# Patient Record
Sex: FEMALE | Race: WHITE | NOT HISPANIC OR LATINO | Employment: OTHER | URBAN - METROPOLITAN AREA
[De-identification: names, ages, dates, MRNs, and addresses within clinical notes are randomized per-mention and may not be internally consistent; named-entity substitution may affect disease eponyms.]

---

## 2018-01-08 ENCOUNTER — TELEPHONE (OUTPATIENT)
Dept: SURGERY | Facility: CLINIC | Age: 68
End: 2018-01-08

## 2018-01-08 NOTE — TELEPHONE ENCOUNTER
----- Message from Chantell Phillips sent at 1/8/2018  1:36 PM CST -----  Contact: [phone]  Pt wants to know if the provider does colonoscopies Please call pt at your earliest convenience.  Thanks !

## 2018-01-16 ENCOUNTER — OFFICE VISIT (OUTPATIENT)
Dept: SURGERY | Facility: CLINIC | Age: 68
End: 2018-01-16
Payer: MEDICARE

## 2018-01-16 VITALS — HEIGHT: 61 IN | BODY MASS INDEX: 24.52 KG/M2 | WEIGHT: 129.88 LBS | TEMPERATURE: 98 F

## 2018-01-16 DIAGNOSIS — Z87.19 HISTORY OF DIVERTICULITIS: Primary | ICD-10-CM

## 2018-01-16 PROCEDURE — 99204 OFFICE O/P NEW MOD 45 MIN: CPT | Mod: S$GLB,,, | Performed by: SURGERY

## 2018-01-16 PROCEDURE — 99999 PR PBB SHADOW E&M-EST. PATIENT-LVL III: CPT | Mod: PBBFAC,,, | Performed by: SURGERY

## 2018-01-16 RX ORDER — AZELASTINE HYDROCHLORIDE 0.5 MG/ML
SOLUTION/ DROPS OPHTHALMIC
COMMUNITY
End: 2018-04-17

## 2018-01-16 RX ORDER — HYDROCODONE BITARTRATE AND ACETAMINOPHEN 5; 325 MG/1; MG/1
1 TABLET ORAL EVERY 6 HOURS PRN
COMMUNITY
Start: 2017-12-08 | End: 2019-01-08

## 2018-01-16 RX ORDER — BENAZEPRIL HYDROCHLORIDE 20 MG/1
1 TABLET ORAL DAILY
Refills: 1 | COMMUNITY
Start: 2017-12-26 | End: 2018-06-21 | Stop reason: SDUPTHER

## 2018-01-16 RX ORDER — CLONIDINE HYDROCHLORIDE 0.1 MG/1
1 TABLET ORAL 2 TIMES DAILY
Refills: 1 | COMMUNITY
Start: 2017-12-19 | End: 2018-09-04 | Stop reason: SDUPTHER

## 2018-01-16 RX ORDER — EZETIMIBE 10 MG/1
1 TABLET ORAL DAILY
COMMUNITY
Start: 2018-01-15 | End: 2018-08-23 | Stop reason: ALTCHOICE

## 2018-01-16 NOTE — PROGRESS NOTES
Subjective:       Patient ID: Kristin Guerra is a 67 y.o. female.    Chief Complaint: Consult (Diverticulitis./Colonoscopy)    HPI  Pleasant 68 yo F referred to me for evaluation of colonoscopy.  Pt reportedly had a bout of diverticulitis around [place]. Diagnosis was based on clinical picture.  Pt notes that she has improved with abx.  PT states that she has never had diverticulitis in the past.  Pt notes that she still has some slight pain but that it is improving.  Also notes some slight nausea. Pt has history of colon cancer and has had a colectomy in 2007.  Pt also had a repeat colectomy in 2008 for apparent complications from the first.  Pt is a daily smoker.  She suffers with HTN, hypercholesterolemia, Lupus and tremors. Pt last colonoscopy was reportedly 7 years ago    Review of Systems   Constitutional: Negative for activity change, appetite change, fever and unexpected weight change.   HENT: Negative for congestion and mouth sores.    Respiratory: Negative for chest tightness, shortness of breath and wheezing.    Cardiovascular: Negative for chest pain.   Gastrointestinal: Positive for abdominal pain. Negative for abdominal distention, constipation, diarrhea, nausea and vomiting.   Genitourinary: Negative for difficulty urinating, dysuria and frequency.   Skin: Negative for color change and wound.   Neurological: Negative for dizziness.   Hematological: Negative for adenopathy. Does not bruise/bleed easily.   Psychiatric/Behavioral: Negative for agitation and decreased concentration.       Objective:      Physical Exam   Constitutional: She is oriented to person, place, and time. She appears well-developed and well-nourished.   HENT:   Head: Normocephalic and atraumatic.   Eyes: Pupils are equal, round, and reactive to light.   Neck: Normal range of motion. Neck supple. No tracheal deviation present. No thyromegaly present.   Cardiovascular: Normal rate, regular rhythm and normal heart sounds.    No  murmur heard.  Pulmonary/Chest: Effort normal. She has wheezes. She exhibits no tenderness.   Abdominal: Soft. Bowel sounds are normal. She exhibits no distension, no abdominal bruit, no pulsatile midline mass and no mass. There is no hepatosplenomegaly. There is no tenderness. There is no rigidity, no rebound, no guarding, no tenderness at McBurney's point and negative Rivera's sign. No hernia. Hernia confirmed negative in the ventral area.   Genitourinary: Rectum normal.   Musculoskeletal: Normal range of motion. She exhibits no edema, tenderness or deformity.   Lymphadenopathy:     She has no cervical adenopathy.        Right: No supraclavicular adenopathy present.        Left: No supraclavicular adenopathy present.   Neurological: She is alert and oriented to person, place, and time.   Skin: Skin is warm. No rash noted. No erythema.   Psychiatric: She has a normal mood and affect. Her behavior is normal.   Vitals reviewed.      Assessment:     Diverticulitis  No diagnosis found.    Plan:       PT with presumed diverticulitis based on outside exam. Given this and last cscope 7 years ago do feel that cscope is appropriate.  Recommend obtaining ct scan to ensure that inflammation has subsided. Will call pt after ct scan to schedule cscope

## 2018-01-18 ENCOUNTER — TELEPHONE (OUTPATIENT)
Dept: SURGERY | Facility: CLINIC | Age: 68
End: 2018-01-18

## 2018-01-18 NOTE — TELEPHONE ENCOUNTER
I called patient to inquire about the date of her surgery in 2008 with Dr. Link.  She states that she was in the hospital in 7/2008 and thinks that it was in 8/2008.  I'll call [facility] and request the reports. Atrhur

## 2018-01-23 ENCOUNTER — HOSPITAL ENCOUNTER (OUTPATIENT)
Dept: RADIOLOGY | Facility: HOSPITAL | Age: 68
Discharge: HOME | End: 2018-01-23
Attending: SURGERY
Payer: MEDICARE

## 2018-01-23 ENCOUNTER — TELEPHONE (OUTPATIENT)
Dept: SURGERY | Facility: CLINIC | Age: 68
End: 2018-01-23

## 2018-01-23 DIAGNOSIS — Z87.19 HISTORY OF DIVERTICULITIS: ICD-10-CM

## 2018-01-23 PROCEDURE — 74177 CT ABD & PELVIS W/CONTRAST: CPT | Mod: TC

## 2018-01-23 PROCEDURE — 74177 CT ABD & PELVIS W/CONTRAST: CPT | Mod: 26,,, | Performed by: RADIOLOGY

## 2018-01-23 PROCEDURE — 25500020 PHARM REV CODE 255

## 2018-01-23 RX ORDER — SODIUM CHLORIDE 9 MG/ML
INJECTION, SOLUTION INTRAVENOUS
Status: DISPENSED
Start: 2018-01-23 | End: 2018-01-23

## 2018-01-23 RX ADMIN — IOHEXOL 75 ML: 350 INJECTION, SOLUTION INTRAVENOUS at 08:01

## 2018-01-23 RX ADMIN — IOHEXOL 30 ML: 350 INJECTION, SOLUTION INTRAVENOUS at 08:01

## 2018-01-24 ENCOUNTER — TELEPHONE (OUTPATIENT)
Dept: SURGERY | Facility: CLINIC | Age: 68
End: 2018-01-24

## 2018-01-24 NOTE — TELEPHONE ENCOUNTER
----- Message from Lulu Snider sent at 1/24/2018  9:39 AM CST -----  Contact: patient  Patient calling to schedule a Colonoscopy. Please advise.  Call back   Thanks!

## 2018-01-26 ENCOUNTER — TELEPHONE (OUTPATIENT)
Dept: SURGERY | Facility: CLINIC | Age: 68
End: 2018-01-26

## 2018-01-26 DIAGNOSIS — Z12.11 SCREEN FOR COLON CANCER: Primary | ICD-10-CM

## 2018-01-26 RX ORDER — SODIUM CHLORIDE, SODIUM LACTATE, POTASSIUM CHLORIDE, CALCIUM CHLORIDE 600; 310; 30; 20 MG/100ML; MG/100ML; MG/100ML; MG/100ML
INJECTION, SOLUTION INTRAVENOUS CONTINUOUS
Status: CANCELLED | OUTPATIENT
Start: 2018-01-26

## 2018-01-26 NOTE — TELEPHONE ENCOUNTER
----- Message from Hanh Santos sent at 1/26/2018  1:55 PM CST -----  Patient requesting to speak with nurse concerning date and time of colonoscopy/please call back at [phone] to advise.

## 2018-01-26 NOTE — TELEPHONE ENCOUNTER
Colonoscopy is scheduled for 01/30/18 arrival time per the preop nurse.  Preop will call from [phone]  Fasting after midnight  Someone to drive you home    PLEASE NOTE THAT SURGERY TIMES CAN CHANGE THE PREOP NURSE WILL GIVE THE ARRIVAL TIME WHEN SHE/HE CALLS.  THE PREOP NURSE WILL CALL, SOMETIMES AS LATE AS 4 PM IN THE AFTERNOON THE DAY BEFORE SURGERY.        Special Instruction:

## 2018-01-29 ENCOUNTER — TELEPHONE (OUTPATIENT)
Dept: SURGERY | Facility: CLINIC | Age: 68
End: 2018-01-29

## 2018-01-29 NOTE — TELEPHONE ENCOUNTER
----- Message from Rosanna Hayden sent at 1/29/2018  7:24 AM CST -----  Contact: self  Needs to cancel colonoscopy, has been sick all weekend. Please call back at [phone] ([phone])

## 2018-01-29 NOTE — TELEPHONE ENCOUNTER
----- Message from Tatiana Kim sent at 1/29/2018 10:30 AM CST -----  Contact: self [phone]  Call placed to pod. Patient returned your call, please call back.  Thank you!

## 2018-02-20 ENCOUNTER — ANESTHESIA (OUTPATIENT)
Dept: ENDOSCOPY | Facility: HOSPITAL | Age: 68
End: 2018-02-20
Payer: MEDICARE

## 2018-02-20 ENCOUNTER — ANESTHESIA EVENT (OUTPATIENT)
Dept: ENDOSCOPY | Facility: HOSPITAL | Age: 68
End: 2018-02-20
Payer: MEDICARE

## 2018-02-20 ENCOUNTER — SURGERY (OUTPATIENT)
Age: 68
End: 2018-02-20

## 2018-02-20 ENCOUNTER — HOSPITAL ENCOUNTER (OUTPATIENT)
Facility: HOSPITAL | Age: 68
Discharge: HOME | End: 2018-02-20
Attending: SURGERY | Admitting: SURGERY
Payer: MEDICARE

## 2018-02-20 DIAGNOSIS — Z87.19 HX OF DIVERTICULITIS OF COLON: ICD-10-CM

## 2018-02-20 DIAGNOSIS — Z85.038 HX OF COLON CANCER, STAGE II: ICD-10-CM

## 2018-02-20 PROCEDURE — 45380 COLONOSCOPY AND BIOPSY: CPT | Performed by: SURGERY

## 2018-02-20 PROCEDURE — 27201012 HC FORCEPS, HOT/COLD, DISP: Performed by: SURGERY

## 2018-02-20 PROCEDURE — 37000009 HC ANESTHESIA EA ADD 15 MINS: Performed by: SURGERY

## 2018-02-20 PROCEDURE — 25000003 PHARM REV CODE 250: Performed by: SURGERY

## 2018-02-20 PROCEDURE — D9220A PRA ANESTHESIA: Mod: CRNA,,, | Performed by: NURSE ANESTHETIST, CERTIFIED REGISTERED

## 2018-02-20 PROCEDURE — 25000003 PHARM REV CODE 250: Performed by: NURSE ANESTHETIST, CERTIFIED REGISTERED

## 2018-02-20 PROCEDURE — 45380 COLONOSCOPY AND BIOPSY: CPT | Mod: ,,, | Performed by: SURGERY

## 2018-02-20 PROCEDURE — 63600175 PHARM REV CODE 636 W HCPCS: Performed by: NURSE ANESTHETIST, CERTIFIED REGISTERED

## 2018-02-20 PROCEDURE — D9220A PRA ANESTHESIA: Mod: ANES,,, | Performed by: ANESTHESIOLOGY

## 2018-02-20 PROCEDURE — 88305 TISSUE EXAM BY PATHOLOGIST: CPT | Performed by: PATHOLOGY

## 2018-02-20 PROCEDURE — 37000008 HC ANESTHESIA 1ST 15 MINUTES: Performed by: SURGERY

## 2018-02-20 RX ORDER — LIDOCAINE HYDROCHLORIDE 10 MG/ML
INJECTION, SOLUTION INTRAVENOUS
Status: DISCONTINUED | OUTPATIENT
Start: 2018-02-20 | End: 2018-02-20

## 2018-02-20 RX ORDER — SODIUM CHLORIDE 9 MG/ML
INJECTION, SOLUTION INTRAVENOUS CONTINUOUS
Status: DISCONTINUED | OUTPATIENT
Start: 2018-02-20 | End: 2018-02-20 | Stop reason: HOSPADM

## 2018-02-20 RX ORDER — PROPOFOL 10 MG/ML
VIAL (ML) INTRAVENOUS
Status: DISCONTINUED | OUTPATIENT
Start: 2018-02-20 | End: 2018-02-20

## 2018-02-20 RX ADMIN — PROPOFOL 20 MG: 10 INJECTION, EMULSION INTRAVENOUS at 07:02

## 2018-02-20 RX ADMIN — PROPOFOL 100 MG: 10 INJECTION, EMULSION INTRAVENOUS at 07:02

## 2018-02-20 RX ADMIN — SODIUM CHLORIDE 1000 ML: 0.9 INJECTION, SOLUTION INTRAVENOUS at 07:02

## 2018-02-20 RX ADMIN — LIDOCAINE HYDROCHLORIDE 50 MG: 10 INJECTION, SOLUTION INTRAVENOUS at 07:02

## 2018-02-20 RX ADMIN — PROPOFOL 10 MG: 10 INJECTION, EMULSION INTRAVENOUS at 08:02

## 2018-02-20 NOTE — OR NURSING
Sedation, oxygen delivery, and monitoring per anesthesia for endoscopic procedure.    Have you had a colonoscopy LESS THAN 3 years ago?   * If YES, answer these questions*:  No    1. Did patient have a prior colonic polyp in a previous surveillance/diagnostic colonoscopy and is 18 years or older on date of encounter?    2. Documentation of < 3 year interval since the patients last colonoscopy due to medical reasons (eg., last colonoscopy incomplete, last colonoscopy had inadequate prep, piecemeal removal of adenomas, or last colonoscopy found > 10 adenomas) ?

## 2018-02-20 NOTE — DISCHARGE INSTRUCTIONS

## 2018-02-20 NOTE — ANESTHESIA PREPROCEDURE EVALUATION
02/20/2018  Kristin Guerra is a 68 y.o., female.    Anesthesia Evaluation    I have reviewed the Patient Summary Reports.    I have reviewed the Nursing Notes.   I have reviewed the Medications.     Review of Systems  Anesthesia Hx:  No problems with previous Anesthesia    Social:  Smoker    Cardiovascular:   Hypertension, well controlled    Pulmonary:  Pulmonary Normal    Renal/:  Renal/ Normal     Hepatic/GI:   GERD, well controlled diverticulitis   Neurological:  Neurology Normal    Endocrine:  Endocrine Normal        Physical Exam  General:  Well nourished    Airway/Jaw/Neck:  Airway Findings: Mouth Opening: Normal Tongue: Normal  General Airway Assessment: Adult  Oropharynx Findings:  Mallampati: II  Jaw/Neck Findings:  Neck ROM: Normal ROM     Eyes/Ears/Nose:  Eyes/Ears/Nose Findings:    Dental:  Dental Findings:   Chest/Lungs:  Chest/Lungs Findings: Normal Respiratory Rate     Heart/Vascular:  Heart Findings: Rate: Normal  Rhythm: Regular Rhythm        Mental Status:  Mental Status Findings:  Cooperative, Alert and Oriented         Anesthesia Plan  Type of Anesthesia, risks & benefits discussed:  Anesthesia Type:  general  Patient's Preference:   Intra-op Monitoring Plan:   Intra-op Monitoring Plan Comments:   Post Op Pain Control Plan: multimodal analgesia  Post Op Pain Control Plan Comments:   Induction:   IV  Beta Blocker:  Patient is not currently on a Beta-Blocker (No further documentation required).       Informed Consent: Patient understands risks and agrees with Anesthesia plan.  Questions answered. Anesthesia consent signed with patient.  ASA Score: 3     Day of Surgery Review of History & Physical:  There are no significant changes.   H&P completed by Anesthesiologist.       Ready For Surgery From Anesthesia Perspective.

## 2018-02-20 NOTE — H&P
HPI  Pleasant 68 yo F referred to me for evaluation of colonoscopy.  Pt reportedly had a bout of diverticulitis around [place]. Diagnosis was based on clinical picture.  Pt notes that she has improved with abx.  PT states that she has never had diverticulitis in the past.  Pt notes that she still has some slight pain but that it is improving.  Also notes some slight nausea. Pt has history of colon cancer and has had a colectomy in 2007.  Pt also had a repeat colectomy in 2008 for apparent complications from the first.  Pt is a daily smoker.  She suffers with HTN, hypercholesterolemia, Lupus and tremors. Pt last colonoscopy was reportedly 7 years ago.   F/u ct scan demonstrated no evidence of diverticulitis.        Review of Systems   Constitutional: Negative for activity change, appetite change, fever and unexpected weight change.   HENT: Negative for congestion and mouth sores.    Respiratory: Negative for chest tightness, shortness of breath and wheezing.    Cardiovascular: Negative for chest pain.   Gastrointestinal: Positive for abdominal pain. Negative for abdominal distention, constipation, diarrhea, nausea and vomiting.   Genitourinary: Negative for difficulty urinating, dysuria and frequency.   Skin: Negative for color change and wound.   Neurological: Negative for dizziness.   Hematological: Negative for adenopathy. Does not bruise/bleed easily.   Psychiatric/Behavioral: Negative for agitation and decreased concentration.       Objective:   Physical Exam   Constitutional: She is oriented to person, place, and time. She appears well-developed and well-nourished.   HENT:   Head: Normocephalic and atraumatic.   Eyes: Pupils are equal, round, and reactive to light.   Neck: Normal range of motion. Neck supple. No tracheal deviation present. No thyromegaly present.   Cardiovascular: Normal rate, regular rhythm and normal heart sounds.    No murmur heard.  Pulmonary/Chest: Effort normal. She has wheezes. She  exhibits no tenderness.   Abdominal: Soft. Bowel sounds are normal. She exhibits no distension, no abdominal bruit, no pulsatile midline mass and no mass. There is no hepatosplenomegaly. There is no tenderness. There is no rigidity, no rebound, no guarding, no tenderness at McBurney's point and negative Rivera's sign. No hernia. Hernia confirmed negative in the ventral area.   Genitourinary: Rectum normal.   Musculoskeletal: Normal range of motion. She exhibits no edema, tenderness or deformity.   Lymphadenopathy:     She has no cervical adenopathy.        Right: No supraclavicular adenopathy present.        Left: No supraclavicular adenopathy present.   Neurological: She is alert and oriented to person, place, and time.   Skin: Skin is warm. No rash noted. No erythema.   Psychiatric: She has a normal mood and affect. Her behavior is normal.   Vitals reviewed.      Assessment:     Diverticulitis  No diagnosis found.    Plan:       Resolved diverticulitis.  Proceed with cscope.

## 2018-02-20 NOTE — TRANSFER OF CARE
"Anesthesia Transfer of Care Note    Patient: Kristin Guerra    Procedure(s) Performed: Procedure(s) (LRB):  COLONOSCOPY (N/A)    Patient location: PACU    Anesthesia Type: general    Transport from OR: Transported from OR on 2-3 L/min O2 by NC with adequate spontaneous ventilation    Post pain: adequate analgesia    Post assessment: no apparent anesthetic complications and tolerated procedure well    Post vital signs: stable    Level of consciousness: awake, alert and oriented    Nausea/Vomiting: no nausea/vomiting    Complications: none    Transfer of care protocol was followed      Last vitals:   Visit Vitals  BP (!) 93/57   Pulse (!) 52   Temp 36.8 °C (98.2 °F) (Oral)   Resp 18   Ht 5' 1" (1.549 m)   Wt 59 kg (130 lb)   SpO2 99%   Breastfeeding? No   BMI 24.56 kg/m²     "

## 2018-02-21 VITALS
HEIGHT: 61 IN | WEIGHT: 130 LBS | SYSTOLIC BLOOD PRESSURE: 132 MMHG | BODY MASS INDEX: 24.55 KG/M2 | DIASTOLIC BLOOD PRESSURE: 72 MMHG | RESPIRATION RATE: 18 BRPM | HEART RATE: 55 BPM | TEMPERATURE: 98 F | OXYGEN SATURATION: 99 %

## 2018-02-25 NOTE — PHYSICIAN QUERY
PT Name: Kristin Guerra  MR #: [id number]     Physician Query Form - Documentation Clarification      CDS/: Mirna Allen               Contact information:    This form is a permanent document in the medical record.     Query Date: February 25, 2018    By submitting this query, we are merely seeking further clarification of documentation. Please utilize your independent clinical judgment when addressing the question(s) below.    The Medical record reflects the following:    Supporting Clinical Findings Location in Medical Record     screning       Surgery tab     Abdominal pain in the left lower quadrant       Proc note                                                                            Doctor Esquivel,  Was the intent of the colonoscopy:    Provider Use Only      [x ] screening    [ ] diagnostic                                                                                                                         [  ] Clinically undetermined

## 2018-04-17 ENCOUNTER — OFFICE VISIT (OUTPATIENT)
Dept: GASTROENTEROLOGY | Facility: CLINIC | Age: 68
End: 2018-04-17
Payer: MEDICARE

## 2018-04-17 VITALS
BODY MASS INDEX: 23.81 KG/M2 | HEIGHT: 61 IN | SYSTOLIC BLOOD PRESSURE: 138 MMHG | DIASTOLIC BLOOD PRESSURE: 65 MMHG | WEIGHT: 126.13 LBS | HEART RATE: 63 BPM

## 2018-04-17 DIAGNOSIS — R12 HEARTBURN: Primary | ICD-10-CM

## 2018-04-17 DIAGNOSIS — R14.3 FLATULENCE: ICD-10-CM

## 2018-04-17 DIAGNOSIS — R12 HEARTBURN: ICD-10-CM

## 2018-04-17 DIAGNOSIS — R10.12 LUQ PAIN: ICD-10-CM

## 2018-04-17 DIAGNOSIS — R10.13 EPIGASTRIC PAIN: Primary | ICD-10-CM

## 2018-04-17 DIAGNOSIS — R10.9 FLANK PAIN: ICD-10-CM

## 2018-04-17 DIAGNOSIS — R14.2 ERUCTATION: ICD-10-CM

## 2018-04-17 DIAGNOSIS — R10.9 LEFT FLANK PAIN: ICD-10-CM

## 2018-04-17 DIAGNOSIS — Z87.898 HISTORY OF URINARY INCONTINENCE: ICD-10-CM

## 2018-04-17 DIAGNOSIS — R10.13 EPIGASTRIC PAIN: ICD-10-CM

## 2018-04-17 DIAGNOSIS — Z87.19 HISTORY OF DIVERTICULOSIS: ICD-10-CM

## 2018-04-17 DIAGNOSIS — Z85.038 HISTORY OF COLON CANCER: ICD-10-CM

## 2018-04-17 DIAGNOSIS — R07.9 NONSPECIFIC CHEST PAIN: ICD-10-CM

## 2018-04-17 PROBLEM — H26.9 CATARACT: Status: ACTIVE | Noted: 2018-04-17

## 2018-04-17 PROBLEM — G25.2 ESSENTIAL AND OTHER SPECIFIED FORMS OF TREMOR: Status: ACTIVE | Noted: 2018-04-17

## 2018-04-17 PROBLEM — M54.12 CERVICAL RADICULOPATHY: Status: ACTIVE | Noted: 2018-04-17

## 2018-04-17 PROBLEM — M50.20 HERNIATION OF INTERVERTEBRAL DISC OF CERVICAL REGION: Status: ACTIVE | Noted: 2018-04-17

## 2018-04-17 PROBLEM — M50.30 DEGENERATION OF INTERVERTEBRAL DISC OF CERVICAL REGION: Status: ACTIVE | Noted: 2018-04-17

## 2018-04-17 PROBLEM — G25.0 ESSENTIAL AND OTHER SPECIFIED FORMS OF TREMOR: Status: ACTIVE | Noted: 2018-04-17

## 2018-04-17 PROBLEM — L93.0 LUPUS ERYTHEMATOSUS: Status: ACTIVE | Noted: 2018-04-17

## 2018-04-17 PROBLEM — I10 HYPERTENSION: Status: ACTIVE | Noted: 2018-04-17

## 2018-04-17 PROBLEM — K20.90 ESOPHAGITIS: Status: ACTIVE | Noted: 2018-04-17

## 2018-04-17 PROBLEM — K21.9 GASTROESOPHAGEAL REFLUX DISEASE: Status: ACTIVE | Noted: 2018-04-17

## 2018-04-17 PROBLEM — E78.00 HYPERCHOLESTEROLEMIA: Status: ACTIVE | Noted: 2018-04-17

## 2018-04-17 PROBLEM — E03.9 HYPOACTIVE THYROID: Status: ACTIVE | Noted: 2018-04-17

## 2018-04-17 PROCEDURE — 99999 PR PBB SHADOW E&M-EST. PATIENT-LVL IV: CPT | Mod: PBBFAC,,, | Performed by: NURSE PRACTITIONER

## 2018-04-17 PROCEDURE — 99203 OFFICE O/P NEW LOW 30 MIN: CPT | Mod: S$GLB,,, | Performed by: NURSE PRACTITIONER

## 2018-04-17 RX ORDER — AMLODIPINE BESYLATE 5 MG/1
5 TABLET ORAL DAILY
COMMUNITY
Start: 2018-03-24 | End: 2018-06-21 | Stop reason: SDUPTHER

## 2018-04-17 RX ORDER — NAPROXEN SODIUM 220 MG
220 TABLET ORAL DAILY PRN
COMMUNITY
End: 2018-10-31

## 2018-04-17 RX ORDER — POLYETHYLENE GLYCOL 3350 17 G/17G
17 POWDER, FOR SOLUTION ORAL DAILY PRN
Status: ON HOLD | COMMUNITY
End: 2020-06-04 | Stop reason: SDUPTHER

## 2018-04-17 RX ORDER — CALCIUM CARBONATE 200(500)MG
1 TABLET,CHEWABLE ORAL 2 TIMES DAILY PRN
COMMUNITY
End: 2020-09-29

## 2018-04-17 NOTE — PATIENT INSTRUCTIONS
Excess Gas  Certain foods produce gas when digested. In some people, these foods make an excessive amount of gas. This may cause bloating, burping, or increased gas passing through the rectum (flatulence).     Foods that cause gas  The following foods are more likely to cause this problem. Limit them, or remove them from your diet:  · Broccoli  · Cauliflower  · [place] sprouts  · Cabbage  · Cooked dried beans  · Fizzy (carbonated) drinks, such as sparkling water, soda, beer, and champagne  Other causes  Other causes of excess gas include:  · Eating too fast or talking while you chew. This may cause you to swallow air. This increases the amount of gas in your stomach. And it may make your symptoms worse. Chew each mouthful completely before you swallow. Take your time.  · Chewing on gum or sucking on hard candy. These cause you to swallow more often. And some of what you are swallowing is air. This leads to more gas in your stomach. Avoid chewing gum and hard candy.  · Overeating. This may increase the feeling of being bloated and cause more gas. When you are full, stop eating.   · Being constipated. This can increase the amount of normal intestinal gas. Avoid constipation by getting more fiber in your diet. Good sources of fiber include whole-grain cereal, fresh vegetables (except those in the above list), and fresh fruits. High-fiber foods absorb water and carry it out of the body. When adding more fiber to your diet, you also need to drink more water. You should drink at least 8, 8-ounce glasses of water (2 quarts) per day.  Date Last Reviewed: 8/1/2016  © 3499-6731 [employer]. [address] [place] [address]. All rights reserved. This information is not intended as a substitute for professional medical care. Always follow your healthcare professional's instructions.        Abdominal Pain    Abdominal pain is pain in the stomach or belly area. Everyone has this pain from time to time.  In many cases it goes away on its own. But abdominal pain can sometimes be due to a serious problem, such as appendicitis. So its important to know when to seek help.  Causes of abdominal pain  There are many possible causes of abdominal pain. Common causes in adults include:  · Constipation, diarrhea, or gas  · Stomach acid flowing back up into the esophagus (acid reflux or heartburn)  · Severe acid reflux, called GERD (gastroesophageal reflux disease)  · A sore in the lining of the stomach or small intestine (peptic ulcer)  · Inflammation of the gallbladder, liver, or pancreas  · Gallstones or kidney stones  · Appendicitis   · Intestinal blockage   · An internal organ pushing through a muscle or other tissue (hernia)  · Urinary tract infections  · In women, menstrual cramps, fibroids, or endometriosis  · Inflammation or infection of the intestines  Diagnosing the cause of abdominal pain  Your healthcare provider will do a physical exam help find the cause of your pain. If needed, tests will be ordered. Belly pain has many possible causes. So it can be hard to find the reason for your pain. Giving details about your pain can help. Tell your provider where and when you feel the pain, and what makes it better or worse. Also let your provider know if you have other symptoms such as:  · Fever  · Tiredness  · Upset stomach (nausea)  · Vomiting  · Changes in bathroom habits  Treating abdominal pain  Some causes of pain need emergency medical treatment right away. These include appendicitis or a bowel blockage. Other problems can be treated with rest, fluids, or medicines. Your healthcare provider can give you specific instructions for treatment or self-care based on what is causing your pain.  If you have vomiting or diarrhea, sip water or other clear fluids. When you are ready to eat solid foods again, start with small amounts of easy-to-digest, low-fat foods. These include apple sauce, toast, or crackers.   When to  seek medical care  Call 911 or go to the hospital right away if you:  · Cant pass stool and are vomiting  · Are vomiting blood or have bloody diarrhea or black, tarry diarrhea  · Have chest, neck, or shoulder pain  · Feel like you might pass out  · Have pain in your shoulder blades with nausea  · Have sudden, severe belly pain  · Have new, severe pain unlike any you have felt before  · Have a belly that is rigid, hard, and tender to touch  Call your healthcare provider if you have:  · Pain for more than 5 days  · Bloating for more than 2 days  · Diarrhea for more than 5 days  · A fever of 100.4°F (38.0°C) or higher, or as directed by your provider  · Pain that gets worse  · Weight loss for no reason  · Continued lack of appetite  · Blood in your stool  How to prevent abdominal pain  Here are some tips to help prevent abdominal pain:  · Eat smaller amounts of food at one time.  · Avoid greasy, fried, or other high-fat foods.  · Avoid foods that give you gas.  · Exercise regularly.  · Drink plenty of fluids.  To help prevent GERD symptoms:  · Quit smoking.  · Reduce alcohol and certain foods that increase stomach acid.  · Avoid aspirin and over-the-counter pain and fever medicines (NSAIDS or nonsteroidal anti-inflammatory drugs), if possible  · Lose extra weight.  · Finish eating at least 2 hours before you go to bed or lie down.  · Raise the head of your bed.  Date Last Reviewed: 7/1/2016  © 2566-5695 [employer]. [address] [place] [address], [place] [address]. All rights reserved. This information is not intended as a substitute for professional medical care. Always follow your healthcare professional's instructions.

## 2018-04-17 NOTE — PROGRESS NOTES
Subjective:       Patient ID: Kristin Guerra is a 68 y.o. female Body mass index is 23.83 kg/m².    Chief Complaint: Abdominal Pain ( extreme flatus)    This patient is new to me.    Abdominal Pain   This is a new problem. Episode onset: started a couple of months ago. The problem occurs every several days. The pain is located in the epigastric region. The pain is at a severity of 0/10 (currently). The quality of the pain is a sensation of fullness (pressure). The abdominal pain radiates to the LUQ and left flank (left flank and LUQ pain is daily described as nagging pain). Associated symptoms include belching (frequent), flatus (frequent) and nausea. Pertinent negatives include no anorexia, constipation (miralax prn; bowel movements once daily), diarrhea, dysuria, fever, frequency, hematochezia, melena, vomiting or weight loss. Associated symptoms comments: Denies change in bowel habits. Exacerbated by: lying on left side, coffee. The pain is relieved by belching and passing flatus. She has tried proton pump inhibitors (tums prn, nexium 40 mg daily (restarted a couple of weeks ago), aleve prn about twice a week) for the symptoms. Prior diagnostic workup includes lower endoscopy. Her past medical history is significant for abdominal surgery, colon cancer and GERD. There is no history of gallstones, pancreatitis or PUD.     Review of Systems   Constitutional: Negative for appetite change, chills, fatigue, fever, unexpected weight change and weight loss.   HENT: Positive for postnasal drip (frequent). Negative for sore throat and trouble swallowing.    Respiratory: Positive for cough (productive of clear mucus, relates to sinus and allergies). Negative for choking and shortness of breath.    Cardiovascular: Positive for chest pain (see cardiologist and told ok; denies currently).   Gastrointestinal: Positive for abdominal pain, flatus (frequent) and nausea. Negative for anal bleeding, anorexia, blood in stool,  constipation (miralax prn; bowel movements once daily), diarrhea, hematochezia, melena, rectal pain and vomiting.   Genitourinary: Negative for difficulty urinating, dysuria, flank pain and frequency.        Bladder incontinence- scheduled to see urology on 18   Neurological: Negative for weakness.       Past Medical History:   Diagnosis Date    Allergy     Colon cancer     Colon polyp     Diverticulitis     Diverticulosis     GERD (gastroesophageal reflux disease)     Hypertension     Lupus     Osteoarthritis     Tremor      Past Surgical History:   Procedure Laterality Date    APPENDECTOMY      CERVICAL FUSION       SECTION      COLON SURGERY   &     hemicolectomy for colon cancer and second was for complications from the first surgery    COLONOSCOPY N/A 2018    Procedure: COLONOSCOPY;  Surgeon: Maverick Esquivel MD;  Location: [facility];  Service: Endoscopy;  Laterality: N/A; repeat in 5 years for surveillance    HYSTERECTOMY      ovaries remain    SPLENECTOMY, TOTAL      UPPER GASTROINTESTINAL ENDOSCOPY      GERD & hiatal hernia per patient report     Family History   Problem Relation Age of Onset    Colon cancer Father      diagnosed in his 60's    Eczema Neg Hx     Lupus Neg Hx     Psoriasis Neg Hx     Melanoma Neg Hx     Crohn's disease Neg Hx     Ulcerative colitis Neg Hx     Stomach cancer Neg Hx     Esophageal cancer Neg Hx      Wt Readings from Last 10 Encounters:   18 57.2 kg (126 lb 1.7 oz)   18 59 kg (130 lb)   18 58.9 kg (129 lb 13.6 oz)   16 56.7 kg (125 lb)   16 57.1 kg (125 lb 14.1 oz)   13 60.3 kg (133 lb)   11/17/10 65.5 kg (144 lb 4.7 oz)     Lab Results   Component Value Date    WBC 13.5 (H) 2017    HGB 12.4 2017    HCT 35.8 (L) 2017    MCV 93.0 2017     2017     CMP  Sodium   Date Value Ref Range Status   2017 140 135 - 146 mmol/L Final     Potassium   Date  "Value Ref Range Status   12/11/2017 4.2 3.5 - 5.3 mmol/L Final     Chloride   Date Value Ref Range Status   12/11/2017 106 98 - 110 mmol/L Final     CO2   Date Value Ref Range Status   12/11/2017 27 20 - 31 mmol/L Final     Glucose   Date Value Ref Range Status   12/11/2017 81 65 - 99 mg/dL Final     Comment:                   Fasting reference interval          BUN, Bld   Date Value Ref Range Status   12/11/2017 11 7 - 25 mg/dL Final     Creatinine   Date Value Ref Range Status   01/23/2018 0.7 0.5 - 1.4 mg/dL Final     Calcium   Date Value Ref Range Status   12/11/2017 9.4 8.6 - 10.4 mg/dL Final     Total Protein   Date Value Ref Range Status   12/11/2017 7.2 6.1 - 8.1 g/dL Final     Albumin   Date Value Ref Range Status   12/11/2017 3.8 3.6 - 5.1 g/dL Final     Total Bilirubin   Date Value Ref Range Status   12/11/2017 0.5 0.2 - 1.2 mg/dL Final     Alkaline Phosphatase   Date Value Ref Range Status   12/11/2017 78 33 - 130 U/L Final     AST   Date Value Ref Range Status   12/11/2017 17 10 - 35 U/L Final     ALT   Date Value Ref Range Status   12/11/2017 14 6 - 29 U/L Final     Anion Gap   Date Value Ref Range Status   11/01/2010 16 10 - 20 mmol/L Final     eGFR if    Date Value Ref Range Status   01/23/2018 >60 >60 mL/min/1.73 m^2 Final     eGFR if non    Date Value Ref Range Status   01/23/2018 >60 >60 mL/min/1.73 m^2 Final     Comment:     Calculation used to obtain the estimated glomerular filtration  rate (eGFR) is the CKD-EPI equation.        Lab Results   Component Value Date    AMYLASE 79 12/19/2017     Lab Results   Component Value Date    LIPASE 17 12/19/2017     Lab Results   Component Value Date    TSH 0.80 02/09/2018     Reviewed prior medical records including radiology report of 1/23/18 ct abdomen pelvis & endoscopy history (see surgical history).    2/20/18 Colonoscopy was reviewed and procedure report states:   " Findings:       Non-bleeding hemorrhoids were found " "during endoscopy. The        hemorrhoids were small. Estimated blood loss was minimal.       Scattered small and large-mouthed diverticula were found in the        sigmoid colon. Estimated blood loss: none.       A 2 mm polyp was found in the recto-sigmoid colon. The polyp was        sessile. The polyp was removed with a cold biopsy forceps. Resection        and retrieval were complete. Estimated blood loss was minimal.       The exam was otherwise without abnormality.  Impression:          - Non-bleeding hemorrhoids.                       - Diverticulosis in the sigmoid colon.                       - One 2 mm polyp at the recto-sigmoid colon, removed                        with a cold biopsy forceps. Resected and retrieved.                       - The examination was otherwise normal.  Recommendation:      - Discharge patient to home (ambulatory).                       - Resume regular diet.                       - Await pathology results.                       - Repeat colonoscopy in 5 years for surveillance.                       - Return to my office PRN. ".  Biopsy results:   "Polyp, rectosigmoid colon, polypectomy:  Hyperplastic polyp, 1 fragment, negative for dysplasia."  Objective:      Physical Exam   Constitutional: She is oriented to person, place, and time. She appears well-developed and well-nourished. No distress.   HENT:   Mouth/Throat: Oropharynx is clear and moist and mucous membranes are normal. No oral lesions. No oropharyngeal exudate.   Eyes: Conjunctivae are normal. Pupils are equal, round, and reactive to light. No scleral icterus.   Cardiovascular: Normal rate.    Pulmonary/Chest: Effort normal and breath sounds normal. No respiratory distress. She has no wheezes.   Abdominal: Soft. Normal appearance and bowel sounds are normal. She exhibits no distension, no abdominal bruit and no mass. There is no hepatosplenomegaly. There is tenderness (mild) in the epigastric area. There is no rigidity, " no rebound, no guarding, no tenderness at McBurney's point and negative Rivera's sign. No hernia.   Well-healed surgical scars noted.   Neurological: She is alert and oriented to person, place, and time.   Skin: Skin is warm and dry. No rash noted. She is not diaphoretic. No erythema. No pallor.   Non-jaundiced   Psychiatric: She has a normal mood and affect. Her behavior is normal. Judgment and thought content normal.   Nursing note and vitals reviewed.      Assessment:       1. Epigastric pain    2. Flatulence    3. Eructation    4. History of colon cancer    5. History of diverticulosis    6. Left flank pain    7. LUQ pain    8. History of urinary incontinence    9. Heartburn    10. Nonspecific chest pain        Plan:       Epigastric pain  -     US Abdomen Complete; Future; Expected date: 04/17/2018  -     CBC auto differential; Future; Expected date: 04/17/2018  -     Comprehensive metabolic panel; Future; Expected date: 04/17/2018  -     Amylase; Future; Expected date: 04/17/2018  -     Lipase; Future; Expected date: 04/17/2018  - schedule EGD, discussed procedure with patient, patient verbalized understanding    Flatulence & Eructation  - schedule EGD, discussed procedure with patient, patient verbalized understanding  - recommended OTC simethicone as directed, such as Phazyme or Gas-x  -discussed with patient about low gas diet: Reduce or eliminate these foods from your diet: Broccoli, Cauliflower, [place] sprouts, Cabbage, Cooked dried beans, Carbonated beverages (sparkling water, soda, beer, champagne)  Other Causes Of Excess Gas Include:   1) EATING TOO FAST or TALKING WHILE YOU CHEW may cause you to swallow air. This increases the amount of gas in the stomach and may worsen your symptoms.  --> Chew each mouthful completely before swallowing. Take your time.  2) OVEREATING may increase the feeling of being bloated and cause more gas.  --> When you are full, stop eating.  3) CONSTIPATION can increase the  amount of normal intestinal gas.  --> Avoid constipation by increasing the amount of fiber in your diet by including whole cereal grains, fresh vegetables (except those in the above list) and fresh fruits. High-fiber foods absorb water and carry it out of the body. When increasing the amount of fiber in your diet, you also need to increase the amount of water that you drink. You should drink at least eight 8-ounce glasses of water (two quarts) per day.    History of colon cancer & History of diverticulosis  - discussed the diagnosis of diverticulosis and diverticulitis, & prevent diverticulitis, high fiber diet is recommended.  -Recommended high fiber diet. Recommended daily exercise, adequate water intake (six 8-oz glasses of water daily), and high fiber diet. OTC fiber supplements are recommended if diet does not reach daily fiber goal (25 grams daily), such as Metamucil, Citrucel, or FiberCon (take as directed, separate from other oral medications by >2 hours).  - Advised to avoid/minimize popcorn, corn, seeds, and nuts.    Left flank pain  -     US Abdomen Complete; Future; Expected date: 04/17/2018  -     CBC auto differential; Future; Expected date: 04/17/2018  -     Comprehensive metabolic panel; Future; Expected date: 04/17/2018  -     Amylase; Future; Expected date: 04/17/2018  -     Lipase; Future; Expected date: 04/17/2018  Recommend follow-up with UROLOGY AS SCHEDULED for continued evaluation and management.    LUQ pain  -     US Abdomen Complete; Future; Expected date: 04/17/2018  -     CBC auto differential; Future; Expected date: 04/17/2018  -     Comprehensive metabolic panel; Future; Expected date: 04/17/2018  -     Amylase; Future; Expected date: 04/17/2018  -     Lipase; Future; Expected date: 04/17/2018  - schedule EGD, discussed procedure with patient, patient verbalized understanding    History of urinary incontinence  Recommend follow-up with UROLOGY AS SCHEDULED for continued evaluation and  management.    Heartburn  - CONTINUE NEXIUM 40 MG ONCE DAILY AS DIRECTED, take in the morning 30-60 minutes before breakfast, discussed about possible long term use of medication (prefer to use lowest effective dose or discontinuing if possible) and discussed the risks & benefits with taking a reflux medication long term, and to take OTC calcium and vitamin d supplements as directed (such as Citracal +D), pt verbalized understanding  -discussed about the different types of medications used to treat reflux and how to use them, antacids can be used PRN for breakthrough heartburn symptoms by reducing stomach acid that is already produced, H2 blockers (zantac) work by limiting the amount acid production, & PPI's work to block acid production and are taken daily, patient verbalized understanding.  -Educated patient on lifestyle modifications to help control/reduce reflux/abdominal pain including: avoid large meals, avoid eating within 2-3 hours of bedtime (avoid late night eating & lying down soon after eating), elevate head of bed if nocturnal symptoms are present, smoking cessation (if current smoker), & weight loss (if overweight).   -Educated to avoid known foods which trigger reflux symptoms & to minimize/avoid high-fat foods, chocolate, caffeine, citrus, alcohol, & tomato products.  -Advised to avoid/limit use of NSAID's, since they can cause GI upset, bleeding, and/or ulcers. If needed, take with food.   - schedule EGD, discussed procedure with patient, patient verbalized understanding    Nonspecific chest pain  - follow-up with PCP &/or cardiologist for continued evaluation and management    Follow-up in about 1 month (around 5/17/2018), or if symptoms worsen or fail to improve.      If no improvement in symptoms or symptoms worsen, call/follow-up at clinic or go to ER.

## 2018-04-19 ENCOUNTER — OFFICE VISIT (OUTPATIENT)
Dept: UROLOGY | Facility: CLINIC | Age: 68
End: 2018-04-19
Payer: MEDICARE

## 2018-04-19 ENCOUNTER — APPOINTMENT (OUTPATIENT)
Dept: LAB | Facility: HOSPITAL | Age: 68
End: 2018-04-19
Attending: UROLOGY
Payer: MEDICARE

## 2018-04-19 VITALS
BODY MASS INDEX: 24.76 KG/M2 | WEIGHT: 126.13 LBS | TEMPERATURE: 98 F | SYSTOLIC BLOOD PRESSURE: 116 MMHG | HEART RATE: 52 BPM | HEIGHT: 60 IN | DIASTOLIC BLOOD PRESSURE: 66 MMHG

## 2018-04-19 DIAGNOSIS — R31.29 MICROHEMATURIA: ICD-10-CM

## 2018-04-19 DIAGNOSIS — N39.46 MIXED INCONTINENCE: ICD-10-CM

## 2018-04-19 DIAGNOSIS — N32.81 OAB (OVERACTIVE BLADDER): Primary | ICD-10-CM

## 2018-04-19 LAB
BACTERIA #/AREA URNS HPF: ABNORMAL /HPF
BILIRUB SERPL-MCNC: ABNORMAL MG/DL
BILIRUB UR QL STRIP: NEGATIVE
BLOOD URINE, POC: ABNORMAL
CLARITY UR: CLEAR
COLOR UR: YELLOW
COLOR, POC UA: ABNORMAL
GLUCOSE UR QL STRIP: ABNORMAL
GLUCOSE UR QL STRIP: NEGATIVE
HGB UR QL STRIP: ABNORMAL
KETONES UR QL STRIP: ABNORMAL
KETONES UR QL STRIP: NEGATIVE
LEUKOCYTE ESTERASE UR QL STRIP: NEGATIVE
LEUKOCYTE ESTERASE URINE, POC: ABNORMAL
MICROSCOPIC COMMENT: ABNORMAL
NITRITE UR QL STRIP: NEGATIVE
NITRITE, POC UA: ABNORMAL
PH UR STRIP: 7 [PH] (ref 5–8)
PH, POC UA: 7
PROT UR QL STRIP: NEGATIVE
PROTEIN, POC: ABNORMAL
RBC #/AREA URNS HPF: 0 /HPF (ref 0–4)
SP GR UR STRIP: <=1.005 (ref 1–1.03)
SPECIFIC GRAVITY, POC UA: 1010
SQUAMOUS #/AREA URNS HPF: 5 /HPF
URN SPEC COLLECT METH UR: ABNORMAL
UROBILINOGEN UR STRIP-ACNC: NEGATIVE EU/DL
UROBILINOGEN, POC UA: ABNORMAL
WBC #/AREA URNS HPF: 1 /HPF (ref 0–5)

## 2018-04-19 PROCEDURE — 99204 OFFICE O/P NEW MOD 45 MIN: CPT | Mod: 25,S$GLB,, | Performed by: UROLOGY

## 2018-04-19 PROCEDURE — 51725 SIMPLE CYSTOMETROGRAM: CPT | Mod: S$GLB,,, | Performed by: UROLOGY

## 2018-04-19 PROCEDURE — 81002 URINALYSIS NONAUTO W/O SCOPE: CPT | Mod: S$GLB,,, | Performed by: UROLOGY

## 2018-04-19 PROCEDURE — 87086 URINE CULTURE/COLONY COUNT: CPT

## 2018-04-19 PROCEDURE — 99999 PR PBB SHADOW E&M-EST. PATIENT-LVL IV: CPT | Mod: PBBFAC,,, | Performed by: UROLOGY

## 2018-04-19 PROCEDURE — 88112 CYTOPATH CELL ENHANCE TECH: CPT | Performed by: PATHOLOGY

## 2018-04-19 PROCEDURE — 81000 URINALYSIS NONAUTO W/SCOPE: CPT

## 2018-04-19 PROCEDURE — 88112 CYTOPATH CELL ENHANCE TECH: CPT | Mod: 26,,, | Performed by: PATHOLOGY

## 2018-04-19 NOTE — Clinical Note
Cath urine for urinalysis and cytology Voided urine for ua microscopic and culture F/u 8-10 weeks with sara

## 2018-04-19 NOTE — PATIENT INSTRUCTIONS
OAB with urge incontinence  -stress test today negative so more likely oab and urge incontinence.   -recommend she stop drinking coffee (3 cups a day)  -will also start myrbetriq 50mg daily. Explained all meds take 4-6 weeks to work and gave her a list of the medications she can call if myrbetriq not covered. All other meds cause dry mouth and constipation to some extent.  -we went over bladder pathway.  -f/u in 8-10 weeks with sara to see how she is doing on myrbetriq, bring diary. If not doing well then can change meds and f/u with me in 8-10 weeks later. If doing well then can continue myrbetriq and f/u with me in a year unless blood in urine needs further workup depending on cytology.  -voiding diary now for 3 days and prior to f/u     Microhematuria  -longstanding, could be from urethral caruncle  -sending urine cytology and urinalysis from cath urine   -send voided urine for urine culture and ua microscopic  -recent ct 1/2018 showed no stones or tumors  -may need a cytoscocpy     F/u 8-10 weeks with sara with voiding diary.

## 2018-04-19 NOTE — PROGRESS NOTES
[facility] Note - [place]  Staff: MD Evelyn    Referring provider and please cc: Dr.Clinton Castaneda  PCP: kamar Haneyner: inactive    Chief Complaint: oab, incontinence, recurrent uti    Subjective:        HPI: Kristin Guerra is a 68 y.o. female presents with     OAB and incontinence  -previously had seen  in . Urine cytology normal. Urine culture negative. Consider cyst  -she voids 10-15x a day. 2-3 pantyliners a day. Larger pad at night. She has mixed incontinence, IRAJ > UUI. . 2nd baby was almost 10 pounds. Denies any recurrent utis  -has never tried any medications for oab. Drinks 3 cups of coffee a day. 1 caffeine free diet coke a day. No tea. BM daily.   -sexually active     Microhematuria  -blood in urin in . No hx of stones.on no AC. Current smoker 1 ppd x 38. No gross hematuria. No uti's.     Ua today: 1.010//tr blood  ua cx:  13 ng    L flank pain  -likely musculoskeletal. Sees a neurologist and pain management doctor.  -she says the pain is worse when she lays on it  -ct scan 2018 showed no kidney stones or abnormalities  -urine sample today shows no UTI.       ECOG Status: 0    REVIEW OF SYSTEMS:  General ROS: no fevers, no chills  Psychological ROS: no depression  Endocrine ROS: no heat or cold  Respiratory ROS: no SOB  Cardiovascular ROS: no CP  Gastrointestinal ROS: no abdominal pain, no constipation, no diarrhea, noBRBPR  Musculoskeletal ROS: no muscle pain  Neurological ROS: no headaches  Dermatological ROS: no rashes  HEENT: noglasses, no sinus   ROS: per HPI     PMHx:  Past Medical History:   Diagnosis Date    Allergy     Colon cancer     Colon polyp     Diverticulitis     Diverticulosis     GERD (gastroesophageal reflux disease)     Hypertension     Lupus     Osteoarthritis     Tremor      Kidney stones: No    PSHx:  Past Surgical History:   Procedure Laterality Date    APPENDECTOMY      CERVICAL FUSION        SECTION      COLON SURGERY  2007 & 2008    hemicolectomy for colon cancer and second was for complications from the first surgery    COLONOSCOPY N/A 2/20/2018    Procedure: COLONOSCOPY;  Surgeon: Maverick Esquivel MD;  Location: [facility];  Service: Endoscopy;  Laterality: N/A; repeat in 5 years for surveillance    HYSTERECTOMY      ovaries remain    SPLENECTOMY, TOTAL      UPPER GASTROINTESTINAL ENDOSCOPY  2007    GERD & hiatal hernia per patient report     Urologic or Gynecologic Surgery: hysterectomy    Stents/Valves/Foreign Bodies: No  Cardiac Evaluation: No    Screening Studies  Colonoscopy:     Fam Hx:   malignancies: No , gyn malignancies: No .   kidney stones: No     Soc Hx:  +tobacco.  1pk per x 40 year  No alcohol  Lives in   :yes  Children: 2  Occupation:    Allergies:  Compazine [prochlorperazine edisylate]; Primidone; Simvastatin; Topiramate; and Morphine    Medications: reviewed   Anticoagulation: No    Objective:     Vitals:    04/19/18 1446   BP: 116/66   Pulse: (!) 52   Temp: 98.1 °F (36.7 °C)       General:WDWN in NAD  Eyes: PERRLA, normal conjunctiva  Respiratory: no increased work on breathing. No wheezing.   Cardiovascular: No obvious extremity edema. Warm and well perfused.   GI: no palpation of masses. No tenderness. No hepatosplenomegaly to palpation.  Musculoskeletal: normal range of motion of bilateral upper extremities. Normal muscle strength and tone.  Skin: no obvious rashes or lesions. No tightening of skin noted.  Neurologic: CN grossly normal. Normal sensation.   Psychiatric: awake, alert and oriented x 3. Mood and affect normal. Cooperative.    Pelvic exam  External Genitalia: normal hair distribution, no lesions  Urethral meatus: normal without prolapse +caruncle  Urethra: without tenderness or mass  Bladder: without fulness or tenderness  Vagina: normal appearing. No discharge or lesions. no prolapse. +atrophic vaginitis with some fusing of inferior labia  Anus and  perineum: appear normal    Pelvic exam today:  negative stress test with coughing supine, negative stress test with valsalva supine  In and out cath performed with 5 residual - urine was sent ua and cytology  Bladder filled to 150 cc  Sensation to void felt at 50 cc  Catheter removed  negativestress test with coughing supine, negative stress test with valsalva supine  Negative stress test with coughing or valsalva  Standing    No prolapse noted        LABS REVIEW:    Cr:   Lab Results   Component Value Date    CREATININE 0.7 01/23/2018       PATHOLOGY REVIEW:  Urine cytology 7/11/13: negative for cancer cells    RADIOGRAPHIC REVIEW:  ctap w 1/16/18  2 left adrenal nodules (indeterminate)  Kidneys normal  Small hiatal hernia, s/p right hemicolectomy, diverticulosis    Assessment:       1. OAB (overactive bladder)    2. Mixed incontinence    3. Microhematuria          Plan:     OAB with urge incontinence  -stress test today negative so more likely oab and urge incontinence.   -recommend she stop drinking coffee (3 cups a day)  -will also start myrbetriq 50mg daily. Explained all meds take 4-6 weeks to work and gave her a list of the medications she can call if myrbetriq not covered. All other meds cause dry mouth and constipation to some extent.  -we went over bladder pathway.  -f/u in 8-10 weeks with sara to see how she is doing on myrbetriq, bring diary. If not doing well then can change meds and f/u with me in 8-10 weeks later. If doing well then can continue myrbetriq and f/u with me in a year unless blood in urine needs further workup depending on cytology.  -voiding diary now for 3 days and prior to f/u     Microhematuria  -longstanding, could be from urethral caruncle  -sending urine cytology and urinalysis from cath urine   -send voided urine for urine culture and ua microscopic  -recent ct 1/2018 showed no stones or tumors  -may need a cytoscocpy     F/u 8-10 weeks with sara with voiding diary.      Rosanna Rivas MD

## 2018-04-20 NOTE — TELEPHONE ENCOUNTER
----- Message from Sherly Downs sent at 4/19/2018  4:34 PM CDT -----  Contact: patient   Calling to request that the prescription for mirabegron (MYRBETRIQ) 50 mg Tb24 be sent to correct pharmacy. Please advise. Call to pod. No answer.   Call back    Thanks!    [id number] - [place] - [address] AT [facility] [facility] [address] [facility] [address] [facility] [address] [facility]  [address]  [place] [address]  Phone: [phone] Fax: [phone]

## 2018-04-21 LAB — BACTERIA UR CULT: NO GROWTH

## 2018-05-01 ENCOUNTER — HOSPITAL ENCOUNTER (OUTPATIENT)
Facility: HOSPITAL | Age: 68
Discharge: HOME | End: 2018-05-01
Attending: INTERNAL MEDICINE | Admitting: INTERNAL MEDICINE
Payer: MEDICARE

## 2018-05-01 ENCOUNTER — HOSPITAL ENCOUNTER (OUTPATIENT)
Dept: RADIOLOGY | Facility: HOSPITAL | Age: 68
Discharge: HOME | End: 2018-05-01
Attending: NURSE PRACTITIONER | Admitting: INTERNAL MEDICINE
Payer: MEDICARE

## 2018-05-01 ENCOUNTER — ANESTHESIA EVENT (OUTPATIENT)
Dept: ENDOSCOPY | Facility: HOSPITAL | Age: 68
End: 2018-05-01
Payer: MEDICARE

## 2018-05-01 ENCOUNTER — SURGERY (OUTPATIENT)
Age: 68
End: 2018-05-01

## 2018-05-01 ENCOUNTER — ANESTHESIA (OUTPATIENT)
Dept: ENDOSCOPY | Facility: HOSPITAL | Age: 68
End: 2018-05-01
Payer: MEDICARE

## 2018-05-01 DIAGNOSIS — R10.9 LEFT FLANK PAIN: ICD-10-CM

## 2018-05-01 DIAGNOSIS — K21.9 ACID REFLUX: ICD-10-CM

## 2018-05-01 DIAGNOSIS — R10.12 LUQ PAIN: ICD-10-CM

## 2018-05-01 DIAGNOSIS — R10.13 EPIGASTRIC PAIN: ICD-10-CM

## 2018-05-01 PROBLEM — K44.9 HIATAL HERNIA: Status: ACTIVE | Noted: 2018-05-01

## 2018-05-01 PROCEDURE — D9220A PRA ANESTHESIA: Mod: ANES,,, | Performed by: ANESTHESIOLOGY

## 2018-05-01 PROCEDURE — 63600175 PHARM REV CODE 636 W HCPCS: Performed by: NURSE ANESTHETIST, CERTIFIED REGISTERED

## 2018-05-01 PROCEDURE — 88305 TISSUE EXAM BY PATHOLOGIST: CPT | Mod: 26,,, | Performed by: PATHOLOGY

## 2018-05-01 PROCEDURE — 88305 TISSUE EXAM BY PATHOLOGIST: CPT | Performed by: PATHOLOGY

## 2018-05-01 PROCEDURE — 76700 US EXAM ABDOM COMPLETE: CPT | Mod: TC

## 2018-05-01 PROCEDURE — 25000003 PHARM REV CODE 250: Performed by: INTERNAL MEDICINE

## 2018-05-01 PROCEDURE — 27201012 HC FORCEPS, HOT/COLD, DISP: Performed by: INTERNAL MEDICINE

## 2018-05-01 PROCEDURE — 37000008 HC ANESTHESIA 1ST 15 MINUTES: Performed by: INTERNAL MEDICINE

## 2018-05-01 PROCEDURE — 76700 US EXAM ABDOM COMPLETE: CPT | Mod: 26,,, | Performed by: RADIOLOGY

## 2018-05-01 PROCEDURE — D9220A PRA ANESTHESIA: Mod: CRNA,,, | Performed by: NURSE ANESTHETIST, CERTIFIED REGISTERED

## 2018-05-01 PROCEDURE — 43239 EGD BIOPSY SINGLE/MULTIPLE: CPT | Mod: ,,, | Performed by: INTERNAL MEDICINE

## 2018-05-01 PROCEDURE — 43239 EGD BIOPSY SINGLE/MULTIPLE: CPT | Performed by: INTERNAL MEDICINE

## 2018-05-01 RX ORDER — PROPOFOL 10 MG/ML
VIAL (ML) INTRAVENOUS
Status: DISCONTINUED | OUTPATIENT
Start: 2018-05-01 | End: 2018-05-01

## 2018-05-01 RX ORDER — OMEPRAZOLE 40 MG/1
40 CAPSULE, DELAYED RELEASE ORAL
Qty: 60 CAPSULE | Refills: 1 | Status: SHIPPED | OUTPATIENT
Start: 2018-05-01 | End: 2018-10-31

## 2018-05-01 RX ORDER — LIDOCAINE HCL/PF 100 MG/5ML
SYRINGE (ML) INTRAVENOUS
Status: DISCONTINUED | OUTPATIENT
Start: 2018-05-01 | End: 2018-05-01

## 2018-05-01 RX ORDER — SODIUM CHLORIDE 9 MG/ML
INJECTION, SOLUTION INTRAVENOUS CONTINUOUS
Status: DISCONTINUED | OUTPATIENT
Start: 2018-05-01 | End: 2018-05-01 | Stop reason: HOSPADM

## 2018-05-01 RX ADMIN — LIDOCAINE HYDROCHLORIDE 100 MG: 20 INJECTION, SOLUTION INTRAVENOUS at 10:05

## 2018-05-01 RX ADMIN — SODIUM CHLORIDE 1000 ML: 0.9 INJECTION, SOLUTION INTRAVENOUS at 09:05

## 2018-05-01 RX ADMIN — PROPOFOL 100 MG: 10 INJECTION, EMULSION INTRAVENOUS at 10:05

## 2018-05-01 NOTE — H&P (VIEW-ONLY)
Subjective:       Patient ID: Kristin Guerra is a 68 y.o. female Body mass index is 23.83 kg/m².    Chief Complaint: Abdominal Pain ( extreme flatus)    This patient is new to me.    Abdominal Pain   This is a new problem. Episode onset: started a couple of months ago. The problem occurs every several days. The pain is located in the epigastric region. The pain is at a severity of 0/10 (currently). The quality of the pain is a sensation of fullness (pressure). The abdominal pain radiates to the LUQ and left flank (left flank and LUQ pain is daily described as nagging pain). Associated symptoms include belching (frequent), flatus (frequent) and nausea. Pertinent negatives include no anorexia, constipation (miralax prn; bowel movements once daily), diarrhea, dysuria, fever, frequency, hematochezia, melena, vomiting or weight loss. Associated symptoms comments: Denies change in bowel habits. Exacerbated by: lying on left side, coffee. The pain is relieved by belching and passing flatus. She has tried proton pump inhibitors (tums prn, nexium 40 mg daily (restarted a couple of weeks ago), aleve prn about twice a week) for the symptoms. Prior diagnostic workup includes lower endoscopy. Her past medical history is significant for abdominal surgery, colon cancer and GERD. There is no history of gallstones, pancreatitis or PUD.     Review of Systems   Constitutional: Negative for appetite change, chills, fatigue, fever, unexpected weight change and weight loss.   HENT: Positive for postnasal drip (frequent). Negative for sore throat and trouble swallowing.    Respiratory: Positive for cough (productive of clear mucus, relates to sinus and allergies). Negative for choking and shortness of breath.    Cardiovascular: Positive for chest pain (see cardiologist and told ok; denies currently).   Gastrointestinal: Positive for abdominal pain, flatus (frequent) and nausea. Negative for anal bleeding, anorexia, blood in stool,  constipation (miralax prn; bowel movements once daily), diarrhea, hematochezia, melena, rectal pain and vomiting.   Genitourinary: Negative for difficulty urinating, dysuria, flank pain and frequency.        Bladder incontinence- scheduled to see urology on 18   Neurological: Negative for weakness.       Past Medical History:   Diagnosis Date    Allergy     Colon cancer     Colon polyp     Diverticulitis     Diverticulosis     GERD (gastroesophageal reflux disease)     Hypertension     Lupus     Osteoarthritis     Tremor      Past Surgical History:   Procedure Laterality Date    APPENDECTOMY      CERVICAL FUSION       SECTION      COLON SURGERY   &     hemicolectomy for colon cancer and second was for complications from the first surgery    COLONOSCOPY N/A 2018    Procedure: COLONOSCOPY;  Surgeon: Maverick Esquivel MD;  Location: [facility];  Service: Endoscopy;  Laterality: N/A; repeat in 5 years for surveillance    HYSTERECTOMY      ovaries remain    SPLENECTOMY, TOTAL      UPPER GASTROINTESTINAL ENDOSCOPY      GERD & hiatal hernia per patient report     Family History   Problem Relation Age of Onset    Colon cancer Father      diagnosed in his 60's    Eczema Neg Hx     Lupus Neg Hx     Psoriasis Neg Hx     Melanoma Neg Hx     Crohn's disease Neg Hx     Ulcerative colitis Neg Hx     Stomach cancer Neg Hx     Esophageal cancer Neg Hx      Wt Readings from Last 10 Encounters:   18 57.2 kg (126 lb 1.7 oz)   18 59 kg (130 lb)   18 58.9 kg (129 lb 13.6 oz)   16 56.7 kg (125 lb)   16 57.1 kg (125 lb 14.1 oz)   13 60.3 kg (133 lb)   11/17/10 65.5 kg (144 lb 4.7 oz)     Lab Results   Component Value Date    WBC 13.5 (H) 2017    HGB 12.4 2017    HCT 35.8 (L) 2017    MCV 93.0 2017     2017     CMP  Sodium   Date Value Ref Range Status   2017 140 135 - 146 mmol/L Final     Potassium   Date  "Value Ref Range Status   12/11/2017 4.2 3.5 - 5.3 mmol/L Final     Chloride   Date Value Ref Range Status   12/11/2017 106 98 - 110 mmol/L Final     CO2   Date Value Ref Range Status   12/11/2017 27 20 - 31 mmol/L Final     Glucose   Date Value Ref Range Status   12/11/2017 81 65 - 99 mg/dL Final     Comment:                   Fasting reference interval          BUN, Bld   Date Value Ref Range Status   12/11/2017 11 7 - 25 mg/dL Final     Creatinine   Date Value Ref Range Status   01/23/2018 0.7 0.5 - 1.4 mg/dL Final     Calcium   Date Value Ref Range Status   12/11/2017 9.4 8.6 - 10.4 mg/dL Final     Total Protein   Date Value Ref Range Status   12/11/2017 7.2 6.1 - 8.1 g/dL Final     Albumin   Date Value Ref Range Status   12/11/2017 3.8 3.6 - 5.1 g/dL Final     Total Bilirubin   Date Value Ref Range Status   12/11/2017 0.5 0.2 - 1.2 mg/dL Final     Alkaline Phosphatase   Date Value Ref Range Status   12/11/2017 78 33 - 130 U/L Final     AST   Date Value Ref Range Status   12/11/2017 17 10 - 35 U/L Final     ALT   Date Value Ref Range Status   12/11/2017 14 6 - 29 U/L Final     Anion Gap   Date Value Ref Range Status   11/01/2010 16 10 - 20 mmol/L Final     eGFR if    Date Value Ref Range Status   01/23/2018 >60 >60 mL/min/1.73 m^2 Final     eGFR if non    Date Value Ref Range Status   01/23/2018 >60 >60 mL/min/1.73 m^2 Final     Comment:     Calculation used to obtain the estimated glomerular filtration  rate (eGFR) is the CKD-EPI equation.        Lab Results   Component Value Date    AMYLASE 79 12/19/2017     Lab Results   Component Value Date    LIPASE 17 12/19/2017     Lab Results   Component Value Date    TSH 0.80 02/09/2018     Reviewed prior medical records including radiology report of 1/23/18 ct abdomen pelvis & endoscopy history (see surgical history).    2/20/18 Colonoscopy was reviewed and procedure report states:   " Findings:       Non-bleeding hemorrhoids were found " "during endoscopy. The        hemorrhoids were small. Estimated blood loss was minimal.       Scattered small and large-mouthed diverticula were found in the        sigmoid colon. Estimated blood loss: none.       A 2 mm polyp was found in the recto-sigmoid colon. The polyp was        sessile. The polyp was removed with a cold biopsy forceps. Resection        and retrieval were complete. Estimated blood loss was minimal.       The exam was otherwise without abnormality.  Impression:          - Non-bleeding hemorrhoids.                       - Diverticulosis in the sigmoid colon.                       - One 2 mm polyp at the recto-sigmoid colon, removed                        with a cold biopsy forceps. Resected and retrieved.                       - The examination was otherwise normal.  Recommendation:      - Discharge patient to home (ambulatory).                       - Resume regular diet.                       - Await pathology results.                       - Repeat colonoscopy in 5 years for surveillance.                       - Return to my office PRN. ".  Biopsy results:   "Polyp, rectosigmoid colon, polypectomy:  Hyperplastic polyp, 1 fragment, negative for dysplasia."  Objective:      Physical Exam   Constitutional: She is oriented to person, place, and time. She appears well-developed and well-nourished. No distress.   HENT:   Mouth/Throat: Oropharynx is clear and moist and mucous membranes are normal. No oral lesions. No oropharyngeal exudate.   Eyes: Conjunctivae are normal. Pupils are equal, round, and reactive to light. No scleral icterus.   Cardiovascular: Normal rate.    Pulmonary/Chest: Effort normal and breath sounds normal. No respiratory distress. She has no wheezes.   Abdominal: Soft. Normal appearance and bowel sounds are normal. She exhibits no distension, no abdominal bruit and no mass. There is no hepatosplenomegaly. There is tenderness (mild) in the epigastric area. There is no rigidity, " no rebound, no guarding, no tenderness at McBurney's point and negative Rivera's sign. No hernia.   Well-healed surgical scars noted.   Neurological: She is alert and oriented to person, place, and time.   Skin: Skin is warm and dry. No rash noted. She is not diaphoretic. No erythema. No pallor.   Non-jaundiced   Psychiatric: She has a normal mood and affect. Her behavior is normal. Judgment and thought content normal.   Nursing note and vitals reviewed.      Assessment:       1. Epigastric pain    2. Flatulence    3. Eructation    4. History of colon cancer    5. History of diverticulosis    6. Left flank pain    7. LUQ pain    8. History of urinary incontinence    9. Heartburn    10. Nonspecific chest pain        Plan:       Epigastric pain  -     US Abdomen Complete; Future; Expected date: 04/17/2018  -     CBC auto differential; Future; Expected date: 04/17/2018  -     Comprehensive metabolic panel; Future; Expected date: 04/17/2018  -     Amylase; Future; Expected date: 04/17/2018  -     Lipase; Future; Expected date: 04/17/2018  - schedule EGD, discussed procedure with patient, patient verbalized understanding    Flatulence & Eructation  - schedule EGD, discussed procedure with patient, patient verbalized understanding  - recommended OTC simethicone as directed, such as Phazyme or Gas-x  -discussed with patient about low gas diet: Reduce or eliminate these foods from your diet: Broccoli, Cauliflower, [place] sprouts, Cabbage, Cooked dried beans, Carbonated beverages (sparkling water, soda, beer, champagne)  Other Causes Of Excess Gas Include:   1) EATING TOO FAST or TALKING WHILE YOU CHEW may cause you to swallow air. This increases the amount of gas in the stomach and may worsen your symptoms.  --> Chew each mouthful completely before swallowing. Take your time.  2) OVEREATING may increase the feeling of being bloated and cause more gas.  --> When you are full, stop eating.  3) CONSTIPATION can increase the  amount of normal intestinal gas.  --> Avoid constipation by increasing the amount of fiber in your diet by including whole cereal grains, fresh vegetables (except those in the above list) and fresh fruits. High-fiber foods absorb water and carry it out of the body. When increasing the amount of fiber in your diet, you also need to increase the amount of water that you drink. You should drink at least eight 8-ounce glasses of water (two quarts) per day.    History of colon cancer & History of diverticulosis  - discussed the diagnosis of diverticulosis and diverticulitis, & prevent diverticulitis, high fiber diet is recommended.  -Recommended high fiber diet. Recommended daily exercise, adequate water intake (six 8-oz glasses of water daily), and high fiber diet. OTC fiber supplements are recommended if diet does not reach daily fiber goal (25 grams daily), such as Metamucil, Citrucel, or FiberCon (take as directed, separate from other oral medications by >2 hours).  - Advised to avoid/minimize popcorn, corn, seeds, and nuts.    Left flank pain  -     US Abdomen Complete; Future; Expected date: 04/17/2018  -     CBC auto differential; Future; Expected date: 04/17/2018  -     Comprehensive metabolic panel; Future; Expected date: 04/17/2018  -     Amylase; Future; Expected date: 04/17/2018  -     Lipase; Future; Expected date: 04/17/2018  Recommend follow-up with UROLOGY AS SCHEDULED for continued evaluation and management.    LUQ pain  -     US Abdomen Complete; Future; Expected date: 04/17/2018  -     CBC auto differential; Future; Expected date: 04/17/2018  -     Comprehensive metabolic panel; Future; Expected date: 04/17/2018  -     Amylase; Future; Expected date: 04/17/2018  -     Lipase; Future; Expected date: 04/17/2018  - schedule EGD, discussed procedure with patient, patient verbalized understanding    History of urinary incontinence  Recommend follow-up with UROLOGY AS SCHEDULED for continued evaluation and  management.    Heartburn  - CONTINUE NEXIUM 40 MG ONCE DAILY AS DIRECTED, take in the morning 30-60 minutes before breakfast, discussed about possible long term use of medication (prefer to use lowest effective dose or discontinuing if possible) and discussed the risks & benefits with taking a reflux medication long term, and to take OTC calcium and vitamin d supplements as directed (such as Citracal +D), pt verbalized understanding  -discussed about the different types of medications used to treat reflux and how to use them, antacids can be used PRN for breakthrough heartburn symptoms by reducing stomach acid that is already produced, H2 blockers (zantac) work by limiting the amount acid production, & PPI's work to block acid production and are taken daily, patient verbalized understanding.  -Educated patient on lifestyle modifications to help control/reduce reflux/abdominal pain including: avoid large meals, avoid eating within 2-3 hours of bedtime (avoid late night eating & lying down soon after eating), elevate head of bed if nocturnal symptoms are present, smoking cessation (if current smoker), & weight loss (if overweight).   -Educated to avoid known foods which trigger reflux symptoms & to minimize/avoid high-fat foods, chocolate, caffeine, citrus, alcohol, & tomato products.  -Advised to avoid/limit use of NSAID's, since they can cause GI upset, bleeding, and/or ulcers. If needed, take with food.   - schedule EGD, discussed procedure with patient, patient verbalized understanding    Nonspecific chest pain  - follow-up with PCP &/or cardiologist for continued evaluation and management    Follow-up in about 1 month (around 5/17/2018), or if symptoms worsen or fail to improve.      If no improvement in symptoms or symptoms worsen, call/follow-up at clinic or go to ER.

## 2018-05-01 NOTE — ANESTHESIA POSTPROCEDURE EVALUATION
"Anesthesia Post Evaluation    Patient: Kristin Guerra    Procedure(s) Performed: Procedure(s) (LRB):  ESOPHAGOGASTRODUODENOSCOPY (EGD) (N/A)    Final Anesthesia Type: general  Patient location during evaluation: PACU  Patient participation: Yes- Able to Participate  Level of consciousness: awake and alert and oriented  Post-procedure vital signs: reviewed and stable  Pain management: adequate  Airway patency: patent  PONV status at discharge: No PONV  Anesthetic complications: no      Cardiovascular status: blood pressure returned to baseline  Respiratory status: unassisted, spontaneous ventilation and room air  Hydration status: euvolemic  Follow-up not needed.        Visit Vitals  /60   Pulse (!) 52   Temp 36.4 °C (97.5 °F)   Resp (!) 60   Ht 5' 1" (1.549 m)   Wt 55.8 kg (123 lb)   SpO2 99%   Breastfeeding? No   BMI 23.24 kg/m²       Pain/Kobe Score: Pain Assessment Performed: Yes (5/1/2018  9:23 AM)  Presence of Pain: denies (5/1/2018  9:23 AM)      "

## 2018-05-01 NOTE — PROVATION PATIENT INSTRUCTIONS
Discharge Summary/Instructions after an Endoscopic Procedure  Patient Name: Kristin Guerra  Patient MRN: [id number]  Patient YOB: 1950  Tuesday, May 01, 2018  Monika Steele MD  RESTRICTIONS:  During your procedure today, you received medications for sedation.  These   medications may affect your judgment, balance and coordination.  Therefore,   for 24 hours, you have the following restrictions:   - DO NOT drive a car, operate machinery, make legal/financial decisions,   sign important papers or drink alcohol.    ACTIVITY:  The following day: return to full activity including work, except no heavy   lifting, straining or running for 3 days if polyps were removed.  DIET:  Eat and drink normally unless instructed otherwise.     TREATMENT FOR COMMON SIDE EFFECTS:  - Mild abdominal pain, nausea, belching, bloating or excessive gas:  rest,   eat lightly and use a heating pad.  - Sore Throat: treat with throat lozenges and/or gargle with warm salt   water.  - Because air was used during the procedure, expelling large amounts of air   from your rectum or belching is normal.  - If a bowel prep was taken, you may not have a bowel movement for 1-3 days.    This is normal.  SYMPTOMS TO WATCH FOR AND REPORT TO YOUR PHYSICIAN:  1. Abdominal pain or bloating, other than gas cramps.  2. Chest pain.  3. Back pain.  4. Signs of infection such as: chills or fever occurring within 24 hours   after the procedure.  5. Rectal bleeding, which would show as bright red, maroon, or black stools.   (A tablespoon of blood from the rectum is not serious, especially if   hemorrhoids are present.)  6. Vomiting.  7. Weakness or dizziness.  GO DIRECTLY TO THE NEAREST EMERGENCY ROOM IF YOU HAVE ANY OF THE FOLLOWING:      Difficulty breathing              Chills and/or fever over 101 F   Persistent vomiting and/or vomiting blood   Severe abdominal pain   Severe chest pain   Black, tarry stools   Bleeding- more than one  tablespoon   Any other symptom or condition that you feel may need urgent attention  Your doctor recommends these additional instructions:  If any biopsies were taken, your doctors clinic will contact you in 1 to 2   weeks with any results.  - Await pathology results.   - Discharge patient to home (with escort).   - Patient has a contact number available for emergencies.  The signs and   symptoms of potential delayed complications were discussed with the   patient.  Return to normal activities tomorrow.  Written discharge   instructions were provided to the patient.   - Resume previous diet.   -PPI BID x 8 weeks  -Repeat EGD to ensure esophageal healing in 8 weeks  -Continue treatment for osteoporosis while on PPI  -Avoid NSAID  For questions, problems or results please call your physician - Monika Steele MD at Work:  [phone].  [facility], EMERGENCY ROOM PHONE NUMBER: [phone]  IF A COMPLICATION OR EMERGENCY SITUATION ARISES AND YOU ARE UNABLE TO REACH   YOUR PHYSICIAN - GO DIRECTLY TO THE EMERGENCY ROOM.  Monika Steele MD  5/1/2018 10:30:08 AM  This report has been verified and signed electronically.

## 2018-05-01 NOTE — INTERVAL H&P NOTE
The patient has been examined and the H&P has been reviewed:    I concur with the findings and no changes have occurred since H&P was written.    Anesthesia/Surgery risks, benefits and alternative options discussed and understood by patient/family.          Active Hospital Problems    Diagnosis  POA    Acid reflux [K21.9]  Yes      Resolved Hospital Problems    Diagnosis Date Resolved POA   No resolved problems to display.

## 2018-05-01 NOTE — TRANSFER OF CARE
"Anesthesia Transfer of Care Note    Patient: Kristin Guerra    Procedure(s) Performed: Procedure(s) (LRB):  ESOPHAGOGASTRODUODENOSCOPY (EGD) (N/A)    Patient location: GI    Anesthesia Type: general    Transport from OR: Transported from OR on room air with adequate spontaneous ventilation    Post pain: adequate analgesia    Post assessment: no apparent anesthetic complications    Post vital signs: stable    Level of consciousness: sedated    Nausea/Vomiting: no nausea/vomiting    Complications: none    Transfer of care protocol was followed      Last vitals:   Visit Vitals  /60   Pulse (!) 52   Temp 36.4 °C (97.5 °F)   Resp (!) 60   Ht 5' 1" (1.549 m)   Wt 55.8 kg (123 lb)   SpO2 99%   Breastfeeding? No   BMI 23.24 kg/m²     "

## 2018-05-01 NOTE — ANESTHESIA PREPROCEDURE EVALUATION
05/01/2018  Kristin Guerra is a 68 y.o., female.    Anesthesia Evaluation    I have reviewed the Patient Summary Reports.    I have reviewed the Nursing Notes.   I have reviewed the Medications.     Review of Systems  Anesthesia Hx:  No problems with previous Anesthesia    Social:  Smoker    Cardiovascular:   Hypertension, well controlled    Pulmonary:  Pulmonary Normal    Renal/:  Renal/ Normal     Hepatic/GI:   GERD, poorly controlled diverticulitis   Musculoskeletal:   Arthritis     Neurological:   Neuromuscular Disease,    Endocrine:   Hypothyroidism        Physical Exam  General:  Well nourished    Airway/Jaw/Neck:  Airway Findings: Mouth Opening: Normal Tongue: Normal  General Airway Assessment: Adult, Good  Mallampati: II  Improves to II with phonation.  TM Distance: 4-6 cm      Dental:  Dental Findings: In tact   Chest/Lungs:  Chest/Lungs Findings: Clear to auscultation, Normal Respiratory Rate     Heart/Vascular:  Heart Findings: Rate: Normal  Rhythm: Regular Rhythm  Sounds: Normal  Heart murmur: negative       Mental Status:  Mental Status Findings:  Cooperative, Alert and Oriented         Anesthesia Plan  Type of Anesthesia, risks & benefits discussed:  Anesthesia Type:  general  Patient's Preference:   Intra-op Monitoring Plan: standard ASA monitors  Intra-op Monitoring Plan Comments:   Post Op Pain Control Plan:   Post Op Pain Control Plan Comments:   Induction:   IV  Beta Blocker:  Patient is not currently on a Beta-Blocker (No further documentation required).       Informed Consent: Patient understands risks and agrees with Anesthesia plan.  Questions answered. Anesthesia consent signed with patient.  ASA Score: 3     Day of Surgery Review of History & Physical: I have interviewed and examined the patient. I have reviewed the patient's H&P dated:  There are no significant changes.           Ready For Surgery From Anesthesia Perspective.

## 2018-05-01 NOTE — DISCHARGE INSTRUCTIONS
"  GERD (Adult)    The esophagus is a tube that carries food from the mouth to the stomach. A valve at the lower end of the esophagus prevents stomach acid from flowing upward. When this valve doesn't work properly, stomach contents may repeatedly flow back up (reflux) into the esophagus. This is called gastroesophageal reflux disease (GERD). GERD can irritate the esophagus. It can cause problems with swallowing or breathing. In severe cases, GERD can cause recurrent pneumonia or other serious problems.  Symptoms of reflux include burning, pressure or sharp pain in the upper abdomen or mid to lower chest. The pain can spread to the neck, back, or shoulder. There may be belching, an acid taste in the back of the throat, chronic cough, or sore throat or hoarseness. GERD symptoms often occur during the day after a big meal. They can also occur at night when lying down.   Home care  Lifestyle changes can help reduce symptoms. If needed, medicines may be prescribed. Symptoms often improve with treatment, but if treatment is stopped, the symptoms often return after a few months. So most persons with GERD will need to continue treatment.  Lifestyle changes  · Limit or avoid fatty, fried, and spicy foods, as well as coffee, chocolate, mint, and foods with high acid content such as tomatoes and citrus fruit and juices (orange, grapefruit, lemon).  · Dont eat large meals, especially at night. Frequent, smaller meals are best. Do not lie down right after eating. And dont eat anything 3 hours before going to bed.  · Avoid drinking alcohol and smoking. As much as possible, stay away from second hand smoke.  · If you are overweight, losing weight will reduce symptoms.   · Avoid wearing tight clothing around your stomach area.  · If your symptoms occur during sleep, use a foam wedge to elevate your upper body (not just your head.) Or, place 4" blocks under the head of your bed.  Medicines  If needed, medicines can help relieve " the symptoms of GERD and prevent damage to the esophagus. Discuss a medicine plan with your healthcare provider. This may include one or more of the following medicines:  · Antacids to help neutralize the normal acids in your stomach.  · Acid blockers (H2 blockers) to decrease acid production.  · Acid inhibitors (PPIs) to decrease acid production in a different way than the blockers. They may work better, but can take a little longer to take effect.  Take an antacid 30-60 minutes after eating and at bedtime, but not at the same time as an acid blocker.  Try not to take medicines such as ibuprofen and aspirin. If you are taking aspirin for your heart or other medical reasons, talk to your healthcare provider about stopping it.  Follow-up care  Follow up with your healthcare provider or as advised by our staff.  When to seek medical advice  Call your healthcare provider if any of the following occur:  · Stomach pain gets worse or moves to the lower right abdomen (appendix area)  · Chest pain appears or gets worse, or spreads to the back, neck, shoulder, or arm  · Frequent vomiting (cant keep down liquids)  · Blood in the stool or vomit (red or black in color)  · Feeling weak or dizzy  · Fever of 100.4ºF (38ºC) or higher, or as directed by your healthcare provider  Date Last Reviewed: 6/23/2015 © 2000-2017 [employer]. [address] [place] [address], [place] [address]. All rights reserved. This information is not intended as a substitute for professional medical care. Always follow your healthcare professional's instructions.        Gastritis (Adult)    Gastritis is inflammation and irritation of the stomach lining. It can be present for a short time (acute) or be long lasting (chronic). Gastritis is often caused by infection with bacteria called H pylori. More than a third of people in the [place] have this bacteria in their bodies. In many cases, H pylori causes no problems or symptoms. In some people, though, the  infection irritates the stomach lining and causes gastritis. Other causes of stomach irritation include drinking alcohol or taking pain-relieving medicines called NSAIDs (such as aspirin or ibuprofen).   Symptoms of gastritis can include:  · Abdominal pain or bloating  · Loss of appetite  · Nausea or vomiting  · Vomiting blood or having black stools  · Feeling more tired than usual  An inflamed and irritated stomach lining is more likely to develop a sore called an ulcer. To help prevent this, gastritis should be treated.  Home care  If needed, medicines may be prescribed. If you have H pylori infection, treating it will likely relieve your symptoms. Other changes can help reduce stomach irritation and help it heal.  · If you have been prescribed medicines for H pylori infection, take them as directed. Take all of the medicine until it is finished or your healthcare provider tells you to stop, even if you feel better.  · Your healthcare provider may recommend avoiding NSAIDs. If you take daily aspirin for your heart or other medical reasons, do not stop without talking to your healthcare provider first.  · Avoid drinking alcohol.  · Stop smoking. Smoking can irritate the stomach and delay healing. As much as possible, stay away from second hand smoke.  Follow-up care  Follow up with your healthcare provider, or as advised by our staff. Testing may be needed to check for inflammation or an ulcer.  When to seek medical advice  Call your healthcare provider for any of the following:  · Stomach pain that gets worse or moves to the lower right abdomen (appendix area)  · Chest pain that appears or gets worse, or spreads to the back, neck, shoulder, or arm  · Frequent vomiting (cant keep down liquids)  · Blood in the stool or vomit (red or black in color)  · Feeling weak or dizzy  · Fever of 100.4ºF (38ºC) or higher, or as directed by your healthcare provider  Date Last Reviewed: 6/22/2015  © 3130-3166 [employer]. [address] [place] [address], [place] [address]. All rights reserved. This information is not intended as a substitute for professional medical care. Always follow your healthcare professional's instructions.        What Is a Hiatal Hernia?    Hiatal hernia is when the area where the stomach and esophagus meet bulges up through the diaphragm into the chest cavity. In some cases, part of the stomach may bulge above the diaphragm. Stomach acid may move up into the esophagus and cause symptoms. The symptoms are often blamed on gastroesophageal reflux disease (GERD). You may only know about the hernia when it shows up on an X-ray taken for other reasons.   What you may feel  The hiatus is a normal hole in the diaphragm. The esophagus passes through this hole and leads to the stomach. In some cases, part of the stomach may bulge above the diaphragm. This bulge is called a hernia. Stomach acid may move up into the esophagus and cause symptoms.  When you eat, the muscle at the hiatus relaxes to allow food to pass into the stomach. It tightens again to keep food and digestive acids in the stomach.  Many people with hiatal hernias have mild symptoms. You may notice the following GERD symptoms:  · Heartburn or other chest discomfort  · A feeling of chest fullness after a meal  · Frequent burping  · Acid taste in the mouth  · Trouble swallowing  Treating symptoms  If you have been diagnosed with hiatal hernia, these suggestions may help improve symptoms:  · Lose excess weight. Extra weight puts pressure on the stomach and esophagus.  · Dont lie down after eating. Sit up for at least an hour after eating. Lying down after eating can increase symptoms.  · Avoid certain foods and drinks. These include fatty foods, chocolate, coffee, mint, and other foods that cause symptoms for you.  · Dont smoke or drink alcohol. These can worsen symptoms.  · Look at your medicines. Discuss your medicines with your healthcare provider.  Many medicines can cause symptoms.  · Consider an antacid medicine. Ask your healthcare provider about over-the-counter and prescription medicines that may help.  · Ask about surgery, if needed. Surgery is a treatment choice for some people. Your healthcare provider can determine if surgery is an option for you.    Date Last Reviewed: 10/1/2016  © 6360-4065 [employer]. [address] [place] [address]. All rights reserved. This information is not intended as a substitute for professional medical care. Always follow your healthcare professional's instructions.        Upper GI Endoscopy     During endoscopy, a long, flexible tube is used to view the inside of your upper GI tract.      Upper GI endoscopy allows your healthcare provider to look directly into the beginning of your gastrointestinal (GI) tract. The esophagus, stomach, and duodenum (the first part of the small intestine) make up the upper GI tract.   Before the exam  Follow these and any other instructions you are given before your endoscopy. If you dont follow the healthcare providers instructions carefully, the test may need to be canceled or done over:  · Don't eat or drink anything after midnight the night before your exam. If your exam is in the afternoon, drink only clear liquids in the morning. Don't eat or drink anything for 8 hours before the exam. In some cases, you may be able to take medicines with sips of water until 2 hours before the procedure. Speak with your healthcare provider about this.   · Bring your X-rays and any other test results you have.  · Because you will be sedated, arrange for an adult to drive you home after the exam.  · Tell your healthcare provider before the exam if you are taking any medicines or have any medical problems.  The procedure  Here is what to expect:  · You will lie on the endoscopy table. Usually patients lie on the left side.  · You will be monitored and given oxygen.  · Your throat  may be numbed with a spray or gargle. You are given medicine through an intravenous (IV) line that will help you relax and remain comfortable. You may be awake or asleep during the procedure.  · The healthcare provider will put the endoscope in your mouth and down your esophagus. It is thinner than most pieces of food that you swallow. It will not affect your breathing. The medicine helps keep you from gagging.  · Air is put into your GI tract to expand it. It can make you burp.  · During the procedure, the healthcare provider can take biopsies (tissue samples), remove abnormalities, such as polyps, or treat abnormalities through a variety of devices placed through the endoscope. You will not feel this.   · The endoscope carries images of your upper GI tract to a video screen. If you are awake, you may be able to look at the images.  · After the procedure is done, you will rest for a time. An adult must drive you home.  When to call your healthcare provider  Contact your healthcare provider if you have:  · Black or tarry stools, or blood in your stool  · Fever  · Pain in your belly that does not go away  · Nausea and vomiting, or vomiting blood   Date Last Reviewed: 7/1/2016  © 1795-9374 [employer]. [address] [place] [address], [place] [address]. All rights reserved. This information is not intended as a substitute for professional medical care. Always follow your healthcare professional's instructions.      Discharge Instructions: After Your Surgery/Procedure  Youve just had surgery. During surgery you were given medicine called anesthesia to keep you relaxed and free of pain. After surgery you may have some pain or nausea. This is common. Here are some tips for feeling better and getting well after surgery.     Stay on schedule with your medication.   Going home  Your doctor or nurse will show you how to take care of yourself when you go home. He or she will also answer your questions. Have an adult  "family member or friend drive you home.      For your safety we recommend these precaution for the first 24 hours after your procedure:  · Do not drive or use heavy equipment.  · Do not make important decisions or sign legal papers.  · Do not drink alcohol.  · Have someone stay with you, if needed. He or she can watch for problems and help keep you safe.  · Your concentration, balance, coordination, and judgement may be impaired for many hours after anesthesia.  Use caution when ambulating or standing up.     · You may feel weak and "washed out" after anesthesia and surgery.      Subtle residual effects of general anesthesia or sedation with regional / local anesthesia can last more than 24 hours.  Rest for the remainder of the day or longer if your Doctor/Surgeon has advised you to do so.  Although you may feel normal within the first 24 hours, your reflexes and mental ability may be impaired without you realizing it.  You may feel dizzy, lightheaded or sleepy for 24 hours or longer.      Be sure to go to all follow-up visits with your doctor. And rest after your surgery for as long as your doctor tells you to.  Coping with pain  If you have pain after surgery, pain medicine will help you feel better. Take it as told, before pain becomes severe. Also, ask your doctor or pharmacist about other ways to control pain. This might be with heat, ice, or relaxation. And follow any other instructions your surgeon or nurse gives you.  Tips for taking pain medicine  To get the best relief possible, remember these points:  · Pain medicines can upset your stomach. Taking them with a little food may help.  · Most pain relievers taken by mouth need at least 20 to 30 minutes to start to work.  · Taking medicine on a schedule can help you remember to take it. Try to time your medicine so that you can take it before starting an activity. This might be before you get dressed, go for a walk, or sit down for dinner.  · Constipation is " a common side effect of pain medicines. Call your doctor before taking any medicines such as laxatives or stool softeners to help ease constipation. Also ask if you should skip any foods. Drinking lots of fluids and eating foods such as fruits and vegetables that are high in fiber can also help. Remember, do not take laxatives unless your surgeon has prescribed them.  · Drinking alcohol and taking pain medicine can cause dizziness and slow your breathing. It can even be deadly. Do not drink alcohol while taking pain medicine.  · Pain medicine can make you react more slowly to things. Do not drive or run machinery while taking pain medicine.  Your health care provider may tell you to take acetaminophen to help ease your pain. Ask him or her how much you are supposed to take each day. Acetaminophen or other pain relievers may interact with your prescription medicines or other over-the-counter (OTC) drugs. Some prescription medicines have acetaminophen and other ingredients. Using both prescription and OTC acetaminophen for pain can cause you to overdose. Read the labels on your OTC medicines with care. This will help you to clearly know the list of ingredients, how much to take, and any warnings. It may also help you not take too much acetaminophen. If you have questions or do not understand the information, ask your pharmacist or health care provider to explain it to you before you take the OTC medicine.  Managing nausea  Some people have an upset stomach after surgery. This is often because of anesthesia, pain, or pain medicine, or the stress of surgery. These tips will help you handle nausea and eat healthy foods as you get better. If you were on a special food plan before surgery, ask your doctor if you should follow it while you get better. These tips may help:  · Do not push yourself to eat. Your body will tell you when to eat and how much.  · Start off with clear liquids and soup. They are easier to  digest.  · Next try semi-solid foods, such as mashed potatoes, applesauce, and gelatin, as you feel ready.  · Slowly move to solid foods. Dont eat fatty, rich, or spicy foods at first.  · Do not force yourself to have 3 large meals a day. Instead eat smaller amounts more often.  · Take pain medicines with a small amount of solid food, such as crackers or toast, to avoid nausea.     Call your surgeon if  · You still have pain an hour after taking medicine. The medicine may not be strong enough.  · You feel too sleepy, dizzy, or groggy. The medicine may be too strong.  · You have side effects like nausea, vomiting, or skin changes, such as rash, itching, or hives.       If you have obstructive sleep apnea  You were given anesthesia medicine during surgery to keep you comfortable and free of pain. After surgery, you may have more apnea spells because of this medicine and other medicines you were given. The spells may last longer than usual.   At home:  · Keep using the continuous positive airway pressure (CPAP) device when you sleep. Unless your health care provider tells you not to, use it when you sleep, day or night. CPAP is a common device used to treat obstructive sleep apnea.  · Talk with your provider before taking any pain medicine, muscle relaxants, or sedatives. Your provider will tell you about the possible dangers of taking these medicines.  © 2388-1388 [employer]. [address] [place] [address], [place] [address]. All rights reserved. This information is not intended as a substitute for professional medical care. Always follow your healthcare professional's instructions.

## 2018-05-02 VITALS
HEART RATE: 53 BPM | WEIGHT: 123 LBS | DIASTOLIC BLOOD PRESSURE: 67 MMHG | BODY MASS INDEX: 23.22 KG/M2 | HEIGHT: 61 IN | RESPIRATION RATE: 18 BRPM | TEMPERATURE: 97 F | OXYGEN SATURATION: 100 % | SYSTOLIC BLOOD PRESSURE: 141 MMHG

## 2018-05-03 ENCOUNTER — TELEPHONE (OUTPATIENT)
Dept: GASTROENTEROLOGY | Facility: CLINIC | Age: 68
End: 2018-05-03

## 2018-05-03 NOTE — TELEPHONE ENCOUNTER
Please call to inform & review the results with the patient- radiology report of the abdominal ultrasound showed mild fatty liver. For fatty liver recommend: low fat, low cholesterol diet, maintain good control of blood sugars and cholesterol levels, exercise, weight loss, minimize/avoid alcohol and tylenol products, & follow-up with PCP for continued evaluation and management; if specialist is needed, recommend seeing hepatology.  Otherwise, unremarkable findings.  Blood work showed normal pancreatic enzymes; unremarkable electrolytes, kidney and liver function levels; & blood counts are stable overall: WBC WNL, mild decrease in RBC and hematocrit.  Continue with previous recommendations. If no improvement in symptoms or symptoms worsen, call/follow-up at clinic or go to ER.  Please release results to patient's mychart once you have discussed results and recommendations with patient.  Thanks,  Balbina HASKINS

## 2018-08-22 ENCOUNTER — DOCUMENTATION ONLY (OUTPATIENT)
Dept: FAMILY MEDICINE | Facility: CLINIC | Age: 68
End: 2018-08-22

## 2018-08-22 NOTE — PROGRESS NOTES
Pre-Visit Chart Review  For Appointment Scheduled on 8/23/2018    Health Maintenance Due   Topic Date Due    TETANUS VACCINE  01/27/1968    Zoster Vaccine  01/27/2011    Pneumococcal (65+) (1 of 2 - PCV13) 01/27/2016    Influenza Vaccine  08/01/2018

## 2018-08-23 ENCOUNTER — OFFICE VISIT (OUTPATIENT)
Dept: FAMILY MEDICINE | Facility: CLINIC | Age: 68
End: 2018-08-23
Payer: MEDICARE

## 2018-08-23 VITALS
WEIGHT: 127 LBS | BODY MASS INDEX: 23.98 KG/M2 | TEMPERATURE: 98 F | HEIGHT: 61 IN | DIASTOLIC BLOOD PRESSURE: 77 MMHG | SYSTOLIC BLOOD PRESSURE: 146 MMHG | HEART RATE: 71 BPM

## 2018-08-23 DIAGNOSIS — I10 HYPERTENSION, UNSPECIFIED TYPE: Primary | ICD-10-CM

## 2018-08-23 DIAGNOSIS — F41.9 ANXIETY AND DEPRESSION: ICD-10-CM

## 2018-08-23 DIAGNOSIS — E78.00 HYPERCHOLESTEROLEMIA: ICD-10-CM

## 2018-08-23 DIAGNOSIS — F32.A ANXIETY AND DEPRESSION: ICD-10-CM

## 2018-08-23 DIAGNOSIS — F41.0 PANIC ATTACK: ICD-10-CM

## 2018-08-23 PROBLEM — G25.81 RLS (RESTLESS LEGS SYNDROME): Status: ACTIVE | Noted: 2018-08-23

## 2018-08-23 PROBLEM — E03.9 HYPOTHYROIDISM: Status: ACTIVE | Noted: 2018-08-23

## 2018-08-23 PROBLEM — R01.1 HEART MURMUR: Status: ACTIVE | Noted: 2018-08-23

## 2018-08-23 PROCEDURE — 99999 PR PBB SHADOW E&M-EST. PATIENT-LVL IV: CPT | Mod: PBBFAC,,, | Performed by: FAMILY MEDICINE

## 2018-08-23 PROCEDURE — 3077F SYST BP >= 140 MM HG: CPT | Mod: ,,, | Performed by: FAMILY MEDICINE

## 2018-08-23 PROCEDURE — 99204 OFFICE O/P NEW MOD 45 MIN: CPT | Mod: S$PBB,,, | Performed by: FAMILY MEDICINE

## 2018-08-23 PROCEDURE — 99214 OFFICE O/P EST MOD 30 MIN: CPT | Mod: PBBFAC,PO | Performed by: FAMILY MEDICINE

## 2018-08-23 PROCEDURE — 3078F DIAST BP <80 MM HG: CPT | Mod: ,,, | Performed by: FAMILY MEDICINE

## 2018-08-23 PROCEDURE — 1101F PT FALLS ASSESS-DOCD LE1/YR: CPT | Mod: ,,, | Performed by: FAMILY MEDICINE

## 2018-08-23 RX ORDER — ESCITALOPRAM OXALATE 5 MG/1
5 TABLET ORAL DAILY
Qty: 30 TABLET | Refills: 3 | Status: SHIPPED | OUTPATIENT
Start: 2018-08-23 | End: 2018-09-25

## 2018-08-23 RX ORDER — AZELASTINE 1 MG/ML
1 SPRAY, METERED NASAL 2 TIMES DAILY
Qty: 30 ML | Refills: 11 | Status: SHIPPED | OUTPATIENT
Start: 2018-08-23 | End: 2020-05-28 | Stop reason: SDUPTHER

## 2018-08-23 NOTE — PROGRESS NOTES
CHIEF COMPLAINT:  Establish care      HISTORY OF PRESENT ILLNESS:  Kristin Guerra is a 68 y.o. female who presents to clinic as a new patient to establish care. She was previously receiving her care from Dr. Castaneda. I do not have her medical records available to me today.  She has HTN and is on norvasc, lotensin. She denies any CP, cough, SOB, edema. She does not regularly monitor her BP. She has hyperlipidemia on her problem list but is not on a statin. She describes having panic attacks, anxiety and depression and requests pharmacotherapy. She has never been on an SSRI/SNRI.       REVIEW OF SYSTEMS:  The patient denies any fever, chills, night sweats, headaches, vision changes, difficulty speaking or swallowing, decreased hearing, weight loss, weight gain, chest pain, palpitations, shortness of breath, cough, nausea, vomiting, abdominal pain, dysuria, diarrhea, constipation, hematuria, hematochezia, melena, changes in her hair, skin, nails, numbness or weakness in her extremities, erythema, pain or swelling over any of her joints, myalgia, swollen glands, easy bruising, fatigue, edema.She denies any vaginal discharge, breast masses, nipple discharge, change in the skin overlying her breasts.      MEDICATIONS:   Reviewed and/or reconciled in EPIC    ALLERGIES:  Reviewed and/or reconciled in [facility]    PAST MEDICAL/SURGICAL HISTORY:   Past Medical History:   Diagnosis Date    Allergy     Colon cancer     Colon polyp     Diverticulitis     Diverticulosis     Fatty liver     GERD (gastroesophageal reflux disease)     Hyperlipidemia     Hypertension     Lupus (systemic lupus erythematosus)     discoid    Osteoarthritis     Osteoporosis     Tremor 2005    essential      Past Surgical History:   Procedure Laterality Date    APPENDECTOMY      removed during colon surgery    CERVICAL FUSION       SECTION      COLON SURGERY   &     hemicolectomy for colon cancer and second was for  complications from the first surgery    HYSTERECTOMY      ovaries remain    SPLENECTOMY, TOTAL  2007    Injured during surgery and removed    UPPER GASTROINTESTINAL ENDOSCOPY  2007    GERD & hiatal hernia per patient report       FAMILY HISTORY:    Family History   Problem Relation Age of Onset    Colon cancer Father         diagnosed in his 60's    Lung cancer Father     Lymphoma Mother         Brain    Lung cancer Brother     Lung cancer Brother         metastatic from prostate    Prostate cancer Brother     Heart disease Brother         valvular disease    Hyperlipidemia Brother     Eczema Neg Hx     Lupus Neg Hx     Psoriasis Neg Hx     Melanoma Neg Hx     Crohn's disease Neg Hx     Ulcerative colitis Neg Hx     Stomach cancer Neg Hx     Esophageal cancer Neg Hx        SOCIAL HISTORY:    Social History     Socioeconomic History    Marital status:      Spouse name: Not on file    Number of children: 2    Years of education: Not on file    Highest education level: Not on file   Social Needs    Financial resource strain: Not on file    Food insecurity - worry: Not on file    Food insecurity - inability: Not on file    Transportation needs - medical: Not on file    Transportation needs - non-medical: Not on file   Occupational History    Not on file   Tobacco Use    Smoking status: Current Every Day Smoker     Packs/day: 1.00     Years: 35.00     Pack years: 35.00     Types: Cigarettes    Smokeless tobacco: Never Used   Substance and Sexual Activity    Alcohol use: No    Drug use: No    Sexual activity: Yes     Partners: Male     Birth control/protection: Post-menopausal   Other Topics Concern    Are you pregnant or think you may be? Not Asked    Breast-feeding Not Asked   Social History Narrative    Not on file       PHYSICAL EXAM:  VITAL SIGNS:   Vitals:    08/23/18 1359   BP: (!) 146/77   Pulse: 71   Temp: 98.2 °F (36.8 °C)   Weight: 57.6 kg (126 lb 15.8 oz)   Height:  "5' 1" (1.549 m)     GENERAL:  Patient appears well nourished, sitting on exam table, in no acute distress.  HEENT:  Atraumatic, normocephalic, PERRLA, EOMI, no conjunctival injection, sclerae are anicteric, normal external auditory canals,TMs clear b/l, gross hearing intact to whisper, MMM, no oropharygneal erythema or exudate.  NECK:  Supple, normal ROM, trachea is midline , no supraclavicular or cervical LAD or masses palpated.  Thyroid gland not palpable.  CARDIOVASCULAR:  RRR, normal S1 and S2, no m/r/g.  RESPIRATORY:  CTA b/l, no wheezes, rhonchi, rales.  No increased work of breathing, no  use of accessory muscles.  ABDOMEN:  Soft, nontender, nondistended, normoactive bowel sounds in all four quadrants, no rebound or guarding, no HSM or masses palpated.  Normal percussion.  EXTREMITIES:  2+ DP pulses b/l, no edema.  SKIN:  Warm, no lesions on exposed skin.  NEUROMUSCULAR:  Cranial nerves II-XII grossly intact.  Strength is 4+/5 over upper and lower extremity flexors/extensors b/l, 2+ biceps and patellar reflexes b/l. No clubbing or cyanosis of digits/nails.  Steady gait.  PSYCH:  Patient is alert and oriented to person, time, place. They are appropriately dressed and groomed. There is normal eye contact. Rate and tone of speech is normal. Normal insight, judgement. Normal thought content and process. Patient describes her mood as "ok", affect is euthymic    LABORATORY/IMAGING STUDIES: pending     ASSESSMENT/PLAN: This is a 68 y.o. female who presents to clinic for evaluation of the following concerns.  .1. Hypertension, unspecified type  See below    2. Hypercholesterolemia  Patient elects not to start statin at this time, continue with low cholesterol diet and monitoring    3. Panic attack,Anxiety and depression  Start lexapro with prn clonopin. The risks/side effects of these medications were discussed with her. She will notify us in 2 weeks with an update.    4. We will request her medical records from " [facility]'s office        Patient readiness: acceptance and barriers:none    During the course of the visit the patient was educated and counseled about the following:     Hypertension:   Medication: no change.    Goals: Hypertension: Reduce Blood Pressure    Did patient meet goals/outcomes: Yes    The following self management tools provided: declined    Patient Instructions (the written plan) was given to the patient/family.     Time spent with patient: 30 minutes      FOLLOW UP:  1 month      Morena Terrazas MD

## 2018-08-23 NOTE — PATIENT INSTRUCTIONS
Start lexapro/escitalopram 5 mg tablet daily, call clinic in 2 weeks to give update on the medication.  Call sooner if having any side effects.

## 2018-09-04 RX ORDER — CLONIDINE HYDROCHLORIDE 0.1 MG/1
0.1 TABLET ORAL 2 TIMES DAILY
Qty: 90 TABLET | Refills: 3 | Status: SHIPPED | OUTPATIENT
Start: 2018-09-04 | End: 2018-12-13 | Stop reason: ALTCHOICE

## 2018-09-04 NOTE — TELEPHONE ENCOUNTER
----- Message from Maira Mcdonald sent at 9/4/2018  8:28 AM CDT -----  Contact: self  Type:  RX Refill Request    Who Called: patient   Refill or New Rx:  refill  RX Name and Strength:cloNIDine (CATAPRES) 0.1 MG tablet   How is the patient currently taking it? (ex. 1XDay):  Take 1 tablet by mouth 2 (two) times daily. - Oral  Is this a 30 day or 90 day RX:    Preferred Pharmacy with phone number:       [id number] - [place], [place] - [address] AT [address]  [address] [address]  Phone: [phone] Fax: [phone]      Local or Mail Order: local  Ordering Provider:  Nini Farr Call Back Number:[phone] (home)     Additional Information:  Patient states she is currently out of medication.

## 2018-09-14 ENCOUNTER — TELEPHONE (OUTPATIENT)
Dept: FAMILY MEDICINE | Facility: CLINIC | Age: 68
End: 2018-09-14

## 2018-09-14 NOTE — TELEPHONE ENCOUNTER
Called pt regarding below message. Pt states she believes she is having a reaction to lexapro. Pt reports   Shakiness, persistent dry mouth and excessive sleepiness. Pt denies swelling, hives, chest discomfort.    Advised pt to discontinue medication until further recommendations from Dr. Terrazas. Pt verbalized understanding with no further questions.     ----- Message from Jossie Starr sent at 9/14/2018  3:12 PM CDT -----  Reaction from Rx Lexpro.  Please call patient as soon as possible to [phone].

## 2018-09-17 ENCOUNTER — TELEPHONE (OUTPATIENT)
Dept: SMOKING CESSATION | Facility: CLINIC | Age: 68
End: 2018-09-17

## 2018-09-17 NOTE — TELEPHONE ENCOUNTER
I spoke to patient regarding Smoking Cessation program and she is now registered and scheduled for intake appointment on 9/24/2018.

## 2018-09-18 ENCOUNTER — DOCUMENTATION ONLY (OUTPATIENT)
Dept: FAMILY MEDICINE | Facility: CLINIC | Age: 68
End: 2018-09-18

## 2018-09-18 NOTE — PROGRESS NOTES
Pre-Visit Chart Review  For Appointment Scheduled on 9/25/2018    Health Maintenance Due   Topic Date Due    TETANUS VACCINE  [birth year]27/1968    Zoster Vaccine  01/27/2011    Pneumococcal (65+) (1 of 2 - PCV13) 01/27/2016    Influenza Vaccine  08/01/2018

## 2018-09-20 NOTE — TELEPHONE ENCOUNTER
If she is still having symptoms she needs to stop the lexapro and we will discuss starting a different medication at her follow up next week. If the symptoms improved, she can continue with it.

## 2018-09-21 NOTE — TELEPHONE ENCOUNTER
Called pt regarding below message per Dr. Terrazas. Pt states she has already discontinued the medication. Pt will discuss alternatives at her upcoming appt. Pt verbalized understanding with no further questions.

## 2018-09-24 ENCOUNTER — CLINICAL SUPPORT (OUTPATIENT)
Dept: SMOKING CESSATION | Facility: CLINIC | Age: 68
End: 2018-09-24
Payer: COMMERCIAL

## 2018-09-24 DIAGNOSIS — F17.200 NICOTINE DEPENDENCE: Primary | ICD-10-CM

## 2018-09-24 PROCEDURE — 99999 PR PBB SHADOW E&M-EST. PATIENT-LVL II: CPT | Mod: PBBFAC,,,

## 2018-09-24 PROCEDURE — 99404 PREV MED CNSL INDIV APPRX 60: CPT | Mod: S$GLB,,,

## 2018-09-24 RX ORDER — IBUPROFEN 200 MG
1 TABLET ORAL DAILY
Qty: 14 PATCH | Refills: 0 | Status: SHIPPED | OUTPATIENT
Start: 2018-09-24 | End: 2018-10-25

## 2018-09-24 NOTE — Clinical Note
Patient is smoking about 20 cigarettes per day. She is ready to quit for health reasons. She mentions pain & physical limitations as a primary trigger. She and her  will be supporting each other in this quit. She will begin her tobacco cessation regimen of 21 mg nicotine patch QD and will follow up in clinic in 2 weeks.

## 2018-09-25 ENCOUNTER — OFFICE VISIT (OUTPATIENT)
Dept: FAMILY MEDICINE | Facility: CLINIC | Age: 68
End: 2018-09-25
Payer: MEDICARE

## 2018-09-25 VITALS
SYSTOLIC BLOOD PRESSURE: 119 MMHG | WEIGHT: 125 LBS | DIASTOLIC BLOOD PRESSURE: 75 MMHG | HEART RATE: 62 BPM | TEMPERATURE: 98 F | HEIGHT: 61 IN | BODY MASS INDEX: 23.6 KG/M2

## 2018-09-25 DIAGNOSIS — F41.9 ANXIETY AND DEPRESSION: Primary | ICD-10-CM

## 2018-09-25 DIAGNOSIS — I10 HYPERTENSION, UNSPECIFIED TYPE: ICD-10-CM

## 2018-09-25 DIAGNOSIS — F32.A ANXIETY AND DEPRESSION: Primary | ICD-10-CM

## 2018-09-25 DIAGNOSIS — Z23 IMMUNIZATION DUE: ICD-10-CM

## 2018-09-25 DIAGNOSIS — F41.0 PANIC ATTACK: ICD-10-CM

## 2018-09-25 PROCEDURE — 99999 PR PBB SHADOW E&M-EST. PATIENT-LVL IV: CPT | Mod: PBBFAC,,, | Performed by: FAMILY MEDICINE

## 2018-09-25 PROCEDURE — 99214 OFFICE O/P EST MOD 30 MIN: CPT | Mod: 25,S$PBB,, | Performed by: FAMILY MEDICINE

## 2018-09-25 PROCEDURE — 1101F PT FALLS ASSESS-DOCD LE1/YR: CPT | Mod: ,,, | Performed by: FAMILY MEDICINE

## 2018-09-25 PROCEDURE — 99214 OFFICE O/P EST MOD 30 MIN: CPT | Mod: PBBFAC,PO,25 | Performed by: FAMILY MEDICINE

## 2018-09-25 PROCEDURE — 90662 IIV NO PRSV INCREASED AG IM: CPT | Mod: PBBFAC,PO

## 2018-09-25 RX ORDER — LORAZEPAM 0.5 MG/1
0.5 TABLET ORAL DAILY PRN
Qty: 30 TABLET | Refills: 3 | Status: SHIPPED | OUTPATIENT
Start: 2018-09-25 | End: 2020-05-26

## 2018-10-08 ENCOUNTER — CLINICAL SUPPORT (OUTPATIENT)
Dept: SMOKING CESSATION | Facility: CLINIC | Age: 68
End: 2018-10-08
Payer: COMMERCIAL

## 2018-10-08 DIAGNOSIS — F17.200 NICOTINE DEPENDENCE: Primary | ICD-10-CM

## 2018-10-08 PROCEDURE — 99999 PR PBB SHADOW E&M-EST. PATIENT-LVL I: CPT | Mod: PBBFAC,,,

## 2018-10-08 PROCEDURE — 99407 BEHAV CHNG SMOKING > 10 MIN: CPT | Mod: S$GLB,,,

## 2018-10-25 ENCOUNTER — CLINICAL SUPPORT (OUTPATIENT)
Dept: SMOKING CESSATION | Facility: CLINIC | Age: 68
End: 2018-10-25
Payer: COMMERCIAL

## 2018-10-25 DIAGNOSIS — F17.200 NICOTINE DEPENDENCE: Primary | ICD-10-CM

## 2018-10-25 PROCEDURE — 99406 BEHAV CHNG SMOKING 3-10 MIN: CPT | Mod: S$GLB,,,

## 2018-10-25 PROCEDURE — 99999 PR PBB SHADOW E&M-EST. PATIENT-LVL I: CPT | Mod: PBBFAC,,,

## 2018-10-30 ENCOUNTER — DOCUMENTATION ONLY (OUTPATIENT)
Dept: FAMILY MEDICINE | Facility: CLINIC | Age: 68
End: 2018-10-30

## 2018-10-30 ENCOUNTER — TELEPHONE (OUTPATIENT)
Dept: FAMILY MEDICINE | Facility: CLINIC | Age: 68
End: 2018-10-30

## 2018-10-30 NOTE — PROGRESS NOTES
Pre-Visit Chart Review  For Appointment Scheduled on 10/31/2018    Health Maintenance Due   Topic Date Due    TETANUS VACCINE  01[birth year]27/1969    Zoster Vaccine  01/27/2011    Pneumococcal (65+) (1 of 2 - PCV13) 01/27/2016

## 2018-10-30 NOTE — TELEPHONE ENCOUNTER
She can be scheduled in 20 minutes. But if there are no openings prior to her surgery it will need to be pushed back.

## 2018-10-30 NOTE — TELEPHONE ENCOUNTER
Received fax from Dr. Tapia's office asking for surgical clearance for pt for back surgery (posterior laminectomy with fusion and instrumentation c3 to c7) on 11/7/18. No available 40 minute appointments. Please review chart and schedule and advise what to do. Thanks, Elma

## 2018-10-30 NOTE — TELEPHONE ENCOUNTER
Called pt regarding below message. Scheduled surgical clearance appt. Appt date, time, and location given. Pt verbalized understanding with no further questions.

## 2018-10-31 ENCOUNTER — OFFICE VISIT (OUTPATIENT)
Dept: FAMILY MEDICINE | Facility: CLINIC | Age: 68
End: 2018-10-31
Payer: MEDICARE

## 2018-10-31 ENCOUNTER — TELEPHONE (OUTPATIENT)
Dept: FAMILY MEDICINE | Facility: CLINIC | Age: 68
End: 2018-10-31

## 2018-10-31 VITALS
WEIGHT: 125.69 LBS | TEMPERATURE: 98 F | BODY MASS INDEX: 23.73 KG/M2 | DIASTOLIC BLOOD PRESSURE: 73 MMHG | OXYGEN SATURATION: 99 % | SYSTOLIC BLOOD PRESSURE: 121 MMHG | HEART RATE: 50 BPM | RESPIRATION RATE: 16 BRPM | HEIGHT: 61 IN

## 2018-10-31 DIAGNOSIS — Z01.818 PRE-OP EXAMINATION: Primary | ICD-10-CM

## 2018-10-31 DIAGNOSIS — M50.20 HERNIATION OF INTERVERTEBRAL DISC OF CERVICAL REGION: ICD-10-CM

## 2018-10-31 DIAGNOSIS — I10 HYPERTENSION, UNSPECIFIED TYPE: ICD-10-CM

## 2018-10-31 DIAGNOSIS — R94.31 ABNORMAL EKG: ICD-10-CM

## 2018-10-31 LAB
BILIRUB UR QL STRIP: NEGATIVE
CLARITY UR REFRACT.AUTO: CLEAR
COLOR UR AUTO: YELLOW
GLUCOSE UR QL STRIP: NEGATIVE
HGB UR QL STRIP: NEGATIVE
KETONES UR QL STRIP: NEGATIVE
LEUKOCYTE ESTERASE UR QL STRIP: NEGATIVE
NITRITE UR QL STRIP: NEGATIVE
PH UR STRIP: 5 [PH] (ref 5–8)
PROT UR QL STRIP: NEGATIVE
SP GR UR STRIP: 1.02 (ref 1–1.03)
URN SPEC COLLECT METH UR: NORMAL

## 2018-10-31 PROCEDURE — 99215 OFFICE O/P EST HI 40 MIN: CPT | Mod: PBBFAC,PO,25 | Performed by: FAMILY MEDICINE

## 2018-10-31 PROCEDURE — 93005 ELECTROCARDIOGRAM TRACING: CPT | Mod: PBBFAC,PO | Performed by: INTERNAL MEDICINE

## 2018-10-31 PROCEDURE — 93010 ELECTROCARDIOGRAM REPORT: CPT | Mod: S$PBB,,, | Performed by: INTERNAL MEDICINE

## 2018-10-31 PROCEDURE — 81003 URINALYSIS AUTO W/O SCOPE: CPT

## 2018-10-31 PROCEDURE — 99999 PR PBB SHADOW E&M-EST. PATIENT-LVL V: CPT | Mod: PBBFAC,,, | Performed by: FAMILY MEDICINE

## 2018-10-31 PROCEDURE — 1101F PT FALLS ASSESS-DOCD LE1/YR: CPT | Mod: ,,, | Performed by: FAMILY MEDICINE

## 2018-10-31 PROCEDURE — 87086 URINE CULTURE/COLONY COUNT: CPT

## 2018-10-31 PROCEDURE — 99215 OFFICE O/P EST HI 40 MIN: CPT | Mod: 25,S$PBB,, | Performed by: FAMILY MEDICINE

## 2018-10-31 RX ORDER — CYCLOBENZAPRINE HCL 5 MG
.5-1 TABLET ORAL EVERY 8 HOURS PRN
Refills: 0 | COMMUNITY
Start: 2018-09-17 | End: 2018-10-31

## 2018-10-31 RX ORDER — HYDROGEN PEROXIDE 3 %
20 SOLUTION, NON-ORAL MISCELLANEOUS
COMMUNITY
End: 2019-01-25

## 2018-10-31 NOTE — PROGRESS NOTES
CHIEF COMPLAINT:  Pre-op examination       HISTORY OF PRESENT ILLNESS:  Kristin Guerra is a 68 y.o. female who presents to clinic for a pre-op examination. She is scheduled to undergo a posterior laminectomy with fusion and instrumentation at the C3-C7 level with Dr. Tapia on 11/7/18. This will be under general anesthesia. She denies any personal or family history of complication from anesthesia. She denies any personal or family history of coagulopathy, hypercoagulable state.  She hsa HTN and hyperlipidemia, she denies any other history of cardiopulmonary disease. She has good functional capacity. She is not on any antiplatelet agents, anticoagulants. She had pre-operative testing done which is reviewed today. Her CBC, CMP, INR, PTT were normal. A CXR was normal. UA was negative, UCX was not done. She had an EKG which demonstrated a rate of 51 bpm, sinus bradycardia, nonspecific t wave changes.       REVIEW OF SYSTEMS:  The patient denies any fever, chills, night sweats, headaches, vision changes, difficulty speaking or swallowing, decreased hearing, weight loss, weight gain, chest pain, palpitations, shortness of breath, cough, nausea, vomiting, abdominal pain, dysuria, diarrhea, constipation, hematuria, hematochezia, melena, changes in her hair, skin, nails, numbness or weakness in her extremities, erythema, swelling over any of her joints, myalgia, swollen glands, easy bruising, fatigue, edema, symptoms of anxiety or depression. She denies any vaginal discharge, breast masses, nipple discharge, change in the skin overlying her breasts.      MEDICATIONS:   Reviewed and/or reconciled in EPIC    ALLERGIES:  Reviewed and/or reconciled in EPIC    PAST MEDICAL/SURGICAL HISTORY:   Past Medical History:   Diagnosis Date    Allergy     Colon cancer     Colon polyp     Diverticulitis     Diverticulosis     Fatty liver     GERD (gastroesophageal reflux disease)     Hyperlipidemia     Hypertension     Lupus  (systemic lupus erythematosus)     discoid    Osteoarthritis     Osteoporosis     Tremor 2005    essential      Past Surgical History:   Procedure Laterality Date    APPENDECTOMY  2007    removed during colon surgery    CERVICAL FUSION       SECTION      COLON SURGERY   & 2008    hemicolectomy for colon cancer and second was for complications from the first surgery    COLONOSCOPY N/A 2018    Procedure: COLONOSCOPY;  Surgeon: Maverick Esquivel MD;  Location: [facility];  Service: Endoscopy;  Laterality: N/A; repeat in 5 years for surveillance    COLONOSCOPY N/A 2018    Performed by Maverick Esquivel MD at [facility]    ESOPHAGOGASTRODUODENOSCOPY (EGD) N/A 2018    Performed by Monika Steele MD at [facility]    HYSTERECTOMY      ovaries remain    SPLENECTOMY, TOTAL      Injured during surgery and removed    UPPER GASTROINTESTINAL ENDOSCOPY      GERD & hiatal hernia per patient report       FAMILY HISTORY:    Family History   Problem Relation Age of Onset    Colon cancer Father         diagnosed in his 60's    Lung cancer Father     Lymphoma Mother         Brain    Lung cancer Brother     Lung cancer Brother         metastatic from prostate    Prostate cancer Brother     Heart disease Brother         valvular disease    Hyperlipidemia Brother     Eczema Neg Hx     Lupus Neg Hx     Psoriasis Neg Hx     Melanoma Neg Hx     Crohn's disease Neg Hx     Ulcerative colitis Neg Hx     Stomach cancer Neg Hx     Esophageal cancer Neg Hx        SOCIAL HISTORY:    Social History     Socioeconomic History    Marital status:      Spouse name: Not on file    Number of children: 2    Years of education: Not on file    Highest education level: Not on file   Social Needs    Financial resource strain: Not on file    Food insecurity - worry: Not on file    Food insecurity - inability: Not on file    Transportation needs - medical: Not on file     "Transportation needs - non-medical: Not on file   Occupational History    Not on file   Tobacco Use    Smoking status: Current Every Day Smoker     Packs/day: 1.00     Years: 35.00     Pack years: 35.00     Types: Cigarettes    Smokeless tobacco: Never Used   Substance and Sexual Activity    Alcohol use: No    Drug use: No    Sexual activity: Yes     Partners: Male     Birth control/protection: Post-menopausal   Other Topics Concern    Are you pregnant or think you may be? Not Asked    Breast-feeding Not Asked   Social History Narrative    Not on file       PHYSICAL EXAM:  VITAL SIGNS:   Vitals:    10/31/18 1157   BP: 121/73   BP Location: Left arm   Patient Position: Sitting   BP Method: Medium (Automatic)   Pulse: (!) 50   Resp: 16   Temp: 97.6 °F (36.4 °C)   TempSrc: Oral   SpO2: 99%   Weight: 57 kg (125 lb 10.6 oz)   Height: 5' 1" (1.549 m)     GENERAL:  Patient appears well nourished, sitting on exam table, in no acute distress.  HEENT:  Atraumatic, normocephalic, PERRLA, EOMI, no conjunctival injection, sclerae are anicteric, normal external auditory canals,TMs clear b/l, gross hearing intact to whisper, MMM, no oropharygneal erythema or exudate.  NECK:  Supple, normal ROM, trachea is midline , no supraclavicular or cervical LAD or masses palpated.  Thyroid gland not palpable.  CARDIOVASCULAR:  RRR, normal S1 and S2, no m/r/g.  RESPIRATORY:  CTA b/l, no wheezes, rhonchi, rales.  No increased work of breathing, no  use of accessory muscles.  ABDOMEN:  Soft, nontender, nondistended, normoactive bowel sounds in all four quadrants, no rebound or guarding, no HSM or masses palpated.  Normal percussion.  EXTREMITIES:  2+ DP pulses b/l, no edema.  SKIN:  Warm, no lesions on exposed skin.   2+ biceps and patellar reflexes b/l. No clubbing or cyanosis of digits/nails.  Steady gait.  PSYCH:  Patient is alert and oriented to person, time, place. They are appropriately dressed and groomed. There is normal eye " contact. Rate and tone of speech is normal. Normal insight, judgement. Normal thought content and process.     LABORATORY/IMAGING STUDIES: I have reviewed a repeat EKG which demonstrated a rate of 44 bpm, sinus bradycardia, no ST or T wave abnormalities or axis deviation    ASSESSMENT/PLAN: This is a 68 y.o. female who presents to clinic for evaluation of the following concerns.  1. 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 If no significant abnormalities she will be cleared for surgery and Dr. Tapia will be notified.    2. Hypertension, unspecified type  See below        Addendum: UA and UCX negative. She is cleared for surgery. Dr. Tapia will be notified.     Patient readiness: acceptance and barriers:none    During the course of the visit the patient was educated and counseled about the following:     Hypertension:   Medication: no change.  Will tae to closely monitor HR, usual range for patient is 50-70 bpm.    Goals: Hypertension: Reduce Blood Pressure    Did patient meet goals/outcomes: Yes    The following self management tools provided: declined    Patient Instructions (the written plan) was given to the patient/family.     Time spent with patient: 30 minutes            Morena Terrazas MD

## 2018-10-31 NOTE — TELEPHONE ENCOUNTER
Called pt regarding below message. Unable to leave voicemail.     ----- Message from Ildefonso Sosa sent at 10/30/2018  4:24 PM CDT -----  Contact: patient  Kristin, [phone]. Requesting to speak with the nurse again. Please advise. Thanks.

## 2018-11-02 ENCOUNTER — TELEPHONE (OUTPATIENT)
Dept: FAMILY MEDICINE | Facility: CLINIC | Age: 68
End: 2018-11-02

## 2018-11-02 LAB — BACTERIA UR CULT: NORMAL

## 2018-11-02 NOTE — TELEPHONE ENCOUNTER
So who put her on antibiotics? Because the surgical center did not do a culture on her urine. That is why I did it.

## 2018-11-02 NOTE — TELEPHONE ENCOUNTER
Called Lisandroregarding below message. Informed Lisandro we will have the clearance prepared and faxed on Monday 11/5/18. Lisandro states pt is currently being treated with levaquin 500 mg daily for urinary symptoms. Informed Lisandro I will send this infraction to Dr. Terrazas and return her call.         ----- Message from Catrina Conte sent at 11/2/2018 12:53 PM CDT -----   Dr Lester Tapia / Lisandro cell ph [phone]  Please send a surgery clearance to fax [phone] / pt was seen 10/31

## 2018-11-05 ENCOUNTER — TELEPHONE (OUTPATIENT)
Dept: FAMILY MEDICINE | Facility: CLINIC | Age: 68
End: 2018-11-05

## 2018-11-05 DIAGNOSIS — R31.9 HEMATURIA, UNSPECIFIED TYPE: Primary | ICD-10-CM

## 2018-11-05 NOTE — TELEPHONE ENCOUNTER
Called pt regarding below message. Unable to leave voicemail.     Faxed clearance to Dr. Tapia office.

## 2018-11-05 NOTE — TELEPHONE ENCOUNTER
Pt returned call to clinic regarding below message. Pt states she will call after surgery to schedule repeat urinalysis. Pt verbalized understanding with no further questions.

## 2018-11-05 NOTE — TELEPHONE ENCOUNTER
Her ucx was negative, ua demonstrates trace blood. She is cleared for surgery. Please let Dr. Tapia know. Will need repeat ua and ucx in one month to make sure there is still not evidence of blood.

## 2018-11-12 ENCOUNTER — PES CALL (OUTPATIENT)
Dept: ADMINISTRATIVE | Facility: CLINIC | Age: 68
End: 2018-11-12

## 2018-12-05 ENCOUNTER — TELEPHONE (OUTPATIENT)
Dept: FAMILY MEDICINE | Facility: CLINIC | Age: 68
End: 2018-12-05

## 2018-12-05 NOTE — TELEPHONE ENCOUNTER
Called pt regarding below message.  George states that pt has been extremely fatigue and lethargic. Pt became extremely weak yesterday with collapse but Josesera denies LOC. Josesera states all pt will do is lay in bed. Encouraged George to take pt to ER  and informed him this needs immediate attention and not an appt in clinic. George states he will take her to the ER. George verbalized understanding with no further questions.

## 2018-12-12 ENCOUNTER — TELEPHONE (OUTPATIENT)
Dept: FAMILY MEDICINE | Facility: CLINIC | Age: 68
End: 2018-12-12

## 2018-12-12 ENCOUNTER — DOCUMENTATION ONLY (OUTPATIENT)
Dept: FAMILY MEDICINE | Facility: CLINIC | Age: 68
End: 2018-12-12

## 2018-12-12 NOTE — TELEPHONE ENCOUNTER
Called pt--her Hospital Follow Up appointment that was scheduled for tomorrow at 10 am on Dr. Terrazas's schedule was made incorrectly. It was scheduled for 20 mins and not 60. Rescheduled to [facility] [place] at 1030 am. Thanks, Elma

## 2018-12-12 NOTE — PROGRESS NOTES
Pre-Visit Chart Review  For Appointment Scheduled on 12/13/2018    Health Maintenance Due   Topic Date Due    TETANUS VACCINE  01[birth year]27/1969    Zoster Vaccine  01/27/2011    Pneumococcal Vaccine (65+ Low/Medium Risk) (1 of 2 - PCV13) 01/27/2016    Mammogram  01/11/2019

## 2018-12-13 ENCOUNTER — LAB VISIT (OUTPATIENT)
Dept: LAB | Facility: HOSPITAL | Age: 68
End: 2018-12-13
Attending: FAMILY MEDICINE
Payer: MEDICARE

## 2018-12-13 ENCOUNTER — OFFICE VISIT (OUTPATIENT)
Dept: FAMILY MEDICINE | Facility: CLINIC | Age: 68
End: 2018-12-13
Payer: MEDICARE

## 2018-12-13 VITALS
SYSTOLIC BLOOD PRESSURE: 125 MMHG | HEIGHT: 61 IN | WEIGHT: 125.69 LBS | DIASTOLIC BLOOD PRESSURE: 73 MMHG | BODY MASS INDEX: 23.73 KG/M2 | HEART RATE: 72 BPM | TEMPERATURE: 98 F | OXYGEN SATURATION: 95 %

## 2018-12-13 DIAGNOSIS — M50.20 HERNIATION OF INTERVERTEBRAL DISC OF CERVICAL REGION: ICD-10-CM

## 2018-12-13 DIAGNOSIS — Z12.39 BREAST CANCER SCREENING: ICD-10-CM

## 2018-12-13 DIAGNOSIS — I95.1 SYNCOPE DUE TO ORTHOSTATIC HYPOTENSION: Primary | ICD-10-CM

## 2018-12-13 DIAGNOSIS — I10 HYPERTENSION, UNSPECIFIED TYPE: ICD-10-CM

## 2018-12-13 DIAGNOSIS — F17.200 TOBACCO USE DISORDER, MODERATE, DEPENDENCE: ICD-10-CM

## 2018-12-13 DIAGNOSIS — R31.9 HEMATURIA, UNSPECIFIED TYPE: ICD-10-CM

## 2018-12-13 DIAGNOSIS — Z09 FOLLOW UP: ICD-10-CM

## 2018-12-13 LAB
BACTERIA #/AREA URNS AUTO: NORMAL /HPF
BILIRUB UR QL STRIP: NEGATIVE
CLARITY UR REFRACT.AUTO: ABNORMAL
COLOR UR AUTO: YELLOW
GLUCOSE UR QL STRIP: NEGATIVE
HGB UR QL STRIP: ABNORMAL
KETONES UR QL STRIP: NEGATIVE
LEUKOCYTE ESTERASE UR QL STRIP: NEGATIVE
MICROSCOPIC COMMENT: NORMAL
NITRITE UR QL STRIP: NEGATIVE
PH UR STRIP: 6 [PH] (ref 5–8)
PROT UR QL STRIP: NEGATIVE
RBC #/AREA URNS AUTO: 2 /HPF (ref 0–4)
SP GR UR STRIP: 1.02 (ref 1–1.03)
SQUAMOUS #/AREA URNS AUTO: 6 /HPF
URN SPEC COLLECT METH UR: ABNORMAL
WBC #/AREA URNS AUTO: 1 /HPF (ref 0–5)

## 2018-12-13 PROCEDURE — 87088 URINE BACTERIA CULTURE: CPT

## 2018-12-13 PROCEDURE — 81001 URINALYSIS AUTO W/SCOPE: CPT

## 2018-12-13 PROCEDURE — 99214 OFFICE O/P EST MOD 30 MIN: CPT | Mod: S$GLB,,, | Performed by: NURSE PRACTITIONER

## 2018-12-13 PROCEDURE — 87086 URINE CULTURE/COLONY COUNT: CPT

## 2018-12-13 PROCEDURE — 87077 CULTURE AEROBIC IDENTIFY: CPT

## 2018-12-13 PROCEDURE — 1101F PT FALLS ASSESS-DOCD LE1/YR: CPT | Mod: S$GLB,,, | Performed by: NURSE PRACTITIONER

## 2018-12-13 PROCEDURE — 99999 PR PBB SHADOW E&M-EST. PATIENT-LVL IV: CPT | Mod: PBBFAC,,, | Performed by: NURSE PRACTITIONER

## 2018-12-13 PROCEDURE — 87186 SC STD MICRODIL/AGAR DIL: CPT

## 2018-12-13 RX ORDER — NAPROXEN SODIUM 220 MG/1
81 TABLET, FILM COATED ORAL DAILY
COMMUNITY
End: 2021-11-02

## 2018-12-13 RX ORDER — LOSARTAN POTASSIUM 50 MG/1
TABLET ORAL
Refills: 2 | COMMUNITY
Start: 2018-12-07 | End: 2019-01-08 | Stop reason: ALTCHOICE

## 2018-12-13 RX ORDER — NIFEDIPINE 30 MG/1
30 TABLET, FILM COATED, EXTENDED RELEASE ORAL DAILY
COMMUNITY
End: 2021-01-29

## 2018-12-13 NOTE — PROGRESS NOTES
Subjective:       Patient ID: Kristin Guerra is a 68 y.o. female.    Chief Complaint: Hospital Follow Up    Patient is a 68 year old female who presents today with  for a hospital follow-up. Patient was inpatient at [facility] on 12/05/2018 for Syncope related to bradycardia and hypotension. Head CT was normal. Patients Clonidine, Norvasc, and Benazepril was discontinued; Nifedipine ER was started. Patient remains on Losartan; current /73. Patient denies and dizziness or lightheadedness . Informed patient to record daily blood pressure readings and call with one week readings. She had Cervical spine surgery on November 07,2018, Incision starts at the base of the skull to cervical neck. The incision is well approximated, open to air, no drainage or edema.  Spent 30 minutes reviewing history, labs and documents form inpatient visit at [facility], education document given on back and neck pain.      Review of Systems   Constitutional: Negative for activity change, appetite change, fatigue and fever.   HENT: Negative for congestion, postnasal drip, rhinorrhea, sinus pressure, sinus pain, sore throat and trouble swallowing.    Eyes: Negative for discharge, redness and itching.   Respiratory: Negative for cough, choking, chest tightness, shortness of breath and wheezing.    Cardiovascular: Negative for chest pain, palpitations and leg swelling.   Gastrointestinal: Negative for abdominal distention, blood in stool, constipation, diarrhea, nausea and vomiting.   Endocrine: Negative.    Genitourinary: Negative for difficulty urinating, flank pain, hematuria and urgency.   Musculoskeletal: Positive for neck pain (neck surgery). Negative for neck stiffness.   Skin: Negative.    Allergic/Immunologic: Negative.    Neurological: Positive for weakness. Negative for dizziness, speech difficulty, light-headedness, numbness and headaches.   Hematological: Negative.   "  Psychiatric/Behavioral: Negative for agitation. The patient is nervous/anxious.        Objective:      /73 (BP Location: Right arm, Patient Position: Sitting, BP Method: Medium (Automatic))   Pulse 72   Temp 98 °F (36.7 °C) (Oral)   Ht 5' 1" (1.549 m)   Wt 57 kg (125 lb 10.6 oz)   SpO2 95%   BMI 23.74 kg/m²   Physical Exam   Constitutional: She is oriented to person, place, and time. She appears well-developed and well-nourished.   HENT:   Head: Normocephalic.   Eyes: Conjunctivae and EOM are normal. Pupils are equal, round, and reactive to light.   Neck: Normal range of motion. Neck supple.   Cardiovascular: Normal rate, regular rhythm, normal heart sounds and intact distal pulses.   Pulmonary/Chest: Effort normal and breath sounds normal.   Abdominal: Soft. Bowel sounds are normal.   Musculoskeletal: Normal range of motion.   Neurological: She is alert and oriented to person, place, and time.   Skin: Skin is warm and dry. There is erythema.        Psychiatric: Judgment and thought content normal. Her mood appears anxious.       Assessment:       1. Syncope due to orthostatic hypotension    2. Herniation of intervertebral disc of cervical region    3. Tobacco use disorder, moderate, dependence    4. Hypertension, unspecified type    5. Follow up    6. Breast cancer screening        Plan:       Syncope due to orthostatic hypotension   continue current medication  Herniation of intervertebral disc of cervical region   S/P surgery repair on November 07,2018   Neck incision well approximated, open to air, no drainage or edema  Tobacco use disorder, moderate, dependence  -     Ambulatory referral to Smoking Cessation Program   Education document given on Tobacco Cessation  Hypertension, unspecified type   Controlled continue current medication  Follow up  -     Comprehensive metabolic panel; Future; Expected date: 12/13/2018  -     CBC auto differential; Future; Expected date: 12/13/2018  -     Lipid " panel; Future; Expected date: 12/13/2018  -     TSH; Future; Expected date: 12/13/2018    Breast cancer screening  -     Mammo Digital Screening Bilateral With CAD; Future; Expected date: 06/12/2019      Patient readiness: acceptance and barriers:none    During the course of the visit the patient was educated and counseled about the following:     Hypertension:   Dietary sodium restriction.  Regular aerobic exercise.    Goals: Hypertension: Reduce Blood Pressure    Did patient meet goals/outcomes: Yes    The following self management tools provided: blood pressure log    Patient Instructions (the written plan) was given to the patient/family.     Time spent with patient: 55 minutes    Barriers to medications present (no )    Adverse reactions to current medications (no)    Over the counter medications reviewed (Yes)

## 2018-12-13 NOTE — PATIENT INSTRUCTIONS
General Neck and Back Pain    Both neck and back pain are usually caused by injury to the muscles or ligaments of the spine. Sometimes the disks that separate each bone of the spine may cause pain by pressing on a nearby nerve. Back and neck pain may appear after a sudden twisting or bending force (such as in a car accident), or sometimes after a simple awkward movement. In either case, muscle spasm is often present and adds to the pain.  Acute neck and back pain usually gets better in 1 to 2 weeks. Pain related to disk disease, arthritis in the spinal joints or spinal stenosis (narrowing of the spinal canal) can become chronic and last for months or years.  Back and neck pain are common problems. Most people feel better in 1 or 2 weeks, and most of the rest in 1 to 2 months. Most people can remain active.  People experience and describe pain differently.  · Pain can be sharp, stabbing, shooting, aching, cramping, or burning  · Movement, standing, bending, lifting, sitting, or walking may worsen the pain  · Pain can be localized to one spot or area, or it can be more generalized  · Pain can spread or radiate upwards, downwards, to the front, or go down your arms  · Muscle spasm may occur.  Most of the time mechanical problems with the muscles or spine cause the pain. it is usually caused by an injury, whether known or not, to the muscles or ligaments. While illnesses can cause back pain, it is usually not caused by a serious illness. Pain is usually related to physical activity, whether sports, exercise, work, or normal activity. Sometimes it can occur without an identifiable cause. This can happen simply by stretching or moving wrong, without noting pain at the time. Other causes include:  · Overexertion, lifting, pushing, pulling incorrectly or too aggressively.  · Sudden twisting, bending or stretching from an accident (car or fall), or accidental movement.  · Poor posture  · Poor conditioning, lack of regular  exercise  · Spinal disc disease or arthritis  · Stress  · Pregnancy, or illness like appendicitis, bladder or kidney infection, pelvic infections   Home care  · For neck pain: Use a comfortable pillow that supports the head and keeps the spine in a neutral position. The position of the head should not be tilted forward or backward.  · When in bed, try to find a position of comfort. A firm mattress is best. Try lying flat on your back with pillows under your knees. You can also try lying on your side with your knees bent up towards your chest and a pillow between your knees.  · At first, do not try to stretch out the sore spots. If there is a strain, it is not like the good soreness you get after exercising without an injury. In this case, stretching may make it worse.  · Avoid prolonged sitting, long car rides or travel. This puts more stress on the lower back than standing or walking.  · During the first 24 to 72 hours after an injury, apply an ice pack to the painful area for 20 minutes and then remove it for 20 minutes over a period of 60 to 90 minutes or several times a day.   · You can alternate ice and heat therapies. Talk with your healthcare provider about the best treatment for your back or neck pain. As a safety precaution, do not use a heating pad at bedtime. Sleeping with a heating pad can lead to skin burns or tissue damage.  · Therapeutic massage can help relax the back and neck muscles without stretching them.  · Be aware of safe lifting methods and do not lift anything over 15 pounds until all the pain is gone.  Medications  Talk to your healthcare provider before using medicine, especially if you have other medical problems or are taking other medicines.  · You may use over-the-counter medicine to control pain, unless another pain medicine was prescribed. If you have chronic conditions like diabetes, liver or kidney disease, stomach ulcers,  gastrointestinal bleeding, or are taking blood thinner  medicines.  · Be careful if you are given pain medicines, narcotics, or medicine for muscle spasm. They can cause drowsiness, and can affect your coordination, reflexes, and judgment. Do not drive or operate heavy machinery.  Follow-up care  Follow up with your healthcare provider, or as advised. Physical therapy or further tests may be needed.  If X-rays were taken, you will be notified of any new findings that may affect your care.  Call 911  Seek emergency medical care if any of the following occur:  · Trouble breathing  · Confusion  · Very drowsy or trouble awakening  · Fainting or loss of consciousness  · Rapid or very slow heart rate  · Loss of bowel or bladder control  When to seek medical advice  Call your healthcare provider right away if any of these occur:  · Pain becomes worse or spreads into your arms or legs  · Weakness, numbness or pain in one or both arms or legs  · Numbness in the groin area  · Difficulty walking  · Fever of 100.4ºF (38ºC) or higher, or as directed by your healthcare provider  Date Last Reviewed: 7/1/2016 © 2000-2017 [employer]. [address] [place] [address], [place] [address]. All rights reserved. This information is not intended as a substitute for professional medical care. Always follow your healthcare professional's instructions.

## 2018-12-14 ENCOUNTER — TELEPHONE (OUTPATIENT)
Dept: FAMILY MEDICINE | Facility: CLINIC | Age: 68
End: 2018-12-14

## 2018-12-14 PROBLEM — K21.9 ACID REFLUX: Status: RESOLVED | Noted: 2018-05-01 | Resolved: 2018-12-14

## 2018-12-16 LAB — BACTERIA UR CULT: NORMAL

## 2018-12-17 ENCOUNTER — TELEPHONE (OUTPATIENT)
Dept: FAMILY MEDICINE | Facility: CLINIC | Age: 68
End: 2018-12-17

## 2018-12-17 DIAGNOSIS — N39.0 URINARY TRACT INFECTION WITH HEMATURIA, SITE UNSPECIFIED: Primary | ICD-10-CM

## 2018-12-17 DIAGNOSIS — R31.9 URINARY TRACT INFECTION WITH HEMATURIA, SITE UNSPECIFIED: Primary | ICD-10-CM

## 2018-12-17 RX ORDER — NITROFURANTOIN 25; 75 MG/1; MG/1
100 CAPSULE ORAL EVERY 12 HOURS
Qty: 14 CAPSULE | Refills: 0 | Status: SHIPPED | OUTPATIENT
Start: 2018-12-17 | End: 2018-12-24

## 2018-12-17 NOTE — TELEPHONE ENCOUNTER
Patient notified of U/A result and to  antibiotics from pharmacy.  Voiced understanding.  Pt is scheduled to see Dr. Terrazas 01/14/19 and will discuss repeating UA and culture at that time.

## 2018-12-17 NOTE — TELEPHONE ENCOUNTER
ua continued to demonstrate blood and culture was positive for infection with enterococcus. Needs to complete a course of antibiotics and have repeat ua and ucx in one month to make sure that infection and hematuria are resolved.

## 2018-12-26 ENCOUNTER — TELEPHONE (OUTPATIENT)
Dept: GASTROENTEROLOGY | Facility: CLINIC | Age: 68
End: 2018-12-26

## 2018-12-26 NOTE — TELEPHONE ENCOUNTER
Spoke with pt. Informed that her scheduled appointment is first available. Appointment added to wait list. Pt verbalized understanding.

## 2018-12-26 NOTE — TELEPHONE ENCOUNTER
----- Message from Huan Cui sent at 12/26/2018  8:21 AM CST -----  Contact: pt  Type:  Sooner Apoointment Request    Caller is requesting a sooner appointment.  Caller declined first available appointment listed below.  Caller will not accept being placed on the waitlist and is requesting a message be sent to doctor.    Name of Caller:  pt  When is the first available appointment?  Pt has one on 1.24.19  Symptoms:  suffering with reflux related issues  Best Call Back Number:  [phone]  Additional Information:  Pt was released from hospital on 12.25.18 for colitus

## 2019-01-02 ENCOUNTER — DOCUMENTATION ONLY (OUTPATIENT)
Dept: FAMILY MEDICINE | Facility: CLINIC | Age: 69
End: 2019-01-02

## 2019-01-02 NOTE — PROGRESS NOTES
Pre-Visit Chart Review  For Appointment Scheduled on 1/7/2019    Health Maintenance Due   Topic Date Due    Pneumococcal Vaccine (65+ Low/Medium Risk) (1 of 2 - PCV13) 01/27/2016    Mammogram  01/11/2019

## 2019-01-07 ENCOUNTER — DOCUMENTATION ONLY (OUTPATIENT)
Dept: FAMILY MEDICINE | Facility: CLINIC | Age: 69
End: 2019-01-07

## 2019-01-07 NOTE — PROGRESS NOTES
Pre-Visit Chart Review  For Appointment Scheduled on 1/8/2019    Health Maintenance Due   Topic Date Due    Pneumococcal Vaccine (65+ Low/Medium Risk) (1 of 2 - PCV13) 01/27/2016    Mammogram  01/11/2019

## 2019-01-08 ENCOUNTER — OFFICE VISIT (OUTPATIENT)
Dept: FAMILY MEDICINE | Facility: CLINIC | Age: 69
End: 2019-01-08
Payer: MEDICARE

## 2019-01-08 ENCOUNTER — HOSPITAL ENCOUNTER (OUTPATIENT)
Dept: RADIOLOGY | Facility: HOSPITAL | Age: 69
Discharge: HOME | End: 2019-01-08
Attending: FAMILY MEDICINE
Payer: MEDICARE

## 2019-01-08 VITALS
HEIGHT: 61 IN | SYSTOLIC BLOOD PRESSURE: 130 MMHG | TEMPERATURE: 98 F | DIASTOLIC BLOOD PRESSURE: 72 MMHG | HEART RATE: 71 BPM | WEIGHT: 124.56 LBS | BODY MASS INDEX: 23.52 KG/M2

## 2019-01-08 DIAGNOSIS — R10.32 LLQ PAIN: ICD-10-CM

## 2019-01-08 DIAGNOSIS — N39.0 URINARY TRACT INFECTION WITH HEMATURIA, SITE UNSPECIFIED: ICD-10-CM

## 2019-01-08 DIAGNOSIS — I10 HYPERTENSION, UNSPECIFIED TYPE: ICD-10-CM

## 2019-01-08 DIAGNOSIS — R31.9 URINARY TRACT INFECTION WITH HEMATURIA, SITE UNSPECIFIED: ICD-10-CM

## 2019-01-08 DIAGNOSIS — R10.32 LLQ PAIN: Primary | ICD-10-CM

## 2019-01-08 LAB
BILIRUB UR QL STRIP: NEGATIVE
CLARITY UR REFRACT.AUTO: ABNORMAL
COLOR UR AUTO: ABNORMAL
GLUCOSE UR QL STRIP: NEGATIVE
HGB UR QL STRIP: NEGATIVE
KETONES UR QL STRIP: NEGATIVE
LEUKOCYTE ESTERASE UR QL STRIP: NEGATIVE
MICROSCOPIC COMMENT: NORMAL
NITRITE UR QL STRIP: NEGATIVE
PH UR STRIP: 6 [PH] (ref 5–8)
PROT UR QL STRIP: NEGATIVE
SP GR UR STRIP: 1.01 (ref 1–1.03)
URN SPEC COLLECT METH UR: ABNORMAL

## 2019-01-08 PROCEDURE — 1101F PR PT FALLS ASSESS DOC 0-1 FALLS W/OUT INJ PAST YR: ICD-10-PCS | Mod: S$GLB,,, | Performed by: FAMILY MEDICINE

## 2019-01-08 PROCEDURE — 74177 CT ABD & PELVIS W/CONTRAST: CPT | Mod: TC

## 2019-01-08 PROCEDURE — 74177 CT ABDOMEN PELVIS WITH CONTRAST: ICD-10-PCS | Mod: 26,,, | Performed by: RADIOLOGY

## 2019-01-08 PROCEDURE — 25500020 PHARM REV CODE 255

## 2019-01-08 PROCEDURE — 1101F PT FALLS ASSESS-DOCD LE1/YR: CPT | Mod: S$GLB,,, | Performed by: FAMILY MEDICINE

## 2019-01-08 PROCEDURE — 74177 CT ABD & PELVIS W/CONTRAST: CPT | Mod: 26,,, | Performed by: RADIOLOGY

## 2019-01-08 PROCEDURE — 3078F DIAST BP <80 MM HG: CPT | Mod: S$GLB,,, | Performed by: FAMILY MEDICINE

## 2019-01-08 PROCEDURE — 3078F PR MOST RECENT DIASTOLIC BLOOD PRESSURE < 80 MM HG: ICD-10-PCS | Mod: S$GLB,,, | Performed by: FAMILY MEDICINE

## 2019-01-08 PROCEDURE — 87088 URINE BACTERIA CULTURE: CPT

## 2019-01-08 PROCEDURE — 99214 OFFICE O/P EST MOD 30 MIN: CPT | Mod: S$GLB,,, | Performed by: FAMILY MEDICINE

## 2019-01-08 PROCEDURE — 3075F SYST BP GE 130 - 139MM HG: CPT | Mod: S$GLB,,, | Performed by: FAMILY MEDICINE

## 2019-01-08 PROCEDURE — 81001 URINALYSIS AUTO W/SCOPE: CPT

## 2019-01-08 PROCEDURE — 87186 SC STD MICRODIL/AGAR DIL: CPT

## 2019-01-08 PROCEDURE — 99214 PR OFFICE/OUTPT VISIT, EST, LEVL IV, 30-39 MIN: ICD-10-PCS | Mod: S$GLB,,, | Performed by: FAMILY MEDICINE

## 2019-01-08 PROCEDURE — 87077 CULTURE AEROBIC IDENTIFY: CPT

## 2019-01-08 PROCEDURE — 87086 URINE CULTURE/COLONY COUNT: CPT

## 2019-01-08 PROCEDURE — 3075F PR MOST RECENT SYSTOLIC BLOOD PRESS GE 130-139MM HG: ICD-10-PCS | Mod: S$GLB,,, | Performed by: FAMILY MEDICINE

## 2019-01-08 PROCEDURE — 99999 PR PBB SHADOW E&M-EST. PATIENT-LVL IV: ICD-10-PCS | Mod: PBBFAC,,, | Performed by: FAMILY MEDICINE

## 2019-01-08 PROCEDURE — 99999 PR PBB SHADOW E&M-EST. PATIENT-LVL IV: CPT | Mod: PBBFAC,,, | Performed by: FAMILY MEDICINE

## 2019-01-08 RX ORDER — AMOXICILLIN AND CLAVULANATE POTASSIUM 875; 125 MG/1; MG/1
1 TABLET, FILM COATED ORAL EVERY 12 HOURS
Qty: 20 TABLET | Refills: 0 | Status: SHIPPED | OUTPATIENT
Start: 2019-01-08 | End: 2019-01-18

## 2019-01-08 RX ORDER — HYDROCODONE BITARTRATE AND ACETAMINOPHEN 7.5; 325 MG/1; MG/1
TABLET ORAL
COMMUNITY
Start: 2019-01-07 | End: 2019-07-09

## 2019-01-08 RX ADMIN — IOHEXOL 75 ML: 350 INJECTION, SOLUTION INTRAVENOUS at 05:01

## 2019-01-08 RX ADMIN — IOHEXOL 30 ML: 350 INJECTION, SOLUTION INTRAVENOUS at 05:01

## 2019-01-08 NOTE — PATIENT INSTRUCTIONS
Add daily fiber supplement such as metamucil or fibercon.     Need to take daily stool softener or laxative such as docusate senna or miralax. If having diarrhea can decrease to every other day.

## 2019-01-08 NOTE — PROGRESS NOTES
CHIEF COMPLAINT: follow up HTN      HISTORY OF PRESENT ILLNESS:  Kristin Guerra is a 68 y.o. female who presents to clinic for follow up on HTN. Of note she was recently seen for a hospital follow up. And today she states that she was again hospitalized at [facility] and that this is a hospital follow up. I have no records available to me at today's visit. She is accompanied by her  and they state that hs was hospitalized at [facility] from 12/23/18-12/25/18 for evaluation of left sided abdominal pain and nausea that started a few weeks prior to her presenting to the ED and being admitted. She states that she had a CT scan that demonstrated evidence of colitis however she does not know what kind and she is not on any antibiotics. A GI follow up was not made. She continues to have pain. She denies any fever, chills, vomiting, hematuria, diarrhea, constipation, hematuria, blood in her stool.     She was recently treated for an enterococcal UTI.    REVIEW OF SYSTEMS:  The patient denies any fever, chills, night sweats, headaches, vision changes, difficulty speaking or swallowing, decreased hearing, weight loss, weight gain, chest pain, palpitations, shortness of breath, cough, nausea, vomiting,dysuria, diarrhea, constipation, hematuria, hematochezia, melena, changes in her hair, skin, nails, numbness or weakness in her extremities, erythema, pain or swelling over any of her joints, myalgia, swollen glands, easy bruising, fatigue, edema.She denies any vaginal discharge, breast masses, nipple discharge, change in the skin overlying her breasts.      MEDICATIONS:   Reviewed and/or reconciled in EPIC    ALLERGIES:  Reviewed and/or reconciled in [facility]    PAST MEDICAL/SURGICAL HISTORY:   Past Medical History:   Diagnosis Date    Allergy     Colon cancer     Colon polyp     Diverticulitis     Diverticulosis     Fatty liver     GERD (gastroesophageal reflux disease)     Hx of cervical spine surgery 11/07/2018     Hyperlipidemia     Hypertension     Lupus (systemic lupus erythematosus)     discoid    Osteoarthritis     Osteoporosis     Tremor 2005    essential      Past Surgical History:   Procedure Laterality Date    APPENDECTOMY  2007    removed during colon surgery    CERVICAL FUSION       SECTION      COLON SURGERY   &     hemicolectomy for colon cancer and second was for complications from the first surgery    COLONOSCOPY N/A 2018    Performed by [facility]ick [facility]quivel [facility] at [facility]    ESOPHAGOGASTRODUODENOSCOPY (EGD) N/A 2018    Performed by Monika Steele MD at [facility]    HYSTERECTOMY      ovaries remain    SPLENECTOMY, TOTAL      Injured during surgery and removed    UPPER GASTROINTESTINAL ENDOSCOPY      GERD & hiatal hernia per patient report       FAMILY HISTORY:    Family History   Problem Relation Age of Onset    Colon cancer Father         diagnosed in his 60's    Lung cancer Father     Lymphoma Mother         Brain    Lung cancer Brother     Lung cancer Brother         metastatic from prostate    Prostate cancer Brother     Heart disease Brother         valvular disease    Hyperlipidemia Brother     Eczema Neg Hx     Lupus Neg Hx     Psoriasis Neg Hx     Melanoma Neg Hx     Crohn's disease Neg Hx     Ulcerative colitis Neg Hx     Stomach cancer Neg Hx     Esophageal cancer Neg Hx        SOCIAL HISTORY:    Social History     Socioeconomic History    Marital status:      Spouse name: Not on file    Number of children: 2    Years of education: Not on file    Highest education level: Not on file   Social Needs    Financial resource strain: Not on file    Food insecurity - worry: Not on file    Food insecurity - inability: Not on file    Transportation needs - medical: Not on file    Transportation needs - non-medical: Not on file   Occupational History    Not on file   Tobacco Use    Smoking status: Current Every Day Smoker      "Packs/day: 0.50     Years: 35.00     Pack years: 17.50     Types: Cigarettes    Smokeless tobacco: Never Used   Substance and Sexual Activity    Alcohol use: No    Drug use: No    Sexual activity: Yes     Partners: Male     Birth control/protection: Post-menopausal   Other Topics Concern    Are you pregnant or think you may be? Not Asked    Breast-feeding Not Asked   Social History Narrative    Not on file       PHYSICAL EXAM:  VITAL SIGNS:   There were no vitals filed for this visit.  GENERAL:  Patient appears well nourished, sitting on exam table, in no acute distress.  HEENT:  Atraumatic, normocephalic, PERRLA, EOMI, no conjunctival injection, sclerae are anicteric, normal external auditory canals,TMs clear b/l, gross hearing intact to whisper, MMM, no oropharygneal erythema or exudate.  NECK:  Supple, normal ROM, trachea is midline , no supraclavicular or cervical LAD or masses palpated.  Thyroid gland not palpable.  CARDIOVASCULAR:  RRR, normal S1 and S2, no m/r/g.  RESPIRATORY:  CTA b/l, no wheezes, rhonchi, rales.  No increased work of breathing, no  use of accessory muscles.  ABDOMEN:  Soft, nontender, nondistended, normoactive bowel sounds in all four quadrants, no rebound or guarding, no HSM or masses palpated.  Normal percussion.  EXTREMITIES:  2+ DP pulses b/l, no edema.  SKIN:  Warm, no lesions on exposed skin.  NEUROMUSCULAR:  Cranial nerves II-XII grossly intact.  Strength is 4+/5 over upper and lower extremity flexors/extensors b/l, 2+ biceps and patellar reflexes b/l. No clubbing or cyanosis of digits/nails.  Steady gait.  PSYCH:  Patient is alert and oriented to person, time, place. They are appropriately dressed and groomed. There is normal eye contact. Rate and tone of speech is normal. Normal insight, judgement. Normal thought content and process. Patient describes her mood as "ok", affect is euthymic    LABORATORY/IMAGING STUDIES: pending     ASSESSMENT/PLAN: This is a 68 y.o. female who " presents to clinic for evaluation of the following concerns.  1. LLQ pain  -will request medical records from [facility]. Due to continued pain will obtain CBC, CMP, and repeat CT scan.   - CT Abdomen Pelvis With Contrast; Future  - CBC auto differential; Future  - Comprehensive metabolic panel; Future    2. Urinary tract infection with hematuria, site unspecified  - Urine culture  - Urinalysis    3. Hypertension, unspecified type  -See below            Patient readiness: acceptance and barriers:none    During the course of the visit the patient was educated and counseled about the following:     Hypertension:   Medication: no change.    Goals: Hypertension: Reduce Blood Pressure    Did patient meet goals/outcomes: Yes    The following self management tools provided: declined    Patient Instructions (the written plan) was given to the patient/family.     Time spent with patient: 30 minutes            Morena Terrazas MD

## 2019-01-09 ENCOUNTER — TELEPHONE (OUTPATIENT)
Dept: FAMILY MEDICINE | Facility: CLINIC | Age: 69
End: 2019-01-09

## 2019-01-09 DIAGNOSIS — D64.9 ANEMIA, UNSPECIFIED TYPE: Primary | ICD-10-CM

## 2019-01-09 DIAGNOSIS — E87.6 HYPOKALEMIA: ICD-10-CM

## 2019-01-09 NOTE — TELEPHONE ENCOUNTER
----- Message from Emily Gr sent at 1/9/2019 10:56 AM CST -----  Contact: Kristin  Type: Needs Medical Advice    Who Called:  patient  Best Call Back Number: [phone]  Additional Information:  Asking to pickup order for MAMMO while at clinic tomorrow for labs as going to [facility] to have done. Please advise if can pickup or if faxing over. Asking also if has gotten results from CT Scan done 1/8/19 at [facility]. Thanks!

## 2019-01-09 NOTE — TELEPHONE ENCOUNTER
Blood work demonstrated mild anemia and slightly low potassium. Needs to eat potassium rich foods, have repeat CBC and BMP in 4 weeks.     UA demonstrated urine that was red, but no blood. Has she has anything to eat or drink that would discolor her urine? The culture is pending.    Her CT scan showed a stable adrenal adenoma, removal of her spleen, right side of there colon and uterus. There is no evidence of kidney stone, diverticulitis, or colitis. However due to her pain I want her to finish the antibiotics. I want her to keep her appointment with GI.     Also please schedule her a 1 month follow up with me-must be 60 minutes to discuss anxiety and depression again.

## 2019-01-10 ENCOUNTER — LAB VISIT (OUTPATIENT)
Dept: LAB | Facility: HOSPITAL | Age: 69
End: 2019-01-10
Attending: NURSE PRACTITIONER
Payer: MEDICARE

## 2019-01-10 DIAGNOSIS — Z09 FOLLOW UP: ICD-10-CM

## 2019-01-10 LAB
CHOLEST SERPL-MCNC: 192 MG/DL
CHOLEST/HDLC SERPL: 4.1 {RATIO}
HDLC SERPL-MCNC: 47 MG/DL
HDLC SERPL: 24.5 %
LDLC SERPL CALC-MCNC: 126 MG/DL
NONHDLC SERPL-MCNC: 145 MG/DL
TRIGL SERPL-MCNC: 95 MG/DL
TSH SERPL DL<=0.005 MIU/L-ACNC: 0.86 UIU/ML

## 2019-01-10 PROCEDURE — 84443 ASSAY THYROID STIM HORMONE: CPT

## 2019-01-10 PROCEDURE — 36415 COLL VENOUS BLD VENIPUNCTURE: CPT | Mod: PO

## 2019-01-10 PROCEDURE — 80061 LIPID PANEL: CPT

## 2019-01-11 LAB — BACTERIA UR CULT: NORMAL

## 2019-01-14 ENCOUNTER — TELEPHONE (OUTPATIENT)
Dept: FAMILY MEDICINE | Facility: CLINIC | Age: 69
End: 2019-01-14

## 2019-01-14 ENCOUNTER — OFFICE VISIT (OUTPATIENT)
Dept: FAMILY MEDICINE | Facility: CLINIC | Age: 69
End: 2019-01-14
Payer: MEDICARE

## 2019-01-14 VITALS
WEIGHT: 124.31 LBS | HEIGHT: 61 IN | TEMPERATURE: 98 F | BODY MASS INDEX: 23.47 KG/M2 | HEART RATE: 58 BPM | SYSTOLIC BLOOD PRESSURE: 150 MMHG | DIASTOLIC BLOOD PRESSURE: 73 MMHG | OXYGEN SATURATION: 96 %

## 2019-01-14 DIAGNOSIS — I10 ESSENTIAL HYPERTENSION: ICD-10-CM

## 2019-01-14 DIAGNOSIS — B37.9 ANTIBIOTIC-INDUCED YEAST INFECTION: ICD-10-CM

## 2019-01-14 DIAGNOSIS — R30.0 BURNING WITH URINATION: Primary | ICD-10-CM

## 2019-01-14 DIAGNOSIS — N39.0 URINARY TRACT INFECTION WITH HEMATURIA, SITE UNSPECIFIED: ICD-10-CM

## 2019-01-14 DIAGNOSIS — Z12.39 BREAST CANCER SCREENING: ICD-10-CM

## 2019-01-14 DIAGNOSIS — R31.9 URINARY TRACT INFECTION WITH HEMATURIA, SITE UNSPECIFIED: ICD-10-CM

## 2019-01-14 DIAGNOSIS — R31.9 HEMATURIA, UNSPECIFIED TYPE: ICD-10-CM

## 2019-01-14 DIAGNOSIS — R31.9 HEMATURIA, UNSPECIFIED TYPE: Primary | ICD-10-CM

## 2019-01-14 DIAGNOSIS — T36.95XA ANTIBIOTIC-INDUCED YEAST INFECTION: ICD-10-CM

## 2019-01-14 LAB
AMORPH CRY UR QL COMP ASSIST: ABNORMAL
BILIRUB SERPL-MCNC: NEGATIVE MG/DL
BILIRUB UR QL STRIP: NEGATIVE
BLOOD URINE, POC: 250
CLARITY UR REFRACT.AUTO: ABNORMAL
COLOR UR AUTO: ABNORMAL
COLOR, POC UA: YELLOW
GLUCOSE UR QL STRIP: NEGATIVE
GLUCOSE UR QL STRIP: NORMAL
HGB UR QL STRIP: ABNORMAL
KETONES UR QL STRIP: NEGATIVE
KETONES UR QL STRIP: NEGATIVE
LEUKOCYTE ESTERASE UR QL STRIP: NEGATIVE
LEUKOCYTE ESTERASE URINE, POC: NEGATIVE
MICROSCOPIC COMMENT: ABNORMAL
NITRITE UR QL STRIP: NEGATIVE
NITRITE, POC UA: NEGATIVE
PH UR STRIP: 5 [PH] (ref 5–8)
PH, POC UA: 5
PROT UR QL STRIP: NEGATIVE
PROTEIN, POC: ABNORMAL
RBC #/AREA URNS AUTO: 8 /HPF (ref 0–4)
SP GR UR STRIP: 1.02 (ref 1–1.03)
SPECIFIC GRAVITY, POC UA: 1.02
SQUAMOUS #/AREA URNS AUTO: 1 /HPF
URN SPEC COLLECT METH UR: ABNORMAL
UROBILINOGEN, POC UA: NORMAL

## 2019-01-14 PROCEDURE — 1101F PR PT FALLS ASSESS DOC 0-1 FALLS W/OUT INJ PAST YR: ICD-10-PCS | Mod: S$GLB,,, | Performed by: NURSE PRACTITIONER

## 2019-01-14 PROCEDURE — 3077F PR MOST RECENT SYSTOLIC BLOOD PRESSURE >= 140 MM HG: ICD-10-PCS | Mod: S$GLB,,, | Performed by: NURSE PRACTITIONER

## 2019-01-14 PROCEDURE — 99999 PR PBB SHADOW E&M-EST. PATIENT-LVL IV: CPT | Mod: PBBFAC,,, | Performed by: NURSE PRACTITIONER

## 2019-01-14 PROCEDURE — 3077F SYST BP >= 140 MM HG: CPT | Mod: S$GLB,,, | Performed by: NURSE PRACTITIONER

## 2019-01-14 PROCEDURE — 99214 OFFICE O/P EST MOD 30 MIN: CPT | Mod: 25,S$GLB,, | Performed by: NURSE PRACTITIONER

## 2019-01-14 PROCEDURE — 81002 URINALYSIS NONAUTO W/O SCOPE: CPT | Mod: S$GLB,,, | Performed by: NURSE PRACTITIONER

## 2019-01-14 PROCEDURE — 99999 PR PBB SHADOW E&M-EST. PATIENT-LVL IV: ICD-10-PCS | Mod: PBBFAC,,, | Performed by: NURSE PRACTITIONER

## 2019-01-14 PROCEDURE — 87086 URINE CULTURE/COLONY COUNT: CPT

## 2019-01-14 PROCEDURE — 1101F PT FALLS ASSESS-DOCD LE1/YR: CPT | Mod: S$GLB,,, | Performed by: NURSE PRACTITIONER

## 2019-01-14 PROCEDURE — 3078F PR MOST RECENT DIASTOLIC BLOOD PRESSURE < 80 MM HG: ICD-10-PCS | Mod: S$GLB,,, | Performed by: NURSE PRACTITIONER

## 2019-01-14 PROCEDURE — 99214 PR OFFICE/OUTPT VISIT, EST, LEVL IV, 30-39 MIN: ICD-10-PCS | Mod: 25,S$GLB,, | Performed by: NURSE PRACTITIONER

## 2019-01-14 PROCEDURE — 3078F DIAST BP <80 MM HG: CPT | Mod: S$GLB,,, | Performed by: NURSE PRACTITIONER

## 2019-01-14 PROCEDURE — 81002 POCT URINE DIPSTICK WITHOUT MICROSCOPE: ICD-10-PCS | Mod: S$GLB,,, | Performed by: NURSE PRACTITIONER

## 2019-01-14 PROCEDURE — 81001 URINALYSIS AUTO W/SCOPE: CPT

## 2019-01-14 RX ORDER — NITROFURANTOIN 25; 75 MG/1; MG/1
100 CAPSULE ORAL 2 TIMES DAILY
Qty: 20 CAPSULE | Refills: 0 | Status: SHIPPED | OUTPATIENT
Start: 2019-01-14 | End: 2019-01-24

## 2019-01-14 RX ORDER — FLUCONAZOLE 50 MG/1
50 TABLET ORAL DAILY
Qty: 30 TABLET | Refills: 0 | Status: SHIPPED | OUTPATIENT
Start: 2019-01-14 | End: 2019-02-13

## 2019-01-14 NOTE — PATIENT INSTRUCTIONS
When to Use Antibiotics   Antibiotics are medicines used to treat infections caused by bacteria. They dont work for illnesses caused by viruses or an allergic reaction. In fact, taking antibiotics for reasons other than a bacterial infection can cause problems. For example, you may have side effects from the medicine. And if you really need an antibiotic, it may not work well.                                                                                                                                              When antibiotics wont help  Your healthcare provider wont usually prescribe antibiotics for the following conditions. You can help by not asking for them if you have:   · A cold. This type of illness is caused by a virus. It can cause a runny nose, stuffed-up nose, sneezing, coughing, headache, mild body aches, and low fever. A cold gets better on its own in a few days to a week.  · The flu (influenza). This is a respiratory illness caused by a virus. The flu usually goes away on its own in a week or so. It can cause fever, body aches, sore throat, and fatigue.  · Bronchitis. This is an infection in the lungs most often caused by a virus. You may have coughing, phlegm, body aches, and a low fever. A common type of bronchitis is known as a chest cold (acute bronchitis). This often happens after you have a respiratory infection like a common cold. Bronchitis can take weeks to go away, but antibiotics usually dont help.  · Most sore throats. Sore throats are most often caused by viruses. Your throat may feel scratchy or achy, and it may hurt to swallow. You may also have a low fever and body aches. A sore throat usually gets better in a few days.  · Most ear infections. An ear infection may be caused by a virus or bacteria. It causes pain in the ear. Antibiotics usually dont help, and the infection goes away on its own.  · Most sinus infections (sinusitis). This kind of infection causes sinus pain and  swelling, and a runny nose. In most cases, sinusitis goes away on its own, and antibiotics dont make recovery quicker.  · Allergic rhinitis. This is a set of symptoms caused by an allergic reaction. You may have sneezing, a runny nose, itchy or watery eyes, or a sore throat. Allergies are not treated with antibiotics.  · Low fever. A mild fever thats less than 100.4°F (38°C) most likely doesnt need treatment with antibiotics.   When antibiotics can help   Antibiotics can be used to treat:                                                     · Strep throat. This is a throat infectioncaused by a certain type of bacteria. Symptoms of strep throat include a sore throat, white patches on the tonsils, red spots on the roof of the mouth, fever, body aches, and nausea and vomiting.  · Urinary tract infection (UTI). This is a bacterial infection of the bladder and the tube that takes urine out of the body. It can cause burning pain and urine thats cloudy or tinted with blood. UTIs are very common. Antibiotics usually help treat these infections.  · Some ear infections. In some cases, a healthcare provider may prescribe antibiotics for an ear infection. You may need a test to show whats causing the ear infection.  · Some sinus infections. In some cases, yourhealthcare provider may give you antibiotics. He or she may first need to make sure your symptoms arent caused by a virus, fungus, allergies, or air pollutants such as smoke.   Your doctor may also recommend antibiotics if you have a condition that can affect your immune system, such as diabetes or cancer.   Self-care at home   If your infection cant be treated with antibiotics, you can take other steps to feel better. Try the remedies below. In general:   · Rest and sleep as much as needed.  · Drink water and other clear fluids.  · Dont smoke, and avoid smoke from other people.  · Use over-the-counter medicine such as acetaminophen to ease pain or fever, as  directed by your healthcare provider.   To treat sinus pain or nasal congestion:   · Put a warm, moist washcloth on your face where you feel sinus pain or pressure.  · Use a nasal spray with medicine or saline, as directed by your healthcare provider.  · Breathe in steam from a hot shower.  · Use a humidifier or cool mist vaporizer.   To quiet a cough:   · Use a humidifier or cool mist vaporizer.  · Breathe in steam from a hot shower.  · Use cough lozenges.   To sooth a sore throat:   · Suck on ice chips, popsicles, or lozenges.  · Use a sore throat spray.  · Use a humidifier or cool mist vaporizer.  · Gargle with saltwater.  · Drink warm liquids.   To ease ear pain:   · Hold a warm, moist washcloth on the ear for 10 minutes at a time.  Date Last Reviewed: 9/1/2016  © 5516-5844 [employer] [address] [place] [address]. All rights reserved. This information is not intended as a substitute for professional medical care. Always follow your healthcare professional's instructions.        Understanding Urinary Tract Infections (UTIs)  Most UTIs are caused by bacteria, although they may also be caused by viruses or fungi. Bacteria from the bowel are the most common source of infection. The infection may start because of any of the following:  · Sexual activity. During sex, bacteria can travel from the penis, vagina, or rectum into the urethra.   · Bacteria on the skin outside the rectum may travel into the urethra. This is more common in women since the rectum and urethra are closer to each other than in men. Wiping from front to back after using the toilet and keeping the area clean can help prevent germs from getting to the urethra.  · Blockage of urine flow through the urinary tract. If urine sits too long, germs may start to grow out of control.      Parts of the urinary tract  The infection can occur in any part of the urinary tract.  · The kidneys collect and store urine.  · The ureters  carry urine from the kidneys to the bladder.  · The bladder holds urine until you are ready to let it out.  · The urethra carries urine from the bladder out of the body. It is shorter in women, so bacteria can move through it more easily. The urethra is longer in men, so a UTI is less likely to reach the bladder or kidneys in men.  Date Last Reviewed: 1/1/2017  © 8498-5986 [employer]. [address] [place] [address], [place] [address]. All rights reserved. This information is not intended as a substitute for professional medical care. Always follow your healthcare professional's instructions.

## 2019-01-14 NOTE — PROGRESS NOTES
Subjective:       Patient ID: Kristin Guerra is a 68 y.o. female.    Chief Complaint: itching, back pain, diarrhea    Patient is a 68 year old female who presents today for vaginal itching, back pain, urinary urgency and frequency. Patient has an history of urinary tract infection with antibiotic use. Patient expresses concerns of hematuria. Urine was sent to Cytology which showed no cancer cells and was seen by Urology. She was diagnosis with overactive bladder.  A abdominal ulrasound was done- no kidney stones, but showed a stable Adrenal Adenoma. Today  POCT urine dipstick showed hematuria, I put in a Urology referral.  Time spent with patient 35 minutes with 50% of time spent reviewing history, labs and coordinating care.      Urinary Tract Infection    This is a recurrent problem. The current episode started acute onset. The problem occurs every urination. The problem has been unchanged. The quality of the pain is described as burning (itching). The pain is at a severity of 7/10. The pain is moderate. There has been no fever. She is sexually active. There is no history of pyelonephritis. Associated symptoms include flank pain, frequency, nausea, urgency and rash. Pertinent negatives include no vomiting or bubble bath use. She has tried antibiotics for the symptoms. The treatment provided no relief. Her past medical history is significant for hypertension and recurrent UTIs. There is no history of diabetes mellitus or kidney stones.     Review of Systems   Constitutional: Negative for activity change, fatigue and fever.   HENT: Negative for congestion, sinus pressure and sinus pain.    Eyes: Negative for photophobia and pain.   Respiratory: Negative for cough and shortness of breath.    Cardiovascular: Negative for chest pain and palpitations.   Gastrointestinal: Positive for nausea. Negative for vomiting.   Genitourinary: Positive for flank pain, frequency and urgency.   Skin: Positive for rash.  "  Neurological: Negative for dizziness and light-headedness.   Hematological: Negative.    Psychiatric/Behavioral: Negative for agitation. The patient is nervous/anxious.        Objective:      BP (!) 150/73 (BP Location: Right arm, Patient Position: Sitting, BP Method: Medium (Automatic))   Pulse (!) 58   Temp 98.1 °F (36.7 °C) (Oral)   Ht 5' 1" (1.549 m)   Wt 56.4 kg (124 lb 5.4 oz)   SpO2 96%   BMI 23.49 kg/m²   Physical Exam   Constitutional: She is oriented to person, place, and time. She appears well-developed and well-nourished.   Eyes: EOM are normal. Pupils are equal, round, and reactive to light.   Neck: Normal range of motion.   Cardiovascular: Normal rate, regular rhythm and normal heart sounds.   Pulmonary/Chest: Effort normal and breath sounds normal.   Abdominal: Soft. Bowel sounds are normal. There is tenderness in the suprapubic area. There is CVA tenderness.   Musculoskeletal: Normal range of motion.   Neurological: She is alert and oriented to person, place, and time.   Skin: Skin is warm and dry. There is erythema.        Psychiatric: She has a normal mood and affect. Her behavior is normal. Judgment and thought content normal.       Assessment:       1. Burning with urination    2. Urinary tract infection with hematuria, site unspecified    3. Breast cancer screening    4. Antibiotic-induced yeast infection    5. Hematuria, unspecified type    6. Essential hypertension        Plan:       Burning with urination  -     POCT URINE DIPSTICK WITHOUT MICROSCOPE  -     URINALYSIS; Future; Expected date: 01/14/2019  -     Urine culture; Future; Expected date: 01/14/2019  -     Urinalysis Microscopic    Urinary tract infection with hematuria, site unspecified  -     nitrofurantoin, macrocrystal-monohydrate, (MACROBID) 100 MG capsule; Take 1 capsule (100 mg total) by mouth 2 (two) times daily. for 10 days  Dispense: 20 capsule; Refill: 0    Breast cancer screening  -     Mammo Digital Screening " Kyler; Future; Expected date: 01/14/2019    Antibiotic-induced yeast infection  -     fluconazole (DIFLUCAN) 50 MG Tab; Take 1 tablet (50 mg total) by mouth once daily.  Dispense: 30 tablet; Refill: 0    Hematuria, unspecified type  -     Ambulatory referral to Urology    Essential hypertension   Patient very anxious   Low sodium diet   Continue mediation   Blood pressure diary for one week      Patient readiness: acceptance and barriers:none    During the course of the visit the patient was educated and counseled about the following:     Hypertension:   Dietary sodium restriction.  Regular aerobic exercise.  Obesity:   General weight loss/lifestyle modification strategies discussed (elicit support from others; identify saboteurs; non-food rewards, etc).  Regular aerobic exercise program discussed.    Goals: Hypertension: Reduce Blood Pressure and Obesity: Reduce calorie intake and BMI    Did patient meet goals/outcomes: No    The following self management tools provided: declined    Patient Instructions (the written plan) was given to the patient/family.     Time spent with patient: 30 minutes    Barriers to medications present (no )    Adverse reactions to current medications (no)    Over the counter medications reviewed (Yes)

## 2019-01-14 NOTE — TELEPHONE ENCOUNTER
final ucx demonstrated infection with enterococcus. Patient was on augmentin due to potential enteritis.    Please see my telephone message from 1/9 with all of her results.    Will need to stop the augmentin, start macrobid.     Thank you for seeing her this morning.

## 2019-01-14 NOTE — TELEPHONE ENCOUNTER
Pt called clinic concerning possible allergic reaction to antibiotics. Pt states diarrhea, lips are on fire, back and legs hurting, Itching to back and arms. Denies rash. Pt reports symptoms x 3 days. Antibiotic started Jan 8. Pt is requesting same day appt for eval. Appt date, time, and location given. Pt verbalized understanding with no further questions.     ----- Message from Caroline Sheriff sent at 1/14/2019  8:04 AM CST -----  Contact: self   Patient want to speak with a nurse regarding having a allergic reaction to antibiotic please call back at [phone] (home)

## 2019-01-14 NOTE — TELEPHONE ENCOUNTER
Please schedule patient an appointment to see Urology for blood in urine, and call her with date and time.

## 2019-01-16 LAB — BACTERIA UR CULT: NO GROWTH

## 2019-01-25 ENCOUNTER — OFFICE VISIT (OUTPATIENT)
Dept: GASTROENTEROLOGY | Facility: CLINIC | Age: 69
End: 2019-01-25
Payer: MEDICARE

## 2019-01-25 VITALS
HEART RATE: 59 BPM | SYSTOLIC BLOOD PRESSURE: 111 MMHG | WEIGHT: 122.56 LBS | BODY MASS INDEX: 23.16 KG/M2 | DIASTOLIC BLOOD PRESSURE: 62 MMHG

## 2019-01-25 DIAGNOSIS — K44.9 LARGE HIATAL HERNIA: ICD-10-CM

## 2019-01-25 DIAGNOSIS — K21.00 GASTROESOPHAGEAL REFLUX DISEASE WITH ESOPHAGITIS: Primary | ICD-10-CM

## 2019-01-25 PROCEDURE — 1101F PT FALLS ASSESS-DOCD LE1/YR: CPT | Mod: S$GLB,,, | Performed by: INTERNAL MEDICINE

## 2019-01-25 PROCEDURE — 3078F PR MOST RECENT DIASTOLIC BLOOD PRESSURE < 80 MM HG: ICD-10-PCS | Mod: S$GLB,,, | Performed by: INTERNAL MEDICINE

## 2019-01-25 PROCEDURE — 3078F DIAST BP <80 MM HG: CPT | Mod: S$GLB,,, | Performed by: INTERNAL MEDICINE

## 2019-01-25 PROCEDURE — 3074F SYST BP LT 130 MM HG: CPT | Mod: S$GLB,,, | Performed by: INTERNAL MEDICINE

## 2019-01-25 PROCEDURE — 1101F PR PT FALLS ASSESS DOC 0-1 FALLS W/OUT INJ PAST YR: ICD-10-PCS | Mod: S$GLB,,, | Performed by: INTERNAL MEDICINE

## 2019-01-25 PROCEDURE — 3074F PR MOST RECENT SYSTOLIC BLOOD PRESSURE < 130 MM HG: ICD-10-PCS | Mod: S$GLB,,, | Performed by: INTERNAL MEDICINE

## 2019-01-25 PROCEDURE — 99213 OFFICE O/P EST LOW 20 MIN: CPT | Mod: S$GLB,,, | Performed by: INTERNAL MEDICINE

## 2019-01-25 PROCEDURE — 99999 PR PBB SHADOW E&M-EST. PATIENT-LVL III: ICD-10-PCS | Mod: PBBFAC,,, | Performed by: INTERNAL MEDICINE

## 2019-01-25 PROCEDURE — 99999 PR PBB SHADOW E&M-EST. PATIENT-LVL III: CPT | Mod: PBBFAC,,, | Performed by: INTERNAL MEDICINE

## 2019-01-25 PROCEDURE — 99213 PR OFFICE/OUTPT VISIT, EST, LEVL III, 20-29 MIN: ICD-10-PCS | Mod: S$GLB,,, | Performed by: INTERNAL MEDICINE

## 2019-01-25 RX ORDER — ESOMEPRAZOLE MAGNESIUM 40 MG/1
40 CAPSULE, DELAYED RELEASE ORAL
Qty: 60 CAPSULE | Refills: 2 | Status: ON HOLD | OUTPATIENT
Start: 2019-01-25 | End: 2019-06-04 | Stop reason: SDUPTHER

## 2019-01-25 NOTE — PROGRESS NOTES
[facility] Note    CC: Reflux    HPI 68 y.o. female with history of Stage II colon cancer s/p resection, presents with several months of severe, intermittent, gastroesophageal reflux not associated with vomiting. She underwent EGD in May 2018 which showed LA Grade D esophagitis and large hiatal hernia. 2 month follow up EGD was recommended but did not follow up. She is taking 20 mg OTC Nexium with frequent breakthrough symptoms.      Past Medical History:   Diagnosis Date    Allergy     Colon cancer     Colon polyp     Diverticulitis     Diverticulosis     Fatty liver     GERD (gastroesophageal reflux disease)     Hx of cervical spine surgery 11/07/2018    Hyperlipidemia     Hypertension     Lupus (systemic lupus erythematosus) 1999    discoid    Osteoarthritis     Osteoporosis     Tremor 2005    essential         Review of Systems  General ROS: negative for - chills, fever; + weight loss  Cardiovascular ROS: no chest pain or dyspnea on exertion  Gastrointestinal ROS: + heartburn, no diarrhea, hematemesis    Physical Examination  /62   Pulse (!) 59   Wt 55.6 kg (122 lb 9.2 oz)   BMI 23.16 kg/m²   General appearance: alert, cooperative, no distress, + resting tremor  HENT: Normocephalic, atraumatic, neck symmetrical, no nasal discharge, sclera anicteric , in neck collar with healing Cspine scar  Lungs: clear to auscultation bilaterally, symmetric chest wall expansion bilaterally  Heart: regular rate and rhythm without rub; no displacement of the PMI   Abdomen: soft, nontender,nondistended, BS normoactive  Extremities: extremities symmetric; no clubbing, cyanosis, or edema      Labs:  Lab Results   Component Value Date    WBC 10.60 01/08/2019    HGB 11.3 (L) 01/08/2019    HCT 34.2 (L) 01/08/2019    MCV 94 01/08/2019     (H) 01/08/2019         Imaging:  CT abdomen/pelvis January 2019 was independently visualized and reviewed by me and showed prior splenectomy, hysterectomy,  right hemicolectomy, stable adrenal adenoma, aortic and pelvic vessel atherosclerosis, diverticulosis of sigmoid    Assessment:   68 y.o. female with history of Grade D esophagitis and large hiatal hernia, presents for symptomatic reflux.     Plan:  -Schedule EGD  -Increase Nexium to 40 mg BID (qAC)  -Repeat colonoscopy 2023 (Dr. Esquivel)    Monika Steele MD  [facility]  [address], [address]  [place] [address]  Office: [phone]  Fax: [phone]

## 2019-01-30 ENCOUNTER — DOCUMENTATION ONLY (OUTPATIENT)
Dept: FAMILY MEDICINE | Facility: CLINIC | Age: 69
End: 2019-01-30

## 2019-01-30 NOTE — PROGRESS NOTES
Pre-Visit Chart Review  For Appointment Scheduled on 2/27/19.    There are no preventive care reminders to display for this patient.

## 2019-02-06 ENCOUNTER — LAB VISIT (OUTPATIENT)
Dept: LAB | Facility: HOSPITAL | Age: 69
End: 2019-02-06
Attending: FAMILY MEDICINE
Payer: MEDICARE

## 2019-02-06 DIAGNOSIS — E87.6 HYPOKALEMIA: ICD-10-CM

## 2019-02-06 DIAGNOSIS — D64.9 ANEMIA, UNSPECIFIED TYPE: ICD-10-CM

## 2019-02-06 LAB
ANION GAP SERPL CALC-SCNC: 7 MMOL/L
BASOPHILS # BLD AUTO: 0.11 K/UL
BASOPHILS NFR BLD: 1.3 %
BUN SERPL-MCNC: 16 MG/DL
CALCIUM SERPL-MCNC: 9.6 MG/DL
CHLORIDE SERPL-SCNC: 107 MMOL/L
CO2 SERPL-SCNC: 26 MMOL/L
CREAT SERPL-MCNC: 0.7 MG/DL
DIFFERENTIAL METHOD: ABNORMAL
EOSINOPHIL # BLD AUTO: 0.3 K/UL
EOSINOPHIL NFR BLD: 4.1 %
ERYTHROCYTE [DISTWIDTH] IN BLOOD BY AUTOMATED COUNT: 14.8 %
EST. GFR  (AFRICAN AMERICAN): >60 ML/MIN/1.73 M^2
EST. GFR  (NON AFRICAN AMERICAN): >60 ML/MIN/1.73 M^2
GLUCOSE SERPL-MCNC: 87 MG/DL
HCT VFR BLD AUTO: 36.3 %
HGB BLD-MCNC: 11.5 G/DL
IMM GRANULOCYTES # BLD AUTO: 0.03 K/UL
IMM GRANULOCYTES NFR BLD AUTO: 0.4 %
LYMPHOCYTES # BLD AUTO: 2.9 K/UL
LYMPHOCYTES NFR BLD: 34.6 %
MCH RBC QN AUTO: 30.3 PG
MCHC RBC AUTO-ENTMCNC: 31.7 G/DL
MCV RBC AUTO: 96 FL
MONOCYTES # BLD AUTO: 0.8 K/UL
MONOCYTES NFR BLD: 9.2 %
NEUTROPHILS # BLD AUTO: 4.2 K/UL
NEUTROPHILS NFR BLD: 50.4 %
NRBC BLD-RTO: 0 /100 WBC
PLATELET # BLD AUTO: 384 K/UL
PMV BLD AUTO: 10 FL
POTASSIUM SERPL-SCNC: 3.9 MMOL/L
RBC # BLD AUTO: 3.8 M/UL
SODIUM SERPL-SCNC: 140 MMOL/L
WBC # BLD AUTO: 8.36 K/UL

## 2019-02-06 PROCEDURE — 80048 BASIC METABOLIC PNL TOTAL CA: CPT

## 2019-02-06 PROCEDURE — 36415 COLL VENOUS BLD VENIPUNCTURE: CPT | Mod: PO

## 2019-02-06 PROCEDURE — 85025 COMPLETE CBC W/AUTO DIFF WBC: CPT

## 2019-02-19 ENCOUNTER — HOSPITAL ENCOUNTER (OUTPATIENT)
Dept: RADIOLOGY | Facility: HOSPITAL | Age: 69
Discharge: HOME | End: 2019-02-19
Attending: NURSE PRACTITIONER
Payer: MEDICARE

## 2019-02-19 DIAGNOSIS — Z12.39 BREAST CANCER SCREENING: ICD-10-CM

## 2019-02-19 PROCEDURE — 77067 MAMMO DIGITAL SCREENING BILAT WITH TOMOSYNTHESIS_CAD: ICD-10-PCS | Mod: 26,,, | Performed by: RADIOLOGY

## 2019-02-19 PROCEDURE — 77063 BREAST TOMOSYNTHESIS BI: CPT | Mod: 26,,, | Performed by: RADIOLOGY

## 2019-02-19 PROCEDURE — 77063 MAMMO DIGITAL SCREENING BILAT WITH TOMOSYNTHESIS_CAD: ICD-10-PCS | Mod: 26,,, | Performed by: RADIOLOGY

## 2019-02-19 PROCEDURE — 77067 SCR MAMMO BI INCL CAD: CPT | Mod: 26,,, | Performed by: RADIOLOGY

## 2019-02-19 PROCEDURE — 77067 SCR MAMMO BI INCL CAD: CPT | Mod: TC

## 2019-02-27 RX ORDER — GABAPENTIN 300 MG/1
300 CAPSULE ORAL DAILY
Qty: 30 CAPSULE | Refills: 6 | Status: SHIPPED | OUTPATIENT
Start: 2019-02-27 | End: 2019-07-22 | Stop reason: SDUPTHER

## 2019-02-27 RX ORDER — TRAZODONE HYDROCHLORIDE 100 MG/1
100 TABLET ORAL NIGHTLY
Qty: 90 TABLET | Refills: 1 | Status: SHIPPED | OUTPATIENT
Start: 2019-02-27 | End: 2019-07-22 | Stop reason: SDUPTHER

## 2019-02-27 NOTE — TELEPHONE ENCOUNTER
Cristina didn't see the trazodone              ----- Message from Stacy Funez sent at 2/27/2019 10:19 AM CST -----  Type:  RX Refill Request    Who Called:  self  Refill or New Rx:  refill  RX Name and Strength:  gabapentin (NEURONTIN) 300 MG capsule; traZODone (DESYREL) 100 MG tablet  How is the patient currently taking it? (ex. 1XDay):    Is this a 30 day or 90 day RX:  30  Preferred Pharmacy with phone number:  [facility]  Local or Mail Order:  local  Ordering Provider:  Dr Terrazas  Best Call Back Number:  [phone]  Additional Information:  Please call patient when sent. Thanks!    [facility] [id number]  [place] - [address] [facility]  [address] [facility] [place] [address]  Phone: [phone] Fax: [phone]

## 2019-03-20 ENCOUNTER — OFFICE VISIT (OUTPATIENT)
Dept: FAMILY MEDICINE | Facility: CLINIC | Age: 69
End: 2019-03-20
Payer: MEDICARE

## 2019-03-20 ENCOUNTER — LAB VISIT (OUTPATIENT)
Dept: LAB | Facility: HOSPITAL | Age: 69
End: 2019-03-20
Attending: FAMILY MEDICINE
Payer: MEDICARE

## 2019-03-20 VITALS
SYSTOLIC BLOOD PRESSURE: 132 MMHG | HEART RATE: 54 BPM | WEIGHT: 127.63 LBS | BODY MASS INDEX: 24.12 KG/M2 | DIASTOLIC BLOOD PRESSURE: 75 MMHG

## 2019-03-20 DIAGNOSIS — R55 SYNCOPE, UNSPECIFIED SYNCOPE TYPE: Primary | ICD-10-CM

## 2019-03-20 DIAGNOSIS — R55 SYNCOPE, UNSPECIFIED SYNCOPE TYPE: ICD-10-CM

## 2019-03-20 LAB
ALBUMIN SERPL BCP-MCNC: 3.5 G/DL
ALP SERPL-CCNC: 77 U/L
ALT SERPL W/O P-5'-P-CCNC: 17 U/L
ANION GAP SERPL CALC-SCNC: 9 MMOL/L
AST SERPL-CCNC: 21 U/L
BASOPHILS # BLD AUTO: 0.1 K/UL
BASOPHILS NFR BLD: 1 %
BILIRUB SERPL-MCNC: 0.2 MG/DL
BUN SERPL-MCNC: 14 MG/DL
CALCIUM SERPL-MCNC: 9.2 MG/DL
CHLORIDE SERPL-SCNC: 105 MMOL/L
CO2 SERPL-SCNC: 24 MMOL/L
CREAT SERPL-MCNC: 0.6 MG/DL
DIFFERENTIAL METHOD: ABNORMAL
EOSINOPHIL # BLD AUTO: 0 K/UL
EOSINOPHIL NFR BLD: 0.1 %
ERYTHROCYTE [DISTWIDTH] IN BLOOD BY AUTOMATED COUNT: 15.8 %
EST. GFR  (AFRICAN AMERICAN): >60 ML/MIN/1.73 M^2
EST. GFR  (NON AFRICAN AMERICAN): >60 ML/MIN/1.73 M^2
FERRITIN SERPL-MCNC: 35 NG/ML
GLUCOSE SERPL-MCNC: 70 MG/DL
HCT VFR BLD AUTO: 37.2 %
HGB BLD-MCNC: 11.8 G/DL
IMM GRANULOCYTES # BLD AUTO: 0.02 K/UL
IMM GRANULOCYTES NFR BLD AUTO: 0.2 %
IRON SERPL-MCNC: 63 UG/DL
LYMPHOCYTES # BLD AUTO: 2.4 K/UL
LYMPHOCYTES NFR BLD: 22.7 %
MCH RBC QN AUTO: 30.6 PG
MCHC RBC AUTO-ENTMCNC: 31.7 G/DL
MCV RBC AUTO: 97 FL
MONOCYTES # BLD AUTO: 0.9 K/UL
MONOCYTES NFR BLD: 8.5 %
NEUTROPHILS # BLD AUTO: 7 K/UL
NEUTROPHILS NFR BLD: 67.5 %
NRBC BLD-RTO: 0 /100 WBC
PLATELET # BLD AUTO: 381 K/UL
PMV BLD AUTO: 10.6 FL
POTASSIUM SERPL-SCNC: 3.9 MMOL/L
PROT SERPL-MCNC: 7.6 G/DL
RBC # BLD AUTO: 3.85 M/UL
SATURATED IRON: 18 %
SODIUM SERPL-SCNC: 138 MMOL/L
TOTAL IRON BINDING CAPACITY: 354 UG/DL
TRANSFERRIN SERPL-MCNC: 239 MG/DL
WBC # BLD AUTO: 10.34 K/UL

## 2019-03-20 PROCEDURE — 93000 EKG 12-LEAD: ICD-10-PCS | Mod: S$GLB,,, | Performed by: INTERNAL MEDICINE

## 2019-03-20 PROCEDURE — 99214 OFFICE O/P EST MOD 30 MIN: CPT | Mod: S$GLB,,, | Performed by: FAMILY MEDICINE

## 2019-03-20 PROCEDURE — 99214 PR OFFICE/OUTPT VISIT, EST, LEVL IV, 30-39 MIN: ICD-10-PCS | Mod: S$GLB,,, | Performed by: FAMILY MEDICINE

## 2019-03-20 PROCEDURE — 1100F PTFALLS ASSESS-DOCD GE2>/YR: CPT | Mod: S$GLB,,, | Performed by: FAMILY MEDICINE

## 2019-03-20 PROCEDURE — 3288F PR FALLS RISK ASSESSMENT DOCUMENTED: ICD-10-PCS | Mod: S$GLB,,, | Performed by: FAMILY MEDICINE

## 2019-03-20 PROCEDURE — 1100F PR PT FALLS ASSESS DOC 2+ FALLS/FALL W/INJURY/YR: ICD-10-PCS | Mod: S$GLB,,, | Performed by: FAMILY MEDICINE

## 2019-03-20 PROCEDURE — 99999 PR PBB SHADOW E&M-EST. PATIENT-LVL IV: ICD-10-PCS | Mod: PBBFAC,,, | Performed by: FAMILY MEDICINE

## 2019-03-20 PROCEDURE — 3078F PR MOST RECENT DIASTOLIC BLOOD PRESSURE < 80 MM HG: ICD-10-PCS | Mod: S$GLB,,, | Performed by: FAMILY MEDICINE

## 2019-03-20 PROCEDURE — 80053 COMPREHEN METABOLIC PANEL: CPT

## 2019-03-20 PROCEDURE — 93000 ELECTROCARDIOGRAM COMPLETE: CPT | Mod: S$GLB,,, | Performed by: INTERNAL MEDICINE

## 2019-03-20 PROCEDURE — 36415 COLL VENOUS BLD VENIPUNCTURE: CPT | Mod: PO

## 2019-03-20 PROCEDURE — 3075F PR MOST RECENT SYSTOLIC BLOOD PRESS GE 130-139MM HG: ICD-10-PCS | Mod: S$GLB,,, | Performed by: FAMILY MEDICINE

## 2019-03-20 PROCEDURE — 99999 PR PBB SHADOW E&M-EST. PATIENT-LVL IV: CPT | Mod: PBBFAC,,, | Performed by: FAMILY MEDICINE

## 2019-03-20 PROCEDURE — 3075F SYST BP GE 130 - 139MM HG: CPT | Mod: S$GLB,,, | Performed by: FAMILY MEDICINE

## 2019-03-20 PROCEDURE — 3078F DIAST BP <80 MM HG: CPT | Mod: S$GLB,,, | Performed by: FAMILY MEDICINE

## 2019-03-20 PROCEDURE — 3288F FALL RISK ASSESSMENT DOCD: CPT | Mod: S$GLB,,, | Performed by: FAMILY MEDICINE

## 2019-03-20 PROCEDURE — 83540 ASSAY OF IRON: CPT

## 2019-03-20 PROCEDURE — 85025 COMPLETE CBC W/AUTO DIFF WBC: CPT

## 2019-03-20 PROCEDURE — 82728 ASSAY OF FERRITIN: CPT

## 2019-03-20 NOTE — PROGRESS NOTES
"Subjective:       Patient ID: Kristin Guerra is a 69 y.o. female.    Chief Complaint: Establish Care (sinus issues , passed out 3/15/19)    HPI  Mrs. Guerra presents to clinic for syncope.  States that she passed out this past Friday.  Was walking from the den to the bathroom then became hot and weak then blacked out.   states that she was "coming in and out," for about 30 minutes.  Last episode like this was in December.  Patient states that she has always had a low pulse.  Will be seeing by her cardiologist in May (Dr. Vasques).  States that her cardiologist gave here for a medication that helped her pulse but she does not remember the name.  Denies history of seizures.    Past Medical History:   Diagnosis Date    Allergy     Colon cancer     Colon polyp     Diverticulitis     Diverticulosis     Fatty liver     GERD (gastroesophageal reflux disease)     Hx of cervical spine surgery 2018    Hyperlipidemia     Hypertension     Lupus (systemic lupus erythematosus)     discoid    Osteoarthritis     Osteoporosis     Tremor 2005    essential       Past Surgical History:   Procedure Laterality Date    APPENDECTOMY      removed during colon surgery    CERVICAL FUSION       SECTION      COLON SURGERY   &     hemicolectomy for colon cancer and second was for complications from the first surgery    COLONOSCOPY N/A 2018    [facility]    ESOPHAGOGASTRODUODENOSCOPY (EGD) N/A 2018    [facility]formed by [facility] at [facility]    HYSTERECTOMY      ovaries remain    SPLENECTOMY, TOTAL      Injured during surgery and removed    UPPER GASTROINTESTINAL ENDOSCOPY      GERD & hiatal hernia per patient report       Family History   Problem Relation Age of Onset    Colon cancer Father         diagnosed in his 60's    Lung cancer Father     Lymphoma Mother         Brain    Lung cancer Brother     Lung cancer Brother "         metastatic from prostate    Prostate cancer Brother     Heart disease Brother         valvular disease    Hyperlipidemia Brother     Eczema Neg Hx     Lupus Neg Hx     Psoriasis Neg Hx     Melanoma Neg Hx     Crohn's disease Neg Hx     Ulcerative colitis Neg Hx     Stomach cancer Neg Hx     Esophageal cancer Neg Hx        Review of patient's allergies indicates:   Allergen Reactions    Compazine [prochlorperazine edisylate] Nausea Only    Lexapro [escitalopram oxalate]      Worsening tremor, and sedation    Primidone Other (See Comments)     Could not function    Prochlorperazine     Simvastatin     Topiramate     Morphine Swelling and Rash           Social History     Substance and Sexual Activity   Drug Use No       Social History     Tobacco Use    Smoking status: Current Every Day Smoker     Packs/day: 0.50     Years: 35.00     Pack years: 17.50     Types: Cigarettes    Smokeless tobacco: Never Used   Substance Use Topics    Alcohol use: No       Social History     Substance and Sexual Activity   Sexual Activity Yes    Partners: Male    Birth control/protection: Post-menopausal         Current Outpatient Medications:     aspirin 81 MG Chew, aspirin 81 mg chewable tablet  Chew 1 tablet every day by oral route., Disp: , Rfl:     azelastine (ASTELIN) 137 mcg (0.1 %) nasal spray, 1 spray (137 mcg total) by Nasal route 2 (two) times daily., Disp: 30 mL, Rfl: 11    calcium carbonate (TUMS) 200 mg calcium (500 mg) chewable tablet, Take 1 tablet by mouth 2 (two) times daily as needed for Heartburn., Disp: , Rfl:     esomeprazole (NEXIUM) 40 MG capsule, Take 1 capsule (40 mg total) by mouth 2 (two) times daily before meals., Disp: 60 capsule, Rfl: 2    fluticasone (FLONASE) 50 mcg/actuation nasal spray, Inhale 1 spray into the lungs once daily once daily., Disp: , Rfl:     gabapentin (NEURONTIN) 300 MG capsule, Take 1 capsule (300 mg total) by mouth once daily., Disp: 30  capsule, Rfl: 6    HYDROcodone-acetaminophen (NORCO) 7.5-325 mg per tablet, , Disp: , Rfl:     LORazepam (ATIVAN) 0.5 MG tablet, Take 1 tablet (0.5 mg total) by mouth daily as needed., Disp: 30 tablet, Rfl: 3    NIFEdipine (ADALAT CC) 30 MG TbSR, nifedipine ER 30 mg tablet,extended release  Take 1 tablet every day by oral route., Disp: , Rfl:     polyethylene glycol (GLYCOLAX) 17 gram/dose powder, Take 17 g by mouth daily as needed., Disp: , Rfl:     traZODone (DESYREL) 100 MG tablet, Take 1 tablet (100 mg total) by mouth nightly., Disp: 90 tablet, Rfl: 1    Review of Systems   Constitutional: Negative for chills and fever.   HENT: Negative for congestion and sore throat.    Eyes: Negative for visual disturbance.   Respiratory: Negative for cough and shortness of breath.    Cardiovascular: Negative for chest pain.   Gastrointestinal: Negative for abdominal pain, constipation, diarrhea, nausea and vomiting.   Genitourinary: Negative for dysuria.   Skin: Negative for rash and wound.   Neurological: Positive for syncope. Negative for dizziness, seizures and headaches.   Hematological: Does not bruise/bleed easily.           Objective:          Vitals:    03/20/19 1014 03/20/19 1124 03/20/19 1125 03/20/19 1126   BP: (!) 140/73 134/83 (!) 154/86 132/75   Pulse: 62 (!) 54 (!) 58 (!) 54   Weight: 57.9 kg (127 lb 10.3 oz)          Physical Exam   Constitutional: She appears well-developed and well-nourished. She is cooperative. No distress.   HENT:   Head: Normocephalic and atraumatic.   Right Ear: Hearing and external ear normal.   Left Ear: Hearing and external ear normal.   Nose: Nose normal.   Eyes: Conjunctivae, EOM and lids are normal.   Neck: Carotid bruit is not present.   Cardiovascular: Normal rate, regular rhythm, normal heart sounds and normal pulses.   Pulmonary/Chest: Effort normal and breath sounds normal.   Abdominal: Soft. Normal appearance and bowel sounds are normal. There is no tenderness.    Musculoskeletal: Normal range of motion.   Neurological: She is alert.   Skin: Skin is warm. Capillary refill takes less than 2 seconds. No rash noted. No cyanosis.   Psychiatric: She has a normal mood and affect. Her speech is normal and behavior is normal. Cognition and memory are normal.   Vitals reviewed.            Assessment/Plan     Kristin was seen today for establish care.    Diagnoses and all orders for this visit:    Syncope, unspecified syncope type  -     IN OFFICE EKG 12-LEAD (to Muse). Revealed bradycardia  -     Orthostatic blood pressure. BP adjusted properly.  -     CBC auto differential; Future  -     Comprehensive metabolic panel; Future  -     Ferritin; Future  -     Iron and TIBC; Future  -     Transthoracic echo (TTE) complete ([facility] Only); Future  -     Holter Monitor - 3-14 Day Adult ([facility] Only); Future  - Will call card office to see if anything was previously done for patient. Also can see if cards can see patient sooner.     Follow-up to discuss results    Future Appointments   Date Time Provider Department Center   3/22/2019  9:00 AM [facility]   3/22/2019 10:00 AM [facility]   4/26/2019  9:00 AM Samantha Diaz MD [facility] Diaz MD  [facility]

## 2019-03-22 ENCOUNTER — CLINICAL SUPPORT (OUTPATIENT)
Dept: CARDIOLOGY | Facility: CLINIC | Age: 69
End: 2019-03-22
Attending: FAMILY MEDICINE
Payer: MEDICARE

## 2019-03-22 ENCOUNTER — HOSPITAL ENCOUNTER (OUTPATIENT)
Dept: CARDIOLOGY | Facility: HOSPITAL | Age: 69
Discharge: HOME | End: 2019-03-22
Attending: FAMILY MEDICINE
Payer: MEDICARE

## 2019-03-22 VITALS
SYSTOLIC BLOOD PRESSURE: 110 MMHG | DIASTOLIC BLOOD PRESSURE: 60 MMHG | WEIGHT: 127.63 LBS | BODY MASS INDEX: 24.1 KG/M2 | HEART RATE: 70 BPM | HEIGHT: 61 IN

## 2019-03-22 DIAGNOSIS — R55 SYNCOPE, UNSPECIFIED SYNCOPE TYPE: ICD-10-CM

## 2019-03-22 PROCEDURE — 0298T HOLTER MONITOR - 3-14 DAY ADULT (CUPID ONLY): CPT | Mod: ,,, | Performed by: INTERNAL MEDICINE

## 2019-03-22 PROCEDURE — 99999 PR PBB SHADOW E&M-EST. PATIENT-LVL II: ICD-10-PCS | Mod: PBBFAC,,,

## 2019-03-22 PROCEDURE — 99999 PR PBB SHADOW E&M-EST. PATIENT-LVL II: CPT | Mod: PBBFAC,,,

## 2019-03-22 PROCEDURE — 93306 TRANSTHORACIC ECHO (TTE) COMPLETE (CUPID ONLY): ICD-10-PCS | Mod: S$GLB,,, | Performed by: INTERNAL MEDICINE

## 2019-03-22 PROCEDURE — 0298T HOLTER MONITOR - 3-14 DAY ADULT (CUPID ONLY): ICD-10-PCS | Mod: ,,, | Performed by: INTERNAL MEDICINE

## 2019-03-22 PROCEDURE — 93306 TTE W/DOPPLER COMPLETE: CPT | Mod: S$GLB,,, | Performed by: INTERNAL MEDICINE

## 2019-03-26 LAB
ASCENDING AORTA: 3.39 CM
AV INDEX (PROSTH): 0.26
AV MEAN GRADIENT: 6.97 MMHG
AV PEAK GRADIENT: 13.54 MMHG
AV VALVE AREA: 0.8 CM2
AV VELOCITY RATIO: 0.28
BSA FOR ECHO PROCEDURE: 1.58 M2
CV ECHO LV RWT: 0.28 CM
DOP CALC AO PEAK VEL: 1.84 M/S
DOP CALC AO VTI: 48.24 CM
DOP CALC LVOT AREA: 3.11 CM2
DOP CALC LVOT DIAMETER: 1.99 CM
DOP CALC LVOT PEAK VEL: 0.51 M/S
DOP CALC LVOT STROKE VOLUME: 38.52 CM3
DOP CALCLVOT PEAK VEL VTI: 12.39 CM
E WAVE DECELERATION TIME: 161.52 MSEC
E/A RATIO: 1.33
E/E' RATIO: 12.47
ECHO LV POSTERIOR WALL: 0.7 CM (ref 0.6–1.1)
FRACTIONAL SHORTENING: 41 % (ref 28–44)
INTERVENTRICULAR SEPTUM: 0.59 CM (ref 0.6–1.1)
IVRT: 0.15 MSEC
LA MAJOR: 4.25 CM
LA MINOR: 3.72 CM
LA WIDTH: 2.94 CM
LEFT ATRIUM SIZE: 2.8 CM
LEFT ATRIUM VOLUME INDEX: 17.8 ML/M2
LEFT ATRIUM VOLUME: 27.76 CM3
LEFT INTERNAL DIMENSION IN SYSTOLE: 2.95 CM (ref 2.1–4)
LEFT VENTRICLE DIASTOLIC VOLUME INDEX: 76.29 ML/M2
LEFT VENTRICLE DIASTOLIC VOLUME: 119.08 ML
LEFT VENTRICLE MASS INDEX: 66.9 G/M2
LEFT VENTRICLE SYSTOLIC VOLUME INDEX: 21.6 ML/M2
LEFT VENTRICLE SYSTOLIC VOLUME: 33.65 ML
LEFT VENTRICULAR INTERNAL DIMENSION IN DIASTOLE: 5.02 CM (ref 3.5–6)
LEFT VENTRICULAR MASS: 104.38 G
LV LATERAL E/E' RATIO: 11.78
LV SEPTAL E/E' RATIO: 13.25
MV PEAK A VEL: 0.8 M/S
MV PEAK E VEL: 1.06 M/S
PISA TR MAX VEL: 2.14 M/S
PULM VEIN S/D RATIO: 1.71
PV PEAK D VEL: 0.34 M/S
PV PEAK S VEL: 0.58 M/S
PV PEAK VELOCITY: 0.8 CM/S
RA MAJOR: 3.82 CM
RA PRESSURE: 3 MMHG
RA WIDTH: 3.7 CM
RIGHT VENTRICULAR END-DIASTOLIC DIMENSION: 1.83 CM
SINUS: 2.35 CM
STJ: 2.73 CM
TDI LATERAL: 0.09
TDI SEPTAL: 0.08
TDI: 0.09
TR MAX PG: 18.32 MMHG
TRICUSPID ANNULAR PLANE SYSTOLIC EXCURSION: 2.26 CM
TV REST PULMONARY ARTERY PRESSURE: 21 MMHG

## 2019-04-02 ENCOUNTER — ANESTHESIA (OUTPATIENT)
Dept: ENDOSCOPY | Facility: HOSPITAL | Age: 69
End: 2019-04-02
Payer: MEDICARE

## 2019-04-02 ENCOUNTER — HOSPITAL ENCOUNTER (OUTPATIENT)
Facility: HOSPITAL | Age: 69
Discharge: HOME | End: 2019-04-02
Attending: INTERNAL MEDICINE | Admitting: INTERNAL MEDICINE
Payer: MEDICARE

## 2019-04-02 ENCOUNTER — ANESTHESIA EVENT (OUTPATIENT)
Dept: ENDOSCOPY | Facility: HOSPITAL | Age: 69
End: 2019-04-02
Payer: MEDICARE

## 2019-04-02 VITALS
SYSTOLIC BLOOD PRESSURE: 159 MMHG | WEIGHT: 127 LBS | DIASTOLIC BLOOD PRESSURE: 68 MMHG | RESPIRATION RATE: 18 BRPM | HEART RATE: 57 BPM | BODY MASS INDEX: 23.98 KG/M2 | OXYGEN SATURATION: 100 % | TEMPERATURE: 98 F | HEIGHT: 61 IN

## 2019-04-02 DIAGNOSIS — K21.9 GERD (GASTROESOPHAGEAL REFLUX DISEASE): ICD-10-CM

## 2019-04-02 PROCEDURE — 88342 IMHCHEM/IMCYTCHM 1ST ANTB: CPT | Mod: 26,,, | Performed by: PATHOLOGY

## 2019-04-02 PROCEDURE — D9220A PRA ANESTHESIA: Mod: CRNA,,, | Performed by: NURSE ANESTHETIST, CERTIFIED REGISTERED

## 2019-04-02 PROCEDURE — 88305 TISSUE SPECIMEN TO PATHOLOGY - SURGERY: ICD-10-PCS | Mod: 26,,, | Performed by: PATHOLOGY

## 2019-04-02 PROCEDURE — D9220A PRA ANESTHESIA: Mod: ANES,,, | Performed by: ANESTHESIOLOGY

## 2019-04-02 PROCEDURE — 63600175 PHARM REV CODE 636 W HCPCS: Performed by: NURSE ANESTHETIST, CERTIFIED REGISTERED

## 2019-04-02 PROCEDURE — 43239 PR EGD, FLEX, W/BIOPSY, SGL/MULTI: ICD-10-PCS | Mod: ,,, | Performed by: INTERNAL MEDICINE

## 2019-04-02 PROCEDURE — 43239 EGD BIOPSY SINGLE/MULTIPLE: CPT | Mod: ,,, | Performed by: INTERNAL MEDICINE

## 2019-04-02 PROCEDURE — 27201012 HC FORCEPS, HOT/COLD, DISP: Performed by: INTERNAL MEDICINE

## 2019-04-02 PROCEDURE — D9220A PRA ANESTHESIA: ICD-10-PCS | Mod: ANES,,, | Performed by: ANESTHESIOLOGY

## 2019-04-02 PROCEDURE — 88305 TISSUE EXAM BY PATHOLOGIST: CPT | Performed by: PATHOLOGY

## 2019-04-02 PROCEDURE — 37000009 HC ANESTHESIA EA ADD 15 MINS: Performed by: INTERNAL MEDICINE

## 2019-04-02 PROCEDURE — 88305 TISSUE EXAM BY PATHOLOGIST: CPT | Mod: 26,,, | Performed by: PATHOLOGY

## 2019-04-02 PROCEDURE — 88342 TISSUE SPECIMEN TO PATHOLOGY - SURGERY: ICD-10-PCS | Mod: 26,,, | Performed by: PATHOLOGY

## 2019-04-02 PROCEDURE — 43239 EGD BIOPSY SINGLE/MULTIPLE: CPT | Performed by: INTERNAL MEDICINE

## 2019-04-02 PROCEDURE — D9220A PRA ANESTHESIA: ICD-10-PCS | Mod: CRNA,,, | Performed by: NURSE ANESTHETIST, CERTIFIED REGISTERED

## 2019-04-02 PROCEDURE — 37000008 HC ANESTHESIA 1ST 15 MINUTES: Performed by: INTERNAL MEDICINE

## 2019-04-02 PROCEDURE — 25000003 PHARM REV CODE 250: Performed by: INTERNAL MEDICINE

## 2019-04-02 RX ORDER — PROPOFOL 10 MG/ML
VIAL (ML) INTRAVENOUS
Status: DISCONTINUED | OUTPATIENT
Start: 2019-04-02 | End: 2019-04-02

## 2019-04-02 RX ORDER — LIDOCAINE HCL/PF 100 MG/5ML
SYRINGE (ML) INTRAVENOUS
Status: DISCONTINUED | OUTPATIENT
Start: 2019-04-02 | End: 2019-04-02

## 2019-04-02 RX ORDER — SODIUM CHLORIDE 9 MG/ML
INJECTION, SOLUTION INTRAVENOUS CONTINUOUS
Status: DISCONTINUED | OUTPATIENT
Start: 2019-04-02 | End: 2019-04-02 | Stop reason: HOSPADM

## 2019-04-02 RX ADMIN — PROPOFOL 30 MG: 10 INJECTION, EMULSION INTRAVENOUS at 09:04

## 2019-04-02 RX ADMIN — PROPOFOL 120 MG: 10 INJECTION, EMULSION INTRAVENOUS at 09:04

## 2019-04-02 RX ADMIN — SODIUM CHLORIDE: 0.9 INJECTION, SOLUTION INTRAVENOUS at 08:04

## 2019-04-02 RX ADMIN — LIDOCAINE HYDROCHLORIDE 50 MG: 20 INJECTION, SOLUTION INTRAVENOUS at 09:04

## 2019-04-02 NOTE — TRANSFER OF CARE
"Anesthesia Transfer of Care Note    Patient: Kristin Guerra    Procedure(s) Performed: Procedure(s) (LRB):  EGD (ESOPHAGOGASTRODUODENOSCOPY) (N/A)    Patient location: GI    Anesthesia Type: general    Transport from OR: Transported from OR on 2-3 L/min O2 by NC with adequate spontaneous ventilation    Post pain: adequate analgesia    Post assessment: no apparent anesthetic complications    Post vital signs: stable    Level of consciousness: awake    Nausea/Vomiting: no nausea/vomiting    Complications: none    Transfer of care protocol was followed      Last vitals:   Visit Vitals  BP (!) 147/67   Pulse (!) 57   Temp 36.5 °C (97.7 °F)   Ht 5' 1" (1.549 m)   Wt 57.6 kg (127 lb)   SpO2 99%   BMI 24.00 kg/m²     "

## 2019-04-02 NOTE — H&P
"[facility] Note    CC: Esophagitis, reflux    HPI 69 y.o. female with history of esophagitis presents for evaluation of worsening reflux    Past Medical History:   Diagnosis Date    Allergy     Colon cancer     Colon polyp     Diverticulitis     Diverticulosis     Fatty liver     GERD (gastroesophageal reflux disease)     Hx of cervical spine surgery 11/07/2018    Hyperlipidemia     Hypertension     Lupus (systemic lupus erythematosus) 1999    discoid    Osteoarthritis     Osteoporosis     Tremor 2005    essential         Review of Systems  General ROS: negative for - chills, fever or weight loss  Cardiovascular ROS: no chest pain or dyspnea on exertion  Gastrointestinal ROS: + reflux    Physical Examination  BP (!) 159/68   Pulse (!) 57   Temp 97.7 °F (36.5 °C)   Resp 18   Ht 5' 1" (1.549 m)   Wt 57.6 kg (127 lb)   SpO2 100%   BMI 24.00 kg/m²   General appearance: alert, cooperative, no distress  HENT: Normocephalic, atraumatic, neck symmetrical, no nasal discharge, sclera anicteric   Lungs: clear to auscultation bilaterally, symmetric chest wall expansion bilaterally  Heart: regular rate and rhythm without rub; no displacement of the PMI   Abdomen: soft, nontender,nondistended  Extremities: extremities symmetric; no clubbing, cyanosis, or edema          Assessment:   69 y.o. female with history of esophagitis and reflux presents for endoscopy    Plan:  -Proceed to EGD    Monika Steele MD  [facility]  [address], [address]  [place] [address]  Office: [phone]  Fax: [phone]  " 1 person assist/verbal cues

## 2019-04-02 NOTE — PLAN OF CARE
Patient is being driven home by her . Dr Steele spoke with them prior to discharge. Nurse accompanied her to bathroom.

## 2019-04-02 NOTE — ANESTHESIA POSTPROCEDURE EVALUATION
Anesthesia Post Evaluation    Patient: Kristin Guerra    Procedure(s) Performed: Procedure(s) (LRB):  EGD (ESOPHAGOGASTRODUODENOSCOPY) (N/A)    Final Anesthesia Type: general  Patient location during evaluation: PACU  Patient participation: Yes- Able to Participate  Level of consciousness: awake and alert and oriented  Post-procedure vital signs: reviewed and stable  Pain management: adequate  Airway patency: patent  PONV status at discharge: No PONV  Anesthetic complications: no      Cardiovascular status: blood pressure returned to baseline  Respiratory status: unassisted, spontaneous ventilation and room air  Hydration status: euvolemic  Follow-up not needed.          Vitals Value Taken Time   /68 4/2/2019 10:20 AM   Temp 36.5 °C (97.7 °F) 4/2/2019  8:28 AM   Pulse 57 4/2/2019 10:20 AM   Resp 18 4/2/2019 10:20 AM   SpO2 100 % 4/2/2019 10:20 AM         No case tracking events are documented in the log.      Pain/Kobe Score: Kobe Score: 10 (4/2/2019 10:17 AM)

## 2019-04-02 NOTE — [employer] PATIENT INSTRUCTIONS
Discharge Summary/Instructions after an Endoscopic Procedure  Patient Name: Kristin Guerra  Patient MRN: [id number]  Patient YOB: 1950 Tuesday, April 02, 2019  Monika Steele MD  RESTRICTIONS:  During your procedure today, you received medications for sedation.  These   medications may affect your judgment, balance and coordination.  Therefore,   for 24 hours, you have the following restrictions:   - DO NOT drive a car, operate machinery, make legal/financial decisions,   sign important papers or drink alcohol.    ACTIVITY:  Today: no heavy lifting, straining or running due to procedural   sedation/anesthesia.  The following day: return to full activity including work.  DIET:  Eat and drink normally unless instructed otherwise.     TREATMENT FOR COMMON SIDE EFFECTS:  - Mild abdominal pain, nausea, belching, bloating or excessive gas:  rest,   eat lightly and use a heating pad.  - Sore Throat: treat with throat lozenges and/or gargle with warm salt   water.  - Because air was used during the procedure, expelling large amounts of air   from your rectum or belching is normal.  - If a bowel prep was taken, you may not have a bowel movement for 1-3 days.    This is normal.  SYMPTOMS TO WATCH FOR AND REPORT TO YOUR PHYSICIAN:  1. Abdominal pain or bloating, other than gas cramps.  2. Chest pain.  3. Back pain.  4. Signs of infection such as: chills or fever occurring within 24 hours   after the procedure.  5. Rectal bleeding, which would show as bright red, maroon, or black stools.   (A tablespoon of blood from the rectum is not serious, especially if   hemorrhoids are present.)  6. Vomiting.  7. Weakness or dizziness.  GO DIRECTLY TO THE NEAREST EMERGENCY ROOM IF YOU HAVE ANY OF THE FOLLOWING:      Difficulty breathing              Chills and/or fever over 101 F   Persistent vomiting and/or vomiting blood   Severe abdominal pain   Severe chest pain   Black, tarry stools   Bleeding- more than one  tablespoon   Any other symptom or condition that you feel may need urgent attention  Your doctor recommends these additional instructions:  If any biopsies were taken, your doctors clinic will contact you in 1 to 2   weeks with any results.  - Await pathology results.   - Discharge patient to home (with spouse).   - Patient has a contact number available for emergencies.  The signs and   symptoms of potential delayed complications were discussed with the   patient.  Return to normal activities tomorrow.  Written discharge   instructions were provided to the patient.   - Resume previous diet.   - Continue present medications, including PPI daily for now  - Await pathology results.  For questions, problems or results please call your physician - Monika Steele MD at Work:  [phone].  [facility], EMERGENCY ROOM PHONE NUMBER: [phone]  IF A COMPLICATION OR EMERGENCY SITUATION ARISES AND YOU ARE UNABLE TO REACH   YOUR PHYSICIAN - GO DIRECTLY TO THE EMERGENCY ROOM.  Monika Steele MD  4/2/2019 9:50:41 AM  This report has been verified and signed electronically.  [employer]

## 2019-04-02 NOTE — ANESTHESIA PREPROCEDURE EVALUATION
04/02/2019  Kristin Guerra is a 69 y.o., female.    Pre-op Assessment    I have reviewed the Patient Summary Reports.     I have reviewed the Nursing Notes.   I have reviewed the Medications.     Review of Systems  Anesthesia Hx:  No problems with previous Anesthesia    Social:  Smoker    Cardiovascular:   Hypertension, well controlled    Pulmonary:  Pulmonary Normal    Renal/:  Renal/ Normal     Hepatic/GI:   GERD, poorly controlled diverticulitis   Musculoskeletal:   Arthritis     Neurological:   Neuromuscular Disease,    Endocrine:   Hypothyroidism        Physical Exam  General:  Well nourished    Airway/Jaw/Neck:  Airway Findings: Mouth Opening: Normal Tongue: Normal  General Airway Assessment: Adult, Good  Mallampati: II  Improves to II with phonation.  TM Distance: 4-6 cm      Dental:  Dental Findings: In tact   Chest/Lungs:  Chest/Lungs Findings: Clear to auscultation, Normal Respiratory Rate     Heart/Vascular:  Heart Findings: Rate: Normal  Rhythm: Regular Rhythm  Sounds: Normal  Heart murmur: negative       Mental Status:  Mental Status Findings:  Cooperative, Alert and Oriented         Anesthesia Plan  Type of Anesthesia, risks & benefits discussed:  Anesthesia Type:  general  Patient's Preference:   Intra-op Monitoring Plan: standard ASA monitors  Intra-op Monitoring Plan Comments:   Post Op Pain Control Plan:   Post Op Pain Control Plan Comments:   Induction:   IV  Beta Blocker:  Patient is not currently on a Beta-Blocker (No further documentation required).       Informed Consent: Patient understands risks and agrees with Anesthesia plan.  Questions answered. Anesthesia consent signed with patient.  ASA Score: 3     Day of Surgery Review of History & Physical: I have interviewed and examined the patient. I have reviewed the patient's H&P dated:  There are no significant changes.           Ready For Surgery From Anesthesia Perspective.

## 2019-04-25 ENCOUNTER — HOSPITAL ENCOUNTER (OUTPATIENT)
Dept: RADIOLOGY | Facility: CLINIC | Age: 69
Discharge: HOME | End: 2019-04-25
Attending: NURSE PRACTITIONER
Payer: MEDICARE

## 2019-04-25 ENCOUNTER — OFFICE VISIT (OUTPATIENT)
Dept: FAMILY MEDICINE | Facility: CLINIC | Age: 69
End: 2019-04-25
Payer: MEDICARE

## 2019-04-25 VITALS
WEIGHT: 126.56 LBS | OXYGEN SATURATION: 92 % | TEMPERATURE: 98 F | HEART RATE: 65 BPM | BODY MASS INDEX: 23.9 KG/M2 | HEIGHT: 61 IN | SYSTOLIC BLOOD PRESSURE: 128 MMHG | DIASTOLIC BLOOD PRESSURE: 70 MMHG

## 2019-04-25 DIAGNOSIS — M54.6 ACUTE LEFT-SIDED THORACIC BACK PAIN: ICD-10-CM

## 2019-04-25 DIAGNOSIS — H61.21 RIGHT EAR IMPACTED CERUMEN: ICD-10-CM

## 2019-04-25 DIAGNOSIS — R30.0 BURNING WITH URINATION: Primary | ICD-10-CM

## 2019-04-25 DIAGNOSIS — R10.84 GENERALIZED ABDOMINAL PAIN: ICD-10-CM

## 2019-04-25 DIAGNOSIS — N30.00 ACUTE CYSTITIS WITHOUT HEMATURIA: ICD-10-CM

## 2019-04-25 DIAGNOSIS — I10 ESSENTIAL HYPERTENSION: ICD-10-CM

## 2019-04-25 PROCEDURE — 3078F PR MOST RECENT DIASTOLIC BLOOD PRESSURE < 80 MM HG: ICD-10-PCS | Mod: S$GLB,,, | Performed by: NURSE PRACTITIONER

## 2019-04-25 PROCEDURE — 99214 OFFICE O/P EST MOD 30 MIN: CPT | Mod: S$GLB,,, | Performed by: NURSE PRACTITIONER

## 2019-04-25 PROCEDURE — 1101F PT FALLS ASSESS-DOCD LE1/YR: CPT | Mod: S$GLB,,, | Performed by: NURSE PRACTITIONER

## 2019-04-25 PROCEDURE — 3074F SYST BP LT 130 MM HG: CPT | Mod: S$GLB,,, | Performed by: NURSE PRACTITIONER

## 2019-04-25 PROCEDURE — 72070 X-RAY EXAM THORAC SPINE 2VWS: CPT | Mod: TC,FY,PO

## 2019-04-25 PROCEDURE — 3074F PR MOST RECENT SYSTOLIC BLOOD PRESSURE < 130 MM HG: ICD-10-PCS | Mod: S$GLB,,, | Performed by: NURSE PRACTITIONER

## 2019-04-25 PROCEDURE — 1101F PR PT FALLS ASSESS DOC 0-1 FALLS W/OUT INJ PAST YR: ICD-10-PCS | Mod: S$GLB,,, | Performed by: NURSE PRACTITIONER

## 2019-04-25 PROCEDURE — 3078F DIAST BP <80 MM HG: CPT | Mod: S$GLB,,, | Performed by: NURSE PRACTITIONER

## 2019-04-25 PROCEDURE — 99999 PR PBB SHADOW E&M-EST. PATIENT-LVL V: CPT | Mod: PBBFAC,,, | Performed by: NURSE PRACTITIONER

## 2019-04-25 PROCEDURE — 99999 PR PBB SHADOW E&M-EST. PATIENT-LVL V: ICD-10-PCS | Mod: PBBFAC,,, | Performed by: NURSE PRACTITIONER

## 2019-04-25 PROCEDURE — 87086 URINE CULTURE/COLONY COUNT: CPT

## 2019-04-25 PROCEDURE — 72070 XR THORACIC SPINE AP LATERAL: ICD-10-PCS | Mod: 26,,, | Performed by: RADIOLOGY

## 2019-04-25 PROCEDURE — 72070 X-RAY EXAM THORAC SPINE 2VWS: CPT | Mod: 26,,, | Performed by: RADIOLOGY

## 2019-04-25 PROCEDURE — 99214 PR OFFICE/OUTPT VISIT, EST, LEVL IV, 30-39 MIN: ICD-10-PCS | Mod: S$GLB,,, | Performed by: NURSE PRACTITIONER

## 2019-04-25 RX ORDER — LOSARTAN POTASSIUM 25 MG/1
TABLET ORAL
Refills: 2 | COMMUNITY
Start: 2019-04-15 | End: 2019-07-09

## 2019-04-25 RX ORDER — NITROFURANTOIN (MACROCRYSTALS) 100 MG/1
100 CAPSULE ORAL EVERY 12 HOURS
Qty: 20 CAPSULE | Refills: 0 | Status: SHIPPED | OUTPATIENT
Start: 2019-04-25 | End: 2019-05-05

## 2019-04-25 RX ORDER — FLUCONAZOLE 100 MG/1
100 TABLET ORAL DAILY
Qty: 7 TABLET | Refills: 0 | Status: SHIPPED | OUTPATIENT
Start: 2019-04-25 | End: 2019-05-02

## 2019-04-25 RX ORDER — MIRTAZAPINE 15 MG/1
TABLET, FILM COATED ORAL
Refills: 2 | COMMUNITY
Start: 2019-03-04 | End: 2019-04-25

## 2019-04-25 RX ORDER — CYCLOBENZAPRINE HCL 5 MG
TABLET ORAL
Refills: 3 | COMMUNITY
Start: 2019-02-14 | End: 2019-04-25

## 2019-04-25 NOTE — PROGRESS NOTES
Subjective:       Patient ID: Kristin Guerra is a 69 y.o. female.    Chief Complaint: back, stomach, neck and bladder pain    Urinary Tract Infection    The current episode started in the past 7 days. The problem occurs intermittently. The problem has been unchanged. The quality of the pain is described as burning. The pain is at a severity of 5/10. The pain is mild. There has been no fever. She is sexually active. There is no history of pyelonephritis. Associated symptoms include flank pain, frequency, nausea and urgency. Pertinent negatives include no bubble bath use. She has tried nothing for the symptoms.   Back Pain   This is a recurrent problem. The current episode started 1 to 4 weeks ago. The pain is present in the thoracic spine. The quality of the pain is described as aching. The pain is the same all the time. The symptoms are aggravated by sitting. Associated symptoms include headaches and paresthesias. Pertinent negatives include no tingling or weakness. She has tried nothing for the symptoms.       Past Medical History:   Diagnosis Date    Allergy     Colon cancer     Colon polyp     Diverticulitis     Diverticulosis     Fatty liver     GERD (gastroesophageal reflux disease)     Hx of cervical spine surgery 11/07/2018    Hyperlipidemia     Hypertension     Lupus (systemic lupus erythematosus) 1999    discoid    Osteoarthritis     Osteoporosis     Tremor 2005    essential       Review of patient's allergies indicates:   Allergen Reactions    Compazine [prochlorperazine edisylate] Nausea Only    Lexapro [escitalopram oxalate]      Worsening tremor, and sedation    Primidone Other (See Comments)     Could not function    Prochlorperazine     Simvastatin     Topiramate     Morphine Swelling and Rash         Current Outpatient Medications:     aspirin 81 MG Chew, aspirin 81 mg chewable tablet  Chew 1 tablet every day by oral route., Disp: , Rfl:     azelastine (ASTELIN) 137 mcg (0.1  %) nasal spray, 1 spray (137 mcg total) by Nasal route 2 (two) times daily., Disp: 30 mL, Rfl: 11    calcium carbonate (TUMS) 200 mg calcium (500 mg) chewable tablet, Take 1 tablet by mouth 2 (two) times daily as needed for Heartburn., Disp: , Rfl:     esomeprazole (NEXIUM) 40 MG capsule, Take 1 capsule (40 mg total) by mouth 2 (two) times daily before meals., Disp: 60 capsule, Rfl: 2    fluticasone (FLONASE) 50 mcg/actuation nasal spray, Inhale 1 spray into the lungs once daily once daily., Disp: , Rfl:     gabapentin (NEURONTIN) 300 MG capsule, Take 1 capsule (300 mg total) by mouth once daily., Disp: 30 capsule, Rfl: 6    HYDROcodone-acetaminophen (NORCO) 7.5-325 mg per tablet, , Disp: , Rfl:     LORazepam (ATIVAN) 0.5 MG tablet, Take 1 tablet (0.5 mg total) by mouth daily as needed., Disp: 30 tablet, Rfl: 3    losartan (COZAAR) 25 MG tablet, TK 1 T PO Q EVENING, Disp: , Rfl: 2    NIFEdipine (ADALAT CC) 30 MG TbSR, nifedipine ER 30 mg tablet,extended release  Take 1 tablet every day by oral route., Disp: , Rfl:     polyethylene glycol (GLYCOLAX) 17 gram/dose powder, Take 17 g by mouth daily as needed., Disp: , Rfl:     traZODone (DESYREL) 100 MG tablet, Take 1 tablet (100 mg total) by mouth nightly., Disp: 90 tablet, Rfl: 1    fluconazole (DIFLUCAN) 100 MG tablet, Take 1 tablet (100 mg total) by mouth once daily. for 7 days, Disp: 7 tablet, Rfl: 0    nitrofurantoin (MACRODANTIN) 100 MG capsule, Take 1 capsule (100 mg total) by mouth every 12 (twelve) hours. for 10 days, Disp: 20 capsule, Rfl: 0    Review of Systems   Gastrointestinal: Positive for nausea.   Genitourinary: Positive for flank pain, frequency and urgency.   Musculoskeletal: Positive for back pain.   Neurological: Positive for headaches and paresthesias. Negative for tingling and weakness.       Objective:      /70 (BP Location: Right arm, Patient Position: Sitting, BP Method: Medium (Automatic))   Pulse 65   Temp 98.2 °F (36.8  "°C) (Oral)   Ht 5' 1" (1.549 m)   Wt 57.4 kg (126 lb 8.7 oz)   SpO2 (!) 92%   BMI 23.91 kg/m²   Physical Exam   Constitutional: She is oriented to person, place, and time. She appears well-developed and well-nourished.   Eyes: Pupils are equal, round, and reactive to light. EOM are normal.   Neck: Normal range of motion.   Cardiovascular: Normal rate, regular rhythm and normal heart sounds.   Pulmonary/Chest: Effort normal and breath sounds normal.   Abdominal: Soft. Bowel sounds are normal. There is generalized tenderness and tenderness in the suprapubic area.   Musculoskeletal: Normal range of motion.   Neurological: She is alert and oriented to person, place, and time.   Skin: Skin is warm and dry.        Psychiatric: She has a normal mood and affect. Her behavior is normal. Judgment and thought content normal.       Assessment:       1. Burning with urination    2. Acute left-sided thoracic back pain    3. Right ear impacted cerumen    4. Acute cystitis without hematuria    5. Generalized abdominal pain    6. Essential hypertension    7. BMI 23.0-23.9, adult        Plan:       Burning with urination  -     POCT URINE DIPSTICK WITHOUT MICROSCOPE  -     Urine culture  -     fluconazole (DIFLUCAN) 100 MG tablet; Take 1 tablet (100 mg total) by mouth once daily. for 7 days  Dispense: 7 tablet; Refill: 0  -     Ambulatory referral to Urology    Acute left-sided thoracic back pain  -     X-Ray Thoracic Spine AP Lateral; Future; Expected date: 04/25/2019    Right ear impacted cerumen  -     Ear wax removal    Acute cystitis without hematuria  -     nitrofurantoin (MACRODANTIN) 100 MG capsule; Take 1 capsule (100 mg total) by mouth every 12 (twelve) hours. for 10 days  Dispense: 20 capsule; Refill: 0  -     fluconazole (DIFLUCAN) 100 MG tablet; Take 1 tablet (100 mg total) by mouth once daily. for 7 days  Dispense: 7 tablet; Refill: 0  -     Ambulatory referral to Urology    Generalized abdominal pain  -     CT " "Abdomen Pelvis With Contrast; Future; Expected date: 04/25/2019  -     Creatinine, serum; Future; Expected date: 04/25/2019    Essential hypertension  BP Readings from Last 3 Encounters:   04/25/19 128/70   04/02/19 (!) 159/68   03/22/19 110/60     BMI 23.0-23.9, adult  Counseled patient on his ideal body weight, health consequences of being obese and current recommendations including weekly exercise and a heart healthy diet.  Current BMI is:Estimated body mass index is 23.91 kg/m² as calculated from the following:    Height as of this encounter: 5' 1" (1.549 m).    Weight as of this encounter: 57.4 kg (126 lb 8.7 oz)..  Patient is aware that ideal BMI < 25 or Weight in (lb) to have BMI = 25: 132.            Patient readiness: acceptance and barriers:none    During the course of the visit the patient was educated and counseled about the following:     Hypertension:   Dietary sodium restriction.  Regular aerobic exercise.    Goals: Hypertension: Reduce Blood Pressure    Did patient meet goals/outcomes: Yes    The following self management tools provided: declined    Patient Instructions (the written plan) was given to the patient/family.     Time spent with patient: 30 minutes    Barriers to medications present (no )    Adverse reactions to current medications (no)    Over the counter medications reviewed (Yes)        "

## 2019-04-26 ENCOUNTER — HOSPITAL ENCOUNTER (OUTPATIENT)
Dept: RADIOLOGY | Facility: HOSPITAL | Age: 69
Discharge: HOME | End: 2019-04-26
Attending: NURSE PRACTITIONER
Payer: MEDICARE

## 2019-04-26 DIAGNOSIS — R10.84 GENERALIZED ABDOMINAL PAIN: ICD-10-CM

## 2019-04-26 DIAGNOSIS — R10.84 GENERALIZED ABDOMINAL PAIN: Primary | ICD-10-CM

## 2019-04-26 PROCEDURE — 74177 CT ABD & PELVIS W/CONTRAST: CPT | Mod: TC

## 2019-04-26 PROCEDURE — 74177 CT ABDOMEN PELVIS WITH CONTRAST: ICD-10-PCS | Mod: 26,,, | Performed by: RADIOLOGY

## 2019-04-26 PROCEDURE — 25500020 PHARM REV CODE 255: Performed by: NURSE PRACTITIONER

## 2019-04-26 PROCEDURE — 74177 CT ABD & PELVIS W/CONTRAST: CPT | Mod: 26,,, | Performed by: RADIOLOGY

## 2019-04-26 RX ADMIN — IOHEXOL 75 ML: 350 INJECTION, SOLUTION INTRAVENOUS at 08:04

## 2019-04-26 RX ADMIN — IOHEXOL 30 ML: 350 INJECTION, SOLUTION INTRAVENOUS at 08:04

## 2019-04-27 LAB — BACTERIA UR CULT: NO GROWTH

## 2019-05-08 ENCOUNTER — TELEPHONE (OUTPATIENT)
Dept: ENDOSCOPY | Facility: HOSPITAL | Age: 69
End: 2019-05-08

## 2019-05-08 DIAGNOSIS — K22.710 BARRETT'S ESOPHAGUS WITH LOW GRADE DYSPLASIA: Primary | ICD-10-CM

## 2019-05-08 NOTE — TELEPHONE ENCOUNTER
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 Could someone please see her for RFA.    Thanks!  Monika

## 2019-05-09 NOTE — TELEPHONE ENCOUNTER
Message   Received: Today   Message Contents   MD Abbie Galan MA   Caller: Unspecified (Yesterday,  4:59 PM)             Need EGD with RFA for LGD.   Uzair Cantor MD      Please sign order

## 2019-05-10 ENCOUNTER — TELEPHONE (OUTPATIENT)
Dept: GASTROENTEROLOGY | Facility: CLINIC | Age: 69
End: 2019-05-10

## 2019-05-10 NOTE — TELEPHONE ENCOUNTER
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  Patient would like to come by today to pick this up.    Patient call back number is [phone] cell

## 2019-05-13 ENCOUNTER — TELEPHONE (OUTPATIENT)
Dept: ENDOSCOPY | Facility: HOSPITAL | Age: 69
End: 2019-05-13

## 2019-05-13 NOTE — TELEPHONE ENCOUNTER
----- Message from Betsey Vu sent at 5/13/2019  2:01 PM CDT -----  Contact: Self- [phone]  [place]- pt called to schedule an ep appt- states she was told by Dr. Steele to schedule since pre-cancerous cells in esophagus have been found- please contact pt at [phone]

## 2019-05-13 NOTE — TELEPHONE ENCOUNTER
Spoke with patient. EGD scheduled 5/31 at 10a. Reviewed prep instructions. Ms Guerra verbalized understanding.

## 2019-05-16 ENCOUNTER — TELEPHONE (OUTPATIENT)
Dept: ENDOSCOPY | Facility: HOSPITAL | Age: 69
End: 2019-05-16

## 2019-05-16 NOTE — TELEPHONE ENCOUNTER
Spoke to pt, she is scheduled for an EGD on 5/31/19 at 10:00 am, medical history/medications reviewed and prep instructions mailed to pt.

## 2019-05-20 ENCOUNTER — TELEPHONE (OUTPATIENT)
Dept: ENDOSCOPY | Facility: HOSPITAL | Age: 69
End: 2019-05-20

## 2019-05-20 NOTE — TELEPHONE ENCOUNTER
EGD Prep Instructions    [facility] [address] Clinic Office [phone]  Endoscopy Lab [phone]    You are scheduled for an EGD with Dr. Cantor on 06/04/2019 at [facility].  You will check in at Admit on the first floor of the hospital.    You may not have anything to eat after 7pm and nothing to drink after midnight.  You can drink clear liquids between 7pm and midnight.    You MAY take your blood pressure, heart, and seizure medication on the morning of the procedure, with a SIP of water.  Hold ALL other medications until after the procedure.    If you are on blood thinners THAT YOU HAVE BEEN INSTRUCTED TO HOLD BY YOUR DOCTOR FOR THIS PROCEDURE, then do NOT take this the morning of your EGD.  Do NOT stop these medications on your own, they must be approved to be held by your doctor.  Your EGD can NOT be done if you are on these medications.  Examples of blood thinners include: Coumadin, Aggrenox, Plavix, Pradaxa, Reapro, Pletal, Xarelto, Ticagrelor, Brilinta, Eliquis, and high dose aspirin (325 mg).  You do not have to stop baby aspirin 81 mg.

## 2019-05-20 NOTE — TELEPHONE ENCOUNTER
----- Message from Georgia Artis MA sent at 5/20/2019  1:18 PM CDT -----  Contact: [phone]  Needs Advice    Reason for call: pt is wanting to know if she can have her procedure done at [facility]  Because its easier for her and her  to get around . Neither pt of spouse  can walk very far .        Communication Preference: [phone]    Additional Information: please call

## 2019-05-31 ENCOUNTER — TELEPHONE (OUTPATIENT)
Dept: ENDOSCOPY | Facility: HOSPITAL | Age: 69
End: 2019-05-31

## 2019-05-31 NOTE — TELEPHONE ENCOUNTER
Spoke with patient about arrival time @ 0845    NPO status reviewed: Patient must have nothing to eat after midnight.  Pt may have CLEAR liquids ONLY until completely NPO 3 hrs @ 0545    Medications: Do not take Insulin or oral diabetic medications the day of the procedure.  Take as prescribed: heart, seizure and blood pressure medication in the morning with a sip of water (less than an ounce).  Take any breathing medications and bring inhalers to hospital with you Leave all valuables and jewelry at home.     Wear comfortable clothes to procedure to change into hospital gown You cannot drive for 24 hours after your procedure because you will receive sedation for your procedure to make you comfortable.  A ride must be provided at discharge.

## 2019-06-04 ENCOUNTER — TELEPHONE (OUTPATIENT)
Dept: ENDOSCOPY | Facility: HOSPITAL | Age: 69
End: 2019-06-04

## 2019-06-04 ENCOUNTER — ANESTHESIA (OUTPATIENT)
Dept: ENDOSCOPY | Facility: HOSPITAL | Age: 69
End: 2019-06-04
Payer: MEDICARE

## 2019-06-04 ENCOUNTER — ANESTHESIA EVENT (OUTPATIENT)
Dept: ENDOSCOPY | Facility: HOSPITAL | Age: 69
End: 2019-06-04
Payer: MEDICARE

## 2019-06-04 ENCOUNTER — HOSPITAL ENCOUNTER (OUTPATIENT)
Facility: HOSPITAL | Age: 69
Discharge: HOME | End: 2019-06-04
Attending: INTERNAL MEDICINE | Admitting: INTERNAL MEDICINE
Payer: MEDICARE

## 2019-06-04 DIAGNOSIS — K22.719 BARRETT'S ESOPHAGUS WITH DYSPLASIA: Primary | ICD-10-CM

## 2019-06-04 PROCEDURE — 88305 TISSUE SPECIMEN TO PATHOLOGY - SURGERY: ICD-10-PCS | Mod: 26,,, | Performed by: PATHOLOGY

## 2019-06-04 PROCEDURE — 63600175 PHARM REV CODE 636 W HCPCS

## 2019-06-04 PROCEDURE — 43257 EGD W/THRML TXMNT GERD: CPT | Performed by: INTERNAL MEDICINE

## 2019-06-04 PROCEDURE — 25000003 PHARM REV CODE 250: Performed by: NURSE ANESTHETIST, CERTIFIED REGISTERED

## 2019-06-04 PROCEDURE — 37000008 HC ANESTHESIA 1ST 15 MINUTES: Performed by: INTERNAL MEDICINE

## 2019-06-04 PROCEDURE — 63600175 PHARM REV CODE 636 W HCPCS: Performed by: NURSE ANESTHETIST, CERTIFIED REGISTERED

## 2019-06-04 PROCEDURE — 37000009 HC ANESTHESIA EA ADD 15 MINS: Performed by: INTERNAL MEDICINE

## 2019-06-04 PROCEDURE — 43270 PR EGD, FLEX, W/ABLATION, TUMOR/POLYP/LESION(S), W/ DILATION: ICD-10-PCS | Mod: ,,, | Performed by: INTERNAL MEDICINE

## 2019-06-04 PROCEDURE — 88305 TISSUE EXAM BY PATHOLOGIST: CPT | Mod: 26,,, | Performed by: PATHOLOGY

## 2019-06-04 PROCEDURE — 63600175 PHARM REV CODE 636 W HCPCS: Performed by: ANESTHESIOLOGY

## 2019-06-04 PROCEDURE — 43270 EGD LESION ABLATION: CPT | Performed by: INTERNAL MEDICINE

## 2019-06-04 PROCEDURE — 88305 TISSUE EXAM BY PATHOLOGIST: CPT | Performed by: PATHOLOGY

## 2019-06-04 PROCEDURE — 25000003 PHARM REV CODE 250: Performed by: INTERNAL MEDICINE

## 2019-06-04 PROCEDURE — 43270 EGD LESION ABLATION: CPT | Mod: ,,, | Performed by: INTERNAL MEDICINE

## 2019-06-04 PROCEDURE — C1886 CATHETER, ABLATION: HCPCS | Performed by: INTERNAL MEDICINE

## 2019-06-04 RX ORDER — METOCLOPRAMIDE HYDROCHLORIDE 5 MG/ML
INJECTION INTRAMUSCULAR; INTRAVENOUS
Status: COMPLETED
Start: 2019-06-04 | End: 2019-06-04

## 2019-06-04 RX ORDER — ONDANSETRON 2 MG/ML
INJECTION INTRAMUSCULAR; INTRAVENOUS
Status: DISCONTINUED | OUTPATIENT
Start: 2019-06-04 | End: 2019-06-04

## 2019-06-04 RX ORDER — ESOMEPRAZOLE MAGNESIUM 40 MG/1
40 CAPSULE, DELAYED RELEASE ORAL
Qty: 60 CAPSULE | Refills: 2 | Status: ON HOLD | OUTPATIENT
Start: 2019-06-04 | End: 2019-10-07 | Stop reason: SDUPTHER

## 2019-06-04 RX ORDER — SUCRALFATE 1 G/1
TABLET ORAL
Qty: 60 TABLET | Refills: 2 | Status: SHIPPED | OUTPATIENT
Start: 2019-06-04 | End: 2019-09-04

## 2019-06-04 RX ORDER — SODIUM CHLORIDE 0.9 % (FLUSH) 0.9 %
10 SYRINGE (ML) INJECTION
Status: DISCONTINUED | OUTPATIENT
Start: 2019-06-04 | End: 2019-06-04 | Stop reason: HOSPADM

## 2019-06-04 RX ORDER — PROPOFOL 10 MG/ML
VIAL (ML) INTRAVENOUS
Status: DISCONTINUED | OUTPATIENT
Start: 2019-06-04 | End: 2019-06-04

## 2019-06-04 RX ORDER — LIDOCAINE HCL/PF 100 MG/5ML
SYRINGE (ML) INTRAVENOUS
Status: DISCONTINUED | OUTPATIENT
Start: 2019-06-04 | End: 2019-06-04

## 2019-06-04 RX ORDER — PROPOFOL 10 MG/ML
VIAL (ML) INTRAVENOUS CONTINUOUS PRN
Status: DISCONTINUED | OUTPATIENT
Start: 2019-06-04 | End: 2019-06-04

## 2019-06-04 RX ORDER — SODIUM CHLORIDE 9 MG/ML
INJECTION, SOLUTION INTRAVENOUS CONTINUOUS
Status: DISCONTINUED | OUTPATIENT
Start: 2019-06-04 | End: 2019-06-04 | Stop reason: HOSPADM

## 2019-06-04 RX ADMIN — METOCLOPRAMIDE 10 MG: 5 INJECTION, SOLUTION INTRAMUSCULAR; INTRAVENOUS at 09:06

## 2019-06-04 RX ADMIN — SODIUM CHLORIDE: 0.9 INJECTION, SOLUTION INTRAVENOUS at 09:06

## 2019-06-04 RX ADMIN — TOPICAL ANESTHETIC 1 EACH: 200 SPRAY DENTAL; PERIODONTAL at 09:06

## 2019-06-04 RX ADMIN — PROPOFOL 130 MCG/KG/MIN: 10 INJECTION, EMULSION INTRAVENOUS at 09:06

## 2019-06-04 RX ADMIN — ONDANSETRON 4 MG: 2 INJECTION, SOLUTION INTRAMUSCULAR; INTRAVENOUS at 09:06

## 2019-06-04 RX ADMIN — PROPOFOL 60 MG: 10 INJECTION, EMULSION INTRAVENOUS at 09:06

## 2019-06-04 RX ADMIN — LIDOCAINE HYDROCHLORIDE 100 MG: 20 INJECTION, SOLUTION INTRAVENOUS at 09:06

## 2019-06-04 RX ADMIN — PROPOFOL 20 MG: 10 INJECTION, EMULSION INTRAVENOUS at 09:06

## 2019-06-04 NOTE — ANESTHESIA PREPROCEDURE EVALUATION
06/04/2019  Kristin Guerra is a 69 y.o., female h/o essential tremors,  SLE, HTN, GERD, and dysphagia, has had significant PONV  Even after propofol anesthestics.  Will try treating with zofran and reglan today.    Anesthesia Evaluation         Review of Systems  Cardiovascular:   Hypertension   Hepatic/GI:   Hiatal Hernia, GERD    Musculoskeletal:   Arthritis     Neurological:   esssential tremors       Physical Exam  General:  Well nourished    Airway/Jaw/Neck:  Airway Findings: Mouth Opening: Small, but > 3cm Tongue: Normal  Pre-Existing Airway Tube(s): Oral Endotracheal tube  General Airway Assessment: Adult  TM Distance: Normal, at least 6 cm       Chest/Lungs:  Chest/Lungs Clear    Heart/Vascular:  Heart Findings: Normal            Anesthesia Plan  Type of Anesthesia, risks & benefits discussed:  Anesthesia Type:  general, MAC  Patient's Preference:   Intra-op Monitoring Plan:   Intra-op Monitoring Plan Comments:   Post Op Pain Control Plan:   Post Op Pain Control Plan Comments:   Induction:   IV  Beta Blocker:  Patient is not currently on a Beta-Blocker (No further documentation required).       Informed Consent: Patient understands risks and agrees with Anesthesia plan.  Questions answered. Anesthesia consent signed with patient.  ASA Score: 2     Day of Surgery Review of History & Physical: I have interviewed and examined the patient. I have reviewed the patient's H&P dated:            Ready For Surgery From Anesthesia Perspective.

## 2019-06-04 NOTE — DISCHARGE SUMMARY
Discharge Summary/Instructions after an Endoscopic Procedure    Patient Name: Kristin Guerra  Patient MRN: [id number]  Patient YOB: 1950 Tuesday, June 04, 2019  Uzair Cantor MD    RESTRICTIONS:  During your procedure today, you received medications for sedation.  These medications may affect your judgment, balance and coordination.  Therefore, for 24 hours, you have the following restrictions:     - DO NOT drive a car, operate machinery, make legal/financial decisions, sign important papers or drink alcohol.      ACTIVITY:  Today: no heavy lifting, straining or running due to procedural sedation/anesthesia.  The following day: return to full activity including work.    DIET:  Eat and drink normally unless instructed otherwise.     TREATMENT FOR COMMON SIDE EFFECTS:  - Mild abdominal pain, nausea, belching, bloating or excessive gas:  rest, eat lightly and use a heating pad.  - Sore Throat: treat with throat lozenges and/or gargle with warm salt water.  - Because air was used during the procedure, expelling large amounts of air from your rectum or belching is normal.  - If a bowel prep was taken, you may not have a bowel movement for 1-3 days.  This is normal.      SYMPTOMS TO WATCH FOR AND REPORT TO YOUR PHYSICIAN:  1. Abdominal pain or bloating, other than gas cramps.  2. Chest pain.  3. Back pain.  4. Signs of infection such as: chills or fever occurring within 24 hours after the procedure.  5. Rectal bleeding, which would show as bright red, maroon, or black stools. (A tablespoon of blood from the rectum is not serious, especially if hemorrhoids are present.)  6. Vomiting.  7. Weakness or dizziness.      GO DIRECTLY TO THE NEAREST EMERGENCY ROOM IF YOU HAVE ANY OF THE FOLLOWING:     Difficulty breathing              Chills and/or fever over 101 F   Persistent vomiting and/or vomiting blood   Severe abdominal pain   Severe chest pain   Black, tarry stools   Bleeding- more than one tablespoon   Any  other symptom or condition that you feel may need urgent attention    Your doctor recommends these additional instructions:  If any biopsies were taken, your doctors clinic will contact you in 1 to 2 weeks with any results.    - Discharge patient to home.   - Resume previous diet.   - Use sucralfate suspension 1 gram PO BID.   - Use Nexium (esomeprazole) 40 mg PO BID.   - Repeat upper endoscopy in 12 weeks for follow-up of Sterling's ablation.   - Await pathology results.    For questions, problems or results please call your physician - Uzair Cantor MD at Work:  [phone].    EMERGENCY PHONE NUMBER: [phone],  LAB RESULTS: [phone]    IF A COMPLICATION OR EMERGENCY SITUATION ARISES AND YOU ARE UNABLE TO REACH YOUR PHYSICIAN - GO DIRECTLY TO THE EMERGENCY ROOM.

## 2019-06-04 NOTE — TRANSFER OF CARE
"Anesthesia Transfer of Care Note    Patient: Kristin Guerra    Procedure(s) Performed: Procedure(s) (LRB):  EGD (ESOPHAGOGASTRODUODENOSCOPY) (N/A)    Patient location: GI    Anesthesia Type: MAC    Transport from OR: Transported from OR on room air with adequate spontaneous ventilation    Post pain: adequate analgesia    Post assessment: no apparent anesthetic complications and tolerated procedure well    Post vital signs: stable    Level of consciousness: sedated    Nausea/Vomiting: no nausea/vomiting    Complications: none    Transfer of care protocol was followed      Last vitals:   Visit Vitals  BP (!) 190/89 (BP Location: Right arm)   Pulse (!) 50   Temp 36.4 °C (97.6 °F)   Resp 20   Ht 5' 1" (1.549 m)   Wt 56.7 kg (125 lb)   SpO2 100%   BMI 23.62 kg/m²     "

## 2019-06-04 NOTE — TELEPHONE ENCOUNTER
----- Message from Betsey Horace sent at 6/4/2019  4:03 PM CDT -----  Contact: Self- [phone]  Devaughn- pt called to determine if she can drink water after taking the rx Carafate- please contact pt at [phone]

## 2019-06-04 NOTE — PLAN OF CARE
Past Medical History:   Diagnosis Date    Allergy     Sterling's esophagus     Colon cancer     Colon polyp     Diverticulitis     Diverticulosis     Fatty liver     GERD (gastroesophageal reflux disease)     Hx of cervical spine surgery 11/07/2018    Hyperlipidemia     Hypertension     Lupus (systemic lupus erythematosus) 1999    discoid    Osteoarthritis     Osteoporosis     Tremor 2005    essential   Dr. Cantor visited at bedside, discussed findings and recommendations with patient and family member; all questions asked and answered. Verbalized understanding of information given. Handout provided at time of discharge.

## 2019-06-04 NOTE — H&P
History & Physical - Short Stay  Gastroenterology      SUBJECTIVE:     Procedure: EGD    Chief Complaint/Indication for Procedure: Sterling's Esophagus    History of Present Illness:  Patient is a 69 y.o. female presents with Sterling's with dysplasia here for possible RFA.     PTA Medications   Medication Sig    aspirin 81 MG Chew aspirin 81 mg chewable tablet   Chew 1 tablet every day by oral route.    azelastine (ASTELIN) 137 mcg (0.1 %) nasal spray 1 spray (137 mcg total) by Nasal route 2 (two) times daily.    calcium carbonate (TUMS) 200 mg calcium (500 mg) chewable tablet Take 1 tablet by mouth 2 (two) times daily as needed for Heartburn.    esomeprazole (NEXIUM) 40 MG capsule Take 1 capsule (40 mg total) by mouth 2 (two) times daily before meals.    fluticasone (FLONASE) 50 mcg/actuation nasal spray Inhale 1 spray into the lungs once daily once daily.    gabapentin (NEURONTIN) 300 MG capsule Take 1 capsule (300 mg total) by mouth once daily.    HYDROcodone-acetaminophen (NORCO) 7.5-325 mg per tablet     LORazepam (ATIVAN) 0.5 MG tablet Take 1 tablet (0.5 mg total) by mouth daily as needed.    losartan (COZAAR) 25 MG tablet TK 1 T PO Q EVENING    NIFEdipine (ADALAT CC) 30 MG TbSR nifedipine ER 30 mg tablet,extended release   Take 1 tablet every day by oral route.    polyethylene glycol (GLYCOLAX) 17 gram/dose powder Take 17 g by mouth daily as needed.    traZODone (DESYREL) 100 MG tablet Take 1 tablet (100 mg total) by mouth nightly.       Review of patient's allergies indicates:   Allergen Reactions    Compazine [prochlorperazine edisylate] Nausea Only    Lexapro [escitalopram oxalate]      Worsening tremor, and sedation    Primidone Other (See Comments)     Could not function    Prochlorperazine     Simvastatin     Topiramate     Morphine Swelling and Rash        Past Medical History:   Diagnosis Date    Allergy     Colon cancer     Colon polyp     Diverticulitis     Diverticulosis      Fatty liver     GERD (gastroesophageal reflux disease)     Hx of cervical spine surgery 2018    Hyperlipidemia     Hypertension     Lupus (systemic lupus erythematosus)     discoid    Osteoarthritis     Osteoporosis     Tremor 2005    essential     Past Surgical History:   Procedure Laterality Date    APPENDECTOMY  2007    removed during colon surgery    CERVICAL FUSION       SECTION      COLON SURGERY   &     hemicolectomy for colon cancer and second was for complications from the first surgery    COLONOSCOPY N/A 2018    Per[facility] at [facility]    EGD (ESOPHAGOGASTRODUODENOSCOPY) N/A 2019    Performed by Monika Steele [facility] at [facility]    ESOPHAGOGASTRODUODENOSCOPY (EGD) N/A 2018    Performed by Monika [facility] at [facility]    HYSTERECTOMY      ovaries remain    SPLENECTOMY, TOTAL      Injured during surgery and removed    UPPER GASTROINTESTINAL ENDOSCOPY      GERD & hiatal hernia per patient report     Family History   Problem Relation Age of Onset    Colon cancer Father         diagnosed in his 60's    Lung cancer Father     Lymphoma Mother         Brain    Lung cancer Brother     Lung cancer Brother         metastatic from prostate    Prostate cancer Brother     Heart disease Brother         valvular disease    Hyperlipidemia Brother     Eczema Neg Hx     Lupus Neg Hx     Psoriasis Neg Hx     Melanoma Neg Hx     Crohn's disease Neg Hx     Ulcerative colitis Neg Hx     Stomach cancer Neg Hx     Esophageal cancer Neg Hx      Social History     Tobacco Use    Smoking status: Current Every Day Smoker     Packs/day: 0.50     Years: 35.00     Pack years: 17.50     Types: Cigarettes    Smokeless tobacco: Never Used   Substance Use Topics    Alcohol use: No    Drug use: No       Review of Systems:  Respiratory: no cough or shortness of breath  Cardiovascular: no chest pain or  palpitations  Gastrointestinal: no nausea or vomiting, no abdominal pain or change in bowel habits    OBJECTIVE:     Vital Signs (Most Recent)       Physical Exam:  General: well developed, well nourished  Lungs:  normal respiratory effort  Heart: regular rate, S1, S2 normal  Abdomen: soft, non-tender non-distented; bowel sounds normal; no masses,  no organomegaly    Laboratory  CBC: No results for input(s): WBC, RBC, HGB, HCT, PLT, MCV, MCH, MCHC in the last 168 hours.  CMP: No results for input(s): GLU, CALCIUM, ALBUMIN, PROT, NA, K, CO2, CL, BUN, CREATININE, ALKPHOS, ALT, AST, BILITOT in the last 168 hours.  Coagulation: No results for input(s): LABPROT, INR, APTT in the last 168 hours.      Diagnostic Results:      ASSESSMENT/PLAN:     Sterling's with dysplasia    Plan: EGD    Anesthesia Plan: MAC    ASA Grade: ASA 3 - Patient with moderate systemic disease with functional limitations     The impression and plan was discussed in detail with the patient and family. All questions have been answered and the patient voices understanding of our plan at this point. The risk of the procedure was discussed in detail which includes but not limited to bleeding, infection, perforation in some cases requiring surgery with its spectrum of complications.

## 2019-06-04 NOTE — DISCHARGE INSTRUCTIONS
Esomeprazole capsules  What is this medicine?  ESOMEPRAZOLE (es oh ME pray zol) prevents the production of acid in the stomach. It is used to treat gastroesophageal reflux disease (GERD), ulcers, certain bacteria in the stomach, and inflammation of the esophagus. It can also be used to prevent ulcers in patients taking medicines called NSAIDs.  You can also buy this medicine without a prescription to treat the symptoms of heartburn. The non-prescription product is not for long-term use, unless otherwise directed by your doctor or health care professional.  How should I use this medicine?  Take this medicine by mouth. Swallow the capsules whole with a drink of water. Follow the directions on the prescription or product label. Do not crush, break or chew. The capsules can be opened and the contents sprinkled on applesauce. Do not crush the contents into the food. This medicine works best if taken on an empty stomach at least one hour before a meal. Take your medicine at regular intervals. Do not take your medicine more often than directed.  Talk to your pediatrician regarding the use of this medicine in children. Special care may be needed.  What side effects may I notice from receiving this medicine?  Side effects that you should report to your doctor or health care professional as soon as possible:  · allergic reactions like skin rash, itching or hives, swelling of the face, lips, or tongue  · bone, muscle or joint pain  · breathing problems  · chest pain or chest tightness  · dark yellow or brown urine  · fast, irregular heartbeat  · feeling faint or lightheaded  · fever or sore throat  · muscle spasms  · rash on cheeks or arms that gets worse in the sun  · tremors  · unusual bleeding or bruising  · unusually weak or tired  · upset stomach  · yellowing of the eyes or skin  Side effects that usually do not require medical attention (Report these to your doctor or health care professional if they continue or are  bothersome.):  · constipation  · diarrhea  · dry mouth  · headache  · nausea  · stomach pain or gas  · vomiting  What may interact with this medicine?  Do not take this medicine with any of the following medications:  · atazanavir  This medicine may also interact with the following medications:  · ampicillin  · antiviral medicines for HIV or AIDS  · certain medicines for fungal infections like ketoconazole and itraconazole  · cilostazol  · clopidogrel  · diazepam  · digoxin  · erlotinib  · diuretics  · iron salts  · methotrexate  · Ryne's Wort  · tacrolimus  · rifampin  · warfarin  What if I miss a dose?  If you miss a dose, take it as soon as you can. If it is almost time for your next dose, take only that dose. Do not take double or extra doses.  Where should I keep my medicine?  Keep out of the reach of children.  Store at room temperature between 15 and 30 degrees C (59 and 86 degrees F). Protect from light and moisture. Throw away any unused medicine after the expiration date.  What should I tell my health care provider before I take this medicine?  They need to know if you have any of these conditions:  · bloody or black, tarry stools  · chest pain  · have had heartburn for over 3 months  · have heartburn with dizziness, lightheadedness, or sweating  · liver disease  · low levels of magnesium in the blood  · lupus  · nausea, vomiting  · stomach pain  · trouble swallowing  · unexplained weight loss  · vomiting with blood  · wheezing  · an unusual or allergic reaction to esomeprazole, other medicines, foods, dyes, or preservatives  · pregnant or trying to get pregnant  · breast-feeding  What should I watch for while using this medicine?  If you are taking this medicine without a prescription, it may take 1 to 4 days for it to fully relieve your heartburn.   If you are using this medicine with a prescription from your healthcare professional for a more serious condition, it can take several days before your  condition gets better. Check with your doctor or health care professional if your condition does not start to get better, or if it gets worse.  If you take this medicine for long periods of time, you may need blood work done.  NOTE:This sheet is a summary. It may not cover all possible information. If you have questions about this medicine, talk to your doctor, pharmacist, or health care provider. Copyright© 2017 [employer]        Sucralfate oral suspension  What is this medicine?  SUCRALFATE (QUENTIN rupali fate) helps to treat ulcers of the intestine.  How should I use this medicine?  Take this medicine by mouth with a glass of water. Follow the directions on the prescription label. Shake well before using. Use a specially marked spoon or container to measure your medicine. Ask your pharmacist if you do not have one. Household spoons are not accurate. This medicine works best if you take it on an empty stomach, 1 hour before meals. Take your doses at regular intervals. Do not take your medicine more often than directed. Do not stop taking except on your doctor's advice.  Talk to your pediatrician regarding the use of this medicine in children. Special care may be needed.  What side effects may I notice from receiving this medicine?  Side effects that you should report to your doctor or health care professional as soon as possible:  · allergic reactions like skin rash, itching or hives, swelling of the face, lips, or tongue  · difficulty breathing  Side effects that usually do not require medical attention (report to your doctor or health care professional if they continue or are bothersome):  · back pain  · constipation  · drowsy, dizzy  · dry mouth  · headache  · stomach upset, gas  · trouble sleeping  What may interact with this medicine?  · antacid  · cimetidine  · digoxin  · ketoconazole  · phenytoin  · quinidine  · ranitidine  · some antibiotics like ciprofloxacin, norfloxacin, and  ofloxacin  · theophylline  · thyroid hormones  · warfarin  What if I miss a dose?  If you miss a dose, take it as soon as you can. If it is almost time for your next dose, take only that dose. Do not take double or extra doses.  Where should I keep my medicine?  Keep out of the reach of children.  Store at room temperature between 20 and 25 degrees C (68 and 77 degrees F). Keep container tightly closed. Throw away any unused medicine after the expiration date.  What should I tell my health care provider before I take this medicine?  They need to know if you have any of these conditions:  · kidney disease  · an unusual or allergic reaction to sucralfate, other medicines, foods, dyes, or preservatives  · pregnant or trying to get pregnant  · breast-feeding  What should I watch for while using this medicine?  Visit your doctor or health care professional for regular check ups. Let your doctor know if your symptoms do not improve or if you feel worse.  Antacids should not be taken within one half hour before or after this medicine.  NOTE:This sheet is a summary. It may not cover all possible information. If you have questions about this medicine, talk to your doctor, pharmacist, or health care provider. Copyright© 2017 [employer]        Tips to Control Acid Reflux    To control acid reflux, youll need to make some basic diet and lifestyle changes. The simple steps outlined below may be all youll need to ease discomfort.  Watch what you eat  · Avoid fatty foods and spicy foods.  · Eat fewer acidic foods, such as citrus and tomato-based foods. These can increase symptoms.  · Limit drinking alcohol, caffeine, and fizzy beverages. All increase acid reflux.  · Try limiting chocolate, peppermint, and spearmint. These can worsen acid reflux in some people.  Watch when you eat  · Avoid lying down for 3 hours after eating.  · Do not snack before going to bed.  Raise your head  Raising your head and upper body by 4 to 6  inches helps limit reflux when youre lying down. Put blocks under the head of your bed frame to raise it.  Other changes  · Lose weight, if you need to  · Dont exercise near bedtime  · Avoid tight-fitting clothes  · Limit aspirin and ibuprofen  · Stop smoking   Date Last Reviewed: 7/1/2016 © 2000-2017 [employer]. [address] [place] [address], [place] [address]. All rights reserved. This information is not intended as a substitute for professional medical care. Always follow your healthcare professional's instructions.        What Is a Hiatal Hernia?    Hiatal hernia is when the area where the stomach and esophagus meet bulges up through the diaphragm into the chest cavity. In some cases, part of the stomach may bulge above the diaphragm. Stomach acid may move up into the esophagus and cause symptoms. The symptoms are often blamed on gastroesophageal reflux disease (GERD). You may only know about the hernia when it shows up on an X-ray taken for other reasons.   What you may feel  The hiatus is a normal hole in the diaphragm. The esophagus passes through this hole and leads to the stomach. In some cases, part of the stomach may bulge above the diaphragm. This bulge is called a hernia. Stomach acid may move up into the esophagus and cause symptoms.  When you eat, the muscle at the hiatus relaxes to allow food to pass into the stomach. It tightens again to keep food and digestive acids in the stomach.  Many people with hiatal hernias have mild symptoms. You may notice the following GERD symptoms:  · Heartburn or other chest discomfort  · A feeling of chest fullness after a meal  · Frequent burping  · Acid taste in the mouth  · Trouble swallowing  Treating symptoms  If you have been diagnosed with hiatal hernia, these suggestions may help improve symptoms:  · Lose excess weight. Extra weight puts pressure on the stomach and esophagus.  · Dont lie down after eating. Sit up for at least an hour after eating.  Lying down after eating can increase symptoms.  · Avoid certain foods and drinks. These include fatty foods, chocolate, coffee, mint, and other foods that cause symptoms for you.  · Dont smoke or drink alcohol. These can worsen symptoms.  · Look at your medicines. Discuss your medicines with your healthcare provider. Many medicines can cause symptoms.  · Consider an antacid medicine. Ask your healthcare provider about over-the-counter and prescription medicines that may help.  · Ask about surgery, if needed. Surgery is a treatment choice for some people. Your healthcare provider can determine if surgery is an option for you.    Date Last Reviewed: 10/1/2016  © 0296-0711 [employer]. [address] [place] [address], [place] [address]. All rights reserved. This information is not intended as a substitute for professional medical care. Always follow your healthcare professional's instructions.        What Is Sterling Esophagus?          You have Sterling esophagus. This means that there have been changes to the lining of the esophagus near the stomach. The changes may have been caused by the acid reflux that happens with GERD (gastroesophageal reflux disease). The changed lining is not cancerous, but may increase your chances of developing cancer later on.      When you have GERD  The esophagus is the tube that carries food and liquid from the mouth to the stomach. Your lower esophageal sphincter (LES) is a one-way valve at the top of the stomach. It keeps food and stomach acid from flowing backward. If the LES is weakened, food and stomach acid flow back (reflux) into your esophagus. If this happens often, the condition is called GERD.  Changes in the lining  The stomach is kept safe from its own acid by a special lining. The esophagus isnt meant to contact stomach acid. With GERD, acid flows back into the esophagus often. This damages the esophagus. In response to the damage, new tissue forms that is not  normal. This is Sterling esophagus. The new tissue may keep changing. This is why it is more likely to become cancer in the future.  Preventing further damage  Your healthcare provider may suggest regular tests to keep track of changes in the esophagus. This usually includes an endoscopy, when a flexible lighted scope is placed through the mouth into the esophagus. Biopsies (tissue samples) can be taken of the abnormal areas. You are usually sedated with an IV medicine for comfort. He or she may also suggest ways for you to control GERD. This includes lifestyle changes, medicine, or even surgery. This should help keep your Sterling esophagus from getting worse.  Symptoms of GERD  Symptoms include the following:  · Heartburn  · Sour-tasting fluid backing up into your mouth  · Frequent burping or belching  · Symptoms that get worse after you eat, bend over, or lie down  · Coughing repeatedly to clear your throat  · Hoarseness   Date Last Reviewed: 6/1/2016  © 2412-6758 [employer]. [address] [place] [address]. All rights reserved. This information is not intended as a substitute for professional medical care. Always follow your healthcare professional's instructions.        Understanding Radiofrequency Ablation (RFA) for Sterlings Esophagus    The esophagus is the tube that joins the mouth to the stomach. In people with GERD (gastroesophageal reflux disease), stomach acid flows back into the esophagus. Over time, stomach acid can hurt the esophagus. Sterlings esophagus is a condition where cells in the esophagus lining change to a different type of cell. The changed cells may protect the esophagus from stomach acid. But these cells are more likely than normal lining cells to become cancerous.  Radiofrequency ablation (RFA) is a procedure that destroys the changed cells. This can help prevent or treat cancer of the esophagus.  Why RFA for Sterlings esophagus is done  This procedure removes the  changed lining from the esophagus. It is most often advised for people who show precancerous changes in the lining and very early cancer. RFA destroys the abnormal lining. Normal lining then grows back to replace it.  How RFA for Sterlings esophagus is done  RFA is most often done on an outpatient basis. That means you go home the same day. During the procedure:  · You are given medicine (anesthesia) to prevent pain and make you sleep during the procedure.  · The doctor puts a thin, lighted tube (endoscope) into your mouth. He or she passes the tube down your throat into the esophagus. The tube sends live video from inside the esophagus to a computer screen.  · A wire with a tiny electrode attached is put through the tube. The doctor moves it down to the area of changed cells. Energy is sent into the electrode. This heats up the esophagus lining, destroying it.  · When the procedure is done, the doctor removes the wire and tube.  Risks of RFA for Sterlings esophagus  These include:  · Chest pain  · Sore throat or trouble swallowing  · Narrowing of the esophagus (stricture)  · Irritation of or hole (perforation) in the esophagus  · Problems related to the anesthesia  Date Last Reviewed: 5/1/2016  © 2038-5010 [employer]. [address] [place] [address], [place] [address]. All rights reserved. This information is not intended as a substitute for professional medical care. Always follow your healthcare professional's instructions.

## 2019-06-04 NOTE — ANESTHESIA POSTPROCEDURE EVALUATION
Anesthesia Post Evaluation    Patient: Kristin Guerra    Procedure(s) Performed: Procedure(s) (LRB):  EGD (ESOPHAGOGASTRODUODENOSCOPY) (N/A)    Final Anesthesia Type: MAC  Patient location during evaluation: GI PACU  Patient participation: Yes- Able to Participate  Level of consciousness: awake and alert and oriented  Post-procedure vital signs: reviewed and stable  Pain management: adequate  Airway patency: patent  PONV status at discharge: No PONV  Anesthetic complications: no      Cardiovascular status: blood pressure returned to baseline, hemodynamically stable and stable  Respiratory status: unassisted, spontaneous ventilation and room air  Hydration status: euvolemic  Follow-up not needed.          Vitals Value Taken Time   /89 6/4/2019  9:27 AM   Temp 36.4 °C (97.6 °F) 6/4/2019  9:27 AM   Pulse 50 6/4/2019  9:27 AM   Resp 20 6/4/2019  9:27 AM   SpO2 100 % 6/4/2019  9:27 AM         No case tracking events are documented in the log.      Pain/Kobe Score: No data recorded

## 2019-06-04 NOTE — [employer]OVATION PATIENT INSTRUCTIONS
Discharge Summary/Instructions after an Endoscopic Procedure  Patient Name: Kristin Guerra  Patient MRN: [id number]  Patient YOB: 1950 Tuesday, June 04, 2019  Uzair Cantor MD  RESTRICTIONS:  During your procedure today, you received medications for sedation.  These   medications may affect your judgment, balance and coordination.  Therefore,   for 24 hours, you have the following restrictions:   - DO NOT drive a car, operate machinery, make legal/financial decisions,   sign important papers or drink alcohol.    ACTIVITY:  Today: no heavy lifting, straining or running due to procedural   sedation/anesthesia.  The following day: return to full activity including work.  DIET:  Eat and drink normally unless instructed otherwise.     TREATMENT FOR COMMON SIDE EFFECTS:  - Mild abdominal pain, nausea, belching, bloating or excessive gas:  rest,   eat lightly and use a heating pad.  - Sore Throat: treat with throat lozenges and/or gargle with warm salt   water.  - Because air was used during the procedure, expelling large amounts of air   from your rectum or belching is normal.  - If a bowel prep was taken, you may not have a bowel movement for 1-3 days.    This is normal.  SYMPTOMS TO WATCH FOR AND REPORT TO YOUR PHYSICIAN:  1. Abdominal pain or bloating, other than gas cramps.  2. Chest pain.  3. Back pain.  4. Signs of infection such as: chills or fever occurring within 24 hours   after the procedure.  5. Rectal bleeding, which would show as bright red, maroon, or black stools.   (A tablespoon of blood from the rectum is not serious, especially if   hemorrhoids are present.)  6. Vomiting.  7. Weakness or dizziness.  GO DIRECTLY TO THE NEAREST EMERGENCY ROOM IF YOU HAVE ANY OF THE FOLLOWING:      Difficulty breathing              Chills and/or fever over 101 F   Persistent vomiting and/or vomiting blood   Severe abdominal pain   Severe chest pain   Black, tarry stools   Bleeding- more than one  tablespoon   Any other symptom or condition that you feel may need urgent attention  Your doctor recommends these additional instructions:  If any biopsies were taken, your doctors clinic will contact you in 1 to 2   weeks with any results.  - Discharge patient to home.   - Resume previous diet.   - Use sucralfate suspension 1 gram PO BID.   - Use Nexium (esomeprazole) 40 mg PO BID.   - Repeat upper endoscopy in 12 weeks for follow-up of Sterling's ablation.   - Await pathology results.  For questions, problems or results please call your physician - Uzair Cantor MD at Work:  [phone].  EMERGENCY PHONE NUMBER: [phone],  LAB RESULTS: [phone]  IF A COMPLICATION OR EMERGENCY SITUATION ARISES AND YOU ARE UNABLE TO REACH   YOUR PHYSICIAN - GO DIRECTLY TO THE EMERGENCY ROOM.  Uzair Cantor MD  6/4/2019 10:05:03 AM  This report has been verified and signed electronically.  [employer]

## 2019-06-05 VITALS
OXYGEN SATURATION: 100 % | DIASTOLIC BLOOD PRESSURE: 74 MMHG | HEART RATE: 54 BPM | BODY MASS INDEX: 23.6 KG/M2 | HEIGHT: 61 IN | TEMPERATURE: 98 F | SYSTOLIC BLOOD PRESSURE: 142 MMHG | WEIGHT: 125 LBS | RESPIRATION RATE: 24 BRPM

## 2019-06-06 ENCOUNTER — TELEPHONE (OUTPATIENT)
Dept: ENDOSCOPY | Facility: HOSPITAL | Age: 69
End: 2019-06-06

## 2019-06-06 DIAGNOSIS — K22.719 BARRETT'S ESOPHAGUS WITH DYSPLASIA: Primary | ICD-10-CM

## 2019-06-06 DIAGNOSIS — K22.70 BARRETT'S ESOPHAGUS WITHOUT DYSPLASIA: ICD-10-CM

## 2019-06-06 NOTE — TELEPHONE ENCOUNTER
----- Message from Uzair Cantor MD sent at 6/6/2019  4:23 PM CDT -----  Please let the patient know the biopsies of the esophagus showed Sterling's no dysplasia. EGD in 12 weeks.

## 2019-06-10 ENCOUNTER — TELEPHONE (OUTPATIENT)
Dept: ENDOSCOPY | Facility: HOSPITAL | Age: 69
End: 2019-06-10

## 2019-06-10 ENCOUNTER — CLINICAL SUPPORT (OUTPATIENT)
Dept: SMOKING CESSATION | Facility: CLINIC | Age: 69
End: 2019-06-10
Payer: COMMERCIAL

## 2019-06-10 DIAGNOSIS — F17.200 NICOTINE DEPENDENCE: Primary | ICD-10-CM

## 2019-06-10 DIAGNOSIS — K22.719 BARRETT'S ESOPHAGUS WITH DYSPLASIA: Primary | ICD-10-CM

## 2019-06-10 PROCEDURE — 99407 BEHAV CHNG SMOKING > 10 MIN: CPT | Mod: S$GLB,,,

## 2019-06-10 PROCEDURE — 99407 PR TOBACCO USE CESSATION INTENSIVE >10 MINUTES: ICD-10-PCS | Mod: S$GLB,,,

## 2019-06-10 NOTE — PROGRESS NOTES
Called pt to f/u on her 3 and 6 month smoking cessation quit status. Pt stated she is still smoking. Informed her she has benefits available and is able to rejoin. Pt not ready to make appointment. She will call back when ready. Informed her of benefit period, phone follow ups, and contact information. Will complete smart form and will continue to follow up on episode.

## 2019-06-12 ENCOUNTER — TELEPHONE (OUTPATIENT)
Dept: GASTROENTEROLOGY | Facility: CLINIC | Age: 69
End: 2019-06-12

## 2019-06-12 NOTE — TELEPHONE ENCOUNTER
Post procedure Instructions: Repeat upper endoscopy in 12 weeks for follow-up of Sterling's ablation. Spoke with patient and was able to schedule 12 week EGD on 8/21/19 with Dr. Cantor. Instructions were reviewed with pt and mailed out to home address. Verbal Understanding.               Patient stated that the Sucralfate makes her very gassy and extremely constipated. How long will she have to continue taking this medication?

## 2019-06-12 NOTE — TELEPHONE ENCOUNTER
EGD Prep Instructions    [facility] [address] Clinic Office [phone]  Endoscopy Lab [phone]    You are scheduled for an EGD with Dr. Cantor on 8/21/19 at [facility].  You will check in at Admit on the first floor of the hospital.    You may not have anything to eat after 7pm and nothing to drink after midnight.  You can drink clear liquids between 7pm and midnight.    You MAY take your blood pressure, heart, and seizure medication on the morning of the procedure, with a SIP of water.  Hold ALL other medications until after the procedure.    If you are on blood thinners THAT YOU HAVE BEEN INSTRUCTED TO HOLD BY YOUR DOCTOR FOR THIS PROCEDURE, then do NOT take this the morning of your EGD.  Do NOT stop these medications on your own, they must be approved to be held by your doctor.  Your EGD can NOT be done if you are on these medications.  Examples of blood thinners include: Coumadin, Aggrenox, Plavix, Pradaxa, Reapro, Pletal, Xarelto, Ticagrelor, Brilinta, Eliquis, and high dose aspirin (325 mg).  You do not have to stop baby aspirin 81 mg.

## 2019-06-24 ENCOUNTER — TELEPHONE (OUTPATIENT)
Dept: UROLOGY | Facility: CLINIC | Age: 69
End: 2019-06-24

## 2019-06-24 NOTE — TELEPHONE ENCOUNTER
----- Message from RT Francheska sent at 6/24/2019  2:48 PM CDT -----  Contact: pt    pt , requesting an appt to be worked in as soon as possible for urinary issues, thanks.

## 2019-06-24 NOTE — TELEPHONE ENCOUNTER
Spoke with patient she states she has been having some urinary frequency. Appointment scheduled with  on 7/9. Patient declined appointment with NP.

## 2019-07-09 ENCOUNTER — APPOINTMENT (OUTPATIENT)
Dept: LAB | Facility: HOSPITAL | Age: 69
End: 2019-07-09
Attending: NURSE PRACTITIONER
Payer: MEDICARE

## 2019-07-09 ENCOUNTER — OFFICE VISIT (OUTPATIENT)
Dept: UROLOGY | Facility: CLINIC | Age: 69
End: 2019-07-09
Payer: MEDICARE

## 2019-07-09 VITALS
SYSTOLIC BLOOD PRESSURE: 146 MMHG | HEART RATE: 60 BPM | WEIGHT: 126.31 LBS | TEMPERATURE: 98 F | BODY MASS INDEX: 23.85 KG/M2 | DIASTOLIC BLOOD PRESSURE: 66 MMHG | RESPIRATION RATE: 18 BRPM | HEIGHT: 61 IN

## 2019-07-09 DIAGNOSIS — N32.81 OAB (OVERACTIVE BLADDER): ICD-10-CM

## 2019-07-09 DIAGNOSIS — R30.0 DYSURIA: ICD-10-CM

## 2019-07-09 DIAGNOSIS — N39.41 URGE INCONTINENCE: ICD-10-CM

## 2019-07-09 DIAGNOSIS — R35.0 URINARY FREQUENCY: Primary | ICD-10-CM

## 2019-07-09 DIAGNOSIS — R39.89 SENSATION OF PRESSURE IN BLADDER AREA: ICD-10-CM

## 2019-07-09 LAB
BACTERIA #/AREA URNS HPF: NORMAL /HPF
BILIRUB SERPL-MCNC: ABNORMAL MG/DL
BILIRUB UR QL STRIP: NEGATIVE
BLOOD URINE, POC: ABNORMAL
CLARITY UR: CLEAR
COLOR UR: YELLOW
COLOR, POC UA: YELLOW
GLUCOSE UR QL STRIP: ABNORMAL
GLUCOSE UR QL STRIP: NEGATIVE
HGB UR QL STRIP: ABNORMAL
KETONES UR QL STRIP: ABNORMAL
KETONES UR QL STRIP: NEGATIVE
LEUKOCYTE ESTERASE UR QL STRIP: NEGATIVE
LEUKOCYTE ESTERASE URINE, POC: ABNORMAL
MICROSCOPIC COMMENT: NORMAL
NITRITE UR QL STRIP: NEGATIVE
NITRITE, POC UA: ABNORMAL
PH UR STRIP: 5 [PH] (ref 5–8)
PH, POC UA: 6
PROT UR QL STRIP: NEGATIVE
PROTEIN, POC: ABNORMAL
RBC #/AREA URNS HPF: 1 /HPF (ref 0–4)
SP GR UR STRIP: <=1.005 (ref 1–1.03)
SPECIFIC GRAVITY, POC UA: 1.01
SQUAMOUS #/AREA URNS HPF: 0 /HPF
URN SPEC COLLECT METH UR: ABNORMAL
UROBILINOGEN UR STRIP-ACNC: NEGATIVE EU/DL
UROBILINOGEN, POC UA: 0.2
WBC #/AREA URNS HPF: 0 /HPF (ref 0–5)

## 2019-07-09 PROCEDURE — 1101F PR PT FALLS ASSESS DOC 0-1 FALLS W/OUT INJ PAST YR: ICD-10-PCS | Mod: S$GLB,,, | Performed by: NURSE PRACTITIONER

## 2019-07-09 PROCEDURE — 81000 URINALYSIS NONAUTO W/SCOPE: CPT

## 2019-07-09 PROCEDURE — 3078F PR MOST RECENT DIASTOLIC BLOOD PRESSURE < 80 MM HG: ICD-10-PCS | Mod: S$GLB,,, | Performed by: NURSE PRACTITIONER

## 2019-07-09 PROCEDURE — 99999 PR PBB SHADOW E&M-EST. PATIENT-LVL V: CPT | Mod: PBBFAC,,, | Performed by: NURSE PRACTITIONER

## 2019-07-09 PROCEDURE — 99214 PR OFFICE/OUTPT VISIT, EST, LEVL IV, 30-39 MIN: ICD-10-PCS | Mod: 25,S$GLB,, | Performed by: NURSE PRACTITIONER

## 2019-07-09 PROCEDURE — 99214 OFFICE O/P EST MOD 30 MIN: CPT | Mod: 25,S$GLB,, | Performed by: NURSE PRACTITIONER

## 2019-07-09 PROCEDURE — 1101F PT FALLS ASSESS-DOCD LE1/YR: CPT | Mod: S$GLB,,, | Performed by: NURSE PRACTITIONER

## 2019-07-09 PROCEDURE — 81002 POCT URINE DIPSTICK WITHOUT MICROSCOPE: ICD-10-PCS | Mod: S$GLB,,, | Performed by: NURSE PRACTITIONER

## 2019-07-09 PROCEDURE — 99999 PR PBB SHADOW E&M-EST. PATIENT-LVL V: ICD-10-PCS | Mod: PBBFAC,,, | Performed by: NURSE PRACTITIONER

## 2019-07-09 PROCEDURE — 3077F SYST BP >= 140 MM HG: CPT | Mod: S$GLB,,, | Performed by: NURSE PRACTITIONER

## 2019-07-09 PROCEDURE — 3078F DIAST BP <80 MM HG: CPT | Mod: S$GLB,,, | Performed by: NURSE PRACTITIONER

## 2019-07-09 PROCEDURE — 87086 URINE CULTURE/COLONY COUNT: CPT

## 2019-07-09 PROCEDURE — 81002 URINALYSIS NONAUTO W/O SCOPE: CPT | Mod: S$GLB,,, | Performed by: NURSE PRACTITIONER

## 2019-07-09 PROCEDURE — 51701 INSERT BLADDER CATHETER: CPT | Mod: S$GLB,,, | Performed by: NURSE PRACTITIONER

## 2019-07-09 PROCEDURE — 3077F PR MOST RECENT SYSTOLIC BLOOD PRESSURE >= 140 MM HG: ICD-10-PCS | Mod: S$GLB,,, | Performed by: NURSE PRACTITIONER

## 2019-07-09 PROCEDURE — 51701 PR INSERTION OF NON-INDWELLING BLADDER CATHETERIZATION FOR RESIDUAL UR: ICD-10-PCS | Mod: S$GLB,,, | Performed by: NURSE PRACTITIONER

## 2019-07-09 RX ORDER — CIPROFLOXACIN 500 MG/1
500 TABLET ORAL 2 TIMES DAILY
Qty: 6 TABLET | Refills: 0 | Status: SHIPPED | OUTPATIENT
Start: 2019-07-09 | End: 2019-07-12

## 2019-07-09 RX ORDER — FLUCONAZOLE 150 MG/1
150 TABLET ORAL DAILY
Qty: 1 TABLET | Refills: 0 | Status: SHIPPED | OUTPATIENT
Start: 2019-07-09 | End: 2019-07-10

## 2019-07-09 NOTE — PROGRESS NOTES
[facility] Note  Staff: AMRITA BroderickC    PCP: Samantha Diaz    Chief Complaint: Urinary frequency, Hx of OAB and Urge incontinence    Subjective:        HPI: Kristin Guerra is a 69 y.o. female presents with bladder pressure, dysuria, discomfort and urinary frequency.    Pt was last seen by Dr. Rosanna Rivas on 18 for oab, incontinence, and recurrent UTIs.  Last ov ov with MD, pt was prescribed Myrbetriq 50 mg daily for her LUTS but states today that she is no longer taking medication.  After being on the med for 1-2 weeks it started to make her sick, therefore she stopped the medication.    PT'S  HX:  OAB and incontinence  -previously had seen  in . Urine cytology normal. Urine culture negative. Consider cyst  -she voids 10-15x a day. 2-3 pantyliners a day. Larger pad at night. She has mixed incontinence, IRAJ > UUI. . 2nd baby was almost 10 pounds. Denies any recurrent utis  -has never tried any medications for oab. Drinks 3 cups of coffee a day. 1 caffeine free diet coke a day. No tea. BM daily.   -sexually active      Microhematuria  -blood in urin in . No hx of stones.on no AC. Current smoker 1 ppd x 38. No gross hematuria. No uti's.       L flank pain  -likely musculoskeletal. Sees a neurologist and pain management doctor.  -she says the pain is worse when she lays on it  -ct scan 2018 showed no kidney stones or abnormalities  -urine sample today shows no UTI.       REVIEW OF SYSTEMS:  A comprehensive 10 system review was performed and is negative except as noted above in HPI    PMHx:  Past Medical History:   Diagnosis Date    Allergy     Sterling's esophagus     Colon cancer     Colon polyp     Diverticulitis     Diverticulosis     Fatty liver     GERD (gastroesophageal reflux disease)     Hx of cervical spine surgery 2018    Hyperlipidemia     Hypertension     Lupus (systemic lupus erythematosus)     discoid     Osteoarthritis     Osteoporosis     Tremor 2005    essential     PSHx:  Past Surgical History:   Procedure Laterality Date    APPENDECTOMY  2007    removed during colon surgery    CERVICAL FUSION       SECTION      COLON SURGERY   &     hemicolectomy for colon cancer and second was for complications from the first surgery    COLONOSCOPY N/A 2018    Performed [facility]ver[facility]quivel [facility] at [facility]    EGD (ESOPHAGOGASTRODUODENOSCOPY) N/A 2019    [facility]    EGD (ESOPHAGOGASTRODUODENOSCOPY) N/A 2019    [facility]formed by Monika Steele [facility] at [facility]    ESOPHAGOGASTRODUODENOSCOPY  2019    with RFA and biopsies    ESOPHAGOGASTRODUODENOSCOPY (EGD) N/A 2018    Performed by [facility] Steele [facility] at [facility]    HYSTERECTOMY      ovaries remain    SPLENECTOMY, TOTAL      Injured during surgery and removed    UPPER GASTROINTESTINAL ENDOSCOPY      GERD & hiatal hernia per patient report     Stents/Valves/Foreign Bodies: No  Cardiac Evaluation: No    Fam Hx:   malignancies: No , gyn malignancies: No   kidney stones: No     Allergies:  Compazine [prochlorperazine edisylate]; Lexapro [escitalopram oxalate]; Primidone; Prochlorperazine; Simvastatin; Topiramate; and Morphine    Medications: reviewed   Anticoagulation: No    Objective:     Vitals:    19 1310   BP: (!) 146/66   Pulse: 60   Resp: 18   Temp: 98.1 °F (36.7 °C)     Physical Exam   Vitals reviewed.  Constitutional: She is oriented to person, place, and time. She appears well-developed and well-nourished.   HENT:   Head: Normocephalic and atraumatic.   Eyes: Conjunctivae and EOM are normal. Pupils are equal, round, and reactive to light.   Neck: Normal range of motion. Neck supple.   Cardiovascular: Normal rate, regular rhythm, normal heart sounds and intact distal pulses.    Pulmonary/Chest: Effort normal and breath sounds normal.   Abdominal: Soft. Bowel sounds are  normal.   Musculoskeletal: Normal range of motion.   Neurological: She is alert and oriented to person, place, and time. She has normal reflexes.   Skin: Skin is warm and dry.     Psychiatric: She has a normal mood and affect. Her behavior is normal. Judgment and thought content normal.      Exam TODAY by me:  An 14 Fr red rubber in and out cath was used for cath with >150 mL of residual urine collected.  White discharge observed at opening but unsure whether it is remains of premarin cream or a yeast infection at this time.  +Caruncle observed.    Pelvic exam done by Dr. Rivas on 04/19/18:  External Genitalia: normal hair distribution, no lesions  Urethral meatus: normal without prolapse +caruncle  Urethra: without tenderness or mass  Bladder: without fulness or tenderness  Vagina: normal appearing. No discharge or lesions. no prolapse. +atrophic vaginitis with some fusing of inferior labia  Anus and perineum: appear normal     Pelvic exam done by MD on 04/19/18:  negative stress test with coughing supine, negative stress test with valsalva supine  In and out cath performed with 5 residual - urine was sent ua and cytology  Bladder filled to 150 cc  Sensation to void felt at 50 cc  Catheter removed  negativestress test with coughing supine, negative stress test with valsalva supine  Negative stress test with coughing or valsalva  Standing  No prolapse noted     LABS REVIEW:  UA today:   Color:Clear, Yellow  Spec. Grav.  1.010  PH  6.0  Negative for leukocytes, nitrates, protein, glucose, ketones, urobili, bili, and blood.    Assessment:       1. Urinary frequency    2. OAB (overactive bladder)    3. Urge incontinence    4. Sensation of pressure in bladder area    5. Dysuria          Plan:     UA, Urine culture, UA microscopic to be performed.  RBUS to be performed.    F/u--due to pt missing her prior f/up appts, it was mentioned by MD at one time that pt may need further evaluation with cystoscopy which was  never done.  Therefore pt needs to f/up with MD in near future for further evaluation of her pain.    [place]: Pt Declined    Melissa Mejia, AMRITAC

## 2019-07-09 NOTE — PATIENT INSTRUCTIONS
Dysuria with Uncertain Cause (Adult)    The urethra is the tube that allows urine to pass out of the body. In a woman, the urethra is the opening above the vagina. In men, the urethra is the opening on the tip of the penis. Dysuria is the feeling of pain or burning in the urethra when passing urine.  Dysuria can be caused by anything that irritates or inflames the urethra. An infection or chemical irritation can cause this reaction. A bladder infection is the most common cause of dysuria in adults. A urine test can diagnose this. A bladder infection needs antibiotic treatment.  Soaps, lotions, colognes and feminine hygiene products can cause dysuria. So can birth control jellies, creams, and foams. It will go away 1 to 3 days after using these irritants.  Sexually transmitted diseases (STDs) such as chlamydia or gonorrhea can cause dysuria. Your healthcare provider may take a culture sample. Your provider may start you on antibiotic medicine before the culture test returns.  In women who have gone through menopause, dysuria can be from dryness in the lining of the urethra. This can be treated with hormones. Dysuria becomes long-term (chronic) when it lasts for weeks or months. You may need to see a specialist (urologist) to diagnose and treat chronic dysuria.  Home care  These home care tips may help:  · Don't use any chemicals or products that you think may be causing your symptoms.  · If you were given a prescription medicine, take as directed. Be sure to take it until it is all used up.  · If a culture was taken, don't have sex until you have been told that it is negative. This means you don't have an infection. Then follow your healthcare provider's advice to treat your condition.  If a culture was done and it is positive:  · Both you and your sexual partner may need to be treated. This is true even if your partner has no symptoms.  · Contact your healthcare provider or go to an urgent care clinic or the  public health department to be looked at and treated.  · Don't have sex until both you and your partner(s) have finished all antibiotics and your healthcare provider says you are no longer contagious.  · Learn about and use safe sex practices. The safest sex is with a partner who has tested negative and only has sex with you. Condoms can prevent STDs from spreading, but they aren't a guarantee.  Follow-up care  Follow up with your healthcare provider, or as advised. If a culture was taken, you may call as directed for the results. If you have an STD, follow up with your provider or the public health department for a complete STD screening, including HIV testing. For more information, contact CDC-INFO at 673-336-6833.  When to seek medical advice  Call your healthcare provider right away if any of these occur:  · You aren't better after 3 days of treatment  · Fever of 100.4ºF (38ºC) or higher, or as directed by your healthcare provider  · Back or belly pain that gets worse  · You can't urinate because of pain  · New discharge from the urethra, vagina, or penis  · Painful sores on the penis  · Rash or joint pain  · Painful lumps (lymph nodes) in the groin  · Testicle pain or swelling of the scrotum  Date Last Reviewed: 11/1/2016 © 2000-2017 [employer]. [address] [place] [address]. All rights reserved. This information is not intended as a substitute for professional medical care. Always follow your healthcare professional's instructions.        Overactive Bladder Syndrome (OAB)     Normally, urine stays in the bladder until a person decides to release it. With OAB, the bladder muscles contract involuntarily, causing a sudden urge to urinate and even urine leakage.   When the bladder muscle contract or squeeze involuntarily, it is called overactive bladder syndrome. This causes an intense urge to urinate, known as urgency. Urgency can occur many times during the day and night. If urine  leaks with the urgency, it is called urge incontinence.  A disease that affects the bladder nerves, such as multiple sclerosis, can cause overactive bladder syndrome. Other conditions, such as urinary tract infection (UTI) or prostate problems in men, can also lead to OAB. But the exact cause is often not known.  How is overactive bladder syndrome diagnosed?  Your healthcare provider will examine you and ask about your symptoms and health history. You may also have one or more of the following:  · Urine test to take samples of urine and have them checked for problems.  · Urinary diary to record how much fluid you take in and urinate out in a 3 day period.  · Bladder ultrasound to study the bladder as it empties. Ultrasound uses sound waves to create detailed images of the inside of the body.  · Cystoscopy to allow the healthcare provider to look for problems in the urinary tract. The test uses a thin, flexible scope called a cystoscope with a light and camera on the end. The scope is inserted into the urethra (the tube that carries urine out of the body).  · Urodynamic studies, a battery of tests designed to measure and record many aspects of urinary bladder function, including pressures, volume, and urine flow.  How is overactive bladder syndrome treated?  Treatment depends on the cause and severity of your OAB. Treatments may include the following:  · Changing urination habits may be suggested. For instance, your healthcare provider may suggest that you urinate as soon as you feel the urge. You may also need to limit how much fluid you have during the day.  · Exercising your pelvic muscles can help strengthen muscles used during urination. These exercises are called Kegels. They involve maite as if you were stopping your urine stream and tightening your rectum as if trying not to pass gas. Your healthcare provider can help you learn how to do Kegels.  · Biofeedback to help you learn to control the movement  of your bladder muscles. Sensors are placed on your abdomen. They turn signals given off by your muscles into lines on a computer screen.  · Medicine may be given to relax the bladder muscle. Medicine can also help ease bladder contractions, which reduces the urge to urinate.  · Neuromodulation may be done if medicine and behavioral changes dont work. Electrical pulses are sent to the sacral nerves (nerves that affect the pelvic area). These pulses help relieve OAB and urge incontinence.  · Surgery to make the bladder larger may be done in severe cases.  With treatment, OAB can be managed. A condition, such as UTI, that has caused you to have OAB will be treated. Treatment may involve taking medicine for months or years. You may also need to make changes in your daily routine. This may include going to the bathroom more often than you think you need to. Or, you may need to cut back on caffeine and alcohol because these can make symptoms worse. Your healthcare provider can tell you more.     When to call your healthcare provider  Call the healthcare provider right away if you have any of the following:  · Fever of 100.4°F (38.0 °C) or higher   · No improvement with treatment  · Trouble urinating because of pain  · Back or abdominal pain   Date Last Reviewed: 1/1/2017  © 4802-2456 [employer]. [address] [place] [address], [place] [address]. All rights reserved. This information is not intended as a substitute for professional medical care. Always follow your healthcare professional's instructions.

## 2019-07-10 ENCOUNTER — HOSPITAL ENCOUNTER (OUTPATIENT)
Dept: RADIOLOGY | Facility: CLINIC | Age: 69
Discharge: HOME | End: 2019-07-10
Attending: NURSE PRACTITIONER
Payer: MEDICARE

## 2019-07-10 DIAGNOSIS — N32.81 OAB (OVERACTIVE BLADDER): ICD-10-CM

## 2019-07-10 DIAGNOSIS — N39.41 URGE INCONTINENCE: ICD-10-CM

## 2019-07-10 DIAGNOSIS — R35.0 URINARY FREQUENCY: ICD-10-CM

## 2019-07-10 DIAGNOSIS — R39.89 SENSATION OF PRESSURE IN BLADDER AREA: ICD-10-CM

## 2019-07-10 DIAGNOSIS — R30.0 DYSURIA: ICD-10-CM

## 2019-07-10 LAB — BACTERIA UR CULT: NO GROWTH

## 2019-07-10 PROCEDURE — 76770 US RETROPERITONEAL COMPLETE: ICD-10-PCS | Mod: 26,,, | Performed by: RADIOLOGY

## 2019-07-10 PROCEDURE — 76770 US EXAM ABDO BACK WALL COMP: CPT | Mod: 26,,, | Performed by: RADIOLOGY

## 2019-07-10 PROCEDURE — 76770 US EXAM ABDO BACK WALL COMP: CPT | Mod: TC,PO

## 2019-07-18 ENCOUNTER — DOCUMENTATION ONLY (OUTPATIENT)
Dept: FAMILY MEDICINE | Facility: CLINIC | Age: 69
End: 2019-07-18

## 2019-07-18 NOTE — PROGRESS NOTES
Pre-Visit Chart Review  For Appointment Scheduled on 7/22/19    Health Maintenance Due   Topic Date Due    Pneumococcal Vaccine (65+ Low/Medium Risk) (1 of 2 - PCV13) 01/27/2016

## 2019-07-22 ENCOUNTER — OFFICE VISIT (OUTPATIENT)
Dept: FAMILY MEDICINE | Facility: CLINIC | Age: 69
End: 2019-07-22
Payer: MEDICARE

## 2019-07-22 VITALS
BODY MASS INDEX: 23.52 KG/M2 | HEART RATE: 66 BPM | HEIGHT: 61 IN | DIASTOLIC BLOOD PRESSURE: 68 MMHG | TEMPERATURE: 98 F | OXYGEN SATURATION: 96 % | SYSTOLIC BLOOD PRESSURE: 136 MMHG | WEIGHT: 124.56 LBS

## 2019-07-22 DIAGNOSIS — F32.A ANXIETY AND DEPRESSION: ICD-10-CM

## 2019-07-22 DIAGNOSIS — Z76.0 MEDICATION REFILL: ICD-10-CM

## 2019-07-22 DIAGNOSIS — M54.12 CERVICAL RADICULOPATHY: Primary | ICD-10-CM

## 2019-07-22 DIAGNOSIS — F41.9 ANXIETY AND DEPRESSION: ICD-10-CM

## 2019-07-22 PROCEDURE — 1101F PR PT FALLS ASSESS DOC 0-1 FALLS W/OUT INJ PAST YR: ICD-10-PCS | Mod: S$GLB,,, | Performed by: FAMILY MEDICINE

## 2019-07-22 PROCEDURE — 99214 PR OFFICE/OUTPT VISIT, EST, LEVL IV, 30-39 MIN: ICD-10-PCS | Mod: S$GLB,,, | Performed by: FAMILY MEDICINE

## 2019-07-22 PROCEDURE — 99214 OFFICE O/P EST MOD 30 MIN: CPT | Mod: S$GLB,,, | Performed by: FAMILY MEDICINE

## 2019-07-22 PROCEDURE — 99999 PR PBB SHADOW E&M-EST. PATIENT-LVL III: CPT | Mod: PBBFAC,,, | Performed by: FAMILY MEDICINE

## 2019-07-22 PROCEDURE — 3078F PR MOST RECENT DIASTOLIC BLOOD PRESSURE < 80 MM HG: ICD-10-PCS | Mod: S$GLB,,, | Performed by: FAMILY MEDICINE

## 2019-07-22 PROCEDURE — 3078F DIAST BP <80 MM HG: CPT | Mod: S$GLB,,, | Performed by: FAMILY MEDICINE

## 2019-07-22 PROCEDURE — 99999 PR PBB SHADOW E&M-EST. PATIENT-LVL III: ICD-10-PCS | Mod: PBBFAC,,, | Performed by: FAMILY MEDICINE

## 2019-07-22 PROCEDURE — 3075F PR MOST RECENT SYSTOLIC BLOOD PRESS GE 130-139MM HG: ICD-10-PCS | Mod: S$GLB,,, | Performed by: FAMILY MEDICINE

## 2019-07-22 PROCEDURE — 3075F SYST BP GE 130 - 139MM HG: CPT | Mod: S$GLB,,, | Performed by: FAMILY MEDICINE

## 2019-07-22 PROCEDURE — 1101F PT FALLS ASSESS-DOCD LE1/YR: CPT | Mod: S$GLB,,, | Performed by: FAMILY MEDICINE

## 2019-07-22 RX ORDER — DULOXETIN HYDROCHLORIDE 30 MG/1
30 CAPSULE, DELAYED RELEASE ORAL DAILY
Qty: 30 CAPSULE | Refills: 1 | Status: SHIPPED | OUTPATIENT
Start: 2019-07-22 | End: 2019-09-04

## 2019-07-22 RX ORDER — GABAPENTIN 300 MG/1
300 CAPSULE ORAL 2 TIMES DAILY
Qty: 60 CAPSULE | Refills: 3 | Status: SHIPPED | OUTPATIENT
Start: 2019-07-22 | End: 2020-05-28 | Stop reason: SDUPTHER

## 2019-07-22 RX ORDER — TRAZODONE HYDROCHLORIDE 100 MG/1
100 TABLET ORAL NIGHTLY
Qty: 90 TABLET | Refills: 1 | Status: SHIPPED | OUTPATIENT
Start: 2019-07-22 | End: 2020-05-28

## 2019-07-22 NOTE — PROGRESS NOTES
Subjective:       Patient ID: Kristin Guerra is a 69 y.o. female.    Chief Complaint: Follow-up    HPI    Mrs. Guerra presents to clinic for f/u. Discussed holter test. Had some episodes of tachycardia. Is being seen by her cardiologist. Denies feeling faint or dizzy at the moment.     Main issue is that she feels depressed.   Feels like days she just wants to cry. All she does is go tot he doctor. Denies suicidal or homicidal ideation. Has chronic pain for cervical radiculopathy    Past Medical History:   Diagnosis Date    Allergy     Sterling's esophagus     Colon cancer     Colon polyp     Diverticulitis     Diverticulosis     Fatty liver     GERD (gastroesophageal reflux disease)     Hx of cervical spine surgery 2018    Hyperlipidemia     Hypertension     Lupus (systemic lupus erythematosus)     discoid    Osteoarthritis     Osteoporosis     Tremor     essential       Past Surgical History:   Procedure Laterality Date    APPENDECTOMY      removed during colon surgery    CERVICAL FUSION       SECTION      COLON SURGERY   &     hemicolectomy for colon cancer and second was for complications from the first surgery    COLONOSCOPY N/A 2018    [facility] ENDO    EGD (ESOPHAGOGASTRODUODENOSCOPY) N/A 2019    [facility]    EGD (ESOPHAGOGASTRODUODENOSCOPY) N/A 2019    [facility] ENDO    ESOPHAGOGASTRODUODENOSCOPY  2019    with RFA and biopsies    ESOPHAGOGASTRODUODENOSCOPY (EGD) N/A 2018    [facility] at [facility] ENDO    HYSTERECTOMY      ovaries remain    SPLENECTOMY, TOTAL      Injured during surgery and removed    UPPER GASTROINTESTINAL ENDOSCOPY      GERD & hiatal hernia per patient report       Family History   Problem Relation Age of Onset    Colon cancer Father         diagnosed in his 60's    Lung cancer  Father     Lymphoma Mother         Brain    Lung cancer Brother     Lung cancer Brother         metastatic from prostate    Prostate cancer Brother     Heart disease Brother         valvular disease    Hyperlipidemia Brother     Eczema Neg Hx     Lupus Neg Hx     Psoriasis Neg Hx     Melanoma Neg Hx     Crohn's disease Neg Hx     Ulcerative colitis Neg Hx     Stomach cancer Neg Hx     Esophageal cancer Neg Hx        Social History     Tobacco Use    Smoking status: Current Every Day Smoker     Packs/day: 0.50     Years: 35.00     Pack years: 17.50     Types: Cigarettes    Smokeless tobacco: Never Used   Substance Use Topics    Alcohol use: No    Drug use: No       Social History     Substance and Sexual Activity   Sexual Activity Yes    Partners: Male    Birth control/protection: Post-menopausal          Current Outpatient Medications:     aspirin 81 MG Chew, aspirin 81 mg chewable tablet  Chew 1 tablet every day by oral route., Disp: , Rfl:     azelastine (ASTELIN) 137 mcg (0.1 %) nasal spray, 1 spray (137 mcg total) by Nasal route 2 (two) times daily., Disp: 30 mL, Rfl: 11    calcium carbonate (TUMS) 200 mg calcium (500 mg) chewable tablet, Take 1 tablet by mouth 2 (two) times daily as needed for Heartburn., Disp: , Rfl:     conjugated estrogens (PREMARIN) vaginal cream, Premarin 0.625 mg/gram vaginal cream  Insert 1 gram by vaginal route once daily for 30days then 2-3times per week, Disp: , Rfl:     esomeprazole (NEXIUM) 40 MG capsule, Take 1 capsule (40 mg total) by mouth 2 (two) times daily before meals., Disp: 60 capsule, Rfl: 2    fluticasone (FLONASE) 50 mcg/actuation nasal spray, Inhale 1 spray into the lungs once daily once daily., Disp: , Rfl:     gabapentin (NEURONTIN) 300 MG capsule, Take 1 capsule (300 mg total) by mouth once daily., Disp: 30 capsule, Rfl: 6    LORazepam (ATIVAN) 0.5 MG tablet, Take 1 tablet (0.5 mg total) by mouth daily as needed., Disp: 30 tablet, Rfl:  "3    NIFEdipine (ADALAT CC) 30 MG TbSR, nifedipine ER 30 mg tablet,extended release  Take 1 tablet every day by oral route., Disp: , Rfl:     polyethylene glycol (GLYCOLAX) 17 gram/dose powder, Take 17 g by mouth daily as needed., Disp: , Rfl:     sucralfate (CARAFATE) 1 gram tablet, 1 tablet dissolved in 10 ml of water orally twice a day., Disp: 60 tablet, Rfl: 2    traZODone (DESYREL) 100 MG tablet, Take 1 tablet (100 mg total) by mouth nightly., Disp: 90 tablet, Rfl: 1     Review of patient's allergies indicates:   Allergen Reactions    Compazine [prochlorperazine edisylate] Nausea Only    Lexapro [escitalopram oxalate]      Worsening tremor, and sedation    Primidone Other (See Comments)     Could not function    Prochlorperazine     Simvastatin     Topiramate     Morphine Swelling and Rash            Review of Systems   Constitutional: Negative for chills and fever.   HENT: Negative for congestion and sore throat.    Eyes: Negative for visual disturbance.   Respiratory: Negative for cough and shortness of breath.    Cardiovascular: Negative for chest pain.   Gastrointestinal: Negative for abdominal pain, constipation, diarrhea, nausea and vomiting.   Genitourinary: Negative for dysuria.   Musculoskeletal: Negative for joint swelling.   Skin: Negative for rash and wound.   Neurological: Negative for dizziness and headaches.   Hematological: Does not bruise/bleed easily.   Psychiatric/Behavioral: Positive for dysphoric mood.           Objective:          Vitals:    07/22/19 0910   BP: 136/68   Pulse: 66   Temp: 97.9 °F (36.6 °C)   SpO2: 96%   Weight: 56.5 kg (124 lb 9 oz)   Height: 5' 1" (1.549 m)       Physical Exam   Constitutional: She appears well-developed and well-nourished.   HENT:   Head: Normocephalic and atraumatic.   Eyes: Conjunctivae are normal.   Cardiovascular: Normal rate and regular rhythm.   Pulmonary/Chest: Effort normal and breath sounds normal.   Neurological: She is alert. "   Skin: Skin is warm.   Psychiatric: She has a normal mood and affect. Her behavior is normal.   Vitals reviewed.              Assessment/Plan     Kristin was seen today for follow-up.    Diagnoses and all orders for this visit:    Cervical radiculopathy  -     gabapentin (NEURONTIN) 300 MG capsule; Take 1 capsule (300 mg total) by mouth 2 (two) times daily.  -     DULoxetine (CYMBALTA) 30 MG capsule; Take 1 capsule (30 mg total) by mouth once daily.    Anxiety and depression  -     DULoxetine (CYMBALTA) 30 MG capsule; Take 1 capsule (30 mg total) by mouth once daily.    Medication refill  -     traZODone (DESYREL) 100 MG tablet; Take 1 tablet (100 mg total) by mouth nightly.          Follow up in about 6 weeks (around 9/2/2019) for anxiety and depresison. body pain. .    Future Appointments   Date Time Provider Department Center   9/5/2019  2:00 PM Rosanna Rivas MD [facility] UROLOGY [place]   9/16/2019  8:40 AM Samantha Diaz MD [facility] [place]       Patient readiness: acceptance and barriers:none    Patient Instructions (the written plan) was given to the patient/family.     Time spent with patient: 30 minutes    Barriers to medications present (no )    Adverse reactions to current medications (no)    Over the counter medications reviewed (No)    Samantha Diaz MD  [facility]

## 2019-07-22 NOTE — PATIENT INSTRUCTIONS
Duloxetine delayed-release capsules  What is this medicine?  DULOXETINE (doo LOX e teen) is used to treat depression, anxiety, and different types of chronic pain.  How should I use this medicine?  Take this medicine by mouth with a glass of water. Follow the directions on the prescription label. Do not cut, crush or chew this medicine. You can take this medicine with or without food. Take your medicine at regular intervals. Do not take your medicine more often than directed. Do not stop taking this medicine suddenly except upon the advice of your doctor. Stopping this medicine too quickly may cause serious side effects or your condition may worsen.  A special MedGuide will be given to you by the pharmacist with each prescription and refill. Be sure to read this information carefully each time.  Talk to your pediatrician regarding the use of this medicine in children. While this drug may be prescribed for children as young as 7 years of age for selected conditions, precautions do apply.  What side effects may I notice from receiving this medicine?  Side effects that you should report to your doctor or health care professional as soon as possible:  · allergic reactions like skin rash, itching or hives, swelling of the face, lips, or tongue  · changes in blood pressure  · confusion  · dark urine  · dizziness  · fast talking and excited feelings or actions that are out of control  · fast, irregular heartbeat  · fever  · general ill feeling or flu-like symptoms  · hallucination, loss of contact with reality  · light-colored stools  · loss of balance or coordination  · redness, blistering, peeling or loosening of the skin, including inside the mouth  · right upper belly pain  · seizures  · suicidal thoughts or other mood changes  · trouble concentrating  · trouble passing urine or change in the amount of urine  · unusual bleeding or bruising  · unusually weak or tired  · yellowing of the eyes or skin  Side effects that  usually do not require medical attention (report to your doctor or health care professional if they continue or are bothersome):  · blurred vision  · change in appetite  · change in sex drive or performance  · headache  · increased sweating  · nausea  What may interact with this medicine?  Do not take this medicine with any of the following medications:  · certain diet drugs like dexfenfluramine, fenfluramine  · desvenlafaxine  · linezolid  · MAOIs like Azilect, Carbex, Eldepryl, Marplan, Nardil, and Parnate  · methylene blue (intravenous)  · milnacipran  · thioridazine  · venlafaxine  This medicine may also interact with the following medications:  · alcohol  · aspirin and aspirin-like medicines  · certain antibiotics like ciprofloxacin and enoxacin  · certain medicines for blood pressure, heart disease, irregular heart beat  · certain medicines for depression, anxiety, or psychotic disturbances  · certain medicines for migraine headache like almotriptan, eletriptan, frovatriptan, naratriptan, rizatriptan, sumatriptan, zolmitriptan  · certain medicines that treat or prevent blood clots like warfarin, enoxaparin, and dalteparin  · cimetidine  · fentanyl  · lithium  · NSAIDS, medicines for pain and inflammation, like ibuprofen or naproxen  · phentermine  · procarbazine  · sibutramine  · Ryne's wort  · theophylline  · tramadol  · tryptophan  What if I miss a dose?  If you miss a dose, take it as soon as you can. If it is almost time for your next dose, take only that dose. Do not take double or extra doses.  Where should I keep my medicine?  Keep out of the reach of children.  Store at room temperature between 20 and 25 degrees C (68 to 77 degrees F). Throw away any unused medicine after the expiration date.  What should I tell my health care provider before I take this medicine?  They need to know if you have any of these conditions:  · bipolar disorder or a family history of bipolar  disorder  · glaucoma  · kidney disease  · liver disease  · suicidal thoughts or a previous suicide attempt  · taken medicines called MAOIs like Carbex, Eldepryl, Marplan, Nardil, and Parnate within 14 days  · an unusual reaction to duloxetine, other medicines, foods, dyes, or preservatives  · pregnant or trying to get pregnant  · breast-feeding  What should I watch for while using this medicine?  Tell your doctor if your symptoms do not get better or if they get worse. Visit your doctor or health care professional for regular checks on your progress. Because it may take several weeks to see the full effects of this medicine, it is important to continue your treatment as prescribed by your doctor.  Patients and their families should watch out for new or worsening thoughts of suicide or depression. Also watch out for sudden changes in feelings such as feeling anxious, agitated, panicky, irritable, hostile, aggressive, impulsive, severely restless, overly excited and hyperactive, or not being able to sleep. If this happens, especially at the beginning of treatment or after a change in dose, call your health care professional.  You may get drowsy or dizzy. Do not drive, use machinery, or do anything that needs mental alertness until you know how this medicine affects you. Do not stand or sit up quickly, especially if you are an older patient. This reduces the risk of dizzy or fainting spells. Alcohol may interfere with the effect of this medicine. Avoid alcoholic drinks.  This medicine can cause an increase in blood pressure. This medicine can also cause a sudden drop in your blood pressure, which may make you feel faint and increase the chance of a fall. These effects are most common when you first start the medicine or when the dose is increased, or during use of other medicines that can cause a sudden drop in blood pressure. Check with your doctor for instructions on monitoring your blood pressure while taking this  medicine.  Your mouth may get dry. Chewing sugarless gum or sucking hard candy, and drinking plenty of water may help. Contact your doctor if the problem does not go away or is severe.  NOTE:This sheet is a summary. It may not cover all possible information. If you have questions about this medicine, talk to your doctor, pharmacist, or health care provider. Copyright© 2017 [employer]

## 2019-07-29 ENCOUNTER — TELEPHONE (OUTPATIENT)
Dept: FAMILY MEDICINE | Facility: CLINIC | Age: 69
End: 2019-07-29

## 2019-07-29 NOTE — TELEPHONE ENCOUNTER
----- Message from Mery Cannon sent at 7/29/2019 12:17 PM CDT -----   Type: Needs Medical Advice    Who Called: pt  Symptoms (please be specific):    Sleeping a lot // and  Stated she is  Taking  gabapentine that  Making her sleep ?  Spitting up  Brown , and  Pt is a  Smoker   How long has patient had these symptoms: a  Couple   A weeks   Best Call Back Number:[phone]  Additional Information:  Pt   Stated she wants to  See a pulmonologist   For  Sleeping a lot ?   Needs adv ice on  Where to  Go  From here

## 2019-08-01 ENCOUNTER — LAB VISIT (OUTPATIENT)
Dept: LAB | Facility: HOSPITAL | Age: 69
End: 2019-08-01
Attending: INTERNAL MEDICINE
Payer: MEDICARE

## 2019-08-01 ENCOUNTER — PATIENT OUTREACH (OUTPATIENT)
Dept: ADMINISTRATIVE | Facility: HOSPITAL | Age: 69
End: 2019-08-01

## 2019-08-01 DIAGNOSIS — Z00.00 ROUTINE GENERAL MEDICAL EXAMINATION AT A HEALTH CARE FACILITY: ICD-10-CM

## 2019-08-01 DIAGNOSIS — R49.0 DYSPHONIA OF ORGANIC TREMOR: ICD-10-CM

## 2019-08-01 DIAGNOSIS — I10 ESSENTIAL HYPERTENSION, MALIGNANT: Primary | ICD-10-CM

## 2019-08-01 DIAGNOSIS — R25.1 DYSPHONIA OF ORGANIC TREMOR: ICD-10-CM

## 2019-08-01 DIAGNOSIS — R55 SYNCOPE AND COLLAPSE: ICD-10-CM

## 2019-08-01 LAB
ALBUMIN SERPL BCP-MCNC: 3.8 G/DL (ref 3.5–5.2)
ALP SERPL-CCNC: 58 U/L (ref 55–135)
ALT SERPL W/O P-5'-P-CCNC: 14 U/L (ref 10–44)
ANION GAP SERPL CALC-SCNC: 6 MMOL/L (ref 8–16)
AST SERPL-CCNC: 19 U/L (ref 10–40)
BILIRUB SERPL-MCNC: 0.6 MG/DL (ref 0.1–1)
BUN SERPL-MCNC: 14 MG/DL (ref 8–23)
CALCIUM SERPL-MCNC: 9.1 MG/DL (ref 8.7–10.5)
CHLORIDE SERPL-SCNC: 109 MMOL/L (ref 95–110)
CHOLEST SERPL-MCNC: 226 MG/DL (ref 120–199)
CHOLEST/HDLC SERPL: 4.3 {RATIO} (ref 2–5)
CO2 SERPL-SCNC: 28 MMOL/L (ref 23–29)
CREAT SERPL-MCNC: 0.7 MG/DL (ref 0.5–1.4)
EST. GFR  (AFRICAN AMERICAN): >60 ML/MIN/1.73 M^2
EST. GFR  (NON AFRICAN AMERICAN): >60 ML/MIN/1.73 M^2
GLUCOSE SERPL-MCNC: 91 MG/DL (ref 70–110)
HDLC SERPL-MCNC: 52 MG/DL (ref 40–75)
HDLC SERPL: 23 % (ref 20–50)
LDLC SERPL CALC-MCNC: 156.4 MG/DL (ref 63–159)
NONHDLC SERPL-MCNC: 174 MG/DL
POTASSIUM SERPL-SCNC: 3.9 MMOL/L (ref 3.5–5.1)
PROT SERPL-MCNC: 7.8 G/DL (ref 6–8.4)
SODIUM SERPL-SCNC: 143 MMOL/L (ref 136–145)
TRIGL SERPL-MCNC: 88 MG/DL (ref 30–150)

## 2019-08-01 PROCEDURE — 80061 LIPID PANEL: CPT

## 2019-08-01 PROCEDURE — 80053 COMPREHEN METABOLIC PANEL: CPT

## 2019-08-19 ENCOUNTER — TELEPHONE (OUTPATIENT)
Dept: ENDOSCOPY | Facility: HOSPITAL | Age: 69
End: 2019-08-19

## 2019-08-19 NOTE — TELEPHONE ENCOUNTER
Spoke with patient about arrival time @ 0815    NPO status reviewed: Patient may eat until 7:00pm.  After 7pm, pt may have CLEAR liquids ONLY until completely NPO at Midnight.    Medications: Do not take Insulin or oral diabetic medications the day of the procedure.    Take as prescribed: heart, seizure and blood pressure medication in the morning with a sip of water (less than an ounce).  Take any breathing medications and bring inhalers to hospital with you.     Leave all valuables and jewelry at home. Wear comfortable clothes to procedure to change into hospital gown.   You cannot drive for 24 hours after your procedure because you will receive sedation for your procedure to make you comfortable.    A ride must be provided at discharge.

## 2019-08-21 ENCOUNTER — ANESTHESIA (OUTPATIENT)
Dept: ENDOSCOPY | Facility: HOSPITAL | Age: 69
End: 2019-08-21
Payer: MEDICARE

## 2019-08-21 ENCOUNTER — HOSPITAL ENCOUNTER (OUTPATIENT)
Facility: HOSPITAL | Age: 69
Discharge: HOME | End: 2019-08-21
Attending: INTERNAL MEDICINE | Admitting: INTERNAL MEDICINE
Payer: MEDICARE

## 2019-08-21 ENCOUNTER — ANESTHESIA EVENT (OUTPATIENT)
Dept: ENDOSCOPY | Facility: HOSPITAL | Age: 69
End: 2019-08-21
Payer: MEDICARE

## 2019-08-21 VITALS
SYSTOLIC BLOOD PRESSURE: 149 MMHG | TEMPERATURE: 98 F | RESPIRATION RATE: 18 BRPM | DIASTOLIC BLOOD PRESSURE: 69 MMHG | HEIGHT: 61 IN | OXYGEN SATURATION: 99 % | HEART RATE: 60 BPM | WEIGHT: 127 LBS | BODY MASS INDEX: 23.98 KG/M2

## 2019-08-21 DIAGNOSIS — K22.719 BARRETT'S ESOPHAGUS WITH DYSPLASIA: Primary | ICD-10-CM

## 2019-08-21 PROCEDURE — 63600175 PHARM REV CODE 636 W HCPCS: Performed by: STUDENT IN AN ORGANIZED HEALTH CARE EDUCATION/TRAINING PROGRAM

## 2019-08-21 PROCEDURE — 43235 EGD DIAGNOSTIC BRUSH WASH: CPT | Mod: 52,,, | Performed by: INTERNAL MEDICINE

## 2019-08-21 PROCEDURE — 37000009 HC ANESTHESIA EA ADD 15 MINS: Performed by: INTERNAL MEDICINE

## 2019-08-21 PROCEDURE — 43235 EGD DIAGNOSTIC BRUSH WASH: CPT | Mod: 53 | Performed by: INTERNAL MEDICINE

## 2019-08-21 PROCEDURE — 63600175 PHARM REV CODE 636 W HCPCS: Performed by: INTERNAL MEDICINE

## 2019-08-21 PROCEDURE — 43235 PR EGD, FLEX, DIAGNOSTIC: ICD-10-PCS | Mod: 52,,, | Performed by: INTERNAL MEDICINE

## 2019-08-21 PROCEDURE — 37000008 HC ANESTHESIA 1ST 15 MINUTES: Performed by: INTERNAL MEDICINE

## 2019-08-21 RX ORDER — SODIUM CHLORIDE 0.9 % (FLUSH) 0.9 %
10 SYRINGE (ML) INJECTION
Status: DISCONTINUED | OUTPATIENT
Start: 2019-08-21 | End: 2019-08-21 | Stop reason: HOSPADM

## 2019-08-21 RX ORDER — PROPOFOL 10 MG/ML
VIAL (ML) INTRAVENOUS
Status: DISCONTINUED | OUTPATIENT
Start: 2019-08-21 | End: 2019-08-21

## 2019-08-21 RX ORDER — SODIUM CHLORIDE 9 MG/ML
INJECTION, SOLUTION INTRAVENOUS CONTINUOUS
Status: DISCONTINUED | OUTPATIENT
Start: 2019-08-21 | End: 2019-08-21 | Stop reason: HOSPADM

## 2019-08-21 RX ORDER — PROPOFOL 10 MG/ML
VIAL (ML) INTRAVENOUS CONTINUOUS PRN
Status: DISCONTINUED | OUTPATIENT
Start: 2019-08-21 | End: 2019-08-21

## 2019-08-21 RX ORDER — ALUMINUM HYDROXIDE, MAGNESIUM HYDROXIDE, AND SIMETHICONE 2400; 240; 2400 MG/30ML; MG/30ML; MG/30ML
5 SUSPENSION ORAL EVERY 6 HOURS PRN
COMMUNITY

## 2019-08-21 RX ADMIN — PROPOFOL 125 MCG/KG/MIN: 10 INJECTION, EMULSION INTRAVENOUS at 09:08

## 2019-08-21 RX ADMIN — PROPOFOL 50 MG: 10 INJECTION, EMULSION INTRAVENOUS at 09:08

## 2019-08-21 RX ADMIN — SODIUM CHLORIDE: 0.9 INJECTION, SOLUTION INTRAVENOUS at 08:08

## 2019-08-21 NOTE — H&P
History & Physical - Short Stay  Gastroenterology      SUBJECTIVE:     Procedure: EGD    Chief Complaint/Indication for Procedure: Sterling's Esophagus    History of Present Illness:  Patient is a 69 y.o. female presents with Sterling's with LGD S/P RFA here for follow up.     PTA Medications   Medication Sig    aluminum & magnesium hydroxide-simethicone (MYLANTA MAX STRENGTH) 400-400-40 mg/5 mL suspension Take by mouth every 6 (six) hours as needed for Indigestion.    esomeprazole (NEXIUM) 40 MG capsule Take 1 capsule (40 mg total) by mouth 2 (two) times daily before meals.    gabapentin (NEURONTIN) 300 MG capsule Take 1 capsule (300 mg total) by mouth 2 (two) times daily.    NIFEdipine (ADALAT CC) 30 MG TbSR nifedipine ER 30 mg tablet,extended release   Take 1 tablet every day by oral route.    polyethylene glycol (GLYCOLAX) 17 gram/dose powder Take 17 g by mouth daily as needed.    traZODone (DESYREL) 100 MG tablet Take 1 tablet (100 mg total) by mouth nightly.    aspirin 81 MG Chew aspirin 81 mg chewable tablet   Chew 1 tablet every day by oral route.    azelastine (ASTELIN) 137 mcg (0.1 %) nasal spray 1 spray (137 mcg total) by Nasal route 2 (two) times daily.    calcium carbonate (TUMS) 200 mg calcium (500 mg) chewable tablet Take 1 tablet by mouth 2 (two) times daily as needed for Heartburn.    conjugated estrogens (PREMARIN) vaginal cream Premarin 0.625 mg/gram vaginal cream   Insert 1 gram by vaginal route once daily for 30days then 2-3times per week    DULoxetine (CYMBALTA) 30 MG capsule Take 1 capsule (30 mg total) by mouth once daily.    fluticasone (FLONASE) 50 mcg/actuation nasal spray Inhale 1 spray into the lungs once daily once daily.    LORazepam (ATIVAN) 0.5 MG tablet Take 1 tablet (0.5 mg total) by mouth daily as needed.    sucralfate (CARAFATE) 1 gram tablet 1 tablet dissolved in 10 ml of water orally twice a day.       Review of patient's allergies indicates:   Allergen Reactions     Compazine [prochlorperazine edisylate] Nausea Only    Lexapro [escitalopram oxalate]      Worsening tremor, and sedation    Primidone Other (See Comments)     Could not function    Prochlorperazine     Simvastatin     Topiramate     Morphine Swelling and Rash        Past Medical History:   Diagnosis Date    Allergy     Sterling's esophagus     Colon cancer     Colon polyp     Diverticulitis     Diverticulosis     Fatty liver     GERD (gastroesophageal reflux disease)     Hx of cervical spine surgery 2018    Hyperlipidemia     Hypertension     Lupus (systemic lupus erythematosus)     discoid    Osteoarthritis     Osteoporosis     Tremor     essential     Past Surgical History:   Procedure Laterality Date    APPENDECTOMY  2007    removed during colon surgery    CERVICAL FUSION       SECTION      COLON SURGERY   &     hemicolectomy for colon cancer and second was for complications from the first surgery    COLONOSCOPY N/A 2018    [facility] ENDO    EGD (ESOPHAGOGASTRODUODENOSCOPY) N/A 2019    [facility]O    EGD (ESOPHAGOGASTRODUODENOSCOPY) N/A 2019    [facility] by [facility] ENDO    ESOPHAGOGASTRODUODENOSCOPY  2019    with RFA and biopsies    ESOPHAGOGASTRODUODENOSCOPY (EGD) N/A 2018    [facility] by [facility] ENDO    HYSTERECTOMY      ovaries remain    SPLENECTOMY, TOTAL      Injured during surgery and removed    UPPER GASTROINTESTINAL ENDOSCOPY      GERD & hiatal hernia per patient report     Family History   Problem Relation Age of Onset    Colon cancer Father         diagnosed in his 60's    Lung cancer Father     Lymphoma Mother         Brain    Lung cancer Brother     Lung cancer Brother         metastatic from prostate    Prostate cancer Brother     Heart disease Brother         valvular disease     Hyperlipidemia Brother     Eczema Neg Hx     Lupus Neg Hx     Psoriasis Neg Hx     Melanoma Neg Hx     Crohn's disease Neg Hx     Ulcerative colitis Neg Hx     Stomach cancer Neg Hx     Esophageal cancer Neg Hx      Social History     Tobacco Use    Smoking status: Current Every Day Smoker     Packs/day: 0.50     Years: 35.00     Pack years: 17.50     Types: Cigarettes    Smokeless tobacco: Never Used   Substance Use Topics    Alcohol use: No    Drug use: No       Review of Systems:  Respiratory: no cough or shortness of breath  Cardiovascular: no chest pain or palpitations  Gastrointestinal: positive for constipation    OBJECTIVE:     Vital Signs (Most Recent)  Temp: 97.7 °F (36.5 °C) (08/21/19 0816)  Pulse: 62 (08/21/19 0816)  Resp: 17 (08/21/19 0816)  BP: (!) 148/74 (08/21/19 0816)  SpO2: 100 % (08/21/19 0816)    Physical Exam:  General: well developed, well nourished  Lungs:  normal respiratory effort  Heart: regular rate, S1, S2 normal  Abdomen: soft, non-tender non-distented; bowel sounds normal; no masses,  no organomegaly    Laboratory  CBC: No results for input(s): WBC, RBC, HGB, HCT, PLT, MCV, MCH, MCHC in the last 168 hours.  CMP: No results for input(s): GLU, CALCIUM, ALBUMIN, PROT, NA, K, CO2, CL, BUN, CREATININE, ALKPHOS, ALT, AST, BILITOT in the last 168 hours.  Coagulation: No results for input(s): LABPROT, INR, APTT in the last 168 hours.      Diagnostic Results:      ASSESSMENT/PLAN:     Sterling's with LGD    Plan: EGD    Anesthesia Plan: MAC    ASA Grade: ASA 3 - Patient with moderate systemic disease with functional limitations     The impression and plan was discussed in detail with the patient. All questions have been answered and the patient voices understanding of our plan at this point. The risk of the procedure was discussed in detail which includes but not limited to bleeding, infection, perforation in some cases requiring surgery with its spectrum of complications.

## 2019-08-21 NOTE — TRANSFER OF CARE
"Anesthesia Transfer of Care Note    Patient: Kristin Guerra    Procedure(s) Performed: Procedure(s) (LRB):  EGD (ESOPHAGOGASTRODUODENOSCOPY) (N/A)    Patient location: GI    Anesthesia Type: MAC    Transport from OR: Transported from OR on room air with adequate spontaneous ventilation    Post pain: adequate analgesia    Post assessment: no apparent anesthetic complications    Post vital signs: stable    Level of consciousness: awake    Nausea/Vomiting: no nausea/vomiting    Complications: none    Transfer of care protocol was followed      Last vitals:   Visit Vitals  /65 (BP Location: Right arm, Patient Position: Lying)   Pulse (!) 59   Temp 36 °C (96.8 °F) (Skin)   Resp 16   Ht 5' 1" (1.549 m)   Wt 57.6 kg (127 lb)   SpO2 96%   Breastfeeding? No   BMI 24.00 kg/m²     "

## 2019-08-21 NOTE — DISCHARGE SUMMARY
Discharge Summary/Instructions after an Endoscopic Procedure    Patient Name: Kristin Guerra  Patient MRN: [id number]  Patient YOB: 1950 Wednesday, August 21, 2019  Uzair Cantor MD    RESTRICTIONS:  During your procedure today, you received medications for sedation.  These medications may affect your judgment, balance and coordination.  Therefore, for 24 hours, you have the following restrictions:     - DO NOT drive a car, operate machinery, make legal/financial decisions, sign important papers or drink alcohol.      ACTIVITY:  Today: no heavy lifting, straining or running due to procedural sedation/anesthesia.  The following day: return to full activity including work.    DIET:  Eat and drink normally unless instructed otherwise.     TREATMENT FOR COMMON SIDE EFFECTS:  - Mild abdominal pain, nausea, belching, bloating or excessive gas:  rest, eat lightly and use a heating pad.  - Sore Throat: treat with throat lozenges and/or gargle with warm salt water.  - Because air was used during the procedure, expelling large amounts of air from your rectum or belching is normal.  - If a bowel prep was taken, you may not have a bowel movement for 1-3 days.  This is normal.      SYMPTOMS TO WATCH FOR AND REPORT TO YOUR PHYSICIAN:  1. Abdominal pain or bloating, other than gas cramps.  2. Chest pain.  3. Back pain.  4. Signs of infection such as: chills or fever occurring within 24 hours after the procedure.  5. Rectal bleeding, which would show as bright red, maroon, or black stools. (A tablespoon of blood from the rectum is not serious, especially if hemorrhoids are present.)  6. Vomiting.  7. Weakness or dizziness.      GO DIRECTLY TO THE NEAREST EMERGENCY ROOM IF YOU HAVE ANY OF THE FOLLOWING:     Difficulty breathing              Chills and/or fever over 101 F   Persistent vomiting and/or vomiting blood   Severe abdominal pain   Severe chest pain   Black, tarry stools   Bleeding- more than one tablespoon   Any  other symptom or condition that you feel may need urgent attention    Your doctor recommends these additional instructions:  If any biopsies were taken, your doctors clinic will contact you in 1 to 2 weeks with any results.    - Discharge patient to home (ambulatory).   - Repeat upper endoscopy at the next available appointment because the preparation was poor.   - The patient will need to do a 24 hours full liquid diet prior to procedure    For questions, problems or results please call your physician - Uzair Cantor MD at Work:  [phone].    EMERGENCY PHONE NUMBER: [phone],  LAB RESULTS: [phone]    IF A COMPLICATION OR EMERGENCY SITUATION ARISES AND YOU ARE UNABLE TO REACH YOUR PHYSICIAN - GO DIRECTLY TO THE EMERGENCY ROOM.

## 2019-08-21 NOTE — ANESTHESIA PREPROCEDURE EVALUATION
2019  Kristin Guerra is a 69 y.o., female having upper endo for eval Sterling's esophagus.    Anesthesia Evaluation    I have reviewed the Patient Summary Reports.    I have reviewed the Nursing Notes.   I have reviewed the Medications.     Review of Systems  Anesthesia Hx:  Personal Hx of Anesthesia complications, Post-Operative Nausea/Vomiting   Social:  Smoker      Past Medical History:   Diagnosis Date    Allergy      Sterling's esophagus      Colon cancer      Colon polyp      Diverticulitis      Diverticulosis      Fatty liver      GERD (gastroesophageal reflux disease)      Hx of cervical spine surgery 2018    Hyperlipidemia      Hypertension      Lupus (systemic lupus erythematosus)      discoid    Osteoarthritis      Osteoporosis      Tremor 2005     essential               Past Surgical History:   Procedure Laterality Date    APPENDECTOMY   2007     removed during colon surgery    CERVICAL FUSION         SECTION        COLON SURGERY    &      hemicolectomy for colon cancer and second was for complications from the first surgery    COLONOSCOPY N/A 2018     Performed by [facility]verick [facility]quivel [facility] at [facility]    EGD (ESOPHAGOGASTRODUODENOSCOPY) N/A 2019     [facility] at [facility]    EGD (ESOPHAGOGASTRODUODENOSCOPY) N/A 2019     Performed by Monika Steele MD at [facility] END[facility]    ESOPHAGOGASTRODUODENOSCOPY   2019     with RFA and biopsies    ESOPHAGOGASTRODUODENOSCOPY (EGD) N/A 2018     Performed by Monika Steele MD at [facility] END[facility]    HYSTERECTOMY         ovaries remain    SPLENECTOMY, TOTAL        Injured during surgery and removed    UPPER GASTROINTESTINAL ENDOSCOPY        GERD & hiatal hernia per patient report         Physical Exam  General:  Well nourished    Airway/Jaw/Neck:  Airway  Findings: Mouth Opening: Normal Tongue: Normal  General Airway Assessment: Adult  Mallampati: III  Improves to I with phonation.  TM Distance: Normal, at least 6 cm  Jaw/Neck Findings:  Neck ROM: Extension Decreased, Mild       Chest/Lungs:  Chest/Lungs Findings: Clear to auscultation, Normal Respiratory Rate     Heart/Vascular:  Heart Findings: Rate: Normal  Rhythm: Regular Rhythm  Sounds: Normal        Mental Status:  Mental Status Findings:  Alert and Oriented         Anesthesia Plan  Type of Anesthesia, risks & benefits discussed:  Anesthesia Type:  MAC  Patient's Preference:   Intra-op Monitoring Plan:   Intra-op Monitoring Plan Comments:   Post Op Pain Control Plan:   Post Op Pain Control Plan Comments:   Induction:   IV  Beta Blocker:         Informed Consent: Patient understands risks and agrees with Anesthesia plan.  Questions answered. Anesthesia consent signed with patient.  ASA Score: 3     Day of Surgery Review of History & Physical: I have interviewed and examined the patient. I have reviewed the patient's H&P dated:            Ready For Surgery From Anesthesia Perspective.

## 2019-08-21 NOTE — PLAN OF CARE
Patient ready for procedure. Reviewed plan of care with patient and family. No needs at this time.

## 2019-08-21 NOTE — [employer] PATIENT INSTRUCTIONS
Discharge Summary/Instructions after an Endoscopic Procedure  Patient Name: Kristin Guerra  Patient MRN: [id number]  Patient YOB: 1950 Wednesday, August 21, 2019  Uzair Cantor MD  RESTRICTIONS:  During your procedure today, you received medications for sedation.  These   medications may affect your judgment, balance and coordination.  Therefore,   for 24 hours, you have the following restrictions:   - DO NOT drive a car, operate machinery, make legal/financial decisions,   sign important papers or drink alcohol.    ACTIVITY:  Today: no heavy lifting, straining or running due to procedural   sedation/anesthesia.  The following day: return to full activity including work.  DIET:  Eat and drink normally unless instructed otherwise.     TREATMENT FOR COMMON SIDE EFFECTS:  - Mild abdominal pain, nausea, belching, bloating or excessive gas:  rest,   eat lightly and use a heating pad.  - Sore Throat: treat with throat lozenges and/or gargle with warm salt   water.  - Because air was used during the procedure, expelling large amounts of air   from your rectum or belching is normal.  - If a bowel prep was taken, you may not have a bowel movement for 1-3 days.    This is normal.  SYMPTOMS TO WATCH FOR AND REPORT TO YOUR PHYSICIAN:  1. Abdominal pain or bloating, other than gas cramps.  2. Chest pain.  3. Back pain.  4. Signs of infection such as: chills or fever occurring within 24 hours   after the procedure.  5. Rectal bleeding, which would show as bright red, maroon, or black stools.   (A tablespoon of blood from the rectum is not serious, especially if   hemorrhoids are present.)  6. Vomiting.  7. Weakness or dizziness.  GO DIRECTLY TO THE NEAREST EMERGENCY ROOM IF YOU HAVE ANY OF THE FOLLOWING:      Difficulty breathing              Chills and/or fever over 101 F   Persistent vomiting and/or vomiting blood   Severe abdominal pain   Severe chest pain   Black, tarry stools   Bleeding- more than one  tablespoon   Any other symptom or condition that you feel may need urgent attention  Your doctor recommends these additional instructions:  If any biopsies were taken, your doctors clinic will contact you in 1 to 2   weeks with any results.  - Discharge patient to home (ambulatory).   - Repeat upper endoscopy at the next available appointment because the   preparation was poor.   - The patient will need to do a 24 hours full liquid diet prior to   procedure  For questions, problems or results please call your physician - Uzair Cantor MD at Work:  [phone].  EMERGENCY PHONE NUMBER: [phone],  LAB RESULTS: [phone]  IF A COMPLICATION OR EMERGENCY SITUATION ARISES AND YOU ARE UNABLE TO REACH   YOUR PHYSICIAN - GO DIRECTLY TO THE EMERGENCY ROOM.  Uzair Cantor MD  8/21/2019 9:30:42 AM  This report has been verified and signed electronically.  [employer]

## 2019-08-26 ENCOUNTER — TELEPHONE (OUTPATIENT)
Dept: GASTROENTEROLOGY | Facility: CLINIC | Age: 69
End: 2019-08-26

## 2019-08-26 NOTE — TELEPHONE ENCOUNTER
Post procedure Instructions: Repeat upper endoscopy at the next available appointment because the preparation was poor.The patient will need to do a 24 hours full   liquid diet prior to procedure. Please contact patient to schedule.

## 2019-08-27 ENCOUNTER — TELEPHONE (OUTPATIENT)
Dept: ENDOSCOPY | Facility: HOSPITAL | Age: 69
End: 2019-08-27

## 2019-08-27 DIAGNOSIS — K22.710 BARRETT'S ESOPHAGUS WITH LOW GRADE DYSPLASIA: Primary | ICD-10-CM

## 2019-09-04 ENCOUNTER — OFFICE VISIT (OUTPATIENT)
Dept: FAMILY MEDICINE | Facility: CLINIC | Age: 69
End: 2019-09-04
Payer: MEDICARE

## 2019-09-04 VITALS
HEIGHT: 61 IN | HEART RATE: 62 BPM | WEIGHT: 125 LBS | RESPIRATION RATE: 20 BRPM | DIASTOLIC BLOOD PRESSURE: 66 MMHG | SYSTOLIC BLOOD PRESSURE: 122 MMHG | BODY MASS INDEX: 23.6 KG/M2

## 2019-09-04 DIAGNOSIS — G47.01 INSOMNIA DUE TO MEDICAL CONDITION: ICD-10-CM

## 2019-09-04 DIAGNOSIS — F41.9 ANXIETY: ICD-10-CM

## 2019-09-04 DIAGNOSIS — M54.12 CERVICAL RADICULOPATHY: ICD-10-CM

## 2019-09-04 DIAGNOSIS — G89.28 OTHER CHRONIC POSTPROCEDURAL PAIN: ICD-10-CM

## 2019-09-04 DIAGNOSIS — J34.9 SINUS PROBLEM: ICD-10-CM

## 2019-09-04 DIAGNOSIS — Z90.81 S/P SPLENECTOMY: ICD-10-CM

## 2019-09-04 DIAGNOSIS — K22.719 BARRETT'S ESOPHAGUS WITH DYSPLASIA: Chronic | ICD-10-CM

## 2019-09-04 DIAGNOSIS — L08.9 SKIN INFECTION: ICD-10-CM

## 2019-09-04 DIAGNOSIS — G25.0 TREMOR, ESSENTIAL: Chronic | ICD-10-CM

## 2019-09-04 DIAGNOSIS — I10 ESSENTIAL HYPERTENSION: Primary | ICD-10-CM

## 2019-09-04 PROCEDURE — 3074F SYST BP LT 130 MM HG: CPT | Mod: S$GLB,,, | Performed by: INTERNAL MEDICINE

## 2019-09-04 PROCEDURE — 1101F PT FALLS ASSESS-DOCD LE1/YR: CPT | Mod: S$GLB,,, | Performed by: INTERNAL MEDICINE

## 2019-09-04 PROCEDURE — 99204 PR OFFICE/OUTPT VISIT, NEW, LEVL IV, 45-59 MIN: ICD-10-PCS | Mod: S$GLB,,, | Performed by: INTERNAL MEDICINE

## 2019-09-04 PROCEDURE — 99204 OFFICE O/P NEW MOD 45 MIN: CPT | Mod: S$GLB,,, | Performed by: INTERNAL MEDICINE

## 2019-09-04 PROCEDURE — 3078F DIAST BP <80 MM HG: CPT | Mod: S$GLB,,, | Performed by: INTERNAL MEDICINE

## 2019-09-04 PROCEDURE — 99999 PR PBB SHADOW E&M-EST. PATIENT-LVL IV: CPT | Mod: PBBFAC,,, | Performed by: INTERNAL MEDICINE

## 2019-09-04 PROCEDURE — 1101F PR PT FALLS ASSESS DOC 0-1 FALLS W/OUT INJ PAST YR: ICD-10-PCS | Mod: S$GLB,,, | Performed by: INTERNAL MEDICINE

## 2019-09-04 PROCEDURE — 99999 PR PBB SHADOW E&M-EST. PATIENT-LVL IV: ICD-10-PCS | Mod: PBBFAC,,, | Performed by: INTERNAL MEDICINE

## 2019-09-04 PROCEDURE — 3078F PR MOST RECENT DIASTOLIC BLOOD PRESSURE < 80 MM HG: ICD-10-PCS | Mod: S$GLB,,, | Performed by: INTERNAL MEDICINE

## 2019-09-04 PROCEDURE — 3074F PR MOST RECENT SYSTOLIC BLOOD PRESSURE < 130 MM HG: ICD-10-PCS | Mod: S$GLB,,, | Performed by: INTERNAL MEDICINE

## 2019-09-04 RX ORDER — MIRTAZAPINE 15 MG/1
1 TABLET, FILM COATED ORAL 2 TIMES DAILY
COMMUNITY
End: 2019-09-19

## 2019-09-04 RX ORDER — HYDROCODONE BITARTRATE AND ACETAMINOPHEN 7.5; 325 MG/1; MG/1
1 TABLET ORAL EVERY 8 HOURS PRN
Refills: 0 | COMMUNITY
Start: 2019-08-20 | End: 2021-10-05

## 2019-09-04 RX ORDER — DOXYCYCLINE 100 MG/1
100 CAPSULE ORAL 2 TIMES DAILY
Qty: 14 CAPSULE | Refills: 0 | Status: SHIPPED | OUTPATIENT
Start: 2019-09-04 | End: 2019-11-25

## 2019-09-04 NOTE — PATIENT INSTRUCTIONS
Taking Your Blood Pressure  Blood pressure is the force of blood against the artery wall as it moves from the heart through the blood vessels. You can take your own blood pressure reading using a digital monitor. Take your readings the same each time, using the same arm. Take readings as often as your healthcare provider instructs.  About blood pressure monitors  Blood pressure monitors are designed for certain ages and cases. You can find monitors for older adults, for pregnant women, and for children. Make sure the one you choose is the right one for your age and situation.  The American Heart Association recommends an automatic cuff monitor that fits on your upper arm (bicep). The cuff should fit your arm size. A cuff thats too large or too small will not give an accurate reading. Measure around your upper arm to find your size.  Monitors that attach to your finger or wrist are not as accurate as monitors for your upper arm.  Ask your healthcare provider for help in choosing a monitor. Bring your monitor to your next provider visit if you need help in using it the correct way.  The steps below are general instructions for using an automatic digital monitor.  Step 1. Relax    · Take your blood pressure at the same time every day, such as in the morning or evening, or at the time your healthcare provider recommends.  · Wait at least a half-hour after smoking, eating, or exercising. Don't drink coffee, tea, soda, or other caffeinated beverages before checking your blood pressure.  · Sit comfortably at a table with both feet on the floor. Do not cross your legs or feet. Place the monitor near you.  · Rest for a few minutes before you begin.  Step 2. Wrap the cuff    · Place your arm on the table, palm up. Your arm should be at the level of your heart. Wrap the cuff around your upper arm, just above your elbow. Its best done on bare skin, not over clothing. Most cuffs will indicate where the brachial artery (the  blood vessel in the middle of the arm at the inner side of the elbow) should line up with the cuff. Look in your monitor's instruction booklet for an illustration. You can also bring your cuff to your healthcare provider and have them show you how to correctly place the cuff.  Step 3. Inflate the cuff    · Push the button that starts the pump.  · The cuff will tighten, then loosen.  · The numbers will change. When they stop changing, your blood pressure reading will appear.  · Take 2 or 3 readings one minute apart.  Step 4. Write down the results of each reading    · Write down your blood pressure numbers for each reading. Note the date and time. Keep your results in one place, such as a notebook. Even if your monitor has a built-in memory, keep a hard copy of the readings.  · Remove the cuff from your arm. Turn off the machine.  · Bring your blood pressure records with your healthcare providers at each visit.  · If you start a new blood pressure medicine, note the day you started the new medicine. Also note the day if you change the dose of your medicine. This information goes on your blood pressure recording sheet. This will help your healthcare provider monitor how well the medicine changes are working.  · Ask your healthcare provider what numbers should prompt you to call him or her. Also ask what numbers should prompt you to get help right away.  Date Last Reviewed: 11/1/2016 © 2000-2017 [employer]. [address] [place] [address]. All rights reserved. This information is not intended as a substitute for professional medical care. Always follow your healthcare professional's instructions.        Essential Tremor (ET)  What is essential tremor?  Essential tremor (ET) is a neurological disorder that causes your hands, head, trunk, voice, or legs to shake rhythmically. It is often confused with Parkinson disease.  ET is the most common trembling disorder that people have. Everyone has  some ET, but the movements usually cannot be seen or felt. When tremors are noticeable, the condition is classified as ET.  ET is most common among people older than age 65, but it can affect people at any age.  What causes ET?  ET can occur in different people for different reasons:  · Familial essential tremor. In most people, the condition seems to be passed down from a parent to a child. If your parent has ET, there is a 50% chance that you or your children will inherit the gene responsible for the condition.  · Essential tremor related to another disorder. Sometimes, a tremor is a symptom of another neurological disorder, such as Parkinson disease or dystonia. Sometimes, ET is mistaken for these other diseases when they are not present. A healthcare providers careful diagnosis is extremely important.  The cause of ET isnt known. However, one theory suggests that your cerebellum and other parts of your brain are not communicating correctly. The cerebellum is a part of the brain that controls muscle coordination.  What are the symptoms of ET?  If you have ET, you will have shaking and trembling at different times and in different situations, but some characteristics are common to all. Here is what you might typically experience:  · Tremors occur when you move and are less noticeable when you rest.  · Certain medicines, caffeine, or stress can make your tremors worse.  · Tremor may improve with ingestion of a small amount of alcohol (such as wine)  · Tremors get worse as you age.  · Tremors dont affect both sides of your body in the same way.  Here are different signs of essential tremor:  · Tremors that are most obvious in your hands  · Difficulty doing tasks with your hands, such as writing or using tools  · Shaking or quivering sound in your voice  · Uncontrollable head-nodding  · In rare instances, tremors in your legs or feet  How is ET diagnosed?  Your rapid, uncontrollable trembling, as well as questions  about your medical and family history, can help your healthcare provider determine if you have familial ET. He or she will probably need to rule out other conditions that could cause shaking or trembling. For example, tremors could be symptoms of diseases, such as hyperthyroidism. Your healthcare provider might test you for those, as well.  In some cases, the tremors might be related to other factors. To find out for certain, your healthcare provider may have you try to:  · Abstain from heavy alcohol use; if youre an alcoholic, trembling is a common symptom.  · Cut out cigarette smoking.  · Avoid caffeine.  · Avoid certain medicines  How is ET treated?   Propanolol and primidone are 2 medicines often prescribed to treat ET. Propanolol blocks the stimulating action of neurotransmitters to calm your trembling. Primidone is a common antiseizure medicine that also controls the actions of neurotransmitters.  Gabapentin and topiramate are 2 other antiseizure medicines that are sometimes prescribed. In some cases, tranquilizers like alprazolam or clonazepam might be suggested.  For ET in your hands, botulinum toxin (Botox) injections have shown some promise in easing the trembling. They work by weakening the surrounding muscles around your hands. For severe tremors, a stimulating device (deep brain stimulator) surgically implanted in your brain may help.  Can ET be prevented?   The specific cause of ET is not known, so scientists are not sure how the condition can be prevented.  Living with ET  ET is usually not dangerous, but it can certainly be frustrating if you have to deal with it. Certain factors can make tremors worse, so the following steps may help to decrease tremors:  · Avoid alcohol, cigarettes, and caffeine  · Avoid stressful situations as much as possible  · Use relaxation techniques, such as yoga, deep-breathing exercises, or biofeedback  · Check with your healthcare provider to see if any medicines youre  taking could be making your tremors worse.  Talk with your healthcare provider about other options, such as surgery, if ET starts to affect your quality of life.  When should I call my healthcare provider?  If you have been diagnosed with ET, talk with your healthcare provider about when you might need to call. He or she will likely advise you to call if your tremors become worse, or if you develop new neurologic symptoms, such as numbness or weakness.  Key points about essential tremors  · ET is a neurological disorder that causes your hands, head, trunk, voice, or legs to shake rhythmically. The cause is not known, but it is often passed down from a parent to a child.  · ET is sometimes confused with other types of tremor, so getting the right diagnosis is important.  · Tremors tend to be worse during movement than when at rest. The tremors are usually not dangerous, but they can get worse over time.  · Avoiding things that might make tremors worse, such as stress, caffeine, and certain medicines, may be helpful.  · Medicines can also help control or limit tremors in some people. Severe tremors can sometimes be treated with surgery.  Next steps  Tips to help you get the most from a visit to your healthcare provider:  · Know the reason for your visit and what you want to happen.  · Before your visit, write down questions you want answered.  · Bring someone with you to help you ask questions and remember what your provider tells you.  · At the visit, write down the name of a new diagnosis, and any new medicines, treatments, or tests. Also write down any new instructions your provider gives you.  · Know why a new medicine or treatment is prescribed, and how it will help you. Also know what the side effects are.  · Ask if your condition can be treated in other ways.  · Know why a test or procedure is recommended and what the results could mean.  · Know what to expect if you do not take the medicine or have the test or  procedure.  · If you have a follow-up appointment, write down the date, time, and purpose for that visit.  · Know how you can contact your provider if you have questions.  © 1298-6830 [employer]. [address] [place] [address], [place] [address]. All rights reserved. This information is not intended as a substitute for professional medical care. Always follow your healthcare professional's instructions.

## 2019-09-04 NOTE — PROGRESS NOTES
Subjective:       Patient ID: Kristin Guerra is a 69 y.o. female.    Chief Complaint: Fatigue; Abscess (under right arm ); Hypertension; Gastroesophageal Reflux; Hidradentis; Tremors; Tobacco dependency; Insomnia; Anxiety; and Chronic Pain    Hypertension   This is a chronic problem. The current episode started more than 1 year ago. The problem is unchanged. The problem is controlled. Associated symptoms include anxiety, malaise/fatigue and neck pain. Pertinent negatives include no chest pain, headaches, palpitations or shortness of breath. Risk factors for coronary artery disease include sedentary lifestyle and smoking/tobacco exposure. Past treatments include calcium channel blockers. The current treatment provides moderate improvement. Compliance problems include psychosocial issues.  There is no history of chronic renal disease, coarctation of the aorta, hyperaldosteronism or renovascular disease.   Gastroesophageal Reflux   She complains of heartburn. She reports no chest pain or no coughing. This is a recurrent problem. The current episode started more than 1 year ago. The problem occurs constantly. The problem has been unchanged. Associated symptoms include fatigue. Risk factors include smoking/tobacco exposure and Sterling's esophagus (Sterling's esophagus). She has tried a PPI for the symptoms. The treatment provided moderate relief. Past procedures include an EGD.       Past Medical History:   Diagnosis Date    Allergy     Sterling's esophagus     Colon cancer     Colon polyp     Diverticulitis     Diverticulosis     Fatty liver     GERD (gastroesophageal reflux disease)     Hx of cervical spine surgery 11/07/2018    Hyperlipidemia     Hypertension     Lupus (systemic lupus erythematosus) 1999    discoid    Osteoarthritis     Osteoporosis     Tremor 2005    essential     Social History     Socioeconomic History    Marital status:      Spouse name: Not on file    Number of children: 2     Years of education: Not on file    Highest education level: Not on file   Occupational History    Not on file   Social Needs    Financial resource strain: Not on file    Food insecurity:     Worry: Not on file     Inability: Not on file    Transportation needs:     Medical: Not on file     Non-medical: Not on file   Tobacco Use    Smoking status: Current Every Day Smoker     Packs/day: 0.50     Years: 35.00     Pack years: 17.50     Types: Cigarettes    Smokeless tobacco: Never Used   Substance and Sexual Activity    Alcohol use: No    Drug use: No    Sexual activity: Yes     Partners: Male     Birth control/protection: Post-menopausal   Lifestyle    Physical activity:     Days per week: Not on file     Minutes per session: Not on file    Stress: Not on file   Relationships    Social connections:     Talks on phone: Not on file     Gets together: Not on file     Attends Buddhism service: Not on file     Active member of club or organization: Not on file     Attends meetings of clubs or organizations: Not on file     Relationship status: Not on file   Other Topics Concern    Are you pregnant or think you may be? Not Asked    Breast-feeding Not Asked   Social History Narrative    Not on file     Past Surgical History:   Procedure Laterality Date    APPENDECTOMY      removed during colon surgery    CERVICAL FUSION       SECTION      COLON SURGERY   &     hemicolectomy for colon cancer and second was for complications from the first surgery    COLONOSCOPY N/A 2018    [facility]    EGD (ESOPHAGOGASTRODUODENOSCOPY) N/A 2019    [facility] at [facility]    EGD (ESOPHAGOGASTRODUODENOSCOPY) N/A 2019    [facility] by [facility]    EGD (ESOPHAGOGASTRODUODENOSCOPY) N/A 2019    Performed by Monika Steele [facility] at [facility] END[facility]    ESOPHAGOGASTRODUODENOSCOPY  2019    with RFA and  biopsies    ESOPHAGOGASTRODUODENOSCOPY (EGD) N/A 5/1/2018    Performed by [facility] ENDO    HYSTERECTOMY      ovaries remain    SPLENECTOMY, TOTAL  2007    Injured during surgery and removed    UPPER GASTROINTESTINAL ENDOSCOPY  2007    GERD & hiatal hernia per patient report     Family History   Problem Relation Age of Onset    Colon cancer Father         diagnosed in his 60's    Lung cancer Father     Lymphoma Mother         Brain    Lung cancer Brother     Lung cancer Brother         metastatic from prostate    Prostate cancer Brother     Heart disease Brother         valvular disease    Hyperlipidemia Brother     Eczema Neg Hx     Lupus Neg Hx     Psoriasis Neg Hx     Melanoma Neg Hx     Crohn's disease Neg Hx     Ulcerative colitis Neg Hx     Stomach cancer Neg Hx     Esophageal cancer Neg Hx        Review of Systems   Constitutional: Positive for fatigue and malaise/fatigue. Negative for activity change, chills, fever and unexpected weight change.   HENT: Positive for congestion. Negative for postnasal drip and sinus pressure.         Chronic sinus problems currently on Astelin   Eyes: Negative for pain, discharge and visual disturbance.   Respiratory: Negative for cough, chest tightness and shortness of breath.    Cardiovascular: Negative for chest pain, palpitations and leg swelling.        HTN,    Gastrointestinal: Positive for heartburn. Negative for abdominal distention, anal bleeding, constipation and diarrhea.        Sterling's esophagitis and reflux.  Currently on PPI.   Genitourinary: Negative for difficulty urinating, dysuria, flank pain, frequency and pelvic pain.   Musculoskeletal: Positive for arthralgias, back pain, neck pain and neck stiffness. Negative for joint swelling.        History of cervical spine surgery with persistent postsurgical pain.  Currently under pain management and hydrocodone.  Also on gabapentin.   Skin: Negative for color change, pallor  "and rash.   Allergic/Immunologic: Negative for environmental allergies, food allergies and immunocompromised state.   Neurological: Positive for tremors. Negative for dizziness, seizures, syncope, light-headedness and headaches.   Hematological: Negative for adenopathy. Does not bruise/bleed easily.   Psychiatric/Behavioral: Positive for sleep disturbance. Negative for agitation, confusion and dysphoric mood. The patient is nervous/anxious.          Objective:      Blood pressure 122/66, pulse 62, resp. rate 20, height 5' 1" (1.549 m), weight 56.7 kg (125 lb). Body mass index is 23.62 kg/m².  Physical Exam   Constitutional: She appears well-developed and well-nourished. She is cooperative. She appears distressed.   Chronically ill-appearing lady with tremulousness and slight difficulty walking.   HENT:   Head: Normocephalic and atraumatic.   Right Ear: Tympanic membrane normal.   Left Ear: Tympanic membrane normal.   Eyes: Pupils are equal, round, and reactive to light. Conjunctivae, EOM and lids are normal. Lids are everted and swept, no foreign bodies found. Right pupil is round and reactive. Left pupil is round and reactive.   Neck: Trachea normal and normal range of motion. Neck supple.   Cardiovascular: Normal rate, regular rhythm, S1 normal, S2 normal and normal heart sounds.   Pulmonary/Chest: She has decreased breath sounds. She has no wheezes. She exhibits no tenderness.   Abdominal: Soft. Bowel sounds are normal. There is no rigidity and no guarding.   Musculoskeletal: She exhibits tenderness and deformity.        Back:    Deformities in the spine noted.   Lymphadenopathy:     She has no cervical adenopathy.     She has no axillary adenopathy.   Neurological: She is alert.   Skin: Skin is warm and dry.   Psychiatric: Her behavior is normal. Thought content normal.   Nursing note and vitals reviewed.        Assessment:       1. Essential hypertension    2. Tremor, essential    3. Sterling's esophagus with " dysplasia    4. Sinus problem    5. S/P splenectomy    6. Skin infection           Lab Visit on 08/01/2019   Component Date Value Ref Range Status    Sodium 08/01/2019 143  136 - 145 mmol/L Final    Potassium 08/01/2019 3.9  3.5 - 5.1 mmol/L Final    Chloride 08/01/2019 109  95 - 110 mmol/L Final    CO2 08/01/2019 28  23 - 29 mmol/L Final    Glucose 08/01/2019 91  70 - 110 mg/dL Final    BUN, Bld 08/01/2019 14  8 - 23 mg/dL Final    Creatinine 08/01/2019 0.7  0.5 - 1.4 mg/dL Final    Calcium 08/01/2019 9.1  8.7 - 10.5 mg/dL Final    Total Protein 08/01/2019 7.8  6.0 - 8.4 g/dL Final    Albumin 08/01/2019 3.8  3.5 - 5.2 g/dL Final    Total Bilirubin 08/01/2019 0.6  0.1 - 1.0 mg/dL Final    Alkaline Phosphatase 08/01/2019 58  55 - 135 U/L Final    AST 08/01/2019 19  10 - 40 U/L Final    ALT 08/01/2019 14  10 - 44 U/L Final    Anion Gap 08/01/2019 6* 8 - 16 mmol/L Final    eGFR if African American 08/01/2019 >60.0  >60 mL/min/1.73 m^2 Final    eGFR if non African American 08/01/2019 >60.0  >60 mL/min/1.73 m^2 Final    Cholesterol 08/01/2019 226* 120 - 199 mg/dL Final    Triglycerides 08/01/2019 88  30 - 150 mg/dL Final    HDL 08/01/2019 52  40 - 75 mg/dL Final    LDL Cholesterol 08/01/2019 156.4  63.0 - 159.0 mg/dL Final    Hdl/Cholesterol Ratio 08/01/2019 23.0  20.0 - 50.0 % Final    Total Cholesterol/HDL Ratio 08/01/2019 4.3  2.0 - 5.0 Final    Non-HDL Cholesterol 08/01/2019 174  mg/dL Final   Office Visit on 07/09/2019   Component Date Value Ref Range Status    Color, UA 07/09/2019 Yellow   Final    Spec Grav UA 07/09/2019 1.010   Final    pH, UA 07/09/2019 6   Final    WBC, UA 07/09/2019 neg   Final    Nitrite, UA 07/09/2019 neg   Final    Protein 07/09/2019 neg   Final    Glucose, UA 07/09/2019 neg   Final    Ketones, UA 07/09/2019 neg   Final    Urobilinogen, UA 07/09/2019 0.2   Final    Bilirubin 07/09/2019 neg   Final    Blood, UA 07/09/2019 small   Final    Urine Culture,  Routine 07/09/2019 No growth   Final    Specimen UA 07/09/2019 Urine, Catheterized   Final    Color, UA 07/09/2019 Yellow  Yellow, Straw, [place] Final    Appearance, UA 07/09/2019 Clear  Clear Final    pH, UA 07/09/2019 5.0  5.0 - 8.0 Final    Specific Gravity, UA 07/09/2019 <=1.005* 1.005 - 1.030 Final    Protein, UA 07/09/2019 Negative  Negative Final    Glucose, UA 07/09/2019 Negative  Negative Final    Ketones, UA 07/09/2019 Negative  Negative Final    Bilirubin (UA) 07/09/2019 Negative  Negative Final    Occult Blood UA 07/09/2019 1+* Negative Final    Nitrite, UA 07/09/2019 Negative  Negative Final    Urobilinogen, UA 07/09/2019 Negative  <2.0 EU/dL Final    Leukocytes, UA 07/09/2019 Negative  Negative Final    RBC, UA 07/09/2019 1  0 - 4 /hpf Final    WBC, UA 07/09/2019 0  0 - 5 /hpf Final    Bacteria 07/09/2019 Rare  None-Occ /hpf Final    Squam Epithel, UA 07/09/2019 0  /hpf Final    Microscopic Comment 07/09/2019 SEE COMMENT   Final     This is a new patient appointment for miss Kristin hernandez who has thus far had fragmented medical care spanning across multiple sub specialities and subspecialties.  Her care includes cardiologist, gastroenterologist, chronic pain management and neurologist also.    She is status post splenectomy.    Plan:           Essential hypertension    Tremor, essential  Comments:  Mirtazapine for tremors. Dr Trimble    Sterling's esophagus with dysplasia  Comments:  OnNexium Dr David Steele    Sinus problem  Comments:  Flonase and Astelin    S/P splenectomy    Skin infection  -     doxycycline (MONODOX) 100 MG capsule; Take 1 capsule (100 mg total) by mouth 2 (two) times daily. Avoid sun exposure  Dispense: 14 capsule; Refill: 0     First order of business would be to start her on antibiotics poor what appears to be an suppurative hidradenitis lesion in the right axilla.    If it does not get better in a couple of days or so she will need incision and drainage.    I  have advised her to quit smoking but at this point she is not prepared for same.    History of splenectomy is noted but I am unclear and unsure about if she has been completely immunized and vaccinated accordingly.  Patient herself is not a very astute historian and old primary care records are not easily available at this point.    She will need the following-    1--pneumonia vaccine-while she may have obtained pneumococcal 23, it is unclear if she has obtained Prevnar 13.  2-meningitis vaccine.  Patient is not sure about it  3-HIB vaccination- patient is not sure about it    Patient has been advised to monitor her blood pressure.    Respiratory status will need to be assessed in near future.    Use of opiate medications, chronic pain and attendant mood dysphoric disorder has also been noted.    Future challenges may include ambulation, polypharmacy and issues of aging and attendant complications.    Follow-up in 2 or 3 weeks for review of status.    Follow-up in 1-2 days with Dr.Ryan Rodolfo MD for potential I and D of lesion in right axilla.    Spent about 40-45 minutes with the patient with 50% of time face-to-face discussion about her multiple medical issues, issues or tobacco dependency and need for immunizations.      Current Outpatient Medications:     aluminum & magnesium hydroxide-simethicone (MYLANTA MAX STRENGTH) 400-400-40 mg/5 mL suspension, Take by mouth every 6 (six) hours as needed for Indigestion., Disp: , Rfl:     aspirin 81 MG Chew, aspirin 81 mg chewable tablet  Chew 1 tablet every day by oral route., Disp: , Rfl:     azelastine (ASTELIN) 137 mcg (0.1 %) nasal spray, 1 spray (137 mcg total) by Nasal route 2 (two) times daily., Disp: 30 mL, Rfl: 11    calcium carbonate (TUMS) 200 mg calcium (500 mg) chewable tablet, Take 1 tablet by mouth 2 (two) times daily as needed for Heartburn., Disp: , Rfl:     esomeprazole (NEXIUM) 40 MG capsule, Take 1 capsule (40 mg total) by mouth 2 (two) times  daily before meals., Disp: 60 capsule, Rfl: 2    fluticasone (FLONASE) 50 mcg/actuation nasal spray, Inhale 1 spray into the lungs once daily once daily., Disp: , Rfl:     gabapentin (NEURONTIN) 300 MG capsule, Take 1 capsule (300 mg total) by mouth 2 (two) times daily., Disp: 60 capsule, Rfl: 3    HYDROcodone-acetaminophen (NORCO) 7.5-325 mg per tablet, 1 tablet daily as needed., Disp: , Rfl: 0    LORazepam (ATIVAN) 0.5 MG tablet, Take 1 tablet (0.5 mg total) by mouth daily as needed., Disp: 30 tablet, Rfl: 3    mirtazapine (REMERON) 15 MG tablet, 1 tablet 2 (two) times daily., Disp: , Rfl:     NIFEdipine (ADALAT CC) 30 MG TbSR, nifedipine ER 30 mg tablet,extended release  Take 1 tablet every day by oral route., Disp: , Rfl:     polyethylene glycol (GLYCOLAX) 17 gram/dose powder, Take 17 g by mouth daily as needed., Disp: , Rfl:     doxycycline (MONODOX) 100 MG capsule, Take 1 capsule (100 mg total) by mouth 2 (two) times daily. Avoid sun exposure, Disp: 14 capsule, Rfl: 0    traZODone (DESYREL) 100 MG tablet, Take 1 tablet (100 mg total) by mouth nightly., Disp: 90 tablet, Rfl: 1

## 2019-09-05 ENCOUNTER — OFFICE VISIT (OUTPATIENT)
Dept: FAMILY MEDICINE | Facility: CLINIC | Age: 69
End: 2019-09-05
Payer: MEDICARE

## 2019-09-05 VITALS
BODY MASS INDEX: 23.6 KG/M2 | TEMPERATURE: 98 F | SYSTOLIC BLOOD PRESSURE: 118 MMHG | HEIGHT: 61 IN | DIASTOLIC BLOOD PRESSURE: 66 MMHG | OXYGEN SATURATION: 99 % | HEART RATE: 69 BPM | WEIGHT: 125 LBS | RESPIRATION RATE: 18 BRPM

## 2019-09-05 DIAGNOSIS — L73.2 HIDRADENITIS: Primary | ICD-10-CM

## 2019-09-05 PROCEDURE — 99213 OFFICE O/P EST LOW 20 MIN: CPT | Mod: 25,S$GLB,, | Performed by: FAMILY MEDICINE

## 2019-09-05 PROCEDURE — 3074F PR MOST RECENT SYSTOLIC BLOOD PRESSURE < 130 MM HG: ICD-10-PCS | Mod: S$GLB,,, | Performed by: FAMILY MEDICINE

## 2019-09-05 PROCEDURE — 99999 PR PBB SHADOW E&M-EST. PATIENT-LVL V: ICD-10-PCS | Mod: PBBFAC,,, | Performed by: FAMILY MEDICINE

## 2019-09-05 PROCEDURE — 1101F PT FALLS ASSESS-DOCD LE1/YR: CPT | Mod: S$GLB,,, | Performed by: FAMILY MEDICINE

## 2019-09-05 PROCEDURE — 3078F DIAST BP <80 MM HG: CPT | Mod: S$GLB,,, | Performed by: FAMILY MEDICINE

## 2019-09-05 PROCEDURE — 3074F SYST BP LT 130 MM HG: CPT | Mod: S$GLB,,, | Performed by: FAMILY MEDICINE

## 2019-09-05 PROCEDURE — 1101F PR PT FALLS ASSESS DOC 0-1 FALLS W/OUT INJ PAST YR: ICD-10-PCS | Mod: S$GLB,,, | Performed by: FAMILY MEDICINE

## 2019-09-05 PROCEDURE — 99213 PR OFFICE/OUTPT VISIT, EST, LEVL III, 20-29 MIN: ICD-10-PCS | Mod: 25,S$GLB,, | Performed by: FAMILY MEDICINE

## 2019-09-05 PROCEDURE — 3078F PR MOST RECENT DIASTOLIC BLOOD PRESSURE < 80 MM HG: ICD-10-PCS | Mod: S$GLB,,, | Performed by: FAMILY MEDICINE

## 2019-09-05 PROCEDURE — 99999 PR PBB SHADOW E&M-EST. PATIENT-LVL V: CPT | Mod: PBBFAC,,, | Performed by: FAMILY MEDICINE

## 2019-09-06 ENCOUNTER — CLINICAL SUPPORT (OUTPATIENT)
Dept: FAMILY MEDICINE | Facility: CLINIC | Age: 69
End: 2019-09-06
Payer: MEDICARE

## 2019-09-06 VITALS
WEIGHT: 125 LBS | DIASTOLIC BLOOD PRESSURE: 70 MMHG | HEART RATE: 76 BPM | SYSTOLIC BLOOD PRESSURE: 120 MMHG | BODY MASS INDEX: 23.6 KG/M2 | OXYGEN SATURATION: 96 % | HEIGHT: 61 IN | TEMPERATURE: 98 F | RESPIRATION RATE: 16 BRPM

## 2019-09-06 PROCEDURE — 10060 INCISION & DRAINAGE: ICD-10-PCS | Mod: S$GLB,,, | Performed by: FAMILY MEDICINE

## 2019-09-06 PROCEDURE — 10060 I&D ABSCESS SIMPLE/SINGLE: CPT | Mod: S$GLB,,, | Performed by: FAMILY MEDICINE

## 2019-09-06 PROCEDURE — 99999 PR PBB SHADOW E&M-EST. PATIENT-LVL V: ICD-10-PCS | Mod: PBBFAC,,,

## 2019-09-06 PROCEDURE — 99999 PR PBB SHADOW E&M-EST. PATIENT-LVL V: CPT | Mod: PBBFAC,,,

## 2019-09-06 NOTE — PROGRESS NOTES
Patient returned to the office for a wound recheck. Patient was identified using her name and date of birth. MD came in to check to make sure that the abscess is draining correctly and not developing an infection. Once it was unpacked and checked to see that it was healing properly, we then proceeded to re-pack the wound. Sterile technique was used during this. Iodine packing was used to be put inside the abscess to help promote draining and healing from the inside out. A non adhesive telfa pad and paper tape was used to protect the abscess. Patient tolerated the procedure well. She will report to urgent care over the weekend to get it unpacked and then repacked. Patient has an appointment scheduled on Monday to re check.

## 2019-09-06 NOTE — PROGRESS NOTES
SUBJECTIVE:    Patient ID: Kristin Guerra is a 69 y.o. female.    Chief Complaint: Abscess (I&D)  70 yo female referred for abscess I&D, she has ha hx of hidradenitis suppurativa with a golf ball sized abscess in her right axilla. Pt has been on abx for 24 hours the lesion has decreased in size over night and may have partially drained.    Abscess   Chronicity:  RecurrentProgression Since Onset: partially resolved  Location:  Shoulder/arm  Associated Symptoms: no fever, no chills, no sweats  Characteristics: redness and swelling    Pain Scale:  7/10  Treatments Tried:  Oral antibiotics and warm compresses  Relieved by:  Warm compresses and oral antibiotics        Past Medical History:   Diagnosis Date    Allergy     Sterling's esophagus     Colon cancer     Colon polyp     Diverticulitis     Diverticulosis     Fatty liver     GERD (gastroesophageal reflux disease)     Hx of cervical spine surgery 11/07/2018    Hyperlipidemia     Hypertension     Lupus (systemic lupus erythematosus) 1999    discoid    Osteoarthritis     Osteoporosis     Tremor 2005    essential     Social History     Socioeconomic History    Marital status:      Spouse name: Not on file    Number of children: 2    Years of education: Not on file    Highest education level: Not on file   Occupational History    Not on file   Social Needs    Financial resource strain: Not on file    Food insecurity:     Worry: Not on file     Inability: Not on file    Transportation needs:     Medical: Not on file     Non-medical: Not on file   Tobacco Use    Smoking status: Current Every Day Smoker     Packs/day: 0.50     Years: 35.00     Pack years: 17.50     Types: Cigarettes    Smokeless tobacco: Never Used   Substance and Sexual Activity    Alcohol use: No    Drug use: No    Sexual activity: Yes     Partners: Male     Birth control/protection: Post-menopausal   Lifestyle    Physical activity:     Days per week: Not on file      Minutes per session: Not on file    Stress: Not on file   Relationships    Social connections:     Talks on phone: Not on file     Gets together: Not on file     Attends Sikhism service: Not on file     Active member of club or organization: Not on file     Attends meetings of clubs or organizations: Not on file     Relationship status: Not on file   Other Topics Concern    Are you pregnant or think you may be? Not Asked    Breast-feeding Not Asked   Social History Narrative    Not on file     Past Surgical History:   Procedure Laterality Date    APPENDECTOMY      removed during colon surgery    CERVICAL FUSION       SECTION      COLON SURGERY   &     hemicolectomy for colon cancer and second was for complications from the first surgery    COLONOSCOPY N/A 2018    [facility] by [facility]ivel [facility] ENDO    EGD (ESOPHAGOGASTRODUODENOSCOPY) N/A 2019    [facility]O    EGD (ESOPHAGOGASTRODUODENOSCOPY) N/A 2019    [facility] ENDO    EGD (ESOPHAGOGASTRODUODENOSCOPY) N/A 2019    [facility]formed by [facility]ika Steele [facility] at [facility] END[facility]    ESOPHAGOGASTRODUODENOSCOPY  2019    with RFA and biopsies    ESOPHAGOGASTRODUODENOSCOPY (EGD) N/A 2018    [facility]formed by [facility] at [facility] ENDO    HYSTERECTOMY      ovaries remain    SPLENECTOMY, TOTAL      Injured during surgery and removed    UPPER GASTROINTESTINAL ENDOSCOPY      GERD & hiatal hernia per patient report     Family History   Problem Relation Age of Onset    Colon cancer Father         diagnosed in his 60's    Lung cancer Father     Lymphoma Mother         Brain    Lung cancer Brother     Lung cancer Brother         metastatic from prostate    Prostate cancer Brother     Heart disease Brother         valvular disease    Hyperlipidemia Brother     Eczema Neg Hx     Lupus Neg Hx     Psoriasis Neg Hx     Melanoma Neg  Hx     Crohn's disease Neg Hx     Ulcerative colitis Neg Hx     Stomach cancer Neg Hx     Esophageal cancer Neg Hx      Current Outpatient Medications   Medication Sig Dispense Refill    aluminum & magnesium hydroxide-simethicone (MYLANTA MAX STRENGTH) 400-400-40 mg/5 mL suspension Take by mouth every 6 (six) hours as needed for Indigestion.      aspirin 81 MG Chew aspirin 81 mg chewable tablet   Chew 1 tablet every day by oral route.      calcium carbonate (TUMS) 200 mg calcium (500 mg) chewable tablet Take 1 tablet by mouth 2 (two) times daily as needed for Heartburn.      doxycycline (MONODOX) 100 MG capsule Take 1 capsule (100 mg total) by mouth 2 (two) times daily. Avoid sun exposure 14 capsule 0    esomeprazole (NEXIUM) 40 MG capsule Take 1 capsule (40 mg total) by mouth 2 (two) times daily before meals. 60 capsule 2    fluticasone (FLONASE) 50 mcg/actuation nasal spray Inhale 1 spray into the lungs once daily once daily.      gabapentin (NEURONTIN) 300 MG capsule Take 1 capsule (300 mg total) by mouth 2 (two) times daily. 60 capsule 3    HYDROcodone-acetaminophen (NORCO) 7.5-325 mg per tablet 1 tablet daily as needed.  0    LORazepam (ATIVAN) 0.5 MG tablet Take 1 tablet (0.5 mg total) by mouth daily as needed. 30 tablet 3    mirtazapine (REMERON) 15 MG tablet 1 tablet 2 (two) times daily.      NIFEdipine (ADALAT CC) 30 MG TbSR nifedipine ER 30 mg tablet,extended release   Take 1 tablet every day by oral route.      polyethylene glycol (GLYCOLAX) 17 gram/dose powder Take 17 g by mouth daily as needed.      traZODone (DESYREL) 100 MG tablet Take 1 tablet (100 mg total) by mouth nightly. 90 tablet 1    azelastine (ASTELIN) 137 mcg (0.1 %) nasal spray 1 spray (137 mcg total) by Nasal route 2 (two) times daily. 30 mL 11     No current facility-administered medications for this visit.      Review of patient's allergies indicates:   Allergen Reactions    Compazine [prochlorperazine edisylate]  "Nausea Only    Lexapro [escitalopram oxalate]      Worsening tremor, and sedation    Primidone Other (See Comments)     Could not function    Prochlorperazine     Simvastatin     Topiramate     Morphine Swelling and Rash       Review of Systems   Constitutional: Negative for chills and fever.          Blood pressure 118/66, pulse 69, temperature 97.9 °F (36.6 °C), temperature source Oral, resp. rate 18, height 5' 1" (1.549 m), weight 56.7 kg (125 lb), SpO2 99 %. Body mass index is 23.62 kg/m².   Objective:      Physical Exam        Assessment:       1. Hidradenitis         Plan:           Hidradenitis  -     Incision & Drainage    Pt instructed to return in the AM for repacking                    "

## 2019-09-06 NOTE — PROCEDURES
"Incision & Drainage  Date/Time: 9/6/2019 8:03 AM  Performed by: Manuel Reynolds MD  Authorized by: Manuel Reynolds MD     Time out: Immediately prior to procedure a "time out" was called to verify the correct patient, procedure, equipment, support staff and site/side marked as required.    Consent Done?:  Yes (Verbal)    Type:  Abscess  Body area:  Upper extremity  Location details:  Right arm  Anesthesia:  Local infiltration  Local anesthetic: lidocaine 2% without epinephrine  Anesthetic total (ml):  2  Risk factor:  Underlying major vessel and underlying major nerve  Scalpel size:  15  Incision type:  Single straight  Drainage:  Pus and purulent  Drainage amount:  Copious  Wound treatment:  Incision and expression of material  Packing material:  1/4 in iodoform gauze  Patient tolerance:  Patient tolerated the procedure well with no immediate complications    Risks and benifets were explained, (bleeding infection, risk of artery or nerve damage, risk of needing a repeat procedure)The area was cleaned with 3 betadine swabs, the area was anesthesized with 2cc xylocane without epi, a .5 inch incision made into the abscess, very thick sebaceous material was expressed. And 2 inches of iodoform gauze packing material was left inplace for the wound to drain. Pt is currently on Doxycycline and will remain on this medication. Pt should have the wound repacked over the next several days.  I attempted to express out all of the material but due to her underlying condition of hidradinitis I suspect that she will require repeat procedures in the future.      "

## 2019-09-07 ENCOUNTER — CLINICAL SUPPORT (OUTPATIENT)
Dept: URGENT CARE | Facility: CLINIC | Age: 69
End: 2019-09-07
Payer: MEDICARE

## 2019-09-07 VITALS
RESPIRATION RATE: 14 BRPM | WEIGHT: 126 LBS | HEART RATE: 80 BPM | BODY MASS INDEX: 23.81 KG/M2 | DIASTOLIC BLOOD PRESSURE: 82 MMHG | OXYGEN SATURATION: 97 % | SYSTOLIC BLOOD PRESSURE: 142 MMHG

## 2019-09-07 DIAGNOSIS — Z51.89 WOUND CHECK, ABSCESS: ICD-10-CM

## 2019-09-07 DIAGNOSIS — L73.2 HIDRADENITIS: Primary | ICD-10-CM

## 2019-09-07 PROCEDURE — 99213 OFFICE O/P EST LOW 20 MIN: CPT | Mod: S$GLB,,, | Performed by: NURSE PRACTITIONER

## 2019-09-07 PROCEDURE — 99213 PR OFFICE/OUTPT VISIT, EST, LEVL III, 20-29 MIN: ICD-10-PCS | Mod: S$GLB,,, | Performed by: NURSE PRACTITIONER

## 2019-09-07 NOTE — PATIENT INSTRUCTIONS
Finish antibiotics FU with your physician for worsening symptoms     Understanding Hidradenitis Suppurativa  Hidradenitis suppurativa is a long-term (chronic) skin disease. It causes painful bumps and sores (abscesses) to form around a hair follicle. Follicles are the tiny holes from which hair grows out of your skin. The disease occurs on parts of the body where skin rubs together. It most often appears in the armpits, the groin area, and under the breasts. It is more common in women.  How to say it  [id number]-[id number] [id number] ANTONIO-vuh   What causes hidradenitis suppurativa?  This skin disease happens when hair follicles become clogged with keratin. Keratin is the protein that makes up your hair and nails. The follicles then burst and become infected. Pressure or rubbing on the skin can clog the follicles. Or it can further irritate them.  The disease tends to run in families. Its also more likely to occur in people who are obese, have diabetes, or smoke. Hormones may also play a part.  Symptoms of hidradenitis suppurativa  This skin disease causes one or more painful red bumps on the skin. These bumps become infected and drain pus. They may also itch or burn. In severe cases, sinus tracts may form. These are narrow channels that run under the skin. Blood or a bad-smelling pus may ooze from these bumps or sinus tracts. Bands of scarring often occur.  Treatment for hidradenitis suppurativa  Treatment for this skin disease is most successful when started early. But it may be hard to diagnose. It may be mistaken for other skin conditions. The painful bumps also often return. So stopping new bumps and limiting scarring is important. Treatment options include:  · Warm compress. Putting a warm, wet washcloth on the affected skin may help.  · Lifestyle changes. Your symptoms may get better if you lose weight or stop smoking, if needed. Also avoid shaving or other irritants, such as deodorant or perfume.  · Antibiotics.  For mild cases, an antibiotic for the skin (topical) may help. You may need oral antibiotics if you have a severe case. They can help prevent further infection.  · Other oral medicines. Over-the-counter pain medicines can ease pain and inflammation. You may need stronger medicines for a severe case. These medicines include corticosteroids or a retinoid. These may cause side effects.  · Injected medicines. A steroid may be injected into the bump to ease pain. A biologic may be injected to ease severe symptoms.  · Surgery. Surgery can drain and remove the painful bumps. For severe cases, the doctor may cut out the entire area of affected skin or destroy it with a laser.  Complications of hidradenitis suppurativa  These include:  · Arthritis  · Depression  · Lymphedema  · Scarring of skin  · Skin cancer  When to call your healthcare provider  Call your healthcare provider right away if you have any of these:  · Fever of 100.4°F (38°C) or higher, or as directed  · Redness, swelling, or fluid leaking from your rash that gets worse  · Pain that gets worse  · Symptoms that dont get better, or get worse  · New symptoms   Date Last Reviewed: 5/1/2016  © 9909-7733 [employer] [address] [place] [address], [place] [address]. All rights reserved. This information is not intended as a substitute for professional medical care. Always follow your healthcare professional's instructions.

## 2019-09-07 NOTE — PROGRESS NOTES
Subjective:       Patient ID: Kristin Guerra is a 69 y.o. female.    Vitals:  weight is 57.2 kg (126 lb). Her blood pressure is 142/82 (abnormal) and her pulse is 80. Her respiration is 14 and oxygen saturation is 97%.     Chief Complaint: Wound Check    Pt had an abscess removed from right axillary here to have it repacked        Wound Check   She was originally treated 2 to 3 days ago (Thursday ). Previous treatment included oral antibiotics (doxy ). Her temperature was unmeasured prior to arrival. There has been bloody discharge from the wound. The redness has improved. The swelling has improved. The pain has improved. She has no difficulty moving the affected extremity or digit.       Constitution: Negative for fatigue.   Gastrointestinal: Negative for nausea, vomiting and diarrhea.   Skin: Positive for wound and abscess.       Objective:      Physical Exam   Constitutional: She is oriented to person, place, and time. No distress.   Eyes: Conjunctivae are normal.   Neck: Normal range of motion.   Cardiovascular: Normal rate.   Pulmonary/Chest: Effort normal.   Neurological: She is alert and oriented to person, place, and time.   + tremors    Skin:   R axillary with healing abscess. Once packing removed no further drainage or surrounding erythema or warmth. There is mild induration.    Psychiatric: She has a normal mood and affect. Her behavior is normal.   Nursing note and vitals reviewed.      Assessment:       1. Hidradenitis    2. Wound check, abscess        Plan:         Hidradenitis    Wound check, abscess    Pt has healing abscess. Discussed continuing antibiotics and FU with her PCP.

## 2019-09-09 ENCOUNTER — CLINICAL SUPPORT (OUTPATIENT)
Dept: FAMILY MEDICINE | Facility: CLINIC | Age: 69
End: 2019-09-09
Payer: MEDICARE

## 2019-09-09 PROCEDURE — 99999 PR PBB SHADOW E&M-EST. PATIENT-LVL II: CPT | Mod: PBBFAC,,,

## 2019-09-09 PROCEDURE — 99999 PR PBB SHADOW E&M-EST. PATIENT-LVL II: ICD-10-PCS | Mod: PBBFAC,,,

## 2019-09-09 NOTE — PROGRESS NOTES
Patient returned to the office to check the wound from over the weekend. The abscess under the right arm has drained and has gone down in size. The MD came to take a look at it and advised that just a bandage was needed to keep it covered. Patient advised to follow up if it comes back to have it re-drained. Patient verbalized understanding.

## 2019-09-11 ENCOUNTER — TELEPHONE (OUTPATIENT)
Dept: GASTROENTEROLOGY | Facility: CLINIC | Age: 69
End: 2019-09-11

## 2019-09-11 NOTE — LETTER
[facility]  [address]  [facility] [address]  Phone: [phone]                 Kristin Guerra  [address] [address] [facility] [address]      EGD Prep Instructions    [facility]  [address] [facility] [phone]  Endoscopy Lab [phone]    You are scheduled for an EGD with Dr. Cantor on 10/07/2019 at [facility].  You will check in at [facility] on the first floor of the hospital. Please contact the office two days before procedure date to reschedule if needed.    You will need to be on a clear liquid diet for two days (10/5/19 and 10/6/2019) before procedure.  You can have clear liquids until 1200am the day before procedure.  CLEAR LIQUID DIET:   - Avoid Red, Orange, Purple, and/or Blue food coloring   - NO DAIRY   - You can have:  Coffee with sugar (no creamer), tea, water, soda, apple or white grape juice, chicken or beef broth/bouillon (no meat, noodles, or veggies), green/yellow popsicles, green/yellow Jell-O, lemonade.      You MAY take your blood pressure, heart, and seizure medication on the morning of the procedure, with a SIP of water.  Hold ALL other medications until after the procedure.    If you are on blood thinners THAT YOU HAVE BEEN INSTRUCTED TO HOLD BY YOUR DOCTOR FOR THIS PROCEDURE, then do NOT take this the morning of your EGD.  Do NOT stop these medications on your own, they must be approved to be held by your doctor.  Your EGD can NOT be done if you are on these medications.  Examples of blood thinners include: Coumadin, Aggrenox, Plavix, Pradaxa, Reapro, Pletal, Xarelto, Ticagrelor, Brilinta, Eliquis, and high dose aspirin (325 mg).  You do not have to stop baby aspirin 81 mg.

## 2019-09-11 NOTE — TELEPHONE ENCOUNTER
You are scheduled for an EGD with Dr. Cantor on 10/07/2019 at [facility].  You will check in at [facility] on the first floor of the hospital. Please contact the office two days before procedure date to reschedule if needed.    You will need to be on a clear liquid diet for two days (10/5/19 and 10/6/2019) before procedure.  You can have clear liquids until 1200am the day before procedure.  CLEAR LIQUID DIET:   - Avoid Red, Orange, Purple, and/or Blue food coloring   - NO DAIRY   - You can have:  Coffee with sugar (no creamer), tea, water, soda, apple or white grape juice, chicken or beef broth/bouillon (no meat, noodles, or veggies), green/yellow popsicles, green/yellow Jell-O, lemonade.      You MAY take your blood pressure, heart, and seizure medication on the morning of the procedure, with a SIP of water.  Hold ALL other medications until after the procedure.    If you are on blood thinners THAT YOU HAVE BEEN INSTRUCTED TO HOLD BY YOUR DOCTOR FOR THIS PROCEDURE, then do NOT take this the morning of your EGD.  Do NOT stop these medications on your own, they must be approved to be held by your doctor.  Your EGD can NOT be done if you are on these medications.  Examples of blood thinners include: Coumadin, Aggrenox, Plavix, Pradaxa, Reapro, Pletal, Xarelto, Ticagrelor, Brilinta, Eliquis, and high dose aspirin (325 mg).  You do not have to stop baby aspirin 81 mg.

## 2019-09-13 ENCOUNTER — TELEPHONE (OUTPATIENT)
Dept: SMOKING CESSATION | Facility: CLINIC | Age: 69
End: 2019-09-13

## 2019-09-19 ENCOUNTER — OFFICE VISIT (OUTPATIENT)
Dept: FAMILY MEDICINE | Facility: CLINIC | Age: 69
End: 2019-09-19
Payer: MEDICARE

## 2019-09-19 VITALS
HEIGHT: 61 IN | SYSTOLIC BLOOD PRESSURE: 149 MMHG | HEART RATE: 56 BPM | BODY MASS INDEX: 23.98 KG/M2 | RESPIRATION RATE: 16 BRPM | WEIGHT: 127 LBS | DIASTOLIC BLOOD PRESSURE: 77 MMHG

## 2019-09-19 DIAGNOSIS — Z90.81 S/P SPLENECTOMY: Chronic | ICD-10-CM

## 2019-09-19 DIAGNOSIS — F17.200 TOBACCO DEPENDENCE: Chronic | ICD-10-CM

## 2019-09-19 DIAGNOSIS — J34.9 SINUS PROBLEM: ICD-10-CM

## 2019-09-19 DIAGNOSIS — Z23 NEEDS FLU SHOT: ICD-10-CM

## 2019-09-19 DIAGNOSIS — G25.0 TREMOR, ESSENTIAL: Chronic | ICD-10-CM

## 2019-09-19 DIAGNOSIS — I10 ESSENTIAL HYPERTENSION: Primary | Chronic | ICD-10-CM

## 2019-09-19 DIAGNOSIS — M54.12 CERVICAL RADICULOPATHY: ICD-10-CM

## 2019-09-19 PROCEDURE — 3078F PR MOST RECENT DIASTOLIC BLOOD PRESSURE < 80 MM HG: ICD-10-PCS | Mod: S$GLB,,, | Performed by: INTERNAL MEDICINE

## 2019-09-19 PROCEDURE — 1101F PT FALLS ASSESS-DOCD LE1/YR: CPT | Mod: S$GLB,,, | Performed by: INTERNAL MEDICINE

## 2019-09-19 PROCEDURE — G0008 FLU VACCINE - HIGH DOSE (65+) PRESERVATIVE FREE IM: ICD-10-PCS | Mod: S$GLB,,, | Performed by: INTERNAL MEDICINE

## 2019-09-19 PROCEDURE — 99214 OFFICE O/P EST MOD 30 MIN: CPT | Mod: 25,S$GLB,, | Performed by: INTERNAL MEDICINE

## 2019-09-19 PROCEDURE — 1101F PR PT FALLS ASSESS DOC 0-1 FALLS W/OUT INJ PAST YR: ICD-10-PCS | Mod: S$GLB,,, | Performed by: INTERNAL MEDICINE

## 2019-09-19 PROCEDURE — 99214 PR OFFICE/OUTPT VISIT, EST, LEVL IV, 30-39 MIN: ICD-10-PCS | Mod: 25,S$GLB,, | Performed by: INTERNAL MEDICINE

## 2019-09-19 PROCEDURE — 3078F DIAST BP <80 MM HG: CPT | Mod: S$GLB,,, | Performed by: INTERNAL MEDICINE

## 2019-09-19 PROCEDURE — 99999 PR PBB SHADOW E&M-EST. PATIENT-LVL IV: CPT | Mod: PBBFAC,,, | Performed by: INTERNAL MEDICINE

## 2019-09-19 PROCEDURE — 3077F PR MOST RECENT SYSTOLIC BLOOD PRESSURE >= 140 MM HG: ICD-10-PCS | Mod: S$GLB,,, | Performed by: INTERNAL MEDICINE

## 2019-09-19 PROCEDURE — 90662 IIV NO PRSV INCREASED AG IM: CPT | Mod: S$GLB,,, | Performed by: INTERNAL MEDICINE

## 2019-09-19 PROCEDURE — G0008 ADMIN INFLUENZA VIRUS VAC: HCPCS | Mod: S$GLB,,, | Performed by: INTERNAL MEDICINE

## 2019-09-19 PROCEDURE — 99999 PR PBB SHADOW E&M-EST. PATIENT-LVL IV: ICD-10-PCS | Mod: PBBFAC,,, | Performed by: INTERNAL MEDICINE

## 2019-09-19 PROCEDURE — 3077F SYST BP >= 140 MM HG: CPT | Mod: S$GLB,,, | Performed by: INTERNAL MEDICINE

## 2019-09-19 PROCEDURE — 90662 FLU VACCINE - HIGH DOSE (65+) PRESERVATIVE FREE IM: ICD-10-PCS | Mod: S$GLB,,, | Performed by: INTERNAL MEDICINE

## 2019-09-19 NOTE — PROGRESS NOTES
Subjective:       Patient ID: Kristin Guerra is a 69 y.o. female.    Chief Complaint: Hypertension and Tremors    Hypertension   This is a chronic problem. The current episode started more than 1 year ago. The problem is unchanged. The problem is controlled. Associated symptoms include anxiety, malaise/fatigue and neck pain. Pertinent negatives include no chest pain, headaches, palpitations or shortness of breath. Risk factors for coronary artery disease include sedentary lifestyle and smoking/tobacco exposure. Past treatments include calcium channel blockers. The current treatment provides moderate improvement. Compliance problems include psychosocial issues.  There is no history of chronic renal disease, coarctation of the aorta, hyperaldosteronism or renovascular disease.   Gastroesophageal Reflux   She complains of heartburn. She reports no chest pain or no coughing. This is a recurrent problem. The current episode started more than 1 year ago. The problem occurs constantly. The problem has been unchanged. Associated symptoms include fatigue. Risk factors include smoking/tobacco exposure and Sterling's esophagus (Sterling's esophagus). She has tried a PPI for the symptoms. The treatment provided moderate relief. Past procedures include an EGD.       Past Medical History:   Diagnosis Date    Allergy     Sterling's esophagus     Colon cancer     Colon polyp     Diverticulitis     Diverticulosis     Fatty liver     GERD (gastroesophageal reflux disease)     Hx of cervical spine surgery 11/07/2018    Hyperlipidemia     Hypertension     Lupus (systemic lupus erythematosus) 1999    discoid    Osteoarthritis     Osteoporosis     Tremor 2005    essential     Social History     Socioeconomic History    Marital status:      Spouse name: Not on file    Number of children: 2    Years of education: Not on file    Highest education level: Not on file   Occupational History    Not on file   Social  Needs    Financial resource strain: Not on file    Food insecurity:     Worry: Not on file     Inability: Not on file    Transportation needs:     Medical: Not on file     Non-medical: Not on file   Tobacco Use    Smoking status: Current Every Day Smoker     Packs/day: 0.50     Years: 35.00     Pack years: 17.50     Types: Cigarettes    Smokeless tobacco: Never Used   Substance and Sexual Activity    Alcohol use: No    Drug use: No    Sexual activity: Yes     Partners: Male     Birth control/protection: Post-menopausal   Lifestyle    Physical activity:     Days per week: Not on file     Minutes per session: Not on file    Stress: Not at all   Relationships    Social connections:     Talks on phone: Not on file     Gets together: Not on file     Attends [employer] service: Not on file     Active member of club or organization: Not on file     Attends meetings of clubs or organizations: Not on file     Relationship status: Not on file   Other Topics Concern    Are you pregnant or think you may be? Not Asked    Breast-feeding Not Asked   Social History Narrative    Not on file     Past Surgical History:   Procedure Laterality Date    APPENDECTOMY  2007    removed during colon surgery    CERVICAL FUSION       SECTION      COLON SURGERY   &     hemicolectomy for colon cancer and second was for complications from the first surgery    COLONOSCOPY N/A 2018    [facility]    EGD (ESOPHAGOGASTRODUODENOSCOPY) N/A 2019    [facility]air [facility]or [facility] at [facility]    EGD (ESOPHAGOGASTRODUODENOSCOPY) N/A 2019    Performed by [facility] ENDO    EGD (ESOPHAGOGASTRODUODENOSCOPY) N/A 2019    [facility] at [facility]    ESOPHAGOGASTRODUODENOSCOPY  2019    with RFA and biopsies    ESOPHAGOGASTRODUODENOSCOPY (EGD) N/A 2018    [facility] at [facility]    HYSTERECTOMY       ovaries remain    SPLENECTOMY, TOTAL  2007    Injured during surgery and removed    UPPER GASTROINTESTINAL ENDOSCOPY  2007    GERD & hiatal hernia per patient report     Family History   Problem Relation Age of Onset    Colon cancer Father         diagnosed in his 60's    Lung cancer Father     Lymphoma Mother         Brain    Lung cancer Brother     Lung cancer Brother         metastatic from prostate    Prostate cancer Brother     Heart disease Brother         valvular disease    Hyperlipidemia Brother     Eczema Neg Hx     Lupus Neg Hx     Psoriasis Neg Hx     Melanoma Neg Hx     Crohn's disease Neg Hx     Ulcerative colitis Neg Hx     Stomach cancer Neg Hx     Esophageal cancer Neg Hx        Review of Systems   Constitutional: Positive for fatigue and malaise/fatigue. Negative for activity change, chills, fever and unexpected weight change.   HENT: Positive for congestion. Negative for postnasal drip and sinus pressure.         Chronic sinus problems currently on Astelin   Eyes: Negative for pain, discharge and visual disturbance.   Respiratory: Negative for cough, chest tightness and shortness of breath.    Cardiovascular: Negative for chest pain, palpitations and leg swelling.        HTN,    Gastrointestinal: Positive for heartburn. Negative for abdominal distention, anal bleeding, constipation and diarrhea.        Sterling's esophagitis and reflux.  Currently on PPI.   Genitourinary: Negative for difficulty urinating, dysuria, flank pain, frequency and pelvic pain.   Musculoskeletal: Positive for arthralgias, back pain, neck pain and neck stiffness. Negative for joint swelling.        History of cervical spine surgery with persistent postsurgical pain.  Currently under pain management and hydrocodone.  Also on gabapentin.   Skin: Negative for color change, pallor and rash.   Allergic/Immunologic: Negative for environmental allergies, food allergies and immunocompromised state.  "  Neurological: Positive for tremors. Negative for dizziness, seizures, syncope, light-headedness and headaches.   Hematological: Negative for adenopathy. Does not bruise/bleed easily.   Psychiatric/Behavioral: Positive for sleep disturbance. Negative for agitation, confusion and dysphoric mood. The patient is nervous/anxious.          Objective:      Blood pressure (!) 149/77, pulse (!) 56, resp. rate 16, height 5' 1" (1.549 m), weight 57.6 kg (127 lb). Body mass index is 24 kg/m².  Physical Exam   Constitutional: She appears well-developed and well-nourished. She is cooperative. She appears distressed.   Chronically ill-appearing lady with tremulousness and slight difficulty walking.   HENT:   Head: Normocephalic and atraumatic.   Right Ear: Tympanic membrane normal.   Left Ear: Tympanic membrane normal.   Eyes: Pupils are equal, round, and reactive to light. Conjunctivae, EOM and lids are normal. Lids are everted and swept, no foreign bodies found. Right pupil is round and reactive. Left pupil is round and reactive.   Neck: Trachea normal and normal range of motion. Neck supple.   Cardiovascular: Normal rate, regular rhythm, S1 normal, S2 normal and normal heart sounds.   Pulmonary/Chest: She has decreased breath sounds. She has no wheezes. She exhibits no tenderness.   Abdominal: Soft. Bowel sounds are normal. There is no rigidity and no guarding.   Musculoskeletal: She exhibits tenderness and deformity.        Back:    Deformities in the spine noted.   Lymphadenopathy:     She has no cervical adenopathy.     She has no axillary adenopathy.   Neurological: She is alert.   Skin: Skin is warm and dry.   Psychiatric: Her behavior is normal. Thought content normal.   Nursing note and vitals reviewed.        Assessment:       1. Essential hypertension    2. Tremor, essential    3. Needs flu shot    4. S/P splenectomy    5. Cervical radiculopathy           Lab Visit on 08/01/2019   Component Date Value Ref Range " Status    Sodium 08/01/2019 143  136 - 145 mmol/L Final    Potassium 08/01/2019 3.9  3.5 - 5.1 mmol/L Final    Chloride 08/01/2019 109  95 - 110 mmol/L Final    CO2 08/01/2019 28  23 - 29 mmol/L Final    Glucose 08/01/2019 91  70 - 110 mg/dL Final    BUN, Bld 08/01/2019 14  8 - 23 mg/dL Final    Creatinine 08/01/2019 0.7  0.5 - 1.4 mg/dL Final    Calcium 08/01/2019 9.1  8.7 - 10.5 mg/dL Final    Total Protein 08/01/2019 7.8  6.0 - 8.4 g/dL Final    Albumin 08/01/2019 3.8  3.5 - 5.2 g/dL Final    Total Bilirubin 08/01/2019 0.6  0.1 - 1.0 mg/dL Final    Alkaline Phosphatase 08/01/2019 58  55 - 135 U/L Final    AST 08/01/2019 19  10 - 40 U/L Final    ALT 08/01/2019 14  10 - 44 U/L Final    Anion Gap 08/01/2019 6* 8 - 16 mmol/L Final    eGFR if African American 08/01/2019 >60.0  >60 mL/min/1.73 m^2 Final    eGFR if non African American 08/01/2019 >60.0  >60 mL/min/1.73 m^2 Final    Cholesterol 08/01/2019 226* 120 - 199 mg/dL Final    Triglycerides 08/01/2019 88  30 - 150 mg/dL Final    HDL 08/01/2019 52  40 - 75 mg/dL Final    LDL Cholesterol 08/01/2019 156.4  63.0 - 159.0 mg/dL Final    Hdl/Cholesterol Ratio 08/01/2019 23.0  20.0 - 50.0 % Final    Total Cholesterol/HDL Ratio 08/01/2019 4.3  2.0 - 5.0 Final    Non-HDL Cholesterol 08/01/2019 174  mg/dL Final   Office Visit on 07/09/2019   Component Date Value Ref Range Status    Color, UA 07/09/2019 Yellow   Final    Spec Grav UA 07/09/2019 1.010   Final    pH, UA 07/09/2019 6   Final    WBC, UA 07/09/2019 neg   Final    Nitrite, UA 07/09/2019 neg   Final    Protein 07/09/2019 neg   Final    Glucose, UA 07/09/2019 neg   Final    Ketones, UA 07/09/2019 neg   Final    Urobilinogen, UA 07/09/2019 0.2   Final    Bilirubin 07/09/2019 neg   Final    Blood, UA 07/09/2019 small   Final    Urine Culture, Routine 07/09/2019 No growth   Final    Specimen UA 07/09/2019 Urine, Catheterized   Final    Color, UA 07/09/2019 Yellow  Yellow, Straw,  [place] Final    Appearance, UA 07/09/2019 Clear  Clear Final    pH, UA 07/09/2019 5.0  5.0 - 8.0 Final    Specific Gravity, UA 07/09/2019 <=1.005* 1.005 - 1.030 Final    Protein, UA 07/09/2019 Negative  Negative Final    Glucose, UA 07/09/2019 Negative  Negative Final    Ketones, UA 07/09/2019 Negative  Negative Final    Bilirubin (UA) 07/09/2019 Negative  Negative Final    Occult Blood UA 07/09/2019 1+* Negative Final    Nitrite, UA 07/09/2019 Negative  Negative Final    Urobilinogen, UA 07/09/2019 Negative  <2.0 EU/dL Final    Leukocytes, UA 07/09/2019 Negative  Negative Final    RBC, UA 07/09/2019 1  0 - 4 /hpf Final    WBC, UA 07/09/2019 0  0 - 5 /hpf Final    Bacteria 07/09/2019 Rare  None-Occ /hpf Final    Squam Epithel, UA 07/09/2019 0  /hpf Final    Microscopic Comment 07/09/2019 SEE COMMENT   Final     This is a new patient appointment for miss Kristin hernandez who has thus far had fragmented medical care spanning across multiple sub specialities and subspecialties.  Her care includes cardiologist, gastroenterologist, chronic pain management and neurologist also.    She is status post splenectomy.    Plan:           Essential hypertension    Tremor, essential    Needs flu shot  -     Influenza - High Dose (65+) (PF) (IM)    S/P splenectomy    Cervical radiculopathy     First order of business would be to start her on antibiotics poor what appears to be an suppurative hidradenitis lesion in the right axilla.    If it does not get better in a couple of days or so she will need incision and drainage.    I have advised her to quit smoking but at this point she is not prepared for same.    History of splenectomy is noted but I am unclear and unsure about if she has been completely immunized and vaccinated accordingly.  Patient herself is not a very astute historian and old primary care records are not easily available at this point.    She will need the following-    1--pneumonia vaccine-while she  may have obtained pneumococcal 23, it is unclear if she has obtained Prevnar 13.  2-meningitis vaccine.  Patient is not sure about it  3-HIB vaccination- patient is not sure about it    Patient has been advised to monitor her blood pressure.    Respiratory status will need to be assessed in near future.    Use of opiate medications, chronic pain and attendant mood dysphoric disorder has also been noted.    Future challenges may include ambulation, polypharmacy and issues of aging and attendant complications.    Follow-up in 2 or 3 weeks for review of status.    Follow-up in 1-2 days with Dr.Ryan Rodolfo MD for potential I and D of lesion in right axilla.    Spent about 40-45 minutes with the patient with 50% of time face-to-face discussion about her multiple medical issues, issues or tobacco dependency and need for immunizations.      Current Outpatient Medications:     aluminum & magnesium hydroxide-simethicone (MYLANTA MAX STRENGTH) 400-400-40 mg/5 mL suspension, Take by mouth every 6 (six) hours as needed for Indigestion., Disp: , Rfl:     aspirin 81 MG Chew, aspirin 81 mg chewable tablet  Chew 1 tablet every day by oral route., Disp: , Rfl:     calcium carbonate (TUMS) 200 mg calcium (500 mg) chewable tablet, Take 1 tablet by mouth 2 (two) times daily as needed for Heartburn., Disp: , Rfl:     doxycycline (MONODOX) 100 MG capsule, Take 1 capsule (100 mg total) by mouth 2 (two) times daily. Avoid sun exposure, Disp: 14 capsule, Rfl: 0    esomeprazole (NEXIUM) 40 MG capsule, Take 1 capsule (40 mg total) by mouth 2 (two) times daily before meals., Disp: 60 capsule, Rfl: 2    fluticasone (FLONASE) 50 mcg/actuation nasal spray, Inhale 1 spray into the lungs once daily once daily., Disp: , Rfl:     gabapentin (NEURONTIN) 300 MG capsule, Take 1 capsule (300 mg total) by mouth 2 (two) times daily., Disp: 60 capsule, Rfl: 3    HYDROcodone-acetaminophen (NORCO) 7.5-325 mg per tablet, 1 tablet daily as needed.,  Disp: , Rfl: 0    LORazepam (ATIVAN) 0.5 MG tablet, Take 1 tablet (0.5 mg total) by mouth daily as needed., Disp: 30 tablet, Rfl: 3    NIFEdipine (ADALAT CC) 30 MG TbSR, nifedipine ER 30 mg tablet,extended release  Take 1 tablet every day by oral route., Disp: , Rfl:     polyethylene glycol (GLYCOLAX) 17 gram/dose powder, Take 17 g by mouth daily as needed., Disp: , Rfl:     traZODone (DESYREL) 100 MG tablet, Take 1 tablet (100 mg total) by mouth nightly., Disp: 90 tablet, Rfl: 1    azelastine (ASTELIN) 137 mcg (0.1 %) nasal spray, 1 spray (137 mcg total) by Nasal route 2 (two) times daily., Disp: 30 mL, Rfl: 11  Subjective:       Patient ID: Kristin Guerra is a 69 y.o. female.    Chief Complaint: Hypertension and Tremors    Hypertension   This is a chronic problem. The current episode started more than 1 year ago. The problem is unchanged. The problem is controlled. Associated symptoms include anxiety, malaise/fatigue and neck pain. Pertinent negatives include no chest pain, headaches, palpitations or shortness of breath. Risk factors for coronary artery disease include sedentary lifestyle and smoking/tobacco exposure. Past treatments include calcium channel blockers. The current treatment provides moderate improvement. Compliance problems include psychosocial issues.  There is no history of chronic renal disease, coarctation of the aorta, hyperaldosteronism or renovascular disease.   Gastroesophageal Reflux   She complains of heartburn. She reports no chest pain or no coughing. This is a recurrent problem. The current episode started more than 1 year ago. The problem occurs constantly. The problem has been unchanged. Associated symptoms include fatigue. Risk factors include smoking/tobacco exposure and Sterling's esophagus (Sterling's esophagus). She has tried a PPI for the symptoms. The treatment provided moderate relief. Past procedures include an EGD.       Past Medical History:   Diagnosis Date    Allergy      Sterling's esophagus     Colon cancer     Colon polyp     Diverticulitis     Diverticulosis     Fatty liver     GERD (gastroesophageal reflux disease)     Hx of cervical spine surgery 2018    Hyperlipidemia     Hypertension     Lupus (systemic lupus erythematosus) 1999    discoid    Osteoarthritis     Osteoporosis     Tremor 2005    essential     Social History     Socioeconomic History    Marital status:      Spouse name: Not on file    Number of children: 2    Years of education: Not on file    Highest education level: Not on file   Occupational History    Not on file   Social Needs    Financial resource strain: Not on file    Food insecurity:     Worry: Not on file     Inability: Not on file    Transportation needs:     Medical: Not on file     Non-medical: Not on file   Tobacco Use    Smoking status: Current Every Day Smoker     Packs/day: 0.50     Years: 35.00     Pack years: 17.50     Types: Cigarettes    Smokeless tobacco: Never Used   Substance and Sexual Activity    Alcohol use: No    Drug use: No    Sexual activity: Yes     Partners: Male     Birth control/protection: Post-menopausal   Lifestyle    Physical activity:     Days per week: Not on file     Minutes per session: Not on file    Stress: Not at all   Relationships    Social connections:     Talks on phone: Not on file     Gets together: Not on file     Attends Gnosticist service: Not on file     Active member of club or organization: Not on file     Attends meetings of clubs or organizations: Not on file     Relationship status: Not on file   Other Topics Concern    Are you pregnant or think you may be? Not Asked    Breast-feeding Not Asked   Social History Narrative    Not on file     Past Surgical History:   Procedure Laterality Date    APPENDECTOMY  2007    removed during colon surgery    CERVICAL FUSION       SECTION      COLON SURGERY   & 2008    hemicolectomy for colon cancer and  second was for complications from the first surgery    COLONOSCOPY N/A 2/20/2018    [facility] ENDO    EGD (ESOPHAGOGASTRODUODENOSCOPY) N/A 8/21/2019    [facility]    EGD (ESOPHAGOGASTRODUODENOSCOPY) N/A 6/4/2019    [facility] ENDO    EGD (ESOPHAGOGASTRODUODENOSCOPY) N/A 4/2/2019    Performed by [facility] Steele [facility] at [facility] ENDO    ESOPHAGOGASTRODUODENOSCOPY  06/04/2019    with RFA and biopsies    ESOPHAGOGASTRODUODENOSCOPY (EGD) N/A 5/1/2018    [facility] by [facility] ENDO    HYSTERECTOMY      ovaries remain    SPLENECTOMY, TOTAL  2007    Injured during surgery and removed    UPPER GASTROINTESTINAL ENDOSCOPY  2007    GERD & hiatal hernia per patient report     Family History   Problem Relation Age of Onset    Colon cancer Father         diagnosed in his 60's    Lung cancer Father     Lymphoma Mother         Brain    Lung cancer Brother     Lung cancer Brother         metastatic from prostate    Prostate cancer Brother     Heart disease Brother         valvular disease    Hyperlipidemia Brother     Eczema Neg Hx     Lupus Neg Hx     Psoriasis Neg Hx     Melanoma Neg Hx     Crohn's disease Neg Hx     Ulcerative colitis Neg Hx     Stomach cancer Neg Hx     Esophageal cancer Neg Hx        Review of Systems   Constitutional: Positive for fatigue and malaise/fatigue. Negative for activity change, chills, fever and unexpected weight change.   HENT: Positive for congestion. Negative for postnasal drip and sinus pressure.         Chronic sinus problems currently on Astelin   Eyes: Negative for pain, discharge and visual disturbance.   Respiratory: Negative for cough, chest tightness and shortness of breath.    Cardiovascular: Negative for chest pain, palpitations and leg swelling.        HTN,    Gastrointestinal: Positive for heartburn. Negative for abdominal distention, anal  "bleeding, constipation and diarrhea.        Sterling's esophagitis and reflux.  Currently on PPI.   Genitourinary: Negative for difficulty urinating, dysuria, flank pain, frequency and pelvic pain.   Musculoskeletal: Positive for arthralgias, back pain, neck pain and neck stiffness. Negative for joint swelling.        History of cervical spine surgery with persistent postsurgical pain.  Currently under pain management and hydrocodone.  Also on gabapentin.   Skin: Negative for color change, pallor and rash.   Allergic/Immunologic: Negative for environmental allergies, food allergies and immunocompromised state.   Neurological: Positive for tremors. Negative for dizziness, seizures, syncope, light-headedness and headaches.   Hematological: Negative for adenopathy. Does not bruise/bleed easily.   Psychiatric/Behavioral: Positive for sleep disturbance. Negative for agitation, confusion and dysphoric mood. The patient is nervous/anxious.          Objective:      Blood pressure (!) 149/77, pulse (!) 56, resp. rate 16, height 5' 1" (1.549 m), weight 57.6 kg (127 lb). Body mass index is 24 kg/m².  Physical Exam   Constitutional: She appears well-developed and well-nourished. She is cooperative. She appears distressed.   Chronically ill-appearing lady with tremulousness and slight difficulty walking.   HENT:   Head: Normocephalic and atraumatic.   Right Ear: Tympanic membrane normal.   Left Ear: Tympanic membrane normal.   Eyes: Pupils are equal, round, and reactive to light. Conjunctivae, EOM and lids are normal. Lids are everted and swept, no foreign bodies found. Right pupil is round and reactive. Left pupil is round and reactive.   Neck: Trachea normal and normal range of motion. Neck supple.   Cardiovascular: Normal rate, regular rhythm, S1 normal, S2 normal and normal heart sounds.   Pulmonary/Chest: She has decreased breath sounds. She has no wheezes. She exhibits no tenderness.   Abdominal: Soft. Bowel sounds are " normal. There is no rigidity and no guarding.   Musculoskeletal: She exhibits tenderness and deformity.        Back:    Deformities in the spine noted.   Lymphadenopathy:     She has no cervical adenopathy.     She has no axillary adenopathy.   Neurological: She is alert.   Skin: Skin is warm and dry.   Psychiatric: Her behavior is normal. Thought content normal.   Nursing note and vitals reviewed.        Assessment:       1. Essential hypertension    2. Tremor, essential    3. Needs flu shot    4. S/P splenectomy    5. Cervical radiculopathy           Lab Visit on 08/01/2019   Component Date Value Ref Range Status    Sodium 08/01/2019 143  136 - 145 mmol/L Final    Potassium 08/01/2019 3.9  3.5 - 5.1 mmol/L Final    Chloride 08/01/2019 109  95 - 110 mmol/L Final    CO2 08/01/2019 28  23 - 29 mmol/L Final    Glucose 08/01/2019 91  70 - 110 mg/dL Final    BUN, Bld 08/01/2019 14  8 - 23 mg/dL Final    Creatinine 08/01/2019 0.7  0.5 - 1.4 mg/dL Final    Calcium 08/01/2019 9.1  8.7 - 10.5 mg/dL Final    Total Protein 08/01/2019 7.8  6.0 - 8.4 g/dL Final    Albumin 08/01/2019 3.8  3.5 - 5.2 g/dL Final    Total Bilirubin 08/01/2019 0.6  0.1 - 1.0 mg/dL Final    Alkaline Phosphatase 08/01/2019 58  55 - 135 U/L Final    AST 08/01/2019 19  10 - 40 U/L Final    ALT 08/01/2019 14  10 - 44 U/L Final    Anion Gap 08/01/2019 6* 8 - 16 mmol/L Final    eGFR if African American 08/01/2019 >60.0  >60 mL/min/1.73 m^2 Final    eGFR if non African American 08/01/2019 >60.0  >60 mL/min/1.73 m^2 Final    Cholesterol 08/01/2019 226* 120 - 199 mg/dL Final    Triglycerides 08/01/2019 88  30 - 150 mg/dL Final    HDL 08/01/2019 52  40 - 75 mg/dL Final    LDL Cholesterol 08/01/2019 156.4  63.0 - 159.0 mg/dL Final    Hdl/Cholesterol Ratio 08/01/2019 23.0  20.0 - 50.0 % Final    Total Cholesterol/HDL Ratio 08/01/2019 4.3  2.0 - 5.0 Final    Non-HDL Cholesterol 08/01/2019 174  mg/dL Final   Office Visit on 07/09/2019    Component Date Value Ref Range Status    Color, UA 07/09/2019 Yellow   Final    Spec Grav UA 07/09/2019 1.010   Final    pH, UA 07/09/2019 6   Final    WBC, UA 07/09/2019 neg   Final    Nitrite, UA 07/09/2019 neg   Final    Protein 07/09/2019 neg   Final    Glucose, UA 07/09/2019 neg   Final    Ketones, UA 07/09/2019 neg   Final    Urobilinogen, UA 07/09/2019 0.2   Final    Bilirubin 07/09/2019 neg   Final    Blood, UA 07/09/2019 small   Final    Urine Culture, Routine 07/09/2019 No growth   Final    Specimen UA 07/09/2019 Urine, Catheterized   Final    Color, UA 07/09/2019 Yellow  Yellow, Straw, [place] Final    Appearance, UA 07/09/2019 Clear  Clear Final    pH, UA 07/09/2019 5.0  5.0 - 8.0 Final    Specific Gravity, UA 07/09/2019 <=1.005* 1.005 - 1.030 Final    Protein, UA 07/09/2019 Negative  Negative Final    Glucose, UA 07/09/2019 Negative  Negative Final    Ketones, UA 07/09/2019 Negative  Negative Final    Bilirubin (UA) 07/09/2019 Negative  Negative Final    Occult Blood UA 07/09/2019 1+* Negative Final    Nitrite, UA 07/09/2019 Negative  Negative Final    Urobilinogen, UA 07/09/2019 Negative  <2.0 EU/dL Final    Leukocytes, UA 07/09/2019 Negative  Negative Final    RBC, UA 07/09/2019 1  0 - 4 /hpf Final    WBC, UA 07/09/2019 0  0 - 5 /hpf Final    Bacteria 07/09/2019 Rare  None-Occ /hpf Final    Squam Epithel, UA 07/09/2019 0  /hpf Final    Microscopic Comment 07/09/2019 SEE COMMENT   Final     This is a new patient appointment for miss Kristin hernandez who has thus far had fragmented medical care spanning across multiple sub specialities and subspecialties.  Her care includes cardiologist, gastroenterologist, chronic pain management and neurologist also.    She is status post splenectomy.    Plan:           Essential hypertension    Tremor, essential    Needs flu shot  -     Influenza - High Dose (65+) (PF) (IM)    S/P splenectomy    Cervical radiculopathy     First order of  business would be to start her on antibiotics poor what appears to be an suppurative hidradenitis lesion in the right axilla.    If it does not get better in a couple of days or so she will need incision and drainage.    I have advised her to quit smoking but at this point she is not prepared for same.    History of splenectomy is noted but I am unclear and unsure about if she has been completely immunized and vaccinated accordingly.  Patient herself is not a very astute historian and old primary care records are not easily available at this point.    She will need the following-    1--pneumonia vaccine-while she may have obtained pneumococcal 23, it is unclear if she has obtained Prevnar 13.  2-meningitis vaccine.  Patient is not sure about it  3-HIB vaccination- patient is not sure about it    Patient has been advised to monitor her blood pressure.    Respiratory status will need to be assessed in near future.    Use of opiate medications, chronic pain and attendant mood dysphoric disorder has also been noted.    Future challenges may include ambulation, polypharmacy and issues of aging and attendant complications.    Follow-up in 2 or 3 weeks for review of status.    Follow-up in 1-2 days with Dr.Ryan Rodolfo MD for potential I and D of lesion in right axilla.    Spent about 40-45 minutes with the patient with 50% of time face-to-face discussion about her multiple medical issues, issues or tobacco dependency and need for immunizations.      Current Outpatient Medications:     aluminum & magnesium hydroxide-simethicone (MYLANTA MAX STRENGTH) 400-400-40 mg/5 mL suspension, Take by mouth every 6 (six) hours as needed for Indigestion., Disp: , Rfl:     aspirin 81 MG Chew, aspirin 81 mg chewable tablet  Chew 1 tablet every day by oral route., Disp: , Rfl:     calcium carbonate (TUMS) 200 mg calcium (500 mg) chewable tablet, Take 1 tablet by mouth 2 (two) times daily as needed for Heartburn., Disp: , Rfl:      doxycycline (MONODOX) 100 MG capsule, Take 1 capsule (100 mg total) by mouth 2 (two) times daily. Avoid sun exposure, Disp: 14 capsule, Rfl: 0    esomeprazole (NEXIUM) 40 MG capsule, Take 1 capsule (40 mg total) by mouth 2 (two) times daily before meals., Disp: 60 capsule, Rfl: 2    fluticasone (FLONASE) 50 mcg/actuation nasal spray, Inhale 1 spray into the lungs once daily once daily., Disp: , Rfl:     gabapentin (NEURONTIN) 300 MG capsule, Take 1 capsule (300 mg total) by mouth 2 (two) times daily., Disp: 60 capsule, Rfl: 3    HYDROcodone-acetaminophen (NORCO) 7.5-325 mg per tablet, 1 tablet daily as needed., Disp: , Rfl: 0    LORazepam (ATIVAN) 0.5 MG tablet, Take 1 tablet (0.5 mg total) by mouth daily as needed., Disp: 30 tablet, Rfl: 3    NIFEdipine (ADALAT CC) 30 MG TbSR, nifedipine ER 30 mg tablet,extended release  Take 1 tablet every day by oral route., Disp: , Rfl:     polyethylene glycol (GLYCOLAX) 17 gram/dose powder, Take 17 g by mouth daily as needed., Disp: , Rfl:     traZODone (DESYREL) 100 MG tablet, Take 1 tablet (100 mg total) by mouth nightly., Disp: 90 tablet, Rfl: 1    azelastine (ASTELIN) 137 mcg (0.1 %) nasal spray, 1 spray (137 mcg total) by Nasal route 2 (two) times daily., Disp: 30 mL, Rfl: 11Subjective:       Patient ID: Kristin Guerra is a 69 y.o. female.    Chief Complaint: Hypertension and Tremors    HPI    Past Medical History:   Diagnosis Date    Allergy     Sterling's esophagus     Colon cancer     Colon polyp     Diverticulitis     Diverticulosis     Fatty liver     GERD (gastroesophageal reflux disease)     Hx of cervical spine surgery 11/07/2018    Hyperlipidemia     Hypertension     Lupus (systemic lupus erythematosus) 1999    discoid    Osteoarthritis     Osteoporosis     Tremor 2005    essential     Social History     Socioeconomic History    Marital status:      Spouse name: Not on file    Number of children: 2    Years of education: Not on  file    Highest education level: Not on file   Occupational History    Not on file   Social Needs    Financial resource strain: Not on file    Food insecurity:     Worry: Not on file     Inability: Not on file    Transportation needs:     Medical: Not on file     Non-medical: Not on file   Tobacco Use    Smoking status: Current Every Day Smoker     Packs/day: 0.50     Years: 35.00     Pack years: 17.50     Types: Cigarettes    Smokeless tobacco: Never Used   Substance and Sexual Activity    Alcohol use: No    Drug use: No    Sexual activity: Yes     Partners: Male     Birth control/protection: Post-menopausal   Lifestyle    Physical activity:     Days per week: Not on file     Minutes per session: Not on file    Stress: Not at all   Relationships    Social connections:     Talks on phone: Not on file     Gets together: Not on file     Attends Nondenominational service: Not on file     Active member of club or organization: Not on file     Attends meetings of clubs or organizations: Not on file     Relationship status: Not on file   Other Topics Concern    Are you pregnant or think you may be? Not Asked    Breast-feeding Not Asked   Social History Narrative    Not on file     Past Surgical History:   Procedure Laterality Date    APPENDECTOMY      removed during colon surgery    CERVICAL FUSION       SECTION      COLON SURGERY   &     hemicolectomy for colon cancer and second was for complications from the first surgery    COLONOSCOPY N/A 2018    Performed [facility]quivel [facility] at [facility]    EGD (ESOPHAGOGASTRODUODENOSCOPY) N/A 2019    [facility]    EGD (ESOPHAGOGASTRODUODENOSCOPY) N/A 2019    [facility] at [facility]    EGD (ESOPHAGOGASTRODUODENOSCOPY) N/A 2019    Performed by Monika Steele MD at [facility] END[facility]    ESOPHAGOGASTRODUODENOSCOPY  2019    with RFA and biopsies     ESOPHAGOGASTRODUODENOSCOPY (EGD) N/A 5/1/2018    [facility]    HYSTERECTOMY      ovaries remain    SPLENECTOMY, TOTAL  2007    Injured during surgery and removed    UPPER GASTROINTESTINAL ENDOSCOPY  2007    GERD & hiatal hernia per patient report     Family History   Problem Relation Age of Onset    Colon cancer Father         diagnosed in his 60's    Lung cancer Father     Lymphoma Mother         Brain    Lung cancer Brother     Lung cancer Brother         metastatic from prostate    Prostate cancer Brother     Heart disease Brother         valvular disease    Hyperlipidemia Brother     Eczema Neg Hx     Lupus Neg Hx     Psoriasis Neg Hx     Melanoma Neg Hx     Crohn's disease Neg Hx     Ulcerative colitis Neg Hx     Stomach cancer Neg Hx     Esophageal cancer Neg Hx        Review of Systems   Constitutional: Positive for fatigue. Negative for activity change, chills, fever and unexpected weight change.   HENT: Positive for congestion. Negative for postnasal drip and sinus pressure.         Chronic sinus problems currently on Astelin   Eyes: Negative for pain, discharge and visual disturbance.   Respiratory: Negative for cough, chest tightness and shortness of breath.    Cardiovascular: Negative for chest pain, palpitations and leg swelling.        HTN,    Gastrointestinal: Negative for abdominal distention, anal bleeding, constipation and diarrhea.        Sterling's esophagitis and reflux.  Currently on PPI.   Genitourinary: Negative for difficulty urinating, dysuria, flank pain, frequency and pelvic pain.   Musculoskeletal: Positive for arthralgias, back pain, neck pain and neck stiffness. Negative for joint swelling.        History of cervical spine surgery with persistent postsurgical pain.  Currently under pain management and hydrocodone.  Also on gabapentin.   Skin: Negative for color change, pallor and rash.   Allergic/Immunologic: Negative for  "environmental allergies, food allergies and immunocompromised state.   Neurological: Positive for tremors. Negative for dizziness, seizures, syncope, light-headedness and headaches.   Hematological: Negative for adenopathy. Does not bruise/bleed easily.   Psychiatric/Behavioral: Positive for sleep disturbance. Negative for agitation, confusion and dysphoric mood. The patient is nervous/anxious.          Objective:      Blood pressure (!) 149/77, pulse (!) 56, resp. rate 16, height 5' 1" (1.549 m), weight 57.6 kg (127 lb). Body mass index is 24 kg/m².  Physical Exam   Constitutional: She appears well-developed and well-nourished. She is cooperative. She appears distressed.   Chronically ill-appearing lady with tremulousness and slight difficulty walking.   HENT:   Head: Normocephalic and atraumatic.   Right Ear: Tympanic membrane normal.   Left Ear: Tympanic membrane normal.   Eyes: Pupils are equal, round, and reactive to light. Conjunctivae, EOM and lids are normal. Lids are everted and swept, no foreign bodies found. Right pupil is round and reactive. Left pupil is round and reactive.   Neck: Trachea normal and normal range of motion. Neck supple.   Cardiovascular: Normal rate, regular rhythm, S1 normal, S2 normal and normal heart sounds.   Pulmonary/Chest: She has decreased breath sounds. She has no wheezes. She exhibits no tenderness.   Abdominal: Soft. Bowel sounds are normal. There is no rigidity and no guarding.   Musculoskeletal: She exhibits tenderness and deformity.        Back:    Deformities in the spine noted.   Lymphadenopathy:     She has no cervical adenopathy.     She has no axillary adenopathy.   Neurological: She is alert.   Skin: Skin is warm and dry.   Psychiatric: Her behavior is normal. Thought content normal.   Nursing note and vitals reviewed.        Assessment:       1. Essential hypertension    2. Tremor, essential    3. Needs flu shot    4. S/P splenectomy    5. Cervical radiculopathy     "       Lab Visit on 08/01/2019   Component Date Value Ref Range Status    Sodium 08/01/2019 143  136 - 145 mmol/L Final    Potassium 08/01/2019 3.9  3.5 - 5.1 mmol/L Final    Chloride 08/01/2019 109  95 - 110 mmol/L Final    CO2 08/01/2019 28  23 - 29 mmol/L Final    Glucose 08/01/2019 91  70 - 110 mg/dL Final    BUN, Bld 08/01/2019 14  8 - 23 mg/dL Final    Creatinine 08/01/2019 0.7  0.5 - 1.4 mg/dL Final    Calcium 08/01/2019 9.1  8.7 - 10.5 mg/dL Final    Total Protein 08/01/2019 7.8  6.0 - 8.4 g/dL Final    Albumin 08/01/2019 3.8  3.5 - 5.2 g/dL Final    Total Bilirubin 08/01/2019 0.6  0.1 - 1.0 mg/dL Final    Alkaline Phosphatase 08/01/2019 58  55 - 135 U/L Final    AST 08/01/2019 19  10 - 40 U/L Final    ALT 08/01/2019 14  10 - 44 U/L Final    Anion Gap 08/01/2019 6* 8 - 16 mmol/L Final    eGFR if African American 08/01/2019 >60.0  >60 mL/min/1.73 m^2 Final    eGFR if non African American 08/01/2019 >60.0  >60 mL/min/1.73 m^2 Final    Cholesterol 08/01/2019 226* 120 - 199 mg/dL Final    Triglycerides 08/01/2019 88  30 - 150 mg/dL Final    HDL 08/01/2019 52  40 - 75 mg/dL Final    LDL Cholesterol 08/01/2019 156.4  63.0 - 159.0 mg/dL Final    Hdl/Cholesterol Ratio 08/01/2019 23.0  20.0 - 50.0 % Final    Total Cholesterol/HDL Ratio 08/01/2019 4.3  2.0 - 5.0 Final    Non-HDL Cholesterol 08/01/2019 174  mg/dL Final   Office Visit on 07/09/2019   Component Date Value Ref Range Status    Color, UA 07/09/2019 Yellow   Final    Spec Grav UA 07/09/2019 1.010   Final    pH, UA 07/09/2019 6   Final    WBC, UA 07/09/2019 neg   Final    Nitrite, UA 07/09/2019 neg   Final    Protein 07/09/2019 neg   Final    Glucose, UA 07/09/2019 neg   Final    Ketones, UA 07/09/2019 neg   Final    Urobilinogen, UA 07/09/2019 0.2   Final    Bilirubin 07/09/2019 neg   Final    Blood, UA 07/09/2019 small   Final    Urine Culture, Routine 07/09/2019 No growth   Final    Specimen UA 07/09/2019 Urine,  Catheterized   Final    Color, UA 07/09/2019 Yellow  Yellow, Straw, [place] Final    Appearance, UA 07/09/2019 Clear  Clear Final    pH, UA 07/09/2019 5.0  5.0 - 8.0 Final    Specific Gravity, UA 07/09/2019 <=1.005* 1.005 - 1.030 Final    Protein, UA 07/09/2019 Negative  Negative Final    Glucose, UA 07/09/2019 Negative  Negative Final    Ketones, UA 07/09/2019 Negative  Negative Final    Bilirubin (UA) 07/09/2019 Negative  Negative Final    Occult Blood UA 07/09/2019 1+* Negative Final    Nitrite, UA 07/09/2019 Negative  Negative Final    Urobilinogen, UA 07/09/2019 Negative  <2.0 EU/dL Final    Leukocytes, UA 07/09/2019 Negative  Negative Final    RBC, UA 07/09/2019 1  0 - 4 /hpf Final    WBC, UA 07/09/2019 0  0 - 5 /hpf Final    Bacteria 07/09/2019 Rare  None-Occ /hpf Final    Squam Epithel, UA 07/09/2019 0  /hpf Final    Microscopic Comment 07/09/2019 SEE COMMENT   Final         Plan:           Essential hypertension    Tremor, essential    Needs flu shot  -     Influenza - High Dose (65+) (PF) (IM)    S/P splenectomy    Cervical radiculopathy      Spent sonido 25 minutes with patient which involved review of pts medical conditions, labs, medications and with 50% of time face-to-face discussion about medical problems, management and any applicable changes.      Current Outpatient Medications:     aluminum & magnesium hydroxide-simethicone (MYLANTA MAX STRENGTH) 400-400-40 mg/5 mL suspension, Take by mouth every 6 (six) hours as needed for Indigestion., Disp: , Rfl:     aspirin 81 MG Chew, aspirin 81 mg chewable tablet  Chew 1 tablet every day by oral route., Disp: , Rfl:     calcium carbonate (TUMS) 200 mg calcium (500 mg) chewable tablet, Take 1 tablet by mouth 2 (two) times daily as needed for Heartburn., Disp: , Rfl:     doxycycline (MONODOX) 100 MG capsule, Take 1 capsule (100 mg total) by mouth 2 (two) times daily. Avoid sun exposure, Disp: 14 capsule, Rfl: 0    esomeprazole (NEXIUM) 40 MG  capsule, Take 1 capsule (40 mg total) by mouth 2 (two) times daily before meals., Disp: 60 capsule, Rfl: 2    fluticasone (FLONASE) 50 mcg/actuation nasal spray, Inhale 1 spray into the lungs once daily once daily., Disp: , Rfl:     gabapentin (NEURONTIN) 300 MG capsule, Take 1 capsule (300 mg total) by mouth 2 (two) times daily., Disp: 60 capsule, Rfl: 3    HYDROcodone-acetaminophen (NORCO) 7.5-325 mg per tablet, 1 tablet daily as needed., Disp: , Rfl: 0    LORazepam (ATIVAN) 0.5 MG tablet, Take 1 tablet (0.5 mg total) by mouth daily as needed., Disp: 30 tablet, Rfl: 3    NIFEdipine (ADALAT CC) 30 MG TbSR, nifedipine ER 30 mg tablet,extended release  Take 1 tablet every day by oral route., Disp: , Rfl:     polyethylene glycol (GLYCOLAX) 17 gram/dose powder, Take 17 g by mouth daily as needed., Disp: , Rfl:     traZODone (DESYREL) 100 MG tablet, Take 1 tablet (100 mg total) by mouth nightly., Disp: 90 tablet, Rfl: 1    azelastine (ASTELIN) 137 mcg (0.1 %) nasal spray, 1 spray (137 mcg total) by Nasal route 2 (two) times daily., Disp: 30 mL, Rfl: 11

## 2019-09-19 NOTE — PATIENT INSTRUCTIONS
Essential Tremor (ET)  What is essential tremor?  Essential tremor (ET) is a neurological disorder that causes your hands, head, trunk, voice, or legs to shake rhythmically. It is often confused with Parkinson disease.  ET is the most common trembling disorder that people have. Everyone has some ET, but the movements usually cannot be seen or felt. When tremors are noticeable, the condition is classified as ET.  ET is most common among people older than age 65, but it can affect people at any age.  What causes ET?  ET can occur in different people for different reasons:  · Familial essential tremor. In most people, the condition seems to be passed down from a parent to a child. If your parent has ET, there is a 50% chance that you or your children will inherit the gene responsible for the condition.  · Essential tremor related to another disorder. Sometimes, a tremor is a symptom of another neurological disorder, such as Parkinson disease or dystonia. Sometimes, ET is mistaken for these other diseases when they are not present. A healthcare providers careful diagnosis is extremely important.  The cause of ET isnt known. However, one theory suggests that your cerebellum and other parts of your brain are not communicating correctly. The cerebellum is a part of the brain that controls muscle coordination.  What are the symptoms of ET?  If you have ET, you will have shaking and trembling at different times and in different situations, but some characteristics are common to all. Here is what you might typically experience:  · Tremors occur when you move and are less noticeable when you rest.  · Certain medicines, caffeine, or stress can make your tremors worse.  · Tremor may improve with ingestion of a small amount of alcohol (such as wine)  · Tremors get worse as you age.  · Tremors dont affect both sides of your body in the same way.  Here are different signs of essential tremor:  · Tremors that are most obvious  in your hands  · Difficulty doing tasks with your hands, such as writing or using tools  · Shaking or quivering sound in your voice  · Uncontrollable head-nodding  · In rare instances, tremors in your legs or feet  How is ET diagnosed?  Your rapid, uncontrollable trembling, as well as questions about your medical and family history, can help your healthcare provider determine if you have familial ET. He or she will probably need to rule out other conditions that could cause shaking or trembling. For example, tremors could be symptoms of diseases, such as hyperthyroidism. Your healthcare provider might test you for those, as well.  In some cases, the tremors might be related to other factors. To find out for certain, your healthcare provider may have you try to:  · Abstain from heavy alcohol use; if youre an alcoholic, trembling is a common symptom.  · Cut out cigarette smoking.  · Avoid caffeine.  · Avoid certain medicines  How is ET treated?   Propanolol and primidone are 2 medicines often prescribed to treat ET. Propanolol blocks the stimulating action of neurotransmitters to calm your trembling. Primidone is a common antiseizure medicine that also controls the actions of neurotransmitters.  Gabapentin and topiramate are 2 other antiseizure medicines that are sometimes prescribed. In some cases, tranquilizers like alprazolam or clonazepam might be suggested.  For ET in your hands, botulinum toxin (Botox) injections have shown some promise in easing the trembling. They work by weakening the surrounding muscles around your hands. For severe tremors, a stimulating device (deep brain stimulator) surgically implanted in your brain may help.  Can ET be prevented?   The specific cause of ET is not known, so scientists are not sure how the condition can be prevented.  Living with ET  ET is usually not dangerous, but it can certainly be frustrating if you have to deal with it. Certain factors can make tremors worse, so  the following steps may help to decrease tremors:  · Avoid alcohol, cigarettes, and caffeine  · Avoid stressful situations as much as possible  · Use relaxation techniques, such as yoga, deep-breathing exercises, or biofeedback  · Check with your healthcare provider to see if any medicines youre taking could be making your tremors worse.  Talk with your healthcare provider about other options, such as surgery, if ET starts to affect your quality of life.  When should I call my healthcare provider?  If you have been diagnosed with ET, talk with your healthcare provider about when you might need to call. He or she will likely advise you to call if your tremors become worse, or if you develop new neurologic symptoms, such as numbness or weakness.  Key points about essential tremors  · ET is a neurological disorder that causes your hands, head, trunk, voice, or legs to shake rhythmically. The cause is not known, but it is often passed down from a parent to a child.  · ET is sometimes confused with other types of tremor, so getting the right diagnosis is important.  · Tremors tend to be worse during movement than when at rest. The tremors are usually not dangerous, but they can get worse over time.  · Avoiding things that might make tremors worse, such as stress, caffeine, and certain medicines, may be helpful.  · Medicines can also help control or limit tremors in some people. Severe tremors can sometimes be treated with surgery.  Next steps  Tips to help you get the most from a visit to your healthcare provider:  · Know the reason for your visit and what you want to happen.  · Before your visit, write down questions you want answered.  · Bring someone with you to help you ask questions and remember what your provider tells you.  · At the visit, write down the name of a new diagnosis, and any new medicines, treatments, or tests. Also write down any new instructions your provider gives you.  · Know why a new medicine or  treatment is prescribed, and how it will help you. Also know what the side effects are.  · Ask if your condition can be treated in other ways.  · Know why a test or procedure is recommended and what the results could mean.  · Know what to expect if you do not take the medicine or have the test or procedure.  · If you have a follow-up appointment, write down the date, time, and purpose for that visit.  · Know how you can contact your provider if you have questions.  © 2535-1486 [employer]. [address] [place] [address], [place] [address]. All rights reserved. This information is not intended as a substitute for professional medical care. Always follow your healthcare professional's instructions.        Taking Your Blood Pressure  Blood pressure is the force of blood against the artery wall as it moves from the heart through the blood vessels. You can take your own blood pressure reading using a digital monitor. Take your readings the same each time, using the same arm. Take readings as often as your healthcare provider instructs.  About blood pressure monitors  Blood pressure monitors are designed for certain ages and cases. You can find monitors for older adults, for pregnant women, and for children. Make sure the one you choose is the right one for your age and situation.  The American Heart Association recommends an automatic cuff monitor that fits on your upper arm (bicep). The cuff should fit your arm size. A cuff thats too large or too small will not give an accurate reading. Measure around your upper arm to find your size.  Monitors that attach to your finger or wrist are not as accurate as monitors for your upper arm.  Ask your healthcare provider for help in choosing a monitor. Bring your monitor to your next provider visit if you need help in using it the correct way.  The steps below are general instructions for using an automatic digital monitor.  Step 1. Relax    · Take your blood pressure at  the same time every day, such as in the morning or evening, or at the time your healthcare provider recommends.  · Wait at least a half-hour after smoking, eating, or exercising. Don't drink coffee, tea, soda, or other caffeinated beverages before checking your blood pressure.  · Sit comfortably at a table with both feet on the floor. Do not cross your legs or feet. Place the monitor near you.  · Rest for a few minutes before you begin.  Step 2. Wrap the cuff    · Place your arm on the table, palm up. Your arm should be at the level of your heart. Wrap the cuff around your upper arm, just above your elbow. Its best done on bare skin, not over clothing. Most cuffs will indicate where the brachial artery (the blood vessel in the middle of the arm at the inner side of the elbow) should line up with the cuff. Look in your monitor's instruction booklet for an illustration. You can also bring your cuff to your healthcare provider and have them show you how to correctly place the cuff.  Step 3. Inflate the cuff    · Push the button that starts the pump.  · The cuff will tighten, then loosen.  · The numbers will change. When they stop changing, your blood pressure reading will appear.  · Take 2 or 3 readings one minute apart.  Step 4. Write down the results of each reading    · Write down your blood pressure numbers for each reading. Note the date and time. Keep your results in one place, such as a notebook. Even if your monitor has a built-in memory, keep a hard copy of the readings.  · Remove the cuff from your arm. Turn off the machine.  · Bring your blood pressure records with your healthcare providers at each visit.  · If you start a new blood pressure medicine, note the day you started the new medicine. Also note the day if you change the dose of your medicine. This information goes on your blood pressure recording sheet. This will help your healthcare provider monitor how well the medicine changes are  working.  · Ask your healthcare provider what numbers should prompt you to call him or her. Also ask what numbers should prompt you to get help right away.  Date Last Reviewed: 11/1/2016  © 5961-8149 [employer]. [address] [place] [address], [place] [address]. All rights reserved. This information is not intended as a substitute for professional medical care. Always follow your healthcare professional's instructions.

## 2019-09-20 ENCOUNTER — TELEPHONE (OUTPATIENT)
Dept: GASTROENTEROLOGY | Facility: CLINIC | Age: 69
End: 2019-09-20

## 2019-09-20 PROBLEM — I95.1 SYNCOPE DUE TO ORTHOSTATIC HYPOTENSION: Status: RESOLVED | Noted: 2018-12-13 | Resolved: 2019-09-20

## 2019-09-20 PROBLEM — K20.90 ESOPHAGITIS: Status: RESOLVED | Noted: 2018-04-17 | Resolved: 2019-09-20

## 2019-09-20 PROBLEM — L08.9 SKIN INFECTION: Status: RESOLVED | Noted: 2019-09-04 | Resolved: 2019-09-20

## 2019-09-20 NOTE — PROGRESS NOTES
Subjective:       Patient ID: Kristin Guerra is a 69 y.o. female.    Chief Complaint: Hypertension; Tremors; Neck Pain; and Sinus Problem    May Kristin is a 69-year-old  female who comes for follow-up.  Underlying medical issues of hypertension, chronic neck pain, tremors of the head and neck have been noted. She had history of neck surgery in past.    Last visit she had axillary abscess and she was seen by my colleague Dr. Weaver or ricky who performed incision and drainage with resolution of this problem.  She does have chronic changes of hidradenitis in the axilla.  Certainly continued smoking increases her risk of repeat infections.  She is currently taking doxycycline.    Neuropathy pain is controlled by gabapentin and pain management.    There has been a recommendation to use Botox for her persistent tremors in the head.  These are uncontrollable tremors of the head and neck in lateral direction bilaterally. The seem to aggravate her neck pain because of persistent and constant movement on her cervical spine.    Sleep is helped by trazodone.  Insomnia has been there for several years.    Hypertension   This is a chronic problem. The current episode started more than 1 month ago. The problem has been waxing and waning since onset. The problem is uncontrolled. Associated symptoms include neck pain. Pertinent negatives include no chest pain, headaches, palpitations or shortness of breath. Risk factors for coronary artery disease include sedentary lifestyle, smoking/tobacco exposure and stress. Past treatments include calcium channel blockers. The current treatment provides mild improvement. Compliance problems include psychosocial issues.    Neck Pain    This is a chronic problem. The current episode started more than 1 year ago. The problem occurs constantly. The problem has been unchanged. Associated with: Past surgery in C-spine/persistent tremors of head. Pertinent negatives include no chest pain,  fever or headaches.   Sinus Problem   This is a chronic problem. The current episode started more than 1 year ago. The problem has been gradually improving since onset. There has been no fever. Associated symptoms include congestion and neck pain. Pertinent negatives include no chills, coughing, headaches, shortness of breath or sinus pressure. Treatments tried: Combination of Flonase and Astelin. The treatment provided moderate relief.       Past Medical History:   Diagnosis Date    Allergy     Sterling's esophagus     Colon cancer     Colon polyp     Diverticulitis     Diverticulosis     Fatty liver     GERD (gastroesophageal reflux disease)     Hx of cervical spine surgery 11/07/2018    Hyperlipidemia     Hypertension     Lupus (systemic lupus erythematosus) 1999    discoid    Osteoarthritis     Osteoporosis     Tremor 2005    essential     Social History     Socioeconomic History    Marital status:      Spouse name: Not on file    Number of children: 2    Years of education: Not on file    Highest education level: Not on file   Occupational History    Not on file   Social Needs    Financial resource strain: Not on file    Food insecurity:     Worry: Not on file     Inability: Not on file    Transportation needs:     Medical: Not on file     Non-medical: Not on file   Tobacco Use    Smoking status: Current Every Day Smoker     Packs/day: 0.50     Years: 35.00     Pack years: 17.50     Types: Cigarettes    Smokeless tobacco: Never Used   Substance and Sexual Activity    Alcohol use: No    Drug use: No    Sexual activity: Not Currently     Partners: Male     Birth control/protection: Post-menopausal   Lifestyle    Physical activity:     Days per week: Not on file     Minutes per session: Not on file    Stress: Not at all   Relationships    Social connections:     Talks on phone: Not on file     Gets together: Not on file     Attends Restorationist service: Not on file     Active  member of club or organization: Not on file     Attends meetings of clubs or organizations: Not on file     Relationship status: Not on file   Other Topics Concern    Are you pregnant or think you may be? Not Asked    Breast-feeding Not Asked   Social History Narrative    Not on file     Past Surgical History:   Procedure Laterality Date    APPENDECTOMY  2007    removed during colon surgery    CERVICAL FUSION       SECTION      COLON SURGERY   &     hemicolectomy for colon cancer and second was for complications from the first surgery    COLONOSCOPY N/A 2018    Performed by [facility] at [facility]    EGD (ESOPHAGOGASTRODUODENOSCOPY) N/A 2019    [facility] at [facility]    EGD (ESOPHAGOGASTRODUODENOSCOPY) N/A 2019    [facility] at [facility] END[facility]    EGD (ESOPHAGOGASTRODUODENOSCOPY) N/A 2019    Performed by Monika Steele [facility] at [facility] END[facility]    ESOPHAGOGASTRODUODENOSCOPY  2019    with RFA and biopsies    ESOPHAGOGASTRODUODENOSCOPY (EGD) N/A 2018    [facility] by [facility] at [facility] END[facility]    HYSTERECTOMY      ovaries remain    SPLENECTOMY, TOTAL      Injured during surgery and removed    UPPER GASTROINTESTINAL ENDOSCOPY      GERD & hiatal hernia per patient report     Family History   Problem Relation Age of Onset    Colon cancer Father         diagnosed in his 60's    Lung cancer Father     Lymphoma Mother         Brain    Lung cancer Brother     Lung cancer Brother         metastatic from prostate    Prostate cancer Brother     Heart disease Brother         valvular disease    Hyperlipidemia Brother     Eczema Neg Hx     Lupus Neg Hx     Psoriasis Neg Hx     Melanoma Neg Hx     Crohn's disease Neg Hx     Ulcerative colitis Neg Hx     Stomach cancer Neg Hx     Esophageal cancer Neg Hx        Review of Systems   Constitutional: Positive for fatigue. Negative for activity change,  "chills, fever and unexpected weight change.   HENT: Positive for congestion and postnasal drip. Negative for sinus pressure.         Chronic sinus problems currently on Astelin   Eyes: Negative for pain, discharge and visual disturbance.   Respiratory: Negative for cough, chest tightness and shortness of breath.    Cardiovascular: Negative for chest pain, palpitations and leg swelling.        HTN,    Gastrointestinal: Negative for abdominal distention, anal bleeding, constipation and diarrhea.        Sterling's esophagitis and reflux.  Currently on PPI.   Genitourinary: Negative for difficulty urinating, dysuria, flank pain, frequency and pelvic pain.   Musculoskeletal: Positive for arthralgias, back pain, neck pain and neck stiffness. Negative for joint swelling.        History of cervical spine surgery with persistent postsurgical pain.  Currently under pain management and hydrocodone.  Also on gabapentin.   Skin: Negative for color change, pallor and rash.   Allergic/Immunologic: Negative for environmental allergies, food allergies and immunocompromised state.   Neurological: Positive for tremors. Negative for dizziness, seizures, syncope, light-headedness and headaches.   Hematological: Negative for adenopathy. Does not bruise/bleed easily.   Psychiatric/Behavioral: Positive for sleep disturbance. Negative for agitation, confusion and dysphoric mood. The patient is nervous/anxious.          Objective:      Blood pressure (!) 149/77, pulse (!) 56, resp. rate 16, height 5' 1" (1.549 m), weight 57.6 kg (127 lb). Body mass index is 24 kg/m².  Physical Exam   Constitutional: She appears well-developed and well-nourished. She is cooperative. No distress.   Chronically ill-appearing lady with tremulousness and slight difficulty walking.   HENT:   Head: Normocephalic and atraumatic.   Right Ear: Tympanic membrane normal.   Left Ear: Tympanic membrane normal.   Eyes: Pupils are equal, round, and reactive to light. " Conjunctivae, EOM and lids are normal. Lids are everted and swept, no foreign bodies found. Right pupil is round and reactive. Left pupil is round and reactive.   Neck: Trachea normal and normal range of motion. Neck supple.   Cardiovascular: Normal rate, regular rhythm, S1 normal, S2 normal and normal heart sounds.   Pulmonary/Chest: She has decreased breath sounds. She has no wheezes. She exhibits no tenderness.   Abdominal: Soft. Bowel sounds are normal. There is no rigidity and no guarding.   Musculoskeletal: She exhibits tenderness and deformity.        Back:    Deformities in the spine noted.   Lymphadenopathy:     She has no cervical adenopathy.     She has no axillary adenopathy.   Neurological: She is alert.   Skin: Skin is warm and dry.   Psychiatric: Her behavior is normal. Thought content normal.   Nursing note and vitals reviewed.        Assessment:       1. Essential hypertension    2. Tremor, essential    3. Needs flu shot    4. Cervical radiculopathy    5. Sinus problem    6. S/P splenectomy    7. Tobacco dependence           Lab Visit on 08/01/2019   Component Date Value Ref Range Status    Sodium 08/01/2019 143  136 - 145 mmol/L Final    Potassium 08/01/2019 3.9  3.5 - 5.1 mmol/L Final    Chloride 08/01/2019 109  95 - 110 mmol/L Final    CO2 08/01/2019 28  23 - 29 mmol/L Final    Glucose 08/01/2019 91  70 - 110 mg/dL Final    BUN, Bld 08/01/2019 14  8 - 23 mg/dL Final    Creatinine 08/01/2019 0.7  0.5 - 1.4 mg/dL Final    Calcium 08/01/2019 9.1  8.7 - 10.5 mg/dL Final    Total Protein 08/01/2019 7.8  6.0 - 8.4 g/dL Final    Albumin 08/01/2019 3.8  3.5 - 5.2 g/dL Final    Total Bilirubin 08/01/2019 0.6  0.1 - 1.0 mg/dL Final    Alkaline Phosphatase 08/01/2019 58  55 - 135 U/L Final    AST 08/01/2019 19  10 - 40 U/L Final    ALT 08/01/2019 14  10 - 44 U/L Final    Anion Gap 08/01/2019 6* 8 - 16 mmol/L Final    eGFR if African American 08/01/2019 >60.0  >60 mL/min/1.73 m^2 Final     eGFR if non African American 08/01/2019 >60.0  >60 mL/min/1.73 m^2 Final    Cholesterol 08/01/2019 226* 120 - 199 mg/dL Final    Triglycerides 08/01/2019 88  30 - 150 mg/dL Final    HDL 08/01/2019 52  40 - 75 mg/dL Final    LDL Cholesterol 08/01/2019 156.4  63.0 - 159.0 mg/dL Final    Hdl/Cholesterol Ratio 08/01/2019 23.0  20.0 - 50.0 % Final    Total Cholesterol/HDL Ratio 08/01/2019 4.3  2.0 - 5.0 Final    Non-HDL Cholesterol 08/01/2019 174  mg/dL Final   Office Visit on 07/09/2019   Component Date Value Ref Range Status    Color, UA 07/09/2019 Yellow   Final    Spec Grav UA 07/09/2019 1.010   Final    pH, UA 07/09/2019 6   Final    WBC, UA 07/09/2019 neg   Final    Nitrite, UA 07/09/2019 neg   Final    Protein 07/09/2019 neg   Final    Glucose, UA 07/09/2019 neg   Final    Ketones, UA 07/09/2019 neg   Final    Urobilinogen, UA 07/09/2019 0.2   Final    Bilirubin 07/09/2019 neg   Final    Blood, UA 07/09/2019 small   Final    Urine Culture, Routine 07/09/2019 No growth   Final    Specimen UA 07/09/2019 Urine, Catheterized   Final    Color, UA 07/09/2019 Yellow  Yellow, Straw, [place] Final    Appearance, UA 07/09/2019 Clear  Clear Final    pH, UA 07/09/2019 5.0  5.0 - 8.0 Final    Specific Gravity, UA 07/09/2019 <=1.005* 1.005 - 1.030 Final    Protein, UA 07/09/2019 Negative  Negative Final    Glucose, UA 07/09/2019 Negative  Negative Final    Ketones, UA 07/09/2019 Negative  Negative Final    Bilirubin (UA) 07/09/2019 Negative  Negative Final    Occult Blood UA 07/09/2019 1+* Negative Final    Nitrite, UA 07/09/2019 Negative  Negative Final    Urobilinogen, UA 07/09/2019 Negative  <2.0 EU/dL Final    Leukocytes, UA 07/09/2019 Negative  Negative Final    RBC, UA 07/09/2019 1  0 - 4 /hpf Final    WBC, UA 07/09/2019 0  0 - 5 /hpf Final    Bacteria 07/09/2019 Rare  None-Occ /hpf Final    Squam Epithel, UA 07/09/2019 0  /hpf Final    Microscopic Comment 07/09/2019 SEE COMMENT   Final          Plan:           Essential hypertension  Comments:  Blood pressures are elevated.  I will have to rely on home blood pressure readings.  Medications reviewed.  Patient advised to quit smoking.    Tremor, essential  Comments:  Chronic tremors of head and neck and I do not have any objection to her getting Botox.    Needs flu shot  -     Influenza - High Dose (65+) (PF) (IM)    Cervical radiculopathy    Sinus problem    S/P splenectomy  Comments:  Patient will need to be immunized for encapsulated organisms.  She has had pneumonia vaccination.  She will need meningitis vaccine as well as HIB vaccination.    Tobacco dependence  Comments:  Patient has been advised to quit smoking.    Patient has been advised to watch diet and exercise. Avoid fried and fatty food. Compliance to medications and follow up urged.    I again had a detailed review of for prior medical history and contribution of head tremors towards her persistent neck pain. I do not have any objection her getting Botox to help mitigate this problem.    Though I suspect, her dependence on opiates will remain.  Perhaps there will be an attempt to cut down on the use of opiates.    I had a lengthy discussion about plans to quit smoking.  Too many psycho social stressors at this point.    She will get a flu shot.  Will need to start immunization against other encapsulated organisms including meningitis vaccine and HIB vaccination.    Not sure if she got these immunizations before.    Spent sonido 25 minutes with patient which involved review of pts medical conditions, labs, medications and with 50% of time face-to-face discussion about medical problems, management and any applicable changes.          Current Outpatient Medications:     aluminum & magnesium hydroxide-simethicone (MYLANTA MAX STRENGTH) 400-400-40 mg/5 mL suspension, Take by mouth every 6 (six) hours as needed for Indigestion., Disp: , Rfl:     aspirin 81 MG Chew, aspirin 81 mg chewable  tablet  Chew 1 tablet every day by oral route., Disp: , Rfl:     calcium carbonate (TUMS) 200 mg calcium (500 mg) chewable tablet, Take 1 tablet by mouth 2 (two) times daily as needed for Heartburn., Disp: , Rfl:     doxycycline (MONODOX) 100 MG capsule, Take 1 capsule (100 mg total) by mouth 2 (two) times daily. Avoid sun exposure, Disp: 14 capsule, Rfl: 0    esomeprazole (NEXIUM) 40 MG capsule, Take 1 capsule (40 mg total) by mouth 2 (two) times daily before meals., Disp: 60 capsule, Rfl: 2    fluticasone (FLONASE) 50 mcg/actuation nasal spray, Inhale 1 spray into the lungs once daily once daily., Disp: , Rfl:     gabapentin (NEURONTIN) 300 MG capsule, Take 1 capsule (300 mg total) by mouth 2 (two) times daily., Disp: 60 capsule, Rfl: 3    HYDROcodone-acetaminophen (NORCO) 7.5-325 mg per tablet, 1 tablet daily as needed., Disp: , Rfl: 0    LORazepam (ATIVAN) 0.5 MG tablet, Take 1 tablet (0.5 mg total) by mouth daily as needed., Disp: 30 tablet, Rfl: 3    NIFEdipine (ADALAT CC) 30 MG TbSR, nifedipine ER 30 mg tablet,extended release  Take 1 tablet every day by oral route., Disp: , Rfl:     polyethylene glycol (GLYCOLAX) 17 gram/dose powder, Take 17 g by mouth daily as needed., Disp: , Rfl:     traZODone (DESYREL) 100 MG tablet, Take 1 tablet (100 mg total) by mouth nightly., Disp: 90 tablet, Rfl: 1    azelastine (ASTELIN) 137 mcg (0.1 %) nasal spray, 1 spray (137 mcg total) by Nasal route 2 (two) times daily., Disp: 30 mL, Rfl: 11

## 2019-09-20 NOTE — TELEPHONE ENCOUNTER
----- Message from Nancy Menon sent at 9/20/2019  2:16 PM CDT -----  Contact: self  Type:  Patient states received letter need to fast two days before procedure.   Patient states need to speak with nurse have questions       Name of Caller:Patient   When is the first available appointment?  Symptoms:  Best Call Back Number:[phone]  Additional Information:

## 2019-10-03 ENCOUNTER — TELEPHONE (OUTPATIENT)
Dept: ENDOSCOPY | Facility: HOSPITAL | Age: 69
End: 2019-10-03

## 2019-10-03 NOTE — TELEPHONE ENCOUNTER
Spoke with patient about arrival time @ 0700 monday    NPO status reviewed: Patient may eat until 7:00pm.  After 7pm, pt may have CLEAR liquids ONLY until completely NPO at Midnight.    Medications: Do not take Insulin or oral diabetic medications the day of the procedure.    Take as prescribed: heart, seizure and blood pressure medication in the morning with a sip of water (less than an ounce).  Take any breathing medications and bring inhalers to hospital with you.     Leave all valuables and jewelry at home. Wear comfortable clothes to procedure to change into hospital gown.   You cannot drive for 24 hours after your procedure because you will receive sedation for your procedure to make you comfortable.    A ride must be provided at discharge.

## 2019-10-06 ENCOUNTER — ANESTHESIA EVENT (OUTPATIENT)
Dept: ENDOSCOPY | Facility: HOSPITAL | Age: 69
End: 2019-10-06
Payer: MEDICARE

## 2019-10-07 ENCOUNTER — HOSPITAL ENCOUNTER (OUTPATIENT)
Facility: HOSPITAL | Age: 69
Discharge: HOME | End: 2019-10-07
Attending: INTERNAL MEDICINE | Admitting: INTERNAL MEDICINE
Payer: MEDICARE

## 2019-10-07 ENCOUNTER — ANESTHESIA (OUTPATIENT)
Dept: ENDOSCOPY | Facility: HOSPITAL | Age: 69
End: 2019-10-07
Payer: MEDICARE

## 2019-10-07 VITALS
OXYGEN SATURATION: 100 % | WEIGHT: 127 LBS | TEMPERATURE: 98 F | HEART RATE: 58 BPM | HEIGHT: 61 IN | DIASTOLIC BLOOD PRESSURE: 61 MMHG | RESPIRATION RATE: 17 BRPM | SYSTOLIC BLOOD PRESSURE: 118 MMHG | BODY MASS INDEX: 23.98 KG/M2

## 2019-10-07 DIAGNOSIS — K22.719 BARRETT'S ESOPHAGUS WITH DYSPLASIA: Primary | ICD-10-CM

## 2019-10-07 PROCEDURE — 25000003 PHARM REV CODE 250: Performed by: NURSE ANESTHETIST, CERTIFIED REGISTERED

## 2019-10-07 PROCEDURE — 43270 EGD LESION ABLATION: CPT | Performed by: INTERNAL MEDICINE

## 2019-10-07 PROCEDURE — 63600175 PHARM REV CODE 636 W HCPCS: Performed by: NURSE ANESTHETIST, CERTIFIED REGISTERED

## 2019-10-07 PROCEDURE — 37000008 HC ANESTHESIA 1ST 15 MINUTES: Performed by: INTERNAL MEDICINE

## 2019-10-07 PROCEDURE — 37000009 HC ANESTHESIA EA ADD 15 MINS: Performed by: INTERNAL MEDICINE

## 2019-10-07 PROCEDURE — 43270 EGD LESION ABLATION: CPT | Mod: ,,, | Performed by: INTERNAL MEDICINE

## 2019-10-07 PROCEDURE — 63600175 PHARM REV CODE 636 W HCPCS: Performed by: INTERNAL MEDICINE

## 2019-10-07 PROCEDURE — C1886 CATHETER, ABLATION: HCPCS | Performed by: INTERNAL MEDICINE

## 2019-10-07 PROCEDURE — 43270 PR EGD, FLEX, W/ABLATION, TUMOR/POLYP/LESION(S), W/ DILATION: ICD-10-PCS | Mod: ,,, | Performed by: INTERNAL MEDICINE

## 2019-10-07 RX ORDER — SUCRALFATE 1 G/1
TABLET ORAL
Qty: 60 TABLET | Refills: 0 | Status: SHIPPED | OUTPATIENT
Start: 2019-10-07 | End: 2019-11-25

## 2019-10-07 RX ORDER — PROPOFOL 10 MG/ML
VIAL (ML) INTRAVENOUS
Status: DISCONTINUED | OUTPATIENT
Start: 2019-10-07 | End: 2019-10-07

## 2019-10-07 RX ORDER — PROPOFOL 10 MG/ML
VIAL (ML) INTRAVENOUS CONTINUOUS PRN
Status: DISCONTINUED | OUTPATIENT
Start: 2019-10-07 | End: 2019-10-07

## 2019-10-07 RX ORDER — SODIUM CHLORIDE 9 MG/ML
INJECTION, SOLUTION INTRAVENOUS CONTINUOUS
Status: DISCONTINUED | OUTPATIENT
Start: 2019-10-07 | End: 2019-10-07 | Stop reason: HOSPADM

## 2019-10-07 RX ORDER — ESOMEPRAZOLE MAGNESIUM 40 MG/1
40 CAPSULE, DELAYED RELEASE ORAL
Qty: 60 CAPSULE | Refills: 2 | Status: ON HOLD | OUTPATIENT
Start: 2019-10-07 | End: 2020-02-10 | Stop reason: SDUPTHER

## 2019-10-07 RX ORDER — SODIUM CHLORIDE 0.9 % (FLUSH) 0.9 %
10 SYRINGE (ML) INJECTION
Status: DISCONTINUED | OUTPATIENT
Start: 2019-10-07 | End: 2019-10-07 | Stop reason: HOSPADM

## 2019-10-07 RX ORDER — LIDOCAINE HCL/PF 100 MG/5ML
SYRINGE (ML) INTRAVENOUS
Status: DISCONTINUED | OUTPATIENT
Start: 2019-10-07 | End: 2019-10-07

## 2019-10-07 RX ADMIN — SODIUM CHLORIDE: 0.9 INJECTION, SOLUTION INTRAVENOUS at 07:10

## 2019-10-07 RX ADMIN — PROPOFOL 20 MG: 10 INJECTION, EMULSION INTRAVENOUS at 08:10

## 2019-10-07 RX ADMIN — LIDOCAINE HYDROCHLORIDE 80 MG: 20 INJECTION, SOLUTION INTRAVENOUS at 08:10

## 2019-10-07 RX ADMIN — PROPOFOL 60 MG: 10 INJECTION, EMULSION INTRAVENOUS at 08:10

## 2019-10-07 RX ADMIN — PROPOFOL 130 MCG/KG/MIN: 10 INJECTION, EMULSION INTRAVENOUS at 08:10

## 2019-10-07 RX ADMIN — TOPICAL ANESTHETIC 1 EACH: 200 SPRAY DENTAL; PERIODONTAL at 08:10

## 2019-10-07 NOTE — ANESTHESIA PREPROCEDURE EVALUATION
10/07/2019  Kristin Guerra is a 69 y.o., female for EGD.  Underwent EGD 19 with [facility]  Past Medical History:   Diagnosis Date    Allergy     Sterling's esophagus     Colon cancer     Colon polyp     Diverticulitis     Diverticulosis     Fatty liver     GERD (gastroesophageal reflux disease)     Hx of cervical spine surgery 2018    Hyperlipidemia     Hypertension     Lupus (systemic lupus erythematosus)     discoid    Osteoarthritis     Osteoporosis     Tremor 2005    essential     Past Surgical History:   Procedure Laterality Date    APPENDECTOMY      removed during colon surgery    CERVICAL FUSION       SECTION      COLON SURGERY   &     hemicolectomy for colon cancer and second was for complications from the first surgery    COLONOSCOPY N/A 2018    Procedure: COLONOSCOPY;  Surgeon: Maverick Esquivle MD;  Location: [facility];  Service: Endoscopy;  Laterality: N/A; repeat in 5 years for surveillance    ESOPHAGOGASTRODUODENOSCOPY N/A 2019    Procedure: EGD (ESOPHAGOGASTRODUODENOSCOPY);  Surgeon: Monika Steele MD;  Location: [facility];  Service: Endoscopy;  Laterality: N/A;    ESOPHAGOGASTRODUODENOSCOPY  2019    with RFA and biopsies    ESOPHAGOGASTRODUODENOSCOPY N/A 2019    Procedure: EGD (ESOPHAGOGASTRODUODENOSCOPY);  Surgeon: Uzair Cantor MD;  Location: [facility];  Service: Endoscopy;  Laterality: N/A;  n+v with anesthesia    ESOPHAGOGASTRODUODENOSCOPY N/A 2019    Procedure: EGD (ESOPHAGOGASTRODUODENOSCOPY);  Surgeon: Uzair Cantor MD;  Location: [facility];  Service: Endoscopy;  Laterality: N/A;    HYSTERECTOMY      ovaries remain    SPLENECTOMY, TOTAL      Injured during surgery and removed    UPPER GASTROINTESTINAL ENDOSCOPY      GERD & hiatal hernia per patient report     Lab Results   Component  Value Date    WBC 10.34 03/20/2019    HGB 11.8 (L) 03/20/2019    HCT 37.2 03/20/2019     (H) 03/20/2019    CHOL 226 (H) 08/01/2019    TRIG 88 08/01/2019    HDL 52 08/01/2019    ALT 14 08/01/2019    AST 19 08/01/2019     08/01/2019    K 3.9 08/01/2019     08/01/2019    CREATININE 0.7 08/01/2019    BUN 14 08/01/2019    CO2 28 08/01/2019    TSH 0.859 01/10/2019           Anesthesia Evaluation    I have reviewed the Patient Summary Reports.    I have reviewed the Nursing Notes.   I have reviewed the Medications.     Review of Systems  Anesthesia Hx:  Personal Hx of Anesthesia complications, Post-Operative Nausea/Vomiting   Social:  Smoker        Physical Exam  General:  Well nourished    Airway/Jaw/Neck:  Airway Findings: Mouth Opening: Normal Tongue: Normal  General Airway Assessment: Adult  Mallampati: III  Improves to I with phonation.  TM Distance: Normal, at least 6 cm  Jaw/Neck Findings:  Neck ROM: Extension Decreased, Mild       Chest/Lungs:  Chest/Lungs Findings: Clear to auscultation, Normal Respiratory Rate     Heart/Vascular:  Heart Findings: Rate: Normal  Rhythm: Regular Rhythm  Sounds: Normal        Mental Status:  Mental Status Findings:  Alert and Oriented         Anesthesia Plan  Type of Anesthesia, risks & benefits discussed:  Anesthesia Type:  MAC  Patient's Preference:   Intra-op Monitoring Plan:   Intra-op Monitoring Plan Comments:   Post Op Pain Control Plan:   Post Op Pain Control Plan Comments:   Induction:   IV  Beta Blocker:         Informed Consent: Patient understands risks and agrees with Anesthesia plan.  Questions answered. Anesthesia consent signed with patient.  ASA Score: 3     Day of Surgery Review of History & Physical: I have interviewed and examined the patient. I have reviewed the patient's H&P dated:            Ready For Surgery From Anesthesia Perspective.

## 2019-10-07 NOTE — H&P
History & Physical - Short Stay  Gastroenterology      SUBJECTIVE:     Procedure: EGD    Chief Complaint/Indication for Procedure: Sterling's Esophagus    History of Present Illness:  Patient is a 69 y.o. female presents with Sterling's with LGD S/P RFA here for follow up.     PTA Medications   Medication Sig    aspirin 81 MG Chew aspirin 81 mg chewable tablet   Chew 1 tablet every day by oral route.    esomeprazole (NEXIUM) 40 MG capsule Take 1 capsule (40 mg total) by mouth 2 (two) times daily before meals.    gabapentin (NEURONTIN) 300 MG capsule Take 1 capsule (300 mg total) by mouth 2 (two) times daily.    HYDROcodone-acetaminophen (NORCO) 7.5-325 mg per tablet 1 tablet daily as needed.    NIFEdipine (ADALAT CC) 30 MG TbSR nifedipine ER 30 mg tablet,extended release   Take 1 tablet every day by oral route.    traZODone (DESYREL) 100 MG tablet Take 1 tablet (100 mg total) by mouth nightly.    aluminum & magnesium hydroxide-simethicone (MYLANTA MAX STRENGTH) 400-400-40 mg/5 mL suspension Take by mouth every 6 (six) hours as needed for Indigestion.    azelastine (ASTELIN) 137 mcg (0.1 %) nasal spray 1 spray (137 mcg total) by Nasal route 2 (two) times daily.    calcium carbonate (TUMS) 200 mg calcium (500 mg) chewable tablet Take 1 tablet by mouth 2 (two) times daily as needed for Heartburn.    doxycycline (MONODOX) 100 MG capsule Take 1 capsule (100 mg total) by mouth 2 (two) times daily. Avoid sun exposure    fluticasone (FLONASE) 50 mcg/actuation nasal spray Inhale 1 spray into the lungs once daily once daily.    LORazepam (ATIVAN) 0.5 MG tablet Take 1 tablet (0.5 mg total) by mouth daily as needed.    polyethylene glycol (GLYCOLAX) 17 gram/dose powder Take 17 g by mouth daily as needed.       Review of patient's allergies indicates:   Allergen Reactions    Mirtazapine Other (See Comments)     Climb the walls    Compazine [prochlorperazine edisylate] Nausea Only    Lexapro [escitalopram oxalate]       Worsening tremor, and sedation    Primidone Other (See Comments)     Could not function    Prochlorperazine     Simvastatin     Topiramate     Morphine Swelling and Rash        Past Medical History:   Diagnosis Date    Allergy     Sterling's esophagus     Colon cancer     Colon polyp     Diverticulitis     Diverticulosis     Fatty liver     GERD (gastroesophageal reflux disease)     Hx of cervical spine surgery 2018    Hyperlipidemia     Hypertension     Lupus (systemic lupus erythematosus)     discoid    Osteoarthritis     Osteoporosis     Tremor 2005    essential     Past Surgical History:   Procedure Laterality Date    APPENDECTOMY  2007    removed during colon surgery    CERVICAL FUSION       SECTION      COLON SURGERY   &     hemicolectomy for colon cancer and second was for complications from the first surgery    COLONOSCOPY N/A 2018    Procedure: COLONOSCOPY;  Surgeon: Maverick Esquivel MD;  Location: [facility];  Service: Endoscopy;  Laterality: N/A; repeat in 5 years for surveillance    ESOPHAGOGASTRODUODENOSCOPY N/A 2019    Procedure: EGD (ESOPHAGOGASTRODUODENOSCOPY);  Surgeon: Monika Steele MD;  Location: [facility];  Service: Endoscopy;  Laterality: N/A;    ESOPHAGOGASTRODUODENOSCOPY  2019    with RFA and biopsies    ESOPHAGOGASTRODUODENOSCOPY N/A 2019    Procedure: EGD (ESOPHAGOGASTRODUODENOSCOPY);  Surgeon: Uzair Cantor MD;  Location: [facility];  Service: Endoscopy;  Laterality: N/A;  n+v with anesthesia    ESOPHAGOGASTRODUODENOSCOPY N/A 2019    Procedure: EGD (ESOPHAGOGASTRODUODENOSCOPY);  Surgeon: Uzair Cantor MD;  Location: [facility];  Service: Endoscopy;  Laterality: N/A;    HYSTERECTOMY      ovaries remain    SPLENECTOMY, TOTAL      Injured during surgery and removed    UPPER GASTROINTESTINAL ENDOSCOPY      GERD & hiatal hernia per patient report     Family History   Problem Relation Age of  Onset    Colon cancer Father         diagnosed in his 60's    Lung cancer Father     Lymphoma Mother         Brain    Lung cancer Brother     Lung cancer Brother         metastatic from prostate    Prostate cancer Brother     Heart disease Brother         valvular disease    Hyperlipidemia Brother     Eczema Neg Hx     Lupus Neg Hx     Psoriasis Neg Hx     Melanoma Neg Hx     Crohn's disease Neg Hx     Ulcerative colitis Neg Hx     Stomach cancer Neg Hx     Esophageal cancer Neg Hx      Social History     Tobacco Use    Smoking status: Current Every Day Smoker     Packs/day: 0.50     Years: 35.00     Pack years: 17.50     Types: Cigarettes    Smokeless tobacco: Never Used   Substance Use Topics    Alcohol use: No    Drug use: No       Review of Systems:  Respiratory: no cough or shortness of breath  Cardiovascular: no chest pain or palpitations    OBJECTIVE:     Vital Signs (Most Recent)  Temp: 97 °F (36.1 °C) (10/07/19 0714)  Pulse: 63 (10/07/19 0714)  Resp: 20 (10/07/19 0714)  BP: 131/75 (10/07/19 0714)  SpO2: 99 % (10/07/19 0714)    Physical Exam:  General: well developed, well nourished  Lungs:  normal respiratory effort  Heart: regular rate, S1, S2 normal    Laboratory  CBC: No results for input(s): WBC, RBC, HGB, HCT, PLT, MCV, MCH, MCHC in the last 168 hours.  CMP: No results for input(s): GLU, CALCIUM, ALBUMIN, PROT, NA, K, CO2, CL, BUN, CREATININE, ALKPHOS, ALT, AST, BILITOT in the last 168 hours.  Coagulation: No results for input(s): LABPROT, INR, APTT in the last 168 hours.      Diagnostic Results:      ASSESSMENT/PLAN:     Sterling's with LGD    Plan: EGD    Anesthesia Plan: MAC    ASA Grade: ASA 3 - Patient with moderate systemic disease with functional limitations     The impression and plan was discussed in detail with the patient and family. All questions have been answered and the patient voices understanding of our plan at this point. The risk of the procedure was discussed in  detail which includes but not limited to bleeding, infection, perforation in some cases requiring surgery with its spectrum of complications.

## 2019-10-07 NOTE — PLAN OF CARE
visited at bedside, discussed findings and recommendations with patient and family member; all questions asked and answered. Verbalized understanding of information given. Handout provided at time of discharge.Recovery complete. Patient recovered to baseline. Discharge instructions reviewed with patient. VSS.

## 2019-10-07 NOTE — DISCHARGE SUMMARY
Discharge Summary/Instructions after an Endoscopic Procedure    Patient Name: Kristin Guerra  Patient MRN: [id number]  Patient YOB: 1950 Monday, October 07, 2019  Uzair Cantor MD    RESTRICTIONS:  During your procedure today, you received medications for sedation.  These medications may affect your judgment, balance and coordination.  Therefore, for 24 hours, you have the following restrictions:     - DO NOT drive a car, operate machinery, make legal/financial decisions, sign important papers or drink alcohol.      ACTIVITY:  Today: no heavy lifting, straining or running due to procedural sedation/anesthesia.  The following day: return to full activity including work.    DIET:  Eat and drink normally unless instructed otherwise.     TREATMENT FOR COMMON SIDE EFFECTS:  - Mild abdominal pain, nausea, belching, bloating or excessive gas:  rest, eat lightly and use a heating pad.  - Sore Throat: treat with throat lozenges and/or gargle with warm salt water.  - Because air was used during the procedure, expelling large amounts of air from your rectum or belching is normal.  - If a bowel prep was taken, you may not have a bowel movement for 1-3 days.  This is normal.      SYMPTOMS TO WATCH FOR AND REPORT TO YOUR PHYSICIAN:  1. Abdominal pain or bloating, other than gas cramps.  2. Chest pain.  3. Back pain.  4. Signs of infection such as: chills or fever occurring within 24 hours after the procedure.  5. Rectal bleeding, which would show as bright red, maroon, or black stools. (A tablespoon of blood from the rectum is not serious, especially if hemorrhoids are present.)  6. Vomiting.  7. Weakness or dizziness.      GO DIRECTLY TO THE NEAREST EMERGENCY ROOM IF YOU HAVE ANY OF THE FOLLOWING:     Difficulty breathing              Chills and/or fever over 101 F   Persistent vomiting and/or vomiting blood   Severe abdominal pain   Severe chest pain   Black, tarry stools   Bleeding- more than one tablespoon   Any  other symptom or condition that you feel may need urgent attention    Your doctor recommends these additional instructions:  If any biopsies were taken, your doctors clinic will contact you in 1 to 2 weeks with any results.    - Discharge patient to home (ambulatory).   - Repeat upper endoscopy in 12 weeks for follow-up of Sterling's ablation.   - Use Nexium (esomeprazole) 40 mg PO BID.   - Use sucralfate suspension 1 gram PO BID.   - Resume previous diet.    For questions, problems or results please call your physician - Uzair Cantor MD at Work:  [phone].    EMERGENCY PHONE NUMBER: [phone],  LAB RESULTS: [phone]    IF A COMPLICATION OR EMERGENCY SITUATION ARISES AND YOU ARE UNABLE TO REACH YOUR PHYSICIAN - GO DIRECTLY TO THE EMERGENCY ROOM.

## 2019-10-07 NOTE — TRANSFER OF CARE
"Anesthesia Transfer of Care Note    Patient: Kristin Guerra    Procedure(s) Performed: Procedure(s) (LRB):  EGD (ESOPHAGOGASTRODUODENOSCOPY) (N/A)    Patient location: GI    Anesthesia Type: MAC    Transport from OR: Transported from OR on room air with adequate spontaneous ventilation    Post pain: adequate analgesia    Post assessment: no apparent anesthetic complications and tolerated procedure well    Post vital signs: stable    Level of consciousness: sedated    Nausea/Vomiting: no nausea/vomiting    Complications: none    Transfer of care protocol was followed      Last vitals:   Visit Vitals  /75 (BP Location: Left arm, Patient Position: Lying)   Pulse 63   Temp 36.1 °C (97 °F) (Temporal)   Resp 20   Ht 5' 1" (1.549 m)   Wt 57.6 kg (127 lb)   SpO2 99%   Breastfeeding? No   BMI 24.00 kg/m²     "

## 2019-10-07 NOTE — [employer] PATIENT INSTRUCTIONS
Discharge Summary/Instructions after an Endoscopic Procedure  Patient Name: Kristin Guerra  Patient MRN: [id number]  Patient YOB: 1950 Monday, October 07, 2019  Uzair Cantor MD  RESTRICTIONS:  During your procedure today, you received medications for sedation.  These   medications may affect your judgment, balance and coordination.  Therefore,   for 24 hours, you have the following restrictions:   - DO NOT drive a car, operate machinery, make legal/financial decisions,   sign important papers or drink alcohol.    ACTIVITY:  Today: no heavy lifting, straining or running due to procedural   sedation/anesthesia.  The following day: return to full activity including work.  DIET:  Eat and drink normally unless instructed otherwise.     TREATMENT FOR COMMON SIDE EFFECTS:  - Mild abdominal pain, nausea, belching, bloating or excessive gas:  rest,   eat lightly and use a heating pad.  - Sore Throat: treat with throat lozenges and/or gargle with warm salt   water.  - Because air was used during the procedure, expelling large amounts of air   from your rectum or belching is normal.  - If a bowel prep was taken, you may not have a bowel movement for 1-3 days.    This is normal.  SYMPTOMS TO WATCH FOR AND REPORT TO YOUR PHYSICIAN:  1. Abdominal pain or bloating, other than gas cramps.  2. Chest pain.  3. Back pain.  4. Signs of infection such as: chills or fever occurring within 24 hours   after the procedure.  5. Rectal bleeding, which would show as bright red, maroon, or black stools.   (A tablespoon of blood from the rectum is not serious, especially if   hemorrhoids are present.)  6. Vomiting.  7. Weakness or dizziness.  GO DIRECTLY TO THE NEAREST EMERGENCY ROOM IF YOU HAVE ANY OF THE FOLLOWING:      Difficulty breathing              Chills and/or fever over 101 F   Persistent vomiting and/or vomiting blood   Severe abdominal pain   Severe chest pain   Black, tarry stools   Bleeding- more than one  tablespoon   Any other symptom or condition that you feel may need urgent attention  Your doctor recommends these additional instructions:  If any biopsies were taken, your doctors clinic will contact you in 1 to 2   weeks with any results.  - Discharge patient to home (ambulatory).   - Repeat upper endoscopy in 12 weeks for follow-up of Sterling's ablation.   - Use Nexium (esomeprazole) 40 mg PO BID.   - Use sucralfate suspension 1 gram PO BID.   - Resume previous diet.  For questions, problems or results please call your physician - Uzair Cantor MD at Work:  [phone].  EMERGENCY PHONE NUMBER: [phone],  LAB RESULTS: [phone]  IF A COMPLICATION OR EMERGENCY SITUATION ARISES AND YOU ARE UNABLE TO REACH   YOUR PHYSICIAN - GO DIRECTLY TO THE EMERGENCY ROOM.  Uzair Cantor MD  10/7/2019 8:38:11 AM  This report has been verified and signed electronically.  [employer]

## 2019-10-07 NOTE — ANESTHESIA POSTPROCEDURE EVALUATION
Anesthesia Post Evaluation    Patient: Kristin Guerra    Procedure(s) Performed: Procedure(s) (LRB):  EGD (ESOPHAGOGASTRODUODENOSCOPY) (N/A)    Final Anesthesia Type: MAC  Patient location during evaluation: GI PACU  Patient participation: Yes- Able to Participate  Level of consciousness: awake and alert and oriented  Post-procedure vital signs: reviewed and stable  Pain management: adequate  Airway patency: patent  PONV status at discharge: No PONV  Anesthetic complications: no      Cardiovascular status: stable, hemodynamically stable and blood pressure returned to baseline  Respiratory status: unassisted, spontaneous ventilation and room air  Hydration status: euvolemic  Follow-up not needed.          Vitals Value Taken Time   /75 10/7/2019  7:14 AM   Temp 36.1 °C (97 °F) 10/7/2019  7:14 AM   Pulse 63 10/7/2019  7:14 AM   Resp 20 10/7/2019  7:14 AM   SpO2 99 % 10/7/2019  7:14 AM         No case tracking events are documented in the log.      Pain/Kobe Score: No data recorded

## 2019-10-08 RX ORDER — LORAZEPAM 0.5 MG/1
0.5 TABLET ORAL DAILY PRN
Qty: 30 TABLET | Refills: 3 | OUTPATIENT
Start: 2019-10-08

## 2019-10-14 ENCOUNTER — TELEPHONE (OUTPATIENT)
Dept: GASTROENTEROLOGY | Facility: CLINIC | Age: 69
End: 2019-10-14

## 2019-10-14 NOTE — TELEPHONE ENCOUNTER
Post procedure Instructions: Repeat upper endoscopy in 12 weeks for follow-up of Sterling's ablation. Please contact patient to schedule.

## 2019-10-15 ENCOUNTER — TELEPHONE (OUTPATIENT)
Dept: ENDOSCOPY | Facility: HOSPITAL | Age: 69
End: 2019-10-15

## 2019-10-15 DIAGNOSIS — K22.710 BARRETT'S ESOPHAGUS WITH LOW GRADE DYSPLASIA: Primary | ICD-10-CM

## 2019-10-17 ENCOUNTER — CLINICAL SUPPORT (OUTPATIENT)
Dept: SMOKING CESSATION | Facility: CLINIC | Age: 69
End: 2019-10-17
Payer: COMMERCIAL

## 2019-10-17 DIAGNOSIS — F17.200 NICOTINE DEPENDENCE: Primary | ICD-10-CM

## 2019-10-17 PROCEDURE — 99406 BEHAV CHNG SMOKING 3-10 MIN: CPT | Mod: S$GLB,,,

## 2019-10-17 PROCEDURE — 99406 PR TOBACCO USE CESSATION INTERMEDIATE 3-10 MINUTES: ICD-10-PCS | Mod: S$GLB,,,

## 2019-10-17 NOTE — PROGRESS NOTES
Called pt to f/u on her 12 month smoking cessation quit status. Pt's  stated him and wife are still smoking. Informed him they have benefits available and are able to rejoin. Pt stated they will call back next week to schedule appointment. Informed him of benefit period, phone follow ups, and contact information. Will complete smart form and resolve quit 1 episode.

## 2019-11-25 ENCOUNTER — OFFICE VISIT (OUTPATIENT)
Dept: FAMILY MEDICINE | Facility: CLINIC | Age: 69
End: 2019-11-25
Payer: MEDICARE

## 2019-11-25 VITALS
HEART RATE: 60 BPM | DIASTOLIC BLOOD PRESSURE: 63 MMHG | BODY MASS INDEX: 23.6 KG/M2 | SYSTOLIC BLOOD PRESSURE: 110 MMHG | HEIGHT: 61 IN | WEIGHT: 125 LBS

## 2019-11-25 DIAGNOSIS — E78.2 MIXED DYSLIPIDEMIA: ICD-10-CM

## 2019-11-25 DIAGNOSIS — T46.6X5A STATIN MYOPATHY: ICD-10-CM

## 2019-11-25 DIAGNOSIS — F17.200 TOBACCO DEPENDENCE: ICD-10-CM

## 2019-11-25 DIAGNOSIS — Z78.9 STATIN INTOLERANCE: ICD-10-CM

## 2019-11-25 DIAGNOSIS — E55.9 VITAMIN D DEFICIENCY: ICD-10-CM

## 2019-11-25 DIAGNOSIS — G25.0 TREMOR, ESSENTIAL: ICD-10-CM

## 2019-11-25 DIAGNOSIS — G72.0 STATIN MYOPATHY: ICD-10-CM

## 2019-11-25 DIAGNOSIS — I10 ESSENTIAL HYPERTENSION: Primary | ICD-10-CM

## 2019-11-25 DIAGNOSIS — G25.2 TREMOR, COARSE: ICD-10-CM

## 2019-11-25 PROCEDURE — 3078F PR MOST RECENT DIASTOLIC BLOOD PRESSURE < 80 MM HG: ICD-10-PCS | Mod: S$GLB,,, | Performed by: INTERNAL MEDICINE

## 2019-11-25 PROCEDURE — 1101F PR PT FALLS ASSESS DOC 0-1 FALLS W/OUT INJ PAST YR: ICD-10-PCS | Mod: S$GLB,,, | Performed by: INTERNAL MEDICINE

## 2019-11-25 PROCEDURE — 1101F PT FALLS ASSESS-DOCD LE1/YR: CPT | Mod: S$GLB,,, | Performed by: INTERNAL MEDICINE

## 2019-11-25 PROCEDURE — 1126F PR PAIN SEVERITY QUANTIFIED, NO PAIN PRESENT: ICD-10-PCS | Mod: S$GLB,,, | Performed by: INTERNAL MEDICINE

## 2019-11-25 PROCEDURE — 3078F DIAST BP <80 MM HG: CPT | Mod: S$GLB,,, | Performed by: INTERNAL MEDICINE

## 2019-11-25 PROCEDURE — 1126F AMNT PAIN NOTED NONE PRSNT: CPT | Mod: S$GLB,,, | Performed by: INTERNAL MEDICINE

## 2019-11-25 PROCEDURE — 99213 OFFICE O/P EST LOW 20 MIN: CPT | Mod: S$GLB,,, | Performed by: INTERNAL MEDICINE

## 2019-11-25 PROCEDURE — 3074F PR MOST RECENT SYSTOLIC BLOOD PRESSURE < 130 MM HG: ICD-10-PCS | Mod: S$GLB,,, | Performed by: INTERNAL MEDICINE

## 2019-11-25 PROCEDURE — 3074F SYST BP LT 130 MM HG: CPT | Mod: S$GLB,,, | Performed by: INTERNAL MEDICINE

## 2019-11-25 PROCEDURE — 1159F PR MEDICATION LIST DOCUMENTED IN MEDICAL RECORD: ICD-10-PCS | Mod: S$GLB,,, | Performed by: INTERNAL MEDICINE

## 2019-11-25 PROCEDURE — 1159F MED LIST DOCD IN RCRD: CPT | Mod: S$GLB,,, | Performed by: INTERNAL MEDICINE

## 2019-11-25 PROCEDURE — 99213 PR OFFICE/OUTPT VISIT, EST, LEVL III, 20-29 MIN: ICD-10-PCS | Mod: S$GLB,,, | Performed by: INTERNAL MEDICINE

## 2019-11-25 NOTE — PROGRESS NOTES
Subjective:       Patient ID: Kristin Guerra is a 69 y.o. female.    Chief Complaint: Hypertension and Hyperlipidemia    May Kristin is a 69-year-old  female who comes for follow-up.  Underlying medical issues of hypertension, chronic neck pain, tremors of the head and neck have been noted. She had history of neck surgery in past.    Few months ago  she had axillary abscess and she was seen by my colleague Dr. Manuel Reynolds who performed incision and drainage with resolution of this problem.  She does have chronic changes of hidradenitis in the axilla.  Certainly continued smoking increases her risk of repeat infections.    Neuropathy pain is controlled by gabapentin and pain management.    She recently had seen a foot doctor.  She was found to have plantar fasciitis.  She was given a couple of cortisone injections which did not make her feel good and in fact made her feel bad.  She does not want to go back.    She also had seen ENT specialist recently for ear discomfort.  No infection was found.  She was advised to check for vitamin-D levels.    She also is interested in cutting down on the pain medications but is looking for some alternative.  She asked me if I can take over her pain medication prescription and weaning off if needed.  I have advised to her that I do not have much qualifications in treating chronic pain at this point.  He should discuss with her pain management specialist about a weaning protocol or replacement protocol.  She is also interested in hearing about Marijuana.  At this point she is concerned and she is not interested in switching from pain medications to marijuana.  She also smokes cigarettes.  She has to quit smoking eventually.  Quitting smoking will eventually improve her generalized pains.  Nicotine does not help with pain.    Please note that she had tried Zocor and Crestor in past and she had severe muscle aches.        Hypertension   This is a chronic problem. The current  episode started more than 1 month ago. The problem has been waxing and waning since onset. The problem is uncontrolled. Pertinent negatives include no palpitations. Risk factors for coronary artery disease include sedentary lifestyle, smoking/tobacco exposure and stress. Past treatments include calcium channel blockers. The current treatment provides mild improvement. Compliance problems include psychosocial issues.    Hyperlipidemia   This is a chronic problem. The current episode started more than 1 year ago. She has no history of obesity. She is currently on no antihyperlipidemic treatment. The current treatment provides mild improvement of lipids. Risk factors for coronary artery disease include post-menopausal, a sedentary lifestyle and dyslipidemia.       Past Medical History:   Diagnosis Date    Allergy     Sterling's esophagus     Colon cancer     Colon polyp     Diverticulitis     Diverticulosis     Fatty liver     GERD (gastroesophageal reflux disease)     Hx of cervical spine surgery 11/07/2018    Hyperlipidemia     Hypertension     Lupus (systemic lupus erythematosus) 1999    discoid    Osteoarthritis     Osteoporosis     Tremor 2005    essential     Social History     Socioeconomic History    Marital status:      Spouse name: Not on file    Number of children: 2    Years of education: Not on file    Highest education level: Not on file   Occupational History    Not on file   Social Needs    Financial resource strain: Not on file    Food insecurity:     Worry: Not on file     Inability: Not on file    Transportation needs:     Medical: Not on file     Non-medical: Not on file   Tobacco Use    Smoking status: Current Every Day Smoker     Packs/day: 0.50     Years: 35.00     Pack years: 17.50     Types: Cigarettes    Smokeless tobacco: Never Used   Substance and Sexual Activity    Alcohol use: No    Drug use: No    Sexual activity: Not Currently     Partners: Male      Birth control/protection: Post-menopausal   Lifestyle    Physical activity:     Days per week: Not on file     Minutes per session: Not on file    Stress: Not at all   Relationships    Social connections:     Talks on phone: Not on file     Gets together: Not on file     Attends Denominational service: Not on file     Active member of club or organization: Not on file     Attends meetings of clubs or organizations: Not on file     Relationship status: Not on file   Other Topics Concern    Are you pregnant or think you may be? Not Asked    Breast-feeding Not Asked   Social History Narrative    Not on file     Past Surgical History:   Procedure Laterality Date    APPENDECTOMY      removed during colon surgery    CERVICAL FUSION       SECTION      COLON SURGERY   &     hemicolectomy for colon cancer and second was for complications from the first surgery    COLONOSCOPY N/A 2018    Procedure: COLONOSCOPY;  Surgeon: Maverick Esquivel MD;  Location: [facility];  Service: Endoscopy;  Laterality: N/A; repeat in 5 years for surveillance    ESOPHAGOGASTRODUODENOSCOPY N/A 2019    Procedure: EGD (ESOPHAGOGASTRODUODENOSCOPY);  Surgeon: Monika Steele MD;  Location: [facility];  Service: Endoscopy;  Laterality: N/A;    ESOPHAGOGASTRODUODENOSCOPY  2019    with RFA and biopsies    ESOPHAGOGASTRODUODENOSCOPY N/A 2019    Procedure: EGD (ESOPHAGOGASTRODUODENOSCOPY);  Surgeon: Uzair Cantor MD;  Location: [facility];  Service: Endoscopy;  Laterality: N/A;  n+v with anesthesia    ESOPHAGOGASTRODUODENOSCOPY N/A 2019    Procedure: EGD (ESOPHAGOGASTRODUODENOSCOPY);  Surgeon: Uzair Cantor MD;  Location: [facility];  Service: Endoscopy;  Laterality: N/A;    ESOPHAGOGASTRODUODENOSCOPY N/A 10/7/2019    Procedure: EGD (ESOPHAGOGASTRODUODENOSCOPY);  Surgeon: Uzair Cantor MD;  Location: [facility];  Service: Endoscopy;  Laterality: N/A;    HYSTERECTOMY      ovaries remain     SPLENECTOMY, TOTAL  2007    Injured during surgery and removed    UPPER GASTROINTESTINAL ENDOSCOPY  2007    GERD & hiatal hernia per patient report     Family History   Problem Relation Age of Onset    Colon cancer Father         diagnosed in his 60's    Lung cancer Father     Lymphoma Mother         Brain    Lung cancer Brother     Lung cancer Brother         metastatic from prostate    Prostate cancer Brother     Heart disease Brother         valvular disease    Hyperlipidemia Brother     Eczema Neg Hx     Lupus Neg Hx     Psoriasis Neg Hx     Melanoma Neg Hx     Crohn's disease Neg Hx     Ulcerative colitis Neg Hx     Stomach cancer Neg Hx     Esophageal cancer Neg Hx        Review of Systems   Constitutional: Positive for fatigue. Negative for activity change and unexpected weight change.   HENT: Positive for postnasal drip.         Chronic sinus problems currently on Astelin   Eyes: Negative for pain, discharge and visual disturbance.   Respiratory: Negative for chest tightness.    Cardiovascular: Negative for palpitations and leg swelling.        HTN,    Gastrointestinal: Negative for abdominal distention, anal bleeding, constipation and diarrhea.        Sterling's esophagitis and reflux.  Currently on PPI.   Genitourinary: Negative for difficulty urinating, dysuria, flank pain, frequency and pelvic pain.   Musculoskeletal: Positive for arthralgias, back pain and neck stiffness. Negative for joint swelling.        History of cervical spine surgery with persistent postsurgical pain.  Currently under pain management and hydrocodone.  Also on gabapentin.   Skin: Negative for color change, pallor and rash.   Allergic/Immunologic: Negative for environmental allergies, food allergies and immunocompromised state.   Neurological: Positive for tremors. Negative for dizziness, seizures, syncope and light-headedness.   Hematological: Negative for adenopathy. Does not bruise/bleed easily.  "  Psychiatric/Behavioral: Positive for sleep disturbance. Negative for agitation, confusion and dysphoric mood. The patient is nervous/anxious.          Objective:      Blood pressure 110/63, pulse 60, height 5' 1" (1.549 m), weight 56.7 kg (125 lb). Body mass index is 23.62 kg/m².  Physical Exam   Constitutional: She appears well-developed and well-nourished. She is cooperative. No distress.   Chronically ill-appearing lady with tremulousness and slight difficulty walking.   HENT:   Head: Normocephalic and atraumatic.   Right Ear: Tympanic membrane normal.   Left Ear: Tympanic membrane normal.   Eyes: Pupils are equal, round, and reactive to light. Conjunctivae, EOM and lids are normal. Lids are everted and swept, no foreign bodies found. Right pupil is round and reactive. Left pupil is round and reactive.   Neck: Trachea normal and normal range of motion. Neck supple.   Cardiovascular: Normal rate, regular rhythm, S1 normal, S2 normal and normal heart sounds.   Pulmonary/Chest: She has decreased breath sounds. She has no wheezes. She exhibits no tenderness.   Abdominal: Soft. Bowel sounds are normal. There is no rigidity and no guarding.   Musculoskeletal: She exhibits tenderness and deformity.        Back:    Deformities in the spine noted.   Lymphadenopathy:     She has no cervical adenopathy.     She has no axillary adenopathy.   Neurological: She is alert. She displays tremor (Neck and hands).   Tremors   Skin: Skin is warm and dry.   Psychiatric: Her behavior is normal. Thought content normal.   Nursing note and vitals reviewed.        Assessment:       1. Essential hypertension    2. Mixed dyslipidemia    3. Tremor, essential    4. Tobacco dependence    5. Statin intolerance    6. Statin myopathy    7. Tremor, coarse    8. Vitamin D deficiency           No visits with results within 3 Month(s) from this visit.   Latest known visit with results is:   Lab Visit on 08/01/2019   Component Date Value Ref Range " Status    Sodium 08/01/2019 143  136 - 145 mmol/L Final    Potassium 08/01/2019 3.9  3.5 - 5.1 mmol/L Final    Chloride 08/01/2019 109  95 - 110 mmol/L Final    CO2 08/01/2019 28  23 - 29 mmol/L Final    Glucose 08/01/2019 91  70 - 110 mg/dL Final    BUN, Bld 08/01/2019 14  8 - 23 mg/dL Final    Creatinine 08/01/2019 0.7  0.5 - 1.4 mg/dL Final    Calcium 08/01/2019 9.1  8.7 - 10.5 mg/dL Final    Total Protein 08/01/2019 7.8  6.0 - 8.4 g/dL Final    Albumin 08/01/2019 3.8  3.5 - 5.2 g/dL Final    Total Bilirubin 08/01/2019 0.6  0.1 - 1.0 mg/dL Final    Alkaline Phosphatase 08/01/2019 58  55 - 135 U/L Final    AST 08/01/2019 19  10 - 40 U/L Final    ALT 08/01/2019 14  10 - 44 U/L Final    Anion Gap 08/01/2019 6* 8 - 16 mmol/L Final    eGFR if African American 08/01/2019 >60.0  >60 mL/min/1.73 m^2 Final    eGFR if non African American 08/01/2019 >60.0  >60 mL/min/1.73 m^2 Final    Cholesterol 08/01/2019 226* 120 - 199 mg/dL Final    Triglycerides 08/01/2019 88  30 - 150 mg/dL Final    HDL 08/01/2019 52  40 - 75 mg/dL Final    LDL Cholesterol 08/01/2019 156.4  63.0 - 159.0 mg/dL Final    Hdl/Cholesterol Ratio 08/01/2019 23.0  20.0 - 50.0 % Final    Total Cholesterol/HDL Ratio 08/01/2019 4.3  2.0 - 5.0 Final    Non-HDL Cholesterol 08/01/2019 174  mg/dL Final         Plan:           Essential hypertension    Mixed dyslipidemia    Tremor, essential    Tobacco dependence    Statin intolerance    Statin myopathy    Tremor, coarse    Vitamin D deficiency  -     Vitamin D; Future; Expected date: 11/25/2019     Patient's recent visit to the ENT specialist and also Podiatry have been noted.    Chronic pain continues to be an issue.  Unfortunately I am not qualified treat chronic pain.  She should keep her appointment with her pain management doctor and discuss about potential of getting of the pain medication and any alternative treatment like marijuana.  I will also try to find alternative  prescribers.    I will check a vitamin-D level as per patient request.  Certainly she might be deficient in vitamin-D.    I have encouraged her to consider quitting smoking.    She has some annual wellness examination guidelines by [employer] cross [employer] which I will address next visit.          Patient has been advised to watch diet and exercise. Avoid fried and fatty food. Compliance to medications and follow up urged.    I again had a detailed review of for prior medical history and contribution of head tremors towards her persistent neck pain. I do not have any objection her getting Botox to help mitigate this problem.    Though I suspect, her dependence on opiates will remain.  Perhaps there will be an attempt to cut down on the use of opiates.    I had a lengthy discussion about plans to quit smoking.  Too many psycho social stressors at this point.    She will get a flu shot.  Will need to start immunization against other encapsulated organisms including meningitis vaccine and HIB vaccination.    Not sure if she got these immunizations before.    Spent sonido 25 minutes with patient which involved review of pts medical conditions, labs, medications and with 50% of time face-to-face discussion about medical problems, management and any applicable changes.          Current Outpatient Medications:     aluminum & magnesium hydroxide-simethicone (MYLANTA MAX STRENGTH) 400-400-40 mg/5 mL suspension, Take by mouth every 6 (six) hours as needed for Indigestion., Disp: , Rfl:     aspirin 81 MG Chew, aspirin 81 mg chewable tablet  Chew 1 tablet every day by oral route., Disp: , Rfl:     azelastine (ASTELIN) 137 mcg (0.1 %) nasal spray, 1 spray (137 mcg total) by Nasal route 2 (two) times daily., Disp: 30 mL, Rfl: 11    calcium carbonate (TUMS) 200 mg calcium (500 mg) chewable tablet, Take 1 tablet by mouth 2 (two) times daily as needed for Heartburn., Disp: , Rfl:     fluticasone (FLONASE) 50 mcg/actuation nasal  spray, Inhale 1 spray into the lungs once daily once daily., Disp: , Rfl:     gabapentin (NEURONTIN) 300 MG capsule, Take 1 capsule (300 mg total) by mouth 2 (two) times daily., Disp: 60 capsule, Rfl: 3    HYDROcodone-acetaminophen (NORCO) 7.5-325 mg per tablet, 1 tablet daily as needed., Disp: , Rfl: 0    LORazepam (ATIVAN) 0.5 MG tablet, Take 1 tablet (0.5 mg total) by mouth daily as needed., Disp: 30 tablet, Rfl: 3    NIFEdipine (ADALAT CC) 30 MG TbSR, nifedipine ER 30 mg tablet,extended release  Take 1 tablet every day by oral route., Disp: , Rfl:     polyethylene glycol (GLYCOLAX) 17 gram/dose powder, Take 17 g by mouth daily as needed., Disp: , Rfl:     traZODone (DESYREL) 100 MG tablet, Take 1 tablet (100 mg total) by mouth nightly., Disp: 90 tablet, Rfl: 1    esomeprazole (NEXIUM) 40 MG capsule, Take 1 capsule (40 mg total) by mouth 2 (two) times daily before meals., Disp: 60 capsule, Rfl: 2

## 2019-11-25 NOTE — PATIENT INSTRUCTIONS

## 2019-11-26 ENCOUNTER — LAB VISIT (OUTPATIENT)
Dept: LAB | Facility: HOSPITAL | Age: 69
End: 2019-11-26
Attending: INTERNAL MEDICINE
Payer: MEDICARE

## 2019-11-26 DIAGNOSIS — E55.9 VITAMIN D DEFICIENCY: ICD-10-CM

## 2019-11-26 LAB — 25(OH)D3+25(OH)D2 SERPL-MCNC: 23 NG/ML (ref 30–96)

## 2019-11-26 PROCEDURE — 82306 VITAMIN D 25 HYDROXY: CPT

## 2019-11-26 PROCEDURE — 36415 COLL VENOUS BLD VENIPUNCTURE: CPT

## 2019-11-26 NOTE — PROGRESS NOTES
Please notify the patient that her vitamin-D level was slightly on the low side at 23.  It should be above 30.  Tender to take over-the-counter vitamin D3 2000 units every day for the rest of her life.

## 2019-11-27 ENCOUNTER — TELEPHONE (OUTPATIENT)
Dept: FAMILY MEDICINE | Facility: CLINIC | Age: 69
End: 2019-11-27

## 2019-11-27 NOTE — TELEPHONE ENCOUNTER
----- Message from Haile Weaver MD sent at 11/26/2019  5:33 PM CST -----  Please notify the patient that her vitamin-D level was slightly on the low side at 23.  It should be above 30.  Tender to take over-the-counter vitamin D3 2000 units every day for the rest of her life.

## 2020-01-15 ENCOUNTER — TELEPHONE (OUTPATIENT)
Dept: GASTROENTEROLOGY | Facility: CLINIC | Age: 70
End: 2020-01-15

## 2020-01-15 NOTE — LETTER
[facility]  [address]  [facility] [address]  Phone: [phone]               Kristin Guerra  [address] [address] [facility] [address]        EGD Prep Instructions    [facility]  [address]  Clinic Office [phone]  Endoscopy Lab [phone]    You are scheduled for an EGD with Dr. Cantor on 02/10/2020 at 815am at [facility].  You will check in at Admit on the first floor of the hospital. Please contact the office two days before procedure date to reschedule if needed.    You may not have anything to eat after 7pm and nothing to drink after midnight.  You can drink clear liquids between 7pm and midnight.    You MAY take your blood pressure, heart, and seizure medication on the morning of the procedure, with a SIP of water.  Hold ALL other medications until after the procedure.    If you are on blood thinners THAT YOU HAVE BEEN INSTRUCTED TO HOLD BY YOUR DOCTOR FOR THIS PROCEDURE, then do NOT take this the morning of your EGD.  Do NOT stop these medications on your own, they must be approved to be held by your doctor.  Your EGD can NOT be done if you are on these medications.  Examples of blood thinners include: Coumadin, Aggrenox, Plavix, Pradaxa, Reapro, Pletal, Xarelto, Ticagrelor, Brilinta, Eliquis, and high dose aspirin (325 mg).  You do not have to stop baby aspirin 81 mg.

## 2020-02-06 ENCOUNTER — TELEPHONE (OUTPATIENT)
Dept: ENDOSCOPY | Facility: HOSPITAL | Age: 70
End: 2020-02-06

## 2020-02-06 NOTE — TELEPHONE ENCOUNTER
Spoke with patient about arrival time @ 0830    NPO status reviewed: Patient must have nothing to eat after midnight.  Pt may have CLEAR liquids ONLY until completely NPO 3 hrs @ 0430    Medications: Do not take Insulin or oral diabetic medications the day of the procedure.  Take as prescribed: heart, seizure and blood pressure medication in the morning with a sip of water (less than an ounce).  Take any breathing medications and bring inhalers to hospital with you Leave all valuables and jewelry at home.     Wear comfortable clothes to procedure to change into hospital gown You cannot drive for 24 hours after your procedure because you will receive sedation for your procedure to make you comfortable.  A ride must be provided at discharge.

## 2020-02-10 ENCOUNTER — ANESTHESIA EVENT (OUTPATIENT)
Dept: ENDOSCOPY | Facility: HOSPITAL | Age: 70
End: 2020-02-10
Payer: MEDICARE

## 2020-02-10 ENCOUNTER — ANESTHESIA (OUTPATIENT)
Dept: ENDOSCOPY | Facility: HOSPITAL | Age: 70
End: 2020-02-10
Payer: MEDICARE

## 2020-02-10 ENCOUNTER — HOSPITAL ENCOUNTER (OUTPATIENT)
Facility: HOSPITAL | Age: 70
Discharge: HOME | End: 2020-02-10
Attending: INTERNAL MEDICINE | Admitting: INTERNAL MEDICINE
Payer: MEDICARE

## 2020-02-10 DIAGNOSIS — K22.719 BARRETT'S ESOPHAGUS WITH DYSPLASIA: Primary | ICD-10-CM

## 2020-02-10 PROCEDURE — C1886 CATHETER, ABLATION: HCPCS | Performed by: INTERNAL MEDICINE

## 2020-02-10 PROCEDURE — 25000003 PHARM REV CODE 250: Performed by: NURSE ANESTHETIST, CERTIFIED REGISTERED

## 2020-02-10 PROCEDURE — 63600175 PHARM REV CODE 636 W HCPCS: Performed by: NURSE ANESTHETIST, CERTIFIED REGISTERED

## 2020-02-10 PROCEDURE — 63600175 PHARM REV CODE 636 W HCPCS: Performed by: INTERNAL MEDICINE

## 2020-02-10 PROCEDURE — 37000009 HC ANESTHESIA EA ADD 15 MINS: Performed by: INTERNAL MEDICINE

## 2020-02-10 PROCEDURE — 43270 EGD LESION ABLATION: CPT | Mod: ,,, | Performed by: INTERNAL MEDICINE

## 2020-02-10 PROCEDURE — 37000008 HC ANESTHESIA 1ST 15 MINUTES: Performed by: INTERNAL MEDICINE

## 2020-02-10 PROCEDURE — 43270 PR EGD, FLEX, W/ABLATION, TUMOR/POLYP/LESION(S), W/ DILATION: ICD-10-PCS | Mod: ,,, | Performed by: INTERNAL MEDICINE

## 2020-02-10 PROCEDURE — 43270 EGD LESION ABLATION: CPT | Performed by: INTERNAL MEDICINE

## 2020-02-10 RX ORDER — LIDOCAINE HCL/PF 100 MG/5ML
SYRINGE (ML) INTRAVENOUS
Status: DISCONTINUED | OUTPATIENT
Start: 2020-02-10 | End: 2020-02-10

## 2020-02-10 RX ORDER — PROPOFOL 10 MG/ML
INJECTION, EMULSION INTRAVENOUS CONTINUOUS PRN
Status: DISCONTINUED | OUTPATIENT
Start: 2020-02-10 | End: 2020-02-10

## 2020-02-10 RX ORDER — SODIUM CHLORIDE 9 MG/ML
INJECTION, SOLUTION INTRAVENOUS CONTINUOUS
Status: DISCONTINUED | OUTPATIENT
Start: 2020-02-10 | End: 2020-02-10 | Stop reason: HOSPADM

## 2020-02-10 RX ORDER — SUCRALFATE 1 G/1
TABLET ORAL
Qty: 60 TABLET | Refills: 0 | Status: SHIPPED | OUTPATIENT
Start: 2020-02-10 | End: 2020-06-02 | Stop reason: CLARIF

## 2020-02-10 RX ORDER — GLYCOPYRROLATE 0.2 MG/ML
INJECTION INTRAMUSCULAR; INTRAVENOUS
Status: DISCONTINUED | OUTPATIENT
Start: 2020-02-10 | End: 2020-02-10

## 2020-02-10 RX ORDER — ESOMEPRAZOLE MAGNESIUM 40 MG/1
40 CAPSULE, DELAYED RELEASE ORAL
Qty: 60 CAPSULE | Refills: 2 | Status: SHIPPED | OUTPATIENT
Start: 2020-02-10 | End: 2020-05-12

## 2020-02-10 RX ORDER — SODIUM CHLORIDE 0.9 % (FLUSH) 0.9 %
10 SYRINGE (ML) INJECTION
Status: DISCONTINUED | OUTPATIENT
Start: 2020-02-10 | End: 2020-02-10 | Stop reason: HOSPADM

## 2020-02-10 RX ORDER — PROPOFOL 10 MG/ML
INJECTION, EMULSION INTRAVENOUS
Status: DISCONTINUED | OUTPATIENT
Start: 2020-02-10 | End: 2020-02-10

## 2020-02-10 RX ADMIN — PROPOFOL 60 MG: 10 INJECTION, EMULSION INTRAVENOUS at 09:02

## 2020-02-10 RX ADMIN — SODIUM CHLORIDE: 0.9 INJECTION, SOLUTION INTRAVENOUS at 08:02

## 2020-02-10 RX ADMIN — TOPICAL ANESTHETIC 1 EACH: 200 SPRAY DENTAL; PERIODONTAL at 09:02

## 2020-02-10 RX ADMIN — GLYCOPYRROLATE 0.2 MG: 0.2 INJECTION, SOLUTION INTRAMUSCULAR; INTRAVENOUS at 09:02

## 2020-02-10 RX ADMIN — PROPOFOL 150 MCG/KG/MIN: 10 INJECTION, EMULSION INTRAVENOUS at 09:02

## 2020-02-10 RX ADMIN — Medication 50 MG: at 09:02

## 2020-02-10 NOTE — [employer] PATIENT INSTRUCTIONS
Discharge Summary/Instructions after an Endoscopic Procedure  Patient Name: Kristin Guerra  Patient MRN: [id number]  Patient YOB: 1950  Monday, February 10, 2020  Uzair Cantor MD  RESTRICTIONS:  During your procedure today, you received medications for sedation.  These   medications may affect your judgment, balance and coordination.  Therefore,   for 24 hours, you have the following restrictions:   - DO NOT drive a car, operate machinery, make legal/financial decisions,   sign important papers or drink alcohol.    ACTIVITY:  Today: no heavy lifting, straining or running due to procedural   sedation/anesthesia.  The following day: return to full activity including work.  DIET:  Eat and drink normally unless instructed otherwise.     TREATMENT FOR COMMON SIDE EFFECTS:  - Mild abdominal pain, nausea, belching, bloating or excessive gas:  rest,   eat lightly and use a heating pad.  - Sore Throat: treat with throat lozenges and/or gargle with warm salt   water.  - Because air was used during the procedure, expelling large amounts of air   from your rectum or belching is normal.  - If a bowel prep was taken, you may not have a bowel movement for 1-3 days.    This is normal.  SYMPTOMS TO WATCH FOR AND REPORT TO YOUR PHYSICIAN:  1. Abdominal pain or bloating, other than gas cramps.  2. Chest pain.  3. Back pain.  4. Signs of infection such as: chills or fever occurring within 24 hours   after the procedure.  5. Rectal bleeding, which would show as bright red, maroon, or black stools.   (A tablespoon of blood from the rectum is not serious, especially if   hemorrhoids are present.)  6. Vomiting.  7. Weakness or dizziness.  GO DIRECTLY TO THE NEAREST EMERGENCY ROOM IF YOU HAVE ANY OF THE FOLLOWING:      Difficulty breathing              Chills and/or fever over 101 F   Persistent vomiting and/or vomiting blood   Severe abdominal pain   Severe chest pain   Black, tarry stools   Bleeding- more than one  tablespoon   Any other symptom or condition that you feel may need urgent attention  Your doctor recommends these additional instructions:  If any biopsies were taken, your doctors clinic will contact you in 1 to 2   weeks with any results.  - Discharge patient to home (ambulatory).   - Resume previous diet.   - Use Nexium (esomeprazole) 40 mg PO BID.   - Use sucralfate suspension 1 gram PO BID.   - Repeat upper endoscopy in 4 months for follow-up of Sterling's ablation.  For questions, problems or results please call your physician - Uzair Cantor MD at Work:  [phone].  EMERGENCY PHONE NUMBER: [phone],  LAB RESULTS: [phone]  IF A COMPLICATION OR EMERGENCY SITUATION ARISES AND YOU ARE UNABLE TO REACH   YOUR PHYSICIAN - GO DIRECTLY TO THE EMERGENCY ROOM.  Uzair Cantor MD  2/10/2020 9:47:38 AM  This report has been verified and signed electronically.  [employer]

## 2020-02-10 NOTE — ANESTHESIA PREPROCEDURE EVALUATION
02/10/2020  Kristin Guerra is a 70 y.o., female for repeat EGD under MAC      Past Medical History:   Diagnosis Date    Allergy     Sterling's esophagus     Colon cancer     Colon polyp     Diverticulitis     Diverticulosis     Fatty liver     GERD (gastroesophageal reflux disease)     Hx of cervical spine surgery 2018    Hyperlipidemia     Hypertension     Lupus (systemic lupus erythematosus)     discoid    Osteoarthritis     Osteoporosis     Tremor 2005    essential     Past Surgical History:   Procedure Laterality Date    APPENDECTOMY      removed during colon surgery    CERVICAL FUSION       SECTION      COLON SURGERY   &     hemicolectomy for colon cancer and second was for complications from the first surgery    COLONOSCOPY N/A 2018    Procedure: COLONOSCOPY;  Surgeon: Maverick Esquivel MD;  Location: [facility];  Service: Endoscopy;  Laterality: N/A; repeat in 5 years for surveillance    ESOPHAGOGASTRODUODENOSCOPY N/A 2019    Procedure: EGD (ESOPHAGOGASTRODUODENOSCOPY);  Surgeon: Monika Steele MD;  Location: [facility];  Service: Endoscopy;  Laterality: N/A;    ESOPHAGOGASTRODUODENOSCOPY  2019    with RFA and biopsies    ESOPHAGOGASTRODUODENOSCOPY N/A 2019    Procedure: EGD (ESOPHAGOGASTRODUODENOSCOPY);  Surgeon: Uzair Cantor MD;  Location: [facility];  Service: Endoscopy;  Laterality: N/A;  n+v with anesthesia    ESOPHAGOGASTRODUODENOSCOPY N/A 2019    Procedure: EGD (ESOPHAGOGASTRODUODENOSCOPY);  Surgeon: Uzair Cantor MD;  Location: [facility];  Service: Endoscopy;  Laterality: N/A;    ESOPHAGOGASTRODUODENOSCOPY N/A 10/7/2019    Procedure: EGD (ESOPHAGOGASTRODUODENOSCOPY);  Surgeon: Uzair Cantor MD;  Location: [facility];  Service: Endoscopy;  Laterality: N/A;    HYSTERECTOMY      ovaries remain     SPLENECTOMY, TOTAL  2007    Injured during surgery and removed    UPPER GASTROINTESTINAL ENDOSCOPY  2007    GERD & hiatal hernia per patient report           Pre-op Assessment    I have reviewed the Patient Summary Reports.     I have reviewed the Nursing Notes.   I have reviewed the Medications.     Review of Systems  Anesthesia Hx:  Personal Hx of Anesthesia complications, Post-Operative Nausea/Vomiting   Social:  Smoker        Physical Exam  General:  Well nourished    Airway/Jaw/Neck:  Airway Findings: Mouth Opening: Normal Tongue: Normal  General Airway Assessment: Adult  Mallampati: III  Improves to I with phonation.  TM Distance: Normal, at least 6 cm  Jaw/Neck Findings:  Neck ROM: Extension Decreased, Mild       Chest/Lungs:  Chest/Lungs Findings: Clear to auscultation, Normal Respiratory Rate     Heart/Vascular:  Heart Findings: Rate: Normal  Rhythm: Regular Rhythm  Sounds: Normal        Mental Status:  Mental Status Findings:  Alert and Oriented         Anesthesia Plan  Type of Anesthesia, risks & benefits discussed:  Anesthesia Type:  MAC  Patient's Preference:   Intra-op Monitoring Plan:   Intra-op Monitoring Plan Comments:   Post Op Pain Control Plan:   Post Op Pain Control Plan Comments:   Induction:   IV  Beta Blocker:         Informed Consent: Patient understands risks and agrees with Anesthesia plan.  Questions answered. Anesthesia consent signed with patient.  ASA Score: 3     Day of Surgery Review of History & Physical: I have interviewed and examined the patient. I have reviewed the patient's H&P dated:            Ready For Surgery From Anesthesia Perspective.

## 2020-02-10 NOTE — H&P
History & Physical - Short Stay  Gastroenterology      SUBJECTIVE:     Procedure: EGD    Chief Complaint/Indication for Procedure: Sterling's Esophagus    History of Present Illness:  Patient is a 70 y.o. female presents with Sterling's with LGD S/P RFA here for follow up.     PTA Medications   Medication Sig    aluminum & magnesium hydroxide-simethicone (MYLANTA MAX STRENGTH) 400-400-40 mg/5 mL suspension Take by mouth every 6 (six) hours as needed for Indigestion.    aspirin 81 MG Chew aspirin 81 mg chewable tablet   Chew 1 tablet every day by oral route.    azelastine (ASTELIN) 137 mcg (0.1 %) nasal spray 1 spray (137 mcg total) by Nasal route 2 (two) times daily.    calcium carbonate (TUMS) 200 mg calcium (500 mg) chewable tablet Take 1 tablet by mouth 2 (two) times daily as needed for Heartburn.    esomeprazole (NEXIUM) 40 MG capsule Take 1 capsule (40 mg total) by mouth 2 (two) times daily before meals.    fluticasone (FLONASE) 50 mcg/actuation nasal spray Inhale 1 spray into the lungs once daily once daily.    gabapentin (NEURONTIN) 300 MG capsule Take 1 capsule (300 mg total) by mouth 2 (two) times daily.    HYDROcodone-acetaminophen (NORCO) 7.5-325 mg per tablet 1 tablet daily as needed.    LORazepam (ATIVAN) 0.5 MG tablet Take 1 tablet (0.5 mg total) by mouth daily as needed.    NIFEdipine (ADALAT CC) 30 MG TbSR nifedipine ER 30 mg tablet,extended release   Take 1 tablet every day by oral route.    polyethylene glycol (GLYCOLAX) 17 gram/dose powder Take 17 g by mouth daily as needed.    traZODone (DESYREL) 100 MG tablet Take 1 tablet (100 mg total) by mouth nightly.       Review of patient's allergies indicates:   Allergen Reactions    Mirtazapine Other (See Comments)     Climb the walls    Compazine [prochlorperazine edisylate] Nausea Only    Lexapro [escitalopram oxalate]      Worsening tremor, and sedation    Primidone Other (See Comments)     Could not function    Prochlorperazine      Simvastatin     Topiramate     Morphine Swelling and Rash        Past Medical History:   Diagnosis Date    Allergy     Sterling's esophagus     Colon cancer     Colon polyp     Diverticulitis     Diverticulosis     Fatty liver     GERD (gastroesophageal reflux disease)     Hx of cervical spine surgery 2018    Hyperlipidemia     Hypertension     Lupus (systemic lupus erythematosus)     discoid    Osteoarthritis     Osteoporosis     Tremor 2005    essential     Past Surgical History:   Procedure Laterality Date    APPENDECTOMY  2007    removed during colon surgery    CERVICAL FUSION       SECTION      COLON SURGERY   & 2008    hemicolectomy for colon cancer and second was for complications from the first surgery    COLONOSCOPY N/A 2018    Procedure: COLONOSCOPY;  Surgeon: Maverick Esquivel MD;  Location: [facility];  Service: Endoscopy;  Laterality: N/A; repeat in 5 years for surveillance    ESOPHAGOGASTRODUODENOSCOPY N/A 2019    Procedure: EGD (ESOPHAGOGASTRODUODENOSCOPY);  Surgeon: Monika Steele MD;  Location: [facility];  Service: Endoscopy;  Laterality: N/A;    ESOPHAGOGASTRODUODENOSCOPY  2019    with RFA and biopsies    ESOPHAGOGASTRODUODENOSCOPY N/A 2019    Procedure: EGD (ESOPHAGOGASTRODUODENOSCOPY);  Surgeon: Uzair Cantor MD;  Location: [facility];  Service: Endoscopy;  Laterality: N/A;  n+v with anesthesia    ESOPHAGOGASTRODUODENOSCOPY N/A 2019    Procedure: EGD (ESOPHAGOGASTRODUODENOSCOPY);  Surgeon: Uzair Cantor MD;  Location: [facility];  Service: Endoscopy;  Laterality: N/A;    ESOPHAGOGASTRODUODENOSCOPY N/A 10/7/2019    Procedure: EGD (ESOPHAGOGASTRODUODENOSCOPY);  Surgeon: Uzair Cantor MD;  Location: [facility];  Service: Endoscopy;  Laterality: N/A;    HYSTERECTOMY      ovaries remain    SPLENECTOMY, TOTAL      Injured during surgery and removed    UPPER GASTROINTESTINAL ENDOSCOPY      GERD & hiatal  hernia per patient report     Family History   Problem Relation Age of Onset    Colon cancer Father         diagnosed in his 60's    Lung cancer Father     Lymphoma Mother         Brain    Lung cancer Brother     Lung cancer Brother         metastatic from prostate    Prostate cancer Brother     Heart disease Brother         valvular disease    Hyperlipidemia Brother     Eczema Neg Hx     Lupus Neg Hx     Psoriasis Neg Hx     Melanoma Neg Hx     Crohn's disease Neg Hx     Ulcerative colitis Neg Hx     Stomach cancer Neg Hx     Esophageal cancer Neg Hx      Social History     Tobacco Use    Smoking status: Current Every Day Smoker     Packs/day: 0.50     Years: 35.00     Pack years: 17.50     Types: Cigarettes    Smokeless tobacco: Never Used   Substance Use Topics    Alcohol use: No    Drug use: No       Review of Systems:  Gastrointestinal: positive for dysphagia    OBJECTIVE:     Vital Signs (Most Recent)       Physical Exam:  General: well developed, well nourished  Lungs:  normal respiratory effort  Heart: regular rate, S1, S2 normal    Laboratory  CBC: No results for input(s): WBC, RBC, HGB, HCT, PLT, MCV, MCH, MCHC in the last 168 hours.  CMP: No results for input(s): GLU, CALCIUM, ALBUMIN, PROT, NA, K, CO2, CL, BUN, CREATININE, ALKPHOS, ALT, AST, BILITOT in the last 168 hours.  Coagulation: No results for input(s): LABPROT, INR, APTT in the last 168 hours.      Diagnostic Results:      ASSESSMENT/PLAN:     Sterling's with LGD    Plan: EGD    Anesthesia Plan: MAC    ASA Grade: ASA 3 - Patient with moderate systemic disease with functional limitations     The impression and plan was discussed in detail with the patient and family. All questions have been answered and the patient voices understanding of our plan at this point. The risk of the procedure was discussed in detail which includes but not limited to bleeding, infection, perforation in some cases requiring surgery with its spectrum of  complications.

## 2020-02-10 NOTE — TRANSFER OF CARE
"Anesthesia Transfer of Care Note    Patient: Kristin Guerra    Procedure(s) Performed: Procedure(s) (LRB):  EGD (ESOPHAGOGASTRODUODENOSCOPY) (N/A)    Patient location: GI    Anesthesia Type: MAC    Transport from OR: Transported from OR on room air with adequate spontaneous ventilation    Post pain: adequate analgesia    Post assessment: no apparent anesthetic complications and tolerated procedure well    Post vital signs: stable    Level of consciousness: awake, alert and oriented    Nausea/Vomiting: no nausea/vomiting    Complications: none    Transfer of care protocol was followed      Last vitals:   Visit Vitals  /86 (Patient Position: Lying)   Pulse (!) 54   Temp 36.9 °C (98.4 °F) (Temporal)   Resp 18   Ht 5' 2" (1.575 m)   Wt 57.6 kg (127 lb)   SpO2 100%   Breastfeeding? No   BMI 23.23 kg/m²     "

## 2020-02-10 NOTE — DISCHARGE SUMMARY
Discharge Summary/Instructions after an Endoscopic Procedure    Patient Name: Kristin Guerra  Patient MRN: [id number]  Patient YOB: 1950    Monday, February 10, 2020  Uzair Canotr MD    RESTRICTIONS:  During your procedure today, you received medications for sedation.  These medications may affect your judgment, balance and coordination.  Therefore, for 24 hours, you have the following restrictions:     - DO NOT drive a car, operate machinery, make legal/financial decisions, sign important papers or drink alcohol.      ACTIVITY:  Today: no heavy lifting, straining or running due to procedural sedation/anesthesia.  The following day: return to full activity including work.    DIET:  Eat and drink normally unless instructed otherwise.     TREATMENT FOR COMMON SIDE EFFECTS:  - Mild abdominal pain, nausea, belching, bloating or excessive gas:  rest, eat lightly and use a heating pad.  - Sore Throat: treat with throat lozenges and/or gargle with warm salt water.  - Because air was used during the procedure, expelling large amounts of air from your rectum or belching is normal.  - If a bowel prep was taken, you may not have a bowel movement for 1-3 days.  This is normal.      SYMPTOMS TO WATCH FOR AND REPORT TO YOUR PHYSICIAN:  1. Abdominal pain or bloating, other than gas cramps.  2. Chest pain.  3. Back pain.  4. Signs of infection such as: chills or fever occurring within 24 hours after the procedure.  5. Rectal bleeding, which would show as bright red, maroon, or black stools. (A tablespoon of blood from the rectum is not serious, especially if hemorrhoids are present.)  6. Vomiting.  7. Weakness or dizziness.      GO DIRECTLY TO THE NEAREST EMERGENCY ROOM IF YOU HAVE ANY OF THE FOLLOWING:     Difficulty breathing              Chills and/or fever over 101 F   Persistent vomiting and/or vomiting blood   Severe abdominal pain   Severe chest pain   Black, tarry stools   Bleeding- more than one tablespoon   Any  other symptom or condition that you feel may need urgent attention    Your doctor recommends these additional instructions:  If any biopsies were taken, your doctors clinic will contact you in 1 to 2 weeks with any results.    - Discharge patient to home (ambulatory).   - Resume previous diet.   - Use Nexium (esomeprazole) 40 mg PO BID.   - Use sucralfate suspension 1 gram PO BID.   - Repeat upper endoscopy in 4 months for follow-up of Sterling's ablation.    For questions, problems or results please call your physician - Uzair Cantor MD at Work:  [phone].    EMERGENCY PHONE NUMBER: [phone],  LAB RESULTS: [phone]    IF A COMPLICATION OR EMERGENCY SITUATION ARISES AND YOU ARE UNABLE TO REACH YOUR PHYSICIAN - GO DIRECTLY TO THE EMERGENCY ROOM.

## 2020-02-10 NOTE — ANESTHESIA POSTPROCEDURE EVALUATION
Anesthesia Post Evaluation    Patient: Kristin Guerra    Procedure(s) Performed: Procedure(s) (LRB):  EGD (ESOPHAGOGASTRODUODENOSCOPY) (N/A)    Final Anesthesia Type: MAC    Patient location during evaluation: GI PACU  Patient participation: Yes- Able to Participate  Level of consciousness: awake and alert and oriented  Post-procedure vital signs: reviewed and stable  Pain management: adequate  Airway patency: patent    PONV status at discharge: No PONV  Anesthetic complications: no      Cardiovascular status: blood pressure returned to baseline, hemodynamically stable and stable  Respiratory status: unassisted, spontaneous ventilation and room air  Hydration status: euvolemic  Follow-up not needed.          Vitals Value Taken Time   /86 2/10/2020  8:33 AM   Temp 36.9 °C (98.4 °F) 2/10/2020  8:33 AM   Pulse 54 2/10/2020  8:33 AM   Resp 18 2/10/2020  8:33 AM   SpO2 100 % 2/10/2020  8:33 AM         No case tracking events are documented in the log.      Pain/Kobe Score: No data recorded

## 2020-02-11 ENCOUNTER — TELEPHONE (OUTPATIENT)
Dept: ENDOSCOPY | Facility: HOSPITAL | Age: 70
End: 2020-02-11

## 2020-02-11 VITALS
OXYGEN SATURATION: 100 % | RESPIRATION RATE: 17 BRPM | HEIGHT: 62 IN | SYSTOLIC BLOOD PRESSURE: 126 MMHG | TEMPERATURE: 98 F | WEIGHT: 127 LBS | BODY MASS INDEX: 23.37 KG/M2 | DIASTOLIC BLOOD PRESSURE: 78 MMHG | HEART RATE: 71 BPM

## 2020-02-11 DIAGNOSIS — K22.719 BARRETT'S ESOPHAGUS WITH DYSPLASIA: Primary | ICD-10-CM

## 2020-02-18 ENCOUNTER — TELEPHONE (OUTPATIENT)
Dept: GASTROENTEROLOGY | Facility: CLINIC | Age: 70
End: 2020-02-18

## 2020-02-18 NOTE — TELEPHONE ENCOUNTER
Post procedure Instructions: Repeat upper endoscopy in 4 months for follow-up of Sterling's ablation. Please place on recall.

## 2020-03-13 DIAGNOSIS — M54.12 CERVICAL RADICULOPATHY: ICD-10-CM

## 2020-03-13 RX ORDER — GABAPENTIN 300 MG/1
CAPSULE ORAL
Qty: 60 CAPSULE | Refills: 3 | OUTPATIENT
Start: 2020-03-13

## 2020-05-12 RX ORDER — ESOMEPRAZOLE MAGNESIUM 40 MG/1
CAPSULE, DELAYED RELEASE ORAL
Qty: 60 CAPSULE | Refills: 2 | Status: SHIPPED | OUTPATIENT
Start: 2020-05-12 | End: 2020-08-24

## 2020-05-26 ENCOUNTER — OFFICE VISIT (OUTPATIENT)
Dept: FAMILY MEDICINE | Facility: CLINIC | Age: 70
End: 2020-05-26
Payer: MEDICARE

## 2020-05-26 VITALS
WEIGHT: 126 LBS | SYSTOLIC BLOOD PRESSURE: 139 MMHG | DIASTOLIC BLOOD PRESSURE: 70 MMHG | TEMPERATURE: 98 F | BODY MASS INDEX: 23.19 KG/M2 | HEART RATE: 67 BPM | HEIGHT: 62 IN

## 2020-05-26 DIAGNOSIS — R52 TOTAL BODY PAIN: ICD-10-CM

## 2020-05-26 DIAGNOSIS — G25.0 TREMOR, ESSENTIAL: ICD-10-CM

## 2020-05-26 DIAGNOSIS — T46.6X5A STATIN MYOPATHY: ICD-10-CM

## 2020-05-26 DIAGNOSIS — I10 ESSENTIAL HYPERTENSION: ICD-10-CM

## 2020-05-26 DIAGNOSIS — F17.200 TOBACCO DEPENDENCE: Chronic | ICD-10-CM

## 2020-05-26 DIAGNOSIS — Z00.01 ENCOUNTER FOR GENERAL ADULT MEDICAL EXAMINATION WITH ABNORMAL FINDINGS: Primary | Chronic | ICD-10-CM

## 2020-05-26 DIAGNOSIS — E78.2 MIXED DYSLIPIDEMIA: ICD-10-CM

## 2020-05-26 DIAGNOSIS — G72.0 STATIN MYOPATHY: ICD-10-CM

## 2020-05-26 DIAGNOSIS — R10.9 LEFT FLANK PAIN: ICD-10-CM

## 2020-05-26 PROCEDURE — 3078F DIAST BP <80 MM HG: CPT | Mod: S$GLB,,, | Performed by: INTERNAL MEDICINE

## 2020-05-26 PROCEDURE — 1125F AMNT PAIN NOTED PAIN PRSNT: CPT | Mod: S$GLB,,, | Performed by: INTERNAL MEDICINE

## 2020-05-26 PROCEDURE — 3075F PR MOST RECENT SYSTOLIC BLOOD PRESS GE 130-139MM HG: ICD-10-PCS | Mod: S$GLB,,, | Performed by: INTERNAL MEDICINE

## 2020-05-26 PROCEDURE — 1159F MED LIST DOCD IN RCRD: CPT | Mod: S$GLB,,, | Performed by: INTERNAL MEDICINE

## 2020-05-26 PROCEDURE — 1101F PR PT FALLS ASSESS DOC 0-1 FALLS W/OUT INJ PAST YR: ICD-10-PCS | Mod: S$GLB,,, | Performed by: INTERNAL MEDICINE

## 2020-05-26 PROCEDURE — 3075F SYST BP GE 130 - 139MM HG: CPT | Mod: S$GLB,,, | Performed by: INTERNAL MEDICINE

## 2020-05-26 PROCEDURE — 99397 PER PM REEVAL EST PAT 65+ YR: CPT | Mod: 25,S$GLB,, | Performed by: INTERNAL MEDICINE

## 2020-05-26 PROCEDURE — 1125F PR PAIN SEVERITY QUANTIFIED, PAIN PRESENT: ICD-10-PCS | Mod: S$GLB,,, | Performed by: INTERNAL MEDICINE

## 2020-05-26 PROCEDURE — 1159F PR MEDICATION LIST DOCUMENTED IN MEDICAL RECORD: ICD-10-PCS | Mod: S$GLB,,, | Performed by: INTERNAL MEDICINE

## 2020-05-26 PROCEDURE — 1101F PT FALLS ASSESS-DOCD LE1/YR: CPT | Mod: S$GLB,,, | Performed by: INTERNAL MEDICINE

## 2020-05-26 PROCEDURE — 3078F PR MOST RECENT DIASTOLIC BLOOD PRESSURE < 80 MM HG: ICD-10-PCS | Mod: S$GLB,,, | Performed by: INTERNAL MEDICINE

## 2020-05-26 PROCEDURE — 99214 PR OFFICE/OUTPT VISIT, EST, LEVL IV, 30-39 MIN: ICD-10-PCS | Mod: S$GLB,,, | Performed by: INTERNAL MEDICINE

## 2020-05-26 PROCEDURE — 99214 OFFICE O/P EST MOD 30 MIN: CPT | Mod: S$GLB,,, | Performed by: INTERNAL MEDICINE

## 2020-05-26 PROCEDURE — 99397 PR PREVENTIVE VISIT,EST,65 & OVER: ICD-10-PCS | Mod: 25,S$GLB,, | Performed by: INTERNAL MEDICINE

## 2020-05-26 RX ORDER — BUSPIRONE HYDROCHLORIDE 5 MG/1
5 TABLET ORAL DAILY
COMMUNITY
Start: 2020-05-04 | End: 2020-09-29

## 2020-05-26 NOTE — PATIENT INSTRUCTIONS
Relieving Back Pain  Back pain is a common problem. You can strain back muscles by lifting too much weight or just by moving the wrong way. Back strain can be uncomfortable, even painful. And it can take weeks or months to improve. To help yourself feel better and prevent future back strains, try these tips.  Important Note: Do not give aspirin to children or teens without first discussing it with your healthcare provider.      ? Ice    Ice reduces muscle pain and swelling. It helps most during the first 24 to 48 hours after an injury.  · Wrap an ice pack or a bag of frozen peas in a thin towel. (Never place ice directly on your skin.)  · Place the ice where your back hurts the most.  · Dont ice for more than 20 minutes at a time.  · You can use ice several times a day.  ? Medicines  Over-the-counter pain relievers can include acetaminophen and anti-inflammatory medicines, which includes aspirin or ibuprofen. They can help ease discomfort. Some also reduce swelling.  · Tell your healthcare provider about any medicines you are already taking.  · Take medicines only as directed.  ? Heat  After the first 48 hours, heat can relax sore muscles and improve blood flow.  · Try a warm bath or shower. Or use a heating pad set on low. To prevent a burn, keep a cloth between you and the heating pad.  · Dont use a heating pad for more than 15 minutes at a time. Never sleep on a heating pad.  Date Last Reviewed: 9/1/2015  © 2291-8544 [employer] [address] [place] [address], [place] [address]. All rights reserved. This information is not intended as a substitute for professional medical care. Always follow your healthcare professional's instructions.

## 2020-05-26 NOTE — PROGRESS NOTES
Subjective:       Patient ID: Kristin Guerra is a 70 y.o. female.    Chief Complaint: Hypertension; Hyperlipidemia; Body Hurts (Everything hurts); Cystitis; and Annual Exam    This is a annual general examination with abnormal findings.  This is directed by [employer].    Patient is a 70-year-old  female who comes for evaluation.  Underlying medical issues of chronic pain, opiate dependency, hypertension, anxiety disorder, tobacco dependency, insomnia, sinus and allergies have been noted.    She complains of generalized body pains and points out to all parts of her body including back, shoulders, knees, hips and spine hurting.  This has been going on for months to years and seems to be getting worse.    She has a chronic smoker with no immediate or intermediate plans to quit smoking.  Blood pressure control is borderline.    Preventive care issues have been reviewed.  She is due for pneumonia vaccine, shingles vaccine, tetanus vaccination and a low-dose CT scan for the lungs in view of longstanding tobacco dependency.  She is updated on colonoscopy, mammogram and bone density checkup.    Allergies have been noted.  They include Remeron, Lexapro, primidone, simvastatin, Topamax and morphine.        Past Medical History:   Diagnosis Date    Allergy     Sterling's esophagus     Colon cancer     Colon polyp     Diverticulitis     Diverticulosis     Fatty liver     GERD (gastroesophageal reflux disease)     Hx of cervical spine surgery 11/07/2018    Hyperlipidemia     Hypertension     Lupus (systemic lupus erythematosus) 1999    discoid    Osteoarthritis     Osteoporosis     Tremor 2005    essential     Social History     Socioeconomic History    Marital status:      Spouse name: Not on file    Number of children: 2    Years of education: Not on file    Highest education level: Not on file   Occupational History    Not on file   Social Needs    Financial resource  strain: Not on file    Food insecurity:     Worry: Not on file     Inability: Not on file    Transportation needs:     Medical: Not on file     Non-medical: Not on file   Tobacco Use    Smoking status: Current Every Day Smoker     Packs/day: 1.00     Years: 35.00     Pack years: 35.00     Types: Cigarettes    Smokeless tobacco: Never Used   Substance and Sexual Activity    Alcohol use: No    Drug use: No    Sexual activity: Not Currently     Partners: Male     Birth control/protection: Post-menopausal   Lifestyle    Physical activity:     Days per week: Not on file     Minutes per session: Not on file    Stress: Not at all   Relationships    Social connections:     Talks on phone: Not on file     Gets together: Not on file     Attends Confucianist service: Not on file     Active member of club or organization: Not on file     Attends meetings of clubs or organizations: Not on file     Relationship status: Not on file   Other Topics Concern    Are you pregnant or think you may be? Not Asked    Breast-feeding Not Asked   Social History Narrative    Not on file     Past Surgical History:   Procedure Laterality Date    APPENDECTOMY      removed during colon surgery    CERVICAL FUSION       SECTION      COLON SURGERY   &     hemicolectomy for colon cancer and second was for complications from the first surgery    COLONOSCOPY N/A 2018    Procedure: COLONOSCOPY;  Surgeon: Maverick Esquivel MD;  Location: [facility];  Service: Endoscopy;  Laterality: N/A; repeat in 5 years for surveillance    ESOPHAGOGASTRODUODENOSCOPY N/A 2019    Procedure: EGD (ESOPHAGOGASTRODUODENOSCOPY);  Surgeon: Monika Steele MD;  Location: [facility];  Service: Endoscopy;  Laterality: N/A;    ESOPHAGOGASTRODUODENOSCOPY  2019    with RFA and biopsies    ESOPHAGOGASTRODUODENOSCOPY N/A 2019    Procedure: EGD (ESOPHAGOGASTRODUODENOSCOPY);  Surgeon: Uzair Cantor MD;  Location: [facility];   Service: Endoscopy;  Laterality: N/A;  n+v with anesthesia    ESOPHAGOGASTRODUODENOSCOPY N/A 8/21/2019    Procedure: EGD (ESOPHAGOGASTRODUODENOSCOPY);  Surgeon: Uzair Cantor MD;  Location: [facility];  Service: Endoscopy;  Laterality: N/A;    ESOPHAGOGASTRODUODENOSCOPY N/A 10/7/2019    Procedure: EGD (ESOPHAGOGASTRODUODENOSCOPY);  Surgeon: Uzair Cantor MD;  Location: [facility] ENDO;  Service: Endoscopy;  Laterality: N/A;    ESOPHAGOGASTRODUODENOSCOPY N/A 2/10/2020    Procedure: EGD (ESOPHAGOGASTRODUODENOSCOPY);  Surgeon: Uzair Cantor MD;  Location: [facility] ENDO;  Service: Endoscopy;  Laterality: N/A;  Requests earliest procedure time    HYSTERECTOMY      ovaries remain    SPLENECTOMY, TOTAL  2007    Injured during surgery and removed    UPPER GASTROINTESTINAL ENDOSCOPY  2007    GERD & hiatal hernia per patient report     Family History   Problem Relation Age of Onset    Colon cancer Father         diagnosed in his 60's    Lung cancer Father     Lymphoma Mother         Brain    Lung cancer Brother     Lung cancer Brother         metastatic from prostate    Prostate cancer Brother     Heart disease Brother         valvular disease    Hyperlipidemia Brother     Eczema Neg Hx     Lupus Neg Hx     Psoriasis Neg Hx     Melanoma Neg Hx     Crohn's disease Neg Hx     Ulcerative colitis Neg Hx     Stomach cancer Neg Hx     Esophageal cancer Neg Hx        Review of Systems   Constitutional: Positive for fatigue. Negative for activity change and unexpected weight change.   HENT: Positive for postnasal drip. Negative for congestion.         Chronic sinus problems currently on Astelin   Eyes: Negative for pain, discharge and visual disturbance.   Respiratory: Positive for shortness of breath. Negative for chest tightness, wheezing and stridor.    Cardiovascular: Negative for chest pain, palpitations and leg swelling.        HTN,    Gastrointestinal: Negative for abdominal distention, anal bleeding,  "constipation and diarrhea.        Sterling's esophagitis and reflux.  Currently on PPI.   Genitourinary: Negative for difficulty urinating, dysuria, flank pain, frequency and pelvic pain.   Musculoskeletal: Positive for arthralgias, back pain and neck stiffness. Negative for joint swelling.        History of cervical spine surgery with persistent postsurgical pain.  Currently under pain management and hydrocodone.  Also on gabapentin.   Skin: Negative for color change, pallor and rash.   Allergic/Immunologic: Negative for environmental allergies, food allergies and immunocompromised state.   Neurological: Positive for tremors. Negative for dizziness, seizures, syncope and light-headedness.   Hematological: Negative for adenopathy. Does not bruise/bleed easily.   Psychiatric/Behavioral: Positive for sleep disturbance. Negative for agitation, confusion and dysphoric mood. The patient is nervous/anxious.          Objective:      Blood pressure 139/70, pulse 67, temperature 98.2 °F (36.8 °C), height 5' 2" (1.575 m), weight 57.2 kg (126 lb). Body mass index is 23.05 kg/m².  Physical Exam   Constitutional: She appears well-developed and well-nourished. She is cooperative. No distress.   Chronically ill-appearing lady with tremulousness and slight difficulty walking.   HENT:   Head: Normocephalic and atraumatic.   Right Ear: Tympanic membrane normal.   Left Ear: Tympanic membrane normal.   Eyes: Conjunctivae, EOM and lids are normal. Lids are everted and swept, no foreign bodies found. Right pupil is round and reactive. Left pupil is round and reactive.   Neck: Trachea normal and normal range of motion. Neck supple.   Cardiovascular: Normal rate, regular rhythm, S1 normal, S2 normal and normal heart sounds.   Pulmonary/Chest: She has decreased breath sounds. She has no wheezes. She exhibits no tenderness.   Abdominal: Soft. Bowel sounds are normal. There is no rigidity and no guarding.   Musculoskeletal: She exhibits " tenderness and deformity.        Back:    Deformities in the spine noted.   Lymphadenopathy:     She has no cervical adenopathy.     She has no axillary adenopathy.   Neurological: She is alert. She displays tremor (Neck and hands).   Tremors Bobbing of head   Skin: Skin is warm and dry.   Nursing note and vitals reviewed.        Assessment:       1. Encounter for general adult medical examination with abnormal findings    2. Left flank pain    3. Essential hypertension    4. Mixed dyslipidemia    5. Statin myopathy    6. Total body pain    7. Tobacco dependence    8. Tremor, essential           No visits with results within 3 Month(s) from this visit.   Latest known visit with results is:   Lab Visit on 11/26/2019   Component Date Value Ref Range Status    Vit D, 25-Hydroxy 11/26/2019 23* 30 - 96 ng/mL Final         Plan:           Encounter for general adult medical examination with abnormal findings  -     CBC auto differential; Future; Expected date: 05/26/2020  -     Comprehensive metabolic panel; Future; Expected date: 05/26/2020  -     Urinalysis; Future; Expected date: 05/26/2020  -     Lipid Panel; Future; Expected date: 05/26/2020    Left flank pain    Essential hypertension    Mixed dyslipidemia    Statin myopathy    Total body pain  -     C-Reactive Protein; Future; Expected date: 05/26/2020  -     Sedimentation rate; Future; Expected date: 05/26/2020    Tobacco dependence  Comments:  Patient has been advised to quit smoking.    Tremor, essential    Other orders  -     Cancel: CBC auto differential; Future; Expected date: 05/26/2020  -     Cancel: Comprehensive metabolic panel; Future; Expected date: 05/26/2020  -     Cancel: Lipid Panel; Future; Expected date: 05/26/2020  -     Cancel: Urinalysis     Patient's pain see what affectively with combination of arthritis, continued smoking and possibly some degree of mood disorder.  Patient's annual physical examination has been done and labs have been  ordered.    Preventive care issues like update on immunizations have been discussed.    Patient has been advised to watch diet and exercise. Avoid fried and fatty food. Compliance to medications and follow up urged.  Advised Ms. Guerra about age and season appropriate immunizations/ cancer screenings.  Also seasonal influenza vaccine, update on tetanus diphtheria vaccination every 10 years.      Please utilize precautions for current COVID-19 pandemic.  Try to avoid crowds or close contact with multiple people.  Minimize outside interaction.  Wash hands with soap for  frequently upon contact.Use face mask or cover.    Follow-up in 2 weeks to review blood pressures and labs.    Spent sonido 25 minutes with patient which involved review of pts medical conditions, labs, medications and with 50% of time face-to-face discussion about medical problems, management and any applicable changes.         Current Outpatient Medications:     aluminum & magnesium hydroxide-simethicone (MYLANTA MAX STRENGTH) 400-400-40 mg/5 mL suspension, Take by mouth every 6 (six) hours as needed for Indigestion., Disp: , Rfl:     aspirin 81 MG Chew, aspirin 81 mg chewable tablet  Chew 1 tablet every day by oral route., Disp: , Rfl:     azelastine (ASTELIN) 137 mcg (0.1 %) nasal spray, 1 spray (137 mcg total) by Nasal route 2 (two) times daily., Disp: 30 mL, Rfl: 11    busPIRone (BUSPAR) 5 MG Tab, 1 tablet once daily., Disp: , Rfl:     calcium carbonate (TUMS) 200 mg calcium (500 mg) chewable tablet, Take 1 tablet by mouth 2 (two) times daily as needed for Heartburn., Disp: , Rfl:     esomeprazole (NEXIUM) 40 MG capsule, TAKE 1 CAPSULE(40 MG) BY MOUTH TWICE DAILY BEFORE MEALS, Disp: 60 capsule, Rfl: 2    fluticasone (FLONASE) 50 mcg/actuation nasal spray, Inhale 1 spray into the lungs once daily once daily., Disp: , Rfl:     gabapentin (NEURONTIN) 300 MG capsule, Take 1 capsule (300 mg total) by mouth 2 (two) times daily., Disp: 60  capsule, Rfl: 3    HYDROcodone-acetaminophen (NORCO) 7.5-325 mg per tablet, 1 tablet daily as needed., Disp: , Rfl: 0    NIFEdipine (ADALAT CC) 30 MG TbSR, nifedipine ER 30 mg tablet,extended release  Take 1 tablet every day by oral route., Disp: , Rfl:     polyethylene glycol (GLYCOLAX) 17 gram/dose powder, Take 17 g by mouth daily as needed., Disp: , Rfl:     sucralfate (CARAFATE) 1 gram tablet, 1 tablet dissolved in 10 ml of water orally twice a day., Disp: 60 tablet, Rfl: 0    traZODone (DESYREL) 100 MG tablet, Take 1 tablet (100 mg total) by mouth nightly., Disp: 90 tablet, Rfl: 1

## 2020-05-28 ENCOUNTER — LAB VISIT (OUTPATIENT)
Dept: LAB | Facility: HOSPITAL | Age: 70
End: 2020-05-28
Attending: INTERNAL MEDICINE
Payer: MEDICARE

## 2020-05-28 DIAGNOSIS — Z00.01 ENCOUNTER FOR GENERAL ADULT MEDICAL EXAMINATION WITH ABNORMAL FINDINGS: Chronic | ICD-10-CM

## 2020-05-28 DIAGNOSIS — R52 TOTAL BODY PAIN: ICD-10-CM

## 2020-05-28 DIAGNOSIS — M54.12 CERVICAL RADICULOPATHY: ICD-10-CM

## 2020-05-28 LAB
ALBUMIN SERPL BCP-MCNC: 3.7 G/DL (ref 3.5–5.2)
ALP SERPL-CCNC: 55 U/L (ref 55–135)
ALT SERPL W/O P-5'-P-CCNC: 12 U/L (ref 10–44)
ANION GAP SERPL CALC-SCNC: 6 MMOL/L (ref 8–16)
AST SERPL-CCNC: 15 U/L (ref 10–40)
BASOPHILS # BLD AUTO: 0.1 K/UL (ref 0–0.2)
BASOPHILS NFR BLD: 1.2 % (ref 0–1.9)
BILIRUB SERPL-MCNC: 0.8 MG/DL (ref 0.1–1)
BUN SERPL-MCNC: 13 MG/DL (ref 8–23)
CALCIUM SERPL-MCNC: 8.6 MG/DL (ref 8.7–10.5)
CHLORIDE SERPL-SCNC: 108 MMOL/L (ref 95–110)
CHOLEST SERPL-MCNC: 213 MG/DL (ref 120–199)
CHOLEST/HDLC SERPL: 4.4 {RATIO} (ref 2–5)
CO2 SERPL-SCNC: 26 MMOL/L (ref 23–29)
CREAT SERPL-MCNC: 0.5 MG/DL (ref 0.5–1.4)
CRP SERPL-MCNC: 0.21 MG/DL
DIFFERENTIAL METHOD: ABNORMAL
EOSINOPHIL # BLD AUTO: 0.4 K/UL (ref 0–0.5)
EOSINOPHIL NFR BLD: 4.2 % (ref 0–8)
ERYTHROCYTE [DISTWIDTH] IN BLOOD BY AUTOMATED COUNT: 14.9 % (ref 11.5–14.5)
ERYTHROCYTE [SEDIMENTATION RATE] IN BLOOD BY WESTERGREN METHOD: 23 MM/HR (ref 0–20)
EST. GFR  (AFRICAN AMERICAN): >60 ML/MIN/1.73 M^2
EST. GFR  (NON AFRICAN AMERICAN): >60 ML/MIN/1.73 M^2
GLUCOSE SERPL-MCNC: 81 MG/DL (ref 70–110)
HCT VFR BLD AUTO: 37.2 % (ref 37–48.5)
HDLC SERPL-MCNC: 48 MG/DL (ref 40–75)
HDLC SERPL: 22.5 % (ref 20–50)
HGB BLD-MCNC: 12.4 G/DL (ref 12–16)
IMM GRANULOCYTES # BLD AUTO: 0.02 K/UL (ref 0–0.04)
IMM GRANULOCYTES NFR BLD AUTO: 0.2 % (ref 0–0.5)
LDLC SERPL CALC-MCNC: 152 MG/DL (ref 63–159)
LYMPHOCYTES # BLD AUTO: 3.1 K/UL (ref 1–4.8)
LYMPHOCYTES NFR BLD: 36 % (ref 18–48)
MCH RBC QN AUTO: 31.6 PG (ref 27–31)
MCHC RBC AUTO-ENTMCNC: 33.3 G/DL (ref 32–36)
MCV RBC AUTO: 95 FL (ref 82–98)
MONOCYTES # BLD AUTO: 0.9 K/UL (ref 0.3–1)
MONOCYTES NFR BLD: 10.2 % (ref 4–15)
NEUTROPHILS # BLD AUTO: 4.2 K/UL (ref 1.8–7.7)
NEUTROPHILS NFR BLD: 48.2 % (ref 38–73)
NONHDLC SERPL-MCNC: 165 MG/DL
NRBC BLD-RTO: 0 /100 WBC
PLATELET # BLD AUTO: 312 K/UL (ref 150–350)
PMV BLD AUTO: 10.3 FL (ref 9.2–12.9)
POTASSIUM SERPL-SCNC: 3.5 MMOL/L (ref 3.5–5.1)
PROT SERPL-MCNC: 7.3 G/DL (ref 6–8.4)
RBC # BLD AUTO: 3.93 M/UL (ref 4–5.4)
SODIUM SERPL-SCNC: 140 MMOL/L (ref 136–145)
TRIGL SERPL-MCNC: 65 MG/DL (ref 30–150)
WBC # BLD AUTO: 8.61 K/UL (ref 3.9–12.7)

## 2020-05-28 PROCEDURE — 86140 C-REACTIVE PROTEIN: CPT

## 2020-05-28 PROCEDURE — 80053 COMPREHEN METABOLIC PANEL: CPT

## 2020-05-28 PROCEDURE — 80061 LIPID PANEL: CPT

## 2020-05-28 PROCEDURE — 36415 COLL VENOUS BLD VENIPUNCTURE: CPT

## 2020-05-28 PROCEDURE — 85651 RBC SED RATE NONAUTOMATED: CPT

## 2020-05-28 PROCEDURE — 85025 COMPLETE CBC W/AUTO DIFF WBC: CPT

## 2020-05-28 RX ORDER — TRAZODONE HYDROCHLORIDE 50 MG/1
50 TABLET ORAL NIGHTLY
COMMUNITY
End: 2020-05-28 | Stop reason: SDUPTHER

## 2020-05-29 RX ORDER — TRAZODONE HYDROCHLORIDE 50 MG/1
50 TABLET ORAL NIGHTLY
Qty: 30 TABLET | Refills: 2 | Status: SHIPPED | OUTPATIENT
Start: 2020-05-29 | End: 2020-08-24 | Stop reason: SDUPTHER

## 2020-05-29 RX ORDER — AZELASTINE 1 MG/ML
1 SPRAY, METERED NASAL 2 TIMES DAILY
Qty: 30 ML | Refills: 11 | Status: SHIPPED | OUTPATIENT
Start: 2020-05-29 | End: 2022-02-10 | Stop reason: SDUPTHER

## 2020-05-29 RX ORDER — GABAPENTIN 300 MG/1
300 CAPSULE ORAL 2 TIMES DAILY
Qty: 60 CAPSULE | Refills: 3 | Status: SHIPPED | OUTPATIENT
Start: 2020-05-29 | End: 2020-09-21 | Stop reason: SDUPTHER

## 2020-05-29 NOTE — TELEPHONE ENCOUNTER
----- Message from Haile Weaver MD sent at 5/29/2020 11:31 AM CDT -----  Notify patient that the blood tests and urine test not show any significant major changes to explain her arthritis in pains.  We will discuss at follow-up.

## 2020-05-29 NOTE — PROGRESS NOTES
Notify patient that the blood tests and urine test not show any significant major changes to explain her arthritis in pains.  We will discuss at follow-up.

## 2020-06-02 ENCOUNTER — HOSPITAL ENCOUNTER (OUTPATIENT)
Facility: HOSPITAL | Age: 70
Discharge: HOME | End: 2020-06-04
Attending: EMERGENCY MEDICINE | Admitting: INTERNAL MEDICINE
Payer: MEDICARE

## 2020-06-02 DIAGNOSIS — R07.9 CHEST PAIN: ICD-10-CM

## 2020-06-02 DIAGNOSIS — R00.1 BRADYCARDIA: ICD-10-CM

## 2020-06-02 DIAGNOSIS — R55 SYNCOPE AND COLLAPSE: ICD-10-CM

## 2020-06-02 DIAGNOSIS — R55 NEAR SYNCOPE: ICD-10-CM

## 2020-06-02 DIAGNOSIS — R55 SYNCOPE: Primary | ICD-10-CM

## 2020-06-02 PROBLEM — R10.9 ABDOMINAL PAIN: Status: ACTIVE | Noted: 2020-06-02

## 2020-06-02 LAB
ALBUMIN SERPL BCP-MCNC: 3.9 G/DL (ref 3.5–5.2)
ALP SERPL-CCNC: 66 U/L (ref 55–135)
ALT SERPL W/O P-5'-P-CCNC: 15 U/L (ref 10–44)
ANION GAP SERPL CALC-SCNC: 8 MMOL/L (ref 8–16)
AST SERPL-CCNC: 22 U/L (ref 10–40)
BASOPHILS # BLD AUTO: 0.13 K/UL (ref 0–0.2)
BASOPHILS NFR BLD: 1.1 % (ref 0–1.9)
BILIRUB SERPL-MCNC: 0.7 MG/DL (ref 0.1–1)
BILIRUB UR QL STRIP: NEGATIVE
BUN SERPL-MCNC: 18 MG/DL (ref 8–23)
CALCIUM SERPL-MCNC: 8.8 MG/DL (ref 8.7–10.5)
CHLORIDE SERPL-SCNC: 104 MMOL/L (ref 95–110)
CLARITY UR: CLEAR
CO2 SERPL-SCNC: 23 MMOL/L (ref 23–29)
COLOR UR: YELLOW
CREAT SERPL-MCNC: 0.7 MG/DL (ref 0.5–1.4)
DIFFERENTIAL METHOD: ABNORMAL
EOSINOPHIL # BLD AUTO: 0 K/UL (ref 0–0.5)
EOSINOPHIL NFR BLD: 0.2 % (ref 0–8)
ERYTHROCYTE [DISTWIDTH] IN BLOOD BY AUTOMATED COUNT: 14.6 % (ref 11.5–14.5)
EST. GFR  (AFRICAN AMERICAN): >60 ML/MIN/1.73 M^2
EST. GFR  (NON AFRICAN AMERICAN): >60 ML/MIN/1.73 M^2
GLUCOSE SERPL-MCNC: 100 MG/DL (ref 70–110)
GLUCOSE SERPL-MCNC: 91 MG/DL (ref 70–110)
GLUCOSE UR QL STRIP: NEGATIVE
HCT VFR BLD AUTO: 37 % (ref 37–48.5)
HGB BLD-MCNC: 12.5 G/DL (ref 12–16)
HGB UR QL STRIP: NEGATIVE
IMM GRANULOCYTES # BLD AUTO: 0.04 K/UL (ref 0–0.04)
IMM GRANULOCYTES NFR BLD AUTO: 0.3 % (ref 0–0.5)
KETONES UR QL STRIP: ABNORMAL
LEUKOCYTE ESTERASE UR QL STRIP: NEGATIVE
LYMPHOCYTES # BLD AUTO: 2.3 K/UL (ref 1–4.8)
LYMPHOCYTES NFR BLD: 19.7 % (ref 18–48)
MCH RBC QN AUTO: 31.4 PG (ref 27–31)
MCHC RBC AUTO-ENTMCNC: 33.8 G/DL (ref 32–36)
MCV RBC AUTO: 93 FL (ref 82–98)
MONOCYTES # BLD AUTO: 0.9 K/UL (ref 0.3–1)
MONOCYTES NFR BLD: 8 % (ref 4–15)
NEUTROPHILS # BLD AUTO: 8.4 K/UL (ref 1.8–7.7)
NEUTROPHILS NFR BLD: 70.7 % (ref 38–73)
NITRITE UR QL STRIP: NEGATIVE
NRBC BLD-RTO: 0 /100 WBC
PH UR STRIP: 8 [PH] (ref 5–8)
PLATELET # BLD AUTO: 297 K/UL (ref 150–350)
PMV BLD AUTO: 9.7 FL (ref 9.2–12.9)
POTASSIUM SERPL-SCNC: 3.6 MMOL/L (ref 3.5–5.1)
PROT SERPL-MCNC: 8 G/DL (ref 6–8.4)
PROT UR QL STRIP: NEGATIVE
RBC # BLD AUTO: 3.98 M/UL (ref 4–5.4)
SARS-COV-2 RDRP RESP QL NAA+PROBE: NEGATIVE
SODIUM SERPL-SCNC: 135 MMOL/L (ref 136–145)
SP GR UR STRIP: 1.03 (ref 1–1.03)
TROPONIN I SERPL DL<=0.01 NG/ML-MCNC: <0.03 NG/ML
URN SPEC COLLECT METH UR: ABNORMAL
UROBILINOGEN UR STRIP-ACNC: NEGATIVE EU/DL
WBC # BLD AUTO: 11.82 K/UL (ref 3.9–12.7)

## 2020-06-02 PROCEDURE — 84484 ASSAY OF TROPONIN QUANT: CPT

## 2020-06-02 PROCEDURE — G0378 HOSPITAL OBSERVATION PER HR: HCPCS

## 2020-06-02 PROCEDURE — 81003 URINALYSIS AUTO W/O SCOPE: CPT

## 2020-06-02 PROCEDURE — 96374 THER/PROPH/DIAG INJ IV PUSH: CPT | Mod: 59

## 2020-06-02 PROCEDURE — 63600175 PHARM REV CODE 636 W HCPCS: Performed by: STUDENT IN AN ORGANIZED HEALTH CARE EDUCATION/TRAINING PROGRAM

## 2020-06-02 PROCEDURE — 80053 COMPREHEN METABOLIC PANEL: CPT

## 2020-06-02 PROCEDURE — 25500020 PHARM REV CODE 255: Performed by: EMERGENCY MEDICINE

## 2020-06-02 PROCEDURE — 63600175 PHARM REV CODE 636 W HCPCS: Performed by: NURSE PRACTITIONER

## 2020-06-02 PROCEDURE — 82962 GLUCOSE BLOOD TEST: CPT

## 2020-06-02 PROCEDURE — 36415 COLL VENOUS BLD VENIPUNCTURE: CPT

## 2020-06-02 PROCEDURE — 99285 EMERGENCY DEPT VISIT HI MDM: CPT | Mod: 25

## 2020-06-02 PROCEDURE — 25000003 PHARM REV CODE 250: Performed by: INTERNAL MEDICINE

## 2020-06-02 PROCEDURE — 85025 COMPLETE CBC W/AUTO DIFF WBC: CPT

## 2020-06-02 PROCEDURE — 25000003 PHARM REV CODE 250: Performed by: NURSE PRACTITIONER

## 2020-06-02 PROCEDURE — U0002 COVID-19 LAB TEST NON-CDC: HCPCS

## 2020-06-02 PROCEDURE — 96372 THER/PROPH/DIAG INJ SC/IM: CPT | Mod: 59

## 2020-06-02 RX ORDER — PANTOPRAZOLE SODIUM 40 MG/1
40 TABLET, DELAYED RELEASE ORAL
Status: DISCONTINUED | OUTPATIENT
Start: 2020-06-03 | End: 2020-06-04 | Stop reason: HOSPADM

## 2020-06-02 RX ORDER — SUCRALFATE 1 G/1
1 TABLET ORAL 2 TIMES DAILY
COMMUNITY
End: 2020-07-15 | Stop reason: ALTCHOICE

## 2020-06-02 RX ORDER — ONDANSETRON 2 MG/ML
4 INJECTION INTRAMUSCULAR; INTRAVENOUS EVERY 8 HOURS PRN
Status: DISCONTINUED | OUTPATIENT
Start: 2020-06-02 | End: 2020-06-04 | Stop reason: HOSPADM

## 2020-06-02 RX ORDER — MAG HYDROX/ALUMINUM HYD/SIMETH 200-200-20
5 SUSPENSION, ORAL (FINAL DOSE FORM) ORAL EVERY 6 HOURS PRN
Status: DISCONTINUED | OUTPATIENT
Start: 2020-06-02 | End: 2020-06-03

## 2020-06-02 RX ORDER — BUSPIRONE HYDROCHLORIDE 5 MG/1
5 TABLET ORAL DAILY
Status: DISCONTINUED | OUTPATIENT
Start: 2020-06-03 | End: 2020-06-04 | Stop reason: HOSPADM

## 2020-06-02 RX ORDER — ACETAMINOPHEN 325 MG/1
650 TABLET ORAL EVERY 4 HOURS PRN
Status: DISCONTINUED | OUTPATIENT
Start: 2020-06-02 | End: 2020-06-04 | Stop reason: HOSPADM

## 2020-06-02 RX ORDER — TALC
6 POWDER (GRAM) TOPICAL NIGHTLY PRN
Status: DISCONTINUED | OUTPATIENT
Start: 2020-06-02 | End: 2020-06-04 | Stop reason: HOSPADM

## 2020-06-02 RX ORDER — AZELASTINE 1 MG/ML
1 SPRAY, METERED NASAL 2 TIMES DAILY
Status: DISCONTINUED | OUTPATIENT
Start: 2020-06-02 | End: 2020-06-04 | Stop reason: HOSPADM

## 2020-06-02 RX ORDER — SODIUM CHLORIDE 0.9 % (FLUSH) 0.9 %
10 SYRINGE (ML) INJECTION
Status: DISCONTINUED | OUTPATIENT
Start: 2020-06-02 | End: 2020-06-04 | Stop reason: HOSPADM

## 2020-06-02 RX ORDER — CALCIUM CARBONATE 200(500)MG
1 TABLET,CHEWABLE ORAL 2 TIMES DAILY PRN
Status: DISCONTINUED | OUTPATIENT
Start: 2020-06-02 | End: 2020-06-04 | Stop reason: HOSPADM

## 2020-06-02 RX ORDER — SUCRALFATE 1 G/1
1 TABLET ORAL 2 TIMES DAILY
Status: DISCONTINUED | OUTPATIENT
Start: 2020-06-02 | End: 2020-06-04 | Stop reason: HOSPADM

## 2020-06-02 RX ORDER — NIFEDIPINE 30 MG/1
30 TABLET, EXTENDED RELEASE ORAL DAILY
Status: DISCONTINUED | OUTPATIENT
Start: 2020-06-03 | End: 2020-06-04 | Stop reason: HOSPADM

## 2020-06-02 RX ORDER — BISACODYL 10 MG
10 SUPPOSITORY, RECTAL RECTAL DAILY PRN
Status: DISCONTINUED | OUTPATIENT
Start: 2020-06-02 | End: 2020-06-04 | Stop reason: HOSPADM

## 2020-06-02 RX ORDER — ONDANSETRON 2 MG/ML
4 INJECTION INTRAMUSCULAR; INTRAVENOUS
Status: COMPLETED | OUTPATIENT
Start: 2020-06-02 | End: 2020-06-02

## 2020-06-02 RX ORDER — LACTULOSE 10 G/15ML
30 SOLUTION ORAL 3 TIMES DAILY
Status: DISPENSED | OUTPATIENT
Start: 2020-06-02 | End: 2020-06-03

## 2020-06-02 RX ORDER — ENOXAPARIN SODIUM 100 MG/ML
40 INJECTION SUBCUTANEOUS EVERY 24 HOURS
Status: DISCONTINUED | OUTPATIENT
Start: 2020-06-02 | End: 2020-06-04 | Stop reason: HOSPADM

## 2020-06-02 RX ORDER — NAPROXEN SODIUM 220 MG/1
81 TABLET, FILM COATED ORAL DAILY
Status: DISCONTINUED | OUTPATIENT
Start: 2020-06-03 | End: 2020-06-04 | Stop reason: HOSPADM

## 2020-06-02 RX ORDER — SYRING-NEEDL,DISP,INSUL,0.3 ML 29 G X1/2"
296 SYRINGE, EMPTY DISPOSABLE MISCELLANEOUS ONCE
Status: COMPLETED | OUTPATIENT
Start: 2020-06-02 | End: 2020-06-02

## 2020-06-02 RX ORDER — TRAZODONE HYDROCHLORIDE 50 MG/1
50 TABLET ORAL NIGHTLY
Status: DISCONTINUED | OUTPATIENT
Start: 2020-06-02 | End: 2020-06-04 | Stop reason: HOSPADM

## 2020-06-02 RX ORDER — FLUTICASONE PROPIONATE 50 MCG
1 SPRAY, SUSPENSION (ML) NASAL DAILY
Status: DISCONTINUED | OUTPATIENT
Start: 2020-06-03 | End: 2020-06-04 | Stop reason: HOSPADM

## 2020-06-02 RX ORDER — HYDROCODONE BITARTRATE AND ACETAMINOPHEN 7.5; 325 MG/1; MG/1
1 TABLET ORAL EVERY 8 HOURS PRN
Status: DISCONTINUED | OUTPATIENT
Start: 2020-06-02 | End: 2020-06-04 | Stop reason: HOSPADM

## 2020-06-02 RX ORDER — GABAPENTIN 300 MG/1
300 CAPSULE ORAL 2 TIMES DAILY
Status: DISCONTINUED | OUTPATIENT
Start: 2020-06-02 | End: 2020-06-03

## 2020-06-02 RX ADMIN — GABAPENTIN 300 MG: 300 CAPSULE ORAL at 10:06

## 2020-06-02 RX ADMIN — ENOXAPARIN SODIUM 40 MG: 100 INJECTION SUBCUTANEOUS at 10:06

## 2020-06-02 RX ADMIN — TRAZODONE HYDROCHLORIDE 50 MG: 50 TABLET ORAL at 10:06

## 2020-06-02 RX ADMIN — ONDANSETRON 4 MG: 2 INJECTION INTRAMUSCULAR; INTRAVENOUS at 05:06

## 2020-06-02 RX ADMIN — LACTULOSE 30 G: 20 SOLUTION ORAL at 10:06

## 2020-06-02 RX ADMIN — Medication 296 ML: at 10:06

## 2020-06-02 RX ADMIN — IOHEXOL 100 ML: 350 INJECTION, SOLUTION INTRAVENOUS at 06:06

## 2020-06-02 NOTE — ED TRIAGE NOTES
Pt states she has felt tired today and had a syncopal episode after getting dizzy in the shower without injury.

## 2020-06-03 ENCOUNTER — CLINICAL SUPPORT (OUTPATIENT)
Dept: CARDIOLOGY | Facility: HOSPITAL | Age: 70
End: 2020-06-03
Attending: INTERNAL MEDICINE
Payer: MEDICARE

## 2020-06-03 PROBLEM — K59.00 CONSTIPATION: Status: ACTIVE | Noted: 2020-06-03

## 2020-06-03 PROBLEM — F17.200 TOBACCO DEPENDENCE: Chronic | Status: ACTIVE | Noted: 2018-12-13

## 2020-06-03 PROBLEM — I10 ESSENTIAL HYPERTENSION: Chronic | Status: ACTIVE | Noted: 2018-04-17

## 2020-06-03 PROBLEM — E78.2 MIXED DYSLIPIDEMIA: Chronic | Status: ACTIVE | Noted: 2019-11-25

## 2020-06-03 PROBLEM — Z85.038 HX OF COLON CANCER, STAGE II: Chronic | Status: ACTIVE | Noted: 2018-02-20

## 2020-06-03 PROBLEM — L93.0 LUPUS ERYTHEMATOSUS: Chronic | Status: ACTIVE | Noted: 2018-04-17

## 2020-06-03 PROBLEM — K22.719 BARRETT'S ESOPHAGUS WITH DYSPLASIA: Chronic | Status: ACTIVE | Noted: 2019-06-04

## 2020-06-03 PROBLEM — K21.9 GASTROESOPHAGEAL REFLUX DISEASE: Chronic | Status: ACTIVE | Noted: 2018-04-17

## 2020-06-03 PROBLEM — Z78.9 STATIN INTOLERANCE: Chronic | Status: ACTIVE | Noted: 2019-11-25

## 2020-06-03 LAB
ANION GAP SERPL CALC-SCNC: 9 MMOL/L (ref 8–16)
AORTIC ROOT ANNULUS: 3.2 CM
AORTIC VALVE CUSP SEPERATION: 1.14 CM
AV INDEX (PROSTH): 0.86
AV MEAN GRADIENT: 6 MMHG
AV PEAK GRADIENT: 11 MMHG
AV VALVE AREA: 2.7 CM2
AV VELOCITY RATIO: 98.75
BASOPHILS # BLD AUTO: 0.1 K/UL (ref 0–0.2)
BASOPHILS NFR BLD: 1 % (ref 0–1.9)
BSA FOR ECHO PROCEDURE: 1.55 M2
BUN SERPL-MCNC: 17 MG/DL (ref 8–23)
CALCIUM SERPL-MCNC: 9 MG/DL (ref 8.7–10.5)
CHLORIDE SERPL-SCNC: 107 MMOL/L (ref 95–110)
CK MB SERPL-MCNC: 0.9 NG/ML (ref 0.1–6.5)
CK MB SERPL-MCNC: 1.2 NG/ML (ref 0.1–6.5)
CO2 SERPL-SCNC: 24 MMOL/L (ref 23–29)
CORTIS SERPL-MCNC: 21.2 UG/DL
CREAT SERPL-MCNC: 0.6 MG/DL (ref 0.5–1.4)
CV ECHO LV RWT: 0.45 CM
DIFFERENTIAL METHOD: ABNORMAL
DOP CALC AO PEAK VEL: 1.67 M/S
DOP CALC AO VTI: 40.03 CM
DOP CALC LVOT AREA: 3.1 CM2
DOP CALC LVOT DIAMETER: 2 CM
DOP CALC LVOT PEAK VEL: 164.92 M/S
DOP CALC LVOT STROKE VOLUME: 108.2 CM3
DOP CALCLVOT PEAK VEL VTI: 34.46 CM
E WAVE DECELERATION TIME: 178.19 MSEC
E/A RATIO: 1.31
E/E' RATIO: 7.74 M/S
ECHO LV POSTERIOR WALL: 1.1 CM (ref 0.6–1.1)
EOSINOPHIL # BLD AUTO: 0.1 K/UL (ref 0–0.5)
EOSINOPHIL NFR BLD: 0.7 % (ref 0–8)
ERYTHROCYTE [DISTWIDTH] IN BLOOD BY AUTOMATED COUNT: 14.6 % (ref 11.5–14.5)
EST. GFR  (AFRICAN AMERICAN): >60 ML/MIN/1.73 M^2
EST. GFR  (NON AFRICAN AMERICAN): >60 ML/MIN/1.73 M^2
FRACTIONAL SHORTENING: 32 % (ref 28–44)
GLUCOSE SERPL-MCNC: 89 MG/DL (ref 70–110)
HCT VFR BLD AUTO: 35.8 % (ref 37–48.5)
HGB BLD-MCNC: 12.1 G/DL (ref 12–16)
IMM GRANULOCYTES # BLD AUTO: 0.03 K/UL (ref 0–0.04)
IMM GRANULOCYTES NFR BLD AUTO: 0.3 % (ref 0–0.5)
INTERVENTRICULAR SEPTUM: 1.1 CM (ref 0.6–1.1)
LEFT ATRIUM SIZE: 2.42 CM
LEFT INTERNAL DIMENSION IN SYSTOLE: 3.27 CM (ref 2.1–4)
LEFT VENTRICLE MASS INDEX: 127 G/M2
LEFT VENTRICULAR INTERNAL DIMENSION IN DIASTOLE: 4.84 CM (ref 3.5–6)
LEFT VENTRICULAR MASS: 196.56 G
LV LATERAL E/E' RATIO: 7.42 M/S
LV SEPTAL E/E' RATIO: 8.09 M/S
LYMPHOCYTES # BLD AUTO: 3.3 K/UL (ref 1–4.8)
LYMPHOCYTES NFR BLD: 32 % (ref 18–48)
MCH RBC QN AUTO: 30.9 PG (ref 27–31)
MCHC RBC AUTO-ENTMCNC: 33.8 G/DL (ref 32–36)
MCV RBC AUTO: 92 FL (ref 82–98)
MONOCYTES # BLD AUTO: 1 K/UL (ref 0.3–1)
MONOCYTES NFR BLD: 9.4 % (ref 4–15)
MV PEAK A VEL: 0.68 M/S
MV PEAK E VEL: 0.89 M/S
NEUTROPHILS # BLD AUTO: 5.8 K/UL (ref 1.8–7.7)
NEUTROPHILS NFR BLD: 56.6 % (ref 38–73)
NRBC BLD-RTO: 0 /100 WBC
PISA TR MAX VEL: 2.54 M/S
PLATELET # BLD AUTO: 296 K/UL (ref 150–350)
PMV BLD AUTO: 9.4 FL (ref 9.2–12.9)
POTASSIUM SERPL-SCNC: 3.4 MMOL/L (ref 3.5–5.1)
PV PEAK VELOCITY: 89.96 CM/S
RA PRESSURE: 8 MMHG
RBC # BLD AUTO: 3.91 M/UL (ref 4–5.4)
SODIUM SERPL-SCNC: 140 MMOL/L (ref 136–145)
TDI LATERAL: 0.12 M/S
TDI SEPTAL: 0.11 M/S
TDI: 0.12 M/S
TR MAX PG: 26 MMHG
TROPONIN I SERPL DL<=0.01 NG/ML-MCNC: <0.03 NG/ML
TROPONIN I SERPL DL<=0.01 NG/ML-MCNC: <0.03 NG/ML
TV REST PULMONARY ARTERY PRESSURE: 34 MMHG
WBC # BLD AUTO: 10.22 K/UL (ref 3.9–12.7)

## 2020-06-03 PROCEDURE — 63600175 PHARM REV CODE 636 W HCPCS: Performed by: NURSE PRACTITIONER

## 2020-06-03 PROCEDURE — 94760 N-INVAS EAR/PLS OXIMETRY 1: CPT

## 2020-06-03 PROCEDURE — 82553 CREATINE MB FRACTION: CPT

## 2020-06-03 PROCEDURE — 36415 COLL VENOUS BLD VENIPUNCTURE: CPT

## 2020-06-03 PROCEDURE — 25000003 PHARM REV CODE 250: Performed by: FAMILY MEDICINE

## 2020-06-03 PROCEDURE — 80048 BASIC METABOLIC PNL TOTAL CA: CPT

## 2020-06-03 PROCEDURE — 84484 ASSAY OF TROPONIN QUANT: CPT | Mod: 91

## 2020-06-03 PROCEDURE — 82553 CREATINE MB FRACTION: CPT | Mod: 91

## 2020-06-03 PROCEDURE — 82533 TOTAL CORTISOL: CPT

## 2020-06-03 PROCEDURE — 25000003 PHARM REV CODE 250: Performed by: NURSE PRACTITIONER

## 2020-06-03 PROCEDURE — 84484 ASSAY OF TROPONIN QUANT: CPT

## 2020-06-03 PROCEDURE — 93306 TTE W/DOPPLER COMPLETE: CPT

## 2020-06-03 PROCEDURE — 99900035 HC TECH TIME PER 15 MIN (STAT)

## 2020-06-03 PROCEDURE — G0378 HOSPITAL OBSERVATION PER HR: HCPCS

## 2020-06-03 PROCEDURE — 85025 COMPLETE CBC W/AUTO DIFF WBC: CPT

## 2020-06-03 PROCEDURE — 96372 THER/PROPH/DIAG INJ SC/IM: CPT | Mod: 59

## 2020-06-03 RX ORDER — GABAPENTIN 300 MG/1
300 CAPSULE ORAL NIGHTLY
Status: DISCONTINUED | OUTPATIENT
Start: 2020-06-03 | End: 2020-06-04 | Stop reason: HOSPADM

## 2020-06-03 RX ORDER — POLYETHYLENE GLYCOL 3350 17 G/17G
17 POWDER, FOR SOLUTION ORAL DAILY
Status: DISCONTINUED | OUTPATIENT
Start: 2020-06-03 | End: 2020-06-03

## 2020-06-03 RX ORDER — DOCUSATE SODIUM 100 MG/1
100 CAPSULE, LIQUID FILLED ORAL DAILY
Status: DISCONTINUED | OUTPATIENT
Start: 2020-06-03 | End: 2020-06-03

## 2020-06-03 RX ORDER — MAG HYDROX/ALUMINUM HYD/SIMETH 200-200-20
10 SUSPENSION, ORAL (FINAL DOSE FORM) ORAL EVERY 6 HOURS PRN
Status: DISCONTINUED | OUTPATIENT
Start: 2020-06-03 | End: 2020-06-04 | Stop reason: HOSPADM

## 2020-06-03 RX ADMIN — ASPIRIN 81 MG 81 MG: 81 TABLET ORAL at 09:06

## 2020-06-03 RX ADMIN — TRAZODONE HYDROCHLORIDE 50 MG: 50 TABLET ORAL at 09:06

## 2020-06-03 RX ADMIN — SUCRALFATE 1 G: 1 TABLET ORAL at 10:06

## 2020-06-03 RX ADMIN — NIFEDIPINE 30 MG: 30 TABLET, FILM COATED, EXTENDED RELEASE ORAL at 10:06

## 2020-06-03 RX ADMIN — BUSPIRONE HYDROCHLORIDE 5 MG: 5 TABLET ORAL at 09:06

## 2020-06-03 RX ADMIN — GABAPENTIN 300 MG: 300 CAPSULE ORAL at 09:06

## 2020-06-03 RX ADMIN — ENOXAPARIN SODIUM 40 MG: 100 INJECTION SUBCUTANEOUS at 05:06

## 2020-06-03 RX ADMIN — PANTOPRAZOLE SODIUM 40 MG: 40 TABLET, DELAYED RELEASE ORAL at 05:06

## 2020-06-03 NOTE — PLAN OF CARE
Problem: Fall Injury Risk  Goal: Absence of Fall and Fall-Related Injury  Outcome: Ongoing, Progressing  Intervention: Promote Injury-Free Environment  Flowsheets (Taken 6/3/2020 1825)  Safety Promotion/Fall Prevention: assistive device/personal item within reach; bed alarm set; Fall Risk reviewed with patient/family; nonskid shoes/socks when out of bed; room near unit station; side rails raised x 2; instructed to call staff for mobility  Environmental Safety Modification: assistive device/personal items within reach; clutter free environment maintained; room organization consistent; room near unit station; lighting adjusted     Problem: Adult Inpatient Plan of Care  Goal: Plan of Care Review  Outcome: Ongoing, Progressing  Goal: Patient-Specific Goal (Individualization)  Outcome: Ongoing, Progressing  Goal: Absence of Hospital-Acquired Illness or Injury  Outcome: Ongoing, Progressing  Intervention: Identify and Manage Fall Risk  Flowsheets (Taken 6/3/2020 1825)  Safety Promotion/Fall Prevention: assistive device/personal item within reach; bed alarm set; Fall Risk reviewed with patient/family; nonskid shoes/socks when out of bed; room near unit station; side rails raised x 2; instructed to call staff for mobility  Intervention: Prevent VTE (venous thromboembolism)  Flowsheets (Taken 6/3/2020 1825)  VTE Prevention/Management: ambulation promoted  Goal: Optimal Comfort and Wellbeing  Outcome: Ongoing, Progressing  Goal: Readiness for Transition of Care  Outcome: Ongoing, Progressing  Goal: Rounds/Family Conference  Outcome: Ongoing, Progressing

## 2020-06-03 NOTE — H&P
[facility]  History & Physical  Date of service: 6/2/2020  At 2030    Patient Name: Kristin Guerra  MRN: [id number]  Admission Date: 6/2/2020  Attending Physician: German Portillo MD   Primary Care Provider: Haile Weaver MD         Patient information was obtained from patient, past medical records and ER records.     Subjective:     Principal Problem:Syncope    Chief Complaint:   Chief Complaint   Patient presents with    Loss of Consciousness        HPI: The patient is a 70 year old female with history of chronic pain, hypertension, anxiety disorder, tobacco dependency, insomnia, sinus/allergies, abdominal surgeries.  She states that she has not been feeling well with pain all over for several weeks. She reports intermittent moderate low back, left flank pain, and abdominal pain associated with nausea/indigestion for several weeks. She states that she sometimes has poor appetite. She reports having a normal BM this morning.    PTA, she took a shower and felt sudden onset of weakness/lightheadedness. She states that her  helped her to the floor and she did not loose consciousness. She denies chest pain, palpitation, shortness of breath.    She states that she had a fall and hit her head in April 2020. She reports having a negative tilt study with Dr. Vasques recently.     In the ED:   Afebrile  VSS, negative for orthostatic changes  Labs unremarkable  UA: no UTI  EKG: SB, no acute changes  Chest radiograph: Negative  CT abdomen and pelvis: Moderate to large volume of stool and gas seen in the rectosigmoid colon  CT head: Negative    Per my chart review:  Echocardiogram 3/22/2019:   · Normal left ventricular systolic function. The estimated ejection fraction is 60%  · Normal LV diastolic function.  · Normal right ventricular systolic function.  · The estimated PA systolic pressure is 21 mm Hg       Holter Monitor 3/22/2020  · The average HR, excluding ectopy was 64 bpm, with a  min of 43 bpm at 07:16 D4 and a max of 105 bpm  · SUPRAVENTRICULAR TACHYCARDIA occurred 14 times  · The fastest run occurred at 17:45 D1 with 3 beats and a HR of 137 bpm  · The lontgest run was 53 beats at 01:47 D4 at a rate of 103 bpm  · No patient diary was submitted, not patient triggered events noted      Past Medical History:   Diagnosis Date    Allergy     Sterling's esophagus     Colon cancer     Colon polyp     Diverticulitis     Diverticulosis     Fatty liver     GERD (gastroesophageal reflux disease)     Hx of cervical spine surgery 2018    Hyperlipidemia     Hypertension     Lupus (systemic lupus erythematosus)     discoid    Osteoarthritis     Osteoporosis     Tremor 2005    essential       Past Surgical History:   Procedure Laterality Date    APPENDECTOMY      removed during colon surgery    CERVICAL FUSION       SECTION      COLON SURGERY   & 2008    hemicolectomy for colon cancer and second was for complications from the first surgery    COLONOSCOPY N/A 2018    Procedure: COLONOSCOPY;  Surgeon: Maverick Esquivel MD;  Location: [facility];  Service: Endoscopy;  Laterality: N/A; repeat in 5 years for surveillance    ESOPHAGOGASTRODUODENOSCOPY N/A 2019    Procedure: EGD (ESOPHAGOGASTRODUODENOSCOPY);  Surgeon: Monika Steele MD;  Location: [facility];  Service: Endoscopy;  Laterality: N/A;    ESOPHAGOGASTRODUODENOSCOPY  2019    with RFA and biopsies    ESOPHAGOGASTRODUODENOSCOPY N/A 2019    Procedure: EGD (ESOPHAGOGASTRODUODENOSCOPY);  Surgeon: Uzair Cantor MD;  Location: [facility];  Service: Endoscopy;  Laterality: N/A;  n+v with anesthesia    ESOPHAGOGASTRODUODENOSCOPY N/A 2019    Procedure: EGD (ESOPHAGOGASTRODUODENOSCOPY);  Surgeon: Uzair Cantor MD;  Location: [facility];  Service: Endoscopy;  Laterality: N/A;    ESOPHAGOGASTRODUODENOSCOPY N/A 10/7/2019    Procedure: EGD (ESOPHAGOGASTRODUODENOSCOPY);  Surgeon:  Uzair Cantor MD;  Location: [facility] ENDO;  Service: Endoscopy;  Laterality: N/A;    ESOPHAGOGASTRODUODENOSCOPY N/A 2/10/2020    Procedure: EGD (ESOPHAGOGASTRODUODENOSCOPY);  Surgeon: Uzair Cantor MD;  Location: [facility] ENDO;  Service: Endoscopy;  Laterality: N/A;  Requests earliest procedure time    HYSTERECTOMY      ovaries remain    SPLENECTOMY, TOTAL  2007    Injured during surgery and removed    UPPER GASTROINTESTINAL ENDOSCOPY  2007    GERD & hiatal hernia per patient report       Review of patient's allergies indicates:   Allergen Reactions    Mirtazapine Other (See Comments)     Climb the walls    Compazine [prochlorperazine edisylate] Nausea Only    Lexapro [escitalopram oxalate]      Worsening tremor, and sedation    Primidone Other (See Comments)     Could not function    Prochlorperazine     Simvastatin     Topiramate     Morphine Swelling and Rash       No current facility-administered medications on file prior to encounter.      Current Outpatient Medications on File Prior to Encounter   Medication Sig    aspirin 81 MG Chew Take 81 mg by mouth once daily.     busPIRone (BUSPAR) 5 MG Tab Take 5 mg by mouth once daily.     esomeprazole (NEXIUM) 40 MG capsule TAKE 1 CAPSULE(40 MG) BY MOUTH TWICE DAILY BEFORE MEALS (Patient taking differently: Take 40 mg by mouth 2 (two) times daily. )    fluticasone (FLONASE) 50 mcg/actuation nasal spray Inhale 1 spray into the lungs once daily once daily.    gabapentin (NEURONTIN) 300 MG capsule Take 1 capsule (300 mg total) by mouth 2 (two) times daily.    NIFEdipine (ADALAT CC) 30 MG TbSR Take 30 mg by mouth once daily.     traZODone (DESYREL) 50 MG tablet Take 1 tablet (50 mg total) by mouth every evening.    aluminum & magnesium hydroxide-simethicone (MYLANTA MAX STRENGTH) 400-400-40 mg/5 mL suspension Take 5 mLs by mouth every 6 (six) hours as needed for Indigestion.     azelastine (ASTELIN) 137 mcg (0.1 %) nasal spray 1 spray (137 mcg  total) by Nasal route 2 (two) times daily.    calcium carbonate (TUMS) 200 mg calcium (500 mg) chewable tablet Take 1 tablet by mouth 2 (two) times daily as needed for Heartburn.    HYDROcodone-acetaminophen (NORCO) 7.5-325 mg per tablet Take 1 tablet by mouth every 8 (eight) hours as needed.     polyethylene glycol (GLYCOLAX) 17 gram/dose powder Take 17 g by mouth daily as needed.    sucralfate (CARAFATE) 1 gram tablet Take 1 g by mouth 2 (two) times daily. Dissolve tab in 10 mls of water    [DISCONTINUED] sucralfate (CARAFATE) 1 gram tablet 1 tablet dissolved in 10 ml of water orally twice a day. (Patient taking differently: 1 g. 1 tablet dissolved in 10 ml of water orally twice a day.)     Family History     Problem Relation (Age of Onset)    Colon cancer Father    Heart disease Brother    Hyperlipidemia Brother    Lung cancer Father, Brother, Brother    Lymphoma Mother    Prostate cancer Brother        Tobacco Use    Smoking status: Current Every Day Smoker     Packs/day: 1.00     Years: 35.00     Pack years: 35.00     Types: Cigarettes    Smokeless tobacco: Never Used   Substance and Sexual Activity    Alcohol use: No    Drug use: No    Sexual activity: Not Currently     Partners: Male     Birth control/protection: Post-menopausal     Review of Systems   Constitutional: Negative for unexpected weight change.   All other systems reviewed and are negative.    Objective:     Vital Signs (Most Recent):  Temp: 97.7 °F (36.5 °C) (06/02/20 1718)  Pulse: 61 (06/02/20 1905)  Resp: 13 (06/02/20 1905)  BP: 91/65 (06/02/20 1905)  SpO2: 100 % (06/02/20 1905) Vital Signs (24h Range):  Temp:  [97.7 °F (36.5 °C)] 97.7 °F (36.5 °C)  Pulse:  [56-61] 61  Resp:  [13-22] 13  SpO2:  [99 %-100 %] 100 %  BP: ()/(63-74) 91/65     Weight: 57.2 kg (126 lb)  Body mass index is 23.05 kg/m².    Physical Exam   Constitutional: She is oriented to person, place, and time. She appears well-developed and well-nourished.   HENT:    Head: Normocephalic and atraumatic.   Eyes: Right eye exhibits no discharge. Left eye exhibits no discharge.   Neck: Normal range of motion. Neck supple.   Cardiovascular: Normal rate and regular rhythm.   No murmur heard.  Pulmonary/Chest: Effort normal and breath sounds normal. She has no wheezes. She has no rales.   Abdominal: Soft. Tenderness: Mild diffuse.   Genitourinary:   Genitourinary Comments: No CVA tenderness   Musculoskeletal: Normal range of motion. She exhibits no edema or deformity.   Neurological: She is alert and oriented to person, place, and time.   Skin: Skin is warm. No rash noted. She is not diaphoretic.   Psychiatric:   Flat affect   Vitals reviewed.          Significant Labs:   CBC:   Recent Labs   Lab 06/02/20  1800   WBC 11.82   HGB 12.5   HCT 37.0        CMP:   Recent Labs   Lab 06/02/20  1800   *   K 3.6      CO2 23      BUN 18   CREATININE 0.7   CALCIUM 8.8   PROT 8.0   ALBUMIN 3.9   BILITOT 0.7   ALKPHOS 66   AST 22   ALT 15   ANIONGAP 8   EGFRNONAA >60.0     Urine Studies:   Recent Labs   Lab 06/02/20  1937   COLORU Yellow   APPEARANCEUA Clear   PHUR 8.0   SPECGRAV 1.030   PROTEINUA Negative   GLUCUA Negative   KETONESU 1+*   BILIRUBINUA Negative   OCCULTUA Negative   NITRITE Negative   UROBILINOGEN Negative   LEUKOCYTESUR Negative       Significant Imaging: I have reviewed all pertinent imaging results/findings within the past 24 hours.   Ct Head Without Contrast    Result Date: 6/2/2020  CMS MANDATED QUALITY DATA - CT RADIATION 436 All CT scans at this facility utilize dose modulation, iterative reconstruction, and/or weight based dosing when appropriate to reduce radiation dose to as low as reasonably achievable. CLINICAL HISTORY: 70 years (1950) Female fall with head trauma, near syncope TECHNIQUE: CT HEAD WITHOUT CONTRAST. 113 images obtained. Axial CT of the brain without contrast using soft tissue and bone algorithm. Please note in the acute  setting if there is a clinical concern for an acute stroke MRI would be more sensitive/specific for evaluation of ischemia. COMPARISON: Most recent study from December 5, 2018 FINDINGS: No acute intracranial hemorrhage, edema or mass effect, and no acute parenchymal abnormality. There is no hydrocephalus, evidence of herniation or midline shift. The basal and suprasellar cisterns are within normal limits. The osseous structures show no acute skull fracture. Mild periventricular deep cerebral white matter low attenuation, nonspecific findings which can be seen in any diffuse white matter process but most commonly associated with chronic microvascular ischemic disease. Rounded defect in the right basal ganglia (image 28) consistent with prior lacunar infarct. There are scattered atheromatous calcifications in the intracranial internal carotid arteries. The orbits appear within normal limits noting likely bilateral lens replacement. External auditory canals are unremarkable. The visualized paranasal sinuses and mastoid air cells are essentially clear. IMPRESSION: 1. No acute intracranial process. 2. Unchanged chronic and involutional findings as noted above. . Electronically Signed by LENO Pena on 6/2/2020 5:58 PM    Ct Abdomen Pelvis With Contrast    Result Date: 6/2/2020  CMS MANDATED QUALITY DATA - CT RADIATION  436 All CT scans at this facility utilize dose modulation, iterative reconstruction, and/or weight based dosing when appropriate to reduce radiation dose to as low as reasonably achievable. CLINICAL HISTORY: 70 years (1950) Female left flank pain/abdominal pain TECHNIQUE: CT ABDOMEN PELVIS WITH CONTRAST. 219 images obtained. Axial CT images of the abdomen and pelvis were obtained from the dome of the diaphragm to the proximal thigh. CONTRAST: IV contrast was administered. COMPARISON: CT most recently from April 26, 2019. FINDINGS:. Lower Thorax: Tiny linear interstitial opacities at both  lung bases suggesting atelectasis and scarring. There is a small hiatal hernia. The heart is normal in size. CT Abdomen: Liver: The liver is normal in size. There is an unchanged 10 mm diameter cyst in the dome of the left lobe (image 25). Gallbladder: No radiopaque stone is identified. There is focal fundal thickening in keeping with adenomyomatosis, unchanged. Biliary Tree: No intra or extrahepatic ductal dilation. Spleen: The spleen is not visualized (surgically absent?). There are small soft tissue attenuation nodules in the splenic fossa in keeping with splenosis status post splenectomy for example 15 x 8 mm (image 34), and 12 x 6 mm (image 47) Pancreas: Within normal limits with no ductal dilation. Adrenal Glands: There is left adrenal nodularity, with a nodule in the body measuring approximately 15 x 12 mm, and in the lateral limb measuring 11 x 10 mm, unchanged. Kidneys: The kidneys are normal in imaging appearance without hydronephrosis or hydroureter. Vasculature: Atheromatous calcifications in the abdominal aorta and iliacs with no aneurysm identified. There is an incidentally noted dual right renal arterial supply a small accessory right renal artery appearing to supply the inferior pole. Lymph nodes: No abdominal lymphadenopathy is seen. Intraperitoneal structures: There is no ascites. Bowel: Scattered sigmoid predominant diverticulosis without findings of acute diverticulitis. There is a moderate to large volume of stool and gas seen in the rectosigmoid colon, consider correlation for constipation/fecal impaction. The patient has had a right hemicolectomy with no acute abnormality along the colonic resection (noting that there is motion artifact in this area). Abdominal wall: The abdominal wall and musculature are normal. Musculoskeletal: Degenerative changes are seen in the spine with no aggressive appearing lytic or blastic lesion. There is diffusely decreased osseous mineralization (suggesting  osteoporosis/osteopenia). CT Pelvis: Bladder: The urinary bladder is within normal limits. Reproductive Organs: The uterus is not visualized, surgically absent. Pelvic Lymph nodes: No pelvic lymphadenopathy or pelvic mass is identified. IMPRESSION: 1. Moderate to large volume of stool and gas seen in the rectosigmoid colon, consider correlation for constipation/fecal impaction. 2. No hydronephrosis, hydroureter or obstructing renal stone is identified. 3. Scattered sigmoid diverticula without findings of acute diverticulitis. 4. Atheromatous calcifications in the abdominal aorta and its major branch vessels with no aneurysm identified. 5. Small hiatal hernia. 6. Additional, and incidental findings as noted above unchanged from the previous exam. . Electronically Signed by LENO Pena on 6/2/2020 7:17 PM    X-ray Chest Ap Portable    Result Date: 6/2/2020  CLINICAL HISTORY: 70 years (1950) Female syncope TECHNIQUE: Portable AP radiograph the chest. COMPARISON: Most recent from December 23, 2018. FINDINGS: The lungs are clear. Costophrenic angles are seen without effusion. No pneumothorax is identified. The heart is normal in size. Atheromatous calcifications are seen at the aortic arch. Degenerative changes are seen with an ACDF and paraspinal fixation hardware in the lower cervical spine. The visualized upper abdomen is unremarkable. IMPRESSION: No acute cardiac or pulmonary process. . Electronically Signed by LENO Pena on 6/2/2020 6:01 PM          Assessment/Plan:     * Syncope  Reports sudden weakness/dizziness. Helped to the floor by her   Denies hitting head/LOC  CT head negative  EKG showed sinus bradycardia  Negative for orthostatic changes in the ED  Reports having a recent negative tilt study, done with Dr. Vasques. I do not see the results in [facility]  Admit for observation  Cardiac monitor for arrhthymias  Echocardiogram  Carotid ultrasounds          Abdominal  pain  History of hiatal hernia, GERD, and Sterling's esophagus  Per abdominal CT: Moderate to large volume of stool and gas seen in the rectosigmoid colon  Add lactulose and Mag citrate      Tobacco dependence  Advise smoking cessation      Essential hypertension  Chronic medical condition  Stable   Monitor      Gastroesophageal reflux disease  Chronic medical condition  Continue home medication  Monitor      VTE Risk Mitigation (From admission, onward)         Ordered     enoxaparin injection 40 mg  Daily      06/02/20 2033     IP VTE HIGH RISK PATIENT  Once      06/02/20 2033                   CANDI Bai  Department of Hospital Medicine   [facility]

## 2020-06-03 NOTE — PROGRESS NOTES
[facility]  Progress Note    Patient name: Kristin Guerra  MRN: [id number]  Admit Date: 6/2/2020   LOS: 0 days     SUBJECTIVE:     Principal problem: Near syncope    Interval History:   Patient reports feeling well.  Denies any more episodes of near-syncope.  Denies any chest pain, shortness of breath.  Wants to go home.  Daughter at bedside.  Appears frustrated and wants to find answers to why mother recurrent episodes of near-syncope.  Counseled family on possible causes.  Patient has significant constipation on imaging.  Enema orders placed.  Patient reports that she had loose stools this morning.  Suspect stool impaction.  Patient's orthostatic negative.  Serial cardiac enzymes unremarkable.  Workup thus far has been negative.  Patient with episodes bradycardia, heart rate in the 50s.  Cardiology consulted.    Scheduled Meds:   aspirin  81 mg Oral Daily    azelastine  1 spray Nasal BID    busPIRone  5 mg Oral Daily    enoxaparin  40 mg Subcutaneous Daily    fluticasone propionate  1 spray Each Nostril Daily    gabapentin  300 mg Oral QHS    lactulose  30 g Oral TID    NIFEdipine  30 mg Oral Daily    pantoprazole  40 mg Oral Before breakfast    sucralfate  1 g Oral BID    traZODone  50 mg Oral QHS     Continuous Infusions:  PRN Meds:acetaminophen, aluminum-magnesium hydroxide-simethicone, bisacodyL, calcium carbonate, HYDROcodone-acetaminophen, melatonin, ondansetron, sodium chloride 0.9%    Review of patient's allergies indicates:   Allergen Reactions    Mirtazapine Other (See Comments)     Climb the walls    Compazine [prochlorperazine edisylate] Nausea Only    Lexapro [escitalopram oxalate]      Worsening tremor, and sedation    Primidone Other (See Comments)     Could not function    Prochlorperazine     Simvastatin     Topiramate     Morphine Swelling and Rash       Review of Systems  As per subjective    OBJECTIVE:     Vital Signs (Most Recent)  Temp: 98.1  °F (36.7 °C) (06/03/20 1513)  Pulse: (!) 50(Notified nurse) (06/03/20 1513)  Resp: 18 (06/03/20 1513)  BP: 122/70 (06/03/20 1513)  SpO2: 97 % (06/03/20 1513)    Vital Signs Range (Last 24H):  Temp:  [97.5 °F (36.4 °C)-99.8 °F (37.7 °C)]   Pulse:  [50-78]   Resp:  [13-25]   BP: ()/(63-78)   SpO2:  [94 %-100 %]     I & O (Last 24H):    Intake/Output Summary (Last 24 hours) at 6/3/2020 1711  Last data filed at 6/3/2020 0750  Gross per 24 hour   Intake 240 ml   Output 1 ml   Net 239 ml       Physical Exam:  General: Patient resting comfortably in no acute distress. Appears older than stated age. Calm.   Eyes: EOM intact. No conjunctivae injection. No scleral icterus.  ENT: Hearing grossly intact. No discharge from ears. No nasal discharge.   CVS: RRR. No LE edema BL.  Lungs: CTA BL, no wheezing or crackles. Good breath sounds. No accessory muscle use. No acute respiratory distress  Neuro: AOx2/3. GCS 15. Cranial nerves grossly intact. Moves all extremities equally. Follows commands. Responds appropriately     Laboratory:  CBC:   Recent Labs   Lab 06/03/20  0556   WBC 10.22   RBC 3.91*   HGB 12.1   HCT 35.8*      MCV 92   MCH 30.9   MCHC 33.8     CMP:   Recent Labs   Lab 06/02/20  1800 06/03/20  0556    89   CALCIUM 8.8 9.0   ALBUMIN 3.9  --    PROT 8.0  --    * 140   K 3.6 3.4*   CO2 23 24    107   BUN 18 17   CREATININE 0.7 0.6   ALKPHOS 66  --    ALT 15  --    AST 22  --    BILITOT 0.7  --      Cardiac markers:   Recent Labs   Lab 06/03/20  0556   CPKMB 0.9   TROPONINI <0.030       Diagnostic Results:      ASSESSMENT/PLAN:     Active Hospital Problems    Diagnosis    *Near syncope    Constipation    Abdominal pain    Mixed dyslipidemia    Tobacco dependence    Essential hypertension    Gastroesophageal reflux disease    Lupus erythematosus    Hx of colon cancer, stage II       Near syncope  Suspect vasovagal response however possible concerns for sick sinus syndrome with  bradycardia causing near-syncope  Imaging found moderate to large volume of stool and gas seen in the sigmoid colon  Orthostatic negative, negative tilt study, carotid ultrasound unremarkable, serial cardiac enzymes unremarkable  May require Holter monitor  Enema ordered  Cardiology consulted  Appreciate consults      VTE Risk Mitigation (From admission, onward)         Ordered     enoxaparin injection 40 mg  Daily      06/02/20 2033     IP VTE HIGH RISK PATIENT  Once      06/02/20 2033                  Department Hospital Medicine  [facility]  Daniel Sharma MD  Date of service: 06/03/2020

## 2020-06-03 NOTE — PLAN OF CARE
06/03/20 1505   RUIZ Message   Medicare Outpatient and Observation Notification regarding financial responsibility Given to patient/caregiver;Explained to patient/caregiver;Signed/date by patient/caregiver   Date RUIZ was signed 06/03/20   Time RUIZ was signed 1500   Notified patient and daughter Jenifer of observation status.  Daughter- Jenifer signed ruiz form for patient.

## 2020-06-03 NOTE — ASSESSMENT & PLAN NOTE
History of hiatal hernia, GERD, and Sterling's esophagus  Per abdominal CT: Moderate to large volume of stool and gas seen in the rectosigmoid colon  Add lactulose and Mag citrate

## 2020-06-03 NOTE — ED PROVIDER NOTES
Encounter Date: 2020       History     Chief Complaint   Patient presents with    Loss of Consciousness     HPI     This is a 71yo woman with hx HTN, multiple abdominal surgeries presenting after near syncopal episode. Patient states that she has been feeling unwell since yesterday with nausea and decreased appetite. Today she was taking a shower, stepped out when she finished and became lightheaded like she would pass out. Her  helped her to the floor and she never lost consciousness or hit her head. States she still feels nauseated. Also notes pain to her left flank/abdomen that has been present for >1 month. In April she also had an accidental fall while trying to reach for something on a stool. She fell backwards hitting her head and has had left sided head pain ever since. Didn't got to he doctor at that time.       Review of patient's allergies indicates:   Allergen Reactions    Mirtazapine Other (See Comments)     Climb the walls    Compazine [prochlorperazine edisylate] Nausea Only    Lexapro [escitalopram oxalate]      Worsening tremor, and sedation    Primidone Other (See Comments)     Could not function    Prochlorperazine     Simvastatin     Topiramate     Morphine Swelling and Rash     Past Medical History:   Diagnosis Date    Allergy     Sterling's esophagus     Colon cancer     Colon polyp     Diverticulitis     Diverticulosis     Fatty liver     GERD (gastroesophageal reflux disease)     Hx of cervical spine surgery 2018    Hyperlipidemia     Hypertension     Lupus (systemic lupus erythematosus)     discoid    Osteoarthritis     Osteoporosis     Tremor 2005    essential     Past Surgical History:   Procedure Laterality Date    APPENDECTOMY  2007    removed during colon surgery    CERVICAL FUSION       SECTION      COLON SURGERY   & 2008    hemicolectomy for colon cancer and second was for complications from the first surgery    COLONOSCOPY  N/A 2/20/2018    Procedure: COLONOSCOPY;  Surgeon: Maverick Esquivel MD;  Location: [facility];  Service: Endoscopy;  Laterality: N/A; repeat in 5 years for surveillance    ESOPHAGOGASTRODUODENOSCOPY N/A 4/2/2019    Procedure: EGD (ESOPHAGOGASTRODUODENOSCOPY);  Surgeon: Monika Steele MD;  Location: [facility];  Service: Endoscopy;  Laterality: N/A;    ESOPHAGOGASTRODUODENOSCOPY  06/04/2019    with RFA and biopsies    ESOPHAGOGASTRODUODENOSCOPY N/A 6/4/2019    Procedure: EGD (ESOPHAGOGASTRODUODENOSCOPY);  Surgeon: Uzair Cantor MD;  Location: [facility];  Service: Endoscopy;  Laterality: N/A;  n+v with anesthesia    ESOPHAGOGASTRODUODENOSCOPY N/A 8/21/2019    Procedure: EGD (ESOPHAGOGASTRODUODENOSCOPY);  Surgeon: Uzair Cantor MD;  Location: [facility];  Service: Endoscopy;  Laterality: N/A;    ESOPHAGOGASTRODUODENOSCOPY N/A 10/7/2019    Procedure: EGD (ESOPHAGOGASTRODUODENOSCOPY);  Surgeon: Uzair Cantor MD;  Location: [facility];  Service: Endoscopy;  Laterality: N/A;    ESOPHAGOGASTRODUODENOSCOPY N/A 2/10/2020    Procedure: EGD (ESOPHAGOGASTRODUODENOSCOPY);  Surgeon: Uzair Cantor MD;  Location: [facility];  Service: Endoscopy;  Laterality: N/A;  Requests earliest procedure time    HYSTERECTOMY      ovaries remain    SPLENECTOMY, TOTAL  2007    Injured during surgery and removed    UPPER GASTROINTESTINAL ENDOSCOPY  2007    GERD & hiatal hernia per patient report     Family History   Problem Relation Age of Onset    Colon cancer Father         diagnosed in his 60's    Lung cancer Father     Lymphoma Mother         Brain    Lung cancer Brother     Lung cancer Brother         metastatic from prostate    Prostate cancer Brother     Heart disease Brother         valvular disease    Hyperlipidemia Brother     Eczema Neg Hx     Lupus Neg Hx     Psoriasis Neg Hx     Melanoma Neg Hx     Crohn's disease Neg Hx     Ulcerative colitis Neg Hx     Stomach cancer Neg Hx     Esophageal  cancer Neg Hx      Social History     Tobacco Use    Smoking status: Current Every Day Smoker     Packs/day: 1.00     Years: 35.00     Pack years: 35.00     Types: Cigarettes    Smokeless tobacco: Never Used   Substance Use Topics    Alcohol use: No    Drug use: No     Review of Systems   Constitutional: Positive for appetite change. Negative for fever.   HENT: Negative for sore throat.    Respiratory: Negative for cough and shortness of breath.    Cardiovascular: Negative for chest pain and palpitations.   Gastrointestinal: Positive for abdominal pain and nausea. Negative for vomiting.   Genitourinary: Positive for flank pain. Negative for dysuria, hematuria, vaginal bleeding and vaginal discharge.   Neurological: Positive for light-headedness and headaches.   Hematological: Does not bruise/bleed easily.   All other systems reviewed and are negative.      Physical Exam     Initial Vitals [06/02/20 1718]   BP Pulse Resp Temp SpO2   131/67 (!) 56 (!) 22 97.7 °F (36.5 °C) 99 %      MAP       --         Physical Exam    Nursing note and vitals reviewed.  Constitutional: She appears well-developed and well-nourished. She is not diaphoretic.   HENT:   Head: Normocephalic and atraumatic.   Mouth/Throat: Oropharynx is clear and moist.   No scalp hematoma or crepitus   Eyes: EOM are normal. Pupils are equal, round, and reactive to light.   Neck: Normal range of motion. Neck supple.   Cardiovascular: Normal rate, regular rhythm, normal heart sounds and intact distal pulses.   Pulmonary/Chest: Breath sounds normal. No respiratory distress. She has no wheezes. She has no rales.   Abdominal: Soft. Bowel sounds are normal. She exhibits no distension. There is tenderness (over the left mid/upper abdomen and flank). There is no guarding.   No flank or abdominal ecchymosis   Neurological: She is alert and oriented to person, place, and time.   No nystagmus   Skin: Skin is warm and dry.   Psychiatric: She has a normal mood and  affect.         ED Course   Procedures     MDM: Rosanna Torres MD HO-IV 7:50 PM  This is a 69yo woman with hx HTN, abdominal surgeries presenting with near syncope espisode as well as left flank/abdominal pain for several weeks. Differential includes arrhythmia, symptomatic bradycardia, ACS, electrolyte abnormality, valvulopathy, dehydration, intracranial hemorrahge, head contusion, intraabdominal injury, constipation, pneumonia, pleural effusion. EKG shows sinus bradycardia 56bpm, normal axis and intervals no stemi. Labs reviewed, troponin normal, glucose 91. Obtained CT head and abd/pelvis given her recent fall with continued head and left flank pain - both images show no acute injuries, large stool burden noted in the rectosigmoid colon. Discussed results with the patient. Given patient's episode of near syncope and most recent echo is from >1 year ago, will admit to hospitalist service.         Labs Reviewed   CBC W/ AUTO DIFFERENTIAL - Abnormal; Notable for the following components:       Result Value    RBC 3.98 (*)     Mean Corpuscular Hemoglobin 31.4 (*)     RDW 14.6 (*)     Gran # (ANC) 8.4 (*)     All other components within normal limits   COMPREHENSIVE METABOLIC PANEL - Abnormal; Notable for the following components:    Sodium 135 (*)     All other components within normal limits   TROPONIN I   URINALYSIS, REFLEX TO URINE CULTURE   SARS-COV-2 RNA AMPLIFICATION, QUAL   POCT GLUCOSE   POCT GLUCOSE MONITORING CONTINUOUS          Imaging Results          CT Abdomen Pelvis With Contrast (Final result)  Result time 06/02/20 19:10:03    Final result by Gage Benson MD (06/02/20 19:10:03)                 Narrative:    CMS MANDATED QUALITY DATA - CT RADIATION  436    All CT scans at this facility utilize dose modulation, iterative  reconstruction, and/or weight based dosing when appropriate to reduce  radiation dose to as low as reasonably achievable.    CLINICAL HISTORY:  70 years (1950) Female  left flank pain/abdominal pain    TECHNIQUE:  CT ABDOMEN PELVIS WITH CONTRAST. 219 images obtained. Axial CT images  of the abdomen and pelvis were obtained from the dome of the diaphragm  to the proximal thigh.    CONTRAST:  IV contrast was administered.    COMPARISON:  CT most recently from April 26, 2019.    FINDINGS:.  Lower Thorax:  Tiny linear interstitial opacities at both lung bases suggesting  atelectasis and scarring. There is a small hiatal hernia. The heart is  normal in size.    CT Abdomen:  Liver: The liver is normal in size. There is an unchanged 10 mm  diameter cyst in the dome of the left lobe (image 25).  Gallbladder: No radiopaque stone is identified. There is focal fundal  thickening in keeping with adenomyomatosis, unchanged.  Biliary Tree: No intra or extrahepatic ductal dilation.  Spleen: The spleen is not visualized (surgically absent?). There are  small soft tissue attenuation nodules in the splenic fossa in keeping  with splenosis status post splenectomy for example 15 x 8 mm (image  34), and 12 x 6 mm (image 47)  Pancreas: Within normal limits with no ductal dilation.  Adrenal Glands: There is left adrenal nodularity, with a nodule in the  body measuring approximately 15 x 12 mm, and in the lateral limb  measuring 11 x 10 mm, unchanged.  Kidneys: The kidneys are normal in imaging appearance without  hydronephrosis or hydroureter.  Vasculature: Atheromatous calcifications in the abdominal aorta and  iliacs with no aneurysm identified. There is an incidentally noted  dual right renal arterial supply a small accessory right renal artery  appearing to supply the inferior pole.  Lymph nodes: No abdominal lymphadenopathy is seen.  Intraperitoneal structures: There is no ascites.  Bowel: Scattered sigmoid predominant diverticulosis without findings  of acute diverticulitis. There is a moderate to large volume of stool  and gas seen in the rectosigmoid colon, consider correlation  for  constipation/fecal impaction. The patient has had a right  hemicolectomy with no acute abnormality along the colonic resection  (noting that there is motion artifact in this area).  Abdominal wall: The abdominal wall and musculature are normal.  Musculoskeletal: Degenerative changes are seen in the spine with no  aggressive appearing lytic or blastic lesion. There is diffusely  decreased osseous mineralization (suggesting osteoporosis/osteopenia).    CT Pelvis:  Bladder: The urinary bladder is within normal limits.  Reproductive Organs: The uterus is not visualized, surgically absent.  Pelvic Lymph nodes: No pelvic lymphadenopathy or pelvic mass is  identified.    IMPRESSION:  1. Moderate to large volume of stool and gas seen in the rectosigmoid  colon, consider correlation for constipation/fecal impaction.  2. No hydronephrosis, hydroureter or obstructing renal stone is  identified.  3. Scattered sigmoid diverticula without findings of acute  diverticulitis.  4. Atheromatous calcifications in the abdominal aorta and its major  branch vessels with no aneurysm identified.  5. Small hiatal hernia.  6. Additional, and incidental findings as noted above unchanged from  the previous exam.                    .    Electronically Signed by LENO Pena on 6/2/2020 7:17 PM                             X-Ray Chest AP Portable (Final result)  Result time 06/02/20 18:01:53   Procedure changed from X-Ray Chest 1 View     Final result by Gage Benson MD (06/02/20 18:01:53)                 Narrative:    CLINICAL HISTORY:  70 years (1950) Female syncope    TECHNIQUE:  Portable AP radiograph the chest.    COMPARISON:  Most recent from December 23, 2018.    FINDINGS:  The lungs are clear. Costophrenic angles are seen without effusion. No  pneumothorax is identified. The heart is normal in size. Atheromatous  calcifications are seen at the aortic arch. Degenerative changes are  seen with an ACDF and  paraspinal fixation hardware in the lower  cervical spine. The visualized upper abdomen is unremarkable.    IMPRESSION:  No acute cardiac or pulmonary process.                  .            Electronically Signed by LENO Pena. on 6/2/2020 6:01 PM                             CT Head Without Contrast (Final result)  Result time 06/02/20 17:57:59    Final result by Gage Benson MD (06/02/20 17:57:59)                 Narrative:    CMS MANDATED QUALITY DATA - CT RADIATION 436    All CT scans at this facility utilize dose modulation, iterative  reconstruction, and/or weight based dosing when appropriate to reduce  radiation dose to as low as reasonably achievable.    CLINICAL HISTORY:  70 years (1950) Female fall with head trauma, near syncope    TECHNIQUE:  CT HEAD WITHOUT CONTRAST. 113 images obtained. Axial CT of the brain  without contrast using soft tissue and bone algorithm. Please note in  the acute setting if there is a clinical concern for an acute stroke  MRI would be more sensitive/specific for evaluation of ischemia.    COMPARISON:  Most recent study from December 5, 2018    FINDINGS:  No acute intracranial hemorrhage, edema or mass effect, and no acute  parenchymal abnormality. There is no hydrocephalus, evidence of  herniation or midline shift. The basal and suprasellar cisterns are  within normal limits. The osseous structures show no acute skull  fracture.    Mild periventricular deep cerebral white matter low attenuation,  nonspecific findings which can be seen in any diffuse white matter  process but most commonly associated with chronic microvascular  ischemic disease. Rounded defect in the right basal ganglia (image 28)  consistent with prior lacunar infarct. There are scattered  atheromatous calcifications in the intracranial internal carotid  arteries.    The orbits appear within normal limits noting likely bilateral lens  replacement. External auditory canals are unremarkable.  The visualized  paranasal sinuses and mastoid air cells are essentially clear.    IMPRESSION:  1. No acute intracranial process.  2. Unchanged chronic and involutional findings as noted above.                  .    Electronically Signed by LENO Pena on 6/2/2020 5:58 PM                                            Attending Attestation:   Physician Attestation Statement for Resident:  As the supervising MD  I agree with the above history. -:   As the supervising MD I agree with the above PE.    As the supervising MD I agree with the above treatment, course, plan, and disposition.                                  Clinical Impression:       ICD-10-CM ICD-9-CM   1. Syncope R55 780.2   2. Chest pain R07.9 786.50             ED Disposition Condition    Admit                           Rosanna Torres MD  Resident  06/02/20 2006       Isrrael Navarrete MD  06/03/20 0015       Isrrael Navarrete MD  06/03/20 0016

## 2020-06-03 NOTE — PLAN OF CARE
06/03/20 0844   Patient Assessment/Suction   Level of Consciousness (AVPU) alert   Respiratory Effort Normal;Unlabored   Expansion/Accessory Muscles/Retractions no use of accessory muscles;no retractions   PRE-TX-O2   O2 Device (Oxygen Therapy) room air   SpO2 98 %   Pulse Oximetry Type Intermittent   $ Pulse Oximetry - Single Charge Pulse Oximetry - Single   Pulse (!) 52   Resp 18   Respiratory Evaluation   $ Care Plan Tech Time 15 min   Evaluation For   (CARE PLAN)

## 2020-06-03 NOTE — CONSULTS
[facility]  Cardiology  Consult Note    Patient Name: Kristin Guerra  MRN: [id number]  Admission Date: 2020  Hospital Length of Stay: 0 days  Code Status: Full Code   Attending Provider: Daniel Sharma MD   Consulting Provider: Karyna Vasques MD  Primary Care Physician: Haile Weaver MD  Principal Problem:Near syncope    Patient information was obtained from patient, relative(s), past medical records and ER records.     Consults  Subjective:     Chief Complaint:  Syncope     HPI:  70-year-old lady admitted to hospital after she felt lightheaded and thought she was going to pass out while taking a shower.  She called out to her  who then helped her get on the flow.  Patient tells me she did not lose consciousness but she was somewhat fuzzy minded for a while.  I saw her during her previous admission to hospital anterior tilt table test which was basically negative.  Holter monitor showed some nonspecific short runs of SVT but no major pauses.  A change blood pressure medication to Procardia XL 30 mg once daily for its positive chronotropic effect.  Her daughter was present in the room and was very frustrated with repeated admissions and no clear solution  Past Medical History:   Diagnosis Date    Allergy     Sterling's esophagus     Colon cancer     Colon polyp     Diverticulitis     Diverticulosis     Fatty liver     GERD (gastroesophageal reflux disease)     Hx of cervical spine surgery 2018    Hyperlipidemia     Hypertension     Lupus (systemic lupus erythematosus)     discoid    Osteoarthritis     Osteoporosis     Tremor 2005    essential       Past Surgical History:   Procedure Laterality Date    APPENDECTOMY      removed during colon surgery    CERVICAL FUSION       SECTION      COLON SURGERY   &     hemicolectomy for colon cancer and second was for complications from the first surgery    COLONOSCOPY N/A 2018    Procedure:  COLONOSCOPY;  Surgeon: Maverick Esquivel MD;  Location: [facility];  Service: Endoscopy;  Laterality: N/A; repeat in 5 years for surveillance    ESOPHAGOGASTRODUODENOSCOPY N/A 4/2/2019    Procedure: EGD (ESOPHAGOGASTRODUODENOSCOPY);  Surgeon: Monika Steele MD;  Location: [facility];  Service: Endoscopy;  Laterality: N/A;    ESOPHAGOGASTRODUODENOSCOPY  06/04/2019    with RFA and biopsies    ESOPHAGOGASTRODUODENOSCOPY N/A 6/4/2019    Procedure: EGD (ESOPHAGOGASTRODUODENOSCOPY);  Surgeon: Uzair Cantor MD;  Location: [facility];  Service: Endoscopy;  Laterality: N/A;  n+v with anesthesia    ESOPHAGOGASTRODUODENOSCOPY N/A 8/21/2019    Procedure: EGD (ESOPHAGOGASTRODUODENOSCOPY);  Surgeon: Uzair Cantor MD;  Location: [facility];  Service: Endoscopy;  Laterality: N/A;    ESOPHAGOGASTRODUODENOSCOPY N/A 10/7/2019    Procedure: EGD (ESOPHAGOGASTRODUODENOSCOPY);  Surgeon: Uzair Cantor MD;  Location: [facility];  Service: Endoscopy;  Laterality: N/A;    ESOPHAGOGASTRODUODENOSCOPY N/A 2/10/2020    Procedure: EGD (ESOPHAGOGASTRODUODENOSCOPY);  Surgeon: Uzair Cantor MD;  Location: [facility];  Service: Endoscopy;  Laterality: N/A;  Requests earliest procedure time    HYSTERECTOMY      ovaries remain    SPLENECTOMY, TOTAL  2007    Injured during surgery and removed    UPPER GASTROINTESTINAL ENDOSCOPY  2007    GERD & hiatal hernia per patient report       Review of patient's allergies indicates:   Allergen Reactions    Mirtazapine Other (See Comments)     Climb the walls    Compazine [prochlorperazine edisylate] Nausea Only    Lexapro [escitalopram oxalate]      Worsening tremor, and sedation    Primidone Other (See Comments)     Could not function    Prochlorperazine     Simvastatin     Topiramate     Morphine Swelling and Rash       No current facility-administered medications on file prior to encounter.      Current Outpatient Medications on File Prior to Encounter   Medication Sig     aspirin 81 MG Chew Take 81 mg by mouth once daily.     busPIRone (BUSPAR) 5 MG Tab Take 5 mg by mouth once daily.     esomeprazole (NEXIUM) 40 MG capsule TAKE 1 CAPSULE(40 MG) BY MOUTH TWICE DAILY BEFORE MEALS (Patient taking differently: Take 40 mg by mouth 2 (two) times daily. )    fluticasone (FLONASE) 50 mcg/actuation nasal spray Inhale 1 spray into the lungs once daily once daily.    gabapentin (NEURONTIN) 300 MG capsule Take 1 capsule (300 mg total) by mouth 2 (two) times daily.    NIFEdipine (ADALAT CC) 30 MG TbSR Take 30 mg by mouth once daily.     polyethylene glycol (GLYCOLAX) 17 gram/dose powder Take 17 g by mouth daily as needed.    traZODone (DESYREL) 50 MG tablet Take 1 tablet (50 mg total) by mouth every evening.    aluminum & magnesium hydroxide-simethicone (MYLANTA MAX STRENGTH) 400-400-40 mg/5 mL suspension Take 5 mLs by mouth every 6 (six) hours as needed for Indigestion.     azelastine (ASTELIN) 137 mcg (0.1 %) nasal spray 1 spray (137 mcg total) by Nasal route 2 (two) times daily.    calcium carbonate (TUMS) 200 mg calcium (500 mg) chewable tablet Take 1 tablet by mouth 2 (two) times daily as needed for Heartburn.    HYDROcodone-acetaminophen (NORCO) 7.5-325 mg per tablet Take 1 tablet by mouth every 8 (eight) hours as needed.     sucralfate (CARAFATE) 1 gram tablet Take 1 g by mouth 2 (two) times daily. Dissolve tab in 10 mls of water     Family History     Problem Relation (Age of Onset)    Colon cancer Father    Heart disease Brother    Hyperlipidemia Brother    Lung cancer Father, Brother, Brother    Lymphoma Mother    Prostate cancer Brother        Tobacco Use    Smoking status: Current Every Day Smoker     Packs/day: 1.00     Years: 35.00     Pack years: 35.00     Types: Cigarettes    Smokeless tobacco: Never Used   Substance and Sexual Activity    Alcohol use: No    Drug use: No    Sexual activity: Not Currently     Partners: Male     Birth control/protection:  Post-menopausal     ROS  Objective:     Vital Signs (Most Recent):  Temp: 98.1 °F (36.7 °C) (06/03/20 1513)  Pulse: (!) 50(Notified nurse) (06/03/20 1513)  Resp: 18 (06/03/20 1513)  BP: 122/70 (06/03/20 1513)  SpO2: 97 % (06/03/20 1513) Vital Signs (24h Range):  Temp:  [97.5 °F (36.4 °C)-99.8 °F (37.7 °C)] 98.1 °F (36.7 °C)  Pulse:  [50-78] 50  Resp:  [13-25] 18  SpO2:  [94 %-100 %] 97 %  BP: ()/(63-78) 122/70     Weight: 55.2 kg (121 lb 9.6 oz)  Body mass index is 22.24 kg/m².    SpO2: 97 %  O2 Device (Oxygen Therapy): room air      Intake/Output Summary (Last 24 hours) at 6/3/2020 1722  Last data filed at 6/3/2020 0750  Gross per 24 hour   Intake 240 ml   Output 1 ml   Net 239 ml       Lines/Drains/Airways     Airway                 Airway - Non-Surgical 06/04/19 0940 Nasal Cannula 365 days         Airway - Non-Surgical 10/07/19 0818 Other (Comment) 240 days         Airway - Non-Surgical 02/10/20 0919 Nasal Cannula 114 days          Peripheral Intravenous Line                 Peripheral IV - Single Lumen 06/02/20 1705 20 G Right Antecubital 1 day                Physical Exam she is alert and oriented x3 she has had and bilateral hand tremors  Her pulse is regular heart sounds are normal there is a soft systolic murmur lungs mostly clear to auscultation      Significant Labs:   CMP   Recent Labs   Lab 06/02/20  1800 06/03/20  0556   * 140   K 3.6 3.4*    107   CO2 23 24    89   BUN 18 17   CREATININE 0.7 0.6   CALCIUM 8.8 9.0   PROT 8.0  --    ALBUMIN 3.9  --    BILITOT 0.7  --    ALKPHOS 66  --    AST 22  --    ALT 15  --    ANIONGAP 8 9   ESTGFRAFRICA >60.0 >60.0   EGFRNONAA >60.0 >60.0   , CBC   Recent Labs   Lab 06/02/20  1800 06/03/20  0556   WBC 11.82 10.22   HGB 12.5 12.1   HCT 37.0 35.8*    296   , INR No results for input(s): INR, PROTIME in the last 48 hours., Lipid Panel No results for input(s): CHOL, HDL, LDLCALC, TRIG, CHOLHDL in the last 48 hours. and Troponin   Recent  Labs   Lab 06/02/20  1800 06/03/20  0012 06/03/20  0556   TROPONINI <0.030 <0.030 <0.030       Significant Imaging: EKG: On telemetry she mostly demonstrates sinus bradycardia with the lowest heart rate of 45 averaging around 55 beats per minute  Assessment and Plan:  Recurrent syncope of uncertain etiology.  Persistent sinus bradycardia.  Recommendations  I am not convinced sinus bradycardia is the cause of her from syncopal episode however 1 possibility is when she becomes hypotensive she is unable to increase her heart rate to compensate for the blood pressure and then having syncope.  I would like to observe her rhythm on telemetry at least till tomorrow.  I plan to refer her to electrophysiologist Dr. Petar Dick at [facility] as an outpatient.     Active Diagnoses:    Diagnosis Date Noted POA    PRINCIPAL PROBLEM:  Near syncope [R55] 06/02/2020 Yes    Constipation [K59.00] 06/03/2020 Yes    Abdominal pain [R10.9] 06/02/2020 Yes    Mixed dyslipidemia [E78.2] 11/25/2019 Yes     Chronic    Tobacco dependence [F17.200] 12/13/2018 Yes     Chronic    Essential hypertension [I10] 04/17/2018 Yes     Chronic    Gastroesophageal reflux disease [K21.9] 04/17/2018 Yes     Chronic    Lupus erythematosus [L93.0] 04/17/2018 Yes     Chronic    Hx of colon cancer, stage II [Z85.038] 02/20/2018 Yes     Chronic      Problems Resolved During this Admission:       VTE Risk Mitigation (From admission, onward)         Ordered     enoxaparin injection 40 mg  Daily      06/02/20 2033     IP VTE HIGH RISK PATIENT  Once      06/02/20 2033                Thank you for your consult. I will follow-up with patient. Please contact us if you have any additional questions.    Karyna Vasques MD  Cardiology   [facility] hospital

## 2020-06-03 NOTE — HPI
The patient is a 70 year old female with history of chronic pain, hypertension, anxiety disorder, tobacco dependency, insomnia, sinus/allergies, abdominal surgeries.  She states that she has not been feeling well with pain all over for several weeks. She reports intermittent moderate low back, left flank pain, and abdominal pain associated with nausea/indigestion for several weeks. She states that she sometimes has poor appetite. She reports having a normal BM this morning.    PTA, she took a shower and felt sudden onset of weakness/lightheadedness. She states that her  helped her to the floor and she did not loose consciousness. She denies chest pain, palpitation, shortness of breath.    She states that she had a fall and hit her head in April 2020. She reports having a negative tilt study with Dr. Vasques recently.     In the ED:   Afebrile  VSS, negative for orthostatic changes  Labs unremarkable  UA: no UTI  EKG: SB, no acute changes  Chest radiograph: Negative  CT abdomen and pelvis: Moderate to large volume of stool and gas seen in the rectosigmoid colon  CT head: Negative    Per my chart review:  Echocardiogram 3/22/2019:   · Normal left ventricular systolic function. The estimated ejection fraction is 60%  · Normal LV diastolic function.  · Normal right ventricular systolic function.  · The estimated PA systolic pressure is 21 mm Hg       Holter Monitor 3/22/2020  · The average HR, excluding ectopy was 64 bpm, with a min of 43 bpm at 07:16 D4 and a max of 105 bpm  · SUPRAVENTRICULAR TACHYCARDIA occurred 14 times  · The fastest run occurred at 17:45 D1 with 3 beats and a HR of 137 bpm  · The lontgest run was 53 beats at 01:47 D4 at a rate of 103 bpm  · No patient diary was submitted, not patient triggered events noted

## 2020-06-03 NOTE — ASSESSMENT & PLAN NOTE
Reports sudden weakness/dizziness. Helped to the floor by her   Denies hitting head/LOC  CT head negative  EKG showed sinus bradycardia  Negative for orthostatic changes in the ED  Reports having a recent negative tilt study, done with Dr. Vasques. I do not see the results in [facility]  Admit for observation  Cardiac monitor for arrhthymias  Echocardiogram  Carotid ultrasounds

## 2020-06-03 NOTE — PROGRESS NOTES
Cardiac Rehab     Kristin Guerra   [id number]   6/3/2020         Cardiac Rehab Phase Taught: Phase 1    Teaching Method: Verbal, Written    Handouts: Smoking Cessation    Educational Videos: None    Understanding:  Learning indicated by feedback and Verbalize understanding    Comments: smoking cessation education    Total Time Spent:10mins            Sarah Dixon RN

## 2020-06-03 NOTE — SUBJECTIVE & OBJECTIVE
Past Medical History:   Diagnosis Date    Allergy     Sterling's esophagus     Colon cancer     Colon polyp     Diverticulitis     Diverticulosis     Fatty liver     GERD (gastroesophageal reflux disease)     Hx of cervical spine surgery 2018    Hyperlipidemia     Hypertension     Lupus (systemic lupus erythematosus)     discoid    Osteoarthritis     Osteoporosis     Tremor 2005    essential       Past Surgical History:   Procedure Laterality Date    APPENDECTOMY  2007    removed during colon surgery    CERVICAL FUSION       SECTION      COLON SURGERY   &     hemicolectomy for colon cancer and second was for complications from the first surgery    COLONOSCOPY N/A 2018    Procedure: COLONOSCOPY;  Surgeon: Maverick Esquivel MD;  Location: [facility];  Service: Endoscopy;  Laterality: N/A; repeat in 5 years for surveillance    ESOPHAGOGASTRODUODENOSCOPY N/A 2019    Procedure: EGD (ESOPHAGOGASTRODUODENOSCOPY);  Surgeon: Monika Steele MD;  Location: [facility];  Service: Endoscopy;  Laterality: N/A;    ESOPHAGOGASTRODUODENOSCOPY  2019    with RFA and biopsies    ESOPHAGOGASTRODUODENOSCOPY N/A 2019    Procedure: EGD (ESOPHAGOGASTRODUODENOSCOPY);  Surgeon: Uzair Cantor MD;  Location: [facility];  Service: Endoscopy;  Laterality: N/A;  n+v with anesthesia    ESOPHAGOGASTRODUODENOSCOPY N/A 2019    Procedure: EGD (ESOPHAGOGASTRODUODENOSCOPY);  Surgeon: Uzair Cantor MD;  Location: [facility];  Service: Endoscopy;  Laterality: N/A;    ESOPHAGOGASTRODUODENOSCOPY N/A 10/7/2019    Procedure: EGD (ESOPHAGOGASTRODUODENOSCOPY);  Surgeon: Uzair Cantor MD;  Location: [facility];  Service: Endoscopy;  Laterality: N/A;    ESOPHAGOGASTRODUODENOSCOPY N/A 2/10/2020    Procedure: EGD (ESOPHAGOGASTRODUODENOSCOPY);  Surgeon: Uzair Cantor MD;  Location: [facility];  Service: Endoscopy;  Laterality: N/A;  Requests earliest procedure time     HYSTERECTOMY      ovaries remain    SPLENECTOMY, TOTAL  2007    Injured during surgery and removed    UPPER GASTROINTESTINAL ENDOSCOPY  2007    GERD & hiatal hernia per patient report       Review of patient's allergies indicates:   Allergen Reactions    Mirtazapine Other (See Comments)     Climb the walls    Compazine [prochlorperazine edisylate] Nausea Only    Lexapro [escitalopram oxalate]      Worsening tremor, and sedation    Primidone Other (See Comments)     Could not function    Prochlorperazine     Simvastatin     Topiramate     Morphine Swelling and Rash       No current facility-administered medications on file prior to encounter.      Current Outpatient Medications on File Prior to Encounter   Medication Sig    aspirin 81 MG Chew Take 81 mg by mouth once daily.     busPIRone (BUSPAR) 5 MG Tab Take 5 mg by mouth once daily.     esomeprazole (NEXIUM) 40 MG capsule TAKE 1 CAPSULE(40 MG) BY MOUTH TWICE DAILY BEFORE MEALS (Patient taking differently: Take 40 mg by mouth 2 (two) times daily. )    fluticasone (FLONASE) 50 mcg/actuation nasal spray Inhale 1 spray into the lungs once daily once daily.    gabapentin (NEURONTIN) 300 MG capsule Take 1 capsule (300 mg total) by mouth 2 (two) times daily.    NIFEdipine (ADALAT CC) 30 MG TbSR Take 30 mg by mouth once daily.     traZODone (DESYREL) 50 MG tablet Take 1 tablet (50 mg total) by mouth every evening.    aluminum & magnesium hydroxide-simethicone (MYLANTA MAX STRENGTH) 400-400-40 mg/5 mL suspension Take 5 mLs by mouth every 6 (six) hours as needed for Indigestion.     azelastine (ASTELIN) 137 mcg (0.1 %) nasal spray 1 spray (137 mcg total) by Nasal route 2 (two) times daily.    calcium carbonate (TUMS) 200 mg calcium (500 mg) chewable tablet Take 1 tablet by mouth 2 (two) times daily as needed for Heartburn.    HYDROcodone-acetaminophen (NORCO) 7.5-325 mg per tablet Take 1 tablet by mouth every 8 (eight) hours as needed.     polyethylene  glycol (GLYCOLAX) 17 gram/dose powder Take 17 g by mouth daily as needed.    sucralfate (CARAFATE) 1 gram tablet Take 1 g by mouth 2 (two) times daily. Dissolve tab in 10 mls of water    [DISCONTINUED] sucralfate (CARAFATE) 1 gram tablet 1 tablet dissolved in 10 ml of water orally twice a day. (Patient taking differently: 1 g. 1 tablet dissolved in 10 ml of water orally twice a day.)     Family History     Problem Relation (Age of Onset)    Colon cancer Father    Heart disease Brother    Hyperlipidemia Brother    Lung cancer Father, Brother, Brother    Lymphoma Mother    Prostate cancer Brother        Tobacco Use    Smoking status: Current Every Day Smoker     Packs/day: 1.00     Years: 35.00     Pack years: 35.00     Types: Cigarettes    Smokeless tobacco: Never Used   Substance and Sexual Activity    Alcohol use: No    Drug use: No    Sexual activity: Not Currently     Partners: Male     Birth control/protection: Post-menopausal     Review of Systems   Constitutional: Negative for unexpected weight change.   All other systems reviewed and are negative.    Objective:     Vital Signs (Most Recent):  Temp: 97.7 °F (36.5 °C) (06/02/20 1718)  Pulse: 61 (06/02/20 1905)  Resp: 13 (06/02/20 1905)  BP: 91/65 (06/02/20 1905)  SpO2: 100 % (06/02/20 1905) Vital Signs (24h Range):  Temp:  [97.7 °F (36.5 °C)] 97.7 °F (36.5 °C)  Pulse:  [56-61] 61  Resp:  [13-22] 13  SpO2:  [99 %-100 %] 100 %  BP: ()/(63-74) 91/65     Weight: 57.2 kg (126 lb)  Body mass index is 23.05 kg/m².    Physical Exam   Constitutional: She is oriented to person, place, and time. She appears well-developed and well-nourished.   HENT:   Head: Normocephalic and atraumatic.   Eyes: Right eye exhibits no discharge. Left eye exhibits no discharge.   Neck: Normal range of motion. Neck supple.   Cardiovascular: Normal rate and regular rhythm.   No murmur heard.  Pulmonary/Chest: Effort normal and breath sounds normal. She has no wheezes. She has no  rales.   Abdominal: Soft. Tenderness: Mild diffuse.   Genitourinary:   Genitourinary Comments: No CVA tenderness   Musculoskeletal: Normal range of motion. She exhibits no edema or deformity.   Neurological: She is alert and oriented to person, place, and time.   Skin: Skin is warm. No rash noted. She is not diaphoretic.   Psychiatric:   Flat affect   Vitals reviewed.          Significant Labs:   CBC:   Recent Labs   Lab 06/02/20  1800   WBC 11.82   HGB 12.5   HCT 37.0        CMP:   Recent Labs   Lab 06/02/20  1800   *   K 3.6      CO2 23      BUN 18   CREATININE 0.7   CALCIUM 8.8   PROT 8.0   ALBUMIN 3.9   BILITOT 0.7   ALKPHOS 66   AST 22   ALT 15   ANIONGAP 8   EGFRNONAA >60.0     Urine Studies:   Recent Labs   Lab 06/02/20  1937   COLORU Yellow   APPEARANCEUA Clear   PHUR 8.0   SPECGRAV 1.030   PROTEINUA Negative   GLUCUA Negative   KETONESU 1+*   BILIRUBINUA Negative   OCCULTUA Negative   NITRITE Negative   UROBILINOGEN Negative   LEUKOCYTESUR Negative       Significant Imaging: I have reviewed all pertinent imaging results/findings within the past 24 hours.   Ct Head Without Contrast    Result Date: 6/2/2020  CMS MANDATED QUALITY DATA - CT RADIATION 436 All CT scans at this facility utilize dose modulation, iterative reconstruction, and/or weight based dosing when appropriate to reduce radiation dose to as low as reasonably achievable. CLINICAL HISTORY: 70 years (1950) Female fall with head trauma, near syncope TECHNIQUE: CT HEAD WITHOUT CONTRAST. 113 images obtained. Axial CT of the brain without contrast using soft tissue and bone algorithm. Please note in the acute setting if there is a clinical concern for an acute stroke MRI would be more sensitive/specific for evaluation of ischemia. COMPARISON: Most recent study from December 5, 2018 FINDINGS: No acute intracranial hemorrhage, edema or mass effect, and no acute parenchymal abnormality. There is no hydrocephalus, evidence  of herniation or midline shift. The basal and suprasellar cisterns are within normal limits. The osseous structures show no acute skull fracture. Mild periventricular deep cerebral white matter low attenuation, nonspecific findings which can be seen in any diffuse white matter process but most commonly associated with chronic microvascular ischemic disease. Rounded defect in the right basal ganglia (image 28) consistent with prior lacunar infarct. There are scattered atheromatous calcifications in the intracranial internal carotid arteries. The orbits appear within normal limits noting likely bilateral lens replacement. External auditory canals are unremarkable. The visualized paranasal sinuses and mastoid air cells are essentially clear. IMPRESSION: 1. No acute intracranial process. 2. Unchanged chronic and involutional findings as noted above. . Electronically Signed by LENO Pena on 6/2/2020 5:58 PM    Ct Abdomen Pelvis With Contrast    Result Date: 6/2/2020  CMS MANDATED QUALITY DATA - CT RADIATION  436 All CT scans at this facility utilize dose modulation, iterative reconstruction, and/or weight based dosing when appropriate to reduce radiation dose to as low as reasonably achievable. CLINICAL HISTORY: 70 years (1950) Female left flank pain/abdominal pain TECHNIQUE: CT ABDOMEN PELVIS WITH CONTRAST. 219 images obtained. Axial CT images of the abdomen and pelvis were obtained from the dome of the diaphragm to the proximal thigh. CONTRAST: IV contrast was administered. COMPARISON: CT most recently from April 26, 2019. FINDINGS:. Lower Thorax: Tiny linear interstitial opacities at both lung bases suggesting atelectasis and scarring. There is a small hiatal hernia. The heart is normal in size. CT Abdomen: Liver: The liver is normal in size. There is an unchanged 10 mm diameter cyst in the dome of the left lobe (image 25). Gallbladder: No radiopaque stone is identified. There is focal fundal  thickening in keeping with adenomyomatosis, unchanged. Biliary Tree: No intra or extrahepatic ductal dilation. Spleen: The spleen is not visualized (surgically absent?). There are small soft tissue attenuation nodules in the splenic fossa in keeping with splenosis status post splenectomy for example 15 x 8 mm (image 34), and 12 x 6 mm (image 47) Pancreas: Within normal limits with no ductal dilation. Adrenal Glands: There is left adrenal nodularity, with a nodule in the body measuring approximately 15 x 12 mm, and in the lateral limb measuring 11 x 10 mm, unchanged. Kidneys: The kidneys are normal in imaging appearance without hydronephrosis or hydroureter. Vasculature: Atheromatous calcifications in the abdominal aorta and iliacs with no aneurysm identified. There is an incidentally noted dual right renal arterial supply a small accessory right renal artery appearing to supply the inferior pole. Lymph nodes: No abdominal lymphadenopathy is seen. Intraperitoneal structures: There is no ascites. Bowel: Scattered sigmoid predominant diverticulosis without findings of acute diverticulitis. There is a moderate to large volume of stool and gas seen in the rectosigmoid colon, consider correlation for constipation/fecal impaction. The patient has had a right hemicolectomy with no acute abnormality along the colonic resection (noting that there is motion artifact in this area). Abdominal wall: The abdominal wall and musculature are normal. Musculoskeletal: Degenerative changes are seen in the spine with no aggressive appearing lytic or blastic lesion. There is diffusely decreased osseous mineralization (suggesting osteoporosis/osteopenia). CT Pelvis: Bladder: The urinary bladder is within normal limits. Reproductive Organs: The uterus is not visualized, surgically absent. Pelvic Lymph nodes: No pelvic lymphadenopathy or pelvic mass is identified. IMPRESSION: 1. Moderate to large volume of stool and gas seen in the  rectosigmoid colon, consider correlation for constipation/fecal impaction. 2. No hydronephrosis, hydroureter or obstructing renal stone is identified. 3. Scattered sigmoid diverticula without findings of acute diverticulitis. 4. Atheromatous calcifications in the abdominal aorta and its major branch vessels with no aneurysm identified. 5. Small hiatal hernia. 6. Additional, and incidental findings as noted above unchanged from the previous exam. . Electronically Signed by LENO Pena on 6/2/2020 7:17 PM    X-ray Chest Ap Portable    Result Date: 6/2/2020  CLINICAL HISTORY: 70 years (1950) Female syncope TECHNIQUE: Portable AP radiograph the chest. COMPARISON: Most recent from December 23, 2018. FINDINGS: The lungs are clear. Costophrenic angles are seen without effusion. No pneumothorax is identified. The heart is normal in size. Atheromatous calcifications are seen at the aortic arch. Degenerative changes are seen with an ACDF and paraspinal fixation hardware in the lower cervical spine. The visualized upper abdomen is unremarkable. IMPRESSION: No acute cardiac or pulmonary process. . Electronically Signed by LENO Pena on 6/2/2020 6:01 PM

## 2020-06-04 VITALS
RESPIRATION RATE: 17 BRPM | OXYGEN SATURATION: 96 % | DIASTOLIC BLOOD PRESSURE: 73 MMHG | SYSTOLIC BLOOD PRESSURE: 130 MMHG | TEMPERATURE: 98 F | HEIGHT: 62 IN | HEART RATE: 58 BPM | WEIGHT: 121.63 LBS | BODY MASS INDEX: 22.38 KG/M2

## 2020-06-04 PROBLEM — R10.9 ABDOMINAL PAIN: Status: RESOLVED | Noted: 2020-06-02 | Resolved: 2020-06-04

## 2020-06-04 PROBLEM — K59.00 CONSTIPATION: Status: RESOLVED | Noted: 2020-06-03 | Resolved: 2020-06-04

## 2020-06-04 LAB
ANION GAP SERPL CALC-SCNC: 6 MMOL/L (ref 8–16)
BASOPHILS # BLD AUTO: 0.14 K/UL (ref 0–0.2)
BASOPHILS NFR BLD: 1.6 % (ref 0–1.9)
BUN SERPL-MCNC: 19 MG/DL (ref 8–23)
CALCIUM SERPL-MCNC: 8.6 MG/DL (ref 8.7–10.5)
CHLORIDE SERPL-SCNC: 107 MMOL/L (ref 95–110)
CO2 SERPL-SCNC: 26 MMOL/L (ref 23–29)
CREAT SERPL-MCNC: 0.5 MG/DL (ref 0.5–1.4)
DIFFERENTIAL METHOD: ABNORMAL
EOSINOPHIL # BLD AUTO: 0.4 K/UL (ref 0–0.5)
EOSINOPHIL NFR BLD: 4.8 % (ref 0–8)
ERYTHROCYTE [DISTWIDTH] IN BLOOD BY AUTOMATED COUNT: 14.8 % (ref 11.5–14.5)
EST. GFR  (AFRICAN AMERICAN): >60 ML/MIN/1.73 M^2
EST. GFR  (NON AFRICAN AMERICAN): >60 ML/MIN/1.73 M^2
GLUCOSE SERPL-MCNC: 93 MG/DL (ref 70–110)
HCT VFR BLD AUTO: 33.1 % (ref 37–48.5)
HGB BLD-MCNC: 11.1 G/DL (ref 12–16)
IMM GRANULOCYTES # BLD AUTO: 0.02 K/UL (ref 0–0.04)
IMM GRANULOCYTES NFR BLD AUTO: 0.2 % (ref 0–0.5)
LYMPHOCYTES # BLD AUTO: 4 K/UL (ref 1–4.8)
LYMPHOCYTES NFR BLD: 44.1 % (ref 18–48)
MCH RBC QN AUTO: 31.4 PG (ref 27–31)
MCHC RBC AUTO-ENTMCNC: 33.5 G/DL (ref 32–36)
MCV RBC AUTO: 94 FL (ref 82–98)
MONOCYTES # BLD AUTO: 0.9 K/UL (ref 0.3–1)
MONOCYTES NFR BLD: 9.4 % (ref 4–15)
NEUTROPHILS # BLD AUTO: 3.6 K/UL (ref 1.8–7.7)
NEUTROPHILS NFR BLD: 39.9 % (ref 38–73)
NRBC BLD-RTO: 0 /100 WBC
PLATELET # BLD AUTO: 263 K/UL (ref 150–350)
PMV BLD AUTO: 9.7 FL (ref 9.2–12.9)
POTASSIUM SERPL-SCNC: 3.6 MMOL/L (ref 3.5–5.1)
RBC # BLD AUTO: 3.54 M/UL (ref 4–5.4)
SODIUM SERPL-SCNC: 139 MMOL/L (ref 136–145)
WBC # BLD AUTO: 9.02 K/UL (ref 3.9–12.7)

## 2020-06-04 PROCEDURE — 25000242 PHARM REV CODE 250 ALT 637 W/ HCPCS: Performed by: NURSE PRACTITIONER

## 2020-06-04 PROCEDURE — 85025 COMPLETE CBC W/AUTO DIFF WBC: CPT

## 2020-06-04 PROCEDURE — 36415 COLL VENOUS BLD VENIPUNCTURE: CPT

## 2020-06-04 PROCEDURE — 80048 BASIC METABOLIC PNL TOTAL CA: CPT

## 2020-06-04 PROCEDURE — 25000003 PHARM REV CODE 250: Performed by: NURSE PRACTITIONER

## 2020-06-04 PROCEDURE — G0378 HOSPITAL OBSERVATION PER HR: HCPCS

## 2020-06-04 RX ORDER — MAGNESIUM SULFATE 1 G/100ML
1 INJECTION INTRAVENOUS
Status: DISCONTINUED | OUTPATIENT
Start: 2020-06-04 | End: 2020-06-04 | Stop reason: HOSPADM

## 2020-06-04 RX ORDER — MAGNESIUM SULFATE HEPTAHYDRATE 40 MG/ML
4 INJECTION, SOLUTION INTRAVENOUS
Status: DISCONTINUED | OUTPATIENT
Start: 2020-06-04 | End: 2020-06-04 | Stop reason: HOSPADM

## 2020-06-04 RX ORDER — POTASSIUM CHLORIDE 20 MEQ/1
40 TABLET, EXTENDED RELEASE ORAL
Status: DISCONTINUED | OUTPATIENT
Start: 2020-06-04 | End: 2020-06-04 | Stop reason: HOSPADM

## 2020-06-04 RX ORDER — POTASSIUM CHLORIDE 7.45 MG/ML
20 INJECTION INTRAVENOUS
Status: DISCONTINUED | OUTPATIENT
Start: 2020-06-04 | End: 2020-06-04 | Stop reason: HOSPADM

## 2020-06-04 RX ORDER — LANOLIN ALCOHOL/MO/W.PET/CERES
800 CREAM (GRAM) TOPICAL
Status: DISCONTINUED | OUTPATIENT
Start: 2020-06-04 | End: 2020-06-04 | Stop reason: HOSPADM

## 2020-06-04 RX ORDER — MAGNESIUM SULFATE HEPTAHYDRATE 40 MG/ML
2 INJECTION, SOLUTION INTRAVENOUS
Status: DISCONTINUED | OUTPATIENT
Start: 2020-06-04 | End: 2020-06-04 | Stop reason: HOSPADM

## 2020-06-04 RX ORDER — POTASSIUM CHLORIDE 7.45 MG/ML
40 INJECTION INTRAVENOUS
Status: DISCONTINUED | OUTPATIENT
Start: 2020-06-04 | End: 2020-06-04 | Stop reason: HOSPADM

## 2020-06-04 RX ORDER — POTASSIUM CHLORIDE 20 MEQ/1
20 TABLET, EXTENDED RELEASE ORAL
Status: DISCONTINUED | OUTPATIENT
Start: 2020-06-04 | End: 2020-06-04 | Stop reason: HOSPADM

## 2020-06-04 RX ORDER — POLYETHYLENE GLYCOL 3350 17 G/17G
17 POWDER, FOR SOLUTION ORAL DAILY
Qty: 507 G | Refills: 0 | Status: SHIPPED | OUTPATIENT
Start: 2020-06-04 | End: 2020-07-04

## 2020-06-04 RX ADMIN — SUCRALFATE 1 G: 1 TABLET ORAL at 09:06

## 2020-06-04 RX ADMIN — AZELASTINE 137 MCG: 137 SPRAY, METERED NASAL at 09:06

## 2020-06-04 RX ADMIN — FLUTICASONE PROPIONATE 50 MCG: 50 SPRAY, METERED NASAL at 08:06

## 2020-06-04 RX ADMIN — PANTOPRAZOLE SODIUM 40 MG: 40 TABLET, DELAYED RELEASE ORAL at 06:06

## 2020-06-04 RX ADMIN — NIFEDIPINE 30 MG: 30 TABLET, FILM COATED, EXTENDED RELEASE ORAL at 09:06

## 2020-06-04 NOTE — PLAN OF CARE
06/04/20 1029   Discharge Reassessment   Assessment Type Discharge Planning Reassessment   Anticipated Discharge Disposition Home   Maynor called Dr Kt Dick's office [phone] to let them know that Dr Vasques wants this pt to see Dr Mayelin FRANKS. Maynor spoke with a Kae in Dr Dick's office who took information on pt and stated that their appt people will call wander Burgess [phone] to set up appt.    Maynor called Jenifer to let her know that Dr Dick's office should be calling to set up and appt for pt and gave her the office phone number in case they do not call.

## 2020-06-04 NOTE — PLAN OF CARE
06/04/20 1129   Final Note   Assessment Type Final Discharge Note   Anticipated Discharge Disposition Home

## 2020-06-04 NOTE — PROGRESS NOTES
[facility]  Cardiology  Progress Note    Patient Name: Kristin Guerra  MRN: [id number]  Admission Date: 6/2/2020  Hospital Length of Stay: 0 days  Code Status: Full Code   Attending Physician: Mamadou Salter MD   Primary Care Physician: Haile Weaver MD  Expected Discharge Date:   Principal Problem:Near syncope    Subjective:     Hospital Course:  Admitted for near syncope  Interval History:  Feels okay this morning, had several BMs yesterday, there is no abdominal or chest pain.  Walking to the bathroom and back she is not dizzy  ROS  Objective:     Vital Signs (Most Recent):  Temp: 97.8 °F (36.6 °C) (06/04/20 0900)  Pulse: (!) 58 (06/04/20 0902)  Resp: 17 (06/04/20 0900)  BP: 130/73 (06/04/20 0902)  SpO2: 96 % (06/04/20 0900) Vital Signs (24h Range):  Temp:  [97.8 °F (36.6 °C)-99.8 °F (37.7 °C)] 97.8 °F (36.6 °C)  Pulse:  [40-58] 58  Resp:  [17-20] 17  SpO2:  [93 %-97 %] 96 %  BP: (112-138)/(53-77) 130/73     Weight: 55.2 kg (121 lb 9.6 oz)  Body mass index is 22.24 kg/m².    SpO2: 96 %  O2 Device (Oxygen Therapy): room air      Intake/Output Summary (Last 24 hours) at 6/4/2020 0953  Last data filed at 6/3/2020 1726  Gross per 24 hour   Intake 480 ml   Output --   Net 480 ml       Lines/Drains/Airways     Peripheral Intravenous Line                 Peripheral IV - Single Lumen 06/02/20 1705 20 G Right Antecubital 1 day                Physical Exam pleasant more cheerful with the thought of going home  Continues to be bradycardic.  I have looked at her telemetry from last night.  Consistent sinus bradycardia with the lowest rates of 44.  No pauses.  Orthostatics done multiple times in the hospital did not demonstrate any drop in blood pressure with standing    Significant Labs:   CMP   Recent Labs   Lab 06/02/20  1800 06/03/20  0556 06/04/20  0435   * 140 139   K 3.6 3.4* 3.6    107 107   CO2 23 24 26    89 93   BUN 18 17 19   CREATININE 0.7 0.6 0.5   CALCIUM 8.8 9.0 8.6*   PROT  8.0  --   --    ALBUMIN 3.9  --   --    BILITOT 0.7  --   --    ALKPHOS 66  --   --    AST 22  --   --    ALT 15  --   --    ANIONGAP 8 9 6*   ESTGFRAFRICA >60.0 >60.0 >60.0   EGFRNONAA >60.0 >60.0 >60.0    and CBC   Recent Labs   Lab 06/02/20  1800 06/03/20  0556 06/04/20  0435   WBC 11.82 10.22 9.02   HGB 12.5 12.1 11.1*   HCT 37.0 35.8* 33.1*    296 263       Significant Imaging:   Assessment and Plan:  Near syncope  Previous episodes of syncope  Sinus bradycardia  Hypertension  Plan  Have advised to drink plenty of water daily.  Continue present medical regimen, Procardia XL 30 mg daily for control of blood pressure.  I plan to refer her to Dr. Kt Dick for his opinion on further management and any need for pacemaker.     Brief HPI:     Active Diagnoses:    Diagnosis Date Noted POA    PRINCIPAL PROBLEM:  Near syncope [R55] 06/02/2020 Yes    Constipation [K59.00] 06/03/2020 Yes    Abdominal pain [R10.9] 06/02/2020 Yes    Mixed dyslipidemia [E78.2] 11/25/2019 Yes     Chronic    Tobacco dependence [F17.200] 12/13/2018 Yes     Chronic    Essential hypertension [I10] 04/17/2018 Yes     Chronic    Gastroesophageal reflux disease [K21.9] 04/17/2018 Yes     Chronic    Lupus erythematosus [L93.0] 04/17/2018 Yes     Chronic    Hx of colon cancer, stage II [Z85.038] 02/20/2018 Yes     Chronic      Problems Resolved During this Admission:       VTE Risk Mitigation (From admission, onward)         Ordered     enoxaparin injection 40 mg  Daily      06/02/20 2033     IP VTE HIGH RISK PATIENT  Once      06/02/20 2033                Karyna Vasques MD  Cardiology  [facility]

## 2020-06-04 NOTE — NURSING
Pt discharged with follow up information and medication education. Pt and daughter verbalized understanding. Iv removed and telemetry discontinued.

## 2020-06-04 NOTE — DISCHARGE SUMMARY
[facility] Medicine  Discharge Summary    DOS: 06/04/2020      Patient Name: Kristin Guerra  MRN: [id number]  Admission Date: 6/2/2020  Hospital Length of Stay: 0 days  Discharge Date and Time:  06/04/2020 4:50 PM  Attending Physician: Ly att. providers found   Discharging Provider: Mamadou Salter MD  Primary Care Provider: Haile Weaver MD      HPI:   The patient is a 70 year old female with history of chronic pain, hypertension, anxiety disorder, tobacco dependency, insomnia, sinus/allergies, abdominal surgeries.  She states that she has not been feeling well with pain all over for several weeks. She reports intermittent moderate low back, left flank pain, and abdominal pain associated with nausea/indigestion for several weeks. She states that she sometimes has poor appetite. She reports having a normal BM this morning.    PTA, she took a shower and felt sudden onset of weakness/lightheadedness. She states that her  helped her to the floor and she did not loose consciousness. She denies chest pain, palpitation, shortness of breath.    She states that she had a fall and hit her head in April 2020. She reports having a negative tilt study with Dr. Vasques recently.     In the ED:   Afebrile  VSS, negative for orthostatic changes  Labs unremarkable  UA: no UTI  EKG: SB, no acute changes  Chest radiograph: Negative  CT abdomen and pelvis: Moderate to large volume of stool and gas seen in the rectosigmoid colon  CT head: Negative    Per my chart review:  Echocardiogram 3/22/2019:   · Normal left ventricular systolic function. The estimated ejection fraction is 60%  · Normal LV diastolic function.  · Normal right ventricular systolic function.  · The estimated PA systolic pressure is 21 mm Hg       Holter Monitor 3/22/2020  · The average HR, excluding ectopy was 64 bpm, with a min of 43 bpm at 07:16 D4 and a max of 105 bpm  · SUPRAVENTRICULAR TACHYCARDIA occurred 14 times  · The  fastest run occurred at 17:45 D1 with 3 beats and a HR of 137 bpm  · The lontgest run was 53 beats at 01:47 D4 at a rate of 103 bpm  · No patient diary was submitted, not patient triggered events noted      * No surgery found *      Hospital Course:   During her short hospital stay patient was treated for presyncope.  I took over her care from my colleague today.  As per my discussion with Cardiology patient suffers from frequent events of near-syncope and has persistent bradycardia however without any pauses.  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  As per my discussion with Cardiology she needs outpatient follow-up with electrophysiologist and possible EP study in order to decide if she would benefit from pacemaker.  Patient also suffers from chronic constipation likely due to ongoing use of Norco and poor dietary habits.  I had long discussion with patient as well as her daughter that it is safe to go home and follow-up with outpatient providers including Dr. Kt Dick MD.  I provided script for MiraLax.  She was educated on how to avoid falls and I asked her daughter to provide supervision.     Review of systems:  Mildly anxious otherwise comprehensive review of system negative    Physical Exam:  General: Patient resting comfortably in no acute distress. Appears older than stated age. Calm.   Eyes: EOM intact. No conjunctivae injection. No scleral icterus.  ENT: Hearing grossly intact. No discharge from ears. No nasal discharge.   CVS: RRR. No LE edema BL.  Lungs: CTA BL, no wheezing or crackles. Good breath sounds. No accessory muscle use. No acute respiratory distress  Neuro: AOx2/3. GCS 15. Cranial nerves grossly intact. Moves all extremities equally. Follows commands. Responds appropriately       Consults:   Consults (From admission, onward)        Status Ordering Provider     Inpatient consult to Cardiology  Once     Provider:  Karyna Vasques MD     JUAN Mcclure new Assessment & Plan notes have been filed under this hospital service since the last note was generated.  Service: Hospital Medicine    Final Active Diagnoses:    Diagnosis Date Noted POA    PRINCIPAL PROBLEM:  Near syncope [R55] 06/02/2020 Yes    Mixed dyslipidemia [E78.2] 11/25/2019 Yes     Chronic    Tobacco dependence [F17.200] 12/13/2018 Yes     Chronic    Essential hypertension [I10] 04/17/2018 Yes     Chronic    Gastroesophageal reflux disease [K21.9] 04/17/2018 Yes     Chronic    Lupus erythematosus [L93.0] 04/17/2018 Yes     Chronic    Hx of colon cancer, stage II [Z85.038] 02/20/2018 Yes     Chronic      Problems Resolved During this Admission:    Diagnosis Date Noted Date Resolved POA    Constipation [K59.00] 06/03/2020 06/04/2020 Yes    Abdominal pain [R10.9] 06/02/2020 06/04/2020 Yes       Discharged Condition: good    Disposition: Home or Self Care    Follow Up:  Follow-up Information     Haile Weaver MD In 2 weeks.    Specialty:  Internal Medicine  Contact information:  [address]  [address]  [place] [address]  [phone]             Sim Brar MD In 1 week.           Karyna Vasques MD In 2 weeks.    Specialties:  Cardiology, INTERVENTIONAL CARDIOLOGY  Contact information:  [address]  [address]  [place] [id number] [phone]                 Patient Instructions:      Ambulatory referral/consult to Cardiology   Standing Status: Future   Referral Priority: Routine Referral Type: Consultation   Referral Reason: Specialty Services Required   Referred to Provider: JESSICA KNIGHT Requested Specialty: Cardiology   Number of Visits Requested: 1     Diet Cardiac     No driving until:   Order Comments: Until cleared by MD     Notify your health care provider if you experience any of the following:  persistent dizziness, light-headedness, or visual disturbances     Activity as tolerated       Significant Diagnostic Studies: Labs:   BMP:    Recent Labs   Lab 06/02/20  1800 06/03/20  0556 06/04/20  0435    89 93   * 140 139   K 3.6 3.4* 3.6    107 107   CO2 23 24 26   BUN 18 17 19   CREATININE 0.7 0.6 0.5   CALCIUM 8.8 9.0 8.6*       Pending Diagnostic Studies:     Procedure Component Value Units Date/Time    EKG 12-lead [[id number]]     Order Status:  Sent Lab Status:  No result          Medications:  Reconciled Home Medications:      Medication List      CHANGE how you take these medications    esomeprazole 40 MG capsule  Commonly known as:  NEXIUM  TAKE 1 CAPSULE(40 MG) BY MOUTH TWICE DAILY BEFORE MEALS  What changed:  See the new instructions.     polyethylene glycol 17 gram/dose powder  Commonly known as:  GLYCOLAX  Take 17 g by mouth once daily.  What changed:    · when to take this  · reasons to take this        CONTINUE taking these medications    aluminum & magnesium hydroxide-simethicone 400-400-40 mg/5 mL suspension  Commonly known as:  MYLANTA MAX STRENGTH  Take 5 mLs by mouth every 6 (six) hours as needed for Indigestion.     aspirin 81 MG Chew  Take 81 mg by mouth once daily.     azelastine 137 mcg (0.1 %) nasal spray  Commonly known as:  ASTELIN  1 spray (137 mcg total) by Nasal route 2 (two) times daily.     busPIRone 5 MG Tab  Commonly known as:  BUSPAR  Take 5 mg by mouth once daily.     calcium carbonate 200 mg calcium (500 mg) chewable tablet  Commonly known as:  TUMS  Take 1 tablet by mouth 2 (two) times daily as needed for Heartburn.     fluticasone propionate 50 mcg/actuation nasal spray  Commonly known as:  FLONASE  Inhale 1 spray into the lungs once daily once daily.     gabapentin 300 MG capsule  Commonly known as:  NEURONTIN  Take 1 capsule (300 mg total) by mouth 2 (two) times daily.     HYDROcodone-acetaminophen 7.5-325 mg per tablet  Commonly known as:  NORCO  Take 1 tablet by mouth every 8 (eight) hours as needed.     NIFEdipine 30 MG Tbsr  Commonly known as:  ADALAT CC  Take 30 mg by mouth once  daily.     sucralfate 1 gram tablet  Commonly known as:  CARAFATE  Take 1 g by mouth 2 (two) times daily. Dissolve tab in 10 mls of water     traZODone 50 MG tablet  Commonly known as:  DESYREL  Take 1 tablet (50 mg total) by mouth every evening.            Indwelling Lines/Drains at time of discharge:   Lines/Drains/Airways     None                 Time spent on the discharge of patient: 27 minutes  Patient was seen and examined on the date of discharge and determined to be suitable for discharge.         Mamadou Salter MD  Department of Hospital Medicine  [facility]

## 2020-06-04 NOTE — HOSPITAL COURSE
During her short hospital stay patient was treated for presyncope.  I took over her care from my colleague today.  As per my discussion with Cardiology patient suffers from frequent events of near-syncope and has persistent bradycardia however without any pauses.  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  As per my discussion with Cardiology she needs outpatient follow-up with electrophysiologist and possible EP study in order to decide if she would benefit from pacemaker.  Patient also suffers from chronic constipation likely due to ongoing use of Norco and poor dietary habits.  I had long discussion with patient as well as her daughter that it is safe to go home and follow-up with outpatient providers including Dr. Kt Dick MD.  I provided script for MiraLax.  She was educated on how to avoid falls and I asked her daughter to provide supervision.     Review of systems:  Mildly anxious otherwise comprehensive review of system negative    Physical Exam:  General: Patient resting comfortably in no acute distress. Appears older than stated age. Calm.   Eyes: EOM intact. No conjunctivae injection. No scleral icterus.  ENT: Hearing grossly intact. No discharge from ears. No nasal discharge.   CVS: RRR. No LE edema BL.  Lungs: CTA BL, no wheezing or crackles. Good breath sounds. No accessory muscle use. No acute respiratory distress  Neuro: AOx2/3. GCS 15. Cranial nerves grossly intact. Moves all extremities equally. Follows commands. Responds appropriately

## 2020-06-04 NOTE — PLAN OF CARE
06/04/20 1038   Medicare Message   Important Message from Medicare regarding Discharge Appeal Rights Given to patient/caregiver;Signed/date by patient/caregiver   Date IMM was signed 06/04/20   Time IMM was signed 1000

## 2020-06-04 NOTE — PLAN OF CARE
Pt has no cm needs and states she is being discharged today.  Dr Vasques would like pt to go to see EP Dr. Dick at [facility] as soon as dtg can make appt. Cm to follow until discharge from hospital.     06/04/20 0953   Discharge Assessment   Assessment Type Discharge Planning Assessment   Confirmed/corrected address and phone number on facesheet? Yes   Assessment information obtained from? Patient   Prior to hospitilization cognitive status: Alert/Oriented   Prior to hospitalization functional status: Independent   Current cognitive status: Alert/Oriented   Current Functional Status: Independent   Lives With spouse   Able to Return to Prior Arrangements yes   Is patient able to care for self after discharge? Yes   Readmission Within the Last 30 Days no previous admission in last 30 days   Patient currently being followed by outpatient case management? No   Patient currently receives any other outside agency services? No   Do you have any problems affording any of your prescribed medications? No   Is the patient taking medications as prescribed? yes   Does the patient have transportation home? Yes   Transportation Anticipated family or friend will provide   Does the patient receive services at the [facility]? No   Discharge Plan A Home with family   Discharge Plan B Home with family   DME Needed Upon Discharge  none   Patient/Family in Agreement with Plan yes

## 2020-06-05 ENCOUNTER — TELEPHONE (OUTPATIENT)
Dept: ELECTROPHYSIOLOGY | Facility: CLINIC | Age: 70
End: 2020-06-05

## 2020-06-05 NOTE — TELEPHONE ENCOUNTER
Spoke with Jenifer, pt's daughter, to schedule consult appt with GP at [facility]  ----- Message from Rupali Sampson RN sent at 6/4/2020  6:38 PM CDT -----  [facility]  Pt needs appt with [facility] in [place]. Thanks  ----- Message -----  From: Chey Waterman  Sent: 6/4/2020  10:24 AM CDT  To: [facility] Staff    [facility] is calling to schedule the pt's new pt appointment. Please call the Pt's daughter to schedule @ Jenifer [phone]. Thanks, Chey

## 2020-06-09 ENCOUNTER — OFFICE VISIT (OUTPATIENT)
Dept: FAMILY MEDICINE | Facility: CLINIC | Age: 70
End: 2020-06-09
Payer: MEDICARE

## 2020-06-09 VITALS
WEIGHT: 124 LBS | BODY MASS INDEX: 22.82 KG/M2 | SYSTOLIC BLOOD PRESSURE: 136 MMHG | DIASTOLIC BLOOD PRESSURE: 71 MMHG | HEIGHT: 62 IN | TEMPERATURE: 98 F | HEART RATE: 54 BPM

## 2020-06-09 DIAGNOSIS — R55 VASOVAGAL SYNCOPE: Primary | ICD-10-CM

## 2020-06-09 DIAGNOSIS — K59.09 OTHER CONSTIPATION: Chronic | ICD-10-CM

## 2020-06-09 DIAGNOSIS — I10 ESSENTIAL HYPERTENSION: ICD-10-CM

## 2020-06-09 DIAGNOSIS — K22.719 BARRETT'S ESOPHAGUS WITH DYSPLASIA: ICD-10-CM

## 2020-06-09 DIAGNOSIS — Z90.81 S/P SPLENECTOMY: ICD-10-CM

## 2020-06-09 DIAGNOSIS — I65.21 STENOSIS OF RIGHT CAROTID ARTERY: ICD-10-CM

## 2020-06-09 DIAGNOSIS — T46.6X5A STATIN MYOPATHY: ICD-10-CM

## 2020-06-09 DIAGNOSIS — G89.4 CHRONIC PAIN SYNDROME: ICD-10-CM

## 2020-06-09 DIAGNOSIS — G72.0 STATIN MYOPATHY: ICD-10-CM

## 2020-06-09 DIAGNOSIS — G25.0 TREMOR, ESSENTIAL: ICD-10-CM

## 2020-06-09 DIAGNOSIS — Z12.9 SCREENING FOR CANCER: ICD-10-CM

## 2020-06-09 DIAGNOSIS — E78.2 MIXED DYSLIPIDEMIA: ICD-10-CM

## 2020-06-09 DIAGNOSIS — F17.210 CIGARETTE SMOKER: ICD-10-CM

## 2020-06-09 PROCEDURE — 3075F SYST BP GE 130 - 139MM HG: CPT | Mod: S$GLB,,, | Performed by: INTERNAL MEDICINE

## 2020-06-09 PROCEDURE — 1159F MED LIST DOCD IN RCRD: CPT | Mod: S$GLB,,, | Performed by: INTERNAL MEDICINE

## 2020-06-09 PROCEDURE — 99214 PR OFFICE/OUTPT VISIT, EST, LEVL IV, 30-39 MIN: ICD-10-PCS | Mod: S$GLB,,, | Performed by: INTERNAL MEDICINE

## 2020-06-09 PROCEDURE — 1159F PR MEDICATION LIST DOCUMENTED IN MEDICAL RECORD: ICD-10-PCS | Mod: S$GLB,,, | Performed by: INTERNAL MEDICINE

## 2020-06-09 PROCEDURE — 1111F PR DISCHARGE MEDS RECONCILED W/ CURRENT OUTPATIENT MED LIST: ICD-10-PCS | Mod: S$GLB,,, | Performed by: INTERNAL MEDICINE

## 2020-06-09 PROCEDURE — 1125F PR PAIN SEVERITY QUANTIFIED, PAIN PRESENT: ICD-10-PCS | Mod: S$GLB,,, | Performed by: INTERNAL MEDICINE

## 2020-06-09 PROCEDURE — 3078F DIAST BP <80 MM HG: CPT | Mod: S$GLB,,, | Performed by: INTERNAL MEDICINE

## 2020-06-09 PROCEDURE — 1125F AMNT PAIN NOTED PAIN PRSNT: CPT | Mod: S$GLB,,, | Performed by: INTERNAL MEDICINE

## 2020-06-09 PROCEDURE — 3288F FALL RISK ASSESSMENT DOCD: CPT | Mod: S$GLB,,, | Performed by: INTERNAL MEDICINE

## 2020-06-09 PROCEDURE — 3075F PR MOST RECENT SYSTOLIC BLOOD PRESS GE 130-139MM HG: ICD-10-PCS | Mod: S$GLB,,, | Performed by: INTERNAL MEDICINE

## 2020-06-09 PROCEDURE — 3288F PR FALLS RISK ASSESSMENT DOCUMENTED: ICD-10-PCS | Mod: S$GLB,,, | Performed by: INTERNAL MEDICINE

## 2020-06-09 PROCEDURE — 1100F PTFALLS ASSESS-DOCD GE2>/YR: CPT | Mod: S$GLB,,, | Performed by: INTERNAL MEDICINE

## 2020-06-09 PROCEDURE — 99214 OFFICE O/P EST MOD 30 MIN: CPT | Mod: S$GLB,,, | Performed by: INTERNAL MEDICINE

## 2020-06-09 PROCEDURE — 1111F DSCHRG MED/CURRENT MED MERGE: CPT | Mod: S$GLB,,, | Performed by: INTERNAL MEDICINE

## 2020-06-09 PROCEDURE — 3078F PR MOST RECENT DIASTOLIC BLOOD PRESSURE < 80 MM HG: ICD-10-PCS | Mod: S$GLB,,, | Performed by: INTERNAL MEDICINE

## 2020-06-09 PROCEDURE — 1100F PR PT FALLS ASSESS DOC 2+ FALLS/FALL W/INJURY/YR: ICD-10-PCS | Mod: S$GLB,,, | Performed by: INTERNAL MEDICINE

## 2020-06-09 NOTE — PROGRESS NOTES
Subjective:       Patient ID: Kristin Guerra is a 70 y.o. female.    Chief Complaint: Hospital Follow Up (passed out ); Syncopal episode; Carotid Stenosis; Constipation; Hypertension; Hyperlipidemia; Gastroesophageal Reflux; and Tremors    Patient is a 70-year-old  female who was recently hospitalized following a syncopal episode while she was taking a shower.  She had workup done in the hospital and no significant cardiac arrhythmias were determined.  It was thought that she has chronotropic insufficiency and possibly some sick sinus syndrome.  There is no mention about possible dysautonomia.  She might be considered for EPS studies.  She might be referred to Dr. Sim Dick for further evaluation and see if she can benefit from pacemaker insertion.  Her cardiologist on record is Dr. Vasques.    Please note that she is on chronic opiate medications with hydrocodone for chronic pain.  This may be causing her constipation.  Constipation seems to be 1 of the big challenges and it is unclear that if she passed out because of trying to strain for constipation.  I will try to give her a prescription of Linzess at this point.  See if it makes a difference in her overall feeling of well-being.  She has tried several medications for constipation including over-the-counter MiraLax and Dulcolax without any relief.        Hospital Course:   During her short hospital stay patient was treated for presyncope.  I took over her care from my colleague today.  As per my discussion with Cardiology patient suffers from frequent events of near-syncope and has persistent bradycardia however without any pauses.  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  As per my discussion with Cardiology she needs outpatient follow-up with electrophysiologist and possible EP study in order to decide if she would benefit from pacemaker.  Patient also suffers from chronic constipation likely  due to ongoing use of Norco and poor dietary habits.  I had long discussion with patient as well as her daughter that it is safe to go home and follow-up with outpatient providers including Dr. Kt Dick MD.  I provided script for MiraLax.  She was educated on how to avoid falls and I asked her daughter to provide supervision.      Review of systems:  Mildly anxious otherwise comprehensive review of system negative         Past Medical History:   Diagnosis Date    Allergy     Sterling's esophagus     Colon cancer     Colon polyp     Diverticulitis     Diverticulosis     Fatty liver     GERD (gastroesophageal reflux disease)     Hx of cervical spine surgery 11/07/2018    Hyperlipidemia     Hypertension     Lupus (systemic lupus erythematosus) 1999    discoid    Osteoarthritis     Osteoporosis     Tremor 2005    essential     Social History     Socioeconomic History    Marital status:      Spouse name: Not on file    Number of children: 2    Years of education: Not on file    Highest education level: Not on file   Occupational History    Not on file   Social Needs    Financial resource strain: Not on file    Food insecurity:     Worry: Not on file     Inability: Not on file    Transportation needs:     Medical: Not on file     Non-medical: Not on file   Tobacco Use    Smoking status: Current Every Day Smoker     Packs/day: 1.00     Years: 35.00     Pack years: 35.00     Types: Cigarettes    Smokeless tobacco: Never Used   Substance and Sexual Activity    Alcohol use: No    Drug use: No    Sexual activity: Not Currently     Partners: Male     Birth control/protection: Post-menopausal   Lifestyle    Physical activity:     Days per week: Not on file     Minutes per session: Not on file    Stress: Not at all   Relationships    Social connections:     Talks on phone: Not on file     Gets together: Not on file     Attends Orthodoxy service: Not on file     Active member of club or  organization: Not on file     Attends meetings of clubs or organizations: Not on file     Relationship status: Not on file   Other Topics Concern    Are you pregnant or think you may be? Not Asked    Breast-feeding Not Asked   Social History Narrative    Not on file     Past Surgical History:   Procedure Laterality Date    APPENDECTOMY  2007    removed during colon surgery    CERVICAL FUSION       SECTION      COLON SURGERY   & 2008    hemicolectomy for colon cancer and second was for complications from the first surgery    COLONOSCOPY N/A 2018    Procedure: COLONOSCOPY;  Surgeon: Maverick Esquivel MD;  Location: [facility];  Service: Endoscopy;  Laterality: N/A; repeat in 5 years for surveillance    ESOPHAGOGASTRODUODENOSCOPY N/A 2019    Procedure: EGD (ESOPHAGOGASTRODUODENOSCOPY);  Surgeon: Monika Steele MD;  Location: [facility];  Service: Endoscopy;  Laterality: N/A;    ESOPHAGOGASTRODUODENOSCOPY  2019    with RFA and biopsies    ESOPHAGOGASTRODUODENOSCOPY N/A 2019    Procedure: EGD (ESOPHAGOGASTRODUODENOSCOPY);  Surgeon: Uzair Cantor MD;  Location: [facility];  Service: Endoscopy;  Laterality: N/A;  n+v with anesthesia    ESOPHAGOGASTRODUODENOSCOPY N/A 2019    Procedure: EGD (ESOPHAGOGASTRODUODENOSCOPY);  Surgeon: Uzair Cantor MD;  Location: [facility];  Service: Endoscopy;  Laterality: N/A;    ESOPHAGOGASTRODUODENOSCOPY N/A 10/7/2019    Procedure: EGD (ESOPHAGOGASTRODUODENOSCOPY);  Surgeon: Uzair Cantor MD;  Location: [facility];  Service: Endoscopy;  Laterality: N/A;    ESOPHAGOGASTRODUODENOSCOPY N/A 2/10/2020    Procedure: EGD (ESOPHAGOGASTRODUODENOSCOPY);  Surgeon: Uzair Cantor MD;  Location: [facility];  Service: Endoscopy;  Laterality: N/A;  Requests earliest procedure time    HYSTERECTOMY      ovaries remain    SPLENECTOMY, TOTAL      Injured during surgery and removed    UPPER GASTROINTESTINAL ENDOSCOPY      GERD & hiatal  "hernia per patient report     Family History   Problem Relation Age of Onset    Colon cancer Father         diagnosed in his 60's    Lung cancer Father     Lymphoma Mother         Brain    Lung cancer Brother     Lung cancer Brother         metastatic from prostate    Prostate cancer Brother     Heart disease Brother         valvular disease    Hyperlipidemia Brother     Eczema Neg Hx     Lupus Neg Hx     Psoriasis Neg Hx     Melanoma Neg Hx     Crohn's disease Neg Hx     Ulcerative colitis Neg Hx     Stomach cancer Neg Hx     Esophageal cancer Neg Hx        Review of Systems      Objective:      Blood pressure 136/71, pulse (!) 54, temperature 97.6 °F (36.4 °C), height 5' 2" (1.575 m), weight 56.2 kg (124 lb). Body mass index is 22.68 kg/m².  Physical Exam   Constitutional: She appears well-developed and well-nourished. She is cooperative. No distress.   Chronically ill-appearing lady with tremulousness and slight difficulty walking.   HENT:   Head: Normocephalic and atraumatic.   Right Ear: Tympanic membrane normal.   Left Ear: Tympanic membrane normal.   Eyes: Conjunctivae, EOM and lids are normal. Lids are everted and swept, no foreign bodies found. Right pupil is round and reactive. Left pupil is round and reactive.   Neck: Trachea normal and normal range of motion. Neck supple.   Cardiovascular: Normal rate, regular rhythm, S1 normal, S2 normal and normal heart sounds.   Pulmonary/Chest: She has decreased breath sounds. She has no wheezes. She exhibits no tenderness.   Abdominal: Soft. Bowel sounds are normal. There is no rigidity and no guarding.   Musculoskeletal: She exhibits tenderness and deformity.        Back:    Deformities in the spine noted.   Lymphadenopathy:     She has no cervical adenopathy.     She has no axillary adenopathy.   Neurological: She is alert. She displays tremor (Neck and hands).   Tremors Bobbing of head.  Patient continues to be very tremulous.   Skin: Skin is warm " and dry.   Nursing note and vitals reviewed.        Assessment:       1. Vasovagal syncope    2. Cigarette smoker    3. Other constipation    4. Stenosis of right carotid artery    5. Essential hypertension    6. Mixed dyslipidemia    7. Statin myopathy    8. Tremor, essential    9. S/P splenectomy    10. Sterling's esophagus with dysplasia    11. Chronic pain syndrome    12. Screening for cancer           Admission on 06/02/2020, Discharged on 06/04/2020   Component Date Value Ref Range Status    WBC 06/02/2020 11.82  3.90 - 12.70 K/uL Final    RBC 06/02/2020 3.98* 4.00 - 5.40 M/uL Final    Hemoglobin 06/02/2020 12.5  12.0 - 16.0 g/dL Final    Hematocrit 06/02/2020 37.0  37.0 - 48.5 % Final    Mean Corpuscular Volume 06/02/2020 93  82 - 98 fL Final    Mean Corpuscular Hemoglobin 06/02/2020 31.4* 27.0 - 31.0 pg Final    Mean Corpuscular Hemoglobin Conc 06/02/2020 33.8  32.0 - 36.0 g/dL Final    RDW 06/02/2020 14.6* 11.5 - 14.5 % Final    Platelets 06/02/2020 297  150 - 350 K/uL Final    MPV 06/02/2020 9.7  9.2 - 12.9 fL Final    Immature Granulocytes 06/02/2020 0.3  0.0 - 0.5 % Final    Gran # (ANC) 06/02/2020 8.4* 1.8 - 7.7 K/uL Final    Immature Grans (Abs) 06/02/2020 0.04  0.00 - 0.04 K/uL Final    Lymph # 06/02/2020 2.3  1.0 - 4.8 K/uL Final    Mono # 06/02/2020 0.9  0.3 - 1.0 K/uL Final    Eos # 06/02/2020 0.0  0.0 - 0.5 K/uL Final    Baso # 06/02/2020 0.13  0.00 - 0.20 K/uL Final    nRBC 06/02/2020 0  0 /100 WBC Final    Gran% 06/02/2020 70.7  38.0 - 73.0 % Final    Lymph% 06/02/2020 19.7  18.0 - 48.0 % Final    Mono% 06/02/2020 8.0  4.0 - 15.0 % Final    Eosinophil% 06/02/2020 0.2  0.0 - 8.0 % Final    Basophil% 06/02/2020 1.1  0.0 - 1.9 % Final    Differential Method 06/02/2020 Automated   Final    Sodium 06/02/2020 135* 136 - 145 mmol/L Final    Potassium 06/02/2020 3.6  3.5 - 5.1 mmol/L Final    Chloride 06/02/2020 104  95 - 110 mmol/L Final    CO2 06/02/2020 23  23 - 29 mmol/L  Final    Glucose 06/02/2020 100  70 - 110 mg/dL Final    BUN, Bld 06/02/2020 18  8 - 23 mg/dL Final    Creatinine 06/02/2020 0.7  0.5 - 1.4 mg/dL Final    Calcium 06/02/2020 8.8  8.7 - 10.5 mg/dL Final    Total Protein 06/02/2020 8.0  6.0 - 8.4 g/dL Final    Albumin 06/02/2020 3.9  3.5 - 5.2 g/dL Final    Total Bilirubin 06/02/2020 0.7  0.1 - 1.0 mg/dL Final    Alkaline Phosphatase 06/02/2020 66  55 - 135 U/L Final    AST 06/02/2020 22  10 - 40 U/L Final    ALT 06/02/2020 15  10 - 44 U/L Final    Anion Gap 06/02/2020 8  8 - 16 mmol/L Final    eGFR if African American 06/02/2020 >60.0  >60 mL/min/1.73 m^2 Final    eGFR if non African American 06/02/2020 >60.0  >60 mL/min/1.73 m^2 Final    Troponin I 06/02/2020 <0.030  <=0.040 ng/mL Final    Specimen UA 06/02/2020 Urine, Clean Catch   Final    Color, UA 06/02/2020 Yellow  Yellow, Straw, [place] Final    Appearance, UA 06/02/2020 Clear  Clear Final    pH, UA 06/02/2020 8.0  5.0 - 8.0 Final    Specific [place], UA 06/02/2020 1.030  1.005 - 1.030 Final    Protein, UA 06/02/2020 Negative  Negative Final    Glucose, UA 06/02/2020 Negative  Negative Final    Ketones, UA 06/02/2020 1+* Negative Final    Bilirubin (UA) 06/02/2020 Negative  Negative Final    Occult Blood UA 06/02/2020 Negative  Negative Final    Nitrite, UA 06/02/2020 Negative  Negative Final    Urobilinogen, UA 06/02/2020 Negative  Negative EU/dL Final    Leukocytes, UA 06/02/2020 Negative  Negative Final    POC Glucose 06/02/2020 91  70 - 110 Final    SARS-CoV-2 RNA, Amplification, Qual 06/02/2020 Negative  Negative Final    Troponin I 06/03/2020 <0.030  <=0.040 ng/mL Final    BSA 06/03/2020 1.55  m2 Final    TDI SEPTAL 06/03/2020 0.11  m/s Final    LV LATERAL E/E' RATIO 06/03/2020 7.42  m/s Final    LV SEPTAL E/E' RATIO 06/03/2020 8.09  m/s Final    Right Atrial Pressure (from IVC) 06/03/2020 8  mmHg Final    AORTIC VALVE CUSP SEPERATION 06/03/2020 1.14  cm Final    TDI  LATERAL 06/03/2020 0.12  m/s Final    PV PEAK VELOCITY 06/03/2020 89.96  cm/s Final    LVIDD 06/03/2020 4.84  3.5 - 6.0 cm Final    IVS 06/03/2020 1.10  0.6 - 1.1 cm Final    PW 06/03/2020 1.10  0.6 - 1.1 cm Final    Ao root annulus 06/03/2020 3.20  cm Final    LVIDS 06/03/2020 3.27  2.1 - 4.0 cm Final    FS 06/03/2020 32  28 - 44 % Final    LV mass 06/03/2020 196.56  g Final    LA size 06/03/2020 2.42  cm Final    Left Ventricle Relative Wall Thick* 06/03/2020 0.45  cm Final    AV mean gradient 06/03/2020 6  mmHg Final    AV valve area 06/03/2020 2.70  cm2 Final    AV Velocity Ratio 06/03/2020 98.75   Final    AV index (prosthetic) 06/03/2020 0.86   Final    E/A ratio 06/03/2020 1.31   Final    Mean e' 06/03/2020 0.12  m/s Final    E wave decelartion time 06/03/2020 178.19  msec Final    LVOT diameter 06/03/2020 2.00  cm Final    LVOT area 06/03/2020 3.1  cm2 Final    LVOT peak russ 06/03/2020 164.92  m/s Final    LVOT peak VTI 06/03/2020 34.46  cm Final    Ao peak russ 06/03/2020 1.67  m/s Final    Ao VTI 06/03/2020 40.03  cm Final    LVOT stroke volume 06/03/2020 108.20  cm3 Final    AV peak gradient 06/03/2020 11  mmHg Final    TV rest pulmonary artery pressure 06/03/2020 34  mmHg Final    E/E' ratio 06/03/2020 7.74  m/s Final    MV Peak E Russ 06/03/2020 0.89  m/s Final    TR Max Russ 06/03/2020 2.54  m/s Final    MV Peak A Russ 06/03/2020 0.68  m/s Final    LV Mass Index 06/03/2020 127  g/m2 Final    Triscuspid Valve Regurgitation Pea* 06/03/2020 26  mmHg Final    CPK MB 06/03/2020 1.2  0.1 - 6.5 ng/mL Final    Cortisol 06/03/2020 21.2  ug/dL Final    Troponin I 06/03/2020 <0.030  <=0.040 ng/mL Final    CPK MB 06/03/2020 0.9  0.1 - 6.5 ng/mL Final    Sodium 06/03/2020 140  136 - 145 mmol/L Final    Potassium 06/03/2020 3.4* 3.5 - 5.1 mmol/L Final    Chloride 06/03/2020 107  95 - 110 mmol/L Final    CO2 06/03/2020 24  23 - 29 mmol/L Final    Glucose 06/03/2020 89  70 - 110  mg/dL Final    BUN, Bld 06/03/2020 17  8 - 23 mg/dL Final    Creatinine 06/03/2020 0.6  0.5 - 1.4 mg/dL Final    Calcium 06/03/2020 9.0  8.7 - 10.5 mg/dL Final    Anion Gap 06/03/2020 9  8 - 16 mmol/L Final    eGFR if African American 06/03/2020 >60.0  >60 mL/min/1.73 m^2 Final    eGFR if non African American 06/03/2020 >60.0  >60 mL/min/1.73 m^2 Final    WBC 06/03/2020 10.22  3.90 - 12.70 K/uL Final    RBC 06/03/2020 3.91* 4.00 - 5.40 M/uL Final    Hemoglobin 06/03/2020 12.1  12.0 - 16.0 g/dL Final    Hematocrit 06/03/2020 35.8* 37.0 - 48.5 % Final    Mean Corpuscular Volume 06/03/2020 92  82 - 98 fL Final    Mean Corpuscular Hemoglobin 06/03/2020 30.9  27.0 - 31.0 pg Final    Mean Corpuscular Hemoglobin Conc 06/03/2020 33.8  32.0 - 36.0 g/dL Final    RDW 06/03/2020 14.6* 11.5 - 14.5 % Final    Platelets 06/03/2020 296  150 - 350 K/uL Final    MPV 06/03/2020 9.4  9.2 - 12.9 fL Final    Immature Granulocytes 06/03/2020 0.3  0.0 - 0.5 % Final    Gran # (ANC) 06/03/2020 5.8  1.8 - 7.7 K/uL Final    Immature Grans (Abs) 06/03/2020 0.03  0.00 - 0.04 K/uL Final    Lymph # 06/03/2020 3.3  1.0 - 4.8 K/uL Final    Mono # 06/03/2020 1.0  0.3 - 1.0 K/uL Final    Eos # 06/03/2020 0.1  0.0 - 0.5 K/uL Final    Baso # 06/03/2020 0.10  0.00 - 0.20 K/uL Final    nRBC 06/03/2020 0  0 /100 WBC Final    Gran% 06/03/2020 56.6  38.0 - 73.0 % Final    Lymph% 06/03/2020 32.0  18.0 - 48.0 % Final    Mono% 06/03/2020 9.4  4.0 - 15.0 % Final    Eosinophil% 06/03/2020 0.7  0.0 - 8.0 % Final    Basophil% 06/03/2020 1.0  0.0 - 1.9 % Final    Differential Method 06/03/2020 Automated   Final    Sodium 06/04/2020 139  136 - 145 mmol/L Final    Potassium 06/04/2020 3.6  3.5 - 5.1 mmol/L Final    Chloride 06/04/2020 107  95 - 110 mmol/L Final    CO2 06/04/2020 26  23 - 29 mmol/L Final    Glucose 06/04/2020 93  70 - 110 mg/dL Final    BUN, Bld 06/04/2020 19  8 - 23 mg/dL Final    Creatinine 06/04/2020 0.5  0.5 -  1.4 mg/dL Final    Calcium 06/04/2020 8.6* 8.7 - 10.5 mg/dL Final    Anion Gap 06/04/2020 6* 8 - 16 mmol/L Final    eGFR if African American 06/04/2020 >60.0  >60 mL/min/1.73 m^2 Final    eGFR if non African American 06/04/2020 >60.0  >60 mL/min/1.73 m^2 Final    WBC 06/04/2020 9.02  3.90 - 12.70 K/uL Final    RBC 06/04/2020 3.54* 4.00 - 5.40 M/uL Final    Hemoglobin 06/04/2020 11.1* 12.0 - 16.0 g/dL Final    Hematocrit 06/04/2020 33.1* 37.0 - 48.5 % Final    Mean Corpuscular Volume 06/04/2020 94  82 - 98 fL Final    Mean Corpuscular Hemoglobin 06/04/2020 31.4* 27.0 - 31.0 pg Final    Mean Corpuscular Hemoglobin Conc 06/04/2020 33.5  32.0 - 36.0 g/dL Final    RDW 06/04/2020 14.8* 11.5 - 14.5 % Final    Platelets 06/04/2020 263  150 - 350 K/uL Final    MPV 06/04/2020 9.7  9.2 - 12.9 fL Final    Immature Granulocytes 06/04/2020 0.2  0.0 - 0.5 % Final    Gran # (ANC) 06/04/2020 3.6  1.8 - 7.7 K/uL Final    Immature Grans (Abs) 06/04/2020 0.02  0.00 - 0.04 K/uL Final    Lymph # 06/04/2020 4.0  1.0 - 4.8 K/uL Final    Mono # 06/04/2020 0.9  0.3 - 1.0 K/uL Final    Eos # 06/04/2020 0.4  0.0 - 0.5 K/uL Final    Baso # 06/04/2020 0.14  0.00 - 0.20 K/uL Final    nRBC 06/04/2020 0  0 /100 WBC Final    Gran% 06/04/2020 39.9  38.0 - 73.0 % Final    Lymph% 06/04/2020 44.1  18.0 - 48.0 % Final    Mono% 06/04/2020 9.4  4.0 - 15.0 % Final    Eosinophil% 06/04/2020 4.8  0.0 - 8.0 % Final    Basophil% 06/04/2020 1.6  0.0 - 1.9 % Final    Differential Method 06/04/2020 Automated   Final   Lab Visit on 05/28/2020   Component Date Value Ref Range Status    Specimen UA 05/28/2020 Urine, Clean Catch   Final    Color, UA 05/28/2020 Yellow  Yellow, Straw, [place] Final    Appearance, UA 05/28/2020 Clear  Clear Final    pH, UA 05/28/2020 7.0  5.0 - 8.0 Final    Specific [place], UA 05/28/2020 1.015  1.005 - 1.030 Final    Protein, UA 05/28/2020 Negative  Negative Final    Glucose, UA 05/28/2020 Negative   Negative Final    Ketones, UA 05/28/2020 Negative  Negative Final    Bilirubin (UA) 05/28/2020 Negative  Negative Final    Occult Blood UA 05/28/2020 Negative  Negative Final    Nitrite, UA 05/28/2020 Negative  Negative Final    Urobilinogen, UA 05/28/2020 Negative  Negative EU/dL Final    Leukocytes, UA 05/28/2020 Negative  Negative Final   Lab Visit on 05/28/2020   Component Date Value Ref Range Status    CRP 05/28/2020 0.21  <0.76 mg/dL Final    Sed Rate 05/28/2020 23* 0 - 20 mm/Hr Final    WBC 05/28/2020 8.61  3.90 - 12.70 K/uL Final    RBC 05/28/2020 3.93* 4.00 - 5.40 M/uL Final    Hemoglobin 05/28/2020 12.4  12.0 - 16.0 g/dL Final    Hematocrit 05/28/2020 37.2  37.0 - 48.5 % Final    Mean Corpuscular Volume 05/28/2020 95  82 - 98 fL Final    Mean Corpuscular Hemoglobin 05/28/2020 31.6* 27.0 - 31.0 pg Final    Mean Corpuscular Hemoglobin Conc 05/28/2020 33.3  32.0 - 36.0 g/dL Final    RDW 05/28/2020 14.9* 11.5 - 14.5 % Final    Platelets 05/28/2020 312  150 - 350 K/uL Final    MPV 05/28/2020 10.3  9.2 - 12.9 fL Final    Immature Granulocytes 05/28/2020 0.2  0.0 - 0.5 % Final    Gran # (ANC) 05/28/2020 4.2  1.8 - 7.7 K/uL Final    Immature Grans (Abs) 05/28/2020 0.02  0.00 - 0.04 K/uL Final    Lymph # 05/28/2020 3.1  1.0 - 4.8 K/uL Final    Mono # 05/28/2020 0.9  0.3 - 1.0 K/uL Final    Eos # 05/28/2020 0.4  0.0 - 0.5 K/uL Final    Baso # 05/28/2020 0.10  0.00 - 0.20 K/uL Final    nRBC 05/28/2020 0  0 /100 WBC Final    Gran% 05/28/2020 48.2  38.0 - 73.0 % Final    Lymph% 05/28/2020 36.0  18.0 - 48.0 % Final    Mono% 05/28/2020 10.2  4.0 - 15.0 % Final    Eosinophil% 05/28/2020 4.2  0.0 - 8.0 % Final    Basophil% 05/28/2020 1.2  0.0 - 1.9 % Final    Differential Method 05/28/2020 Automated   Final    Sodium 05/28/2020 140  136 - 145 mmol/L Final    Potassium 05/28/2020 3.5  3.5 - 5.1 mmol/L Final    Chloride 05/28/2020 108  95 - 110 mmol/L Final    CO2 05/28/2020 26  23 - 29  mmol/L Final    Glucose 05/28/2020 81  70 - 110 mg/dL Final    BUN, Bld 05/28/2020 13  8 - 23 mg/dL Final    Creatinine 05/28/2020 0.5  0.5 - 1.4 mg/dL Final    Calcium 05/28/2020 8.6* 8.7 - 10.5 mg/dL Final    Total Protein 05/28/2020 7.3  6.0 - 8.4 g/dL Final    Albumin 05/28/2020 3.7  3.5 - 5.2 g/dL Final    Total Bilirubin 05/28/2020 0.8  0.1 - 1.0 mg/dL Final    Alkaline Phosphatase 05/28/2020 55  55 - 135 U/L Final    AST 05/28/2020 15  10 - 40 U/L Final    ALT 05/28/2020 12  10 - 44 U/L Final    Anion Gap 05/28/2020 6* 8 - 16 mmol/L Final    eGFR if African American 05/28/2020 >60.0  >60 mL/min/1.73 m^2 Final    eGFR if non African American 05/28/2020 >60.0  >60 mL/min/1.73 m^2 Final    Cholesterol 05/28/2020 213* 120 - 199 mg/dL Final    Triglycerides 05/28/2020 65  30 - 150 mg/dL Final    HDL 05/28/2020 48  40 - 75 mg/dL Final    LDL Cholesterol 05/28/2020 152.0  63.0 - 159.0 mg/dL Final    Hdl/Cholesterol Ratio 05/28/2020 22.5  20.0 - 50.0 % Final    Total Cholesterol/HDL Ratio 05/28/2020 4.4  2.0 - 5.0 Final    Non-HDL Cholesterol 05/28/2020 165  mg/dL Final         Plan:           Vasovagal syncope  Comments:  Admitted with passing out.  Suspected to have chronotropic insufficiency or sick sinus syndrome.    Cigarette smoker  -     CT Chest Lung Screening Low Dose; Future; Expected date: 06/16/2020    Other constipation  Comments:  Trial of Linzess.  Patient on opiate medications.  May consider Movantik  Orders:  -     linaCLOtide (LINZESS) 72 mcg Cap capsule; Take 1 capsule (72 mcg total) by mouth once daily.  Dispense: 30 capsule; Refill: 2    Stenosis of right carotid artery  Comments:  less than 50 %    Essential hypertension    Mixed dyslipidemia    Statin myopathy    Tremor, essential    S/P splenectomy    Sterling's esophagus with dysplasia    Chronic pain syndrome    Screening for cancer  -     CT Chest Lung Screening Low Dose; Future; Expected date: 06/16/2020     Patient's  multiple medical conditions and recent hospitalization have been noted.    Syncopal episode possibly secondary they vasovagal noted.    Constipation noted for which will give a trial of Linzess.    Chronic opiate use with  mood disorder, sleep disorder noted.  Tremors complicate the matters.    Fall precautions and safety precautions discussed.    Patient advised to quit smoking.  Screening for lung cancer CT scan.    Follow-up in 1 month.    Spent sonido 25 minutes with patient which involved review of pts medical conditions, labs, medications and with 50% of time face-to-face discussion about medical problems, management and any applicable changes.        Current Outpatient Medications:     aluminum & magnesium hydroxide-simethicone (MYLANTA MAX STRENGTH) 400-400-40 mg/5 mL suspension, Take 5 mLs by mouth every 6 (six) hours as needed for Indigestion. , Disp: , Rfl:     aspirin 81 MG Chew, Take 81 mg by mouth once daily. , Disp: , Rfl:     azelastine (ASTELIN) 137 mcg (0.1 %) nasal spray, 1 spray (137 mcg total) by Nasal route 2 (two) times daily., Disp: 30 mL, Rfl: 11    busPIRone (BUSPAR) 5 MG Tab, Take 5 mg by mouth once daily. , Disp: , Rfl:     calcium carbonate (TUMS) 200 mg calcium (500 mg) chewable tablet, Take 1 tablet by mouth 2 (two) times daily as needed for Heartburn., Disp: , Rfl:     esomeprazole (NEXIUM) 40 MG capsule, TAKE 1 CAPSULE(40 MG) BY MOUTH TWICE DAILY BEFORE MEALS (Patient taking differently: Take 40 mg by mouth 2 (two) times daily. ), Disp: 60 capsule, Rfl: 2    fluticasone (FLONASE) 50 mcg/actuation nasal spray, Inhale 1 spray into the lungs once daily once daily., Disp: , Rfl:     gabapentin (NEURONTIN) 300 MG capsule, Take 1 capsule (300 mg total) by mouth 2 (two) times daily., Disp: 60 capsule, Rfl: 3    HYDROcodone-acetaminophen (NORCO) 7.5-325 mg per tablet, Take 1 tablet by mouth every 8 (eight) hours as needed. , Disp: , Rfl: 0    NIFEdipine (ADALAT CC) 30 MG TbSR, Take 30  mg by mouth once daily. , Disp: , Rfl:     polyethylene glycol (GLYCOLAX) 17 gram/dose powder, Take 17 g by mouth once daily., Disp: 507 g, Rfl: 0    sucralfate (CARAFATE) 1 gram tablet, Take 1 g by mouth 2 (two) times daily. Dissolve tab in 10 mls of water, Disp: , Rfl:     traZODone (DESYREL) 50 MG tablet, Take 1 tablet (50 mg total) by mouth every evening., Disp: 30 tablet, Rfl: 2    linaCLOtide (LINZESS) 72 mcg Cap capsule, Take 1 capsule (72 mcg total) by mouth once daily., Disp: 30 capsule, Rfl: 2

## 2020-06-09 NOTE — PATIENT INSTRUCTIONS
Constipation (Adult)  Constipation means that you have bowel movements that are less frequent than usual. Stools often become very hard and difficult to pass.  Constipation is very common. At some point in life it affects almost everyone. Since everyone's bowel habits are different, what is constipation to one person may not be to another. Your healthcare provider may do tests to diagnose constipation. It depends on what he or she finds when evaluating you.    Symptoms of constipation include:  · Abdominal pain  · Bloating  · Vomiting  · Painful bowel movements  · Itching, swelling, bleeding, or pain around the anus  Causes  Constipation can have many causes. These include:  · Diet low in fiber  · Too much dairy  · Not drinking enough liquids  · Lack of exercise or physical activity. This is especially true for older adults.  · Changes in lifestyle or daily routine, including pregnancy, aging, work, and travel  · Frequent use or misuse of laxatives  · Ignoring the urge to have a bowel movement or delaying it until later  · Medicines, such as certain prescription pain medicines, iron supplements, antacids, certain antidepressants, and calcium supplements  · Diseases like irritable bowel syndrome, bowel obstructions, stroke, diabetes, thyroid disease, Parkinson disease, hemorrhoids, and colon cancer  Complications  Potential complications of constipation can include:  · Hemorrhoids  · Rectal bleeding from hemorrhoids or anal fissures (skin tears)  · Hernias  · Dependency on laxatives  · Chronic constipation  · Fecal impaction  · Bowel obstruction or perforation  Home care  All treatment should be done after talking with your healthcare provider. This is especially true if you have another medical problems, are taking prescription medicines, or are an older adult. Treatment most often involves lifestyle changes. You may also need medicines. Your healthcare provider will tell you which will work best for you. Follow  the advice below to help avoid this problem in the future.  Lifestyle changes  These lifestyle changes can help prevent constipation:  · Diet. Eat a high-fiber diet, with fresh fruit and vegetables, and reduce dairy intake, meats, and processed foods  · Fluids. It's important to get enough fluids each day. Drink plenty of water when you eat more fiber. If you are on diet that limits the amount of fluid you can have, talk about this with your healthcare provider.  · Regular exercise. Check with your healthcare provider first.  Medications  Take any medicines as directed. Some laxatives are safe to use only every now and then. Others can be taken on a regular basis. Talk with your doctor or pharmacist if you have questions.  Prescription pain medicines can cause constipation. If you are taking this kind of medicine, ask your healthcare provider if you should also take a stool softener.  Medicines you may take to treat constipation include:  · Fiber supplements  · Stool softeners  · Laxatives  · Enemas  · Rectal suppositories  Follow-up care  Follow up with your healthcare provider if symptoms don't get better in the next few days. You may need to have more tests or see a specialist.  Call 911  Call 911 if any of these occur:  · Trouble breathing  · Stiff, rigid abdomen that is severely painful to touch  · Confusion  · Fainting or loss of consciousness  · Rapid heart rate  · Chest pain  When to seek medical advice  Call your healthcare provider right away if any of these occur:  · Fever over 100.4°F (38°C)  · Failure to resume normal bowel movements  · Pain in your abdomen or back gets worse  · Nausea or vomiting  · Swelling in your abdomen  · Blood in the stool  · Black, tarry stool  · Involuntary weight loss  · Weakness  Date Last Reviewed: 12/30/2015  © 9778-5627 [employer]. [address] [place] [address], [place] [address]. All rights reserved. This information is not intended as a substitute for  professional medical care. Always follow your healthcare professional's instructions.

## 2020-06-11 ENCOUNTER — TELEPHONE (OUTPATIENT)
Dept: GASTROENTEROLOGY | Facility: CLINIC | Age: 70
End: 2020-06-11

## 2020-06-11 NOTE — TELEPHONE ENCOUNTER
Patient asked to call back in September to schedule EGD. Patient is having multiple medical issues at this time.

## 2020-06-11 NOTE — TELEPHONE ENCOUNTER
----- Message from Donna Thorne sent at 6/5/2020 11:49 AM CDT -----  Contact: self [phone]  Patient is calling to schedule her procedure. Please call

## 2020-06-16 ENCOUNTER — TELEPHONE (OUTPATIENT)
Dept: GASTROENTEROLOGY | Facility: CLINIC | Age: 70
End: 2020-06-16

## 2020-06-18 ENCOUNTER — HOSPITAL ENCOUNTER (OUTPATIENT)
Dept: RADIOLOGY | Facility: HOSPITAL | Age: 70
Discharge: HOME | End: 2020-06-18
Attending: INTERNAL MEDICINE
Payer: MEDICARE

## 2020-06-18 DIAGNOSIS — Z12.9 SCREENING FOR CANCER: ICD-10-CM

## 2020-06-18 DIAGNOSIS — F17.210 CIGARETTE SMOKER: ICD-10-CM

## 2020-06-18 PROCEDURE — G0297 LDCT FOR LUNG CA SCREEN: HCPCS | Mod: TC,PO

## 2020-06-21 ENCOUNTER — TELEPHONE (OUTPATIENT)
Dept: FAMILY MEDICINE | Facility: CLINIC | Age: 70
End: 2020-06-21

## 2020-06-21 DIAGNOSIS — R91.1 SOLITARY PULMONARY NODULE: ICD-10-CM

## 2020-06-22 NOTE — TELEPHONE ENCOUNTER
Order sent for CT scanning to be done without contrast in 3 months time in view of a pulmonary nodule seen in the right mid lung 6 mm.  Patient has been a chronic smoker.  The 1st scanning was low-dose CT scanning for cancer screening.

## 2020-06-25 ENCOUNTER — TELEPHONE (OUTPATIENT)
Dept: GASTROENTEROLOGY | Facility: CLINIC | Age: 70
End: 2020-06-25

## 2020-06-30 DIAGNOSIS — M85.88 OTHER SPECIFIED DISORDERS OF BONE DENSITY AND STRUCTURE, OTHER SITE: ICD-10-CM

## 2020-06-30 DIAGNOSIS — Z12.31 SCREENING MAMMOGRAM FOR HIGH-RISK PATIENT: Primary | ICD-10-CM

## 2020-07-06 ENCOUNTER — OFFICE VISIT (OUTPATIENT)
Dept: PULMONOLOGY | Facility: CLINIC | Age: 70
End: 2020-07-06
Payer: MEDICARE

## 2020-07-06 VITALS
WEIGHT: 126.63 LBS | OXYGEN SATURATION: 98 % | TEMPERATURE: 97 F | SYSTOLIC BLOOD PRESSURE: 149 MMHG | BODY MASS INDEX: 23.17 KG/M2 | DIASTOLIC BLOOD PRESSURE: 80 MMHG | HEART RATE: 60 BPM

## 2020-07-06 DIAGNOSIS — J44.9 CHRONIC OBSTRUCTIVE PULMONARY DISEASE, UNSPECIFIED COPD TYPE: Primary | ICD-10-CM

## 2020-07-06 DIAGNOSIS — G47.33 OBSTRUCTIVE SLEEP APNEA SYNDROME: ICD-10-CM

## 2020-07-06 DIAGNOSIS — R91.1 LUNG NODULE: ICD-10-CM

## 2020-07-06 PROCEDURE — 1100F PR PT FALLS ASSESS DOC 2+ FALLS/FALL W/INJURY/YR: ICD-10-PCS | Mod: S$GLB,,, | Performed by: NURSE PRACTITIONER

## 2020-07-06 PROCEDURE — 3079F DIAST BP 80-89 MM HG: CPT | Mod: S$GLB,,, | Performed by: NURSE PRACTITIONER

## 2020-07-06 PROCEDURE — 99999 PR PBB SHADOW E&M-EST. PATIENT-LVL V: CPT | Mod: PBBFAC,,, | Performed by: NURSE PRACTITIONER

## 2020-07-06 PROCEDURE — 3288F PR FALLS RISK ASSESSMENT DOCUMENTED: ICD-10-PCS | Mod: S$GLB,,, | Performed by: NURSE PRACTITIONER

## 2020-07-06 PROCEDURE — 1100F PTFALLS ASSESS-DOCD GE2>/YR: CPT | Mod: S$GLB,,, | Performed by: NURSE PRACTITIONER

## 2020-07-06 PROCEDURE — 3008F BODY MASS INDEX DOCD: CPT | Mod: S$GLB,,, | Performed by: NURSE PRACTITIONER

## 2020-07-06 PROCEDURE — 3008F PR BODY MASS INDEX (BMI) DOCUMENTED: ICD-10-PCS | Mod: S$GLB,,, | Performed by: NURSE PRACTITIONER

## 2020-07-06 PROCEDURE — 1159F MED LIST DOCD IN RCRD: CPT | Mod: S$GLB,,, | Performed by: NURSE PRACTITIONER

## 2020-07-06 PROCEDURE — 1125F PR PAIN SEVERITY QUANTIFIED, PAIN PRESENT: ICD-10-PCS | Mod: S$GLB,,, | Performed by: NURSE PRACTITIONER

## 2020-07-06 PROCEDURE — 3288F FALL RISK ASSESSMENT DOCD: CPT | Mod: S$GLB,,, | Performed by: NURSE PRACTITIONER

## 2020-07-06 PROCEDURE — 99205 OFFICE O/P NEW HI 60 MIN: CPT | Mod: S$GLB,,, | Performed by: NURSE PRACTITIONER

## 2020-07-06 PROCEDURE — 1159F PR MEDICATION LIST DOCUMENTED IN MEDICAL RECORD: ICD-10-PCS | Mod: S$GLB,,, | Performed by: NURSE PRACTITIONER

## 2020-07-06 PROCEDURE — 3077F PR MOST RECENT SYSTOLIC BLOOD PRESSURE >= 140 MM HG: ICD-10-PCS | Mod: S$GLB,,, | Performed by: NURSE PRACTITIONER

## 2020-07-06 PROCEDURE — 1125F AMNT PAIN NOTED PAIN PRSNT: CPT | Mod: S$GLB,,, | Performed by: NURSE PRACTITIONER

## 2020-07-06 PROCEDURE — 99999 PR PBB SHADOW E&M-EST. PATIENT-LVL V: ICD-10-PCS | Mod: PBBFAC,,, | Performed by: NURSE PRACTITIONER

## 2020-07-06 PROCEDURE — 3077F SYST BP >= 140 MM HG: CPT | Mod: S$GLB,,, | Performed by: NURSE PRACTITIONER

## 2020-07-06 PROCEDURE — 3079F PR MOST RECENT DIASTOLIC BLOOD PRESSURE 80-89 MM HG: ICD-10-PCS | Mod: S$GLB,,, | Performed by: NURSE PRACTITIONER

## 2020-07-06 PROCEDURE — 99205 PR OFFICE/OUTPT VISIT, NEW, LEVL V, 60-74 MIN: ICD-10-PCS | Mod: S$GLB,,, | Performed by: NURSE PRACTITIONER

## 2020-07-06 RX ORDER — NIFEDIPINE 30 MG/1
TABLET, EXTENDED RELEASE ORAL
COMMUNITY
Start: 2020-06-16 | End: 2020-07-15 | Stop reason: SDUPTHER

## 2020-07-06 RX ORDER — ASPIRIN 81 MG/1
81 TABLET ORAL DAILY
COMMUNITY
Start: 2020-06-30 | End: 2020-07-15 | Stop reason: SDUPTHER

## 2020-07-06 RX ORDER — ALBUTEROL SULFATE 90 UG/1
2 AEROSOL, METERED RESPIRATORY (INHALATION) EVERY 4 HOURS PRN
Qty: 18 G | Refills: 11 | Status: SHIPPED | OUTPATIENT
Start: 2020-07-06 | End: 2021-04-22

## 2020-07-06 RX ORDER — GABAPENTIN 300 MG/1
CAPSULE ORAL
COMMUNITY
Start: 2020-06-30 | End: 2020-07-15 | Stop reason: SDUPTHER

## 2020-07-06 RX ORDER — AZELASTINE 1 MG/ML
SPRAY, METERED NASAL
COMMUNITY
Start: 2020-06-30 | End: 2020-07-15 | Stop reason: SDUPTHER

## 2020-07-06 RX ORDER — MIRTAZAPINE 15 MG/1
TABLET, FILM COATED ORAL
COMMUNITY
Start: 2020-06-30 | End: 2020-07-15

## 2020-07-06 RX ORDER — NIFEDIPINE 30 MG/1
TABLET, FILM COATED, EXTENDED RELEASE ORAL
COMMUNITY
Start: 2020-06-30 | End: 2020-07-15 | Stop reason: SDUPTHER

## 2020-07-06 RX ORDER — TRAZODONE HYDROCHLORIDE 100 MG/1
TABLET ORAL
COMMUNITY
Start: 2020-06-30 | End: 2020-07-15 | Stop reason: SDUPTHER

## 2020-07-06 NOTE — PROGRESS NOTES
2020    Kristin Guerra  New Patient Consult    Chief Complaint   Patient presents with    Abnormal Ct Scan       HPI:  2020- referred by PCP for Ct chest screening with abnormal spot seen. Complaint of weakness and fatigue, being treated for Syncope dx with bradycardia waiting for pacemaker placement.   Cough- onset 2 years, large amounts of mucous, clear color, nickel size, daily.  SOB- onset 2 years, limits activity level, works with exertion, improves quickly with rest, no chest tightness or wheeze,  Complaint of nightly snoring, daytime fatigue, drowsiness in afternoons.   Social Hx: lives with spouse but no pets, currently retired from office and retail stocking, no known Asbestosis exposure, Smoking Hx: currently smoking 1 pack daily, 40 pack years.  Family Hx: Father and 2 brothers (Small cell) Lung Cancer, no COPD, no Asthma  Medical Hx: no previous pneumonia ; no previous shoulder/chest surgery, Neck surgery , , 16 for disc infusions.         The chief compliant  problem is new to me  PFSH:  Past Medical History:   Diagnosis Date    Allergy     Sterling's esophagus     Colon cancer     Colon polyp     Diverticulitis     Diverticulosis     Fatty liver     GERD (gastroesophageal reflux disease)     Hx of cervical spine surgery 2018    Hyperlipidemia     Hypertension     Lupus (systemic lupus erythematosus)     discoid    Osteoarthritis     Osteoporosis     Tremor 2005    essential         Past Surgical History:   Procedure Laterality Date    APPENDECTOMY      removed during colon surgery    CERVICAL FUSION       SECTION      COLON SURGERY   &     hemicolectomy for colon cancer and second was for complications from the first surgery    COLONOSCOPY N/A 2018    Procedure: COLONOSCOPY;  Surgeon: Maverick Esquivel MD;  [facility]: [facility];  Service: Endoscopy;  Laterality: N/A; repeat in 5 years for surveillance    ESOPHAGOGASTRODUODENOSCOPY N/A  4/2/2019    Procedure: EGD (ESOPHAGOGASTRODUODENOSCOPY);  Surgeon: Monika Steele MD;  Location: [facility] ENDO;  Service: Endoscopy;  Laterality: N/A;    ESOPHAGOGASTRODUODENOSCOPY  06/04/2019    with RFA and biopsies    ESOPHAGOGASTRODUODENOSCOPY N/A 6/4/2019    Procedure: EGD (ESOPHAGOGASTRODUODENOSCOPY);  Surgeon: Uzair Cantor MD;  Location: [facility];  Service: Endoscopy;  Laterality: N/A;  n+v with anesthesia    ESOPHAGOGASTRODUODENOSCOPY N/A 8/21/2019    Procedure: EGD (ESOPHAGOGASTRODUODENOSCOPY);  Surgeon: Uzair Cantor MD;  Location: [facility];  Service: Endoscopy;  Laterality: N/A;    ESOPHAGOGASTRODUODENOSCOPY N/A 10/7/2019    Procedure: EGD (ESOPHAGOGASTRODUODENOSCOPY);  Surgeon: Uzair Cantor MD;  Location: [facility];  Service: Endoscopy;  Laterality: N/A;    ESOPHAGOGASTRODUODENOSCOPY N/A 2/10/2020    Procedure: EGD (ESOPHAGOGASTRODUODENOSCOPY);  Surgeon: Uzair Cantor MD;  Location: [facility];  Service: Endoscopy;  Laterality: N/A;  Requests earliest procedure time    HYSTERECTOMY      ovaries remain    SPLENECTOMY, TOTAL  2007    Injured during surgery and removed    UPPER GASTROINTESTINAL ENDOSCOPY  2007    GERD & hiatal hernia per patient report     Social History     Tobacco Use    Smoking status: Current Every Day Smoker     Packs/day: 1.00     Years: 35.00     Pack years: 35.00     Types: Cigarettes    Smokeless tobacco: Never Used   Substance Use Topics    Alcohol use: No    Drug use: No     Family History   Problem Relation Age of Onset    Colon cancer Father         diagnosed in his 60's    Lung cancer Father     Lymphoma Mother         Brain    Lung cancer Brother     Lung cancer Brother         metastatic from prostate    Prostate cancer Brother     Heart disease Brother         valvular disease    Hyperlipidemia Brother     Eczema Neg Hx     Lupus Neg Hx     Psoriasis Neg Hx     Melanoma Neg Hx     Crohn's disease Neg Hx     Ulcerative colitis  Neg Hx     Stomach cancer Neg Hx     Esophageal cancer Neg Hx      Review of patient's allergies indicates:   Allergen Reactions    Mirtazapine Other (See Comments)     Climb the walls    Compazine [prochlorperazine edisylate] Nausea Only    Lexapro [escitalopram oxalate]      Worsening tremor, and sedation    Primidone Other (See Comments)     Could not function    Prochlorperazine     Simvastatin     Topiramate     Morphine Swelling and Rash     I have reviewed past medical, family, and social history. I have reviewed previous nurse notes.    Performance Status:The patient's activity level is functions out of house.          Review of Systems   Constitutional: Negative for activity change, appetite change, chills, fatigue, fever and unexpected weight change. Positive for fatigue, diaphoresis at night 1x monthly  HENT: Negative for dental problem,  rhinorrhea, sinus pressure, sinus pain, sneezing,  trouble swallowing and voice change.  Positive for postnasal drip, sore throat in morning  Respiratory: Negative for apnea, chest tightness,  wheezing and stridor.  Positive  cough,  shortness of breath,  Cardiovascular: Negative for palpitations and leg swelling. Positive for chest pain  Gastrointestinal: Negative for abdominal pain and nausea. Positive for abdominal distention and constipation  Musculoskeletal: Negative for gait problem, myalgias and neck pain. Positive for chronic neck pain  Skin: Negative for color change and pallor.   Allergic/Immunologic: Negative for environmental allergies and food allergies.   Neurological: Negative for dizziness, speech difficulty, weakness, light-headedness, numbness and headaches.   Hematological: Negative for adenopathy. Does not bruise/bleed easily.   Psychiatric/Behavioral: Negative for dysphoric mood and sleep disturbance. The patient is nervous/anxious.           Exam:Comprehensive exam done. BP (!) 149/80 (BP Location: Left arm, Patient Position: Sitting)    Pulse 60   Temp 96.6 °F (35.9 °C)   Wt 57.5 kg (126 lb 10.5 oz)   SpO2 98% Comment: on room air at rest  BMI 23.17 kg/m²   Exam included Vitals as listed  Constitutional:  oriented to person, place, and time.  appears well-developed. No distress.   Nose: Nose normal.   Mouth/Throat: Uvula is midline, oropharynx is clear and moist and mucous membranes are normal. No dental caries. No oropharyngeal exudate, posterior oropharyngeal edema, posterior oropharyngeal erythema or tonsillar abscesses.  Mallapatti (M) score  Eyes: Pupils are equal, round, and reactive to light.   Neck: No JVD present. No thyromegaly present.   Cardiovascular: Normal rate, regular rhythm and normal heart sounds. Exam reveals no gallop and no friction rub.   No murmur heard.  Pulmonary/Chest: Effort normal and breath sounds normal. No accessory muscle usage or stridor. No apnea and no tachypnea. No respiratory distress, decreased breath sounds, wheezes, rhonchi, rales, or tenderness.   Abdominal: Soft.  exhibits no mass. There is no tenderness. No hepatosplenomegaly, hernias and normoactive bowel sounds  Musculoskeletal: Normal range of motion. exhibits no edema.   Lymphadenopathy:      no cervical adenopathy.   no axillary adenopathy.   Neurological:  alert and oriented to person, place, and time. not disoriented.   Skin: Skin is warm and dry. Capillary refill takes less 2 sec. No cyanosis or erythema. No pallor. Nails show no clubbing.   Psychiatric: normal mood and affect. behavior is normal. Judgment and thought content normal.       Radiographs (ct chest and cxr) reviewed: view by direct vision   Transthoracic echo (TTE) complete 6/3/2020  Mild concentric left ventricular hypertrophy.  Normal left ventricular systolic function. The estimated ejection fraction is 60%.  Normal LV diastolic function.  The estimated PA systolic pressure is 34 mmHg.     X-Ray Chest AP Portable 06/02/20   The lungs are clear. Costophrenic angles are seen  without effusion. No  pneumothorax is identified. The heart is normal in size. Atheromatous  calcifications are seen at the aortic arch. Degenerative changes are  seen with an ACDF and paraspinal fixation hardware in the lower  cervical spine. The visualized upper abdomen is unremarkable    CT Chest Lung Screening Low Dose 06/18/20   1.  Nodular density with spiculations in the anterior segment of  the right middle lobe measures 6 x 9 mm.  2.  6 mm subpleural nodular density in the anterior right  costophrenic angle.  3.  Irregular shaped density in the medial segment of the right  lower lobe is unchanged dating back from 2009 and is benign.     LUNG-RADS CATEGORY 4A: SUSPICIOUS FINDINGS     RECOMMENDATION: 3 month follow-up LDCT (PET/CT may be considered when  solid component greater than 7 mm is present).        Labs reviewed       Lab Results   Component Value Date    WBC 9.02 06/04/2020    RBC 3.54 (L) 06/04/2020    HGB 11.1 (L) 06/04/2020    HCT 33.1 (L) 06/04/2020    MCV 94 06/04/2020    MCH 31.4 (H) 06/04/2020    MCHC 33.5 06/04/2020    RDW 14.8 (H) 06/04/2020     06/04/2020    MPV 9.7 06/04/2020    GRAN 3.6 06/04/2020    GRAN 39.9 06/04/2020    LYMPH 4.0 06/04/2020    LYMPH 44.1 06/04/2020    MONO 0.9 06/04/2020    MONO 9.4 06/04/2020    EOS 0.4 06/04/2020    BASO 0.14 06/04/2020    EOSINOPHIL 4.8 06/04/2020    BASOPHIL 1.6 06/04/2020     Ed Model: 7/6/2020 8.47% probability.    PFT will be done and results to be reviewed  Pulmonary Functions Testing Results:    EPWORTH SLEEPINESS SCALE= 12 ON 7/6/2020    Plan:  Clinical impression is apparently straight forward and impression with management as below.    Kristin was seen today for abnormal ct scan.    Diagnoses and all orders for this visit:    Chronic obstructive pulmonary disease, unspecified COPD type  -     Complete PFT with bronchodilator; Future  -     fluticasone-umeclidin-vilanter (TRELEGY ELLIPTA) 100-62.5-25 mcg DsDv; Inhale 1 puff into  the lungs once daily.  -     albuterol (PROVENTIL/VENTOLIN HFA) 90 mcg/actuation inhaler; Inhale 2 puffs into the lungs every 4 (four) hours as needed for Wheezing or Shortness of Breath. Rescue    Obstructive sleep apnea syndrome  -     Home Sleep Studies; Future  -     Home Sleep Studies    Lung nodule     - CT chest with out contrast 3 months    Follow up in about 3 months (around 10/6/2020), or if symptoms worsen or fail to improve.    Discussed with patient above for education the following:      Patient Instructions   CT of chest shows three nodules only one is concerning but is to small to do any further testing. Will wait 3 months and assess for any changes, if nodule continues to grow will look into needle biospy for cause due to your family history of lung cancer    Start new medication  Trelegy 100 1 puff once a day every day, rinse mouth after using due to risk for thrush if mouth or tongue has white sores contact clinic    Albuterol Inhaler 1-2 puffs every 4 hours, for cough or shortness of breath    Lung function test to evaluate for chronic obstructive pulmonary disease.     Continue to use nicotine patches to assist in smoking cessation, if interested in additional medication let clinic know.

## 2020-07-06 NOTE — PATIENT INSTRUCTIONS
CT of chest shows three nodules only one is concerning but is to small to do any further testing. Will wait 3 months and assess for any changes, if nodule continues to grow will look into needle biospy for cause due to your family history of lung cancer    Start new medication  Trelegy 100 1 puff once a day every day, rinse mouth after using due to risk for thrush if mouth or tongue has white sores contact clinic    Albuterol Inhaler 1-2 puffs every 4 hours, for cough or shortness of breath    Lung function test to evaluate for chronic obstructive pulmonary disease.     Continue to use nicotine patches to assist in smoking cessation, if interested in additional medication let clinic know.

## 2020-07-07 PROBLEM — R55 SYNCOPE AND COLLAPSE: Status: ACTIVE | Noted: 2020-07-07

## 2020-07-07 NOTE — PROGRESS NOTES
Subjective:     HPI    Cardiologist: Karyna Vasques MD     I had the pleasure of seeing Kristin Guerra in consultation at your request for the evaluation of syncope. Jed os a 70 year old female with a history of HTN, COPD, MADISON, active tobacco use, and lung nodule (too small for biopsy), who underwent neck surgery in 11/2018, with her postoperative course notable for chronic neck and back pains. In 12/2018 she was standing up at therapy when she developed sudden onset diaphoresis and then passed out. She reportedly had two syncope events prior to this one. This event prompted a cardiac work-up which included an echo performed in 12/2018 showed a structurally normal heart with normal LA size and an EF of >55%.    Ms. Guerra has had two episodes of syncope in the past few months (4/2020 and 6/2020). Her 6/2020 episode occurred after a warn shower. She got out, was putting on her clothes, then developed sudden onset lightheadedness and weakness. About 2 minutes later she passed out. She presented to [facility] where a work-up was unremarkable. Although her telemetry was notable for sinus bradycardia, no pauses were seen.    An echo performed in 6/2020 showed an EF of 60% and no significant valvular disease. A 14 day event monitor done in 3/2019 showed an average HR of 64 bpm (range  bpm), with a 53 beat run of SVT at 103 bpm (no strips to review today).    Notably, Ms. Guerra has difficult-to-control hypertension--she is not currently on beta blockers due to her history of sinus bradycardia and syncope.    My interpretation of today's ECG is sinus bradycardia at 59 bpm.    Review of Systems   Constitution: Positive for malaise/fatigue. Negative for decreased appetite, weight gain and weight loss.   HENT: Negative for sore throat.    Eyes: Negative for blurred vision.   Cardiovascular: Positive for syncope. Negative for chest pain, dyspnea on exertion, irregular heartbeat, leg swelling, near-syncope, orthopnea,  palpitations and paroxysmal nocturnal dyspnea.   Respiratory: Negative for shortness of breath.    Skin: Negative for rash.   Musculoskeletal: Negative for arthritis.   Gastrointestinal: Negative for abdominal pain.   Neurological: Negative for focal weakness.   Psychiatric/Behavioral: Negative for altered mental status.        Objective:    Physical Exam   Constitutional: She is oriented to person, place, and time. She appears well-developed and well-nourished. No distress.   HENT:   Head: Normocephalic and atraumatic.   Mouth/Throat: Oropharynx is clear and moist.   Eyes: Pupils are equal, round, and reactive to light. Conjunctivae are normal. No scleral icterus.   Neck: Normal range of motion. Neck supple. No JVD present. No thyromegaly present.   Cardiovascular: Normal rate, regular rhythm, normal heart sounds and intact distal pulses. Exam reveals no gallop and no friction rub.   No murmur heard.  Pulmonary/Chest: Effort normal and breath sounds normal. No respiratory distress. She has no rales.   Abdominal: Soft. Bowel sounds are normal. She exhibits no distension.   Musculoskeletal:         General: No edema.   Neurological: She is alert and oriented to person, place, and time.   Skin: Skin is warm and dry.   Psychiatric: She has a normal mood and affect. Her behavior is normal.   Vitals reviewed.        Assessment:       1. S/P splenectomy    2. Syncope and collapse    3. Tobacco dependence    4. Systemic lupus erythematosus, unspecified SLE type, unspecified organ involvement status    5. Essential hypertension    6. Mixed dyslipidemia         Plan:       In summary, Kristin Guerra is a 70 year old female with a history of syncope, possibly related to symptomatic bradycardia. However, there is no objective data to indicate this is the case. We discussed the risks, benefits, indications, and alternatives to ILR implantation. She expressed understanding and wishes to proceed. This will be done locally by   [facility]. I have discussed the case with him and he agrees with the plan.    She will see me again as needed.    Thank you for allowing me to participate in the care of this patient. Please do not hesitate to call me with any questions or concerns.

## 2020-07-10 ENCOUNTER — INITIAL CONSULT (OUTPATIENT)
Dept: CARDIOLOGY | Facility: CLINIC | Age: 70
End: 2020-07-10
Payer: MEDICARE

## 2020-07-10 VITALS
SYSTOLIC BLOOD PRESSURE: 130 MMHG | OXYGEN SATURATION: 95 % | DIASTOLIC BLOOD PRESSURE: 60 MMHG | HEART RATE: 74 BPM | BODY MASS INDEX: 23.62 KG/M2 | WEIGHT: 128.38 LBS | HEIGHT: 62 IN

## 2020-07-10 DIAGNOSIS — R55 SYNCOPE AND COLLAPSE: ICD-10-CM

## 2020-07-10 DIAGNOSIS — Z90.81 S/P SPLENECTOMY: Primary | ICD-10-CM

## 2020-07-10 DIAGNOSIS — M32.9 SYSTEMIC LUPUS ERYTHEMATOSUS, UNSPECIFIED SLE TYPE, UNSPECIFIED ORGAN INVOLVEMENT STATUS: Chronic | ICD-10-CM

## 2020-07-10 DIAGNOSIS — F17.200 TOBACCO DEPENDENCE: Chronic | ICD-10-CM

## 2020-07-10 DIAGNOSIS — I10 ESSENTIAL HYPERTENSION: Chronic | ICD-10-CM

## 2020-07-10 DIAGNOSIS — E78.2 MIXED DYSLIPIDEMIA: Chronic | ICD-10-CM

## 2020-07-10 PROCEDURE — 1159F PR MEDICATION LIST DOCUMENTED IN MEDICAL RECORD: ICD-10-PCS | Mod: S$GLB,,, | Performed by: INTERNAL MEDICINE

## 2020-07-10 PROCEDURE — 1100F PTFALLS ASSESS-DOCD GE2>/YR: CPT | Mod: S$GLB,,, | Performed by: INTERNAL MEDICINE

## 2020-07-10 PROCEDURE — 3008F BODY MASS INDEX DOCD: CPT | Mod: S$GLB,,, | Performed by: INTERNAL MEDICINE

## 2020-07-10 PROCEDURE — 99205 OFFICE O/P NEW HI 60 MIN: CPT | Mod: S$GLB,,, | Performed by: INTERNAL MEDICINE

## 2020-07-10 PROCEDURE — 3075F SYST BP GE 130 - 139MM HG: CPT | Mod: S$GLB,,, | Performed by: INTERNAL MEDICINE

## 2020-07-10 PROCEDURE — 1159F MED LIST DOCD IN RCRD: CPT | Mod: S$GLB,,, | Performed by: INTERNAL MEDICINE

## 2020-07-10 PROCEDURE — 3288F PR FALLS RISK ASSESSMENT DOCUMENTED: ICD-10-PCS | Mod: S$GLB,,, | Performed by: INTERNAL MEDICINE

## 2020-07-10 PROCEDURE — 3078F DIAST BP <80 MM HG: CPT | Mod: S$GLB,,, | Performed by: INTERNAL MEDICINE

## 2020-07-10 PROCEDURE — 3075F PR MOST RECENT SYSTOLIC BLOOD PRESS GE 130-139MM HG: ICD-10-PCS | Mod: S$GLB,,, | Performed by: INTERNAL MEDICINE

## 2020-07-10 PROCEDURE — 1126F AMNT PAIN NOTED NONE PRSNT: CPT | Mod: S$GLB,,, | Performed by: INTERNAL MEDICINE

## 2020-07-10 PROCEDURE — 99205 PR OFFICE/OUTPT VISIT, NEW, LEVL V, 60-74 MIN: ICD-10-PCS | Mod: S$GLB,,, | Performed by: INTERNAL MEDICINE

## 2020-07-10 PROCEDURE — 3008F PR BODY MASS INDEX (BMI) DOCUMENTED: ICD-10-PCS | Mod: S$GLB,,, | Performed by: INTERNAL MEDICINE

## 2020-07-10 PROCEDURE — 1126F PR PAIN SEVERITY QUANTIFIED, NO PAIN PRESENT: ICD-10-PCS | Mod: S$GLB,,, | Performed by: INTERNAL MEDICINE

## 2020-07-10 PROCEDURE — 1100F PR PT FALLS ASSESS DOC 2+ FALLS/FALL W/INJURY/YR: ICD-10-PCS | Mod: S$GLB,,, | Performed by: INTERNAL MEDICINE

## 2020-07-10 PROCEDURE — 3078F PR MOST RECENT DIASTOLIC BLOOD PRESSURE < 80 MM HG: ICD-10-PCS | Mod: S$GLB,,, | Performed by: INTERNAL MEDICINE

## 2020-07-10 PROCEDURE — 3288F FALL RISK ASSESSMENT DOCD: CPT | Mod: S$GLB,,, | Performed by: INTERNAL MEDICINE

## 2020-07-10 NOTE — LETTER
July 10, 2020      Karyna Vasques MD  [address]  [address]  Cardiology [place]  [place] [address]           [facility] [place] [facility]  [address]  [facility] [address]  Phone: [phone]  Fax: [phone]          Patient: Kristin Guerra   MR Number: [id number]   YOB: 1950   Date of Visit: 7/10/2020       Dear Dr. Karyna Vasques:    Thank you for referring Kristin Guerra to me for evaluation. Attached you will find relevant portions of my assessment and plan of care.    If you have questions, please do not hesitate to call me. I look forward to following Kristin Guerra along with you.    Sincerely,    Kt Dick MD    Enclosure  CC:  No Recipients    If you would like to receive this communication electronically, please contact [email or web address] or [phone] to request more information on [facility] access.    For providers and/or their staff who would like to refer a patient to [facility], please contact us through our one-stop-shop provider referral line, [facility], at [phone].    If you feel you have received this communication in error or would no longer like to receive these types of communications, please e-mail [email or web address]

## 2020-07-13 ENCOUNTER — HOSPITAL ENCOUNTER (OUTPATIENT)
Dept: RADIOLOGY | Facility: HOSPITAL | Age: 70
Discharge: HOME | End: 2020-07-13
Attending: OBSTETRICS & GYNECOLOGY
Payer: MEDICARE

## 2020-07-13 ENCOUNTER — TELEPHONE (OUTPATIENT)
Dept: FAMILY MEDICINE | Facility: CLINIC | Age: 70
End: 2020-07-13

## 2020-07-13 VITALS — HEIGHT: 62 IN | BODY MASS INDEX: 23.61 KG/M2 | WEIGHT: 128.31 LBS

## 2020-07-13 DIAGNOSIS — Z12.31 SCREENING MAMMOGRAM FOR HIGH-RISK PATIENT: ICD-10-CM

## 2020-07-13 DIAGNOSIS — M85.88 OTHER SPECIFIED DISORDERS OF BONE DENSITY AND STRUCTURE, OTHER SITE: ICD-10-CM

## 2020-07-13 PROCEDURE — 77067 SCR MAMMO BI INCL CAD: CPT | Mod: TC,PO

## 2020-07-13 PROCEDURE — 77080 DXA BONE DENSITY AXIAL: CPT | Mod: TC,PO

## 2020-07-13 NOTE — TELEPHONE ENCOUNTER
----- Message from Haile Weaver MD sent at 7/13/2020  2:08 PM CDT -----  Please notify patient that her mammogram is normal.  She also has a bone density report.

## 2020-07-13 NOTE — PROGRESS NOTES
Please notify patient that her bone density shows osteopenia in the lumbar spine and hip bones.  Continue with calcium but use a calcium which has it and vitamin D3 with it like Caltrate or Citracal.  She seems to be taking calcium only supplements.  Will discuss at follow-up also.

## 2020-07-13 NOTE — TELEPHONE ENCOUNTER
----- Message from Haile Weaver MD sent at 7/13/2020  2:09 PM CDT -----  Please notify patient that her bone density shows osteopenia in the lumbar spine and hip bones.  Continue with calcium but use a calcium which has it and vitamin D3 with it like Caltrate or Citracal.  She seems to be taking calcium only supplements.  Will discuss at follow-up also.

## 2020-07-15 ENCOUNTER — HOSPITAL ENCOUNTER (OUTPATIENT)
Dept: PULMONOLOGY | Facility: HOSPITAL | Age: 70
Discharge: HOME | End: 2020-07-15
Attending: NURSE PRACTITIONER
Payer: MEDICARE

## 2020-07-15 ENCOUNTER — HOSPITAL ENCOUNTER (OUTPATIENT)
Dept: RADIOLOGY | Facility: HOSPITAL | Age: 70
Discharge: HOME | End: 2020-07-15
Attending: INTERNAL MEDICINE
Payer: MEDICARE

## 2020-07-15 ENCOUNTER — HOSPITAL ENCOUNTER (OUTPATIENT)
Dept: PREADMISSION TESTING | Facility: HOSPITAL | Age: 70
Discharge: HOME | End: 2020-07-15
Attending: INTERNAL MEDICINE
Payer: MEDICARE

## 2020-07-15 VITALS — HEIGHT: 61 IN | WEIGHT: 126.56 LBS | BODY MASS INDEX: 23.9 KG/M2

## 2020-07-15 DIAGNOSIS — Z01.810 PREOP CARDIOVASCULAR EXAM: ICD-10-CM

## 2020-07-15 DIAGNOSIS — R55 SYNCOPE, UNSPECIFIED SYNCOPE TYPE: ICD-10-CM

## 2020-07-15 DIAGNOSIS — R55 SYNCOPE, UNSPECIFIED SYNCOPE TYPE: Primary | ICD-10-CM

## 2020-07-15 DIAGNOSIS — J44.9 CHRONIC OBSTRUCTIVE PULMONARY DISEASE, UNSPECIFIED COPD TYPE: ICD-10-CM

## 2020-07-15 LAB
BRPFT: ABNORMAL
DLCO ADJ PRE: 9.49 ML/(MIN*MMHG) (ref 13.85–25.32)
DLCO SINGLE BREATH LLN: 13.85
DLCO SINGLE BREATH PRE REF: 48.5 %
DLCO SINGLE BREATH REF: 19.58
DLCOC SBVA LLN: 2.79
DLCOC SBVA PRE REF: 52.8 %
DLCOC SBVA REF: 4.41
DLCOC SINGLE BREATH LLN: 13.85
DLCOC SINGLE BREATH PRE REF: 48.5 %
DLCOC SINGLE BREATH REF: 19.58
DLCOVA LLN: 2.79
DLCOVA PRE REF: 52.8 %
DLCOVA PRE: 2.33 ML/(MIN*MMHG*L) (ref 2.79–6.03)
DLCOVA REF: 4.41
DLVAADJ PRE: 2.33 ML/(MIN*MMHG*L) (ref 2.79–6.03)
ERVN2 LLN: -16449.37
ERVN2 PRE REF: 69.2 %
ERVN2 PRE: 0.44 L (ref -16449.37–16450.63)
ERVN2 REF: 0.63
FEF 25 75 CHG: 62.6 %
FEF 25 75 LLN: 0.82
FEF 25 75 POST REF: 62.6 %
FEF 25 75 PRE REF: 38.5 %
FEF 25 75 REF: 1.77
FET100 CHG: 0.5 %
FEV1 CHG: 24.6 %
FEV1 FVC CHG: 4.9 %
FEV1 FVC LLN: 65
FEV1 FVC POST REF: 88.1 %
FEV1 FVC PRE REF: 84 %
FEV1 FVC REF: 79
FEV1 LLN: 1.47
FEV1 POST REF: 86.9 %
FEV1 PRE REF: 69.8 %
FEV1 REF: 2.01
FRCN2 LLN: 1.72
FRCN2 PRE REF: 95.2 %
FRCN2 REF: 2.54
FVC CHG: 18.8 %
FVC LLN: 1.88
FVC POST REF: 98.1 %
FVC PRE REF: 82.6 %
FVC REF: 2.57
IVC PRE: 2.04 L (ref 1.88–3.26)
IVC SINGLE BREATH LLN: 1.88
IVC SINGLE BREATH PRE REF: 79.4 %
IVC SINGLE BREATH REF: 2.57
MVV LLN: 61
MVV PRE REF: 64 %
MVV REF: 72
PEF CHG: 15.8 %
PEF LLN: 3.72
PEF POST REF: 86.6 %
PEF PRE REF: 74.8 %
PEF REF: 5.27
POST FEF 25 75: 1.11 L/S (ref 0.82–2.71)
POST FET 100: 7.57 SEC
POST FEV1 FVC: 69.23 % (ref 65.33–91.87)
POST FEV1: 1.75 L (ref 1.47–2.55)
POST FVC: 2.52 L (ref 1.88–3.26)
POST PEF: 4.57 L/S (ref 3.72–6.83)
PRE DLCO: 9.49 ML/(MIN*MMHG) (ref 13.85–25.32)
PRE FEF 25 75: 0.68 L/S (ref 0.82–2.71)
PRE FET 100: 7.53 SEC
PRE FEV1 FVC: 66.02 % (ref 65.33–91.87)
PRE FEV1: 1.4 L (ref 1.47–2.55)
PRE FRC N2: 2.42 L
PRE FVC: 2.13 L (ref 1.88–3.26)
PRE MVV: 46 L/MIN (ref 61.12–82.69)
PRE PEF: 3.94 L/S (ref 3.72–6.83)
RVN2 LLN: 1.33
RVN2 PRE REF: 101.4 %
RVN2 PRE: 1.94 L (ref 1.33–2.49)
RVN2 REF: 1.91
RVN2TLCN2 LLN: 32.83
RVN2TLCN2 PRE REF: 110.3 %
RVN2TLCN2 PRE: 46.78 % (ref 32.83–52.01)
RVN2TLCN2 REF: 42.42
TLCN2 LLN: 3.45
TLCN2 PRE REF: 93.3 %
TLCN2 PRE: 4.14 L (ref 3.45–5.43)
TLCN2 REF: 4.44
VA PRE: 4.08 L (ref 4.29–4.29)
VA SINGLE BREATH LLN: 4.29
VA SINGLE BREATH PRE REF: 95.2 %
VA SINGLE BREATH REF: 4.29
VCMAXN2 LLN: 1.88
VCMAXN2 PRE REF: 85.6 %
VCMAXN2 PRE: 2.2 L (ref 1.88–3.26)
VCMAXN2 REF: 2.57

## 2020-07-15 PROCEDURE — U0003 INFECTIOUS AGENT DETECTION BY NUCLEIC ACID (DNA OR RNA); SEVERE ACUTE RESPIRATORY SYNDROME CORONAVIRUS 2 (SARS-COV-2) (CORONAVIRUS DISEASE [COVID-19]), AMPLIFIED PROBE TECHNIQUE, MAKING USE OF HIGH THROUGHPUT TECHNOLOGIES AS DESCRIBED BY CMS-2020-01-R: HCPCS

## 2020-07-15 PROCEDURE — 71046 X-RAY EXAM CHEST 2 VIEWS: CPT | Mod: TC

## 2020-07-15 PROCEDURE — 94729 DIFFUSING CAPACITY: CPT | Mod: 26,,, | Performed by: INTERNAL MEDICINE

## 2020-07-15 PROCEDURE — 94729 PR C02/MEMBANE DIFFUSE CAPACITY: ICD-10-PCS | Mod: 26,,, | Performed by: INTERNAL MEDICINE

## 2020-07-15 PROCEDURE — 94729 DIFFUSING CAPACITY: CPT

## 2020-07-15 PROCEDURE — 94727 PR PULM FUNCTION TEST BY GAS: ICD-10-PCS | Mod: 26,,, | Performed by: INTERNAL MEDICINE

## 2020-07-15 PROCEDURE — 94060 PR EVAL OF BRONCHOSPASM: ICD-10-PCS | Mod: 26,,, | Performed by: INTERNAL MEDICINE

## 2020-07-15 PROCEDURE — 94727 GAS DIL/WSHOT DETER LNG VOL: CPT | Mod: 26,,, | Performed by: INTERNAL MEDICINE

## 2020-07-15 PROCEDURE — 94060 EVALUATION OF WHEEZING: CPT | Mod: 26,,, | Performed by: INTERNAL MEDICINE

## 2020-07-15 PROCEDURE — 94727 GAS DIL/WSHOT DETER LNG VOL: CPT

## 2020-07-15 PROCEDURE — 94060 EVALUATION OF WHEEZING: CPT

## 2020-07-15 RX ORDER — POLYETHYLENE GLYCOL 3350 17 G/17G
17 POWDER, FOR SOLUTION ORAL DAILY PRN
COMMUNITY

## 2020-07-15 NOTE — DISCHARGE INSTRUCTIONS
 Do not take any medications the morning of your procedure.   Bring all your medications with you in the original pill bottles from pharmacy.   If you take blood thinners, ask your doctor if you should stop taking them.   Do your Hibiclens wash the night before and morning of your procedure.    Friday 07/17/2020 07:00AM

## 2020-07-16 ENCOUNTER — TELEPHONE (OUTPATIENT)
Dept: FAMILY MEDICINE | Facility: CLINIC | Age: 70
End: 2020-07-16

## 2020-07-16 LAB — SARS-COV-2 RNA RESP QL NAA+PROBE: NOT DETECTED

## 2020-07-16 NOTE — TELEPHONE ENCOUNTER
----- Message from Haile Weaver MD sent at 7/16/2020  2:31 PM CDT -----  The results are within acceptable range.  Please keep regular follow up.

## 2020-07-17 ENCOUNTER — HOSPITAL ENCOUNTER (OUTPATIENT)
Facility: HOSPITAL | Age: 70
Discharge: HOME | End: 2020-07-17
Attending: INTERNAL MEDICINE | Admitting: INTERNAL MEDICINE
Payer: MEDICARE

## 2020-07-17 VITALS — SYSTOLIC BLOOD PRESSURE: 124 MMHG | HEART RATE: 53 BPM | OXYGEN SATURATION: 100 % | DIASTOLIC BLOOD PRESSURE: 58 MMHG

## 2020-07-17 DIAGNOSIS — R55 SYNCOPE, UNSPECIFIED SYNCOPE TYPE: ICD-10-CM

## 2020-07-17 PROCEDURE — C1764 EVENT RECORDER, CARDIAC: HCPCS | Performed by: INTERNAL MEDICINE

## 2020-07-17 PROCEDURE — 33285 INSJ SUBQ CAR RHYTHM MNTR: CPT | Performed by: INTERNAL MEDICINE

## 2020-07-17 NOTE — DISCHARGE INSTRUCTIONS
You can take a shower in 48 hours, not tub baths or submerging in water. Do not let the water hit you directly in the chest.  Leave the dressing on until in falls off.   You can take tylenol for any discomfort.  If you notice and severe pain, swelling, or signs of infection at the site, let your doctor know right away.

## 2020-07-23 ENCOUNTER — OFFICE VISIT (OUTPATIENT)
Dept: FAMILY MEDICINE | Facility: CLINIC | Age: 70
End: 2020-07-23
Payer: MEDICARE

## 2020-07-23 VITALS
HEIGHT: 61 IN | DIASTOLIC BLOOD PRESSURE: 69 MMHG | SYSTOLIC BLOOD PRESSURE: 134 MMHG | WEIGHT: 127 LBS | BODY MASS INDEX: 23.98 KG/M2 | HEART RATE: 59 BPM

## 2020-07-23 DIAGNOSIS — K59.09 OTHER CONSTIPATION: Primary | ICD-10-CM

## 2020-07-23 DIAGNOSIS — R91.1 SOLITARY PULMONARY NODULE: ICD-10-CM

## 2020-07-23 DIAGNOSIS — G25.0 TREMOR, ESSENTIAL: ICD-10-CM

## 2020-07-23 PROCEDURE — 3008F BODY MASS INDEX DOCD: CPT | Mod: S$GLB,,, | Performed by: INTERNAL MEDICINE

## 2020-07-23 PROCEDURE — 3288F PR FALLS RISK ASSESSMENT DOCUMENTED: ICD-10-PCS | Mod: S$GLB,,, | Performed by: INTERNAL MEDICINE

## 2020-07-23 PROCEDURE — 1100F PTFALLS ASSESS-DOCD GE2>/YR: CPT | Mod: S$GLB,,, | Performed by: INTERNAL MEDICINE

## 2020-07-23 PROCEDURE — 3288F FALL RISK ASSESSMENT DOCD: CPT | Mod: S$GLB,,, | Performed by: INTERNAL MEDICINE

## 2020-07-23 PROCEDURE — 3075F SYST BP GE 130 - 139MM HG: CPT | Mod: S$GLB,,, | Performed by: INTERNAL MEDICINE

## 2020-07-23 PROCEDURE — 1159F PR MEDICATION LIST DOCUMENTED IN MEDICAL RECORD: ICD-10-PCS | Mod: S$GLB,,, | Performed by: INTERNAL MEDICINE

## 2020-07-23 PROCEDURE — 3078F DIAST BP <80 MM HG: CPT | Mod: S$GLB,,, | Performed by: INTERNAL MEDICINE

## 2020-07-23 PROCEDURE — 1100F PR PT FALLS ASSESS DOC 2+ FALLS/FALL W/INJURY/YR: ICD-10-PCS | Mod: S$GLB,,, | Performed by: INTERNAL MEDICINE

## 2020-07-23 PROCEDURE — 3075F PR MOST RECENT SYSTOLIC BLOOD PRESS GE 130-139MM HG: ICD-10-PCS | Mod: S$GLB,,, | Performed by: INTERNAL MEDICINE

## 2020-07-23 PROCEDURE — 3008F PR BODY MASS INDEX (BMI) DOCUMENTED: ICD-10-PCS | Mod: S$GLB,,, | Performed by: INTERNAL MEDICINE

## 2020-07-23 PROCEDURE — 3078F PR MOST RECENT DIASTOLIC BLOOD PRESSURE < 80 MM HG: ICD-10-PCS | Mod: S$GLB,,, | Performed by: INTERNAL MEDICINE

## 2020-07-23 PROCEDURE — 1126F AMNT PAIN NOTED NONE PRSNT: CPT | Mod: S$GLB,,, | Performed by: INTERNAL MEDICINE

## 2020-07-23 PROCEDURE — 1159F MED LIST DOCD IN RCRD: CPT | Mod: S$GLB,,, | Performed by: INTERNAL MEDICINE

## 2020-07-23 PROCEDURE — 99213 PR OFFICE/OUTPT VISIT, EST, LEVL III, 20-29 MIN: ICD-10-PCS | Mod: S$GLB,,, | Performed by: INTERNAL MEDICINE

## 2020-07-23 PROCEDURE — 1126F PR PAIN SEVERITY QUANTIFIED, NO PAIN PRESENT: ICD-10-PCS | Mod: S$GLB,,, | Performed by: INTERNAL MEDICINE

## 2020-07-23 PROCEDURE — 99213 OFFICE O/P EST LOW 20 MIN: CPT | Mod: S$GLB,,, | Performed by: INTERNAL MEDICINE

## 2020-07-23 NOTE — PROGRESS NOTES
Subjective:       Patient ID: Kristin Guerra is a 70 y.o. female.    Chief Complaint: Constipation (stopped med cause gas )    Ms. Foster comes back for follow-up.  She was recently in the hospital for loop recorder placement.    Constipation continues to be a problem and I had prescribed her Linzess last time.  Apparently it caused her more problems with gas.    Then she started taking Senokot with MiraLax and this seems to help her.  Please note that she is not taking the calcium carbonate or Tums tablets.    Medications which can cause constipation include nifedipine which is used for her blood pressure.    Unfortunately the stress at home is horrible and she continues to smoke cigarettes.    Constipation  This is a chronic problem. The current episode started more than 1 year ago. The problem is unchanged. The patient is not on a high fiber diet. She does not exercise regularly. There has not been adequate water intake.       Past Medical History:   Diagnosis Date    Allergy     Sterling's esophagus     Colon polyp     Diverticulitis     Diverticulosis     Fatty liver     GERD (gastroesophageal reflux disease)     Hx of cervical spine surgery 11/07/2018    Hyperlipidemia     Hypertension     Lupus (systemic lupus erythematosus) 1999    discoid    Osteoarthritis     Osteopenia     Syncope     Tremor 2005    essential     Social History     Socioeconomic History    Marital status:      Spouse name: Not on file    Number of children: 2    Years of education: Not on file    Highest education level: Not on file   Occupational History    Not on file   Social Needs    Financial resource strain: Not on file    Food insecurity     Worry: Not on file     Inability: Not on file    Transportation needs     Medical: Not on file     Non-medical: Not on file   Tobacco Use    Smoking status: Current Every Day Smoker     Packs/day: 1.00     Years: 35.00     Pack years: 35.00     Types: Cigarettes     Smokeless tobacco: Never Used   Substance and Sexual Activity    Alcohol use: No    Drug use: No    Sexual activity: Not on file   Lifestyle    Physical activity     Days per week: Not on file     Minutes per session: Not on file    Stress: Not at all   Relationships    Social connections     Talks on phone: Not on file     Gets together: Not on file     Attends Sabianist service: Not on file     Active member of club or organization: Not on file     Attends meetings of clubs or organizations: Not on file     Relationship status: Not on file   Other Topics Concern    Are you pregnant or think you may be? Not Asked    Breast-feeding Not Asked   Social History Narrative    Not on file     Past Surgical History:   Procedure Laterality Date    APPENDECTOMY  2007    removed during colon surgery    CERVICAL FUSION      CERVICAL SPINE SURGERY      Rods and screws.     SECTION      COLON SURGERY      hemicolectomy    COLON SURGERY  2008    repair    COLONOSCOPY N/A 2018    Procedure: COLONOSCOPY;  Surgeon: Maverick Esquivel MD;  Location: [facility];  Service: Endoscopy;  Laterality: N/A; repeat in 5 years for surveillance    ESOPHAGOGASTRODUODENOSCOPY N/A 2019    Procedure: EGD (ESOPHAGOGASTRODUODENOSCOPY);  Surgeon: Monika Steele MD;  Location: [facility];  Service: Endoscopy;  Laterality: N/A;    ESOPHAGOGASTRODUODENOSCOPY  2019    with RFA and biopsies    ESOPHAGOGASTRODUODENOSCOPY N/A 2019    Procedure: EGD (ESOPHAGOGASTRODUODENOSCOPY);  Surgeon: Uzair Cantor MD;  Location: [facility];  Service: Endoscopy;  Laterality: N/A;  n+v with anesthesia    ESOPHAGOGASTRODUODENOSCOPY N/A 2019    Procedure: EGD (ESOPHAGOGASTRODUODENOSCOPY);  Surgeon: Uzair Cantor MD;  Location: [facility];  Service: Endoscopy;  Laterality: N/A;    ESOPHAGOGASTRODUODENOSCOPY N/A 10/7/2019    Procedure: EGD (ESOPHAGOGASTRODUODENOSCOPY);  Surgeon: Uzair Cantor MD;   "Location: [facility] ENDO;  Service: Endoscopy;  Laterality: N/A;    ESOPHAGOGASTRODUODENOSCOPY N/A 2/10/2020    Procedure: EGD (ESOPHAGOGASTRODUODENOSCOPY);  Surgeon: Uzair Cantor MD;  Location: [facility] ENDO;  Service: Endoscopy;  Laterality: N/A;  Requests earliest procedure time    HYSTERECTOMY      ovaries remain    INSERTION OF IMPLANTABLE LOOP RECORDER N/A 7/17/2020    Procedure: INSERTION, IMPLANTABLE LOOP RECORDER (MEDTRONIC);  Surgeon: Karyna Vasques MD;  Location: [facility] CATH/EP LAB;  Service: Cardiology;  Laterality: N/A;    SPLENECTOMY, TOTAL  2007    Injured during surgery and removed    UPPER GASTROINTESTINAL ENDOSCOPY  2007    GERD & hiatal hernia per patient report     Family History   Problem Relation Age of Onset    Colon cancer Father         diagnosed in his 60's    Lung cancer Father     Lymphoma Mother         Brain    Lung cancer Brother     Lung cancer Brother         metastatic from prostate    Prostate cancer Brother     Heart disease Brother         valvular disease    Hyperlipidemia Brother     Eczema Neg Hx     Lupus Neg Hx     Psoriasis Neg Hx     Melanoma Neg Hx     Crohn's disease Neg Hx     Ulcerative colitis Neg Hx     Stomach cancer Neg Hx     Esophageal cancer Neg Hx        Review of Systems   Gastrointestinal: Positive for constipation.         Objective:      Blood pressure 134/69, pulse (!) 59, height 5' 1" (1.549 m), weight 57.6 kg (127 lb). Body mass index is 24 kg/m².  Physical Exam  Vitals signs and nursing note reviewed.   Constitutional:       General: She is not in acute distress.     Appearance: She is well-developed.      Comments: Chronically ill-appearing lady with tremulousness and slight difficulty walking.   HENT:      Head: Normocephalic and atraumatic.      Right Ear: Tympanic membrane normal.      Left Ear: Tympanic membrane normal.   Eyes:      General: Lids are normal. Lids are everted, no foreign bodies appreciated.      " Conjunctiva/sclera: Conjunctivae normal.      Pupils:      Right eye: Pupil is round and reactive.      Left eye: Pupil is round and reactive.   Neck:      Musculoskeletal: Normal range of motion and neck supple.      Trachea: Trachea normal.   Cardiovascular:      Rate and Rhythm: Normal rate and regular rhythm.      Heart sounds: Normal heart sounds, S1 normal and S2 normal.   Pulmonary:      Breath sounds: Decreased breath sounds present. No wheezing.   Chest:      Chest wall: No tenderness.       Abdominal:      General: Bowel sounds are normal.      Palpations: Abdomen is soft. Abdomen is not rigid.      Tenderness: There is no guarding.   Musculoskeletal:         General: Tenderness and deformity present.      Comments: Deformities in the spine noted.   Lymphadenopathy:      Cervical: No cervical adenopathy.   Skin:     General: Skin is warm and dry.   Neurological:      Mental Status: She is alert.      Motor: Tremor (Neck and hands) present.      Comments: Tremors Bobbing of head.  Patient continues to be very tremulous.   Psychiatric:         Behavior: Behavior is cooperative.           Assessment:       1. Other constipation    2. Tremor, essential    3. Solitary pulmonary nodule           Hospital Outpatient Visit on 07/15/2020   Component Date Value Ref Range Status    SARS-CoV2 (COVID-19) Qualitative P* 07/15/2020 Not Detected  Not Detected Final   Hospital Outpatient Visit on 07/15/2020   Component Date Value Ref Range Status    Interpretation 07/15/2020    Final                    Value: spirometry with bronchodilator, lung volume by gas dilution, and diffusion capacity were measured July 15, 2020.  The FEV1 to FVC ratio was 66% indicating airflow obstruction.  The FEV1 measured 70% of predicted making airflow obstruction moderate.    There was a 25% improvement in FEV1 following bronchodilator, this is statistically significant.  The FEV1 measured about 70% predicted at 1.4 L pre bronchodilator.   Total lung capacity was in normal limits on lung volumes by gas dilution.  Diffusion,   uncorrected for anemia present, was 49% of predicted.      The patient had moderate airflow obstruction that resolved with bronchodilator.  Lung volumes were normal.  Diffusion was reduced.  Clinical correlation recommended.      Post FVC 07/15/2020 2.52  1.88 - 3.26 L Final    Post FEV1 07/15/2020 1.75  1.47 - 2.55 L Final    Post FEV1 FVC 07/15/2020 69.23  65.33 - 91.87 % Final    Post FEF 25 75 07/15/2020 1.11  0.82 - 2.71 L/s Final    Post PEF 07/15/2020 4.57  3.72 - 6.83 L/s Final    Post  07/15/2020 7.57  sec Final    Pre DLCO 07/15/2020 9.49* 13.85 - 25.32 ml/(min*mmHg) Final    DLCO ADJ PRE 07/15/2020 9.49* 13.85 - 25.32 ml/(min*mmHg) Final    DLCOVA PRE 07/15/2020 2.33* 2.79 - 6.03 ml/(min*mmHg*L) Final    DLVAAdj PRE 07/15/2020 2.33* 2.79 - 6.03 ml/(min*mmHg*L) Final    VA PRE 07/15/2020 4.08* 4.29 - 4.29 L Final    IVC PRE 07/15/2020 2.04  1.88 - 3.26 L Final    TLCN2 PRE 07/15/2020 4.14  3.45 - 5.43 L Final    VCMAXN2 PRE 07/15/2020 2.20  1.88 - 3.26 L Final    Pre FRC N2 07/15/2020 2.42  L Final    ERVN2 PRE 07/15/2020 0.44  -37713.37 - 01857.63 L Final    RVN2 PRE 07/15/2020 1.94  1.33 - 2.49 L Final    [id number] PRE 07/15/2020 46.78  32.83 - 52.01 % Final    Pre FVC 07/15/2020 2.13  1.88 - 3.26 L Final    Pre FEV1 07/15/2020 1.40* 1.47 - 2.55 L Final    Pre FEV1 FVC 07/15/2020 66.02  65.33 - 91.87 % Final    Pre FEF 25 75 07/15/2020 0.68* 0.82 - 2.71 L/s Final    Pre PEF 07/15/2020 3.94  3.72 - 6.83 L/s Final    Pre  07/15/2020 7.53  sec Final    Pre MVV 07/15/2020 46.00* 61.12 - 82.69 L/min Final    FVC Ref 07/15/2020 2.57   Final    FVC LLN 07/15/2020 1.88   Final    FVC Pre Ref 07/15/2020 82.6  % Final    FVC Post Ref 07/15/2020 98.1  % Final    FVC Chg 07/15/2020 18.8  % Final    FEV1 Ref 07/15/2020 2.01   Final    FEV1 LLN 07/15/2020 1.47   Final    FEV1 Pre Ref  07/15/2020 69.8  % Final    FEV1 Post Ref 07/15/2020 86.9  % Final    FEV1 Chg 07/15/2020 24.6  % Final    FEV1 FVC Ref 07/15/2020 79   Final    FEV1 FVC LLN 07/15/2020 65   Final    FEV1 FVC Pre Ref 07/15/2020 84.0  % Final    FEV1 FVC Post Ref 07/15/2020 88.1  % Final    FEV1 FVC Chg 07/15/2020 4.9  % Final    FEF 25 75 Ref 07/15/2020 1.77   Final    FEF 25 75 LLN 07/15/2020 0.82   Final    FEF 25 75 Pre Ref 07/15/2020 38.5  % Final    FEF 25 75 Post Ref 07/15/2020 62.6  % Final    FEF 25 75 Chg 07/15/2020 62.6  % Final    PEF Ref 07/15/2020 5.27   Final    PEF LLN 07/15/2020 3.72   Final    PEF Pre Ref 07/15/2020 74.8  % Final    PEF Post Ref 07/15/2020 86.6  % Final    PEF Chg 07/15/2020 15.8  % Final    JSO684 Chg 07/15/2020 0.5  % Final    MVV Ref 07/15/2020 72   Final    MVV LLN 07/15/2020 61   Final    MVV Pre Ref 07/15/2020 64.0  % Final    TLCN2 Ref 07/15/2020 4.44   Final    TLCN2 LLN 07/15/2020 3.45   Final    TLCN2 Pre Ref 07/15/2020 93.3  % Final    VCMAXN2 Ref 07/15/2020 2.57   Final    VCMAXN2 LLN 07/15/2020 1.88   Final    VCMAXN2 Pre Ref 07/15/2020 85.6  % Final    FRCN2 Ref 07/15/2020 2.54   Final    FRCN2 LLN 07/15/2020 1.72   Final    FRCN2 Pre Ref 07/15/2020 95.2  % Final    ERVN2 Ref 07/15/2020 0.63   Final    ERVN2 LLN 07/15/2020 -14771.37   Final    ERVN2 Pre Ref 07/15/2020 69.2  % Final    RVN2 Ref 07/15/2020 1.91   Final    RVN2 LLN 07/15/2020 1.33   Final    RVN2 Pre Ref 07/15/2020 101.4  % Final    [id number] Ref 07/15/2020 42.42   Final    [id number] LLN 07/15/2020 32.83   Final    [id number] Pre Ref 07/15/2020 110.3  % Final    DLCO Single Breath Ref 07/15/2020 19.58   Final    DLCO Single Breath LLN 07/15/2020 13.85   Final    DLCO Single Breath Pre Ref 07/15/2020 48.5  % Final    DLCOc Single Breath Ref 07/15/2020 19.58   Final    DLCOc Single Breath LLN 07/15/2020 13.85   Final    DLCOc Single Breath Pre Ref 07/15/2020 48.5  % Final     DLCOVA Ref 07/15/2020 4.41   Final    DLCOVA LLN 07/15/2020 2.79   Final    DLCOVA Pre Ref 07/15/2020 52.8  % Final    DLCOc SBVA Ref 07/15/2020 4.41   Final    DLCOc SBVA LLN 07/15/2020 2.79   Final    DLCOc SBVA Pre Ref 07/15/2020 52.8  % Final    VA Single Breath Ref 07/15/2020 4.29   Final    VA Single Breath LLN 07/15/2020 4.29   Final    VA Single Breath Pre Ref 07/15/2020 95.2  % Final    IVC Single Breath Ref 07/15/2020 2.57   Final    IVC Single Breath LLN 07/15/2020 1.88   Final    IVC Single Breath Pre Ref 07/15/2020 79.4  % Final   Admission on 06/02/2020, Discharged on 06/04/2020   Component Date Value Ref Range Status    WBC 06/02/2020 11.82  3.90 - 12.70 K/uL Final    RBC 06/02/2020 3.98* 4.00 - 5.40 M/uL Final    Hemoglobin 06/02/2020 12.5  12.0 - 16.0 g/dL Final    Hematocrit 06/02/2020 37.0  37.0 - 48.5 % Final    Mean Corpuscular Volume 06/02/2020 93  82 - 98 fL Final    Mean Corpuscular Hemoglobin 06/02/2020 31.4* 27.0 - 31.0 pg Final    Mean Corpuscular Hemoglobin Conc 06/02/2020 33.8  32.0 - 36.0 g/dL Final    RDW 06/02/2020 14.6* 11.5 - 14.5 % Final    Platelets 06/02/2020 297  150 - 350 K/uL Final    MPV 06/02/2020 9.7  9.2 - 12.9 fL Final    Immature Granulocytes 06/02/2020 0.3  0.0 - 0.5 % Final    Gran # (ANC) 06/02/2020 8.4* 1.8 - 7.7 K/uL Final    Immature Grans (Abs) 06/02/2020 0.04  0.00 - 0.04 K/uL Final    Lymph # 06/02/2020 2.3  1.0 - 4.8 K/uL Final    Mono # 06/02/2020 0.9  0.3 - 1.0 K/uL Final    Eos # 06/02/2020 0.0  0.0 - 0.5 K/uL Final    Baso # 06/02/2020 0.13  0.00 - 0.20 K/uL Final    nRBC 06/02/2020 0  0 /100 WBC Final    Gran% 06/02/2020 70.7  38.0 - 73.0 % Final    Lymph% 06/02/2020 19.7  18.0 - 48.0 % Final    Mono% 06/02/2020 8.0  4.0 - 15.0 % Final    Eosinophil% 06/02/2020 0.2  0.0 - 8.0 % Final    Basophil% 06/02/2020 1.1  0.0 - 1.9 % Final    Differential Method 06/02/2020 Automated   Final    Sodium 06/02/2020 135* 136 - 145  mmol/L Final    Potassium 06/02/2020 3.6  3.5 - 5.1 mmol/L Final    Chloride 06/02/2020 104  95 - 110 mmol/L Final    CO2 06/02/2020 23  23 - 29 mmol/L Final    Glucose 06/02/2020 100  70 - 110 mg/dL Final    BUN, Bld 06/02/2020 18  8 - 23 mg/dL Final    Creatinine 06/02/2020 0.7  0.5 - 1.4 mg/dL Final    Calcium 06/02/2020 8.8  8.7 - 10.5 mg/dL Final    Total Protein 06/02/2020 8.0  6.0 - 8.4 g/dL Final    Albumin 06/02/2020 3.9  3.5 - 5.2 g/dL Final    Total Bilirubin 06/02/2020 0.7  0.1 - 1.0 mg/dL Final    Alkaline Phosphatase 06/02/2020 66  55 - 135 U/L Final    AST 06/02/2020 22  10 - 40 U/L Final    ALT 06/02/2020 15  10 - 44 U/L Final    Anion Gap 06/02/2020 8  8 - 16 mmol/L Final    eGFR if African American 06/02/2020 >60.0  >60 mL/min/1.73 m^2 Final    eGFR if non African American 06/02/2020 >60.0  >60 mL/min/1.73 m^2 Final    Troponin I 06/02/2020 <0.030  <=0.040 ng/mL Final    Specimen UA 06/02/2020 Urine, Clean Catch   Final    Color, UA 06/02/2020 Yellow  Yellow, Straw, [place] Final    Appearance, UA 06/02/2020 Clear  Clear Final    pH, UA 06/02/2020 8.0  5.0 - 8.0 Final    Specific [place], UA 06/02/2020 1.030  1.005 - 1.030 Final    Protein, UA 06/02/2020 Negative  Negative Final    Glucose, UA 06/02/2020 Negative  Negative Final    Ketones, UA 06/02/2020 1+* Negative Final    Bilirubin (UA) 06/02/2020 Negative  Negative Final    Occult Blood UA 06/02/2020 Negative  Negative Final    Nitrite, UA 06/02/2020 Negative  Negative Final    Urobilinogen, UA 06/02/2020 Negative  Negative EU/dL Final    Leukocytes, UA 06/02/2020 Negative  Negative Final    POC Glucose 06/02/2020 91  70 - 110 Final    SARS-CoV-2 RNA, Amplification, Qual 06/02/2020 Negative  Negative Final    Troponin I 06/03/2020 <0.030  <=0.040 ng/mL Final    BSA 06/03/2020 1.55  m2 Final    TDI SEPTAL 06/03/2020 0.11  m/s Final    LV LATERAL E/E' RATIO 06/03/2020 7.42  m/s Final    LV SEPTAL E/E' RATIO  06/03/2020 8.09  m/s Final    Right Atrial Pressure (from IVC) 06/03/2020 8  mmHg Final    AORTIC VALVE CUSP SEPERATION 06/03/2020 1.14  cm Final    TDI LATERAL 06/03/2020 0.12  m/s Final    PV PEAK VELOCITY 06/03/2020 89.96  cm/s Final    LVIDD 06/03/2020 4.84  3.5 - 6.0 cm Final    IVS 06/03/2020 1.10  0.6 - 1.1 cm Final    PW 06/03/2020 1.10  0.6 - 1.1 cm Final    Ao root annulus 06/03/2020 3.20  cm Final    LVIDS 06/03/2020 3.27  2.1 - 4.0 cm Final    FS 06/03/2020 32  28 - 44 % Final    LV mass 06/03/2020 196.56  g Final    LA size 06/03/2020 2.42  cm Final    Left Ventricle Relative Wall Thick* 06/03/2020 0.45  cm Final    AV mean gradient 06/03/2020 6  mmHg Final    AV valve area 06/03/2020 2.70  cm2 Final    AV Velocity Ratio 06/03/2020 98.75   Final    AV index (prosthetic) 06/03/2020 0.86   Final    E/A ratio 06/03/2020 1.31   Final    Mean e' 06/03/2020 0.12  m/s Final    E wave decelartion time 06/03/2020 178.19  msec Final    LVOT diameter 06/03/2020 2.00  cm Final    LVOT area 06/03/2020 3.1  cm2 Final    LVOT peak russ 06/03/2020 164.92  m/s Final    LVOT peak VTI 06/03/2020 34.46  cm Final    Ao peak russ 06/03/2020 1.67  m/s Final    Ao VTI 06/03/2020 40.03  cm Final    LVOT stroke volume 06/03/2020 108.20  cm3 Final    AV peak gradient 06/03/2020 11  mmHg Final    TV rest pulmonary artery pressure 06/03/2020 34  mmHg Final    E/E' ratio 06/03/2020 7.74  m/s Final    MV Peak E Russ 06/03/2020 0.89  m/s Final    TR Max Russ 06/03/2020 2.54  m/s Final    MV Peak A Russ 06/03/2020 0.68  m/s Final    LV Mass Index 06/03/2020 127  g/m2 Final    Triscuspid Valve Regurgitation Pea* 06/03/2020 26  mmHg Final    CPK MB 06/03/2020 1.2  0.1 - 6.5 ng/mL Final    Cortisol 06/03/2020 21.2  ug/dL Final    Troponin I 06/03/2020 <0.030  <=0.040 ng/mL Final    CPK MB 06/03/2020 0.9  0.1 - 6.5 ng/mL Final    Sodium 06/03/2020 140  136 - 145 mmol/L Final    Potassium 06/03/2020 3.4*  3.5 - 5.1 mmol/L Final    Chloride 06/03/2020 107  95 - 110 mmol/L Final    CO2 06/03/2020 24  23 - 29 mmol/L Final    Glucose 06/03/2020 89  70 - 110 mg/dL Final    BUN, Bld 06/03/2020 17  8 - 23 mg/dL Final    Creatinine 06/03/2020 0.6  0.5 - 1.4 mg/dL Final    Calcium 06/03/2020 9.0  8.7 - 10.5 mg/dL Final    Anion Gap 06/03/2020 9  8 - 16 mmol/L Final    eGFR if African American 06/03/2020 >60.0  >60 mL/min/1.73 m^2 Final    eGFR if non African American 06/03/2020 >60.0  >60 mL/min/1.73 m^2 Final    WBC 06/03/2020 10.22  3.90 - 12.70 K/uL Final    RBC 06/03/2020 3.91* 4.00 - 5.40 M/uL Final    Hemoglobin 06/03/2020 12.1  12.0 - 16.0 g/dL Final    Hematocrit 06/03/2020 35.8* 37.0 - 48.5 % Final    Mean Corpuscular Volume 06/03/2020 92  82 - 98 fL Final    Mean Corpuscular Hemoglobin 06/03/2020 30.9  27.0 - 31.0 pg Final    Mean Corpuscular Hemoglobin Conc 06/03/2020 33.8  32.0 - 36.0 g/dL Final    RDW 06/03/2020 14.6* 11.5 - 14.5 % Final    Platelets 06/03/2020 296  150 - 350 K/uL Final    MPV 06/03/2020 9.4  9.2 - 12.9 fL Final    Immature Granulocytes 06/03/2020 0.3  0.0 - 0.5 % Final    Gran # (ANC) 06/03/2020 5.8  1.8 - 7.7 K/uL Final    Immature Grans (Abs) 06/03/2020 0.03  0.00 - 0.04 K/uL Final    Lymph # 06/03/2020 3.3  1.0 - 4.8 K/uL Final    Mono # 06/03/2020 1.0  0.3 - 1.0 K/uL Final    Eos # 06/03/2020 0.1  0.0 - 0.5 K/uL Final    Baso # 06/03/2020 0.10  0.00 - 0.20 K/uL Final    nRBC 06/03/2020 0  0 /100 WBC Final    Gran% 06/03/2020 56.6  38.0 - 73.0 % Final    Lymph% 06/03/2020 32.0  18.0 - 48.0 % Final    Mono% 06/03/2020 9.4  4.0 - 15.0 % Final    Eosinophil% 06/03/2020 0.7  0.0 - 8.0 % Final    Basophil% 06/03/2020 1.0  0.0 - 1.9 % Final    Differential Method 06/03/2020 Automated   Final    Sodium 06/04/2020 139  136 - 145 mmol/L Final    Potassium 06/04/2020 3.6  3.5 - 5.1 mmol/L Final    Chloride 06/04/2020 107  95 - 110 mmol/L Final    CO2 06/04/2020 26   23 - 29 mmol/L Final    Glucose 06/04/2020 93  70 - 110 mg/dL Final    BUN, Bld 06/04/2020 19  8 - 23 mg/dL Final    Creatinine 06/04/2020 0.5  0.5 - 1.4 mg/dL Final    Calcium 06/04/2020 8.6* 8.7 - 10.5 mg/dL Final    Anion Gap 06/04/2020 6* 8 - 16 mmol/L Final    eGFR if African American 06/04/2020 >60.0  >60 mL/min/1.73 m^2 Final    eGFR if non African American 06/04/2020 >60.0  >60 mL/min/1.73 m^2 Final    WBC 06/04/2020 9.02  3.90 - 12.70 K/uL Final    RBC 06/04/2020 3.54* 4.00 - 5.40 M/uL Final    Hemoglobin 06/04/2020 11.1* 12.0 - 16.0 g/dL Final    Hematocrit 06/04/2020 33.1* 37.0 - 48.5 % Final    Mean Corpuscular Volume 06/04/2020 94  82 - 98 fL Final    Mean Corpuscular Hemoglobin 06/04/2020 31.4* 27.0 - 31.0 pg Final    Mean Corpuscular Hemoglobin Conc 06/04/2020 33.5  32.0 - 36.0 g/dL Final    RDW 06/04/2020 14.8* 11.5 - 14.5 % Final    Platelets 06/04/2020 263  150 - 350 K/uL Final    MPV 06/04/2020 9.7  9.2 - 12.9 fL Final    Immature Granulocytes 06/04/2020 0.2  0.0 - 0.5 % Final    Gran # (ANC) 06/04/2020 3.6  1.8 - 7.7 K/uL Final    Immature Grans (Abs) 06/04/2020 0.02  0.00 - 0.04 K/uL Final    Lymph # 06/04/2020 4.0  1.0 - 4.8 K/uL Final    Mono # 06/04/2020 0.9  0.3 - 1.0 K/uL Final    Eos # 06/04/2020 0.4  0.0 - 0.5 K/uL Final    Baso # 06/04/2020 0.14  0.00 - 0.20 K/uL Final    nRBC 06/04/2020 0  0 /100 WBC Final    Gran% 06/04/2020 39.9  38.0 - 73.0 % Final    Lymph% 06/04/2020 44.1  18.0 - 48.0 % Final    Mono% 06/04/2020 9.4  4.0 - 15.0 % Final    Eosinophil% 06/04/2020 4.8  0.0 - 8.0 % Final    Basophil% 06/04/2020 1.6  0.0 - 1.9 % Final    Differential Method 06/04/2020 Automated   Final   Lab Visit on 05/28/2020   Component Date Value Ref Range Status    Specimen UA 05/28/2020 Urine, Clean Catch   Final    Color, UA 05/28/2020 Yellow  Yellow, Straw, [place] Final    Appearance, UA 05/28/2020 Clear  Clear Final    pH, UA 05/28/2020 7.0  5.0 - 8.0 Final     Specific Gravity, UA 05/28/2020 1.015  1.005 - 1.030 Final    Protein, UA 05/28/2020 Negative  Negative Final    Glucose, UA 05/28/2020 Negative  Negative Final    Ketones, UA 05/28/2020 Negative  Negative Final    Bilirubin (UA) 05/28/2020 Negative  Negative Final    Occult Blood UA 05/28/2020 Negative  Negative Final    Nitrite, UA 05/28/2020 Negative  Negative Final    Urobilinogen, UA 05/28/2020 Negative  Negative EU/dL Final    Leukocytes, UA 05/28/2020 Negative  Negative Final   Lab Visit on 05/28/2020   Component Date Value Ref Range Status    CRP 05/28/2020 0.21  <0.76 mg/dL Final    Sed Rate 05/28/2020 23* 0 - 20 mm/Hr Final    WBC 05/28/2020 8.61  3.90 - 12.70 K/uL Final    RBC 05/28/2020 3.93* 4.00 - 5.40 M/uL Final    Hemoglobin 05/28/2020 12.4  12.0 - 16.0 g/dL Final    Hematocrit 05/28/2020 37.2  37.0 - 48.5 % Final    Mean Corpuscular Volume 05/28/2020 95  82 - 98 fL Final    Mean Corpuscular Hemoglobin 05/28/2020 31.6* 27.0 - 31.0 pg Final    Mean Corpuscular Hemoglobin Conc 05/28/2020 33.3  32.0 - 36.0 g/dL Final    RDW 05/28/2020 14.9* 11.5 - 14.5 % Final    Platelets 05/28/2020 312  150 - 350 K/uL Final    MPV 05/28/2020 10.3  9.2 - 12.9 fL Final    Immature Granulocytes 05/28/2020 0.2  0.0 - 0.5 % Final    Gran # (ANC) 05/28/2020 4.2  1.8 - 7.7 K/uL Final    Immature Grans (Abs) 05/28/2020 0.02  0.00 - 0.04 K/uL Final    Lymph # 05/28/2020 3.1  1.0 - 4.8 K/uL Final    Mono # 05/28/2020 0.9  0.3 - 1.0 K/uL Final    Eos # 05/28/2020 0.4  0.0 - 0.5 K/uL Final    Baso # 05/28/2020 0.10  0.00 - 0.20 K/uL Final    nRBC 05/28/2020 0  0 /100 WBC Final    Gran% 05/28/2020 48.2  38.0 - 73.0 % Final    Lymph% 05/28/2020 36.0  18.0 - 48.0 % Final    Mono% 05/28/2020 10.2  4.0 - 15.0 % Final    Eosinophil% 05/28/2020 4.2  0.0 - 8.0 % Final    Basophil% 05/28/2020 1.2  0.0 - 1.9 % Final    Differential Method 05/28/2020 Automated   Final    Sodium 05/28/2020 140  136 - 145  mmol/L Final    Potassium 05/28/2020 3.5  3.5 - 5.1 mmol/L Final    Chloride 05/28/2020 108  95 - 110 mmol/L Final    CO2 05/28/2020 26  23 - 29 mmol/L Final    Glucose 05/28/2020 81  70 - 110 mg/dL Final    BUN, Bld 05/28/2020 13  8 - 23 mg/dL Final    Creatinine 05/28/2020 0.5  0.5 - 1.4 mg/dL Final    Calcium 05/28/2020 8.6* 8.7 - 10.5 mg/dL Final    Total Protein 05/28/2020 7.3  6.0 - 8.4 g/dL Final    Albumin 05/28/2020 3.7  3.5 - 5.2 g/dL Final    Total Bilirubin 05/28/2020 0.8  0.1 - 1.0 mg/dL Final    Alkaline Phosphatase 05/28/2020 55  55 - 135 U/L Final    AST 05/28/2020 15  10 - 40 U/L Final    ALT 05/28/2020 12  10 - 44 U/L Final    Anion Gap 05/28/2020 6* 8 - 16 mmol/L Final    eGFR if African American 05/28/2020 >60.0  >60 mL/min/1.73 m^2 Final    eGFR if non African American 05/28/2020 >60.0  >60 mL/min/1.73 m^2 Final    Cholesterol 05/28/2020 213* 120 - 199 mg/dL Final    Triglycerides 05/28/2020 65  30 - 150 mg/dL Final    HDL 05/28/2020 48  40 - 75 mg/dL Final    LDL Cholesterol 05/28/2020 152.0  63.0 - 159.0 mg/dL Final    Hdl/Cholesterol Ratio 05/28/2020 22.5  20.0 - 50.0 % Final    Total Cholesterol/HDL Ratio 05/28/2020 4.4  2.0 - 5.0 Final    Non-HDL Cholesterol 05/28/2020 165  mg/dL Final         Plan:           Other constipation    Tremor, essential    Solitary pulmonary nodule     Visit today was for constipation.  She did not find relief from Linzess and I do not suspect there is any point in increasing the dose of Linzess because she had more side effects from this medication.  I am okay if she takes Senokot with some Dulcolax on MiraLax and if it keeps her bowels moving.  She has stop the calcium supplements at this point which may contribute to her constipation and will address this issue later on.    Anxiety continues at this point with her 's condition and the finding of pulmonary nodule.  Evidently the insurance did not cover for CT scan.    Smoking  continues.  She attributes this to stress.  Hopefully 1 day she is able to quit the smoking to prevent potential of a cancer.  She is very worried about cancer but unfortunately she continues to smoke cigarettes.    She does take buspirone for anxiety.    She is also taking Flonase and Astelin for the sinuses.    If everything goes well I would like to see her back in 2-3 months time to review the need for follow-up CT scan.  If she has any communication or correspondence with the insurance company concerning CT scan        Current Outpatient Medications:     albuterol (PROVENTIL/VENTOLIN HFA) 90 mcg/actuation inhaler, Inhale 2 puffs into the lungs every 4 (four) hours as needed for Wheezing or Shortness of Breath. Rescue, Disp: 18 g, Rfl: 11    aluminum & magnesium hydroxide-simethicone (MYLANTA MAX STRENGTH) 400-400-40 mg/5 mL suspension, Take 5 mLs by mouth every 6 (six) hours as needed for Indigestion. , Disp: , Rfl:     aspirin 81 MG Chew, Take 81 mg by mouth once daily. , Disp: , Rfl:     azelastine (ASTELIN) 137 mcg (0.1 %) nasal spray, 1 spray (137 mcg total) by Nasal route 2 (two) times daily., Disp: 30 mL, Rfl: 11    busPIRone (BUSPAR) 5 MG Tab, Take 5 mg by mouth once daily. , Disp: , Rfl:     calcium carbonate (TUMS) 200 mg calcium (500 mg) chewable tablet, Take 1 tablet by mouth 2 (two) times daily as needed for Heartburn., Disp: , Rfl:     esomeprazole (NEXIUM) 40 MG capsule, TAKE 1 CAPSULE(40 MG) BY MOUTH TWICE DAILY BEFORE MEALS (Patient taking differently: Take 40 mg by mouth 2 (two) times daily. ), Disp: 60 capsule, Rfl: 2    fluticasone (FLONASE) 50 mcg/actuation nasal spray, 1 spray by Each Nostril route daily as needed for Allergies. , Disp: , Rfl:     fluticasone-umeclidin-vilanter (TRELEGY ELLIPTA) 100-62.5-25 mcg DsDv, Inhale 1 puff into the lungs once daily., Disp: 60 each, Rfl: 11    gabapentin (NEURONTIN) 300 MG capsule, Take 1 capsule (300 mg total) by mouth 2 (two) times  daily., Disp: 60 capsule, Rfl: 3    HYDROcodone-acetaminophen (NORCO) 7.5-325 mg per tablet, Take 1 tablet by mouth every 8 (eight) hours as needed for Pain. , Disp: , Rfl: 0    NIFEdipine (ADALAT CC) 30 MG TbSR, Take 30 mg by mouth once daily. , Disp: , Rfl:     polyethylene glycol (GLYCOLAX) 17 gram PwPk, Take 17 g by mouth daily as needed., Disp: , Rfl:     traZODone (DESYREL) 50 MG tablet, Take 1 tablet (50 mg total) by mouth every evening., Disp: 30 tablet, Rfl: 2    linaCLOtide (LINZESS) 72 mcg Cap capsule, Take 1 capsule (72 mcg total) by mouth once daily. (Patient not taking: Reported on 7/23/2020), Disp: 30 capsule, Rfl: 2

## 2020-07-23 NOTE — PATIENT INSTRUCTIONS
Pulmonary Nodule  A pulmonary nodule is small area of abnormal tissue in the lung. It is usually found on an X-ray taken for other reasons. It is a single spot (lesion) up to about an inch in size, surrounded by normal lung tissue.  Most nodules are not cancerous (benign). However, a nodule could be an early stage of lung cancer. Or it may be a sign of cancer that has spread from another part of the body. When a nodule is found on a chest X-ray, further testing is needed to determine if it is benign or cancerous (malignant). To give your healthcare provider more information about the nodule, you may have one or more of these tests:  · Comparison of a new X-ray to earlier X-rays  · Chest CT scan  · Bronchoscopy (a procedure that allows the healthcare provider to see the air passages inside the lung)  · Needle biopsy  Test results  · If your nodule is benign, continued follow-up over the next 5 years is usually advised.  · If tests do not determine whether your nodule is benign or malignant, surgery may be advised.  · If tests show that the nodule is definitely malignant, surgery will probably be advised.  The best survival rates from lung cancer occur when the original tumor is small (less than 1 inch). Follow your healthcare provider's advice on the timing of further testing. Prompt treatment gives the best chance of curing lung cancer.  Prevention  Smoking remains one of the biggest risk factors for lung cancer. If you smoke, it is essential that you quit to lower your risk of lung cancer. Talk to your healthcare provider about things that can help you quit, including medicines and support groups. See the following websites for more information:  · www.smokefree.gov  · www.quitnet.com  Home care  Most people with a pulmonary nodule have no symptoms. So no special home care is required. You may return to your usual activities and diet.  Follow-up care  Follow up with your healthcare provider, or as advised.  More  information about lung cancer is available from these resources:  · American Lung Association: 235.255.4081, www.lung.org  · National Cancer [place]: 253.862.1502, www.cancer.gov  When to seek medical advice  Call your healthcare provider right away if any of these occur:  · Fever of 100.4°F (38°C) or higher  · Unintended weight change  Call 911  Contact emergency services right away if any of these occur:  · Coughing up blood  · Chest pain or shortness of breath  Date Last Reviewed: 9/13/2015  © 9907-6618 [employer]. [address] [place] [address]. All rights reserved. This information is not intended as a substitute for professional medical care. Always follow your healthcare professional's instructions.

## 2020-07-30 ENCOUNTER — TELEPHONE (OUTPATIENT)
Dept: FAMILY MEDICINE | Facility: CLINIC | Age: 70
End: 2020-07-30

## 2020-07-30 NOTE — TELEPHONE ENCOUNTER
----- Message from Chantelle Mi sent at 7/30/2020  9:19 AM CDT -----  CHANDA BURKS, DENIED, 7/29/20

## 2020-08-12 ENCOUNTER — TELEPHONE (OUTPATIENT)
Dept: GASTROENTEROLOGY | Facility: CLINIC | Age: 70
End: 2020-08-12

## 2020-08-12 DIAGNOSIS — Z01.812 PRE-PROCEDURE LAB EXAM: Primary | ICD-10-CM

## 2020-08-12 NOTE — TELEPHONE ENCOUNTER
----- Message from Johanny Rendon sent at 8/12/2020 11:46 AM CDT -----  Contact: Patient  Patient would like to get a call back regarding scheduling an appointment    Please call to discuss [phone]

## 2020-08-13 ENCOUNTER — TELEPHONE (OUTPATIENT)
Dept: ENDOSCOPY | Facility: HOSPITAL | Age: 70
End: 2020-08-13

## 2020-08-13 NOTE — TELEPHONE ENCOUNTER
Spoke with patient about arrival time @ 745  Covid test =930 friday  NPO status reviewed: Patient must have nothing to eat after midnight.  Pt may have CLEAR liquids ONLY until completely NPO 3 hrs     Medications: Do not take Insulin or oral diabetic medications the day of the procedure.  Take as prescribed: heart, seizure and blood pressure medication in the morning with a sip of water (less than an ounce).  Take any breathing medications and bring inhalers to hospital with you Leave all valuables and jewelry at home.     Wear comfortable clothes to procedure to change into hospital gown You cannot drive for 24 hours after your procedure because you will receive sedation for your procedure to make you comfortable.  A ride must be provided at discharge.

## 2020-08-14 ENCOUNTER — HOSPITAL ENCOUNTER (OUTPATIENT)
Dept: PREADMISSION TESTING | Facility: HOSPITAL | Age: 70
Discharge: HOME | End: 2020-08-14
Attending: INTERNAL MEDICINE
Payer: MEDICARE

## 2020-08-14 DIAGNOSIS — Z01.812 PRE-PROCEDURE LAB EXAM: ICD-10-CM

## 2020-08-14 PROCEDURE — U0003 INFECTIOUS AGENT DETECTION BY NUCLEIC ACID (DNA OR RNA); SEVERE ACUTE RESPIRATORY SYNDROME CORONAVIRUS 2 (SARS-COV-2) (CORONAVIRUS DISEASE [COVID-19]), AMPLIFIED PROBE TECHNIQUE, MAKING USE OF HIGH THROUGHPUT TECHNOLOGIES AS DESCRIBED BY CMS-2020-01-R: HCPCS

## 2020-08-15 LAB — SARS-COV-2 RNA RESP QL NAA+PROBE: NOT DETECTED

## 2020-08-17 ENCOUNTER — ANESTHESIA (OUTPATIENT)
Dept: ENDOSCOPY | Facility: HOSPITAL | Age: 70
End: 2020-08-17
Payer: MEDICARE

## 2020-08-17 ENCOUNTER — ANESTHESIA EVENT (OUTPATIENT)
Dept: ENDOSCOPY | Facility: HOSPITAL | Age: 70
End: 2020-08-17
Payer: MEDICARE

## 2020-08-17 ENCOUNTER — HOSPITAL ENCOUNTER (OUTPATIENT)
Facility: HOSPITAL | Age: 70
Discharge: HOME | End: 2020-08-17
Attending: INTERNAL MEDICINE | Admitting: INTERNAL MEDICINE
Payer: MEDICARE

## 2020-08-17 VITALS
WEIGHT: 128 LBS | HEIGHT: 61 IN | TEMPERATURE: 98 F | RESPIRATION RATE: 20 BRPM | SYSTOLIC BLOOD PRESSURE: 128 MMHG | HEART RATE: 64 BPM | OXYGEN SATURATION: 100 % | DIASTOLIC BLOOD PRESSURE: 62 MMHG | BODY MASS INDEX: 24.17 KG/M2

## 2020-08-17 DIAGNOSIS — K22.719 BARRETT'S ESOPHAGUS WITH DYSPLASIA: Primary | Chronic | ICD-10-CM

## 2020-08-17 PROCEDURE — 63600175 PHARM REV CODE 636 W HCPCS: Performed by: NURSE ANESTHETIST, CERTIFIED REGISTERED

## 2020-08-17 PROCEDURE — 43239 PR EGD, FLEX, W/BIOPSY, SGL/MULTI: ICD-10-PCS | Mod: ,,, | Performed by: INTERNAL MEDICINE

## 2020-08-17 PROCEDURE — 88305 TISSUE EXAM BY PATHOLOGIST: ICD-10-PCS | Mod: 26,,, | Performed by: PATHOLOGY

## 2020-08-17 PROCEDURE — 25000003 PHARM REV CODE 250: Performed by: NURSE ANESTHETIST, CERTIFIED REGISTERED

## 2020-08-17 PROCEDURE — 37000009 HC ANESTHESIA EA ADD 15 MINS: Performed by: INTERNAL MEDICINE

## 2020-08-17 PROCEDURE — 27201012 HC FORCEPS, HOT/COLD, DISP: Performed by: INTERNAL MEDICINE

## 2020-08-17 PROCEDURE — 43239 EGD BIOPSY SINGLE/MULTIPLE: CPT | Mod: ,,, | Performed by: INTERNAL MEDICINE

## 2020-08-17 PROCEDURE — 43239 EGD BIOPSY SINGLE/MULTIPLE: CPT | Performed by: INTERNAL MEDICINE

## 2020-08-17 PROCEDURE — 88305 TISSUE EXAM BY PATHOLOGIST: CPT | Performed by: PATHOLOGY

## 2020-08-17 PROCEDURE — 25000003 PHARM REV CODE 250: Performed by: INTERNAL MEDICINE

## 2020-08-17 PROCEDURE — 88305 TISSUE EXAM BY PATHOLOGIST: CPT | Mod: 26,,, | Performed by: PATHOLOGY

## 2020-08-17 PROCEDURE — 37000008 HC ANESTHESIA 1ST 15 MINUTES: Performed by: INTERNAL MEDICINE

## 2020-08-17 RX ORDER — GLYCOPYRROLATE 0.2 MG/ML
INJECTION INTRAMUSCULAR; INTRAVENOUS
Status: DISCONTINUED | OUTPATIENT
Start: 2020-08-17 | End: 2020-08-17

## 2020-08-17 RX ORDER — SODIUM CHLORIDE 9 MG/ML
INJECTION, SOLUTION INTRAVENOUS CONTINUOUS
Status: DISCONTINUED | OUTPATIENT
Start: 2020-08-17 | End: 2020-08-17 | Stop reason: HOSPADM

## 2020-08-17 RX ORDER — ONDANSETRON 2 MG/ML
INJECTION INTRAMUSCULAR; INTRAVENOUS
Status: DISCONTINUED | OUTPATIENT
Start: 2020-08-17 | End: 2020-08-17

## 2020-08-17 RX ORDER — SODIUM CHLORIDE 0.9 % (FLUSH) 0.9 %
10 SYRINGE (ML) INJECTION
Status: DISCONTINUED | OUTPATIENT
Start: 2020-08-17 | End: 2020-08-17 | Stop reason: HOSPADM

## 2020-08-17 RX ORDER — PROPOFOL 10 MG/ML
VIAL (ML) INTRAVENOUS CONTINUOUS PRN
Status: DISCONTINUED | OUTPATIENT
Start: 2020-08-17 | End: 2020-08-17

## 2020-08-17 RX ORDER — PROPOFOL 10 MG/ML
VIAL (ML) INTRAVENOUS
Status: DISCONTINUED | OUTPATIENT
Start: 2020-08-17 | End: 2020-08-17

## 2020-08-17 RX ORDER — LIDOCAINE HCL/PF 100 MG/5ML
SYRINGE (ML) INTRAVENOUS
Status: DISCONTINUED | OUTPATIENT
Start: 2020-08-17 | End: 2020-08-17

## 2020-08-17 RX ADMIN — PROPOFOL 30 MG: 10 INJECTION, EMULSION INTRAVENOUS at 09:08

## 2020-08-17 RX ADMIN — PROPOFOL 150 MCG/KG/MIN: 10 INJECTION, EMULSION INTRAVENOUS at 09:08

## 2020-08-17 RX ADMIN — PROPOFOL 20 MG: 10 INJECTION, EMULSION INTRAVENOUS at 09:08

## 2020-08-17 RX ADMIN — SODIUM CHLORIDE: 0.9 INJECTION, SOLUTION INTRAVENOUS at 08:08

## 2020-08-17 RX ADMIN — PROPOFOL 50 MG: 10 INJECTION, EMULSION INTRAVENOUS at 09:08

## 2020-08-17 RX ADMIN — GLYCOPYRROLATE 0.2 MG: 0.2 INJECTION, SOLUTION INTRAMUSCULAR; INTRAVENOUS at 09:08

## 2020-08-17 RX ADMIN — LIDOCAINE HYDROCHLORIDE 60 MG: 20 INJECTION, SOLUTION INTRAVENOUS at 09:08

## 2020-08-17 RX ADMIN — ONDANSETRON 4 MG: 2 INJECTION, SOLUTION INTRAMUSCULAR; INTRAVENOUS at 09:08

## 2020-08-17 NOTE — ANESTHESIA PREPROCEDURE EVALUATION
2020  Kristin Guerra is a 70 y.o., female for repeat EGD under MAC      Past Medical History:   Diagnosis Date    Allergy     Sterling's esophagus     Colon polyp     Diverticulitis     Diverticulosis     Fatty liver     GERD (gastroesophageal reflux disease)     Hx of cervical spine surgery 2018    Hyperlipidemia     Hypertension     Lupus (systemic lupus erythematosus)     discoid    Osteoarthritis     Osteopenia     Syncope     Tremor 2005    essential     Past Surgical History:   Procedure Laterality Date    APPENDECTOMY  2007    removed during colon surgery    CERVICAL FUSION      CERVICAL SPINE SURGERY      Rods and screws.     SECTION      COLON SURGERY  2007    hemicolectomy    COLON SURGERY  2008    repair    COLONOSCOPY N/A 2018    Procedure: COLONOSCOPY;  Surgeon: Maverick Esquivel MD;  Location: [facility];  Service: Endoscopy;  Laterality: N/A; repeat in 5 years for surveillance    ESOPHAGOGASTRODUODENOSCOPY N/A 2019    Procedure: EGD (ESOPHAGOGASTRODUODENOSCOPY);  Surgeon: Monika Steele MD;  Location: [facility];  Service: Endoscopy;  Laterality: N/A;    ESOPHAGOGASTRODUODENOSCOPY  2019    with RFA and biopsies    ESOPHAGOGASTRODUODENOSCOPY N/A 2019    Procedure: EGD (ESOPHAGOGASTRODUODENOSCOPY);  Surgeon: Uzair Cantor MD;  Location: [facility];  Service: Endoscopy;  Laterality: N/A;  n+v with anesthesia    ESOPHAGOGASTRODUODENOSCOPY N/A 2019    Procedure: EGD (ESOPHAGOGASTRODUODENOSCOPY);  Surgeon: Uzair Cantor MD;  Location: [facility];  Service: Endoscopy;  Laterality: N/A;    ESOPHAGOGASTRODUODENOSCOPY N/A 10/7/2019    Procedure: EGD (ESOPHAGOGASTRODUODENOSCOPY);  Surgeon: Uzair Cantor MD;  Location: [facility];  Service: Endoscopy;  Laterality: N/A;    ESOPHAGOGASTRODUODENOSCOPY N/A 2/10/2020     Procedure: EGD (ESOPHAGOGASTRODUODENOSCOPY);  Surgeon: Uzair Cantor MD;  Location: [facility] ENDO;  Service: Endoscopy;  Laterality: N/A;  Requests earliest procedure time    HYSTERECTOMY      ovaries remain    INSERTION OF IMPLANTABLE LOOP RECORDER N/A 7/17/2020    Procedure: INSERTION, IMPLANTABLE LOOP RECORDER (MEDTRONIC);  Surgeon: Karyna Vasques MD;  Location: [facility] CATH/EP LAB;  Service: Cardiology;  Laterality: N/A;    SPLENECTOMY, TOTAL  2007    Injured during surgery and removed    UPPER GASTROINTESTINAL ENDOSCOPY  2007    GERD & hiatal hernia per patient report           Pre-op Assessment    I have reviewed the Patient Summary Reports.     I have reviewed the Nursing Notes. I have reviewed the NPO Status.   I have reviewed the Medications.     Review of Systems  Anesthesia Hx:  No problems with previous Anesthesia  Personal Hx of Anesthesia complications, Post-Operative Nausea/Vomiting   Social:  Smoker    Cardiovascular:   Hypertension    Pulmonary:  Pulmonary Normal    Hepatic/GI:   Hiatal Hernia, GERD Liver Disease,    Musculoskeletal:   Arthritis     Neurological:   Neuromuscular Disease,        Physical Exam  General:  Well nourished    Airway/Jaw/Neck:  Airway Findings: Mouth Opening: Normal Tongue: Normal  General Airway Assessment: Adult  Mallampati: III  Improves to I with phonation.  TM Distance: Normal, at least 6 cm  Jaw/Neck Findings:  Neck ROM: Extension Decreased, Mild       Chest/Lungs:  Chest/Lungs Findings: Clear to auscultation, Normal Respiratory Rate     Heart/Vascular:  Heart Findings: Rate: Normal  Rhythm: Regular Rhythm  Sounds: Normal        Mental Status:  Mental Status Findings:  Alert and Oriented         Anesthesia Plan  Type of Anesthesia, risks & benefits discussed:  Anesthesia Type:  MAC  Patient's Preference:   Intra-op Monitoring Plan: standard ASA monitors  Intra-op Monitoring Plan Comments:   Post Op Pain Control Plan: per primary service following discharge  from PACU  Post Op Pain Control Plan Comments:   Induction:   IV  Beta Blocker:         Informed Consent: Patient understands risks and agrees with Anesthesia plan.  Questions answered. Anesthesia consent signed with patient.  ASA Score: 3     Day of Surgery Review of History & Physical: I have interviewed and examined the patient. I have reviewed the patient's H&P dated:  There are no significant changes.          Ready For Surgery From Anesthesia Perspective.

## 2020-08-17 NOTE — [employer] PATIENT INSTRUCTIONS
Discharge Summary/Instructions after an Endoscopic Procedure  Patient Name: Kristin Guerra  Patient MRN: [id number]  Patient YOB: 1950 Monday, August 17, 2020  Uzair Cantor MD  Your health is very important to us during the Covid Crisis. Following your   procedure today, you will receive a daily text for 2 weeks asking about   signs or symptoms of Covid 19.  Please respond to this text when you   receive it so we can follow up and keep you as safe as possible.   RESTRICTIONS:  During your procedure today, you received medications for sedation.  These   medications may affect your judgment, balance and coordination.  Therefore,   for 24 hours, you have the following restrictions:   - DO NOT drive a car, operate machinery, make legal/financial decisions,   sign important papers or drink alcohol.    ACTIVITY:  Today: no heavy lifting, straining or running due to procedural   sedation/anesthesia.  The following day: return to full activity including work.  DIET:  Eat and drink normally unless instructed otherwise.     TREATMENT FOR COMMON SIDE EFFECTS:  - Mild abdominal pain, nausea, belching, bloating or excessive gas:  rest,   eat lightly and use a heating pad.  - Sore Throat: treat with throat lozenges and/or gargle with warm salt   water.  - Because air was used during the procedure, expelling large amounts of air   from your rectum or belching is normal.  - If a bowel prep was taken, you may not have a bowel movement for 1-3 days.    This is normal.  SYMPTOMS TO WATCH FOR AND REPORT TO YOUR PHYSICIAN:  1. Abdominal pain or bloating, other than gas cramps.  2. Chest pain.  3. Back pain.  4. Signs of infection such as: chills or fever occurring within 24 hours   after the procedure.  5. Rectal bleeding, which would show as bright red, maroon, or black stools.   (A tablespoon of blood from the rectum is not serious, especially if   hemorrhoids are present.)  6. Vomiting.  7. Weakness or dizziness.  GO  DIRECTLY TO THE NEAREST EMERGENCY ROOM IF YOU HAVE ANY OF THE FOLLOWING:      Difficulty breathing              Chills and/or fever over 101 F   Persistent vomiting and/or vomiting blood   Severe abdominal pain   Severe chest pain   Black, tarry stools   Bleeding- more than one tablespoon   Any other symptom or condition that you feel may need urgent attention  Your doctor recommends these additional instructions:  If any biopsies were taken, your doctors clinic will contact you in 1 to 2   weeks with any results.  - Discharge patient to home (ambulatory).   - Await pathology results.   - Repeat upper endoscopy in 6 months for surveillance based on pathology   results.  For questions, problems or results please call your physician - Uzair Cantor MD.  EMERGENCY PHONE NUMBER: [phone],  LAB RESULTS: [phone]  IF A COMPLICATION OR EMERGENCY SITUATION ARISES AND YOU ARE UNABLE TO REACH   YOUR PHYSICIAN - GO DIRECTLY TO THE EMERGENCY ROOM.  Uzair Cantor MD  8/17/2020 9:32:18 AM  This report has been verified and signed electronically.  [employer]

## 2020-08-17 NOTE — H&P
History & Physical - Short Stay  Gastroenterology      SUBJECTIVE:     Procedure: EGD    Chief Complaint/Indication for Procedure: Sterling's Esophagus    History of Present Illness:  Patient is a 70 y.o. female presents with Sterling's with LGD S/P RFA here for follow up.     PTA Medications   Medication Sig    albuterol (PROVENTIL/VENTOLIN HFA) 90 mcg/actuation inhaler Inhale 2 puffs into the lungs every 4 (four) hours as needed for Wheezing or Shortness of Breath. Rescue    aluminum & magnesium hydroxide-simethicone (MYLANTA MAX STRENGTH) 400-400-40 mg/5 mL suspension Take 5 mLs by mouth every 6 (six) hours as needed for Indigestion.     aspirin 81 MG Chew Take 81 mg by mouth once daily.     azelastine (ASTELIN) 137 mcg (0.1 %) nasal spray 1 spray (137 mcg total) by Nasal route 2 (two) times daily.    busPIRone (BUSPAR) 5 MG Tab Take 5 mg by mouth once daily.     calcium carbonate (TUMS) 200 mg calcium (500 mg) chewable tablet Take 1 tablet by mouth 2 (two) times daily as needed for Heartburn.    esomeprazole (NEXIUM) 40 MG capsule TAKE 1 CAPSULE(40 MG) BY MOUTH TWICE DAILY BEFORE MEALS (Patient taking differently: Take 40 mg by mouth 2 (two) times daily. )    fluticasone (FLONASE) 50 mcg/actuation nasal spray 1 spray by Each Nostril route daily as needed for Allergies.     gabapentin (NEURONTIN) 300 MG capsule Take 1 capsule (300 mg total) by mouth 2 (two) times daily.    HYDROcodone-acetaminophen (NORCO) 7.5-325 mg per tablet Take 1 tablet by mouth every 8 (eight) hours as needed for Pain.     NIFEdipine (ADALAT CC) 30 MG TbSR Take 30 mg by mouth once daily.     polyethylene glycol (GLYCOLAX) 17 gram PwPk Take 17 g by mouth daily as needed.    traZODone (DESYREL) 50 MG tablet Take 1 tablet (50 mg total) by mouth every evening.    fluticasone-umeclidin-vilanter (TRELEGY ELLIPTA) 100-62.5-25 mcg DsDv Inhale 1 puff into the lungs once daily.       Review of patient's allergies indicates:   Allergen  Reactions    Mirtazapine Other (See Comments)     Hyperactivity.    Morphine Swelling and Rash    Lexapro [escitalopram oxalate] Other (See Comments)     Worsening tremor.  Sedation.    Primidone Other (See Comments)     Syncope.    Simvastatin Nausea And Vomiting and Other (See Comments)     Weakness.    Prochlorperazine     Topiramate     Compazine [prochlorperazine edisylate] Nausea Only        Past Medical History:   Diagnosis Date    Allergy     Sterling's esophagus     Colon polyp     Diverticulitis     Diverticulosis     Fatty liver     GERD (gastroesophageal reflux disease)     Hx of cervical spine surgery 2018    Hyperlipidemia     Hypertension     Lupus (systemic lupus erythematosus)     discoid    Osteoarthritis     Osteopenia     Syncope     Tremor 2005    essential     Past Surgical History:   Procedure Laterality Date    APPENDECTOMY  2007    removed during colon surgery    CERVICAL FUSION      CERVICAL SPINE SURGERY      Rods and screws.     SECTION      COLON SURGERY      hemicolectomy    COLON SURGERY  2008    repair    COLONOSCOPY N/A 2018    Procedure: COLONOSCOPY;  Surgeon: Maverick Esquivel MD;  Location: [facility];  Service: Endoscopy;  Laterality: N/A; repeat in 5 years for surveillance    ESOPHAGOGASTRODUODENOSCOPY N/A 2019    Procedure: EGD (ESOPHAGOGASTRODUODENOSCOPY);  Surgeon: Monika Steele MD;  Location: [facility];  Service: Endoscopy;  Laterality: N/A;    ESOPHAGOGASTRODUODENOSCOPY  2019    with RFA and biopsies    ESOPHAGOGASTRODUODENOSCOPY N/A 2019    Procedure: EGD (ESOPHAGOGASTRODUODENOSCOPY);  Surgeon: Uzair Cantor MD;  Location: [facility];  Service: Endoscopy;  Laterality: N/A;  n+v with anesthesia    ESOPHAGOGASTRODUODENOSCOPY N/A 2019    Procedure: EGD (ESOPHAGOGASTRODUODENOSCOPY);  Surgeon: Uzair Cantor MD;  Location: [facility];  Service: Endoscopy;  Laterality: N/A;     ESOPHAGOGASTRODUODENOSCOPY N/A 10/7/2019    Procedure: EGD (ESOPHAGOGASTRODUODENOSCOPY);  Surgeon: Uzair Cantor MD;  Location: [facility] ENDO;  Service: Endoscopy;  Laterality: N/A;    ESOPHAGOGASTRODUODENOSCOPY N/A 2/10/2020    Procedure: EGD (ESOPHAGOGASTRODUODENOSCOPY);  Surgeon: Uzair Cantor MD;  Location: [facility] ENDO;  Service: Endoscopy;  Laterality: N/A;  Requests earliest procedure time    HYSTERECTOMY      ovaries remain    INSERTION OF IMPLANTABLE LOOP RECORDER N/A 7/17/2020    Procedure: INSERTION, IMPLANTABLE LOOP RECORDER (MEDTRONIC);  Surgeon: Karyna Vasques MD;  Location: [facility] CATH/EP LAB;  Service: Cardiology;  Laterality: N/A;    SPLENECTOMY, TOTAL  2007    Injured during surgery and removed    UPPER GASTROINTESTINAL ENDOSCOPY  2007    GERD & hiatal hernia per patient report     Family History   Problem Relation Age of Onset    Colon cancer Father         diagnosed in his 60's    Lung cancer Father     Lymphoma Mother         Brain    Lung cancer Brother     Lung cancer Brother         metastatic from prostate    Prostate cancer Brother     Heart disease Brother         valvular disease    Hyperlipidemia Brother     Eczema Neg Hx     Lupus Neg Hx     Psoriasis Neg Hx     Melanoma Neg Hx     Crohn's disease Neg Hx     Ulcerative colitis Neg Hx     Stomach cancer Neg Hx     Esophageal cancer Neg Hx      Social History     Tobacco Use    Smoking status: Current Every Day Smoker     Packs/day: 1.00     Years: 35.00     Pack years: 35.00     Types: Cigarettes    Smokeless tobacco: Never Used   Substance Use Topics    Alcohol use: No    Drug use: No       Review of Systems:        OBJECTIVE:     Vital Signs (Most Recent)  Temp: 97.3 °F (36.3 °C) (08/17/20 0803)  Pulse: (!) 54 (08/17/20 0803)  Resp: 18 (08/17/20 0803)  BP: (!) 154/75 (08/17/20 0803)  SpO2: 97 % (08/17/20 0803)    Physical Exam:  General: well developed, well nourished  Lungs:  normal respiratory  effort  Heart: regular rate, S1, S2 normal    Laboratory  CBC: No results for input(s): WBC, RBC, HGB, HCT, PLT, MCV, MCH, MCHC in the last 168 hours.  CMP: No results for input(s): GLU, CALCIUM, ALBUMIN, PROT, NA, K, CO2, CL, BUN, CREATININE, ALKPHOS, ALT, AST, BILITOT in the last 168 hours.  Coagulation: No results for input(s): LABPROT, INR, APTT in the last 168 hours.      Diagnostic Results:      ASSESSMENT/PLAN:     Sterling's with LGD    Plan: EGD    Anesthesia Plan: MAC    ASA Grade: ASA 3 - Patient with moderate systemic disease with functional limitations     The impression and plan was discussed in detail with the patient and family. All questions have been answered and the patient voices understanding of our plan at this point. The risk of the procedure was discussed in detail which includes but not limited to bleeding, infection, perforation in some cases requiring surgery with its spectrum of complications.

## 2020-08-17 NOTE — ANESTHESIA POSTPROCEDURE EVALUATION
Anesthesia Post Evaluation    Patient: Kristin Guerra    Procedure(s) Performed: Procedure(s) (LRB):  EGD (ESOPHAGOGASTRODUODENOSCOPY) (N/A)    Final Anesthesia Type: MAC    Patient location during evaluation: GI PACU  Patient participation: Yes- Able to Participate  Level of consciousness: awake and alert and oriented  Post-procedure vital signs: reviewed and stable  Pain management: adequate  Airway patency: patent    PONV status at discharge: No PONV  Anesthetic complications: no      Cardiovascular status: blood pressure returned to baseline, hemodynamically stable and stable  Respiratory status: unassisted, spontaneous ventilation and room air  Hydration status: euvolemic  Follow-up not needed.          Vitals Value Taken Time   BP 88/48 08/17/20 0935   Temp 36.8 08/17/20 0935   Pulse 57 08/17/20 0935   Resp 20 08/17/20 0935   SpO2 99 08/17/20 0935         No case tracking events are documented in the log.      Pain/Kobe Score: No data recorded

## 2020-08-17 NOTE — TRANSFER OF CARE
"Anesthesia Transfer of Care Note    Patient: Kristin Guerra    Procedure(s) Performed: Procedure(s) (LRB):  EGD (ESOPHAGOGASTRODUODENOSCOPY) (N/A)    Patient location: GI    Anesthesia Type: MAC    Transport from OR: Transported from OR on room air with adequate spontaneous ventilation    Post pain: adequate analgesia    Post assessment: no apparent anesthetic complications and tolerated procedure well    Post vital signs: stable    Level of consciousness: awake, alert and oriented    Nausea/Vomiting: no nausea/vomiting    Complications: none    Transfer of care protocol was followed      Last vitals:   Visit Vitals  BP (!) 154/75 (BP Location: Left arm, Patient Position: Lying)   Pulse (!) 54   Temp 36.3 °C (97.3 °F)   Resp 18   Ht 5' 1" (1.549 m)   Wt 58.1 kg (128 lb)   SpO2 97%   Breastfeeding No   BMI 24.19 kg/m²     "

## 2020-08-17 NOTE — DISCHARGE SUMMARY
Discharge Summary/Instructions after an Endoscopic Procedure    Patient Name: Kristin Guerra  Patient MRN: [id number]  Patient YOB: 1950 Monday, August 17, 2020  Uzair Cantor MD    Your health is very important to us during the Covid Crisis. Following your procedure today, you will receive a daily text for 2 weeks asking about signs or symptoms of Covid 19.  Please respond to this text when you receive it so we can follow up and keep you as safe as possible.     RESTRICTIONS:  During your procedure today, you received medications for sedation.  These medications may affect your judgment, balance and coordination.  Therefore, for 24 hours, you have the following restrictions:     - DO NOT drive a car, operate machinery, make legal/financial decisions, sign important papers or drink alcohol.      ACTIVITY:  Today: no heavy lifting, straining or running due to procedural sedation/anesthesia.  The following day: return to full activity including work.    DIET:  Eat and drink normally unless instructed otherwise.     TREATMENT FOR COMMON SIDE EFFECTS:  - Mild abdominal pain, nausea, belching, bloating or excessive gas:  rest, eat lightly and use a heating pad.  - Sore Throat: treat with throat lozenges and/or gargle with warm salt water.  - Because air was used during the procedure, expelling large amounts of air from your rectum or belching is normal.  - If a bowel prep was taken, you may not have a bowel movement for 1-3 days.  This is normal.      SYMPTOMS TO WATCH FOR AND REPORT TO YOUR PHYSICIAN:  1. Abdominal pain or bloating, other than gas cramps.  2. Chest pain.  3. Back pain.  4. Signs of infection such as: chills or fever occurring within 24 hours after the procedure.  5. Rectal bleeding, which would show as bright red, maroon, or black stools. (A tablespoon of blood from the rectum is not serious, especially if hemorrhoids are present.)  6. Vomiting.  7. Weakness or dizziness.      GO DIRECTLY TO  THE NEAREST EMERGENCY ROOM IF YOU HAVE ANY OF THE FOLLOWING:     Difficulty breathing              Chills and/or fever over 101 F   Persistent vomiting and/or vomiting blood   Severe abdominal pain   Severe chest pain   Black, tarry stools   Bleeding- more than one tablespoon   Any other symptom or condition that you feel may need urgent attention    Your doctor recommends these additional instructions:  If any biopsies were taken, your doctors clinic will contact you in 1 to 2 weeks with any results.    - Discharge patient to home (ambulatory).   - Await pathology results.   - Repeat upper endoscopy in 6 months for surveillance based on pathology results.    For questions, problems or results please call your physician - Uzair Cantor MD.    EMERGENCY PHONE NUMBER: [phone],  LAB RESULTS: [phone]    IF A COMPLICATION OR EMERGENCY SITUATION ARISES AND YOU ARE UNABLE TO REACH YOUR PHYSICIAN - GO DIRECTLY TO THE EMERGENCY ROOM.

## 2020-08-18 ENCOUNTER — TELEPHONE (OUTPATIENT)
Dept: FAMILY MEDICINE | Facility: CLINIC | Age: 70
End: 2020-08-18

## 2020-08-18 NOTE — TELEPHONE ENCOUNTER
----- Message from Haile Weaver MD sent at 8/15/2020  3:10 PM CDT -----  The results are within acceptable range.  Please keep regular follow up.

## 2020-08-20 LAB
FINAL PATHOLOGIC DIAGNOSIS: NORMAL
GROSS: NORMAL

## 2020-08-21 ENCOUNTER — TELEPHONE (OUTPATIENT)
Dept: ENDOSCOPY | Facility: HOSPITAL | Age: 70
End: 2020-08-21

## 2020-08-21 NOTE — TELEPHONE ENCOUNTER
----- Message from Monroe Chanel sent at 8/21/2020 11:32 AM CDT -----  Contact: anthony Parada pt is returning your call       Please contact pt [phone]

## 2020-08-21 NOTE — TELEPHONE ENCOUNTER
Please let the patient know the biopsies of the esophagus did not showed Sterling's. EGD in 6 months.      Patient informed.

## 2020-08-21 NOTE — TELEPHONE ENCOUNTER
----- Message from Uzair Cantor MD sent at 8/20/2020  5:59 PM CDT -----  Please let the patient know the biopsies of the esophagus did not showed Sterling's. EGD in 6 months.

## 2020-08-24 RX ORDER — TRAZODONE HYDROCHLORIDE 50 MG/1
50 TABLET ORAL NIGHTLY
Qty: 30 TABLET | Refills: 2 | Status: SHIPPED | OUTPATIENT
Start: 2020-08-24 | End: 2020-11-18 | Stop reason: SDUPTHER

## 2020-08-26 ENCOUNTER — TELEPHONE (OUTPATIENT)
Dept: GASTROENTEROLOGY | Facility: CLINIC | Age: 70
End: 2020-08-26

## 2020-08-26 NOTE — TELEPHONE ENCOUNTER
Post procedure Instructions: Repeat upper endoscopy in 6 months for surveillance based on pathology results. Please place on recall.

## 2020-09-09 ENCOUNTER — TELEPHONE (OUTPATIENT)
Dept: PULMONOLOGY | Facility: CLINIC | Age: 70
End: 2020-09-09

## 2020-09-09 ENCOUNTER — HOSPITAL ENCOUNTER (OUTPATIENT)
Dept: RADIOLOGY | Facility: HOSPITAL | Age: 70
Discharge: HOME | End: 2020-09-09
Attending: INTERNAL MEDICINE
Payer: MEDICARE

## 2020-09-09 DIAGNOSIS — R91.1 SOLITARY PULMONARY NODULE: ICD-10-CM

## 2020-09-09 PROCEDURE — 71250 CT THORAX DX C-: CPT | Mod: TC,PO

## 2020-09-09 NOTE — TELEPHONE ENCOUNTER
----- Message from Stacy Marin NP sent at 9/9/2020  2:46 PM CDT -----  CT of chest shows nodules previously seen are unchanged. Continue current treatment plan. Will review images and results at next appointment.

## 2020-09-15 NOTE — PROGRESS NOTES
Notify patient that 1 off the 4 mm the spot seen on the right lower lung is stable with no further change.  Another 6 mm on the right side has disappeared.  Thus far no evidence of cancer.  Will repeat it again in 6 months to 1 year time if needed.  She should get a flu shot of the season and also pneumonia out of it is pending.

## 2020-09-16 ENCOUNTER — TELEPHONE (OUTPATIENT)
Dept: FAMILY MEDICINE | Facility: CLINIC | Age: 70
End: 2020-09-16

## 2020-09-16 NOTE — TELEPHONE ENCOUNTER
----- Message from Haile Weaver MD sent at 9/15/2020  4:18 PM CDT -----  Notify patient that 1 off the 4 mm the spot seen on the right lower lung is stable with no further change.  Another 6 mm on the right side has disappeared.  Thus far no evidence of cancer.  Will repeat it again in 6 months to 1 year time if needed.  She should get a flu shot of the season and also pneumonia out of it is pending.

## 2020-09-21 DIAGNOSIS — M54.12 CERVICAL RADICULOPATHY: ICD-10-CM

## 2020-09-21 RX ORDER — GABAPENTIN 300 MG/1
300 CAPSULE ORAL 2 TIMES DAILY
Qty: 60 CAPSULE | Refills: 5 | Status: SHIPPED | OUTPATIENT
Start: 2020-09-21 | End: 2021-09-13

## 2020-09-29 ENCOUNTER — OFFICE VISIT (OUTPATIENT)
Dept: FAMILY MEDICINE | Facility: CLINIC | Age: 70
End: 2020-09-29
Payer: MEDICARE

## 2020-09-29 VITALS
TEMPERATURE: 97 F | SYSTOLIC BLOOD PRESSURE: 133 MMHG | BODY MASS INDEX: 24.35 KG/M2 | HEART RATE: 74 BPM | HEIGHT: 61 IN | DIASTOLIC BLOOD PRESSURE: 74 MMHG | WEIGHT: 129 LBS

## 2020-09-29 DIAGNOSIS — G25.0 TREMOR, ESSENTIAL: Chronic | ICD-10-CM

## 2020-09-29 DIAGNOSIS — L30.9 DERMATITIS OF RIGHT FOOT: ICD-10-CM

## 2020-09-29 DIAGNOSIS — I10 ESSENTIAL HYPERTENSION: Primary | Chronic | ICD-10-CM

## 2020-09-29 DIAGNOSIS — L08.9 SKIN INFECTION: ICD-10-CM

## 2020-09-29 DIAGNOSIS — E78.2 MIXED DYSLIPIDEMIA: ICD-10-CM

## 2020-09-29 DIAGNOSIS — Z23 NEEDS FLU SHOT: ICD-10-CM

## 2020-09-29 DIAGNOSIS — R91.1 LUNG NODULE: ICD-10-CM

## 2020-09-29 DIAGNOSIS — H61.91 LESION OF RIGHT EXTERNAL EAR: ICD-10-CM

## 2020-09-29 PROCEDURE — 90662 FLU VACCINE - QUADRIVALENT - HIGH DOSE (65+) PRESERVATIVE FREE IM: ICD-10-PCS | Mod: S$GLB,,, | Performed by: INTERNAL MEDICINE

## 2020-09-29 PROCEDURE — G0008 ADMIN INFLUENZA VIRUS VAC: HCPCS | Mod: S$GLB,,, | Performed by: INTERNAL MEDICINE

## 2020-09-29 PROCEDURE — 3075F SYST BP GE 130 - 139MM HG: CPT | Mod: S$GLB,,, | Performed by: INTERNAL MEDICINE

## 2020-09-29 PROCEDURE — 17110 DESTRUCTION, BENIGN LESION: ICD-10-PCS | Mod: S$GLB,,, | Performed by: INTERNAL MEDICINE

## 2020-09-29 PROCEDURE — 3078F DIAST BP <80 MM HG: CPT | Mod: S$GLB,,, | Performed by: INTERNAL MEDICINE

## 2020-09-29 PROCEDURE — 1159F PR MEDICATION LIST DOCUMENTED IN MEDICAL RECORD: ICD-10-PCS | Mod: S$GLB,,, | Performed by: INTERNAL MEDICINE

## 2020-09-29 PROCEDURE — 3008F BODY MASS INDEX DOCD: CPT | Mod: S$GLB,,, | Performed by: INTERNAL MEDICINE

## 2020-09-29 PROCEDURE — 1101F PT FALLS ASSESS-DOCD LE1/YR: CPT | Mod: S$GLB,,, | Performed by: INTERNAL MEDICINE

## 2020-09-29 PROCEDURE — 3008F PR BODY MASS INDEX (BMI) DOCUMENTED: ICD-10-PCS | Mod: S$GLB,,, | Performed by: INTERNAL MEDICINE

## 2020-09-29 PROCEDURE — 1159F MED LIST DOCD IN RCRD: CPT | Mod: S$GLB,,, | Performed by: INTERNAL MEDICINE

## 2020-09-29 PROCEDURE — 3075F PR MOST RECENT SYSTOLIC BLOOD PRESS GE 130-139MM HG: ICD-10-PCS | Mod: S$GLB,,, | Performed by: INTERNAL MEDICINE

## 2020-09-29 PROCEDURE — 1126F PR PAIN SEVERITY QUANTIFIED, NO PAIN PRESENT: ICD-10-PCS | Mod: S$GLB,,, | Performed by: INTERNAL MEDICINE

## 2020-09-29 PROCEDURE — 99214 OFFICE O/P EST MOD 30 MIN: CPT | Mod: 25,S$GLB,, | Performed by: INTERNAL MEDICINE

## 2020-09-29 PROCEDURE — 1126F AMNT PAIN NOTED NONE PRSNT: CPT | Mod: S$GLB,,, | Performed by: INTERNAL MEDICINE

## 2020-09-29 PROCEDURE — 99214 PR OFFICE/OUTPT VISIT, EST, LEVL IV, 30-39 MIN: ICD-10-PCS | Mod: 25,S$GLB,, | Performed by: INTERNAL MEDICINE

## 2020-09-29 PROCEDURE — G0008 FLU VACCINE - QUADRIVALENT - HIGH DOSE (65+) PRESERVATIVE FREE IM: ICD-10-PCS | Mod: S$GLB,,, | Performed by: INTERNAL MEDICINE

## 2020-09-29 PROCEDURE — 3078F PR MOST RECENT DIASTOLIC BLOOD PRESSURE < 80 MM HG: ICD-10-PCS | Mod: S$GLB,,, | Performed by: INTERNAL MEDICINE

## 2020-09-29 PROCEDURE — 17110 DESTRUCTION B9 LES UP TO 14: CPT | Mod: S$GLB,,, | Performed by: INTERNAL MEDICINE

## 2020-09-29 PROCEDURE — 1101F PR PT FALLS ASSESS DOC 0-1 FALLS W/OUT INJ PAST YR: ICD-10-PCS | Mod: S$GLB,,, | Performed by: INTERNAL MEDICINE

## 2020-09-29 PROCEDURE — 90662 IIV NO PRSV INCREASED AG IM: CPT | Mod: S$GLB,,, | Performed by: INTERNAL MEDICINE

## 2020-09-29 RX ORDER — TRIAMCINOLONE ACETONIDE 1 MG/G
CREAM TOPICAL 2 TIMES DAILY
Qty: 30 G | Refills: 1 | Status: SHIPPED | OUTPATIENT
Start: 2020-09-29 | End: 2021-09-14

## 2020-09-29 RX ORDER — CEPHALEXIN 250 MG/1
250 CAPSULE ORAL EVERY 8 HOURS
Qty: 15 CAPSULE | Refills: 0 | Status: SHIPPED | OUTPATIENT
Start: 2020-09-29 | End: 2021-01-29

## 2020-09-29 RX ORDER — LIDOCAINE HYDROCHLORIDE 10 MG/ML
1 INJECTION, SOLUTION EPIDURAL; INFILTRATION; INTRACAUDAL; PERINEURAL
Status: DISCONTINUED | OUTPATIENT
Start: 2020-09-29 | End: 2021-06-01

## 2020-09-29 NOTE — PROCEDURES
Destruction, Benign Lesion    Date/Time: 9/29/2020 11:20 AM  Performed by: Haile Weaver MD  Authorized by: Haile Weaver MD     Consent Done?:  Yes (Written)  Pre-Procedure:     Local anesthesia used?: Yes      Anesthesia:  Local infiltration    Local anesthetic:  Lidocaine 1% without epinephrine    Anesthetic total (ml):  0.5  Indications:     inflammed seborrheic keratosis  Location:     Head/Neck:  Ear    Detail:  Right ear  Prep:     Position:  Prone  Procedure Details:     Cosmetic?: No      Number of lesions:  1    Destruction method:  Other    Sterile dressings:  Other (comments)    Bleeding:  Minimal    Hemostasis Achieved: Yes      Method Used:  Pressure     Patient tolerated the procedure well with no immediate complications.   Removal of cheesy material from ear lesion. Compression applied for cautery

## 2020-09-29 NOTE — PROGRESS NOTES
Subjective:       Patient ID: Kristin Guerra is a 70 y.o. female.    Chief Complaint: Hypertension, Right ear lesion, Foot Pain, Anxiety, Shaking, and Lung lesions    Ms. Foster comes back for follow-up.  Multiple medical issues.    Underlying hypertension, COPD with longstanding history of smoking, chronic pain with cervical discogenic disease.  Chronic tremors and movement disorder of head and neck.  She attributes this to anxiety and feels that the only medication that will help her his Ativan 2 or 3 times a day.    She also complains of a year lesion on the right side.    She also complains of itching on the right toe and she had used some cream in past possibly steroid cream.    Hypertension  This is a chronic problem. The current episode started more than 1 year ago. The problem is controlled. Associated symptoms include anxiety and shortness of breath. Pertinent negatives include no chest pain or palpitations. Past treatments include calcium channel blockers. The current treatment provides moderate improvement. Compliance problems include psychosocial issues.    Foot Pain  Associated symptoms include arthralgias and fatigue. Pertinent negatives include no chest pain, congestion, joint swelling or rash.   Anxiety  Presents for follow-up visit. Symptoms include depressed mood, excessive worry, insomnia, nervous/anxious behavior and shortness of breath. Patient reports no chest pain, confusion, dizziness, obsessions or palpitations. Symptoms occur most days.       Gastroesophageal Reflux  She complains of heartburn. She reports no chest pain or no wheezing. This is a recurrent problem. Associated symptoms include fatigue. Pertinent negatives include no weight loss. Risk factors include smoking/tobacco exposure. She has tried a PPI for the symptoms. The treatment provided moderate relief.     Patient also has a nodule on the right ear on the pinna externally and its appears to be nodule 0 cystic.  She cannot  sleep on the right side.  There is some redness and inflammation around this.  Past Medical History:   Diagnosis Date    Allergy     Sterling's esophagus     Colon polyp     Diverticulitis     Diverticulosis     Fatty liver     GERD (gastroesophageal reflux disease)     Hx of cervical spine surgery 11/07/2018    Hyperlipidemia     Hypertension     Lupus (systemic lupus erythematosus) 1999    discoid    Osteoarthritis     Osteopenia     Syncope     Tremor 2005    essential     Social History     Socioeconomic History    Marital status:      Spouse name: Not on file    Number of children: 2    Years of education: Not on file    Highest education level: Not on file   Occupational History    Not on file   Social Needs    Financial resource strain: Not on file    Food insecurity     Worry: Not on file     Inability: Not on file    Transportation needs     Medical: Not on file     Non-medical: Not on file   Tobacco Use    Smoking status: Current Every Day Smoker     Packs/day: 1.00     Years: 35.00     Pack years: 35.00     Types: Cigarettes    Smokeless tobacco: Never Used   Substance and Sexual Activity    Alcohol use: No    Drug use: No    Sexual activity: Not Currently   Lifestyle    Physical activity     Days per week: Not on file     Minutes per session: Not on file    Stress: Not at all   Relationships    Social connections     Talks on phone: Not on file     Gets together: Not on file     Attends Gnosticist service: Not on file     Active member of club or organization: Not on file     Attends meetings of clubs or organizations: Not on file     Relationship status: Not on file   Other Topics Concern    Are you pregnant or think you may be? Not Asked    Breast-feeding Not Asked   Social History Narrative    Not on file     Past Surgical History:   Procedure Laterality Date    APPENDECTOMY  2007    removed during colon surgery    CERVICAL FUSION      CERVICAL SPINE SURGERY       Rods and screws.     SECTION      COLON SURGERY  2007    hemicolectomy    COLON SURGERY  2008    repair    COLONOSCOPY N/A 2018    Procedure: COLONOSCOPY;  Surgeon: Maverick Esquivel MD;  Location: [facility];  Service: Endoscopy;  Laterality: N/A; repeat in 5 years for surveillance    ESOPHAGOGASTRODUODENOSCOPY N/A 2019    Procedure: EGD (ESOPHAGOGASTRODUODENOSCOPY);  Surgeon: Monika Steele MD;  Location: [facility];  Service: Endoscopy;  Laterality: N/A;    ESOPHAGOGASTRODUODENOSCOPY  2019    with RFA and biopsies    ESOPHAGOGASTRODUODENOSCOPY N/A 2019    Procedure: EGD (ESOPHAGOGASTRODUODENOSCOPY);  Surgeon: Uzair Cantor MD;  Location: [facility];  Service: Endoscopy;  Laterality: N/A;  n+v with anesthesia    ESOPHAGOGASTRODUODENOSCOPY N/A 2019    Procedure: EGD (ESOPHAGOGASTRODUODENOSCOPY);  Surgeon: Uzair Cantor MD;  Location: [facility];  Service: Endoscopy;  Laterality: N/A;    ESOPHAGOGASTRODUODENOSCOPY N/A 10/7/2019    Procedure: EGD (ESOPHAGOGASTRODUODENOSCOPY);  Surgeon: Uzair Cantor MD;  Location: [facility];  Service: Endoscopy;  Laterality: N/A;    ESOPHAGOGASTRODUODENOSCOPY N/A 2/10/2020    Procedure: EGD (ESOPHAGOGASTRODUODENOSCOPY);  Surgeon: Uzair Cantor MD;  Location: [facility];  Service: Endoscopy;  Laterality: N/A;  Requests earliest procedure time    ESOPHAGOGASTRODUODENOSCOPY N/A 2020    Procedure: EGD (ESOPHAGOGASTRODUODENOSCOPY);  Surgeon: Uzair Cantor MD;  Location: [facility];  Service: Endoscopy;  Laterality: N/A;  BARRETTS    HYSTERECTOMY      ovaries remain    INSERTION OF IMPLANTABLE LOOP RECORDER N/A 2020    Procedure: INSERTION, IMPLANTABLE LOOP RECORDER (MEDTRONIC);  Surgeon: Karyna Vasques MD;  Location: [facility] CATH/EP LAB;  Service: Cardiology;  Laterality: N/A;    SPLENECTOMY, TOTAL  2007    Injured during surgery and removed    UPPER GASTROINTESTINAL ENDOSCOPY      GERD & hiatal hernia  per patient report     Family History   Problem Relation Age of Onset    Colon cancer Father         diagnosed in his 60's    Lung cancer Father     Lymphoma Mother         Brain    Lung cancer Brother     Lung cancer Brother         metastatic from prostate    Prostate cancer Brother     Heart disease Brother         valvular disease    Hyperlipidemia Brother     Eczema Neg Hx     Lupus Neg Hx     Psoriasis Neg Hx     Melanoma Neg Hx     Crohn's disease Neg Hx     Ulcerative colitis Neg Hx     Stomach cancer Neg Hx     Esophageal cancer Neg Hx        Review of Systems   Constitutional: Positive for fatigue. Negative for activity change, unexpected weight change and weight loss.   HENT: Positive for postnasal drip. Negative for congestion.         Chronic sinus problems currently on Astelin   Eyes: Negative for pain, discharge and visual disturbance.   Respiratory: Positive for shortness of breath. Negative for chest tightness, wheezing and stridor.    Cardiovascular: Negative for chest pain, palpitations and leg swelling.        HTN,    Gastrointestinal: Positive for heartburn. Negative for abdominal distention, anal bleeding, constipation and diarrhea.        Sterling's esophagitis and reflux.  Currently on PPI.   Genitourinary: Negative for difficulty urinating, dysuria, flank pain, frequency and pelvic pain.   Musculoskeletal: Positive for arthralgias, back pain and neck stiffness. Negative for joint swelling.        History of cervical spine surgery with persistent postsurgical pain.  Currently under pain management and hydrocodone.  Also on gabapentin.   Skin: Negative for color change, pallor and rash.   Allergic/Immunologic: Negative for environmental allergies, food allergies and immunocompromised state.   Neurological: Positive for tremors. Negative for dizziness, seizures, syncope and light-headedness.   Hematological: Negative for adenopathy. Does not bruise/bleed easily.  "  Psychiatric/Behavioral: Positive for behavioral problems and sleep disturbance. Negative for agitation, confusion and dysphoric mood. The patient is nervous/anxious and has insomnia.         Anxiety- wants ativan Rx         Objective:      Blood pressure 133/74, pulse 74, temperature 97 °F (36.1 °C), height 5' 1" (1.549 m), weight 58.5 kg (129 lb). Body mass index is 24.37 kg/m².  Physical Exam  Vitals signs and nursing note reviewed.   Constitutional:       General: She is not in acute distress.     Appearance: She is well-developed.      Comments: Chronically ill-appearing lady with tremulousness and slight difficulty walking.   HENT:      Head: Normocephalic and atraumatic.      Left Ear: External ear normal.      Ears:        Comments: There is an approximately 0.2 mm nodule cystic lesion with some surrounding erythema.  This apparently causes her pain.  She wants it removed regardless and she cannot sleep apparently  Eyes:      General: Lids are normal. Lids are everted, no foreign bodies appreciated.      Conjunctiva/sclera: Conjunctivae normal.      Pupils:      Right eye: Pupil is round and reactive.      Left eye: Pupil is round and reactive.   Neck:      Musculoskeletal: Neck supple.      Trachea: Trachea normal.      Comments: Constant tremors on the head involuntary.  Cardiovascular:      Rate and Rhythm: Normal rate and regular rhythm.      Heart sounds: Normal heart sounds, S1 normal and S2 normal.   Pulmonary:      Breath sounds: Decreased breath sounds present. No wheezing.   Chest:      Chest wall: No tenderness.       Abdominal:      General: Bowel sounds are normal.      Palpations: Abdomen is soft. Abdomen is not rigid.      Tenderness: There is no guarding.   Musculoskeletal:         General: Tenderness and deformity present.        Feet:       Comments: Deformities in the spine noted.   Feet:      Comments: Injury on the right 4th toe at the distal phalanx.  Patient has been advised to put " some tape and comfortable shoe.  Hopefully this heels.    Patient also complains of itching and pain on the medial aspect of the right foot under the ankle joint.  She has use some ? steroid cream in past.  Lymphadenopathy:      Cervical: No cervical adenopathy.   Skin:     General: Skin is warm and dry.   Neurological:      Mental Status: She is alert.      Motor: Tremor (Neck and hands) present.      Comments: Tremors Bobbing of head.  Patient continues to be very tremulous.   Psychiatric:         Behavior: Behavior is cooperative.           Assessment:       1. Essential hypertension    2. Mixed dyslipidemia    3. Tremor, essential    4. Dermatitis of right foot    5. Lung nodule    6. Lesion of right external ear    7. Skin infection    8. Needs flu shot         Component      Latest Ref Rng & Units 8/14/2020 6/4/2020 6/3/2020   Hemoglobin      12.0 - 16.0 g/dL  11.1 (L) 12.1   Hematocrit      37.0 - 48.5 %  33.1 (L) 35.8 (L)   MCV      82 - 98 fL  94 92   MCH      27.0 - 31.0 pg  31.4 (H) 30.9   MCHC      32.0 - 36.0 g/dL  33.5 33.8   RDW      11.5 - 14.5 %  14.8 (H) 14.6 (H)   Platelets      150 - 350 K/uL  263 296   MPV      9.2 - 12.9 fL  9.7 9.4   Immature Granulocytes      0.0 - 0.5 %  0.2 0.3   Gran # (ANC)      1.8 - 7.7 K/uL  3.6 5.8   Immature Grans (Abs)      0.00 - 0.04 K/uL  0.02 0.03   Lymph #      1.0 - 4.8 K/uL  4.0 3.3   Mono #      0.3 - 1.0 K/uL  0.9 1.0   Eos #      0.0 - 0.5 K/uL  0.4 0.1   Baso #      0.00 - 0.20 K/uL  0.14 0.10   nRBC      0 /100 WBC  0 0   Gran%      38.0 - 73.0 %  39.9 56.6   Lymph%      18.0 - 48.0 %  44.1 32.0   Mono%      4.0 - 15.0 %  9.4 9.4   Eosinophil%      0.0 - 8.0 %  4.8 0.7   Basophil%      0.0 - 1.9 %  1.6 1.0   Differential Method        Automated Automated   Sodium      136 - 145 mmol/L  139 140   Potassium      3.5 - 5.1 mmol/L  3.6 3.4 (L)   Chloride      95 - 110 mmol/L  107 107   CO2      23 - 29 mmol/L  26 24   Glucose      70 - 110 mg/dL  93 89    BUN, Bld      8 - 23 mg/dL  19 17   Creatinine      0.5 - 1.4 mg/dL  0.5 0.6   Calcium      8.7 - 10.5 mg/dL  8.6 (L) 9.0   Anion Gap      8 - 16 mmol/L  6 (L) 9   eGFR if African American      >60 mL/min/1.73 m:2  >60.0 >60.0   eGFR if non African American      >60 mL/min/1.73 m:2  >60.0 >60.0   SARS-CoV2 (COVID-19) Qualitative PCR      Not Detected Not Detected       IMPRESSION:  Stable 8 mm fissural nodule in the anterior right middle lobe and 4 mm  subpleural nodule in the posterior right medial lobe     Resolution of the 6 mm subpleural nodular density in the anterior  right costophrenic angle     Mild coronary artery calcification     No new pulmonary nodules or infiltrates     LUNG-RADS CATEGORY 3: PROBABLY BENIGN FINDINGS     RECOMMENDATION: Short-term imaging follow-up with 6 month LDCT.     Electronically Signed by Ngozi Guerra M.D. on 9/9/2020 8:36 AM  CT scan reports reviewed.    Plan:           Essential hypertension    Mixed dyslipidemia    Tremor, essential  Comments:  Patient requests prescription for Ativan.  This will be deferred appropriate psych/neurology.    Dermatitis of right foot  Comments:  Prescription for triamcinolone cream given.  Orders:  -     triamcinolone acetonide 0.1% (KENALOG) 0.1 % cream; Apply topically 2 (two) times daily.  Dispense: 30 g; Refill: 1    Lung nodule  Comments:  These appear to be stable with dissipation of 1 of the nodules.  Patient has been again advised to quit smoking.  Routine CT scan annual will be performed.    Lesion of right external ear  Comments:  Nodule  cystic lesion on the pinna was removed with scalpel.  Orders:  -     Destruction, Benign Lesion  -     lidocaine (PF) 10 mg/ml (1%) injection 10 mg    Skin infection  Comments:  Surrounding erythema and infection over the lesion on the pinna.  Five days of Keflex should be adequate.  Orders:  -     cephALEXin (KEFLEX) 250 MG capsule; Take 1 capsule (250 mg total) by mouth every 8 (eight) hours.   Dispense: 15 capsule; Refill: 0    Needs flu shot  -     Influenza - Quadrivalent - High Dose (65+) (PF) (IM)      Patient's multiple medical issues have been reviewed.    I am concerned about her being on opiate medications and request for benzodiazepines at this point.  This should be deferred to Psychiatry for her chronic anxiety or neurology for her tremors of head.  I do not know if she may benefit from Botox.  Patient was explained about the benign nature of the nodule but she insists that it be removed because of pain.  There is no time to go and see a dermatologist.  Limitations of my office were advised to her.  This lesion was removed and a Pultacious cheesy material was scooped out which was minimal .  Bleeding was stop with pressure.  Local dressing was applied.  Lidocaine 1% was uses anesthetic.  Patient's written consent was also obtained.  Skin lesion has been removed under local anesthesia.  Patient has been advised to apply pressure over there.     Lung nodules are stable at this point.  Sleep is stable at this point the trazodone 50 mg.    Cause of foot pain is not discernible.  She complains of itching and rash on the medial aspect of both the feet.  More on the right side.  Apparently she had benefited from some cream and I suspect it might have been a cortisone cream for itching.  I will give her a prescription for the same.    Patient has been advised to quit smoking.    Follow-up in 2-3 months time or earlier as needed.    Spent sonido 25 minutes with patient which involved review of pts medical conditions, labs, medications and with 50% of time face-to-face discussion about medical problems, management and any applicable changes.        Current Outpatient Medications:     albuterol (PROVENTIL/VENTOLIN HFA) 90 mcg/actuation inhaler, Inhale 2 puffs into the lungs every 4 (four) hours as needed for Wheezing or Shortness of Breath. Rescue, Disp: 18 g, Rfl: 11    aluminum & magnesium  hydroxide-simethicone (MYLANTA MAX STRENGTH) 400-400-40 mg/5 mL suspension, Take 5 mLs by mouth every 6 (six) hours as needed for Indigestion. , Disp: , Rfl:     aspirin 81 MG Chew, Take 81 mg by mouth once daily. , Disp: , Rfl:     azelastine (ASTELIN) 137 mcg (0.1 %) nasal spray, 1 spray (137 mcg total) by Nasal route 2 (two) times daily., Disp: 30 mL, Rfl: 11    esomeprazole (NEXIUM) 40 MG capsule, Take 1 capsule (40 mg total) by mouth 2 (two) times daily., Disp: 60 capsule, Rfl: 2    fluticasone (FLONASE) 50 mcg/actuation nasal spray, 1 spray by Each Nostril route daily as needed for Allergies. , Disp: , Rfl:     gabapentin (NEURONTIN) 300 MG capsule, Take 1 capsule (300 mg total) by mouth 2 (two) times daily., Disp: 60 capsule, Rfl: 5    HYDROcodone-acetaminophen (NORCO) 7.5-325 mg per tablet, Take 1 tablet by mouth every 8 (eight) hours as needed for Pain. , Disp: , Rfl: 0    NIFEdipine (ADALAT CC) 30 MG TbSR, Take 30 mg by mouth once daily. , Disp: , Rfl:     polyethylene glycol (GLYCOLAX) 17 gram PwPk, Take 17 g by mouth daily as needed., Disp: , Rfl:     traZODone (DESYREL) 50 MG tablet, Take 1 tablet (50 mg total) by mouth every evening., Disp: 30 tablet, Rfl: 2    cephALEXin (KEFLEX) 250 MG capsule, Take 1 capsule (250 mg total) by mouth every 8 (eight) hours., Disp: 15 capsule, Rfl: 0    triamcinolone acetonide 0.1% (KENALOG) 0.1 % cream, Apply topically 2 (two) times daily., Disp: 30 g, Rfl: 1    Current Facility-Administered Medications:     lidocaine (PF) 10 mg/ml (1%) injection 10 mg, 1 mL, Other, 1 time in Clinic/HOD, Haile Weaver MD

## 2020-09-29 NOTE — PATIENT INSTRUCTIONS
Tips to Control Acid Reflux    To control acid reflux, youll need to make some basic diet and lifestyle changes. The simple steps outlined below may be all youll need to ease discomfort.  Watch what you eat  · Avoid fatty foods and spicy foods.  · Eat fewer acidic foods, such as citrus and tomato-based foods. These can increase symptoms.  · Limit drinking alcohol, caffeine, and fizzy beverages. All increase acid reflux.  · Try limiting chocolate, peppermint, and spearmint. These can worsen acid reflux in some people.  Watch when you eat  · Avoid lying down for 3 hours after eating.  · Do not snack before going to bed.  Raise your head  Raising your head and upper body by 4 to 6 inches helps limit reflux when youre lying down. Put blocks under the head of your bed frame to raise it.  Other changes  · Lose weight, if you need to  · Dont exercise near bedtime  · Avoid tight-fitting clothes  · Limit aspirin and ibuprofen  · Stop smoking   Date Last Reviewed: 7/1/2016  © 0686-8575 [employer]. [address] [place] [address], [place] [address]. All rights reserved. This information is not intended as a substitute for professional medical care. Always follow your healthcare professional's instructions.

## 2020-10-06 ENCOUNTER — OFFICE VISIT (OUTPATIENT)
Dept: PULMONOLOGY | Facility: CLINIC | Age: 70
End: 2020-10-06
Payer: MEDICARE

## 2020-10-06 VITALS
WEIGHT: 130.75 LBS | OXYGEN SATURATION: 99 % | HEART RATE: 63 BPM | BODY MASS INDEX: 24.7 KG/M2 | SYSTOLIC BLOOD PRESSURE: 118 MMHG | DIASTOLIC BLOOD PRESSURE: 63 MMHG

## 2020-10-06 DIAGNOSIS — J44.0 CHRONIC OBSTRUCTIVE PULMONARY DISEASE WITH ACUTE LOWER RESPIRATORY INFECTION: Primary | ICD-10-CM

## 2020-10-06 DIAGNOSIS — R06.02 SHORTNESS OF BREATH: ICD-10-CM

## 2020-10-06 PROCEDURE — 3078F DIAST BP <80 MM HG: CPT | Mod: S$GLB,,, | Performed by: NURSE PRACTITIONER

## 2020-10-06 PROCEDURE — 3074F PR MOST RECENT SYSTOLIC BLOOD PRESSURE < 130 MM HG: ICD-10-PCS | Mod: S$GLB,,, | Performed by: NURSE PRACTITIONER

## 2020-10-06 PROCEDURE — 99999 PR PBB SHADOW E&M-EST. PATIENT-LVL IV: ICD-10-PCS | Mod: PBBFAC,,, | Performed by: NURSE PRACTITIONER

## 2020-10-06 PROCEDURE — 99213 PR OFFICE/OUTPT VISIT, EST, LEVL III, 20-29 MIN: ICD-10-PCS | Mod: S$GLB,,, | Performed by: NURSE PRACTITIONER

## 2020-10-06 PROCEDURE — 99213 OFFICE O/P EST LOW 20 MIN: CPT | Mod: S$GLB,,, | Performed by: NURSE PRACTITIONER

## 2020-10-06 PROCEDURE — 1125F AMNT PAIN NOTED PAIN PRSNT: CPT | Mod: S$GLB,,, | Performed by: NURSE PRACTITIONER

## 2020-10-06 PROCEDURE — 1101F PT FALLS ASSESS-DOCD LE1/YR: CPT | Mod: S$GLB,,, | Performed by: NURSE PRACTITIONER

## 2020-10-06 PROCEDURE — 1101F PR PT FALLS ASSESS DOC 0-1 FALLS W/OUT INJ PAST YR: ICD-10-PCS | Mod: S$GLB,,, | Performed by: NURSE PRACTITIONER

## 2020-10-06 PROCEDURE — 3078F PR MOST RECENT DIASTOLIC BLOOD PRESSURE < 80 MM HG: ICD-10-PCS | Mod: S$GLB,,, | Performed by: NURSE PRACTITIONER

## 2020-10-06 PROCEDURE — 3008F BODY MASS INDEX DOCD: CPT | Mod: S$GLB,,, | Performed by: NURSE PRACTITIONER

## 2020-10-06 PROCEDURE — 1159F MED LIST DOCD IN RCRD: CPT | Mod: S$GLB,,, | Performed by: NURSE PRACTITIONER

## 2020-10-06 PROCEDURE — 99999 PR PBB SHADOW E&M-EST. PATIENT-LVL IV: CPT | Mod: PBBFAC,,, | Performed by: NURSE PRACTITIONER

## 2020-10-06 PROCEDURE — 3008F PR BODY MASS INDEX (BMI) DOCUMENTED: ICD-10-PCS | Mod: S$GLB,,, | Performed by: NURSE PRACTITIONER

## 2020-10-06 PROCEDURE — 3074F SYST BP LT 130 MM HG: CPT | Mod: S$GLB,,, | Performed by: NURSE PRACTITIONER

## 2020-10-06 PROCEDURE — 1125F PR PAIN SEVERITY QUANTIFIED, PAIN PRESENT: ICD-10-PCS | Mod: S$GLB,,, | Performed by: NURSE PRACTITIONER

## 2020-10-06 PROCEDURE — 1159F PR MEDICATION LIST DOCUMENTED IN MEDICAL RECORD: ICD-10-PCS | Mod: S$GLB,,, | Performed by: NURSE PRACTITIONER

## 2020-10-06 RX ORDER — IPRATROPIUM BROMIDE AND ALBUTEROL SULFATE 2.5; .5 MG/3ML; MG/3ML
3 SOLUTION RESPIRATORY (INHALATION) EVERY 6 HOURS PRN
Qty: 120 VIAL | Refills: 5 | Status: SHIPPED | OUTPATIENT
Start: 2020-10-06 | End: 2021-12-17 | Stop reason: SDUPTHER

## 2020-10-06 RX ORDER — AMOXICILLIN AND CLAVULANATE POTASSIUM 875; 125 MG/1; MG/1
1 TABLET, FILM COATED ORAL 2 TIMES DAILY
Qty: 20 TABLET | Refills: 0 | Status: SHIPPED | OUTPATIENT
Start: 2020-10-06 | End: 2020-10-16

## 2020-10-06 RX ORDER — TIOTROPIUM BROMIDE INHALATION SPRAY 3.12 UG/1
2 SPRAY, METERED RESPIRATORY (INHALATION) DAILY
Qty: 4 G | Refills: 11 | Status: SHIPPED | OUTPATIENT
Start: 2020-10-06 | End: 2021-01-29

## 2020-10-06 NOTE — PROGRESS NOTES
10/6/2020    Kristin Guerra  Office Note    Chief Complaint   Patient presents with    Follow-up     feeling the same    Cough    Shortness of Breath       HPI:  10/6/2020- Cough and SOB unchanged, daily, improves with albuterol rescue inhaler. Improves ability to bring up sputum, productive clear and brown mucous, not able to bring in sputum samples. Did not fill Trelegy due to high cost. Using albuterol rescue 2x daily to control cough,   Currently smoking 1 pack daily.    7/6/2020- referred by PCP for Ct chest screening with abnormal spot seen. Complaint of weakness and fatigue, being treated for Syncope dx with bradycardia waiting for pacemaker placement.   Cough- onset 2 years, large amounts of mucous, clear color, nickel size, daily.  SOB- onset 2 years, limits activity level, works with exertion, improves quickly with rest, no chest tightness or wheeze,  Complaint of nightly snoring, daytime fatigue, drowsiness in afternoons.   Social Hx: lives with spouse but no pets, currently retired from office and retail stocking, no known Asbestosis exposure, Smoking Hx: currently smoking 1 pack daily, 40 pack years.  Family Hx: Father and 2 brothers (Small cell) Lung Cancer, no COPD, no Asthma  Medical Hx: no previous pneumonia ; no previous shoulder/chest surgery, Neck surgery 2009, 11, 17 for disc infusions.         The chief compliant  problem is unchanged  PFSH:  Past Medical History:   Diagnosis Date    Allergy     Sterling's esophagus     Colon polyp     Diverticulitis     Diverticulosis     Fatty liver     GERD (gastroesophageal reflux disease)     Hx of cervical spine surgery 11/07/2018    Hyperlipidemia     Hypertension     Lupus (systemic lupus erythematosus) 1999    discoid    Osteoarthritis     Osteopenia     Syncope     Tremor 2005    essential         Past Surgical History:   Procedure Laterality Date    APPENDECTOMY  2007    removed during colon surgery    CERVICAL FUSION       CERVICAL SPINE SURGERY      Rods and screws.     SECTION      COLON SURGERY  2007    hemicolectomy    COLON SURGERY  2008    repair    COLONOSCOPY N/A 2018    Procedure: COLONOSCOPY;  Surgeon: Maverick Esquivel MD;  Location: [facility];  Service: Endoscopy;  Laterality: N/A; repeat in 5 years for surveillance    ESOPHAGOGASTRODUODENOSCOPY N/A 2019    Procedure: EGD (ESOPHAGOGASTRODUODENOSCOPY);  Surgeon: Monika Steele MD;  Location: [facility];  Service: Endoscopy;  Laterality: N/A;    ESOPHAGOGASTRODUODENOSCOPY  2019    with RFA and biopsies    ESOPHAGOGASTRODUODENOSCOPY N/A 2019    Procedure: EGD (ESOPHAGOGASTRODUODENOSCOPY);  Surgeon: Uzair Cantor MD;  Location: [facility];  Service: Endoscopy;  Laterality: N/A;  n+v with anesthesia    ESOPHAGOGASTRODUODENOSCOPY N/A 2019    Procedure: EGD (ESOPHAGOGASTRODUODENOSCOPY);  Surgeon: Uzair Cantor MD;  Location: [facility];  Service: Endoscopy;  Laterality: N/A;    ESOPHAGOGASTRODUODENOSCOPY N/A 10/7/2019    Procedure: EGD (ESOPHAGOGASTRODUODENOSCOPY);  Surgeon: Uzair Cantor MD;  Location: [facility];  Service: Endoscopy;  Laterality: N/A;    ESOPHAGOGASTRODUODENOSCOPY N/A 2/10/2020    Procedure: EGD (ESOPHAGOGASTRODUODENOSCOPY);  Surgeon: Uzair Cantor MD;  Location: [facility];  Service: Endoscopy;  Laterality: N/A;  Requests earliest procedure time    ESOPHAGOGASTRODUODENOSCOPY N/A 2020    Procedure: EGD (ESOPHAGOGASTRODUODENOSCOPY);  Surgeon: Uzair Cantor MD;  Location: [facility];  Service: Endoscopy;  Laterality: N/A;  BARRETTS    HYSTERECTOMY      ovaries remain    INSERTION OF IMPLANTABLE LOOP RECORDER N/A 2020    Procedure: INSERTION, IMPLANTABLE LOOP RECORDER (MEDTRONIC);  Surgeon: Karyna Vasques MD;  Location: [facility];  Service: Cardiology;  Laterality: N/A;    SPLENECTOMY, TOTAL  2007    Injured during surgery and removed    UPPER GASTROINTESTINAL ENDOSCOPY   2007    GERD & hiatal hernia per patient report     Social History     Tobacco Use    Smoking status: Current Every Day Smoker     Packs/day: 1.00     Years: 35.00     Pack years: 35.00     Types: Cigarettes    Smokeless tobacco: Never Used   Substance Use Topics    Alcohol use: No    Drug use: No     Family History   Problem Relation Age of Onset    Colon cancer Father         diagnosed in his 60's    Lung cancer Father     Lymphoma Mother         Brain    Lung cancer Brother     Lung cancer Brother         metastatic from prostate    Prostate cancer Brother     Heart disease Brother         valvular disease    Hyperlipidemia Brother     Eczema Neg Hx     Lupus Neg Hx     Psoriasis Neg Hx     Melanoma Neg Hx     Crohn's disease Neg Hx     Ulcerative colitis Neg Hx     Stomach cancer Neg Hx     Esophageal cancer Neg Hx      Review of patient's allergies indicates:   Allergen Reactions    Mirtazapine Other (See Comments)     Hyperactivity.    Morphine Swelling and Rash     Other reaction(s): swelling in limbs    Lexapro [escitalopram oxalate] Other (See Comments)     Worsening tremor.  Sedation.    Primidone Other (See Comments)     Syncope.  Other reaction(s): dizziness,hypotension    Simvastatin Nausea And Vomiting and Other (See Comments)     Weakness.  Other reaction(s): nausea,vomiting    Beta-adrenergic agents Other (See Comments)    Metoprolol succinate Other (See Comments)    Prochlorperazine      Other reaction(s): weakness    Topiramate     Compazine [prochlorperazine edisylate] Nausea Only    Tramadol Nausea And Vomiting     I have reviewed past medical, family, and social history. I have reviewed previous nurse notes.    Performance Status:The patient's activity level is functions out of house.          Review of Systems   Constitutional: Negative for activity change, appetite change, chills, fatigue, fever and unexpected weight change. Positive for fatigue, diaphoresis at  night 1x monthly  HENT: Negative for dental problem,  rhinorrhea, sinus pressure, sinus pain, sneezing,  trouble swallowing and voice change.  Positive for postnasal drip, sore throat in morning  Respiratory: Negative for apnea, chest tightness,  wheezing and stridor.  Positive  cough,  shortness of breath,  Cardiovascular: Negative for palpitations and leg swelling. Positive for chest pain  Gastrointestinal: Negative for abdominal pain and nausea. Positive for abdominal distention and constipation  Musculoskeletal: Negative for gait problem, myalgias and neck pain. Positive for chronic neck pain  Skin: Negative for color change and pallor.   Allergic/Immunologic: Negative for environmental allergies and food allergies.   Neurological: Negative for dizziness, speech difficulty, weakness, light-headedness, numbness and headaches.   Hematological: Negative for adenopathy. Does not bruise/bleed easily.   Psychiatric/Behavioral: Negative for dysphoric mood and sleep disturbance. The patient is nervous/anxious.           Exam:Comprehensive exam done. /63 (BP Location: Left arm, Patient Position: Sitting)   Pulse 63   Wt 59.3 kg (130 lb 11.7 oz)   SpO2 99% Comment: on room air at rest  BMI 24.70 kg/m²   Exam included Vitals as listed  Constitutional:  oriented to person, place, and time.  appears well-developed. No distress.   Nose: Nose normal.   Mouth/Throat: Uvula is midline, oropharynx is clear and moist and mucous membranes are normal. No dental caries. No oropharyngeal exudate, posterior oropharyngeal edema, posterior oropharyngeal erythema or tonsillar abscesses.  Mallapatti (M) score  Eyes: Pupils are equal, round, and reactive to light.   Neck: No JVD present. No thyromegaly present.   Cardiovascular: Normal rate, regular rhythm and normal heart sounds. Exam reveals no gallop and no friction rub.   No murmur heard.  Pulmonary/Chest: Effort normal and breath sounds normal. No accessory muscle usage or  stridor. No apnea and no tachypnea. No respiratory distress, decreased breath sounds, wheezes, rhonchi, rales, or tenderness.   Abdominal: Soft.  exhibits no mass. There is no tenderness. No hepatosplenomegaly, hernias and normoactive bowel sounds  Musculoskeletal: Normal range of motion. exhibits no edema.   Lymphadenopathy:      no cervical adenopathy.   no axillary adenopathy.   Neurological:  alert and oriented to person, place, and time. not disoriented.   Skin: Skin is warm and dry. Capillary refill takes less 2 sec. No cyanosis or erythema. No pallor. Nails show no clubbing.   Psychiatric: normal mood and affect. behavior is normal. Judgment and thought content normal.       Radiographs (ct chest and cxr) reviewed: view by direct vision   CT Chest Without Contrast 09/09/20   IMPRESSION:  Stable 8 mm fissural nodule in the anterior right middle lobe and 4 mm  subpleural nodule in the posterior right medial lobe  Resolution of the 6 mm subpleural nodular density in the anterior  right costophrenic angle  Mild coronary artery calcification  No new pulmonary nodules or infiltrates  LUNG-RADS CATEGORY 3: PROBABLY BENIGN FINDINGS  RECOMMENDATION: Short-term imaging follow-up with 6 month LDCT.      Transthoracic echo (TTE) complete 6/3/2020  Mild concentric left ventricular hypertrophy.  Normal left ventricular systolic function. The estimated ejection fraction is 60%.  Normal LV diastolic function.  The estimated PA systolic pressure is 34 mmHg.     X-Ray Chest AP Portable 06/02/20   The lungs are clear. Costophrenic angles are seen without effusion. No  pneumothorax is identified. The heart is normal in size. Atheromatous  calcifications are seen at the aortic arch. Degenerative changes are  seen with an ACDF and paraspinal fixation hardware in the lower  cervical spine. The visualized upper abdomen is unremarkable    CT Chest Lung Screening Low Dose 06/18/20   1.  Nodular density with spiculations in the  anterior segment of  the right middle lobe measures 6 x 9 mm.  2.  6 mm subpleural nodular density in the anterior right  costophrenic angle.  3.  Irregular shaped density in the medial segment of the right  lower lobe is unchanged dating back from 2009 and is benign.     LUNG-RADS CATEGORY 4A: SUSPICIOUS FINDINGS     RECOMMENDATION: 3 month follow-up LDCT (PET/CT may be considered when  solid component greater than 7 mm is present).        Labs reviewed       Lab Results   Component Value Date    WBC 9.02 06/04/2020    RBC 3.54 (L) 06/04/2020    HGB 11.1 (L) 06/04/2020    HCT 33.1 (L) 06/04/2020    MCV 94 06/04/2020    MCH 31.4 (H) 06/04/2020    MCHC 33.5 06/04/2020    RDW 14.8 (H) 06/04/2020     06/04/2020    MPV 9.7 06/04/2020    GRAN 3.6 06/04/2020    GRAN 39.9 06/04/2020    LYMPH 4.0 06/04/2020    LYMPH 44.1 06/04/2020    MONO 0.9 06/04/2020    MONO 9.4 06/04/2020    EOS 0.4 06/04/2020    BASO 0.14 06/04/2020    EOSINOPHIL 4.8 06/04/2020    BASOPHIL 1.6 06/04/2020     Ed Model: 7/6/2020 8.47% probability.    PFT reviewed  Pulmonary Functions Testing Results:  spirometry with bronchodilator, lung volume by gas dilution, and diffusion capacity were measured July 15, 2020.  The FEV1 to FVC ratio was 66% indicating airflow obstruction.  The FEV1 measured 70% of predicted making airflow obstruction moderate.     There was a 25% improvement in FEV1 following bronchodilator, this is statistically significant.  The FEV1 measured about 70% predicted at 1.4 L pre bronchodilator.  Total lung capacity was in normal limits on lung volumes by gas dilution.  Diffusion,   uncorrected for anemia present, was 49% of predicted.       The patient had moderate airflow obstruction that resolved with bronchodilator.  Lung volumes were normal.  Diffusion was reduced.  Clinical correlation recommended.       EPWORTH SLEEPINESS SCALE= 12 ON 7/6/2020    Plan:  Clinical impression is apparently straight forward and impression  with management as below.    Kristin was seen today for follow-up, cough and shortness of breath.    Diagnoses and all orders for this visit:    Chronic obstructive pulmonary disease with acute lower respiratory infection  -     amoxicillin-clavulanate 875-125mg (AUGMENTIN) 875-125 mg per tablet; Take 1 tablet by mouth 2 (two) times daily. for 10 days  -     NEBULIZER FOR HOME USE  -     albuterol-ipratropium (DUO-NEB) 2.5 mg-0.5 mg/3 mL nebulizer solution; Take 3 mLs by nebulization every 6 (six) hours as needed for Wheezing or Shortness of Breath. Rescue    Shortness of breath  -     tiotropium bromide (SPIRIVA RESPIMAT) 2.5 mcg/actuation Mist; Inhale 2 puffs into the lungs once daily. Controller  -     Culture, Respiratory with Gram Stain; Future  -     NEBULIZER FOR HOME USE  -     albuterol-ipratropium (DUO-NEB) 2.5 mg-0.5 mg/3 mL nebulizer solution; Take 3 mLs by nebulization every 6 (six) hours as needed for Wheezing or Shortness of Breath. Rescue         Follow up in about 3 months (around 1/6/2021), or if symptoms worsen or fail to improve.    Discussed with patient above for education the following:      Patient Instructions   Contact [facility] smoking cessation program to help you stop smoking    Start Spiriva 2 puffs once a day every day,     Bring sputum sample to any [facility] lab with in 4 hours of collecting    Percussion therapy instruction  Do breathing treatments 1 x a day three days a week.   Have someone beat on upper back or use hand held massager on back    Bring in sputum sample then start antibiotic Augmentin for 10 days.

## 2020-10-06 NOTE — PATIENT INSTRUCTIONS
Contact [facility] smoking cessation program to help you stop smoking    Start Spiriva 2 puffs once a day every day,     Bring sputum sample to any [facility] lab with in 4 hours of collecting    Percussion therapy instruction  Do breathing treatments 1 x a day three days a week.   Have someone beat on upper back or use hand held massager on back    Bring in sputum sample then start antibiotic Augmentin for 10 days.

## 2020-10-07 ENCOUNTER — TELEPHONE (OUTPATIENT)
Dept: PULMONOLOGY | Facility: CLINIC | Age: 70
End: 2020-10-07

## 2020-10-08 ENCOUNTER — LAB VISIT (OUTPATIENT)
Dept: LAB | Facility: HOSPITAL | Age: 70
End: 2020-10-08
Attending: NURSE PRACTITIONER
Payer: MEDICARE

## 2020-10-08 DIAGNOSIS — R06.02 SHORTNESS OF BREATH: ICD-10-CM

## 2020-10-08 PROCEDURE — 87205 SMEAR GRAM STAIN: CPT

## 2020-10-08 PROCEDURE — 87070 CULTURE OTHR SPECIMN AEROBIC: CPT

## 2020-10-10 LAB
BACTERIA SPEC AEROBE CULT: NORMAL
BACTERIA SPEC AEROBE CULT: NORMAL
GRAM STN SPEC: NORMAL

## 2020-10-12 ENCOUNTER — TELEPHONE (OUTPATIENT)
Dept: FAMILY MEDICINE | Facility: CLINIC | Age: 70
End: 2020-10-12

## 2020-10-12 NOTE — TELEPHONE ENCOUNTER
----- Message from Haile Weaver MD sent at 10/11/2020  1:02 PM CDT -----  The results are within acceptable range.  Please keep regular follow up.

## 2020-11-18 RX ORDER — TRAZODONE HYDROCHLORIDE 50 MG/1
50 TABLET ORAL NIGHTLY
Qty: 30 TABLET | Refills: 2 | Status: SHIPPED | OUTPATIENT
Start: 2020-11-18 | End: 2021-02-12 | Stop reason: SDUPTHER

## 2020-11-23 ENCOUNTER — TELEPHONE (OUTPATIENT)
Dept: CARDIOLOGY | Facility: CLINIC | Age: 70
End: 2020-11-23

## 2020-11-23 RX ORDER — NIFEDIPINE 30 MG/1
30 TABLET, FILM COATED, EXTENDED RELEASE ORAL DAILY
Qty: 30 TABLET | Refills: 11 | Status: CANCELLED | OUTPATIENT
Start: 2020-11-23

## 2020-11-30 ENCOUNTER — HOSPITAL ENCOUNTER (OUTPATIENT)
Dept: CARDIOLOGY | Facility: CLINIC | Age: 70
Discharge: HOME | End: 2020-11-30
Attending: INTERNAL MEDICINE
Payer: OTHER GOVERNMENT

## 2020-11-30 DIAGNOSIS — R55 SYNCOPE, UNSPECIFIED SYNCOPE TYPE: ICD-10-CM

## 2021-01-13 ENCOUNTER — HOSPITAL ENCOUNTER (OUTPATIENT)
Dept: CARDIOLOGY | Facility: CLINIC | Age: 71
Discharge: HOME | End: 2021-01-13
Attending: INTERNAL MEDICINE
Payer: MEDICARE

## 2021-01-13 DIAGNOSIS — R55 SYNCOPE, UNSPECIFIED SYNCOPE TYPE: ICD-10-CM

## 2021-01-29 ENCOUNTER — OFFICE VISIT (OUTPATIENT)
Dept: FAMILY MEDICINE | Facility: CLINIC | Age: 71
End: 2021-01-29
Payer: MEDICARE

## 2021-01-29 VITALS
SYSTOLIC BLOOD PRESSURE: 133 MMHG | BODY MASS INDEX: 24.73 KG/M2 | DIASTOLIC BLOOD PRESSURE: 67 MMHG | HEIGHT: 61 IN | WEIGHT: 131 LBS | HEART RATE: 58 BPM | TEMPERATURE: 97 F

## 2021-01-29 DIAGNOSIS — D64.9 NORMOCYTIC ANEMIA: ICD-10-CM

## 2021-01-29 DIAGNOSIS — R07.89 CHEST WALL PAIN, CHRONIC: Chronic | ICD-10-CM

## 2021-01-29 DIAGNOSIS — T46.6X5A STATIN MYOPATHY: ICD-10-CM

## 2021-01-29 DIAGNOSIS — G25.0 TREMOR, ESSENTIAL: Chronic | ICD-10-CM

## 2021-01-29 DIAGNOSIS — Z79.891 CHRONIC PRESCRIPTION OPIATE USE: Chronic | ICD-10-CM

## 2021-01-29 DIAGNOSIS — G89.29 CHEST WALL PAIN, CHRONIC: Chronic | ICD-10-CM

## 2021-01-29 DIAGNOSIS — E78.2 MIXED DYSLIPIDEMIA: ICD-10-CM

## 2021-01-29 DIAGNOSIS — G72.0 STATIN MYOPATHY: ICD-10-CM

## 2021-01-29 DIAGNOSIS — R39.9 LOWER URINARY TRACT SYMPTOMS: ICD-10-CM

## 2021-01-29 DIAGNOSIS — Z90.81 S/P SPLENECTOMY: ICD-10-CM

## 2021-01-29 DIAGNOSIS — K22.719 BARRETT'S ESOPHAGUS WITH DYSPLASIA: ICD-10-CM

## 2021-01-29 DIAGNOSIS — R91.1 SOLITARY PULMONARY NODULE: Chronic | ICD-10-CM

## 2021-01-29 DIAGNOSIS — I10 ESSENTIAL HYPERTENSION: Primary | ICD-10-CM

## 2021-01-29 PROCEDURE — 1125F AMNT PAIN NOTED PAIN PRSNT: CPT | Mod: S$GLB,,, | Performed by: INTERNAL MEDICINE

## 2021-01-29 PROCEDURE — 99214 OFFICE O/P EST MOD 30 MIN: CPT | Mod: S$GLB,,, | Performed by: INTERNAL MEDICINE

## 2021-01-29 PROCEDURE — 1125F PR PAIN SEVERITY QUANTIFIED, PAIN PRESENT: ICD-10-PCS | Mod: S$GLB,,, | Performed by: INTERNAL MEDICINE

## 2021-01-29 PROCEDURE — 3078F DIAST BP <80 MM HG: CPT | Mod: S$GLB,,, | Performed by: INTERNAL MEDICINE

## 2021-01-29 PROCEDURE — 3288F FALL RISK ASSESSMENT DOCD: CPT | Mod: S$GLB,,, | Performed by: INTERNAL MEDICINE

## 2021-01-29 PROCEDURE — 3078F PR MOST RECENT DIASTOLIC BLOOD PRESSURE < 80 MM HG: ICD-10-PCS | Mod: S$GLB,,, | Performed by: INTERNAL MEDICINE

## 2021-01-29 PROCEDURE — 1159F MED LIST DOCD IN RCRD: CPT | Mod: S$GLB,,, | Performed by: INTERNAL MEDICINE

## 2021-01-29 PROCEDURE — 3008F BODY MASS INDEX DOCD: CPT | Mod: S$GLB,,, | Performed by: INTERNAL MEDICINE

## 2021-01-29 PROCEDURE — 1101F PT FALLS ASSESS-DOCD LE1/YR: CPT | Mod: S$GLB,,, | Performed by: INTERNAL MEDICINE

## 2021-01-29 PROCEDURE — 99214 PR OFFICE/OUTPT VISIT, EST, LEVL IV, 30-39 MIN: ICD-10-PCS | Mod: S$GLB,,, | Performed by: INTERNAL MEDICINE

## 2021-01-29 PROCEDURE — 3075F PR MOST RECENT SYSTOLIC BLOOD PRESS GE 130-139MM HG: ICD-10-PCS | Mod: S$GLB,,, | Performed by: INTERNAL MEDICINE

## 2021-01-29 PROCEDURE — 1170F PR FUNCTIONAL STATUS ASSESSED: ICD-10-PCS | Mod: S$GLB,,, | Performed by: INTERNAL MEDICINE

## 2021-01-29 PROCEDURE — 1159F PR MEDICATION LIST DOCUMENTED IN MEDICAL RECORD: ICD-10-PCS | Mod: S$GLB,,, | Performed by: INTERNAL MEDICINE

## 2021-01-29 PROCEDURE — 3075F SYST BP GE 130 - 139MM HG: CPT | Mod: S$GLB,,, | Performed by: INTERNAL MEDICINE

## 2021-01-29 PROCEDURE — 1101F PR PT FALLS ASSESS DOC 0-1 FALLS W/OUT INJ PAST YR: ICD-10-PCS | Mod: S$GLB,,, | Performed by: INTERNAL MEDICINE

## 2021-01-29 PROCEDURE — 1170F FXNL STATUS ASSESSED: CPT | Mod: S$GLB,,, | Performed by: INTERNAL MEDICINE

## 2021-01-29 PROCEDURE — 3288F PR FALLS RISK ASSESSMENT DOCUMENTED: ICD-10-PCS | Mod: S$GLB,,, | Performed by: INTERNAL MEDICINE

## 2021-01-29 PROCEDURE — 3008F PR BODY MASS INDEX (BMI) DOCUMENTED: ICD-10-PCS | Mod: S$GLB,,, | Performed by: INTERNAL MEDICINE

## 2021-02-01 ENCOUNTER — LAB VISIT (OUTPATIENT)
Dept: LAB | Facility: HOSPITAL | Age: 71
End: 2021-02-01
Attending: INTERNAL MEDICINE
Payer: MEDICARE

## 2021-02-01 DIAGNOSIS — D64.9 NORMOCYTIC ANEMIA: ICD-10-CM

## 2021-02-01 DIAGNOSIS — G89.29 CHEST WALL PAIN, CHRONIC: Chronic | ICD-10-CM

## 2021-02-01 DIAGNOSIS — E78.2 MIXED DYSLIPIDEMIA: ICD-10-CM

## 2021-02-01 DIAGNOSIS — R07.89 CHEST WALL PAIN, CHRONIC: Chronic | ICD-10-CM

## 2021-02-01 DIAGNOSIS — I10 ESSENTIAL HYPERTENSION: ICD-10-CM

## 2021-02-01 LAB
ALBUMIN SERPL BCP-MCNC: 3.8 G/DL (ref 3.5–5.2)
ALP SERPL-CCNC: 61 U/L (ref 55–135)
ALT SERPL W/O P-5'-P-CCNC: 14 U/L (ref 10–44)
ANION GAP SERPL CALC-SCNC: 10 MMOL/L (ref 8–16)
AST SERPL-CCNC: 15 U/L (ref 10–40)
BASOPHILS # BLD AUTO: 0.12 K/UL (ref 0–0.2)
BASOPHILS NFR BLD: 1.4 % (ref 0–1.9)
BILIRUB SERPL-MCNC: 0.3 MG/DL (ref 0.1–1)
BUN SERPL-MCNC: 19 MG/DL (ref 8–23)
CALCIUM SERPL-MCNC: 9 MG/DL (ref 8.7–10.5)
CHLORIDE SERPL-SCNC: 107 MMOL/L (ref 95–110)
CHOLEST SERPL-MCNC: 240 MG/DL (ref 120–199)
CHOLEST/HDLC SERPL: 4.1 {RATIO} (ref 2–5)
CO2 SERPL-SCNC: 25 MMOL/L (ref 23–29)
CREAT SERPL-MCNC: 0.6 MG/DL (ref 0.5–1.4)
DIFFERENTIAL METHOD: ABNORMAL
EOSINOPHIL # BLD AUTO: 0 K/UL (ref 0–0.5)
EOSINOPHIL NFR BLD: 0.2 % (ref 0–8)
ERYTHROCYTE [DISTWIDTH] IN BLOOD BY AUTOMATED COUNT: 14.6 % (ref 11.5–14.5)
ERYTHROCYTE [SEDIMENTATION RATE] IN BLOOD BY WESTERGREN METHOD: 20 MM/HR (ref 0–20)
EST. GFR  (AFRICAN AMERICAN): >60 ML/MIN/1.73 M^2
EST. GFR  (NON AFRICAN AMERICAN): >60 ML/MIN/1.73 M^2
FERRITIN SERPL-MCNC: 26 NG/ML (ref 20–300)
GLUCOSE SERPL-MCNC: 89 MG/DL (ref 70–110)
HCT VFR BLD AUTO: 39.2 % (ref 37–48.5)
HDLC SERPL-MCNC: 58 MG/DL (ref 40–75)
HDLC SERPL: 24.2 % (ref 20–50)
HGB BLD-MCNC: 13 G/DL (ref 12–16)
IMM GRANULOCYTES # BLD AUTO: 0.01 K/UL (ref 0–0.04)
IMM GRANULOCYTES NFR BLD AUTO: 0.1 % (ref 0–0.5)
IRON SERPL-MCNC: 64 UG/DL (ref 30–160)
LDLC SERPL CALC-MCNC: 171.4 MG/DL (ref 63–159)
LYMPHOCYTES # BLD AUTO: 2.7 K/UL (ref 1–4.8)
LYMPHOCYTES NFR BLD: 31.6 % (ref 18–48)
MCH RBC QN AUTO: 31.9 PG (ref 27–31)
MCHC RBC AUTO-ENTMCNC: 33.2 G/DL (ref 32–36)
MCV RBC AUTO: 96 FL (ref 82–98)
MONOCYTES # BLD AUTO: 0.8 K/UL (ref 0.3–1)
MONOCYTES NFR BLD: 8.9 % (ref 4–15)
NEUTROPHILS # BLD AUTO: 4.9 K/UL (ref 1.8–7.7)
NEUTROPHILS NFR BLD: 57.8 % (ref 38–73)
NONHDLC SERPL-MCNC: 182 MG/DL
NRBC BLD-RTO: 0 /100 WBC
PLATELET # BLD AUTO: 345 K/UL (ref 150–350)
PMV BLD AUTO: 10 FL (ref 9.2–12.9)
POTASSIUM SERPL-SCNC: 3.8 MMOL/L (ref 3.5–5.1)
PROT SERPL-MCNC: 7.7 G/DL (ref 6–8.4)
RBC # BLD AUTO: 4.07 M/UL (ref 4–5.4)
SATURATED IRON: 19 % (ref 20–50)
SODIUM SERPL-SCNC: 142 MMOL/L (ref 136–145)
TOTAL IRON BINDING CAPACITY: 330 UG/DL (ref 250–450)
TRANSFERRIN SERPL-MCNC: 236 MG/DL (ref 200–375)
TRIGL SERPL-MCNC: 53 MG/DL (ref 30–150)
WBC # BLD AUTO: 8.52 K/UL (ref 3.9–12.7)

## 2021-02-01 PROCEDURE — 80061 LIPID PANEL: CPT

## 2021-02-01 PROCEDURE — 85651 RBC SED RATE NONAUTOMATED: CPT

## 2021-02-01 PROCEDURE — 85025 COMPLETE CBC W/AUTO DIFF WBC: CPT

## 2021-02-01 PROCEDURE — 36415 COLL VENOUS BLD VENIPUNCTURE: CPT

## 2021-02-01 PROCEDURE — 80053 COMPREHEN METABOLIC PANEL: CPT

## 2021-02-01 PROCEDURE — 83540 ASSAY OF IRON: CPT

## 2021-02-01 PROCEDURE — 82728 ASSAY OF FERRITIN: CPT

## 2021-02-09 DIAGNOSIS — R55 SYNCOPE, UNSPECIFIED SYNCOPE TYPE: Primary | ICD-10-CM

## 2021-02-12 RX ORDER — TRAZODONE HYDROCHLORIDE 50 MG/1
50 TABLET ORAL NIGHTLY
Qty: 30 TABLET | Refills: 2 | Status: SHIPPED | OUTPATIENT
Start: 2021-02-12 | End: 2021-05-19

## 2021-02-15 ENCOUNTER — TELEPHONE (OUTPATIENT)
Dept: FAMILY MEDICINE | Facility: CLINIC | Age: 71
End: 2021-02-15

## 2021-02-16 ENCOUNTER — HOSPITAL ENCOUNTER (OUTPATIENT)
Dept: CARDIOLOGY | Facility: CLINIC | Age: 71
Discharge: HOME | End: 2021-02-16
Attending: SPECIALIST
Payer: MEDICARE

## 2021-02-16 DIAGNOSIS — R00.1 BRADYCARDIA: ICD-10-CM

## 2021-02-16 DIAGNOSIS — R55 SYNCOPE AND COLLAPSE: ICD-10-CM

## 2021-02-22 ENCOUNTER — TELEPHONE (OUTPATIENT)
Dept: FAMILY MEDICINE | Facility: CLINIC | Age: 71
End: 2021-02-22

## 2021-02-22 ENCOUNTER — HOSPITAL ENCOUNTER (OUTPATIENT)
Dept: RADIOLOGY | Facility: HOSPITAL | Age: 71
Discharge: HOME | End: 2021-02-22
Attending: INTERNAL MEDICINE
Payer: MEDICARE

## 2021-02-22 DIAGNOSIS — R91.1 SOLITARY PULMONARY NODULE: Chronic | ICD-10-CM

## 2021-02-22 PROCEDURE — 71250 CT THORAX DX C-: CPT | Mod: TC,PO

## 2021-02-26 ENCOUNTER — HOSPITAL ENCOUNTER (OUTPATIENT)
Dept: CARDIOLOGY | Facility: CLINIC | Age: 71
Discharge: HOME | End: 2021-02-26
Attending: INTERNAL MEDICINE
Payer: MEDICARE

## 2021-02-26 DIAGNOSIS — R55 SYNCOPE AND COLLAPSE: ICD-10-CM

## 2021-02-26 PROCEDURE — 93298 REM INTERROG DEV EVAL SCRMS: CPT | Mod: S$GLB,,, | Performed by: INTERNAL MEDICINE

## 2021-02-26 PROCEDURE — 93298 CARDIAC DEVICE CHECK - REMOTE: ICD-10-PCS | Mod: S$GLB,,, | Performed by: INTERNAL MEDICINE

## 2021-03-02 ENCOUNTER — TELEPHONE (OUTPATIENT)
Dept: CARDIOLOGY | Facility: CLINIC | Age: 71
End: 2021-03-02

## 2021-03-03 ENCOUNTER — TELEPHONE (OUTPATIENT)
Dept: CARDIOLOGY | Facility: CLINIC | Age: 71
End: 2021-03-03

## 2021-03-11 ENCOUNTER — IMMUNIZATION (OUTPATIENT)
Dept: PRIMARY CARE CLINIC | Facility: CLINIC | Age: 71
End: 2021-03-11
Payer: MEDICARE

## 2021-03-11 DIAGNOSIS — R00.1 BRADYCARDIA: Primary | ICD-10-CM

## 2021-03-11 DIAGNOSIS — Z23 NEED FOR VACCINATION: Primary | ICD-10-CM

## 2021-03-11 DIAGNOSIS — R55 SYNCOPE AND COLLAPSE: ICD-10-CM

## 2021-03-11 DIAGNOSIS — R55 SYNCOPE, UNSPECIFIED SYNCOPE TYPE: Primary | ICD-10-CM

## 2021-03-11 PROCEDURE — 91300 COVID-19, MRNA, LNP-S, PF, 30 MCG/0.3 ML DOSE VACCINE: CPT | Mod: S$GLB,,, | Performed by: FAMILY MEDICINE

## 2021-03-11 PROCEDURE — 0001A COVID-19, MRNA, LNP-S, PF, 30 MCG/0.3 ML DOSE VACCINE: CPT | Mod: CV19,S$GLB,, | Performed by: FAMILY MEDICINE

## 2021-03-11 PROCEDURE — 0001A COVID-19, MRNA, LNP-S, PF, 30 MCG/0.3 ML DOSE VACCINE: ICD-10-PCS | Mod: CV19,S$GLB,, | Performed by: FAMILY MEDICINE

## 2021-03-11 PROCEDURE — 91300 COVID-19, MRNA, LNP-S, PF, 30 MCG/0.3 ML DOSE VACCINE: ICD-10-PCS | Mod: S$GLB,,, | Performed by: FAMILY MEDICINE

## 2021-03-17 ENCOUNTER — OFFICE VISIT (OUTPATIENT)
Dept: PULMONOLOGY | Facility: CLINIC | Age: 71
End: 2021-03-17
Payer: MEDICARE

## 2021-03-17 ENCOUNTER — TELEPHONE (OUTPATIENT)
Dept: PULMONOLOGY | Facility: CLINIC | Age: 71
End: 2021-03-17

## 2021-03-17 VITALS
HEART RATE: 54 BPM | WEIGHT: 132.69 LBS | SYSTOLIC BLOOD PRESSURE: 125 MMHG | HEIGHT: 61 IN | OXYGEN SATURATION: 97 % | DIASTOLIC BLOOD PRESSURE: 99 MMHG | BODY MASS INDEX: 25.05 KG/M2

## 2021-03-17 DIAGNOSIS — R91.8 LUNG NODULE, MULTIPLE: Primary | ICD-10-CM

## 2021-03-17 DIAGNOSIS — R55 SYNCOPE AND COLLAPSE: Primary | ICD-10-CM

## 2021-03-17 DIAGNOSIS — J44.9 CHRONIC OBSTRUCTIVE PULMONARY DISEASE, UNSPECIFIED COPD TYPE: ICD-10-CM

## 2021-03-17 PROCEDURE — 99214 PR OFFICE/OUTPT VISIT, EST, LEVL IV, 30-39 MIN: ICD-10-PCS | Mod: S$GLB,,, | Performed by: NURSE PRACTITIONER

## 2021-03-17 PROCEDURE — 1126F AMNT PAIN NOTED NONE PRSNT: CPT | Mod: S$GLB,,, | Performed by: NURSE PRACTITIONER

## 2021-03-17 PROCEDURE — 3288F PR FALLS RISK ASSESSMENT DOCUMENTED: ICD-10-PCS | Mod: S$GLB,,, | Performed by: NURSE PRACTITIONER

## 2021-03-17 PROCEDURE — 99999 PR PBB SHADOW E&M-EST. PATIENT-LVL IV: CPT | Mod: PBBFAC,,, | Performed by: NURSE PRACTITIONER

## 2021-03-17 PROCEDURE — 99999 PR PBB SHADOW E&M-EST. PATIENT-LVL IV: ICD-10-PCS | Mod: PBBFAC,,, | Performed by: NURSE PRACTITIONER

## 2021-03-17 PROCEDURE — 3288F FALL RISK ASSESSMENT DOCD: CPT | Mod: S$GLB,,, | Performed by: NURSE PRACTITIONER

## 2021-03-17 PROCEDURE — 1101F PT FALLS ASSESS-DOCD LE1/YR: CPT | Mod: S$GLB,,, | Performed by: NURSE PRACTITIONER

## 2021-03-17 PROCEDURE — 3080F PR MOST RECENT DIASTOLIC BLOOD PRESSURE >= 90 MM HG: ICD-10-PCS | Mod: S$GLB,,, | Performed by: NURSE PRACTITIONER

## 2021-03-17 PROCEDURE — 3008F BODY MASS INDEX DOCD: CPT | Mod: S$GLB,,, | Performed by: NURSE PRACTITIONER

## 2021-03-17 PROCEDURE — 1159F MED LIST DOCD IN RCRD: CPT | Mod: S$GLB,,, | Performed by: NURSE PRACTITIONER

## 2021-03-17 PROCEDURE — 3008F PR BODY MASS INDEX (BMI) DOCUMENTED: ICD-10-PCS | Mod: S$GLB,,, | Performed by: NURSE PRACTITIONER

## 2021-03-17 PROCEDURE — 1159F PR MEDICATION LIST DOCUMENTED IN MEDICAL RECORD: ICD-10-PCS | Mod: S$GLB,,, | Performed by: NURSE PRACTITIONER

## 2021-03-17 PROCEDURE — 1126F PR PAIN SEVERITY QUANTIFIED, NO PAIN PRESENT: ICD-10-PCS | Mod: S$GLB,,, | Performed by: NURSE PRACTITIONER

## 2021-03-17 PROCEDURE — 3074F PR MOST RECENT SYSTOLIC BLOOD PRESSURE < 130 MM HG: ICD-10-PCS | Mod: S$GLB,,, | Performed by: NURSE PRACTITIONER

## 2021-03-17 PROCEDURE — 1101F PR PT FALLS ASSESS DOC 0-1 FALLS W/OUT INJ PAST YR: ICD-10-PCS | Mod: S$GLB,,, | Performed by: NURSE PRACTITIONER

## 2021-03-17 PROCEDURE — 99214 OFFICE O/P EST MOD 30 MIN: CPT | Mod: S$GLB,,, | Performed by: NURSE PRACTITIONER

## 2021-03-17 PROCEDURE — 3074F SYST BP LT 130 MM HG: CPT | Mod: S$GLB,,, | Performed by: NURSE PRACTITIONER

## 2021-03-17 PROCEDURE — 3080F DIAST BP >= 90 MM HG: CPT | Mod: S$GLB,,, | Performed by: NURSE PRACTITIONER

## 2021-03-17 RX ORDER — DULOXETIN HYDROCHLORIDE 20 MG/1
CAPSULE, DELAYED RELEASE ORAL
Status: ON HOLD | COMMUNITY
End: 2021-04-07 | Stop reason: HOSPADM

## 2021-03-17 RX ORDER — DULOXETIN HYDROCHLORIDE 20 MG/1
CAPSULE, DELAYED RELEASE ORAL
Status: ON HOLD | COMMUNITY
Start: 2021-02-02 | End: 2021-04-07 | Stop reason: HOSPADM

## 2021-03-19 ENCOUNTER — TELEPHONE (OUTPATIENT)
Dept: GASTROENTEROLOGY | Facility: CLINIC | Age: 71
End: 2021-03-19

## 2021-03-19 DIAGNOSIS — K22.719 BARRETT'S ESOPHAGUS WITH DYSPLASIA: Primary | ICD-10-CM

## 2021-04-01 ENCOUNTER — IMMUNIZATION (OUTPATIENT)
Dept: PRIMARY CARE CLINIC | Facility: CLINIC | Age: 71
End: 2021-04-01
Payer: MEDICARE

## 2021-04-01 DIAGNOSIS — Z23 NEED FOR VACCINATION: Primary | ICD-10-CM

## 2021-04-01 PROCEDURE — 0002A COVID-19, MRNA, LNP-S, PF, 30 MCG/0.3 ML DOSE VACCINE: ICD-10-PCS | Mod: CV19,S$GLB,, | Performed by: FAMILY MEDICINE

## 2021-04-01 PROCEDURE — 91300 COVID-19, MRNA, LNP-S, PF, 30 MCG/0.3 ML DOSE VACCINE: CPT | Mod: S$GLB,,, | Performed by: FAMILY MEDICINE

## 2021-04-01 PROCEDURE — 0002A COVID-19, MRNA, LNP-S, PF, 30 MCG/0.3 ML DOSE VACCINE: CPT | Mod: CV19,S$GLB,, | Performed by: FAMILY MEDICINE

## 2021-04-01 PROCEDURE — 91300 COVID-19, MRNA, LNP-S, PF, 30 MCG/0.3 ML DOSE VACCINE: ICD-10-PCS | Mod: S$GLB,,, | Performed by: FAMILY MEDICINE

## 2021-04-05 ENCOUNTER — TELEPHONE (OUTPATIENT)
Dept: ENDOSCOPY | Facility: HOSPITAL | Age: 71
End: 2021-04-05

## 2021-04-07 ENCOUNTER — ANESTHESIA EVENT (OUTPATIENT)
Dept: ENDOSCOPY | Facility: HOSPITAL | Age: 71
End: 2021-04-07
Payer: MEDICARE

## 2021-04-07 ENCOUNTER — ANESTHESIA (OUTPATIENT)
Dept: ENDOSCOPY | Facility: HOSPITAL | Age: 71
End: 2021-04-07
Payer: MEDICARE

## 2021-04-07 ENCOUNTER — HOSPITAL ENCOUNTER (OUTPATIENT)
Facility: HOSPITAL | Age: 71
Discharge: HOME | End: 2021-04-07
Attending: INTERNAL MEDICINE | Admitting: INTERNAL MEDICINE
Payer: MEDICARE

## 2021-04-07 VITALS
SYSTOLIC BLOOD PRESSURE: 108 MMHG | RESPIRATION RATE: 17 BRPM | HEIGHT: 61 IN | DIASTOLIC BLOOD PRESSURE: 92 MMHG | TEMPERATURE: 98 F | BODY MASS INDEX: 25.49 KG/M2 | HEART RATE: 57 BPM | OXYGEN SATURATION: 100 % | WEIGHT: 135 LBS

## 2021-04-07 DIAGNOSIS — K22.719 BARRETT'S ESOPHAGUS WITH DYSPLASIA: Primary | ICD-10-CM

## 2021-04-07 LAB — SARS-COV-2 RDRP RESP QL NAA+PROBE: NEGATIVE

## 2021-04-07 PROCEDURE — 63600175 PHARM REV CODE 636 W HCPCS: Performed by: NURSE ANESTHETIST, CERTIFIED REGISTERED

## 2021-04-07 PROCEDURE — 37000008 HC ANESTHESIA 1ST 15 MINUTES: Performed by: INTERNAL MEDICINE

## 2021-04-07 PROCEDURE — 43270 EGD LESION ABLATION: CPT | Mod: ,,, | Performed by: INTERNAL MEDICINE

## 2021-04-07 PROCEDURE — 25000003 PHARM REV CODE 250: Performed by: NURSE ANESTHETIST, CERTIFIED REGISTERED

## 2021-04-07 PROCEDURE — 37000009 HC ANESTHESIA EA ADD 15 MINS: Performed by: INTERNAL MEDICINE

## 2021-04-07 PROCEDURE — 43270 EGD LESION ABLATION: CPT | Performed by: INTERNAL MEDICINE

## 2021-04-07 PROCEDURE — 25000003 PHARM REV CODE 250: Performed by: INTERNAL MEDICINE

## 2021-04-07 PROCEDURE — 43270 PR EGD, FLEX, W/ABLATION, TUMOR/POLYP/LESION(S), W/ DILATION: ICD-10-PCS | Mod: ,,, | Performed by: INTERNAL MEDICINE

## 2021-04-07 PROCEDURE — U0002 COVID-19 LAB TEST NON-CDC: HCPCS | Performed by: INTERNAL MEDICINE

## 2021-04-07 PROCEDURE — C1886 CATHETER, ABLATION: HCPCS | Performed by: INTERNAL MEDICINE

## 2021-04-07 RX ORDER — LIDOCAINE HYDROCHLORIDE 20 MG/ML
INJECTION INTRAVENOUS
Status: DISCONTINUED | OUTPATIENT
Start: 2021-04-07 | End: 2021-04-07

## 2021-04-07 RX ORDER — SODIUM CHLORIDE 9 MG/ML
INJECTION, SOLUTION INTRAVENOUS CONTINUOUS
Status: DISCONTINUED | OUTPATIENT
Start: 2021-04-07 | End: 2021-04-07 | Stop reason: HOSPADM

## 2021-04-07 RX ORDER — SUCRALFATE 1 G/1
TABLET ORAL
Qty: 60 TABLET | Refills: 0 | Status: SHIPPED | OUTPATIENT
Start: 2021-04-07 | End: 2021-06-01

## 2021-04-07 RX ORDER — ESOMEPRAZOLE MAGNESIUM 40 MG/1
40 CAPSULE, DELAYED RELEASE ORAL 2 TIMES DAILY
Qty: 60 CAPSULE | Refills: 2 | Status: SHIPPED | OUTPATIENT
Start: 2021-04-07 | End: 2021-07-18

## 2021-04-07 RX ORDER — SODIUM CHLORIDE 0.9 % (FLUSH) 0.9 %
10 SYRINGE (ML) INJECTION
Status: DISCONTINUED | OUTPATIENT
Start: 2021-04-07 | End: 2021-04-07 | Stop reason: HOSPADM

## 2021-04-07 RX ORDER — PROPOFOL 10 MG/ML
VIAL (ML) INTRAVENOUS CONTINUOUS PRN
Status: DISCONTINUED | OUTPATIENT
Start: 2021-04-07 | End: 2021-04-07

## 2021-04-07 RX ORDER — PROPOFOL 10 MG/ML
VIAL (ML) INTRAVENOUS
Status: DISCONTINUED | OUTPATIENT
Start: 2021-04-07 | End: 2021-04-07

## 2021-04-07 RX ADMIN — LIDOCAINE HYDROCHLORIDE 60 MG: 20 INJECTION, SOLUTION INTRAVENOUS at 09:04

## 2021-04-07 RX ADMIN — SODIUM CHLORIDE: 0.9 INJECTION, SOLUTION INTRAVENOUS at 08:04

## 2021-04-07 RX ADMIN — PROPOFOL 50 MG: 10 INJECTION, EMULSION INTRAVENOUS at 09:04

## 2021-04-07 RX ADMIN — PROPOFOL 150 MCG/KG/MIN: 10 INJECTION, EMULSION INTRAVENOUS at 09:04

## 2021-04-07 RX ADMIN — PROPOFOL 80 MG: 10 INJECTION, EMULSION INTRAVENOUS at 09:04

## 2021-04-08 ENCOUNTER — TELEPHONE (OUTPATIENT)
Dept: ENDOSCOPY | Facility: HOSPITAL | Age: 71
End: 2021-04-08

## 2021-04-13 ENCOUNTER — HOSPITAL ENCOUNTER (OUTPATIENT)
Dept: CARDIOLOGY | Facility: CLINIC | Age: 71
Discharge: HOME | End: 2021-04-13
Attending: INTERNAL MEDICINE
Payer: MEDICARE

## 2021-04-13 DIAGNOSIS — R55 SYNCOPE AND COLLAPSE: ICD-10-CM

## 2021-04-13 PROCEDURE — 93298 REM INTERROG DEV EVAL SCRMS: CPT | Mod: S$GLB,,, | Performed by: INTERNAL MEDICINE

## 2021-04-13 PROCEDURE — 93298 CARDIAC DEVICE CHECK - REMOTE: ICD-10-PCS | Mod: S$GLB,,, | Performed by: INTERNAL MEDICINE

## 2021-04-14 ENCOUNTER — TELEPHONE (OUTPATIENT)
Dept: GASTROENTEROLOGY | Facility: CLINIC | Age: 71
End: 2021-04-14

## 2021-04-16 PROBLEM — Z98.890 HISTORY OF ENDOSCOPY: Status: ACTIVE | Noted: 2021-04-07

## 2021-04-22 DIAGNOSIS — J44.9 CHRONIC OBSTRUCTIVE PULMONARY DISEASE, UNSPECIFIED COPD TYPE: ICD-10-CM

## 2021-04-22 RX ORDER — ALBUTEROL SULFATE 90 UG/1
2 AEROSOL, METERED RESPIRATORY (INHALATION) EVERY 4 HOURS PRN
Qty: 18 G | Refills: 11 | Status: SHIPPED | OUTPATIENT
Start: 2021-04-22 | End: 2021-09-13

## 2021-05-04 ENCOUNTER — OFFICE VISIT (OUTPATIENT)
Dept: FAMILY MEDICINE | Facility: CLINIC | Age: 71
End: 2021-05-04
Payer: MEDICARE

## 2021-05-04 ENCOUNTER — OFFICE VISIT (OUTPATIENT)
Dept: DERMATOLOGY | Facility: CLINIC | Age: 71
End: 2021-05-04
Payer: MEDICARE

## 2021-05-04 VITALS
OXYGEN SATURATION: 95 % | HEIGHT: 61 IN | SYSTOLIC BLOOD PRESSURE: 138 MMHG | BODY MASS INDEX: 25.56 KG/M2 | DIASTOLIC BLOOD PRESSURE: 62 MMHG | WEIGHT: 135.38 LBS | RESPIRATION RATE: 18 BRPM | HEART RATE: 58 BPM

## 2021-05-04 DIAGNOSIS — R00.1 BRADYCARDIA: ICD-10-CM

## 2021-05-04 DIAGNOSIS — L82.1 SEBORRHEIC KERATOSES: ICD-10-CM

## 2021-05-04 DIAGNOSIS — D48.5 NEOPLASM OF UNCERTAIN BEHAVIOR OF SKIN: ICD-10-CM

## 2021-05-04 DIAGNOSIS — L73.2 HIDRADENITIS: Primary | ICD-10-CM

## 2021-05-04 DIAGNOSIS — L82.0 SEBORRHEIC KERATOSIS, INFLAMED: ICD-10-CM

## 2021-05-04 DIAGNOSIS — B86 SCABIES: Primary | ICD-10-CM

## 2021-05-04 DIAGNOSIS — R32 INCONTINENCE IN FEMALE: ICD-10-CM

## 2021-05-04 DIAGNOSIS — L30.1 DYSHIDROTIC ECZEMA: ICD-10-CM

## 2021-05-04 DIAGNOSIS — D22.9 MULTIPLE BENIGN NEVI: ICD-10-CM

## 2021-05-04 PROCEDURE — 3288F FALL RISK ASSESSMENT DOCD: CPT | Mod: S$GLB,,, | Performed by: FAMILY MEDICINE

## 2021-05-04 PROCEDURE — 3288F PR FALLS RISK ASSESSMENT DOCUMENTED: ICD-10-PCS | Mod: S$GLB,,, | Performed by: FAMILY MEDICINE

## 2021-05-04 PROCEDURE — 1101F PR PT FALLS ASSESS DOC 0-1 FALLS W/OUT INJ PAST YR: ICD-10-PCS | Mod: S$GLB,,, | Performed by: FAMILY MEDICINE

## 2021-05-04 PROCEDURE — 1159F MED LIST DOCD IN RCRD: CPT | Mod: S$GLB,,, | Performed by: DERMATOLOGY

## 2021-05-04 PROCEDURE — 99999 PR PBB SHADOW E&M-EST. PATIENT-LVL V: CPT | Mod: PBBFAC,,, | Performed by: FAMILY MEDICINE

## 2021-05-04 PROCEDURE — 1125F AMNT PAIN NOTED PAIN PRSNT: CPT | Mod: S$GLB,,, | Performed by: FAMILY MEDICINE

## 2021-05-04 PROCEDURE — 3008F PR BODY MASS INDEX (BMI) DOCUMENTED: ICD-10-PCS | Mod: S$GLB,,, | Performed by: FAMILY MEDICINE

## 2021-05-04 PROCEDURE — 99213 PR OFFICE/OUTPT VISIT, EST, LEVL III, 20-29 MIN: ICD-10-PCS | Mod: S$GLB,,, | Performed by: FAMILY MEDICINE

## 2021-05-04 PROCEDURE — 3008F BODY MASS INDEX DOCD: CPT | Mod: S$GLB,,, | Performed by: FAMILY MEDICINE

## 2021-05-04 PROCEDURE — 99204 PR OFFICE/OUTPT VISIT, NEW, LEVL IV, 45-59 MIN: ICD-10-PCS | Mod: 25,S$GLB,, | Performed by: DERMATOLOGY

## 2021-05-04 PROCEDURE — 88305 TISSUE EXAM BY PATHOLOGIST: CPT | Performed by: PATHOLOGY

## 2021-05-04 PROCEDURE — 99999 PR PBB SHADOW E&M-EST. PATIENT-LVL V: ICD-10-PCS | Mod: PBBFAC,,, | Performed by: FAMILY MEDICINE

## 2021-05-04 PROCEDURE — 99999 PR PBB SHADOW E&M-EST. PATIENT-LVL IV: ICD-10-PCS | Mod: PBBFAC,,, | Performed by: DERMATOLOGY

## 2021-05-04 PROCEDURE — 88305 TISSUE EXAM BY PATHOLOGIST: CPT | Mod: 26,,, | Performed by: PATHOLOGY

## 2021-05-04 PROCEDURE — 99999 PR PBB SHADOW E&M-EST. PATIENT-LVL IV: CPT | Mod: PBBFAC,,, | Performed by: DERMATOLOGY

## 2021-05-04 PROCEDURE — 1125F PR PAIN SEVERITY QUANTIFIED, PAIN PRESENT: ICD-10-PCS | Mod: S$GLB,,, | Performed by: FAMILY MEDICINE

## 2021-05-04 PROCEDURE — 11102 TANGNTL BX SKIN SINGLE LES: CPT | Mod: S$GLB,,, | Performed by: DERMATOLOGY

## 2021-05-04 PROCEDURE — 99213 OFFICE O/P EST LOW 20 MIN: CPT | Mod: S$GLB,,, | Performed by: FAMILY MEDICINE

## 2021-05-04 PROCEDURE — 88305 TISSUE EXAM BY PATHOLOGIST: ICD-10-PCS | Mod: 26,,, | Performed by: PATHOLOGY

## 2021-05-04 PROCEDURE — 1101F PT FALLS ASSESS-DOCD LE1/YR: CPT | Mod: S$GLB,,, | Performed by: FAMILY MEDICINE

## 2021-05-04 PROCEDURE — 1159F PR MEDICATION LIST DOCUMENTED IN MEDICAL RECORD: ICD-10-PCS | Mod: S$GLB,,, | Performed by: FAMILY MEDICINE

## 2021-05-04 PROCEDURE — 99204 OFFICE O/P NEW MOD 45 MIN: CPT | Mod: 25,S$GLB,, | Performed by: DERMATOLOGY

## 2021-05-04 PROCEDURE — 1159F MED LIST DOCD IN RCRD: CPT | Mod: S$GLB,,, | Performed by: FAMILY MEDICINE

## 2021-05-04 PROCEDURE — 1159F PR MEDICATION LIST DOCUMENTED IN MEDICAL RECORD: ICD-10-PCS | Mod: S$GLB,,, | Performed by: DERMATOLOGY

## 2021-05-04 PROCEDURE — 11102 PR TANGENTIAL BIOPSY, SKIN, SINGLE LESION: ICD-10-PCS | Mod: S$GLB,,, | Performed by: DERMATOLOGY

## 2021-05-04 RX ORDER — PERMETHRIN 50 MG/G
CREAM TOPICAL ONCE
Qty: 60 G | Refills: 0 | Status: SHIPPED | OUTPATIENT
Start: 2021-05-04 | End: 2021-05-04

## 2021-05-04 RX ORDER — CLOBETASOL PROPIONATE 0.5 MG/G
CREAM TOPICAL
Qty: 30 G | Refills: 2 | Status: SHIPPED | OUTPATIENT
Start: 2021-05-04 | End: 2023-05-16 | Stop reason: SDUPTHER

## 2021-05-04 RX ORDER — DOXYCYCLINE 100 MG/1
TABLET ORAL
Qty: 30 TABLET | Refills: 2 | Status: SHIPPED | OUTPATIENT
Start: 2021-05-04 | End: 2021-05-14

## 2021-05-05 ENCOUNTER — OFFICE VISIT (OUTPATIENT)
Dept: UROLOGY | Facility: CLINIC | Age: 71
End: 2021-05-05
Payer: MEDICARE

## 2021-05-05 ENCOUNTER — TELEPHONE (OUTPATIENT)
Dept: UROLOGY | Facility: CLINIC | Age: 71
End: 2021-05-05

## 2021-05-05 VITALS
SYSTOLIC BLOOD PRESSURE: 198 MMHG | BODY MASS INDEX: 25.56 KG/M2 | WEIGHT: 135.38 LBS | HEIGHT: 61 IN | DIASTOLIC BLOOD PRESSURE: 88 MMHG | RESPIRATION RATE: 18 BRPM | HEART RATE: 78 BPM

## 2021-05-05 DIAGNOSIS — R31.29 MICROHEMATURIA: ICD-10-CM

## 2021-05-05 DIAGNOSIS — F17.200 TOBACCO DEPENDENCE: Chronic | ICD-10-CM

## 2021-05-05 DIAGNOSIS — G25.0 TREMOR, ESSENTIAL: ICD-10-CM

## 2021-05-05 DIAGNOSIS — Z01.818 PRE-OP TESTING: ICD-10-CM

## 2021-05-05 DIAGNOSIS — N32.81 OAB (OVERACTIVE BLADDER): ICD-10-CM

## 2021-05-05 DIAGNOSIS — E78.2 MIXED DYSLIPIDEMIA: Chronic | ICD-10-CM

## 2021-05-05 DIAGNOSIS — N39.46 MIXED STRESS AND URGE INCONTINENCE: Primary | ICD-10-CM

## 2021-05-05 DIAGNOSIS — M54.12 CERVICAL RADICULOPATHY: ICD-10-CM

## 2021-05-05 DIAGNOSIS — I10 ESSENTIAL HYPERTENSION: Chronic | ICD-10-CM

## 2021-05-05 LAB
BILIRUB SERPL-MCNC: ABNORMAL MG/DL
BLOOD URINE, POC: ABNORMAL
CLARITY, POC UA: CLEAR
COLOR, POC UA: YELLOW
GLUCOSE UR QL STRIP: ABNORMAL
KETONES UR QL STRIP: ABNORMAL
LEUKOCYTE ESTERASE URINE, POC: ABNORMAL
NITRITE, POC UA: ABNORMAL
PH, POC UA: 6.5
POC RESIDUAL URINE VOLUME: 25 ML (ref 0–100)
PROTEIN, POC: ABNORMAL
SPECIFIC GRAVITY, POC UA: 1.01
UROBILINOGEN, POC UA: 0.2

## 2021-05-05 PROCEDURE — 81002 POCT URINE DIPSTICK WITHOUT MICROSCOPE: ICD-10-PCS | Mod: S$GLB,,, | Performed by: STUDENT IN AN ORGANIZED HEALTH CARE EDUCATION/TRAINING PROGRAM

## 2021-05-05 PROCEDURE — 1159F PR MEDICATION LIST DOCUMENTED IN MEDICAL RECORD: ICD-10-PCS | Mod: S$GLB,,, | Performed by: STUDENT IN AN ORGANIZED HEALTH CARE EDUCATION/TRAINING PROGRAM

## 2021-05-05 PROCEDURE — 1159F MED LIST DOCD IN RCRD: CPT | Mod: S$GLB,,, | Performed by: STUDENT IN AN ORGANIZED HEALTH CARE EDUCATION/TRAINING PROGRAM

## 2021-05-05 PROCEDURE — 51798 US URINE CAPACITY MEASURE: CPT | Mod: S$GLB,,, | Performed by: STUDENT IN AN ORGANIZED HEALTH CARE EDUCATION/TRAINING PROGRAM

## 2021-05-05 PROCEDURE — 3008F PR BODY MASS INDEX (BMI) DOCUMENTED: ICD-10-PCS | Mod: S$GLB,,, | Performed by: STUDENT IN AN ORGANIZED HEALTH CARE EDUCATION/TRAINING PROGRAM

## 2021-05-05 PROCEDURE — 1126F PR PAIN SEVERITY QUANTIFIED, NO PAIN PRESENT: ICD-10-PCS | Mod: S$GLB,,, | Performed by: STUDENT IN AN ORGANIZED HEALTH CARE EDUCATION/TRAINING PROGRAM

## 2021-05-05 PROCEDURE — 3288F FALL RISK ASSESSMENT DOCD: CPT | Mod: S$GLB,,, | Performed by: STUDENT IN AN ORGANIZED HEALTH CARE EDUCATION/TRAINING PROGRAM

## 2021-05-05 PROCEDURE — 99999 PR PBB SHADOW E&M-EST. PATIENT-LVL IV: CPT | Mod: PBBFAC,,, | Performed by: STUDENT IN AN ORGANIZED HEALTH CARE EDUCATION/TRAINING PROGRAM

## 2021-05-05 PROCEDURE — 1101F PT FALLS ASSESS-DOCD LE1/YR: CPT | Mod: S$GLB,,, | Performed by: STUDENT IN AN ORGANIZED HEALTH CARE EDUCATION/TRAINING PROGRAM

## 2021-05-05 PROCEDURE — 51798 POCT BLADDER SCAN: ICD-10-PCS | Mod: S$GLB,,, | Performed by: STUDENT IN AN ORGANIZED HEALTH CARE EDUCATION/TRAINING PROGRAM

## 2021-05-05 PROCEDURE — 3288F PR FALLS RISK ASSESSMENT DOCUMENTED: ICD-10-PCS | Mod: S$GLB,,, | Performed by: STUDENT IN AN ORGANIZED HEALTH CARE EDUCATION/TRAINING PROGRAM

## 2021-05-05 PROCEDURE — 99214 PR OFFICE/OUTPT VISIT, EST, LEVL IV, 30-39 MIN: ICD-10-PCS | Mod: 25,S$GLB,, | Performed by: STUDENT IN AN ORGANIZED HEALTH CARE EDUCATION/TRAINING PROGRAM

## 2021-05-05 PROCEDURE — 99999 PR PBB SHADOW E&M-EST. PATIENT-LVL IV: ICD-10-PCS | Mod: PBBFAC,,, | Performed by: STUDENT IN AN ORGANIZED HEALTH CARE EDUCATION/TRAINING PROGRAM

## 2021-05-05 PROCEDURE — 99214 OFFICE O/P EST MOD 30 MIN: CPT | Mod: 25,S$GLB,, | Performed by: STUDENT IN AN ORGANIZED HEALTH CARE EDUCATION/TRAINING PROGRAM

## 2021-05-05 PROCEDURE — 81002 URINALYSIS NONAUTO W/O SCOPE: CPT | Mod: S$GLB,,, | Performed by: STUDENT IN AN ORGANIZED HEALTH CARE EDUCATION/TRAINING PROGRAM

## 2021-05-05 PROCEDURE — 3008F BODY MASS INDEX DOCD: CPT | Mod: S$GLB,,, | Performed by: STUDENT IN AN ORGANIZED HEALTH CARE EDUCATION/TRAINING PROGRAM

## 2021-05-05 PROCEDURE — 1101F PR PT FALLS ASSESS DOC 0-1 FALLS W/OUT INJ PAST YR: ICD-10-PCS | Mod: S$GLB,,, | Performed by: STUDENT IN AN ORGANIZED HEALTH CARE EDUCATION/TRAINING PROGRAM

## 2021-05-05 PROCEDURE — 1126F AMNT PAIN NOTED NONE PRSNT: CPT | Mod: S$GLB,,, | Performed by: STUDENT IN AN ORGANIZED HEALTH CARE EDUCATION/TRAINING PROGRAM

## 2021-05-10 ENCOUNTER — TELEPHONE (OUTPATIENT)
Dept: DERMATOLOGY | Facility: CLINIC | Age: 71
End: 2021-05-10

## 2021-05-10 LAB
FINAL PATHOLOGIC DIAGNOSIS: NORMAL
GROSS: NORMAL
Lab: NORMAL
MICROSCOPIC EXAM: NORMAL

## 2021-05-13 ENCOUNTER — HOSPITAL ENCOUNTER (OUTPATIENT)
Dept: RADIOLOGY | Facility: HOSPITAL | Age: 71
Discharge: HOME | End: 2021-05-13
Attending: STUDENT IN AN ORGANIZED HEALTH CARE EDUCATION/TRAINING PROGRAM
Payer: MEDICARE

## 2021-05-13 DIAGNOSIS — R31.29 MICROHEMATURIA: ICD-10-CM

## 2021-05-13 PROCEDURE — 74178 CT UROGRAM ABD PELVIS W WO: ICD-10-PCS | Mod: 26,,, | Performed by: RADIOLOGY

## 2021-05-13 PROCEDURE — 74178 CT ABD&PLV WO CNTR FLWD CNTR: CPT | Mod: 26,,, | Performed by: RADIOLOGY

## 2021-05-13 PROCEDURE — 74178 CT ABD&PLV WO CNTR FLWD CNTR: CPT | Mod: TC

## 2021-05-13 PROCEDURE — 25500020 PHARM REV CODE 255

## 2021-05-13 RX ADMIN — IOHEXOL 125 ML: 350 INJECTION, SOLUTION INTRAVENOUS at 11:05

## 2021-05-14 ENCOUNTER — PROCEDURE VISIT (OUTPATIENT)
Dept: DERMATOLOGY | Facility: CLINIC | Age: 71
End: 2021-05-14
Payer: MEDICARE

## 2021-05-14 DIAGNOSIS — L72.0 EIC (EPIDERMAL INCLUSION CYST): Primary | ICD-10-CM

## 2021-05-14 PROCEDURE — 99499 UNLISTED E&M SERVICE: CPT | Mod: S$GLB,,, | Performed by: DERMATOLOGY

## 2021-05-14 PROCEDURE — 11401 PR EXC SKIN BENIG 0.6-1 CM TRUNK,ARM,LEG: ICD-10-PCS | Mod: 59,S$GLB,, | Performed by: DERMATOLOGY

## 2021-05-14 PROCEDURE — 11402 EXC TR-EXT B9+MARG 1.1-2 CM: CPT | Mod: S$GLB,,, | Performed by: DERMATOLOGY

## 2021-05-14 PROCEDURE — 11402 PR EXC SKIN BENIG 1.1-2 CM TRUNK,ARM,LEG: ICD-10-PCS | Mod: S$GLB,,, | Performed by: DERMATOLOGY

## 2021-05-14 PROCEDURE — 88304 PR  SURG PATH,LEVEL III: ICD-10-PCS | Mod: 26,,, | Performed by: PATHOLOGY

## 2021-05-14 PROCEDURE — 88304 TISSUE EXAM BY PATHOLOGIST: CPT | Performed by: PATHOLOGY

## 2021-05-14 PROCEDURE — 11401 EXC TR-EXT B9+MARG 0.6-1 CM: CPT | Mod: 59,S$GLB,, | Performed by: DERMATOLOGY

## 2021-05-14 PROCEDURE — 99499 NO LOS: ICD-10-PCS | Mod: S$GLB,,, | Performed by: DERMATOLOGY

## 2021-05-14 PROCEDURE — 88304 TISSUE EXAM BY PATHOLOGIST: CPT | Mod: 26,,, | Performed by: PATHOLOGY

## 2021-05-19 LAB
FINAL PATHOLOGIC DIAGNOSIS: NORMAL
GROSS: NORMAL
Lab: NORMAL
MICROSCOPIC EXAM: NORMAL

## 2021-05-19 RX ORDER — TRAZODONE HYDROCHLORIDE 50 MG/1
50 TABLET ORAL NIGHTLY
Qty: 30 TABLET | Refills: 4 | Status: SHIPPED | OUTPATIENT
Start: 2021-05-19 | End: 2021-09-13

## 2021-05-23 ENCOUNTER — LAB VISIT (OUTPATIENT)
Dept: PRIMARY CARE CLINIC | Facility: CLINIC | Age: 71
End: 2021-05-23
Payer: MEDICARE

## 2021-05-23 DIAGNOSIS — Z01.818 PRE-OP TESTING: ICD-10-CM

## 2021-05-23 PROCEDURE — U0005 INFEC AGEN DETEC AMPLI PROBE: HCPCS | Performed by: STUDENT IN AN ORGANIZED HEALTH CARE EDUCATION/TRAINING PROGRAM

## 2021-05-23 PROCEDURE — U0003 INFECTIOUS AGENT DETECTION BY NUCLEIC ACID (DNA OR RNA); SEVERE ACUTE RESPIRATORY SYNDROME CORONAVIRUS 2 (SARS-COV-2) (CORONAVIRUS DISEASE [COVID-19]), AMPLIFIED PROBE TECHNIQUE, MAKING USE OF HIGH THROUGHPUT TECHNOLOGIES AS DESCRIBED BY CMS-2020-01-R: HCPCS | Performed by: STUDENT IN AN ORGANIZED HEALTH CARE EDUCATION/TRAINING PROGRAM

## 2021-05-24 LAB — SARS-COV-2 RNA RESP QL NAA+PROBE: NOT DETECTED

## 2021-05-25 ENCOUNTER — TELEPHONE (OUTPATIENT)
Dept: UROLOGY | Facility: CLINIC | Age: 71
End: 2021-05-25

## 2021-05-25 ENCOUNTER — CLINICAL SUPPORT (OUTPATIENT)
Dept: DERMATOLOGY | Facility: CLINIC | Age: 71
End: 2021-05-25
Payer: MEDICARE

## 2021-05-25 DIAGNOSIS — L73.2 HIDRADENITIS: ICD-10-CM

## 2021-05-25 DIAGNOSIS — Z48.02 VISIT FOR SUTURE REMOVAL: Primary | ICD-10-CM

## 2021-05-25 PROCEDURE — 99999 PR PBB SHADOW E&M-EST. PATIENT-LVL I: CPT | Mod: PBBFAC,,,

## 2021-05-25 PROCEDURE — 99999 PR PBB SHADOW E&M-EST. PATIENT-LVL I: ICD-10-PCS | Mod: PBBFAC,,,

## 2021-05-25 PROCEDURE — 99024 POSTOP FOLLOW-UP VISIT: CPT | Mod: S$GLB,,, | Performed by: STUDENT IN AN ORGANIZED HEALTH CARE EDUCATION/TRAINING PROGRAM

## 2021-05-25 PROCEDURE — 99024 PR POST-OP FOLLOW-UP VISIT: ICD-10-PCS | Mod: S$GLB,,, | Performed by: STUDENT IN AN ORGANIZED HEALTH CARE EDUCATION/TRAINING PROGRAM

## 2021-05-25 PROCEDURE — 87070 CULTURE OTHR SPECIMN AEROBIC: CPT | Performed by: DERMATOLOGY

## 2021-05-26 ENCOUNTER — TELEPHONE (OUTPATIENT)
Dept: UROLOGY | Facility: CLINIC | Age: 71
End: 2021-05-26

## 2021-05-26 ENCOUNTER — HOSPITAL ENCOUNTER (OUTPATIENT)
Facility: AMBULARY SURGERY CENTER | Age: 71
Discharge: HOME | End: 2021-05-26
Attending: STUDENT IN AN ORGANIZED HEALTH CARE EDUCATION/TRAINING PROGRAM | Admitting: STUDENT IN AN ORGANIZED HEALTH CARE EDUCATION/TRAINING PROGRAM
Payer: MEDICARE

## 2021-05-26 DIAGNOSIS — R31.29 MICROHEMATURIA: Primary | ICD-10-CM

## 2021-05-26 PROCEDURE — 52000 PR CYSTOURETHROSCOPY: ICD-10-PCS | Mod: ,,, | Performed by: STUDENT IN AN ORGANIZED HEALTH CARE EDUCATION/TRAINING PROGRAM

## 2021-05-26 PROCEDURE — 52000 CYSTOURETHROSCOPY: CPT | Mod: ,,, | Performed by: STUDENT IN AN ORGANIZED HEALTH CARE EDUCATION/TRAINING PROGRAM

## 2021-05-26 PROCEDURE — 52000 CYSTOURETHROSCOPY: CPT | Performed by: STUDENT IN AN ORGANIZED HEALTH CARE EDUCATION/TRAINING PROGRAM

## 2021-05-26 RX ORDER — SULFAMETHOXAZOLE AND TRIMETHOPRIM 800; 160 MG/1; MG/1
1 TABLET ORAL 2 TIMES DAILY
Qty: 2 TABLET | Refills: 0 | Status: SHIPPED | OUTPATIENT
Start: 2021-05-26 | End: 2021-05-27

## 2021-05-26 RX ORDER — WATER 1000 ML/1000ML
INJECTION, SOLUTION INTRAVENOUS
Status: DISCONTINUED | OUTPATIENT
Start: 2021-05-26 | End: 2021-05-26 | Stop reason: HOSPADM

## 2021-05-26 RX ORDER — LIDOCAINE HYDROCHLORIDE 20 MG/ML
JELLY TOPICAL
Status: DISCONTINUED | OUTPATIENT
Start: 2021-05-26 | End: 2021-05-26 | Stop reason: HOSPADM

## 2021-05-26 RX ORDER — LIDOCAINE HYDROCHLORIDE 20 MG/ML
JELLY TOPICAL
Status: DISCONTINUED
Start: 2021-05-26 | End: 2021-05-26 | Stop reason: HOSPADM

## 2021-05-27 VITALS
SYSTOLIC BLOOD PRESSURE: 139 MMHG | HEIGHT: 61 IN | RESPIRATION RATE: 20 BRPM | BODY MASS INDEX: 25.49 KG/M2 | OXYGEN SATURATION: 97 % | TEMPERATURE: 98 F | HEART RATE: 64 BPM | DIASTOLIC BLOOD PRESSURE: 84 MMHG | WEIGHT: 135 LBS

## 2021-05-28 ENCOUNTER — TELEPHONE (OUTPATIENT)
Dept: DERMATOLOGY | Facility: CLINIC | Age: 71
End: 2021-05-28

## 2021-05-28 ENCOUNTER — HOSPITAL ENCOUNTER (OUTPATIENT)
Dept: CARDIOLOGY | Facility: CLINIC | Age: 71
Discharge: HOME | End: 2021-05-28
Attending: INTERNAL MEDICINE
Payer: MEDICARE

## 2021-05-28 DIAGNOSIS — R55 SYNCOPE AND COLLAPSE: ICD-10-CM

## 2021-05-28 LAB — BACTERIA SPEC AEROBE CULT: NO GROWTH

## 2021-05-28 PROCEDURE — 93298 REM INTERROG DEV EVAL SCRMS: CPT | Mod: S$GLB,,, | Performed by: INTERNAL MEDICINE

## 2021-05-28 PROCEDURE — 93298 CARDIAC DEVICE CHECK - REMOTE: ICD-10-PCS | Mod: S$GLB,,, | Performed by: INTERNAL MEDICINE

## 2021-06-01 ENCOUNTER — TELEPHONE (OUTPATIENT)
Dept: CARDIOLOGY | Facility: CLINIC | Age: 71
End: 2021-06-01

## 2021-06-01 ENCOUNTER — HOSPITAL ENCOUNTER (OUTPATIENT)
Facility: HOSPITAL | Age: 71
Discharge: HOME HEALTH | End: 2021-06-03
Attending: EMERGENCY MEDICINE | Admitting: INTERNAL MEDICINE
Payer: MEDICARE

## 2021-06-01 DIAGNOSIS — R55 SYNCOPE: ICD-10-CM

## 2021-06-01 DIAGNOSIS — I95.1 ORTHOSTATIC HYPOTENSION: ICD-10-CM

## 2021-06-01 DIAGNOSIS — R55 SYNCOPE, UNSPECIFIED SYNCOPE TYPE: ICD-10-CM

## 2021-06-01 DIAGNOSIS — K44.9 HIATAL HERNIA: ICD-10-CM

## 2021-06-01 DIAGNOSIS — R53.1 WEAKNESS: Primary | ICD-10-CM

## 2021-06-01 DIAGNOSIS — R07.9 CHEST PAIN: ICD-10-CM

## 2021-06-01 PROBLEM — D72.829 LEUKOCYTOSIS: Status: ACTIVE | Noted: 2021-06-01

## 2021-06-01 LAB
ALBUMIN SERPL BCP-MCNC: 3.8 G/DL (ref 3.5–5.2)
ALP SERPL-CCNC: 69 U/L (ref 55–135)
ALT SERPL W/O P-5'-P-CCNC: 14 U/L (ref 10–44)
ANION GAP SERPL CALC-SCNC: 10 MMOL/L (ref 8–16)
AST SERPL-CCNC: 19 U/L (ref 10–40)
BASOPHILS # BLD AUTO: 0.12 K/UL (ref 0–0.2)
BASOPHILS NFR BLD: 0.9 % (ref 0–1.9)
BILIRUB SERPL-MCNC: 0.6 MG/DL (ref 0.1–1)
BILIRUB UR QL STRIP: NEGATIVE
BNP SERPL-MCNC: 39 PG/ML (ref 0–99)
BUN SERPL-MCNC: 14 MG/DL (ref 8–23)
CALCIUM SERPL-MCNC: 9.2 MG/DL (ref 8.7–10.5)
CHLORIDE SERPL-SCNC: 104 MMOL/L (ref 95–110)
CLARITY UR: CLEAR
CO2 SERPL-SCNC: 22 MMOL/L (ref 23–29)
COLOR UR: ABNORMAL
CREAT SERPL-MCNC: 0.6 MG/DL (ref 0.5–1.4)
DIFFERENTIAL METHOD: ABNORMAL
EOSINOPHIL # BLD AUTO: 0 K/UL (ref 0–0.5)
EOSINOPHIL NFR BLD: 0.2 % (ref 0–8)
ERYTHROCYTE [DISTWIDTH] IN BLOOD BY AUTOMATED COUNT: 14 % (ref 11.5–14.5)
EST. GFR  (AFRICAN AMERICAN): >60 ML/MIN/1.73 M^2
EST. GFR  (NON AFRICAN AMERICAN): >60 ML/MIN/1.73 M^2
GLUCOSE SERPL-MCNC: 119 MG/DL (ref 70–110)
GLUCOSE SERPL-MCNC: 75 MG/DL (ref 70–110)
GLUCOSE UR QL STRIP: NEGATIVE
HCT VFR BLD AUTO: 39.8 % (ref 37–48.5)
HGB BLD-MCNC: 13 G/DL (ref 12–16)
HGB UR QL STRIP: ABNORMAL
IMM GRANULOCYTES # BLD AUTO: 0.05 K/UL (ref 0–0.04)
IMM GRANULOCYTES NFR BLD AUTO: 0.4 % (ref 0–0.5)
KETONES UR QL STRIP: NEGATIVE
LEUKOCYTE ESTERASE UR QL STRIP: NEGATIVE
LYMPHOCYTES # BLD AUTO: 2.9 K/UL (ref 1–4.8)
LYMPHOCYTES NFR BLD: 21.5 % (ref 18–48)
MCH RBC QN AUTO: 31.2 PG (ref 27–31)
MCHC RBC AUTO-ENTMCNC: 32.7 G/DL (ref 32–36)
MCV RBC AUTO: 95 FL (ref 82–98)
MONOCYTES # BLD AUTO: 0.9 K/UL (ref 0.3–1)
MONOCYTES NFR BLD: 6.5 % (ref 4–15)
NEUTROPHILS # BLD AUTO: 9.6 K/UL (ref 1.8–7.7)
NEUTROPHILS NFR BLD: 70.5 % (ref 38–73)
NITRITE UR QL STRIP: NEGATIVE
NRBC BLD-RTO: 0 /100 WBC
PH UR STRIP: 6 [PH] (ref 5–8)
PLATELET # BLD AUTO: 318 K/UL (ref 150–450)
PMV BLD AUTO: 9.6 FL (ref 9.2–12.9)
POTASSIUM SERPL-SCNC: 3.7 MMOL/L (ref 3.5–5.1)
PROT SERPL-MCNC: 7.7 G/DL (ref 6–8.4)
PROT UR QL STRIP: NEGATIVE
RBC # BLD AUTO: 4.17 M/UL (ref 4–5.4)
SARS-COV-2 RDRP RESP QL NAA+PROBE: NEGATIVE
SODIUM SERPL-SCNC: 136 MMOL/L (ref 136–145)
SP GR UR STRIP: 1 (ref 1–1.03)
TROPONIN I SERPL DL<=0.01 NG/ML-MCNC: <0.03 NG/ML
TSH SERPL DL<=0.005 MIU/L-ACNC: 0.78 UIU/ML (ref 0.34–5.6)
URN SPEC COLLECT METH UR: ABNORMAL
UROBILINOGEN UR STRIP-ACNC: NEGATIVE EU/DL
WBC # BLD AUTO: 13.59 K/UL (ref 3.9–12.7)

## 2021-06-01 PROCEDURE — G0378 HOSPITAL OBSERVATION PER HR: HCPCS

## 2021-06-01 PROCEDURE — 84436 ASSAY OF TOTAL THYROXINE: CPT | Performed by: INTERNAL MEDICINE

## 2021-06-01 PROCEDURE — 99900035 HC TECH TIME PER 15 MIN (STAT)

## 2021-06-01 PROCEDURE — 80053 COMPREHEN METABOLIC PANEL: CPT | Performed by: EMERGENCY MEDICINE

## 2021-06-01 PROCEDURE — 96374 THER/PROPH/DIAG INJ IV PUSH: CPT | Mod: 59

## 2021-06-01 PROCEDURE — 84484 ASSAY OF TROPONIN QUANT: CPT | Performed by: EMERGENCY MEDICINE

## 2021-06-01 PROCEDURE — 93010 ELECTROCARDIOGRAM REPORT: CPT | Mod: ,,, | Performed by: SPECIALIST

## 2021-06-01 PROCEDURE — 81003 URINALYSIS AUTO W/O SCOPE: CPT | Performed by: EMERGENCY MEDICINE

## 2021-06-01 PROCEDURE — 84443 ASSAY THYROID STIM HORMONE: CPT | Performed by: INTERNAL MEDICINE

## 2021-06-01 PROCEDURE — 93010 ELECTROCARDIOGRAM REPORT: CPT | Mod: 76,,, | Performed by: SPECIALIST

## 2021-06-01 PROCEDURE — 36415 COLL VENOUS BLD VENIPUNCTURE: CPT | Performed by: EMERGENCY MEDICINE

## 2021-06-01 PROCEDURE — 36415 COLL VENOUS BLD VENIPUNCTURE: CPT | Performed by: INTERNAL MEDICINE

## 2021-06-01 PROCEDURE — 96372 THER/PROPH/DIAG INJ SC/IM: CPT | Mod: 59

## 2021-06-01 PROCEDURE — 84484 ASSAY OF TROPONIN QUANT: CPT | Mod: 91 | Performed by: NURSE PRACTITIONER

## 2021-06-01 PROCEDURE — 25000003 PHARM REV CODE 250: Performed by: NURSE PRACTITIONER

## 2021-06-01 PROCEDURE — 36415 COLL VENOUS BLD VENIPUNCTURE: CPT | Performed by: NURSE PRACTITIONER

## 2021-06-01 PROCEDURE — 94761 N-INVAS EAR/PLS OXIMETRY MLT: CPT

## 2021-06-01 PROCEDURE — 83880 ASSAY OF NATRIURETIC PEPTIDE: CPT | Performed by: EMERGENCY MEDICINE

## 2021-06-01 PROCEDURE — 93005 ELECTROCARDIOGRAM TRACING: CPT | Performed by: SPECIALIST

## 2021-06-01 PROCEDURE — 93010 EKG 12-LEAD: ICD-10-PCS | Mod: ,,, | Performed by: SPECIALIST

## 2021-06-01 PROCEDURE — 27000221 HC OXYGEN, UP TO 24 HOURS

## 2021-06-01 PROCEDURE — 99285 EMERGENCY DEPT VISIT HI MDM: CPT | Mod: 25

## 2021-06-01 PROCEDURE — 85025 COMPLETE CBC W/AUTO DIFF WBC: CPT | Performed by: EMERGENCY MEDICINE

## 2021-06-01 PROCEDURE — 96361 HYDRATE IV INFUSION ADD-ON: CPT

## 2021-06-01 PROCEDURE — 63600175 PHARM REV CODE 636 W HCPCS: Performed by: NURSE PRACTITIONER

## 2021-06-01 PROCEDURE — U0002 COVID-19 LAB TEST NON-CDC: HCPCS | Performed by: NURSE PRACTITIONER

## 2021-06-01 RX ORDER — ONDANSETRON 2 MG/ML
4 INJECTION INTRAMUSCULAR; INTRAVENOUS EVERY 8 HOURS PRN
Status: DISCONTINUED | OUTPATIENT
Start: 2021-06-01 | End: 2021-06-03 | Stop reason: HOSPADM

## 2021-06-01 RX ORDER — POTASSIUM CHLORIDE 20 MEQ/1
40 TABLET, EXTENDED RELEASE ORAL
Status: DISCONTINUED | OUTPATIENT
Start: 2021-06-01 | End: 2021-06-03 | Stop reason: HOSPADM

## 2021-06-01 RX ORDER — POTASSIUM CHLORIDE 20 MEQ/1
20 TABLET, EXTENDED RELEASE ORAL
Status: DISCONTINUED | OUTPATIENT
Start: 2021-06-01 | End: 2021-06-03 | Stop reason: HOSPADM

## 2021-06-01 RX ORDER — IPRATROPIUM BROMIDE AND ALBUTEROL SULFATE 2.5; .5 MG/3ML; MG/3ML
3 SOLUTION RESPIRATORY (INHALATION) EVERY 6 HOURS PRN
Status: DISCONTINUED | OUTPATIENT
Start: 2021-06-01 | End: 2021-06-03 | Stop reason: HOSPADM

## 2021-06-01 RX ORDER — POTASSIUM CHLORIDE 7.45 MG/ML
20 INJECTION INTRAVENOUS
Status: DISCONTINUED | OUTPATIENT
Start: 2021-06-01 | End: 2021-06-03 | Stop reason: HOSPADM

## 2021-06-01 RX ORDER — POTASSIUM CHLORIDE 7.45 MG/ML
40 INJECTION INTRAVENOUS
Status: DISCONTINUED | OUTPATIENT
Start: 2021-06-01 | End: 2021-06-03 | Stop reason: HOSPADM

## 2021-06-01 RX ORDER — LANOLIN ALCOHOL/MO/W.PET/CERES
800 CREAM (GRAM) TOPICAL
Status: DISCONTINUED | OUTPATIENT
Start: 2021-06-01 | End: 2021-06-03 | Stop reason: HOSPADM

## 2021-06-01 RX ORDER — MAGNESIUM SULFATE HEPTAHYDRATE 40 MG/ML
2 INJECTION, SOLUTION INTRAVENOUS
Status: DISCONTINUED | OUTPATIENT
Start: 2021-06-01 | End: 2021-06-03 | Stop reason: HOSPADM

## 2021-06-01 RX ORDER — ACETAMINOPHEN 325 MG/1
650 TABLET ORAL EVERY 4 HOURS PRN
Status: DISCONTINUED | OUTPATIENT
Start: 2021-06-01 | End: 2021-06-03 | Stop reason: HOSPADM

## 2021-06-01 RX ORDER — MAGNESIUM SULFATE 1 G/100ML
1 INJECTION INTRAVENOUS
Status: DISCONTINUED | OUTPATIENT
Start: 2021-06-01 | End: 2021-06-03 | Stop reason: HOSPADM

## 2021-06-01 RX ORDER — PANTOPRAZOLE SODIUM 40 MG/1
40 TABLET, DELAYED RELEASE ORAL DAILY
Status: DISCONTINUED | OUTPATIENT
Start: 2021-06-02 | End: 2021-06-03 | Stop reason: HOSPADM

## 2021-06-01 RX ORDER — SODIUM CHLORIDE 0.9 % (FLUSH) 0.9 %
10 SYRINGE (ML) INJECTION
Status: DISCONTINUED | OUTPATIENT
Start: 2021-06-01 | End: 2021-06-03 | Stop reason: HOSPADM

## 2021-06-01 RX ORDER — MAG HYDROX/ALUMINUM HYD/SIMETH 200-200-20
15 SUSPENSION, ORAL (FINAL DOSE FORM) ORAL EVERY 6 HOURS PRN
Status: DISCONTINUED | OUTPATIENT
Start: 2021-06-01 | End: 2021-06-03 | Stop reason: HOSPADM

## 2021-06-01 RX ORDER — GABAPENTIN 300 MG/1
300 CAPSULE ORAL 2 TIMES DAILY
Status: DISCONTINUED | OUTPATIENT
Start: 2021-06-01 | End: 2021-06-01

## 2021-06-01 RX ORDER — NAPROXEN SODIUM 220 MG/1
81 TABLET, FILM COATED ORAL DAILY
Status: DISCONTINUED | OUTPATIENT
Start: 2021-06-01 | End: 2021-06-02

## 2021-06-01 RX ORDER — NIFEDIPINE 30 MG/1
30 TABLET, EXTENDED RELEASE ORAL DAILY
Status: DISCONTINUED | OUTPATIENT
Start: 2021-06-02 | End: 2021-06-01

## 2021-06-01 RX ORDER — HYDRALAZINE HYDROCHLORIDE 20 MG/ML
10 INJECTION INTRAMUSCULAR; INTRAVENOUS EVERY 4 HOURS PRN
Status: DISCONTINUED | OUTPATIENT
Start: 2021-06-01 | End: 2021-06-01

## 2021-06-01 RX ORDER — AMOXICILLIN 250 MG
1 CAPSULE ORAL 2 TIMES DAILY
Status: DISCONTINUED | OUTPATIENT
Start: 2021-06-01 | End: 2021-06-01

## 2021-06-01 RX ORDER — POLYETHYLENE GLYCOL 3350 17 G/17G
17 POWDER, FOR SOLUTION ORAL 2 TIMES DAILY PRN
Status: DISCONTINUED | OUTPATIENT
Start: 2021-06-01 | End: 2021-06-03 | Stop reason: HOSPADM

## 2021-06-01 RX ORDER — ENOXAPARIN SODIUM 100 MG/ML
40 INJECTION SUBCUTANEOUS EVERY 24 HOURS
Status: DISCONTINUED | OUTPATIENT
Start: 2021-06-01 | End: 2021-06-03 | Stop reason: HOSPADM

## 2021-06-01 RX ORDER — MAGNESIUM SULFATE HEPTAHYDRATE 40 MG/ML
4 INJECTION, SOLUTION INTRAVENOUS
Status: DISCONTINUED | OUTPATIENT
Start: 2021-06-01 | End: 2021-06-03 | Stop reason: HOSPADM

## 2021-06-01 RX ORDER — TRAZODONE HYDROCHLORIDE 50 MG/1
50 TABLET ORAL NIGHTLY
Status: DISCONTINUED | OUTPATIENT
Start: 2021-06-01 | End: 2021-06-01

## 2021-06-01 RX ORDER — FLUTICASONE PROPIONATE 50 MCG
1 SPRAY, SUSPENSION (ML) NASAL DAILY
Status: DISCONTINUED | OUTPATIENT
Start: 2021-06-02 | End: 2021-06-03 | Stop reason: HOSPADM

## 2021-06-01 RX ADMIN — SODIUM CHLORIDE 1000 ML: 9 INJECTION, SOLUTION INTRAVENOUS at 06:06

## 2021-06-01 RX ADMIN — HYDRALAZINE HYDROCHLORIDE 10 MG: 20 INJECTION INTRAMUSCULAR; INTRAVENOUS at 05:06

## 2021-06-01 RX ADMIN — ENOXAPARIN SODIUM 40 MG: 40 INJECTION SUBCUTANEOUS at 04:06

## 2021-06-01 RX ADMIN — GABAPENTIN 300 MG: 300 CAPSULE ORAL at 08:06

## 2021-06-01 RX ADMIN — ALUMINUM HYDROXIDE, MAGNESIUM HYDROXIDE, AND SIMETHICONE 15 ML: 200; 200; 20 SUSPENSION ORAL at 08:06

## 2021-06-01 RX ADMIN — TRAZODONE HYDROCHLORIDE 50 MG: 50 TABLET ORAL at 08:06

## 2021-06-01 RX ADMIN — ASPIRIN 81 MG: 81 TABLET, CHEWABLE ORAL at 04:06

## 2021-06-02 ENCOUNTER — HOSPITAL ENCOUNTER (OUTPATIENT)
Dept: RADIOLOGY | Facility: HOSPITAL | Age: 71
Discharge: HOME | End: 2021-06-02
Payer: MEDICARE

## 2021-06-02 ENCOUNTER — CLINICAL SUPPORT (OUTPATIENT)
Dept: CARDIOLOGY | Facility: HOSPITAL | Age: 71
End: 2021-06-02
Payer: MEDICARE

## 2021-06-02 PROBLEM — Z98.890 HISTORY OF LOOP RECORDER: Chronic | Status: ACTIVE | Noted: 2021-06-02

## 2021-06-02 PROBLEM — D72.829 LEUKOCYTOSIS: Status: RESOLVED | Noted: 2021-06-01 | Resolved: 2021-06-02

## 2021-06-02 PROBLEM — I95.1 ORTHOSTATIC HYPOTENSION: Status: ACTIVE | Noted: 2021-06-02

## 2021-06-02 LAB
ANION GAP SERPL CALC-SCNC: 8 MMOL/L (ref 8–16)
BASOPHILS # BLD AUTO: 0.11 K/UL (ref 0–0.2)
BASOPHILS NFR BLD: 1 % (ref 0–1.9)
BUN SERPL-MCNC: 16 MG/DL (ref 8–23)
CALCIUM SERPL-MCNC: 8.5 MG/DL (ref 8.7–10.5)
CHLORIDE SERPL-SCNC: 109 MMOL/L (ref 95–110)
CO2 SERPL-SCNC: 22 MMOL/L (ref 23–29)
CREAT SERPL-MCNC: 0.5 MG/DL (ref 0.5–1.4)
CV PHARM DOSE: 0.4 MG
CV STRESS BASE HR: 59 BPM
DIASTOLIC BLOOD PRESSURE: 89 MMHG
DIFFERENTIAL METHOD: ABNORMAL
EOSINOPHIL # BLD AUTO: 0.1 K/UL (ref 0–0.5)
EOSINOPHIL NFR BLD: 1 % (ref 0–8)
ERYTHROCYTE [DISTWIDTH] IN BLOOD BY AUTOMATED COUNT: 14.1 % (ref 11.5–14.5)
EST. GFR  (AFRICAN AMERICAN): >60 ML/MIN/1.73 M^2
EST. GFR  (NON AFRICAN AMERICAN): >60 ML/MIN/1.73 M^2
GLUCOSE SERPL-MCNC: 86 MG/DL (ref 70–110)
HCT VFR BLD AUTO: 36.2 % (ref 37–48.5)
HGB BLD-MCNC: 12 G/DL (ref 12–16)
IMM GRANULOCYTES # BLD AUTO: 0.04 K/UL (ref 0–0.04)
IMM GRANULOCYTES NFR BLD AUTO: 0.4 % (ref 0–0.5)
LYMPHOCYTES # BLD AUTO: 2.9 K/UL (ref 1–4.8)
LYMPHOCYTES NFR BLD: 26.8 % (ref 18–48)
MAGNESIUM SERPL-MCNC: 2.1 MG/DL (ref 1.6–2.6)
MCH RBC QN AUTO: 31.1 PG (ref 27–31)
MCHC RBC AUTO-ENTMCNC: 33.1 G/DL (ref 32–36)
MCV RBC AUTO: 94 FL (ref 82–98)
MONOCYTES # BLD AUTO: 1.1 K/UL (ref 0.3–1)
MONOCYTES NFR BLD: 9.8 % (ref 4–15)
NEUTROPHILS # BLD AUTO: 6.5 K/UL (ref 1.8–7.7)
NEUTROPHILS NFR BLD: 61 % (ref 38–73)
NRBC BLD-RTO: 0 /100 WBC
OHS CV CPX 1 MINUTE RECOVERY HEART RATE: 95 BPM
OHS CV CPX 85 PERCENT MAX PREDICTED HEART RATE MALE: 123
OHS CV CPX MAX PREDICTED HEART RATE: 144
OHS CV CPX PATIENT IS FEMALE: 1
OHS CV CPX PATIENT IS MALE: 0
OHS CV CPX PEAK DIASTOLIC BLOOD PRESSURE: 89 MMHG
OHS CV CPX PEAK HEAR RATE: 96 BPM
OHS CV CPX PEAK RATE PRESSURE PRODUCT: NORMAL
OHS CV CPX PEAK SYSTOLIC BLOOD PRESSURE: 161 MMHG
OHS CV CPX PERCENT MAX PREDICTED HEART RATE ACHIEVED: 66
OHS CV CPX RATE PRESSURE PRODUCT PRESENTING: 9499
PLATELET # BLD AUTO: 302 K/UL (ref 150–450)
PMV BLD AUTO: 9.7 FL (ref 9.2–12.9)
POTASSIUM SERPL-SCNC: 3.7 MMOL/L (ref 3.5–5.1)
RBC # BLD AUTO: 3.86 M/UL (ref 4–5.4)
SODIUM SERPL-SCNC: 139 MMOL/L (ref 136–145)
SYSTOLIC BLOOD PRESSURE: 161 MMHG
WBC # BLD AUTO: 10.69 K/UL (ref 3.9–12.7)

## 2021-06-02 PROCEDURE — 99900035 HC TECH TIME PER 15 MIN (STAT)

## 2021-06-02 PROCEDURE — 83735 ASSAY OF MAGNESIUM: CPT | Performed by: NURSE PRACTITIONER

## 2021-06-02 PROCEDURE — 25000003 PHARM REV CODE 250: Performed by: INTERNAL MEDICINE

## 2021-06-02 PROCEDURE — 93016 CV STRESS TEST SUPVJ ONLY: CPT | Mod: ,,, | Performed by: INTERNAL MEDICINE

## 2021-06-02 PROCEDURE — 93017 CV STRESS TEST TRACING ONLY: CPT

## 2021-06-02 PROCEDURE — 94761 N-INVAS EAR/PLS OXIMETRY MLT: CPT

## 2021-06-02 PROCEDURE — 85025 COMPLETE CBC W/AUTO DIFF WBC: CPT | Performed by: NURSE PRACTITIONER

## 2021-06-02 PROCEDURE — 63600175 PHARM REV CODE 636 W HCPCS: Performed by: NURSE PRACTITIONER

## 2021-06-02 PROCEDURE — 99214 OFFICE O/P EST MOD 30 MIN: CPT | Mod: ,,, | Performed by: INTERNAL MEDICINE

## 2021-06-02 PROCEDURE — 99900031 HC PATIENT EDUCATION (STAT)

## 2021-06-02 PROCEDURE — 25000242 PHARM REV CODE 250 ALT 637 W/ HCPCS: Performed by: NURSE PRACTITIONER

## 2021-06-02 PROCEDURE — 93016 NUCLEAR STRESS TEST (CUPID ONLY): ICD-10-PCS | Mod: ,,, | Performed by: INTERNAL MEDICINE

## 2021-06-02 PROCEDURE — 36415 COLL VENOUS BLD VENIPUNCTURE: CPT | Performed by: NURSE PRACTITIONER

## 2021-06-02 PROCEDURE — G0378 HOSPITAL OBSERVATION PER HR: HCPCS

## 2021-06-02 PROCEDURE — A9502 TC99M TETROFOSMIN: HCPCS

## 2021-06-02 PROCEDURE — 80048 BASIC METABOLIC PNL TOTAL CA: CPT | Performed by: NURSE PRACTITIONER

## 2021-06-02 PROCEDURE — 99214 PR OFFICE/OUTPT VISIT, EST, LEVL IV, 30-39 MIN: ICD-10-PCS | Mod: ,,, | Performed by: INTERNAL MEDICINE

## 2021-06-02 PROCEDURE — 25000003 PHARM REV CODE 250: Performed by: NURSE PRACTITIONER

## 2021-06-02 PROCEDURE — 78452 HT MUSCLE IMAGE SPECT MULT: CPT | Mod: TC

## 2021-06-02 PROCEDURE — 96372 THER/PROPH/DIAG INJ SC/IM: CPT

## 2021-06-02 PROCEDURE — 93018 NUCLEAR STRESS TEST (CUPID ONLY): ICD-10-PCS | Mod: ,,, | Performed by: INTERNAL MEDICINE

## 2021-06-02 PROCEDURE — 93018 CV STRESS TEST I&R ONLY: CPT | Mod: ,,, | Performed by: INTERNAL MEDICINE

## 2021-06-02 RX ORDER — NAPROXEN SODIUM 220 MG/1
81 TABLET, FILM COATED ORAL NIGHTLY
Status: DISCONTINUED | OUTPATIENT
Start: 2021-06-03 | End: 2021-06-03 | Stop reason: HOSPADM

## 2021-06-02 RX ORDER — REGADENOSON 0.08 MG/ML
0.4 INJECTION, SOLUTION INTRAVENOUS ONCE
Status: COMPLETED | OUTPATIENT
Start: 2021-06-02 | End: 2021-06-02

## 2021-06-02 RX ORDER — LISINOPRIL 2.5 MG/1
2.5 TABLET ORAL DAILY
Status: DISCONTINUED | OUTPATIENT
Start: 2021-06-02 | End: 2021-06-03

## 2021-06-02 RX ADMIN — PANTOPRAZOLE SODIUM 40 MG: 40 TABLET, DELAYED RELEASE ORAL at 05:06

## 2021-06-02 RX ADMIN — ENOXAPARIN SODIUM 40 MG: 40 INJECTION SUBCUTANEOUS at 06:06

## 2021-06-02 RX ADMIN — FLUTICASONE PROPIONATE 50 MCG: 50 SPRAY, METERED NASAL at 12:06

## 2021-06-02 RX ADMIN — ASPIRIN 81 MG: 81 TABLET, CHEWABLE ORAL at 12:06

## 2021-06-02 RX ADMIN — LISINOPRIL 2.5 MG: 2.5 TABLET ORAL at 06:06

## 2021-06-02 RX ADMIN — ACETAMINOPHEN 650 MG: 325 TABLET, FILM COATED ORAL at 11:06

## 2021-06-02 RX ADMIN — REGADENOSON 0.4 MG: 0.08 INJECTION, SOLUTION INTRAVENOUS at 09:06

## 2021-06-02 RX ADMIN — POTASSIUM CHLORIDE 20 MEQ: 20 TABLET, EXTENDED RELEASE ORAL at 12:06

## 2021-06-03 VITALS
RESPIRATION RATE: 18 BRPM | WEIGHT: 132.19 LBS | TEMPERATURE: 98 F | DIASTOLIC BLOOD PRESSURE: 73 MMHG | BODY MASS INDEX: 24.96 KG/M2 | HEART RATE: 54 BPM | SYSTOLIC BLOOD PRESSURE: 160 MMHG | HEIGHT: 61 IN | OXYGEN SATURATION: 100 %

## 2021-06-03 PROBLEM — R55 SYNCOPE: Status: RESOLVED | Noted: 2021-06-01 | Resolved: 2021-06-03

## 2021-06-03 LAB
ANION GAP SERPL CALC-SCNC: 11 MMOL/L (ref 8–16)
BUN SERPL-MCNC: 21 MG/DL (ref 8–23)
CALCIUM SERPL-MCNC: 9 MG/DL (ref 8.7–10.5)
CHLORIDE SERPL-SCNC: 104 MMOL/L (ref 95–110)
CO2 SERPL-SCNC: 22 MMOL/L (ref 23–29)
CREAT SERPL-MCNC: 0.6 MG/DL (ref 0.5–1.4)
ERYTHROCYTE [DISTWIDTH] IN BLOOD BY AUTOMATED COUNT: 13.6 % (ref 11.5–14.5)
EST. GFR  (AFRICAN AMERICAN): >60 ML/MIN/1.73 M^2
EST. GFR  (NON AFRICAN AMERICAN): >60 ML/MIN/1.73 M^2
GLUCOSE SERPL-MCNC: 92 MG/DL (ref 70–110)
HCT VFR BLD AUTO: 36.6 % (ref 37–48.5)
HGB BLD-MCNC: 12.3 G/DL (ref 12–16)
MAGNESIUM SERPL-MCNC: 2.2 MG/DL (ref 1.6–2.6)
MCH RBC QN AUTO: 31.3 PG (ref 27–31)
MCHC RBC AUTO-ENTMCNC: 33.6 G/DL (ref 32–36)
MCV RBC AUTO: 93 FL (ref 82–98)
PHOSPHATE SERPL-MCNC: 3.5 MG/DL (ref 2.7–4.5)
PLATELET # BLD AUTO: 319 K/UL (ref 150–450)
PMV BLD AUTO: 9.5 FL (ref 9.2–12.9)
POTASSIUM SERPL-SCNC: 3.8 MMOL/L (ref 3.5–5.1)
RBC # BLD AUTO: 3.93 M/UL (ref 4–5.4)
SODIUM SERPL-SCNC: 137 MMOL/L (ref 136–145)
T4 SERPL-MCNC: 8.2 UG/DL (ref 4.5–12)
WBC # BLD AUTO: 10.14 K/UL (ref 3.9–12.7)

## 2021-06-03 PROCEDURE — 80048 BASIC METABOLIC PNL TOTAL CA: CPT | Performed by: INTERNAL MEDICINE

## 2021-06-03 PROCEDURE — 83735 ASSAY OF MAGNESIUM: CPT | Performed by: INTERNAL MEDICINE

## 2021-06-03 PROCEDURE — 84100 ASSAY OF PHOSPHORUS: CPT | Performed by: INTERNAL MEDICINE

## 2021-06-03 PROCEDURE — G0378 HOSPITAL OBSERVATION PER HR: HCPCS

## 2021-06-03 PROCEDURE — 85027 COMPLETE CBC AUTOMATED: CPT | Performed by: INTERNAL MEDICINE

## 2021-06-03 PROCEDURE — 25000003 PHARM REV CODE 250: Performed by: NURSE PRACTITIONER

## 2021-06-03 PROCEDURE — 25000003 PHARM REV CODE 250: Performed by: INTERNAL MEDICINE

## 2021-06-03 PROCEDURE — 36415 COLL VENOUS BLD VENIPUNCTURE: CPT | Performed by: INTERNAL MEDICINE

## 2021-06-03 RX ORDER — MECLIZINE HCL 12.5 MG 12.5 MG/1
12.5 TABLET ORAL ONCE
Status: COMPLETED | OUTPATIENT
Start: 2021-06-03 | End: 2021-06-03

## 2021-06-03 RX ORDER — LISINOPRIL 2.5 MG/1
2.5 TABLET ORAL DAILY
Status: DISCONTINUED | OUTPATIENT
Start: 2021-06-03 | End: 2021-06-03 | Stop reason: HOSPADM

## 2021-06-03 RX ORDER — MECLIZINE HCL 12.5 MG 12.5 MG/1
12.5 TABLET ORAL 2 TIMES DAILY PRN
Qty: 14 TABLET | Refills: 0 | Status: SHIPPED | OUTPATIENT
Start: 2021-06-03 | End: 2021-09-13

## 2021-06-03 RX ORDER — LISINOPRIL 2.5 MG/1
2.5 TABLET ORAL DAILY
Qty: 30 TABLET | Refills: 0 | Status: SHIPPED | OUTPATIENT
Start: 2021-06-04 | End: 2021-10-21

## 2021-06-03 RX ADMIN — ALUMINUM HYDROXIDE, MAGNESIUM HYDROXIDE, AND SIMETHICONE 15 ML: 200; 200; 20 SUSPENSION ORAL at 01:06

## 2021-06-03 RX ADMIN — LISINOPRIL 2.5 MG: 2.5 TABLET ORAL at 09:06

## 2021-06-03 RX ADMIN — ACETAMINOPHEN 650 MG: 325 TABLET, FILM COATED ORAL at 01:06

## 2021-06-03 RX ADMIN — PANTOPRAZOLE SODIUM 40 MG: 40 TABLET, DELAYED RELEASE ORAL at 05:06

## 2021-06-03 RX ADMIN — MECLIZINE HYDROCHLORIDE 12.5 MG: 12.5 TABLET ORAL at 10:06

## 2021-06-03 RX ADMIN — FLUTICASONE PROPIONATE 50 MCG: 50 SPRAY, METERED NASAL at 09:06

## 2021-06-09 ENCOUNTER — OFFICE VISIT (OUTPATIENT)
Dept: CARDIOLOGY | Facility: CLINIC | Age: 71
End: 2021-06-09
Payer: MEDICARE

## 2021-06-09 VITALS
HEART RATE: 75 BPM | BODY MASS INDEX: 25.11 KG/M2 | HEIGHT: 61 IN | OXYGEN SATURATION: 100 % | SYSTOLIC BLOOD PRESSURE: 160 MMHG | WEIGHT: 133 LBS | RESPIRATION RATE: 16 BRPM | DIASTOLIC BLOOD PRESSURE: 82 MMHG

## 2021-06-09 DIAGNOSIS — F41.9 ANXIETY: Primary | ICD-10-CM

## 2021-06-09 DIAGNOSIS — I10 ESSENTIAL HYPERTENSION: Chronic | ICD-10-CM

## 2021-06-09 DIAGNOSIS — F41.0 PANIC ATTACK: ICD-10-CM

## 2021-06-09 DIAGNOSIS — I95.1 ORTHOSTATIC HYPOTENSION: ICD-10-CM

## 2021-06-09 DIAGNOSIS — E78.2 MIXED DYSLIPIDEMIA: Chronic | ICD-10-CM

## 2021-06-09 DIAGNOSIS — R55 NEAR SYNCOPE: ICD-10-CM

## 2021-06-09 PROCEDURE — 1159F PR MEDICATION LIST DOCUMENTED IN MEDICAL RECORD: ICD-10-PCS | Mod: S$GLB,,, | Performed by: INTERNAL MEDICINE

## 2021-06-09 PROCEDURE — 3008F PR BODY MASS INDEX (BMI) DOCUMENTED: ICD-10-PCS | Mod: S$GLB,,, | Performed by: INTERNAL MEDICINE

## 2021-06-09 PROCEDURE — 1101F PR PT FALLS ASSESS DOC 0-1 FALLS W/OUT INJ PAST YR: ICD-10-PCS | Mod: S$GLB,,, | Performed by: INTERNAL MEDICINE

## 2021-06-09 PROCEDURE — 99214 OFFICE O/P EST MOD 30 MIN: CPT | Mod: S$GLB,,, | Performed by: INTERNAL MEDICINE

## 2021-06-09 PROCEDURE — 3288F FALL RISK ASSESSMENT DOCD: CPT | Mod: S$GLB,,, | Performed by: INTERNAL MEDICINE

## 2021-06-09 PROCEDURE — 1101F PT FALLS ASSESS-DOCD LE1/YR: CPT | Mod: S$GLB,,, | Performed by: INTERNAL MEDICINE

## 2021-06-09 PROCEDURE — 99214 PR OFFICE/OUTPT VISIT, EST, LEVL IV, 30-39 MIN: ICD-10-PCS | Mod: S$GLB,,, | Performed by: INTERNAL MEDICINE

## 2021-06-09 PROCEDURE — 1125F PR PAIN SEVERITY QUANTIFIED, PAIN PRESENT: ICD-10-PCS | Mod: S$GLB,,, | Performed by: INTERNAL MEDICINE

## 2021-06-09 PROCEDURE — 3288F PR FALLS RISK ASSESSMENT DOCUMENTED: ICD-10-PCS | Mod: S$GLB,,, | Performed by: INTERNAL MEDICINE

## 2021-06-09 PROCEDURE — 1125F AMNT PAIN NOTED PAIN PRSNT: CPT | Mod: S$GLB,,, | Performed by: INTERNAL MEDICINE

## 2021-06-09 PROCEDURE — 3008F BODY MASS INDEX DOCD: CPT | Mod: S$GLB,,, | Performed by: INTERNAL MEDICINE

## 2021-06-09 PROCEDURE — 1159F MED LIST DOCD IN RCRD: CPT | Mod: S$GLB,,, | Performed by: INTERNAL MEDICINE

## 2021-06-09 RX ORDER — LISINOPRIL 2.5 MG/1
2.5 TABLET ORAL DAILY
Qty: 90 TABLET | Refills: 3 | Status: SHIPPED | OUTPATIENT
Start: 2021-06-09 | End: 2021-06-17 | Stop reason: SDUPTHER

## 2021-06-17 ENCOUNTER — LAB VISIT (OUTPATIENT)
Dept: LAB | Facility: HOSPITAL | Age: 71
End: 2021-06-17
Attending: FAMILY MEDICINE
Payer: MEDICARE

## 2021-06-17 ENCOUNTER — TELEPHONE (OUTPATIENT)
Dept: FAMILY MEDICINE | Facility: CLINIC | Age: 71
End: 2021-06-17

## 2021-06-17 ENCOUNTER — OFFICE VISIT (OUTPATIENT)
Dept: FAMILY MEDICINE | Facility: CLINIC | Age: 71
End: 2021-06-17
Payer: MEDICARE

## 2021-06-17 ENCOUNTER — TELEPHONE (OUTPATIENT)
Dept: CARDIOLOGY | Facility: CLINIC | Age: 71
End: 2021-06-17

## 2021-06-17 VITALS
RESPIRATION RATE: 18 BRPM | TEMPERATURE: 98 F | SYSTOLIC BLOOD PRESSURE: 134 MMHG | BODY MASS INDEX: 24.97 KG/M2 | OXYGEN SATURATION: 98 % | HEIGHT: 61 IN | HEART RATE: 55 BPM | WEIGHT: 132.25 LBS | DIASTOLIC BLOOD PRESSURE: 82 MMHG

## 2021-06-17 DIAGNOSIS — G25.0 DISABLING ESSENTIAL TREMOR: Primary | ICD-10-CM

## 2021-06-17 DIAGNOSIS — D53.9 MACROCYTIC ANEMIA: ICD-10-CM

## 2021-06-17 DIAGNOSIS — F41.9 ANXIETY: ICD-10-CM

## 2021-06-17 DIAGNOSIS — Z12.31 SCREENING MAMMOGRAM, ENCOUNTER FOR: ICD-10-CM

## 2021-06-17 DIAGNOSIS — R09.89 LABILE HYPERTENSION: ICD-10-CM

## 2021-06-17 LAB
FOLATE SERPL-MCNC: 13.5 NG/ML (ref 4–24)
VIT B12 SERPL-MCNC: 637 PG/ML (ref 210–950)

## 2021-06-17 PROCEDURE — 99215 PR OFFICE/OUTPT VISIT, EST, LEVL V, 40-54 MIN: ICD-10-PCS | Mod: S$GLB,,, | Performed by: FAMILY MEDICINE

## 2021-06-17 PROCEDURE — 3008F BODY MASS INDEX DOCD: CPT | Mod: S$GLB,,, | Performed by: FAMILY MEDICINE

## 2021-06-17 PROCEDURE — 99215 OFFICE O/P EST HI 40 MIN: CPT | Mod: S$GLB,,, | Performed by: FAMILY MEDICINE

## 2021-06-17 PROCEDURE — 3288F FALL RISK ASSESSMENT DOCD: CPT | Mod: S$GLB,,, | Performed by: FAMILY MEDICINE

## 2021-06-17 PROCEDURE — 1159F MED LIST DOCD IN RCRD: CPT | Mod: S$GLB,,, | Performed by: FAMILY MEDICINE

## 2021-06-17 PROCEDURE — 82607 VITAMIN B-12: CPT | Performed by: FAMILY MEDICINE

## 2021-06-17 PROCEDURE — 1125F AMNT PAIN NOTED PAIN PRSNT: CPT | Mod: S$GLB,,, | Performed by: FAMILY MEDICINE

## 2021-06-17 PROCEDURE — 36415 COLL VENOUS BLD VENIPUNCTURE: CPT | Performed by: FAMILY MEDICINE

## 2021-06-17 PROCEDURE — 82746 ASSAY OF FOLIC ACID SERUM: CPT | Performed by: FAMILY MEDICINE

## 2021-06-17 PROCEDURE — 3288F PR FALLS RISK ASSESSMENT DOCUMENTED: ICD-10-PCS | Mod: S$GLB,,, | Performed by: FAMILY MEDICINE

## 2021-06-17 PROCEDURE — 1159F PR MEDICATION LIST DOCUMENTED IN MEDICAL RECORD: ICD-10-PCS | Mod: S$GLB,,, | Performed by: FAMILY MEDICINE

## 2021-06-17 PROCEDURE — 1100F PR PT FALLS ASSESS DOC 2+ FALLS/FALL W/INJURY/YR: ICD-10-PCS | Mod: S$GLB,,, | Performed by: FAMILY MEDICINE

## 2021-06-17 PROCEDURE — 1100F PTFALLS ASSESS-DOCD GE2>/YR: CPT | Mod: S$GLB,,, | Performed by: FAMILY MEDICINE

## 2021-06-17 PROCEDURE — 99999 PR PBB SHADOW E&M-EST. PATIENT-LVL IV: CPT | Mod: PBBFAC,,, | Performed by: FAMILY MEDICINE

## 2021-06-17 PROCEDURE — 1125F PR PAIN SEVERITY QUANTIFIED, PAIN PRESENT: ICD-10-PCS | Mod: S$GLB,,, | Performed by: FAMILY MEDICINE

## 2021-06-17 PROCEDURE — 3008F PR BODY MASS INDEX (BMI) DOCUMENTED: ICD-10-PCS | Mod: S$GLB,,, | Performed by: FAMILY MEDICINE

## 2021-06-17 PROCEDURE — 99999 PR PBB SHADOW E&M-EST. PATIENT-LVL IV: ICD-10-PCS | Mod: PBBFAC,,, | Performed by: FAMILY MEDICINE

## 2021-06-17 RX ORDER — ALPRAZOLAM 0.25 MG/1
0.25 TABLET ORAL 2 TIMES DAILY PRN
Qty: 60 TABLET | Refills: 2 | Status: SHIPPED | OUTPATIENT
Start: 2021-06-17 | End: 2021-06-17 | Stop reason: ALTCHOICE

## 2021-06-17 RX ORDER — CLONAZEPAM 0.5 MG/1
0.5 TABLET ORAL 2 TIMES DAILY PRN
Qty: 60 TABLET | Refills: 2 | Status: SHIPPED | OUTPATIENT
Start: 2021-06-17 | End: 2021-10-05

## 2021-06-29 NOTE — TELEPHONE ENCOUNTER
----- Message from Princess JUAN LUIS Tapia sent at 11/23/2020  9:43 AM CST -----  Contact: pt  Type:  RX Refill Request    Who Called:  patient   Refill or New Rx:  refill   RX Name and Strength:  NIFEdipine (ADALAT CC) 30 MG TbSR  How is the patient currently taking it? (ex. 1XDay):   Route: Take 30 mg by mouth once daily.  -  Is this a 30 day or 90 day RX:  30  Preferred Pharmacy with phone number:    [id number] - [place] - [address] [facility]  [address]  [place] [address]  Phone: [phone] Fax: [phone]      Local or Mail Order:  Local  Ordering Provider:    Best Call Back Number:  [phone] (home) or     Additional Information:  please call when rx is sent      
No

## 2021-07-12 ENCOUNTER — HOSPITAL ENCOUNTER (OUTPATIENT)
Dept: CARDIOLOGY | Facility: CLINIC | Age: 71
Discharge: HOME | End: 2021-07-12
Attending: INTERNAL MEDICINE
Payer: MEDICARE

## 2021-07-12 DIAGNOSIS — R55 SYNCOPE AND COLLAPSE: ICD-10-CM

## 2021-07-12 PROCEDURE — 93298 REM INTERROG DEV EVAL SCRMS: CPT | Mod: S$GLB,,, | Performed by: INTERNAL MEDICINE

## 2021-07-12 PROCEDURE — 93298 CARDIAC DEVICE CHECK - REMOTE: ICD-10-PCS | Mod: S$GLB,,, | Performed by: INTERNAL MEDICINE

## 2021-07-14 DIAGNOSIS — Z86.73 HISTORY OF CVA (CEREBROVASCULAR ACCIDENT): Primary | ICD-10-CM

## 2021-07-15 ENCOUNTER — TELEPHONE (OUTPATIENT)
Dept: CARDIOLOGY | Facility: CLINIC | Age: 71
End: 2021-07-15

## 2021-07-15 DIAGNOSIS — Z86.73 PERSONAL HISTORY OF TRANSIENT CEREBRAL ISCHEMIA: Primary | ICD-10-CM

## 2021-07-15 DIAGNOSIS — Z86.73 PERSONAL HISTORY OF TIA (TRANSIENT ISCHEMIC ATTACK): Primary | ICD-10-CM

## 2021-07-19 ENCOUNTER — TELEPHONE (OUTPATIENT)
Dept: FAMILY MEDICINE | Facility: CLINIC | Age: 71
End: 2021-07-19

## 2021-07-20 ENCOUNTER — HOSPITAL ENCOUNTER (OUTPATIENT)
Dept: RADIOLOGY | Facility: HOSPITAL | Age: 71
Discharge: HOME | End: 2021-07-20
Attending: FAMILY MEDICINE
Payer: MEDICARE

## 2021-07-20 ENCOUNTER — TELEPHONE (OUTPATIENT)
Dept: CARDIOLOGY | Facility: CLINIC | Age: 71
End: 2021-07-20

## 2021-07-20 DIAGNOSIS — G45.9 TRANSIENT CEREBRAL ISCHEMIA: ICD-10-CM

## 2021-07-20 DIAGNOSIS — Z12.31 SCREENING MAMMOGRAM, ENCOUNTER FOR: ICD-10-CM

## 2021-07-20 DIAGNOSIS — Z86.73 HISTORY OF CVA (CEREBROVASCULAR ACCIDENT): Primary | ICD-10-CM

## 2021-07-20 PROCEDURE — 77067 SCR MAMMO BI INCL CAD: CPT | Mod: TC,PO

## 2021-07-20 NOTE — TELEPHONE ENCOUNTER
Patient brought in her BP log for provider to review.  Stacy Gale NP reviewed and says they look good.    Scanned to chart.   Patient advised.     Patient reports that she is having trouble with the compression hose.  She puts them on and gets so hot, she passes out.  She tries to wear them when she can, and takes them off at night.

## 2021-07-21 ENCOUNTER — TELEPHONE (OUTPATIENT)
Dept: FAMILY MEDICINE | Facility: CLINIC | Age: 71
End: 2021-07-21

## 2021-07-21 ENCOUNTER — TELEPHONE (OUTPATIENT)
Dept: ENDOSCOPY | Facility: HOSPITAL | Age: 71
End: 2021-07-21

## 2021-07-21 DIAGNOSIS — F32.A ANXIETY AND DEPRESSION: Primary | ICD-10-CM

## 2021-07-21 DIAGNOSIS — K22.719 BARRETT'S ESOPHAGUS WITH DYSPLASIA: Primary | ICD-10-CM

## 2021-07-21 DIAGNOSIS — F41.9 ANXIETY AND DEPRESSION: Primary | ICD-10-CM

## 2021-07-29 ENCOUNTER — TELEPHONE (OUTPATIENT)
Dept: CARDIOLOGY | Facility: HOSPITAL | Age: 71
End: 2021-07-29

## 2021-07-30 ENCOUNTER — CLINICAL SUPPORT (OUTPATIENT)
Dept: CARDIOLOGY | Facility: HOSPITAL | Age: 71
End: 2021-07-30
Attending: NURSE PRACTITIONER
Payer: MEDICARE

## 2021-07-30 DIAGNOSIS — Z86.73 HISTORY OF CVA (CEREBROVASCULAR ACCIDENT): ICD-10-CM

## 2021-07-30 PROCEDURE — 93306 ECHO (CUPID ONLY): ICD-10-PCS | Mod: 26,,, | Performed by: INTERNAL MEDICINE

## 2021-07-30 PROCEDURE — 93306 TTE W/DOPPLER COMPLETE: CPT | Mod: 26,,, | Performed by: INTERNAL MEDICINE

## 2021-07-30 PROCEDURE — 93306 TTE W/DOPPLER COMPLETE: CPT

## 2021-08-02 ENCOUNTER — HOSPITAL ENCOUNTER (OUTPATIENT)
Dept: RADIOLOGY | Facility: HOSPITAL | Age: 71
Discharge: HOME | End: 2021-08-02
Attending: NURSE PRACTITIONER
Payer: MEDICARE

## 2021-08-02 DIAGNOSIS — G45.9 TRANSIENT CEREBRAL ISCHEMIA: ICD-10-CM

## 2021-08-02 DIAGNOSIS — Z86.73 HISTORY OF CVA (CEREBROVASCULAR ACCIDENT): ICD-10-CM

## 2021-08-02 PROCEDURE — 70551 MRI BRAIN STEM W/O DYE: CPT | Mod: TC,PO

## 2021-08-02 PROCEDURE — 70544 MR ANGIOGRAPHY HEAD W/O DYE: CPT | Mod: TC,PO

## 2021-08-02 PROCEDURE — 93880 EXTRACRANIAL BILAT STUDY: CPT | Mod: TC,PO

## 2021-08-03 LAB
AORTIC ROOT ANNULUS: 2.77 CM
AORTIC VALVE CUSP SEPERATION: 1.77 CM
AV INDEX (PROSTH): 0.89
AV MEAN GRADIENT: 8 MMHG
AV PEAK GRADIENT: 15 MMHG
AV VALVE AREA: 2.79 CM2
AV VELOCITY RATIO: 75.19
CV ECHO LV RWT: 0.9 CM
DOP CALC AO PEAK VEL: 1.91 M/S
DOP CALC AO VTI: 35.22 CM
DOP CALC LVOT AREA: 3.1 CM2
DOP CALC LVOT DIAMETER: 2 CM
DOP CALC LVOT PEAK VEL: 143.61 M/S
DOP CALC LVOT STROKE VOLUME: 98.16 CM3
DOP CALCLVOT PEAK VEL VTI: 31.26 CM
E WAVE DECELERATION TIME: 250.44 MSEC
E/A RATIO: 0.68
E/E' RATIO: 9.2 M/S
ECHO LV POSTERIOR WALL: 1.36 CM (ref 0.6–1.1)
EJECTION FRACTION: 60 %
FRACTIONAL SHORTENING: 32 % (ref 28–44)
INTERVENTRICULAR SEPTUM: 1.65 CM (ref 0.6–1.1)
LEFT ATRIUM SIZE: 3.7 CM
LEFT INTERNAL DIMENSION IN SYSTOLE: 2.05 CM (ref 2.1–4)
LEFT VENTRICULAR INTERNAL DIMENSION IN DIASTOLE: 3.01 CM (ref 3.5–6)
LEFT VENTRICULAR MASS: 159.43 G
LV LATERAL E/E' RATIO: 8.63 M/S
LV SEPTAL E/E' RATIO: 9.86 M/S
MV PEAK A VEL: 1.01 M/S
MV PEAK E VEL: 0.69 M/S
PISA TR MAX VEL: 2.6 M/S
PV PEAK VELOCITY: 103.89 CM/S
RA PRESSURE: 3 MMHG
RIGHT VENTRICULAR END-DIASTOLIC DIMENSION: 257 CM
TDI LATERAL: 0.08 M/S
TDI SEPTAL: 0.07 M/S
TDI: 0.08 M/S
TR MAX PG: 27 MMHG
TV REST PULMONARY ARTERY PRESSURE: 30 MMHG

## 2021-08-26 ENCOUNTER — HOSPITAL ENCOUNTER (OUTPATIENT)
Dept: CARDIOLOGY | Facility: CLINIC | Age: 71
Discharge: HOME | End: 2021-08-26
Attending: INTERNAL MEDICINE
Payer: MEDICARE

## 2021-08-26 DIAGNOSIS — R55 SYNCOPE AND COLLAPSE: ICD-10-CM

## 2021-08-26 PROCEDURE — 93298 REM INTERROG DEV EVAL SCRMS: CPT | Mod: S$GLB,,, | Performed by: INTERNAL MEDICINE

## 2021-08-26 PROCEDURE — 93298 CARDIAC DEVICE CHECK - REMOTE: ICD-10-PCS | Mod: S$GLB,,, | Performed by: INTERNAL MEDICINE

## 2021-09-10 ENCOUNTER — HOSPITAL ENCOUNTER (OUTPATIENT)
Dept: RADIOLOGY | Facility: HOSPITAL | Age: 71
Discharge: HOME | End: 2021-09-10
Attending: NURSE PRACTITIONER
Payer: OTHER GOVERNMENT

## 2021-09-10 ENCOUNTER — TELEPHONE (OUTPATIENT)
Dept: PULMONOLOGY | Facility: CLINIC | Age: 71
End: 2021-09-10

## 2021-09-10 DIAGNOSIS — R91.8 LUNG NODULE, MULTIPLE: ICD-10-CM

## 2021-09-10 PROCEDURE — 71250 CT THORAX DX C-: CPT | Mod: TC,PO

## 2021-09-13 ENCOUNTER — OFFICE VISIT (OUTPATIENT)
Dept: FAMILY MEDICINE | Facility: CLINIC | Age: 71
End: 2021-09-13
Payer: MEDICARE

## 2021-09-13 VITALS
BODY MASS INDEX: 25.31 KG/M2 | DIASTOLIC BLOOD PRESSURE: 88 MMHG | HEART RATE: 70 BPM | HEIGHT: 61 IN | OXYGEN SATURATION: 97 % | SYSTOLIC BLOOD PRESSURE: 136 MMHG | TEMPERATURE: 99 F | WEIGHT: 134.06 LBS

## 2021-09-13 DIAGNOSIS — K21.00 GASTROESOPHAGEAL REFLUX DISEASE WITH ESOPHAGITIS WITHOUT HEMORRHAGE: ICD-10-CM

## 2021-09-13 DIAGNOSIS — L50.9 URTICARIA: Primary | ICD-10-CM

## 2021-09-13 DIAGNOSIS — L93.0 DISCOID LUPUS ERYTHEMATOSUS: Chronic | ICD-10-CM

## 2021-09-13 DIAGNOSIS — G25.2 TREMOR, COARSE: ICD-10-CM

## 2021-09-13 DIAGNOSIS — F41.9 ANXIETY: ICD-10-CM

## 2021-09-13 DIAGNOSIS — G25.81 RLS (RESTLESS LEGS SYNDROME): ICD-10-CM

## 2021-09-13 DIAGNOSIS — K22.719 BARRETT'S ESOPHAGUS WITH DYSPLASIA: Chronic | ICD-10-CM

## 2021-09-13 DIAGNOSIS — I10 ESSENTIAL HYPERTENSION: Chronic | ICD-10-CM

## 2021-09-13 DIAGNOSIS — F17.200 TOBACCO DEPENDENCE: Chronic | ICD-10-CM

## 2021-09-13 PROCEDURE — 1159F MED LIST DOCD IN RCRD: CPT | Mod: S$GLB,,, | Performed by: PHYSICIAN ASSISTANT

## 2021-09-13 PROCEDURE — 99999 PR PBB SHADOW E&M-EST. PATIENT-LVL IV: CPT | Mod: PBBFAC,,, | Performed by: PHYSICIAN ASSISTANT

## 2021-09-13 PROCEDURE — 1125F AMNT PAIN NOTED PAIN PRSNT: CPT | Mod: S$GLB,,, | Performed by: PHYSICIAN ASSISTANT

## 2021-09-13 PROCEDURE — 99214 PR OFFICE/OUTPT VISIT, EST, LEVL IV, 30-39 MIN: ICD-10-PCS | Mod: S$GLB,,, | Performed by: PHYSICIAN ASSISTANT

## 2021-09-13 PROCEDURE — 1101F PT FALLS ASSESS-DOCD LE1/YR: CPT | Mod: S$GLB,,, | Performed by: PHYSICIAN ASSISTANT

## 2021-09-13 PROCEDURE — 99214 OFFICE O/P EST MOD 30 MIN: CPT | Mod: S$GLB,,, | Performed by: PHYSICIAN ASSISTANT

## 2021-09-13 PROCEDURE — 1159F PR MEDICATION LIST DOCUMENTED IN MEDICAL RECORD: ICD-10-PCS | Mod: S$GLB,,, | Performed by: PHYSICIAN ASSISTANT

## 2021-09-13 PROCEDURE — 3008F PR BODY MASS INDEX (BMI) DOCUMENTED: ICD-10-PCS | Mod: S$GLB,,, | Performed by: PHYSICIAN ASSISTANT

## 2021-09-13 PROCEDURE — 1160F RVW MEDS BY RX/DR IN RCRD: CPT | Mod: S$GLB,,, | Performed by: PHYSICIAN ASSISTANT

## 2021-09-13 PROCEDURE — 1125F PR PAIN SEVERITY QUANTIFIED, PAIN PRESENT: ICD-10-PCS | Mod: S$GLB,,, | Performed by: PHYSICIAN ASSISTANT

## 2021-09-13 PROCEDURE — 4010F PR ACE/ARB THEARPY RXD/TAKEN: ICD-10-PCS | Mod: S$GLB,,, | Performed by: PHYSICIAN ASSISTANT

## 2021-09-13 PROCEDURE — 1160F PR REVIEW ALL MEDS BY PRESCRIBER/CLIN PHARMACIST DOCUMENTED: ICD-10-PCS | Mod: S$GLB,,, | Performed by: PHYSICIAN ASSISTANT

## 2021-09-13 PROCEDURE — 99999 PR PBB SHADOW E&M-EST. PATIENT-LVL IV: ICD-10-PCS | Mod: PBBFAC,,, | Performed by: PHYSICIAN ASSISTANT

## 2021-09-13 PROCEDURE — 4010F ACE/ARB THERAPY RXD/TAKEN: CPT | Mod: S$GLB,,, | Performed by: PHYSICIAN ASSISTANT

## 2021-09-13 PROCEDURE — 3075F SYST BP GE 130 - 139MM HG: CPT | Mod: S$GLB,,, | Performed by: PHYSICIAN ASSISTANT

## 2021-09-13 PROCEDURE — 3008F BODY MASS INDEX DOCD: CPT | Mod: S$GLB,,, | Performed by: PHYSICIAN ASSISTANT

## 2021-09-13 PROCEDURE — 1101F PR PT FALLS ASSESS DOC 0-1 FALLS W/OUT INJ PAST YR: ICD-10-PCS | Mod: S$GLB,,, | Performed by: PHYSICIAN ASSISTANT

## 2021-09-13 PROCEDURE — 3288F FALL RISK ASSESSMENT DOCD: CPT | Mod: S$GLB,,, | Performed by: PHYSICIAN ASSISTANT

## 2021-09-13 PROCEDURE — 3079F PR MOST RECENT DIASTOLIC BLOOD PRESSURE 80-89 MM HG: ICD-10-PCS | Mod: S$GLB,,, | Performed by: PHYSICIAN ASSISTANT

## 2021-09-13 PROCEDURE — 3288F PR FALLS RISK ASSESSMENT DOCUMENTED: ICD-10-PCS | Mod: S$GLB,,, | Performed by: PHYSICIAN ASSISTANT

## 2021-09-13 PROCEDURE — 3075F PR MOST RECENT SYSTOLIC BLOOD PRESS GE 130-139MM HG: ICD-10-PCS | Mod: S$GLB,,, | Performed by: PHYSICIAN ASSISTANT

## 2021-09-13 PROCEDURE — 3079F DIAST BP 80-89 MM HG: CPT | Mod: S$GLB,,, | Performed by: PHYSICIAN ASSISTANT

## 2021-09-13 RX ORDER — METRONIDAZOLE 500 MG/1
500 TABLET ORAL 2 TIMES DAILY
COMMUNITY
Start: 2021-08-23 | End: 2021-10-05

## 2021-09-16 ENCOUNTER — PATIENT OUTREACH (OUTPATIENT)
Dept: ADMINISTRATIVE | Facility: OTHER | Age: 71
End: 2021-09-16

## 2021-09-17 ENCOUNTER — OFFICE VISIT (OUTPATIENT)
Dept: PULMONOLOGY | Facility: CLINIC | Age: 71
End: 2021-09-17
Payer: MEDICARE

## 2021-09-17 VITALS
SYSTOLIC BLOOD PRESSURE: 153 MMHG | TEMPERATURE: 98 F | HEIGHT: 61 IN | WEIGHT: 134.81 LBS | BODY MASS INDEX: 25.45 KG/M2 | HEART RATE: 56 BPM | DIASTOLIC BLOOD PRESSURE: 80 MMHG | OXYGEN SATURATION: 99 %

## 2021-09-17 DIAGNOSIS — J44.9 CHRONIC OBSTRUCTIVE PULMONARY DISEASE, UNSPECIFIED COPD TYPE: Primary | ICD-10-CM

## 2021-09-17 DIAGNOSIS — R91.8 LUNG NODULES: ICD-10-CM

## 2021-09-17 DIAGNOSIS — G47.30 SLEEP APNEA, UNSPECIFIED TYPE: ICD-10-CM

## 2021-09-17 PROCEDURE — 3288F PR FALLS RISK ASSESSMENT DOCUMENTED: ICD-10-PCS | Mod: S$GLB,,, | Performed by: NURSE PRACTITIONER

## 2021-09-17 PROCEDURE — 1125F AMNT PAIN NOTED PAIN PRSNT: CPT | Mod: S$GLB,,, | Performed by: NURSE PRACTITIONER

## 2021-09-17 PROCEDURE — 3288F FALL RISK ASSESSMENT DOCD: CPT | Mod: S$GLB,,, | Performed by: NURSE PRACTITIONER

## 2021-09-17 PROCEDURE — 99999 PR PBB SHADOW E&M-EST. PATIENT-LVL IV: CPT | Mod: PBBFAC,,, | Performed by: NURSE PRACTITIONER

## 2021-09-17 PROCEDURE — 1125F PR PAIN SEVERITY QUANTIFIED, PAIN PRESENT: ICD-10-PCS | Mod: S$GLB,,, | Performed by: NURSE PRACTITIONER

## 2021-09-17 PROCEDURE — 1101F PR PT FALLS ASSESS DOC 0-1 FALLS W/OUT INJ PAST YR: ICD-10-PCS | Mod: S$GLB,,, | Performed by: NURSE PRACTITIONER

## 2021-09-17 PROCEDURE — 3077F SYST BP >= 140 MM HG: CPT | Mod: S$GLB,,, | Performed by: NURSE PRACTITIONER

## 2021-09-17 PROCEDURE — 3008F PR BODY MASS INDEX (BMI) DOCUMENTED: ICD-10-PCS | Mod: S$GLB,,, | Performed by: NURSE PRACTITIONER

## 2021-09-17 PROCEDURE — 4010F ACE/ARB THERAPY RXD/TAKEN: CPT | Mod: S$GLB,,, | Performed by: NURSE PRACTITIONER

## 2021-09-17 PROCEDURE — 4010F PR ACE/ARB THEARPY RXD/TAKEN: ICD-10-PCS | Mod: S$GLB,,, | Performed by: NURSE PRACTITIONER

## 2021-09-17 PROCEDURE — 1159F MED LIST DOCD IN RCRD: CPT | Mod: S$GLB,,, | Performed by: NURSE PRACTITIONER

## 2021-09-17 PROCEDURE — 3079F DIAST BP 80-89 MM HG: CPT | Mod: S$GLB,,, | Performed by: NURSE PRACTITIONER

## 2021-09-17 PROCEDURE — 99214 OFFICE O/P EST MOD 30 MIN: CPT | Mod: S$GLB,,, | Performed by: NURSE PRACTITIONER

## 2021-09-17 PROCEDURE — 3079F PR MOST RECENT DIASTOLIC BLOOD PRESSURE 80-89 MM HG: ICD-10-PCS | Mod: S$GLB,,, | Performed by: NURSE PRACTITIONER

## 2021-09-17 PROCEDURE — 1159F PR MEDICATION LIST DOCUMENTED IN MEDICAL RECORD: ICD-10-PCS | Mod: S$GLB,,, | Performed by: NURSE PRACTITIONER

## 2021-09-17 PROCEDURE — 3008F BODY MASS INDEX DOCD: CPT | Mod: S$GLB,,, | Performed by: NURSE PRACTITIONER

## 2021-09-17 PROCEDURE — 99999 PR PBB SHADOW E&M-EST. PATIENT-LVL IV: ICD-10-PCS | Mod: PBBFAC,,, | Performed by: NURSE PRACTITIONER

## 2021-09-17 PROCEDURE — 3077F PR MOST RECENT SYSTOLIC BLOOD PRESSURE >= 140 MM HG: ICD-10-PCS | Mod: S$GLB,,, | Performed by: NURSE PRACTITIONER

## 2021-09-17 PROCEDURE — 1101F PT FALLS ASSESS-DOCD LE1/YR: CPT | Mod: S$GLB,,, | Performed by: NURSE PRACTITIONER

## 2021-09-17 PROCEDURE — 99214 PR OFFICE/OUTPT VISIT, EST, LEVL IV, 30-39 MIN: ICD-10-PCS | Mod: S$GLB,,, | Performed by: NURSE PRACTITIONER

## 2021-09-17 RX ORDER — ALBUTEROL SULFATE 90 UG/1
2 AEROSOL, METERED RESPIRATORY (INHALATION) EVERY 4 HOURS PRN
Qty: 18 G | Refills: 11 | Status: SHIPPED | OUTPATIENT
Start: 2021-09-17 | End: 2021-11-02

## 2021-09-27 ENCOUNTER — PROCEDURE VISIT (OUTPATIENT)
Dept: SLEEP MEDICINE | Facility: HOSPITAL | Age: 71
End: 2021-09-27
Attending: NURSE PRACTITIONER
Payer: MEDICARE

## 2021-09-27 DIAGNOSIS — G47.30 SLEEP APNEA, UNSPECIFIED TYPE: ICD-10-CM

## 2021-09-27 PROCEDURE — 95806 SLEEP STUDY UNATT&RESP EFFT: CPT

## 2021-10-04 ENCOUNTER — TELEPHONE (OUTPATIENT)
Dept: PULMONOLOGY | Facility: CLINIC | Age: 71
End: 2021-10-04

## 2021-10-05 ENCOUNTER — OFFICE VISIT (OUTPATIENT)
Dept: PSYCHIATRY | Facility: CLINIC | Age: 71
End: 2021-10-05
Payer: MEDICARE

## 2021-10-05 VITALS
BODY MASS INDEX: 25.56 KG/M2 | DIASTOLIC BLOOD PRESSURE: 85 MMHG | HEIGHT: 61 IN | SYSTOLIC BLOOD PRESSURE: 152 MMHG | WEIGHT: 135.38 LBS | HEART RATE: 72 BPM

## 2021-10-05 DIAGNOSIS — F32.9 REACTIVE DEPRESSION: ICD-10-CM

## 2021-10-05 DIAGNOSIS — F41.1 GAD (GENERALIZED ANXIETY DISORDER): Primary | ICD-10-CM

## 2021-10-05 PROCEDURE — 1101F PT FALLS ASSESS-DOCD LE1/YR: CPT | Mod: S$GLB,,, | Performed by: REGISTERED NURSE

## 2021-10-05 PROCEDURE — 3077F PR MOST RECENT SYSTOLIC BLOOD PRESSURE >= 140 MM HG: ICD-10-PCS | Mod: S$GLB,,, | Performed by: REGISTERED NURSE

## 2021-10-05 PROCEDURE — 4010F PR ACE/ARB THEARPY RXD/TAKEN: ICD-10-PCS | Mod: S$GLB,,, | Performed by: REGISTERED NURSE

## 2021-10-05 PROCEDURE — 1125F PR PAIN SEVERITY QUANTIFIED, PAIN PRESENT: ICD-10-PCS | Mod: S$GLB,,, | Performed by: REGISTERED NURSE

## 2021-10-05 PROCEDURE — 99999 PR PBB SHADOW E&M-EST. PATIENT-LVL V: CPT | Mod: PBBFAC,,, | Performed by: REGISTERED NURSE

## 2021-10-05 PROCEDURE — 3288F FALL RISK ASSESSMENT DOCD: CPT | Mod: S$GLB,,, | Performed by: REGISTERED NURSE

## 2021-10-05 PROCEDURE — 90792 PR PSYCHIATRIC DIAGNOSTIC EVALUATION W/MEDICAL SERVICES: ICD-10-PCS | Mod: S$GLB,,, | Performed by: REGISTERED NURSE

## 2021-10-05 PROCEDURE — 3008F PR BODY MASS INDEX (BMI) DOCUMENTED: ICD-10-PCS | Mod: S$GLB,,, | Performed by: REGISTERED NURSE

## 2021-10-05 PROCEDURE — 90792 PSYCH DIAG EVAL W/MED SRVCS: CPT | Mod: S$GLB,,, | Performed by: REGISTERED NURSE

## 2021-10-05 PROCEDURE — 3008F BODY MASS INDEX DOCD: CPT | Mod: S$GLB,,, | Performed by: REGISTERED NURSE

## 2021-10-05 PROCEDURE — 3077F SYST BP >= 140 MM HG: CPT | Mod: S$GLB,,, | Performed by: REGISTERED NURSE

## 2021-10-05 PROCEDURE — 1125F AMNT PAIN NOTED PAIN PRSNT: CPT | Mod: S$GLB,,, | Performed by: REGISTERED NURSE

## 2021-10-05 PROCEDURE — 4010F ACE/ARB THERAPY RXD/TAKEN: CPT | Mod: S$GLB,,, | Performed by: REGISTERED NURSE

## 2021-10-05 PROCEDURE — 3079F PR MOST RECENT DIASTOLIC BLOOD PRESSURE 80-89 MM HG: ICD-10-PCS | Mod: S$GLB,,, | Performed by: REGISTERED NURSE

## 2021-10-05 PROCEDURE — 3079F DIAST BP 80-89 MM HG: CPT | Mod: S$GLB,,, | Performed by: REGISTERED NURSE

## 2021-10-05 PROCEDURE — 99999 PR PBB SHADOW E&M-EST. PATIENT-LVL V: ICD-10-PCS | Mod: PBBFAC,,, | Performed by: REGISTERED NURSE

## 2021-10-05 PROCEDURE — 1101F PR PT FALLS ASSESS DOC 0-1 FALLS W/OUT INJ PAST YR: ICD-10-PCS | Mod: S$GLB,,, | Performed by: REGISTERED NURSE

## 2021-10-05 PROCEDURE — 3288F PR FALLS RISK ASSESSMENT DOCUMENTED: ICD-10-PCS | Mod: S$GLB,,, | Performed by: REGISTERED NURSE

## 2021-10-05 RX ORDER — SERTRALINE HYDROCHLORIDE 25 MG/1
TABLET, FILM COATED ORAL
Qty: 30 TABLET | Refills: 1 | Status: SHIPPED | OUTPATIENT
Start: 2021-10-05 | End: 2021-12-20 | Stop reason: SDUPTHER

## 2021-10-06 ENCOUNTER — IMMUNIZATION (OUTPATIENT)
Dept: PRIMARY CARE CLINIC | Facility: CLINIC | Age: 71
End: 2021-10-06
Payer: MEDICARE

## 2021-10-06 DIAGNOSIS — Z23 NEED FOR VACCINATION: Primary | ICD-10-CM

## 2021-10-06 PROCEDURE — 91300 COVID-19, MRNA, LNP-S, PF, 30 MCG/0.3 ML DOSE VACCINE: CPT | Mod: S$GLB,,, | Performed by: FAMILY MEDICINE

## 2021-10-06 PROCEDURE — 0003A COVID-19, MRNA, LNP-S, PF, 30 MCG/0.3 ML DOSE VACCINE: ICD-10-PCS | Mod: S$GLB,,, | Performed by: FAMILY MEDICINE

## 2021-10-06 PROCEDURE — 0003A COVID-19, MRNA, LNP-S, PF, 30 MCG/0.3 ML DOSE VACCINE: CPT | Mod: S$GLB,,, | Performed by: FAMILY MEDICINE

## 2021-10-06 PROCEDURE — 91300 COVID-19, MRNA, LNP-S, PF, 30 MCG/0.3 ML DOSE VACCINE: ICD-10-PCS | Mod: S$GLB,,, | Performed by: FAMILY MEDICINE

## 2021-10-11 ENCOUNTER — HOSPITAL ENCOUNTER (OUTPATIENT)
Dept: CARDIOLOGY | Facility: CLINIC | Age: 71
Discharge: HOME | End: 2021-10-11
Attending: INTERNAL MEDICINE
Payer: MEDICARE

## 2021-10-11 DIAGNOSIS — R55 SYNCOPE AND COLLAPSE: ICD-10-CM

## 2021-10-11 PROCEDURE — 93298 REM INTERROG DEV EVAL SCRMS: CPT | Mod: S$GLB,,, | Performed by: INTERNAL MEDICINE

## 2021-10-11 PROCEDURE — 93298 CARDIAC DEVICE CHECK - REMOTE: ICD-10-PCS | Mod: S$GLB,,, | Performed by: INTERNAL MEDICINE

## 2021-10-21 ENCOUNTER — OFFICE VISIT (OUTPATIENT)
Dept: FAMILY MEDICINE | Facility: CLINIC | Age: 71
End: 2021-10-21
Payer: MEDICARE

## 2021-10-21 VITALS
SYSTOLIC BLOOD PRESSURE: 180 MMHG | DIASTOLIC BLOOD PRESSURE: 92 MMHG | HEIGHT: 61 IN | TEMPERATURE: 98 F | BODY MASS INDEX: 25.66 KG/M2 | HEART RATE: 56 BPM | WEIGHT: 135.94 LBS | OXYGEN SATURATION: 99 %

## 2021-10-21 DIAGNOSIS — N76.0 BACTERIAL VAGINOSIS: ICD-10-CM

## 2021-10-21 DIAGNOSIS — G25.2 TREMOR, COARSE: ICD-10-CM

## 2021-10-21 DIAGNOSIS — B96.89 BACTERIAL VAGINOSIS: ICD-10-CM

## 2021-10-21 DIAGNOSIS — I10 ESSENTIAL HYPERTENSION: Primary | ICD-10-CM

## 2021-10-21 DIAGNOSIS — M54.12 CERVICAL RADICULOPATHY: ICD-10-CM

## 2021-10-21 PROCEDURE — 99999 PR PBB SHADOW E&M-EST. PATIENT-LVL IV: CPT | Mod: PBBFAC,,, | Performed by: FAMILY MEDICINE

## 2021-10-21 PROCEDURE — 1159F PR MEDICATION LIST DOCUMENTED IN MEDICAL RECORD: ICD-10-PCS | Mod: S$GLB,,, | Performed by: FAMILY MEDICINE

## 2021-10-21 PROCEDURE — 90694 FLU VACCINE - QUADRIVALENT - ADJUVANTED: ICD-10-PCS | Mod: S$GLB,,, | Performed by: FAMILY MEDICINE

## 2021-10-21 PROCEDURE — 3008F PR BODY MASS INDEX (BMI) DOCUMENTED: ICD-10-PCS | Mod: S$GLB,,, | Performed by: FAMILY MEDICINE

## 2021-10-21 PROCEDURE — 3077F PR MOST RECENT SYSTOLIC BLOOD PRESSURE >= 140 MM HG: ICD-10-PCS | Mod: S$GLB,,, | Performed by: FAMILY MEDICINE

## 2021-10-21 PROCEDURE — 3288F FALL RISK ASSESSMENT DOCD: CPT | Mod: S$GLB,,, | Performed by: FAMILY MEDICINE

## 2021-10-21 PROCEDURE — 4010F ACE/ARB THERAPY RXD/TAKEN: CPT | Mod: S$GLB,,, | Performed by: FAMILY MEDICINE

## 2021-10-21 PROCEDURE — G0008 ADMIN INFLUENZA VIRUS VAC: HCPCS | Mod: S$GLB,,, | Performed by: FAMILY MEDICINE

## 2021-10-21 PROCEDURE — 1101F PT FALLS ASSESS-DOCD LE1/YR: CPT | Mod: S$GLB,,, | Performed by: FAMILY MEDICINE

## 2021-10-21 PROCEDURE — 90694 VACC AIIV4 NO PRSRV 0.5ML IM: CPT | Mod: S$GLB,,, | Performed by: FAMILY MEDICINE

## 2021-10-21 PROCEDURE — 99214 PR OFFICE/OUTPT VISIT, EST, LEVL IV, 30-39 MIN: ICD-10-PCS | Mod: 25,S$GLB,, | Performed by: FAMILY MEDICINE

## 2021-10-21 PROCEDURE — 1125F PR PAIN SEVERITY QUANTIFIED, PAIN PRESENT: ICD-10-PCS | Mod: S$GLB,,, | Performed by: FAMILY MEDICINE

## 2021-10-21 PROCEDURE — 99999 PR PBB SHADOW E&M-EST. PATIENT-LVL IV: ICD-10-PCS | Mod: PBBFAC,,, | Performed by: FAMILY MEDICINE

## 2021-10-21 PROCEDURE — 1125F AMNT PAIN NOTED PAIN PRSNT: CPT | Mod: S$GLB,,, | Performed by: FAMILY MEDICINE

## 2021-10-21 PROCEDURE — 3080F PR MOST RECENT DIASTOLIC BLOOD PRESSURE >= 90 MM HG: ICD-10-PCS | Mod: S$GLB,,, | Performed by: FAMILY MEDICINE

## 2021-10-21 PROCEDURE — 3077F SYST BP >= 140 MM HG: CPT | Mod: S$GLB,,, | Performed by: FAMILY MEDICINE

## 2021-10-21 PROCEDURE — 4010F PR ACE/ARB THEARPY RXD/TAKEN: ICD-10-PCS | Mod: S$GLB,,, | Performed by: FAMILY MEDICINE

## 2021-10-21 PROCEDURE — 1159F MED LIST DOCD IN RCRD: CPT | Mod: S$GLB,,, | Performed by: FAMILY MEDICINE

## 2021-10-21 PROCEDURE — 1101F PR PT FALLS ASSESS DOC 0-1 FALLS W/OUT INJ PAST YR: ICD-10-PCS | Mod: S$GLB,,, | Performed by: FAMILY MEDICINE

## 2021-10-21 PROCEDURE — 3008F BODY MASS INDEX DOCD: CPT | Mod: S$GLB,,, | Performed by: FAMILY MEDICINE

## 2021-10-21 PROCEDURE — 3080F DIAST BP >= 90 MM HG: CPT | Mod: S$GLB,,, | Performed by: FAMILY MEDICINE

## 2021-10-21 PROCEDURE — 1160F RVW MEDS BY RX/DR IN RCRD: CPT | Mod: S$GLB,,, | Performed by: FAMILY MEDICINE

## 2021-10-21 PROCEDURE — G0008 FLU VACCINE - QUADRIVALENT - ADJUVANTED: ICD-10-PCS | Mod: S$GLB,,, | Performed by: FAMILY MEDICINE

## 2021-10-21 PROCEDURE — 99214 OFFICE O/P EST MOD 30 MIN: CPT | Mod: 25,S$GLB,, | Performed by: FAMILY MEDICINE

## 2021-10-21 PROCEDURE — 3288F PR FALLS RISK ASSESSMENT DOCUMENTED: ICD-10-PCS | Mod: S$GLB,,, | Performed by: FAMILY MEDICINE

## 2021-10-21 PROCEDURE — 1160F PR REVIEW ALL MEDS BY PRESCRIBER/CLIN PHARMACIST DOCUMENTED: ICD-10-PCS | Mod: S$GLB,,, | Performed by: FAMILY MEDICINE

## 2021-10-21 RX ORDER — LISINOPRIL 2.5 MG/1
5 TABLET ORAL DAILY
Qty: 60 TABLET | Refills: 0
Start: 2021-10-21 | End: 2021-10-26 | Stop reason: DRUGHIGH

## 2021-10-21 RX ORDER — GABAPENTIN 300 MG/1
300 CAPSULE ORAL NIGHTLY
COMMUNITY
End: 2022-03-07 | Stop reason: SDUPTHER

## 2021-10-21 RX ORDER — METRONIDAZOLE 500 MG/1
500 TABLET ORAL 3 TIMES DAILY
Qty: 21 TABLET | Refills: 0 | Status: SHIPPED | OUTPATIENT
Start: 2021-10-21 | End: 2021-11-08

## 2021-10-22 ENCOUNTER — TELEPHONE (OUTPATIENT)
Dept: FAMILY MEDICINE | Facility: CLINIC | Age: 71
End: 2021-10-22

## 2021-10-22 ENCOUNTER — TELEPHONE (OUTPATIENT)
Dept: CARDIOLOGY | Facility: CLINIC | Age: 71
End: 2021-10-22

## 2021-10-25 ENCOUNTER — TELEPHONE (OUTPATIENT)
Dept: CARDIOLOGY | Facility: CLINIC | Age: 71
End: 2021-10-25
Payer: MEDICARE

## 2021-10-25 ENCOUNTER — NURSE TRIAGE (OUTPATIENT)
Dept: ADMINISTRATIVE | Facility: CLINIC | Age: 71
End: 2021-10-25
Payer: MEDICARE

## 2021-10-26 ENCOUNTER — HOSPITAL ENCOUNTER (OUTPATIENT)
Dept: RADIOLOGY | Facility: CLINIC | Age: 71
Discharge: HOME | End: 2021-10-26
Attending: FAMILY MEDICINE
Payer: MEDICARE

## 2021-10-26 ENCOUNTER — OFFICE VISIT (OUTPATIENT)
Dept: FAMILY MEDICINE | Facility: CLINIC | Age: 71
End: 2021-10-26
Payer: MEDICARE

## 2021-10-26 ENCOUNTER — TELEPHONE (OUTPATIENT)
Dept: CARDIOLOGY | Facility: CLINIC | Age: 71
End: 2021-10-26
Payer: MEDICARE

## 2021-10-26 VITALS
HEIGHT: 61 IN | HEART RATE: 60 BPM | DIASTOLIC BLOOD PRESSURE: 74 MMHG | OXYGEN SATURATION: 97 % | SYSTOLIC BLOOD PRESSURE: 152 MMHG | RESPIRATION RATE: 14 BRPM | BODY MASS INDEX: 25.59 KG/M2 | WEIGHT: 135.56 LBS

## 2021-10-26 DIAGNOSIS — F17.200 SMOKING: ICD-10-CM

## 2021-10-26 DIAGNOSIS — R10.84 GENERALIZED ABDOMINAL PAIN: ICD-10-CM

## 2021-10-26 DIAGNOSIS — C18.9 MALIGNANT NEOPLASM OF COLON, UNSPECIFIED PART OF COLON: ICD-10-CM

## 2021-10-26 DIAGNOSIS — R10.9 ABDOMINAL PAIN, UNSPECIFIED ABDOMINAL LOCATION: ICD-10-CM

## 2021-10-26 DIAGNOSIS — M32.9 SYSTEMIC LUPUS ERYTHEMATOSUS, UNSPECIFIED SLE TYPE, UNSPECIFIED ORGAN INVOLVEMENT STATUS: ICD-10-CM

## 2021-10-26 DIAGNOSIS — I10 ESSENTIAL HYPERTENSION: Primary | ICD-10-CM

## 2021-10-26 PROCEDURE — 1101F PT FALLS ASSESS-DOCD LE1/YR: CPT | Mod: S$GLB,,, | Performed by: FAMILY MEDICINE

## 2021-10-26 PROCEDURE — 3077F PR MOST RECENT SYSTOLIC BLOOD PRESSURE >= 140 MM HG: ICD-10-PCS | Mod: S$GLB,,, | Performed by: FAMILY MEDICINE

## 2021-10-26 PROCEDURE — 3078F PR MOST RECENT DIASTOLIC BLOOD PRESSURE < 80 MM HG: ICD-10-PCS | Mod: S$GLB,,, | Performed by: FAMILY MEDICINE

## 2021-10-26 PROCEDURE — 74019 RADEX ABDOMEN 2 VIEWS: CPT | Mod: 26,,, | Performed by: RADIOLOGY

## 2021-10-26 PROCEDURE — 99407 PR TOBACCO USE CESSATION INTENSIVE >10 MINUTES: ICD-10-PCS | Mod: S$GLB,,, | Performed by: FAMILY MEDICINE

## 2021-10-26 PROCEDURE — 3008F BODY MASS INDEX DOCD: CPT | Mod: S$GLB,,, | Performed by: FAMILY MEDICINE

## 2021-10-26 PROCEDURE — 1125F AMNT PAIN NOTED PAIN PRSNT: CPT | Mod: S$GLB,,, | Performed by: FAMILY MEDICINE

## 2021-10-26 PROCEDURE — 99999 PR PBB SHADOW E&M-EST. PATIENT-LVL V: CPT | Mod: PBBFAC,,, | Performed by: FAMILY MEDICINE

## 2021-10-26 PROCEDURE — 1101F PR PT FALLS ASSESS DOC 0-1 FALLS W/OUT INJ PAST YR: ICD-10-PCS | Mod: S$GLB,,, | Performed by: FAMILY MEDICINE

## 2021-10-26 PROCEDURE — 4010F PR ACE/ARB THEARPY RXD/TAKEN: ICD-10-PCS | Mod: S$GLB,,, | Performed by: FAMILY MEDICINE

## 2021-10-26 PROCEDURE — 3077F SYST BP >= 140 MM HG: CPT | Mod: S$GLB,,, | Performed by: FAMILY MEDICINE

## 2021-10-26 PROCEDURE — 99999 PR PBB SHADOW E&M-EST. PATIENT-LVL V: ICD-10-PCS | Mod: PBBFAC,,, | Performed by: FAMILY MEDICINE

## 2021-10-26 PROCEDURE — 1159F PR MEDICATION LIST DOCUMENTED IN MEDICAL RECORD: ICD-10-PCS | Mod: S$GLB,,, | Performed by: FAMILY MEDICINE

## 2021-10-26 PROCEDURE — 3288F PR FALLS RISK ASSESSMENT DOCUMENTED: ICD-10-PCS | Mod: S$GLB,,, | Performed by: FAMILY MEDICINE

## 2021-10-26 PROCEDURE — 3078F DIAST BP <80 MM HG: CPT | Mod: S$GLB,,, | Performed by: FAMILY MEDICINE

## 2021-10-26 PROCEDURE — 99214 PR OFFICE/OUTPT VISIT, EST, LEVL IV, 30-39 MIN: ICD-10-PCS | Mod: 25,S$GLB,, | Performed by: FAMILY MEDICINE

## 2021-10-26 PROCEDURE — 3288F FALL RISK ASSESSMENT DOCD: CPT | Mod: S$GLB,,, | Performed by: FAMILY MEDICINE

## 2021-10-26 PROCEDURE — 74019 XR ABDOMEN FLAT AND ERECT: ICD-10-PCS | Mod: 26,,, | Performed by: RADIOLOGY

## 2021-10-26 PROCEDURE — 74019 RADEX ABDOMEN 2 VIEWS: CPT | Mod: TC,FY,PO

## 2021-10-26 PROCEDURE — 3008F PR BODY MASS INDEX (BMI) DOCUMENTED: ICD-10-PCS | Mod: S$GLB,,, | Performed by: FAMILY MEDICINE

## 2021-10-26 PROCEDURE — 99214 OFFICE O/P EST MOD 30 MIN: CPT | Mod: 25,S$GLB,, | Performed by: FAMILY MEDICINE

## 2021-10-26 PROCEDURE — 4010F ACE/ARB THERAPY RXD/TAKEN: CPT | Mod: S$GLB,,, | Performed by: FAMILY MEDICINE

## 2021-10-26 PROCEDURE — 99407 BEHAV CHNG SMOKING > 10 MIN: CPT | Mod: S$GLB,,, | Performed by: FAMILY MEDICINE

## 2021-10-26 PROCEDURE — 1159F MED LIST DOCD IN RCRD: CPT | Mod: S$GLB,,, | Performed by: FAMILY MEDICINE

## 2021-10-26 PROCEDURE — 1125F PR PAIN SEVERITY QUANTIFIED, PAIN PRESENT: ICD-10-PCS | Mod: S$GLB,,, | Performed by: FAMILY MEDICINE

## 2021-10-26 RX ORDER — AMLODIPINE BESYLATE 5 MG/1
5 TABLET ORAL DAILY
Qty: 90 TABLET | Refills: 3 | Status: SHIPPED | OUTPATIENT
Start: 2021-10-26 | End: 2021-11-02

## 2021-10-26 RX ORDER — IBUPROFEN 200 MG
1 TABLET ORAL DAILY
Qty: 90 PATCH | Refills: 1 | Status: SHIPPED | OUTPATIENT
Start: 2021-10-26 | End: 2021-11-08

## 2021-10-27 ENCOUNTER — HOSPITAL ENCOUNTER (OUTPATIENT)
Dept: RADIOLOGY | Facility: HOSPITAL | Age: 71
Discharge: HOME | End: 2021-10-27
Attending: FAMILY MEDICINE
Payer: MEDICARE

## 2021-10-27 ENCOUNTER — TELEPHONE (OUTPATIENT)
Dept: ENDOSCOPY | Facility: HOSPITAL | Age: 71
End: 2021-10-27
Payer: MEDICARE

## 2021-10-27 DIAGNOSIS — R10.9 ABDOMINAL PAIN, UNSPECIFIED ABDOMINAL LOCATION: ICD-10-CM

## 2021-10-27 DIAGNOSIS — R10.84 GENERALIZED ABDOMINAL PAIN: ICD-10-CM

## 2021-10-27 PROCEDURE — 74176 CT ABD & PELVIS W/O CONTRAST: CPT | Mod: TC,PO

## 2021-10-31 ENCOUNTER — TELEPHONE (OUTPATIENT)
Dept: FAMILY MEDICINE | Facility: CLINIC | Age: 71
End: 2021-10-31
Payer: MEDICARE

## 2021-10-31 DIAGNOSIS — K44.9 HIATAL HERNIA: Primary | ICD-10-CM

## 2021-11-01 ENCOUNTER — TELEPHONE (OUTPATIENT)
Dept: FAMILY MEDICINE | Facility: CLINIC | Age: 71
End: 2021-11-01
Payer: MEDICARE

## 2021-11-01 ENCOUNTER — PATIENT OUTREACH (OUTPATIENT)
Dept: FAMILY MEDICINE | Facility: CLINIC | Age: 71
End: 2021-11-01
Payer: MEDICARE

## 2021-11-02 ENCOUNTER — OFFICE VISIT (OUTPATIENT)
Dept: GASTROENTEROLOGY | Facility: CLINIC | Age: 71
End: 2021-11-02
Payer: MEDICARE

## 2021-11-02 ENCOUNTER — TELEPHONE (OUTPATIENT)
Dept: FAMILY MEDICINE | Facility: CLINIC | Age: 71
End: 2021-11-02
Payer: MEDICARE

## 2021-11-02 VITALS
SYSTOLIC BLOOD PRESSURE: 172 MMHG | HEIGHT: 61 IN | HEART RATE: 68 BPM | WEIGHT: 135.81 LBS | DIASTOLIC BLOOD PRESSURE: 70 MMHG | BODY MASS INDEX: 25.64 KG/M2

## 2021-11-02 DIAGNOSIS — K59.09 CHRONIC CONSTIPATION: ICD-10-CM

## 2021-11-02 DIAGNOSIS — R14.0 ABDOMINAL BLOATING: ICD-10-CM

## 2021-11-02 DIAGNOSIS — K44.9 HIATAL HERNIA: ICD-10-CM

## 2021-11-02 DIAGNOSIS — K22.710 BARRETT'S ESOPHAGUS WITH LOW GRADE DYSPLASIA: Primary | ICD-10-CM

## 2021-11-02 PROCEDURE — 3288F FALL RISK ASSESSMENT DOCD: CPT | Mod: S$GLB,,, | Performed by: INTERNAL MEDICINE

## 2021-11-02 PROCEDURE — 4010F PR ACE/ARB THEARPY RXD/TAKEN: ICD-10-PCS | Mod: S$GLB,,, | Performed by: INTERNAL MEDICINE

## 2021-11-02 PROCEDURE — 1125F PR PAIN SEVERITY QUANTIFIED, PAIN PRESENT: ICD-10-PCS | Mod: S$GLB,,, | Performed by: INTERNAL MEDICINE

## 2021-11-02 PROCEDURE — 1125F AMNT PAIN NOTED PAIN PRSNT: CPT | Mod: S$GLB,,, | Performed by: INTERNAL MEDICINE

## 2021-11-02 PROCEDURE — 3008F PR BODY MASS INDEX (BMI) DOCUMENTED: ICD-10-PCS | Mod: S$GLB,,, | Performed by: INTERNAL MEDICINE

## 2021-11-02 PROCEDURE — 99213 PR OFFICE/OUTPT VISIT, EST, LEVL III, 20-29 MIN: ICD-10-PCS | Mod: S$GLB,,, | Performed by: INTERNAL MEDICINE

## 2021-11-02 PROCEDURE — 1159F PR MEDICATION LIST DOCUMENTED IN MEDICAL RECORD: ICD-10-PCS | Mod: S$GLB,,, | Performed by: INTERNAL MEDICINE

## 2021-11-02 PROCEDURE — 1159F MED LIST DOCD IN RCRD: CPT | Mod: S$GLB,,, | Performed by: INTERNAL MEDICINE

## 2021-11-02 PROCEDURE — 3288F PR FALLS RISK ASSESSMENT DOCUMENTED: ICD-10-PCS | Mod: S$GLB,,, | Performed by: INTERNAL MEDICINE

## 2021-11-02 PROCEDURE — 3077F SYST BP >= 140 MM HG: CPT | Mod: S$GLB,,, | Performed by: INTERNAL MEDICINE

## 2021-11-02 PROCEDURE — 1101F PR PT FALLS ASSESS DOC 0-1 FALLS W/OUT INJ PAST YR: ICD-10-PCS | Mod: S$GLB,,, | Performed by: INTERNAL MEDICINE

## 2021-11-02 PROCEDURE — 4010F ACE/ARB THERAPY RXD/TAKEN: CPT | Mod: S$GLB,,, | Performed by: INTERNAL MEDICINE

## 2021-11-02 PROCEDURE — 99999 PR PBB SHADOW E&M-EST. PATIENT-LVL III: CPT | Mod: PBBFAC,,, | Performed by: INTERNAL MEDICINE

## 2021-11-02 PROCEDURE — 99999 PR PBB SHADOW E&M-EST. PATIENT-LVL III: ICD-10-PCS | Mod: PBBFAC,,, | Performed by: INTERNAL MEDICINE

## 2021-11-02 PROCEDURE — 1160F RVW MEDS BY RX/DR IN RCRD: CPT | Mod: S$GLB,,, | Performed by: INTERNAL MEDICINE

## 2021-11-02 PROCEDURE — 99213 OFFICE O/P EST LOW 20 MIN: CPT | Mod: S$GLB,,, | Performed by: INTERNAL MEDICINE

## 2021-11-02 PROCEDURE — 1101F PT FALLS ASSESS-DOCD LE1/YR: CPT | Mod: S$GLB,,, | Performed by: INTERNAL MEDICINE

## 2021-11-02 PROCEDURE — 3008F BODY MASS INDEX DOCD: CPT | Mod: S$GLB,,, | Performed by: INTERNAL MEDICINE

## 2021-11-02 PROCEDURE — 3077F PR MOST RECENT SYSTOLIC BLOOD PRESSURE >= 140 MM HG: ICD-10-PCS | Mod: S$GLB,,, | Performed by: INTERNAL MEDICINE

## 2021-11-02 PROCEDURE — 3078F PR MOST RECENT DIASTOLIC BLOOD PRESSURE < 80 MM HG: ICD-10-PCS | Mod: S$GLB,,, | Performed by: INTERNAL MEDICINE

## 2021-11-02 PROCEDURE — 1160F PR REVIEW ALL MEDS BY PRESCRIBER/CLIN PHARMACIST DOCUMENTED: ICD-10-PCS | Mod: S$GLB,,, | Performed by: INTERNAL MEDICINE

## 2021-11-02 PROCEDURE — 3078F DIAST BP <80 MM HG: CPT | Mod: S$GLB,,, | Performed by: INTERNAL MEDICINE

## 2021-11-03 ENCOUNTER — HOSPITAL ENCOUNTER (OUTPATIENT)
Dept: RADIOLOGY | Facility: HOSPITAL | Age: 71
Discharge: HOME | End: 2021-11-03
Attending: FAMILY MEDICINE
Payer: MEDICARE

## 2021-11-03 DIAGNOSIS — F17.200 SMOKING: ICD-10-CM

## 2021-11-03 PROCEDURE — 76775 US EXAM ABDO BACK WALL LIM: CPT | Mod: TC,PO

## 2021-11-04 ENCOUNTER — TELEPHONE (OUTPATIENT)
Dept: FAMILY MEDICINE | Facility: CLINIC | Age: 71
End: 2021-11-04
Payer: MEDICARE

## 2021-11-08 ENCOUNTER — OFFICE VISIT (OUTPATIENT)
Dept: PSYCHIATRY | Facility: CLINIC | Age: 71
End: 2021-11-08
Payer: MEDICARE

## 2021-11-08 VITALS
DIASTOLIC BLOOD PRESSURE: 75 MMHG | HEART RATE: 69 BPM | BODY MASS INDEX: 25.54 KG/M2 | HEIGHT: 61 IN | SYSTOLIC BLOOD PRESSURE: 128 MMHG | WEIGHT: 135.25 LBS

## 2021-11-08 DIAGNOSIS — F41.1 GAD (GENERALIZED ANXIETY DISORDER): Primary | ICD-10-CM

## 2021-11-08 DIAGNOSIS — F32.9 REACTIVE DEPRESSION: ICD-10-CM

## 2021-11-08 PROCEDURE — 1159F MED LIST DOCD IN RCRD: CPT | Mod: S$GLB,,, | Performed by: REGISTERED NURSE

## 2021-11-08 PROCEDURE — 1125F PR PAIN SEVERITY QUANTIFIED, PAIN PRESENT: ICD-10-PCS | Mod: S$GLB,,, | Performed by: REGISTERED NURSE

## 2021-11-08 PROCEDURE — 3074F SYST BP LT 130 MM HG: CPT | Mod: S$GLB,,, | Performed by: REGISTERED NURSE

## 2021-11-08 PROCEDURE — 3288F PR FALLS RISK ASSESSMENT DOCUMENTED: ICD-10-PCS | Mod: S$GLB,,, | Performed by: REGISTERED NURSE

## 2021-11-08 PROCEDURE — 3008F PR BODY MASS INDEX (BMI) DOCUMENTED: ICD-10-PCS | Mod: S$GLB,,, | Performed by: REGISTERED NURSE

## 2021-11-08 PROCEDURE — 3078F DIAST BP <80 MM HG: CPT | Mod: S$GLB,,, | Performed by: REGISTERED NURSE

## 2021-11-08 PROCEDURE — 1160F PR REVIEW ALL MEDS BY PRESCRIBER/CLIN PHARMACIST DOCUMENTED: ICD-10-PCS | Mod: S$GLB,,, | Performed by: REGISTERED NURSE

## 2021-11-08 PROCEDURE — 99999 PR PBB SHADOW E&M-EST. PATIENT-LVL IV: CPT | Mod: PBBFAC,,, | Performed by: REGISTERED NURSE

## 2021-11-08 PROCEDURE — 3288F FALL RISK ASSESSMENT DOCD: CPT | Mod: S$GLB,,, | Performed by: REGISTERED NURSE

## 2021-11-08 PROCEDURE — 1160F RVW MEDS BY RX/DR IN RCRD: CPT | Mod: S$GLB,,, | Performed by: REGISTERED NURSE

## 2021-11-08 PROCEDURE — 99999 PR PBB SHADOW E&M-EST. PATIENT-LVL IV: ICD-10-PCS | Mod: PBBFAC,,, | Performed by: REGISTERED NURSE

## 2021-11-08 PROCEDURE — 4010F PR ACE/ARB THEARPY RXD/TAKEN: ICD-10-PCS | Mod: S$GLB,,, | Performed by: REGISTERED NURSE

## 2021-11-08 PROCEDURE — 3074F PR MOST RECENT SYSTOLIC BLOOD PRESSURE < 130 MM HG: ICD-10-PCS | Mod: S$GLB,,, | Performed by: REGISTERED NURSE

## 2021-11-08 PROCEDURE — 4010F ACE/ARB THERAPY RXD/TAKEN: CPT | Mod: S$GLB,,, | Performed by: REGISTERED NURSE

## 2021-11-08 PROCEDURE — 1125F AMNT PAIN NOTED PAIN PRSNT: CPT | Mod: S$GLB,,, | Performed by: REGISTERED NURSE

## 2021-11-08 PROCEDURE — 1159F PR MEDICATION LIST DOCUMENTED IN MEDICAL RECORD: ICD-10-PCS | Mod: S$GLB,,, | Performed by: REGISTERED NURSE

## 2021-11-08 PROCEDURE — 3078F PR MOST RECENT DIASTOLIC BLOOD PRESSURE < 80 MM HG: ICD-10-PCS | Mod: S$GLB,,, | Performed by: REGISTERED NURSE

## 2021-11-08 PROCEDURE — 1101F PR PT FALLS ASSESS DOC 0-1 FALLS W/OUT INJ PAST YR: ICD-10-PCS | Mod: S$GLB,,, | Performed by: REGISTERED NURSE

## 2021-11-08 PROCEDURE — 99214 PR OFFICE/OUTPT VISIT, EST, LEVL IV, 30-39 MIN: ICD-10-PCS | Mod: S$GLB,,, | Performed by: REGISTERED NURSE

## 2021-11-08 PROCEDURE — 3008F BODY MASS INDEX DOCD: CPT | Mod: S$GLB,,, | Performed by: REGISTERED NURSE

## 2021-11-08 PROCEDURE — 99214 OFFICE O/P EST MOD 30 MIN: CPT | Mod: S$GLB,,, | Performed by: REGISTERED NURSE

## 2021-11-08 PROCEDURE — 1101F PT FALLS ASSESS-DOCD LE1/YR: CPT | Mod: S$GLB,,, | Performed by: REGISTERED NURSE

## 2021-11-09 ENCOUNTER — OFFICE VISIT (OUTPATIENT)
Dept: FAMILY MEDICINE | Facility: CLINIC | Age: 71
End: 2021-11-09
Payer: MEDICARE

## 2021-11-09 VITALS
DIASTOLIC BLOOD PRESSURE: 72 MMHG | SYSTOLIC BLOOD PRESSURE: 120 MMHG | RESPIRATION RATE: 18 BRPM | HEIGHT: 61 IN | BODY MASS INDEX: 25.48 KG/M2 | OXYGEN SATURATION: 95 % | HEART RATE: 60 BPM | WEIGHT: 134.94 LBS

## 2021-11-09 DIAGNOSIS — F17.200 SMOKING: ICD-10-CM

## 2021-11-09 DIAGNOSIS — E55.9 VITAMIN D DEFICIENCY: ICD-10-CM

## 2021-11-09 DIAGNOSIS — G25.2 TREMOR, COARSE: ICD-10-CM

## 2021-11-09 DIAGNOSIS — G25.0 TREMOR, ESSENTIAL: ICD-10-CM

## 2021-11-09 DIAGNOSIS — I10 ESSENTIAL HYPERTENSION: Chronic | ICD-10-CM

## 2021-11-09 DIAGNOSIS — F33.9 DEPRESSION, RECURRENT: Primary | ICD-10-CM

## 2021-11-09 DIAGNOSIS — K22.719 BARRETT'S ESOPHAGUS WITH DYSPLASIA: Chronic | ICD-10-CM

## 2021-11-09 PROCEDURE — 3288F FALL RISK ASSESSMENT DOCD: CPT | Mod: S$GLB,,, | Performed by: FAMILY MEDICINE

## 2021-11-09 PROCEDURE — 4010F PR ACE/ARB THEARPY RXD/TAKEN: ICD-10-PCS | Mod: S$GLB,,, | Performed by: FAMILY MEDICINE

## 2021-11-09 PROCEDURE — 1101F PT FALLS ASSESS-DOCD LE1/YR: CPT | Mod: S$GLB,,, | Performed by: FAMILY MEDICINE

## 2021-11-09 PROCEDURE — 3008F PR BODY MASS INDEX (BMI) DOCUMENTED: ICD-10-PCS | Mod: S$GLB,,, | Performed by: FAMILY MEDICINE

## 2021-11-09 PROCEDURE — 3074F PR MOST RECENT SYSTOLIC BLOOD PRESSURE < 130 MM HG: ICD-10-PCS | Mod: S$GLB,,, | Performed by: FAMILY MEDICINE

## 2021-11-09 PROCEDURE — 1159F MED LIST DOCD IN RCRD: CPT | Mod: S$GLB,,, | Performed by: FAMILY MEDICINE

## 2021-11-09 PROCEDURE — 1125F PR PAIN SEVERITY QUANTIFIED, PAIN PRESENT: ICD-10-PCS | Mod: S$GLB,,, | Performed by: FAMILY MEDICINE

## 2021-11-09 PROCEDURE — 3008F BODY MASS INDEX DOCD: CPT | Mod: S$GLB,,, | Performed by: FAMILY MEDICINE

## 2021-11-09 PROCEDURE — 1101F PR PT FALLS ASSESS DOC 0-1 FALLS W/OUT INJ PAST YR: ICD-10-PCS | Mod: S$GLB,,, | Performed by: FAMILY MEDICINE

## 2021-11-09 PROCEDURE — 3078F PR MOST RECENT DIASTOLIC BLOOD PRESSURE < 80 MM HG: ICD-10-PCS | Mod: S$GLB,,, | Performed by: FAMILY MEDICINE

## 2021-11-09 PROCEDURE — 3078F DIAST BP <80 MM HG: CPT | Mod: S$GLB,,, | Performed by: FAMILY MEDICINE

## 2021-11-09 PROCEDURE — 99407 PR TOBACCO USE CESSATION INTENSIVE >10 MINUTES: ICD-10-PCS | Mod: S$GLB,,, | Performed by: FAMILY MEDICINE

## 2021-11-09 PROCEDURE — 4010F ACE/ARB THERAPY RXD/TAKEN: CPT | Mod: S$GLB,,, | Performed by: FAMILY MEDICINE

## 2021-11-09 PROCEDURE — 99999 PR PBB SHADOW E&M-EST. PATIENT-LVL IV: ICD-10-PCS | Mod: PBBFAC,,, | Performed by: FAMILY MEDICINE

## 2021-11-09 PROCEDURE — 1125F AMNT PAIN NOTED PAIN PRSNT: CPT | Mod: S$GLB,,, | Performed by: FAMILY MEDICINE

## 2021-11-09 PROCEDURE — 99214 OFFICE O/P EST MOD 30 MIN: CPT | Mod: 25,S$GLB,, | Performed by: FAMILY MEDICINE

## 2021-11-09 PROCEDURE — 99407 BEHAV CHNG SMOKING > 10 MIN: CPT | Mod: S$GLB,,, | Performed by: FAMILY MEDICINE

## 2021-11-09 PROCEDURE — 1159F PR MEDICATION LIST DOCUMENTED IN MEDICAL RECORD: ICD-10-PCS | Mod: S$GLB,,, | Performed by: FAMILY MEDICINE

## 2021-11-09 PROCEDURE — 99999 PR PBB SHADOW E&M-EST. PATIENT-LVL IV: CPT | Mod: PBBFAC,,, | Performed by: FAMILY MEDICINE

## 2021-11-09 PROCEDURE — 3074F SYST BP LT 130 MM HG: CPT | Mod: S$GLB,,, | Performed by: FAMILY MEDICINE

## 2021-11-09 PROCEDURE — 99214 PR OFFICE/OUTPT VISIT, EST, LEVL IV, 30-39 MIN: ICD-10-PCS | Mod: 25,S$GLB,, | Performed by: FAMILY MEDICINE

## 2021-11-09 PROCEDURE — 3288F PR FALLS RISK ASSESSMENT DOCUMENTED: ICD-10-PCS | Mod: S$GLB,,, | Performed by: FAMILY MEDICINE

## 2021-11-15 ENCOUNTER — TELEPHONE (OUTPATIENT)
Dept: ENDOSCOPY | Facility: HOSPITAL | Age: 71
End: 2021-11-15
Payer: MEDICARE

## 2021-11-23 ENCOUNTER — TELEPHONE (OUTPATIENT)
Dept: GASTROENTEROLOGY | Facility: CLINIC | Age: 71
End: 2021-11-23
Payer: MEDICARE

## 2021-11-26 ENCOUNTER — TELEPHONE (OUTPATIENT)
Dept: ENDOSCOPY | Facility: HOSPITAL | Age: 71
End: 2021-11-26
Payer: MEDICARE

## 2021-11-30 ENCOUNTER — HOSPITAL ENCOUNTER (OUTPATIENT)
Facility: HOSPITAL | Age: 71
Discharge: HOME | End: 2021-11-30
Attending: INTERNAL MEDICINE | Admitting: INTERNAL MEDICINE
Payer: OTHER GOVERNMENT

## 2021-11-30 ENCOUNTER — ANESTHESIA EVENT (OUTPATIENT)
Dept: ENDOSCOPY | Facility: HOSPITAL | Age: 71
End: 2021-11-30
Payer: MEDICARE

## 2021-11-30 ENCOUNTER — ANESTHESIA (OUTPATIENT)
Dept: ENDOSCOPY | Facility: HOSPITAL | Age: 71
End: 2021-11-30
Payer: MEDICARE

## 2021-11-30 VITALS
HEIGHT: 61 IN | WEIGHT: 135 LBS | DIASTOLIC BLOOD PRESSURE: 46 MMHG | HEART RATE: 64 BPM | RESPIRATION RATE: 20 BRPM | OXYGEN SATURATION: 98 % | SYSTOLIC BLOOD PRESSURE: 102 MMHG | TEMPERATURE: 98 F | BODY MASS INDEX: 25.49 KG/M2

## 2021-11-30 DIAGNOSIS — K22.719 BARRETT'S ESOPHAGUS WITH DYSPLASIA: Primary | Chronic | ICD-10-CM

## 2021-11-30 PROCEDURE — 88305 TISSUE EXAM BY PATHOLOGIST: CPT | Mod: 59 | Performed by: PATHOLOGY

## 2021-11-30 PROCEDURE — 25000003 PHARM REV CODE 250: Performed by: INTERNAL MEDICINE

## 2021-11-30 PROCEDURE — 88313 SPECIAL STAINS GROUP 2: CPT | Performed by: PATHOLOGY

## 2021-11-30 PROCEDURE — 88305 TISSUE EXAM BY PATHOLOGIST: ICD-10-PCS | Mod: 26,,, | Performed by: PATHOLOGY

## 2021-11-30 PROCEDURE — 37000009 HC ANESTHESIA EA ADD 15 MINS: Performed by: INTERNAL MEDICINE

## 2021-11-30 PROCEDURE — 25000003 PHARM REV CODE 250: Performed by: NURSE ANESTHETIST, CERTIFIED REGISTERED

## 2021-11-30 PROCEDURE — 27201012 HC FORCEPS, HOT/COLD, DISP: Performed by: INTERNAL MEDICINE

## 2021-11-30 PROCEDURE — 43239 EGD BIOPSY SINGLE/MULTIPLE: CPT | Mod: ,,, | Performed by: INTERNAL MEDICINE

## 2021-11-30 PROCEDURE — 88313 SPECIAL STAINS GROUP 2: CPT | Mod: 26,,, | Performed by: PATHOLOGY

## 2021-11-30 PROCEDURE — 63600175 PHARM REV CODE 636 W HCPCS: Performed by: NURSE ANESTHETIST, CERTIFIED REGISTERED

## 2021-11-30 PROCEDURE — 37000008 HC ANESTHESIA 1ST 15 MINUTES: Performed by: INTERNAL MEDICINE

## 2021-11-30 PROCEDURE — 88305 TISSUE EXAM BY PATHOLOGIST: CPT | Mod: 26,,, | Performed by: PATHOLOGY

## 2021-11-30 PROCEDURE — 43239 EGD BIOPSY SINGLE/MULTIPLE: CPT | Performed by: INTERNAL MEDICINE

## 2021-11-30 PROCEDURE — 43239 PR EGD, FLEX, W/BIOPSY, SGL/MULTI: ICD-10-PCS | Mod: ,,, | Performed by: INTERNAL MEDICINE

## 2021-11-30 PROCEDURE — 88313 PR  SPECIAL STAINS,GROUP II: ICD-10-PCS | Mod: 26,,, | Performed by: PATHOLOGY

## 2021-11-30 RX ORDER — SODIUM CHLORIDE 0.9 % (FLUSH) 0.9 %
10 SYRINGE (ML) INJECTION
Status: DISCONTINUED | OUTPATIENT
Start: 2021-11-30 | End: 2021-11-30 | Stop reason: HOSPADM

## 2021-11-30 RX ORDER — ONDANSETRON 2 MG/ML
INJECTION INTRAMUSCULAR; INTRAVENOUS
Status: DISCONTINUED | OUTPATIENT
Start: 2021-11-30 | End: 2021-11-30

## 2021-11-30 RX ORDER — PROPOFOL 10 MG/ML
VIAL (ML) INTRAVENOUS CONTINUOUS PRN
Status: DISCONTINUED | OUTPATIENT
Start: 2021-11-30 | End: 2021-11-30

## 2021-11-30 RX ORDER — AMLODIPINE BESYLATE 5 MG/1
5 TABLET ORAL DAILY
COMMUNITY
End: 2022-10-24 | Stop reason: SDUPTHER

## 2021-11-30 RX ORDER — LIDOCAINE HYDROCHLORIDE 20 MG/ML
INJECTION, SOLUTION EPIDURAL; INFILTRATION; INTRACAUDAL; PERINEURAL
Status: DISCONTINUED | OUTPATIENT
Start: 2021-11-30 | End: 2021-11-30

## 2021-11-30 RX ORDER — SODIUM CHLORIDE 9 MG/ML
INJECTION, SOLUTION INTRAVENOUS CONTINUOUS
Status: DISCONTINUED | OUTPATIENT
Start: 2021-11-30 | End: 2021-11-30 | Stop reason: HOSPADM

## 2021-11-30 RX ORDER — PROPOFOL 10 MG/ML
VIAL (ML) INTRAVENOUS
Status: DISCONTINUED | OUTPATIENT
Start: 2021-11-30 | End: 2021-11-30

## 2021-11-30 RX ADMIN — GLYCOPYRROLATE 0.2 MG: 0.2 INJECTION, SOLUTION INTRAMUSCULAR; INTRAVITREAL at 10:11

## 2021-11-30 RX ADMIN — LIDOCAINE HYDROCHLORIDE 50 MG: 20 INJECTION, SOLUTION EPIDURAL; INFILTRATION; INTRACAUDAL; PERINEURAL at 10:11

## 2021-11-30 RX ADMIN — PROPOFOL 150 MCG/KG/MIN: 10 INJECTION, EMULSION INTRAVENOUS at 10:11

## 2021-11-30 RX ADMIN — SODIUM CHLORIDE: 0.9 INJECTION, SOLUTION INTRAVENOUS at 09:11

## 2021-11-30 RX ADMIN — PROPOFOL 40 MG: 10 INJECTION, EMULSION INTRAVENOUS at 10:11

## 2021-11-30 RX ADMIN — TOPICAL ANESTHETIC 1 EACH: 200 SPRAY DENTAL; PERIODONTAL at 10:11

## 2021-11-30 RX ADMIN — PROPOFOL 20 MG: 10 INJECTION, EMULSION INTRAVENOUS at 10:11

## 2021-11-30 RX ADMIN — ONDANSETRON 4 MG: 2 INJECTION, SOLUTION INTRAMUSCULAR; INTRAVENOUS at 10:11

## 2021-12-01 ENCOUNTER — TELEPHONE (OUTPATIENT)
Dept: ENDOSCOPY | Facility: HOSPITAL | Age: 71
End: 2021-12-01
Payer: MEDICARE

## 2021-12-03 ENCOUNTER — TELEPHONE (OUTPATIENT)
Dept: ENDOSCOPY | Facility: HOSPITAL | Age: 71
End: 2021-12-03
Payer: MEDICARE

## 2021-12-06 LAB
FINAL PATHOLOGIC DIAGNOSIS: NORMAL
GROSS: NORMAL
Lab: NORMAL

## 2021-12-07 ENCOUNTER — TELEPHONE (OUTPATIENT)
Dept: ENDOSCOPY | Facility: HOSPITAL | Age: 71
End: 2021-12-07
Payer: MEDICARE

## 2021-12-15 ENCOUNTER — OFFICE VISIT (OUTPATIENT)
Dept: CARDIOLOGY | Facility: CLINIC | Age: 71
End: 2021-12-15
Payer: MEDICARE

## 2021-12-15 VITALS
DIASTOLIC BLOOD PRESSURE: 84 MMHG | BODY MASS INDEX: 26.06 KG/M2 | OXYGEN SATURATION: 97 % | HEART RATE: 63 BPM | HEIGHT: 61 IN | WEIGHT: 138 LBS | SYSTOLIC BLOOD PRESSURE: 138 MMHG

## 2021-12-15 DIAGNOSIS — I10 ESSENTIAL HYPERTENSION: Chronic | ICD-10-CM

## 2021-12-15 DIAGNOSIS — M32.9 SYSTEMIC LUPUS ERYTHEMATOSUS, UNSPECIFIED SLE TYPE, UNSPECIFIED ORGAN INVOLVEMENT STATUS: ICD-10-CM

## 2021-12-15 DIAGNOSIS — I95.1 ORTHOSTATIC HYPOTENSION: Primary | ICD-10-CM

## 2021-12-15 DIAGNOSIS — E78.00 HYPERCHOLESTEROLEMIA: ICD-10-CM

## 2021-12-15 DIAGNOSIS — E78.2 MIXED DYSLIPIDEMIA: Chronic | ICD-10-CM

## 2021-12-15 DIAGNOSIS — R00.1 BRADYCARDIA: ICD-10-CM

## 2021-12-15 DIAGNOSIS — Z78.9 STATIN INTOLERANCE: Chronic | ICD-10-CM

## 2021-12-15 DIAGNOSIS — K22.719 BARRETT'S ESOPHAGUS WITH DYSPLASIA: Chronic | ICD-10-CM

## 2021-12-15 PROCEDURE — 93000 ELECTROCARDIOGRAM COMPLETE: CPT | Mod: S$GLB,,, | Performed by: INTERNAL MEDICINE

## 2021-12-15 PROCEDURE — 99214 PR OFFICE/OUTPT VISIT, EST, LEVL IV, 30-39 MIN: ICD-10-PCS | Mod: S$GLB,,, | Performed by: INTERNAL MEDICINE

## 2021-12-15 PROCEDURE — 93000 EKG 12-LEAD: ICD-10-PCS | Mod: S$GLB,,, | Performed by: INTERNAL MEDICINE

## 2021-12-15 PROCEDURE — 4010F ACE/ARB THERAPY RXD/TAKEN: CPT | Mod: CPTII,S$GLB,, | Performed by: INTERNAL MEDICINE

## 2021-12-15 PROCEDURE — 4010F PR ACE/ARB THEARPY RXD/TAKEN: ICD-10-PCS | Mod: CPTII,S$GLB,, | Performed by: INTERNAL MEDICINE

## 2021-12-15 PROCEDURE — 99214 OFFICE O/P EST MOD 30 MIN: CPT | Mod: S$GLB,,, | Performed by: INTERNAL MEDICINE

## 2021-12-17 ENCOUNTER — OFFICE VISIT (OUTPATIENT)
Dept: PULMONOLOGY | Facility: CLINIC | Age: 71
End: 2021-12-17
Payer: MEDICARE

## 2021-12-17 VITALS
SYSTOLIC BLOOD PRESSURE: 152 MMHG | DIASTOLIC BLOOD PRESSURE: 78 MMHG | OXYGEN SATURATION: 99 % | BODY MASS INDEX: 26.06 KG/M2 | WEIGHT: 138 LBS | HEIGHT: 61 IN | HEART RATE: 61 BPM

## 2021-12-17 DIAGNOSIS — J44.9 CHRONIC OBSTRUCTIVE PULMONARY DISEASE, UNSPECIFIED COPD TYPE: Primary | ICD-10-CM

## 2021-12-17 DIAGNOSIS — R06.02 SHORTNESS OF BREATH: ICD-10-CM

## 2021-12-17 PROCEDURE — 99999 PR PBB SHADOW E&M-EST. PATIENT-LVL III: ICD-10-PCS | Mod: PBBFAC,,, | Performed by: NURSE PRACTITIONER

## 2021-12-17 PROCEDURE — 99213 PR OFFICE/OUTPT VISIT, EST, LEVL III, 20-29 MIN: ICD-10-PCS | Mod: S$GLB,,, | Performed by: NURSE PRACTITIONER

## 2021-12-17 PROCEDURE — 99213 OFFICE O/P EST LOW 20 MIN: CPT | Mod: S$GLB,,, | Performed by: NURSE PRACTITIONER

## 2021-12-17 PROCEDURE — 4010F PR ACE/ARB THEARPY RXD/TAKEN: ICD-10-PCS | Mod: CPTII,S$GLB,, | Performed by: NURSE PRACTITIONER

## 2021-12-17 PROCEDURE — 4010F ACE/ARB THERAPY RXD/TAKEN: CPT | Mod: CPTII,S$GLB,, | Performed by: NURSE PRACTITIONER

## 2021-12-17 PROCEDURE — 99999 PR PBB SHADOW E&M-EST. PATIENT-LVL III: CPT | Mod: PBBFAC,,, | Performed by: NURSE PRACTITIONER

## 2021-12-17 RX ORDER — IPRATROPIUM BROMIDE AND ALBUTEROL SULFATE 2.5; .5 MG/3ML; MG/3ML
3 SOLUTION RESPIRATORY (INHALATION) EVERY 6 HOURS PRN
Qty: 120 EACH | Refills: 5 | Status: SHIPPED | OUTPATIENT
Start: 2021-12-17 | End: 2023-09-25 | Stop reason: SDUPTHER

## 2021-12-20 DIAGNOSIS — F32.9 REACTIVE DEPRESSION: ICD-10-CM

## 2021-12-20 DIAGNOSIS — F41.1 GAD (GENERALIZED ANXIETY DISORDER): ICD-10-CM

## 2021-12-20 RX ORDER — SERTRALINE HYDROCHLORIDE 25 MG/1
25 TABLET, FILM COATED ORAL NIGHTLY
Qty: 30 TABLET | Refills: 1 | Status: SHIPPED | OUTPATIENT
Start: 2021-12-20 | End: 2021-12-28 | Stop reason: SDUPTHER

## 2021-12-28 ENCOUNTER — OFFICE VISIT (OUTPATIENT)
Dept: PSYCHIATRY | Facility: CLINIC | Age: 71
End: 2021-12-28
Payer: MEDICARE

## 2021-12-28 VITALS
WEIGHT: 137.56 LBS | HEART RATE: 64 BPM | SYSTOLIC BLOOD PRESSURE: 137 MMHG | HEIGHT: 61 IN | DIASTOLIC BLOOD PRESSURE: 74 MMHG | BODY MASS INDEX: 25.97 KG/M2

## 2021-12-28 DIAGNOSIS — F32.9 REACTIVE DEPRESSION: ICD-10-CM

## 2021-12-28 DIAGNOSIS — F41.1 GAD (GENERALIZED ANXIETY DISORDER): ICD-10-CM

## 2021-12-28 PROCEDURE — 1160F RVW MEDS BY RX/DR IN RCRD: CPT | Mod: CPTII,S$GLB,, | Performed by: REGISTERED NURSE

## 2021-12-28 PROCEDURE — 1101F PR PT FALLS ASSESS DOC 0-1 FALLS W/OUT INJ PAST YR: ICD-10-PCS | Mod: CPTII,S$GLB,, | Performed by: REGISTERED NURSE

## 2021-12-28 PROCEDURE — 3008F BODY MASS INDEX DOCD: CPT | Mod: CPTII,S$GLB,, | Performed by: REGISTERED NURSE

## 2021-12-28 PROCEDURE — 3075F PR MOST RECENT SYSTOLIC BLOOD PRESS GE 130-139MM HG: ICD-10-PCS | Mod: CPTII,S$GLB,, | Performed by: REGISTERED NURSE

## 2021-12-28 PROCEDURE — 4010F ACE/ARB THERAPY RXD/TAKEN: CPT | Mod: CPTII,S$GLB,, | Performed by: REGISTERED NURSE

## 2021-12-28 PROCEDURE — 3078F PR MOST RECENT DIASTOLIC BLOOD PRESSURE < 80 MM HG: ICD-10-PCS | Mod: CPTII,S$GLB,, | Performed by: REGISTERED NURSE

## 2021-12-28 PROCEDURE — 99999 PR PBB SHADOW E&M-EST. PATIENT-LVL IV: CPT | Mod: PBBFAC,,, | Performed by: REGISTERED NURSE

## 2021-12-28 PROCEDURE — 99214 PR OFFICE/OUTPT VISIT, EST, LEVL IV, 30-39 MIN: ICD-10-PCS | Mod: S$GLB,,, | Performed by: REGISTERED NURSE

## 2021-12-28 PROCEDURE — 99214 OFFICE O/P EST MOD 30 MIN: CPT | Mod: S$GLB,,, | Performed by: REGISTERED NURSE

## 2021-12-28 PROCEDURE — 99999 PR PBB SHADOW E&M-EST. PATIENT-LVL IV: ICD-10-PCS | Mod: PBBFAC,,, | Performed by: REGISTERED NURSE

## 2021-12-28 PROCEDURE — 3075F SYST BP GE 130 - 139MM HG: CPT | Mod: CPTII,S$GLB,, | Performed by: REGISTERED NURSE

## 2021-12-28 PROCEDURE — 1101F PT FALLS ASSESS-DOCD LE1/YR: CPT | Mod: CPTII,S$GLB,, | Performed by: REGISTERED NURSE

## 2021-12-28 PROCEDURE — 1159F PR MEDICATION LIST DOCUMENTED IN MEDICAL RECORD: ICD-10-PCS | Mod: CPTII,S$GLB,, | Performed by: REGISTERED NURSE

## 2021-12-28 PROCEDURE — 3288F PR FALLS RISK ASSESSMENT DOCUMENTED: ICD-10-PCS | Mod: CPTII,S$GLB,, | Performed by: REGISTERED NURSE

## 2021-12-28 PROCEDURE — 3078F DIAST BP <80 MM HG: CPT | Mod: CPTII,S$GLB,, | Performed by: REGISTERED NURSE

## 2021-12-28 PROCEDURE — 4010F PR ACE/ARB THEARPY RXD/TAKEN: ICD-10-PCS | Mod: CPTII,S$GLB,, | Performed by: REGISTERED NURSE

## 2021-12-28 PROCEDURE — 3288F FALL RISK ASSESSMENT DOCD: CPT | Mod: CPTII,S$GLB,, | Performed by: REGISTERED NURSE

## 2021-12-28 PROCEDURE — 1125F AMNT PAIN NOTED PAIN PRSNT: CPT | Mod: CPTII,S$GLB,, | Performed by: REGISTERED NURSE

## 2021-12-28 PROCEDURE — 3008F PR BODY MASS INDEX (BMI) DOCUMENTED: ICD-10-PCS | Mod: CPTII,S$GLB,, | Performed by: REGISTERED NURSE

## 2021-12-28 PROCEDURE — 1159F MED LIST DOCD IN RCRD: CPT | Mod: CPTII,S$GLB,, | Performed by: REGISTERED NURSE

## 2021-12-28 PROCEDURE — 1125F PR PAIN SEVERITY QUANTIFIED, PAIN PRESENT: ICD-10-PCS | Mod: CPTII,S$GLB,, | Performed by: REGISTERED NURSE

## 2021-12-28 PROCEDURE — 1160F PR REVIEW ALL MEDS BY PRESCRIBER/CLIN PHARMACIST DOCUMENTED: ICD-10-PCS | Mod: CPTII,S$GLB,, | Performed by: REGISTERED NURSE

## 2021-12-28 RX ORDER — SERTRALINE HYDROCHLORIDE 25 MG/1
25 TABLET, FILM COATED ORAL NIGHTLY
Qty: 90 TABLET | Refills: 1 | Status: SHIPPED | OUTPATIENT
Start: 2021-12-28 | End: 2022-08-11 | Stop reason: SDUPTHER

## 2021-12-28 RX ORDER — PRIMIDONE 50 MG/1
TABLET ORAL
COMMUNITY
Start: 2021-07-08 | End: 2021-12-28

## 2021-12-28 RX ORDER — CARBIDOPA AND LEVODOPA 10; 100 MG/1; MG/1
1 TABLET ORAL 3 TIMES DAILY
COMMUNITY
Start: 2021-08-18 | End: 2022-11-15

## 2022-01-24 ENCOUNTER — OFFICE VISIT (OUTPATIENT)
Dept: OTOLARYNGOLOGY | Facility: CLINIC | Age: 72
End: 2022-01-24
Payer: OTHER GOVERNMENT

## 2022-01-24 VITALS — BODY MASS INDEX: 26.3 KG/M2 | WEIGHT: 139.31 LBS | HEIGHT: 61 IN

## 2022-01-24 DIAGNOSIS — H93.90 EAR LESION: ICD-10-CM

## 2022-01-24 DIAGNOSIS — H61.23 BILATERAL IMPACTED CERUMEN: Primary | ICD-10-CM

## 2022-01-24 DIAGNOSIS — Z86.69 HISTORY OF HEARING LOSS: ICD-10-CM

## 2022-01-24 PROCEDURE — 69210 REMOVE IMPACTED EAR WAX UNI: CPT | Mod: S$GLB,,, | Performed by: PHYSICIAN ASSISTANT

## 2022-01-24 PROCEDURE — 69210 EAR CERUMEN REMOVAL: ICD-10-PCS | Mod: S$GLB,,, | Performed by: PHYSICIAN ASSISTANT

## 2022-01-24 PROCEDURE — 99203 PR OFFICE/OUTPT VISIT, NEW, LEVL III, 30-44 MIN: ICD-10-PCS | Mod: 25,S$GLB,, | Performed by: PHYSICIAN ASSISTANT

## 2022-01-24 PROCEDURE — 99203 OFFICE O/P NEW LOW 30 MIN: CPT | Mod: 25,S$GLB,, | Performed by: PHYSICIAN ASSISTANT

## 2022-01-24 NOTE — PROGRESS NOTES
[facility]    Subjective:      Patient: Kristin Guerra Patient PCP: Georgi Mendez MD         :  1950     Sex:  female      MRN:  [id number]          Date of Visit: 2022      Chief Complaint: Ear pain    Patient ID: Kristin Guerra is a 71 y.o. female who was self-referred for ear pain. Pt has had right ear pain x 1 month. Pt took a couple of keflex from a prior abx prescription which helped he ear. Pt states that gland under the right ear was swollen, which has improved with keflex. Pt has h/o in right ear hearing loss which was previously evaluated by Dr. Kimbrough. Pt endorses right side aural pressue. Pt denies fever/chills or further hearing loss with new episode of aural fullness. There is not a family history of hearing loss at a young age. There is not a prior history of ear surgery. There is not a prior history of ear infections. She denies a history of significant noise exposure. 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 Pt has had recurrence of right outer helix lesion.     Review of Systems   Constitutional: Negative for chills and fever.   HENT: Positive for ear pain and hearing loss. Negative for ear discharge.    Psychiatric/Behavioral: Negative for agitation, behavioral problems, confusion, decreased concentration and dysphoric mood.      Past Medical History  She has a past medical history of Allergy, Sterling's esophagus, Colon polyp, Diverticulitis, Diverticulosis, Fatty liver, GERD (gastroesophageal reflux disease), History of endoscopy, cervical spine surgery, Hyperlipidemia, Hypertension, Lupus (systemic lupus erythematosus), Osteoarthritis, Osteopenia, Syncope, and Tremor.    Family History  Her family history includes Colon cancer in her father; Heart disease in her brother; Hyperlipidemia in her brother; Lung cancer in her brother, brother, and father; Lymphoma in her mother; Prostate cancer in her  brother.    Past Surgical History:   Procedure Laterality Date    APPENDECTOMY      removed during colon surgery    CERVICAL FUSION      CERVICAL SPINE SURGERY      Rods and screws.     SECTION      COLON SURGERY  2007    hemicolectomy    COLON SURGERY  2008    repair    COLONOSCOPY N/A 2018    Procedure: COLONOSCOPY;  Surgeon: Maverick Esquivel MD;  Location: [facility];  Service: Endoscopy;  Laterality: N/A; repeat in 5 years for surveillance    CYSTOSCOPY N/A 2021    Procedure: CYSTOSCOPY;  Surgeon: Allison Miranda MD;  Location: [facility];  Service: Urology;  Laterality: N/A;    ESOPHAGOGASTRODUODENOSCOPY N/A 2019    Procedure: EGD (ESOPHAGOGASTRODUODENOSCOPY);  Surgeon: Monika Steele MD;  Location: [facility];  Service: Endoscopy;  Laterality: N/A;    ESOPHAGOGASTRODUODENOSCOPY  2019    with RFA and biopsies    ESOPHAGOGASTRODUODENOSCOPY N/A 2019    Procedure: EGD (ESOPHAGOGASTRODUODENOSCOPY);  Surgeon: Uzair Cantor MD;  Location: [facility];  Service: Endoscopy;  Laterality: N/A;  n+v with anesthesia    ESOPHAGOGASTRODUODENOSCOPY N/A 2019    Procedure: EGD (ESOPHAGOGASTRODUODENOSCOPY);  Surgeon: Uzair Cantor MD;  Location: [facility];  Service: Endoscopy;  Laterality: N/A;    ESOPHAGOGASTRODUODENOSCOPY N/A 10/7/2019    Procedure: EGD (ESOPHAGOGASTRODUODENOSCOPY);  Surgeon: Uzair Catnor MD;  Location: [facility];  Service: Endoscopy;  Laterality: N/A;    ESOPHAGOGASTRODUODENOSCOPY N/A 2/10/2020    Procedure: EGD (ESOPHAGOGASTRODUODENOSCOPY);  Surgeon: Uzair Cantor MD;  Location: [facility];  Service: Endoscopy;  Laterality: N/A;  Requests earliest procedure time    ESOPHAGOGASTRODUODENOSCOPY N/A 2020    Procedure: EGD (ESOPHAGOGASTRODUODENOSCOPY);  Surgeon: Uzair Cantor MD;  Location: [facility];  Service: Endoscopy;  Laterality: N/A;  BARRETTS    ESOPHAGOGASTRODUODENOSCOPY N/A 2021    Procedure:  "ESOPHAGOGASTRODUODENOSCOPY (EGD);  Surgeon: Uzair Cantor MD;  Location: [facility] ENDO;  Service: Endoscopy;  Laterality: N/A;  Needs Rapid Covid MP    ESOPHAGOGASTRODUODENOSCOPY N/A 11/30/2021    Procedure: EGD (ESOPHAGOGASTRODUODENOSCOPY);  Surgeon: Uzair Cantor MD;  Location: [facility] ENDO;  Service: Endoscopy;  Laterality: N/A;    HYSTERECTOMY      ovaries remain    INSERTION OF IMPLANTABLE LOOP RECORDER N/A 7/17/2020    Procedure: INSERTION, IMPLANTABLE LOOP RECORDER (MEDTRONIC);  Surgeon: Karyna Vasques MD;  Location: [facility] CATH/EP LAB;  Service: Cardiology;  Laterality: N/A;    SPLENECTOMY, TOTAL  2007    Injured during surgery and removed    UPPER GASTROINTESTINAL ENDOSCOPY  2007    GERD & hiatal hernia per patient report     Social History     Tobacco Use    Smoking status: Current Every Day Smoker     Packs/day: 1.00     Years: 35.00     Pack years: 35.00     Types: Cigarettes    Smokeless tobacco: Never Used   Substance and Sexual Activity    Alcohol use: No    Drug use: No    Sexual activity: Not Currently     Medications  She has a current medication list which includes the following prescription(s): albuterol-ipratropium, aluminum & magnesium hydroxide-simethicone, amlodipine, azelastine, carbidopa-levodopa  mg, clobetasol, esomeprazole, fluticasone propionate, gabapentin, polyethylene glycol, and sertraline.    Review of patient's allergies indicates:   Allergen Reactions    Doxycycline Itching and Swelling     Prescribed 5/2021, took one dose, describes lip swelling, and "itch all over"    Mirtazapine Other (See Comments)     Hyperactivity.    Morphine Swelling and Rash     Other reaction(s): swelling in limbs    Lexapro [escitalopram oxalate] Other (See Comments)     Worsening tremor.  Sedation.    Primidone Other (See Comments)     Syncope.  Other reaction(s): dizziness,hypotension  Other reaction(s): Unknown  Other reaction(s): Unknown    Simvastatin Nausea And " Vomiting and Other (See Comments)     Weakness.  Other reaction(s): nausea,vomiting    Beta-adrenergic agents Other (See Comments)     Low blood pressure and fainted.     Metoprolol succinate Other (See Comments)    Prochlorperazine      Other reaction(s): weakness    Topiramate     Compazine [prochlorperazine edisylate] Nausea Only    Tramadol Nausea And Vomiting     All medications, allergies, and past history have been reviewed.    Objective:      Vitals:  Vitals - 1 value per visit 12/17/2021 1/24/2022 1/24/2022   SYSTOLIC 152 - -   DIASTOLIC 78 - -   Pulse 61 - -   Temp - - -   Resp - - -   SPO2 99 - -   Weight (lb) 138.01 - 139.33   Weight (kg) 62.6 - 63.2   Height 61 - 61   BMI (Calculated) 26.1 - 26.3   VISIT REPORT - - -   Pain Score  - 2 -   Some encounter information is confidential and restricted. Go to Review Flowsheets activity to see all data.   Some recent data might be hidden       Body surface area is 1.65 meters squared.    Physical Exam  Constitutional:       General: She is not in acute distress.     Appearance: Normal appearance. She is not ill-appearing.   HENT:      Head: Normocephalic and atraumatic.      Right Ear: Tympanic membrane, ear canal and external ear normal. There is impacted cerumen (cleared in office. See procedure note.).      Left Ear: Tympanic membrane, ear canal and external ear normal. There is impacted cerumen (cleared in office. See procedure note.).      Nose: Nose normal.   Eyes:      General:         Right eye: No discharge.         Left eye: No discharge.      Extraocular Movements: Extraocular movements intact.      Conjunctiva/sclera: Conjunctivae normal.   Neck:      Thyroid: No thyroid mass.      Trachea: Trachea and phonation normal.   Pulmonary:      Effort: Pulmonary effort is normal.   Musculoskeletal:      Cervical back: Normal range of motion. No crepitus.   Lymphadenopathy:      Cervical: No cervical adenopathy.   Neurological:      General: No focal  deficit present.      Mental Status: She is alert and oriented to person, place, and time. Mental status is at baseline.   Psychiatric:         Mood and Affect: Mood normal.         Behavior: Behavior normal.         Thought Content: Thought content normal.         Judgment: Judgment normal.       Procedure:  Cerumen removal performed.  See procedure note.    Labs:  WBC   Date Value Ref Range Status   06/03/2021 10.14 3.90 - 12.70 K/uL Final     Platelets   Date Value Ref Range Status   06/03/2021 319 150 - 450 K/uL Final     Creatinine   Date Value Ref Range Status   06/03/2021 0.6 0.5 - 1.4 mg/dL Final     TSH   Date Value Ref Range Status   06/01/2021 0.780 0.340 - 5.600 uIU/mL Final     Glucose   Date Value Ref Range Status   06/03/2021 92 70 - 110 mg/dL Final     All lab results and imaging results have been reviewed.    Assessment:        ICD-10-CM ICD-9-CM   1. Bilateral impacted cerumen  H61.23 380.4   2. History of hearing loss  Z86.69 V12.49   3. Ear lesion  H93.90 388.9            Plan:      Bilateral impacted cerumen  -     Ear Cerumen Removal completed in office. Please see procedure note. Pt tolerated procedure well and there were no complications. Pt had relief of ear issues after ear cleaning was performed in office.    History of hearing loss  -Pt had relief of ear issues after ear cleaning was performed in office. Pt has h/o of right sided hearing loss previously followed by Dr. Kimbrough. Pt would like to wait for [facility] to be set up as she and her  would have trouble going to [place] for updated audiogram. Will get updated audiogram when [facility] is complete.    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 She has had recurrence of skin lesion. Looks like possible actinic keratosis. Pt is to follow up with Dermatology for assessment of skin lesion.     Patient is to follow up as  needed if she has further ear issues or if she has ENT concerns.

## 2022-01-24 NOTE — PROCEDURES
Ear Cerumen Removal    Date/Time: 1/24/2022 9:00 AM  Performed by: Jabier Gonzales PA-C  Authorized by: Jabier Gonzales PA-C     Consent Done?:  Yes (Verbal)    Local anesthetic:  None  Location details:  Both ears  Procedure type: curette    Cerumen  Removal Results:  Cerumen completely removed  Patient tolerance:  Patient tolerated the procedure well with no immediate complications

## 2022-01-26 ENCOUNTER — PATIENT OUTREACH (OUTPATIENT)
Dept: ADMINISTRATIVE | Facility: OTHER | Age: 72
End: 2022-01-26
Payer: MEDICARE

## 2022-01-26 ENCOUNTER — OFFICE VISIT (OUTPATIENT)
Dept: DERMATOLOGY | Facility: CLINIC | Age: 72
End: 2022-01-26
Payer: MEDICARE

## 2022-01-26 DIAGNOSIS — D48.5 NEOPLASM OF UNCERTAIN BEHAVIOR OF SKIN: Primary | ICD-10-CM

## 2022-01-26 DIAGNOSIS — L82.1 SEBORRHEIC KERATOSES: ICD-10-CM

## 2022-01-26 PROCEDURE — 88305 TISSUE EXAM BY PATHOLOGIST: ICD-10-PCS | Mod: 26,,, | Performed by: PATHOLOGY

## 2022-01-26 PROCEDURE — 11102 PR TANGENTIAL BIOPSY, SKIN, SINGLE LESION: ICD-10-PCS | Mod: S$GLB,,, | Performed by: STUDENT IN AN ORGANIZED HEALTH CARE EDUCATION/TRAINING PROGRAM

## 2022-01-26 PROCEDURE — 99212 PR OFFICE/OUTPT VISIT, EST, LEVL II, 10-19 MIN: ICD-10-PCS | Mod: 25,S$GLB,, | Performed by: STUDENT IN AN ORGANIZED HEALTH CARE EDUCATION/TRAINING PROGRAM

## 2022-01-26 PROCEDURE — 99213 OFFICE O/P EST LOW 20 MIN: CPT | Mod: PBBFAC,PO,25 | Performed by: STUDENT IN AN ORGANIZED HEALTH CARE EDUCATION/TRAINING PROGRAM

## 2022-01-26 PROCEDURE — 11102 TANGNTL BX SKIN SINGLE LES: CPT | Mod: S$GLB,,, | Performed by: STUDENT IN AN ORGANIZED HEALTH CARE EDUCATION/TRAINING PROGRAM

## 2022-01-26 PROCEDURE — 88305 TISSUE EXAM BY PATHOLOGIST: CPT | Mod: 26,,, | Performed by: PATHOLOGY

## 2022-01-26 PROCEDURE — 99212 OFFICE O/P EST SF 10 MIN: CPT | Mod: 25,S$GLB,, | Performed by: STUDENT IN AN ORGANIZED HEALTH CARE EDUCATION/TRAINING PROGRAM

## 2022-01-26 PROCEDURE — 11102 TANGNTL BX SKIN SINGLE LES: CPT | Mod: PBBFAC,PO | Performed by: STUDENT IN AN ORGANIZED HEALTH CARE EDUCATION/TRAINING PROGRAM

## 2022-01-26 PROCEDURE — 99999 PR PBB SHADOW E&M-EST. PATIENT-LVL III: ICD-10-PCS | Mod: PBBFAC,,, | Performed by: STUDENT IN AN ORGANIZED HEALTH CARE EDUCATION/TRAINING PROGRAM

## 2022-01-26 PROCEDURE — 99999 PR PBB SHADOW E&M-EST. PATIENT-LVL III: CPT | Mod: PBBFAC,,, | Performed by: STUDENT IN AN ORGANIZED HEALTH CARE EDUCATION/TRAINING PROGRAM

## 2022-01-26 PROCEDURE — 88305 TISSUE EXAM BY PATHOLOGIST: CPT | Performed by: PATHOLOGY

## 2022-01-26 NOTE — PROGRESS NOTES
Subjective:       Patient ID:  Kristin Guerra is a 71 y.o. female who presents for   Chief Complaint   Patient presents with    Lesion     Right ear     New patient    Patient here today for lesion to right ear x 3 weeks. Raised, firm, and tender when laying down. No treatment.      Denies Phx NMSC      Review of Systems   Constitutional: Negative for fever and chills.   Respiratory: Negative for cough and shortness of breath.    Gastrointestinal: Negative for nausea and vomiting.        Objective:    Physical Exam   Constitutional: She appears well-developed and well-nourished.   Neurological: She is alert and oriented to person, place, and time.   Psychiatric: She has a normal mood and affect.   Skin:                 Diagram Legend     Erythematous scaling macule/papule c/w actinic keratosis       Vascular papule c/w angioma      Pigmented verrucoid papule/plaque c/w seborrheic keratosis      Yellow umbilicated papule c/w sebaceous hyperplasia      Irregularly shaped tan macule c/w lentigo     1-2 mm smooth white papules consistent with Milia      Movable subcutaneous cyst with punctum c/w epidermal inclusion cyst      Subcutaneous movable cyst c/w pilar cyst      Firm pink to brown papule c/w dermatofibroma      Pedunculated fleshy papule(s) c/w skin tag(s)      Evenly pigmented macule c/w junctional nevus     Mildly variegated pigmented, slightly irregular-bordered macule c/w mildly atypical nevus      Flesh colored to evenly pigmented papule c/w intradermal nevus       Pink pearly papule/plaque c/w basal cell carcinoma      Erythematous hyperkeratotic cursted plaque c/w SCC      Surgical scar with no sign of skin cancer recurrence      Open and closed comedones      Inflammatory papules and pustules      Verrucoid papule consistent consistent with wart     Erythematous eczematous patches and plaques     Dystrophic onycholytic nail with subungual debris c/w onychomycosis     Umbilicated papule     Erythematous-base heme-crusted tan verrucoid plaque consistent with inflamed seborrheic keratosis     Erythematous Silvery Scaling Plaque c/w Psoriasis     See annotation          Assessment / Plan:      Pathology Orders:     Normal Orders This Visit    Specimen to Pathology, Dermatology     Questions:    Procedure Type: Dermatology and skin neoplasms    Number of Specimens: 1    ------------------------: -------------------------    Spec 1 Procedure: Biopsy    Spec 1 Clinical Impression: 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 Area prepped with alcohol, anesthetized with approximately 1.0cc of 1% lidocaine with epinephrine. Lesional tissue shaved with razor blade. Hemostasis achieved with application of aluminum chloride followed by hyfrecation. No complications. Dressing applied. Wound care explained.      Seborrheic keratoses  - scalp  -These are benign inherited growths without a malignant potential. Reassurance given to patient. No treatment is necessary.              No follow-ups on file.

## 2022-01-26 NOTE — PROGRESS NOTES
Chart was reviewed for overdue Proactive [facility] Encounters ([facility])  topics  Updates were requested from care everywhere  Health Maintenance has been updated  [place] immunization registry triggered

## 2022-01-26 NOTE — PATIENT INSTRUCTIONS
Shave Biopsy Wound Care    Your doctor has performed a shave biopsy today.  A band aid and vaseline ointment has been placed over the site.  This should remain in place for 24 hours.  It is recommended that you keep the area dry for the first 24 hours.  After 24 hours, you may remove the band aid and wash the area with warm soap and water and apply Vaseline jelly.  Many patients prefer to use Neosporin or Bacitracin ointment.  This is acceptable; however, know that you can develop an allergy to this medication even if you have used it safely for years.  It is important to keep the area moist.  Letting it dry out and get air slows healing time, and will worsen the scar.  Band aid is optional after first 24 hours.      If you notice increasing redness, tenderness, pain, or yellow drainage at the biopsy site, please notify your doctor.  These are signs of an infection.    If your biopsy site is bleeding, apply firm pressure for 15 minutes straight.  Repeat for another 15 minutes, if it is still bleeding.   If the surgical site continues to bleed, then please contact your doctor.      [facility] [place] [address]/ [phone] FAX/ [email or web address]

## 2022-02-02 LAB
FINAL PATHOLOGIC DIAGNOSIS: NORMAL
GROSS: NORMAL
Lab: NORMAL
MICROSCOPIC EXAM: NORMAL

## 2022-02-02 NOTE — PROGRESS NOTES
Final Pathologic Diagnosis Skin, right helix, shave biopsy:   -CHONDRODERMATITIS NODULARIS HELICIS, consistent with   This lesion is benign.   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 Please offer her an appointment in 6 weeks for me to re-evaluate the area.

## 2022-02-09 ENCOUNTER — TELEPHONE (OUTPATIENT)
Dept: FAMILY MEDICINE | Facility: CLINIC | Age: 72
End: 2022-02-09
Payer: MEDICARE

## 2022-02-09 ENCOUNTER — LAB VISIT (OUTPATIENT)
Dept: LAB | Facility: HOSPITAL | Age: 72
End: 2022-02-09
Attending: FAMILY MEDICINE
Payer: MEDICARE

## 2022-02-09 DIAGNOSIS — E55.9 VITAMIN D DEFICIENCY: ICD-10-CM

## 2022-02-09 DIAGNOSIS — G25.0 TREMOR, ESSENTIAL: ICD-10-CM

## 2022-02-09 LAB
25(OH)D3+25(OH)D2 SERPL-MCNC: 29 NG/ML (ref 30–96)
TSH SERPL DL<=0.005 MIU/L-ACNC: 2.18 UIU/ML (ref 0.4–4)

## 2022-02-09 PROCEDURE — 36415 COLL VENOUS BLD VENIPUNCTURE: CPT | Mod: PO | Performed by: FAMILY MEDICINE

## 2022-02-09 PROCEDURE — 82306 VITAMIN D 25 HYDROXY: CPT | Performed by: FAMILY MEDICINE

## 2022-02-09 PROCEDURE — 84443 ASSAY THYROID STIM HORMONE: CPT | Performed by: FAMILY MEDICINE

## 2022-02-09 NOTE — TELEPHONE ENCOUNTER
assisted pt in rescheduling appointment to [facility] location and cancelling an appointment pt stated she wasn't sure why she had it.

## 2022-02-10 ENCOUNTER — OFFICE VISIT (OUTPATIENT)
Dept: OTOLARYNGOLOGY | Facility: CLINIC | Age: 72
End: 2022-02-10
Payer: MEDICARE

## 2022-02-10 VITALS — WEIGHT: 140.44 LBS | HEIGHT: 61 IN | BODY MASS INDEX: 26.51 KG/M2

## 2022-02-10 DIAGNOSIS — E55.9 VITAMIN D DEFICIENCY: Primary | ICD-10-CM

## 2022-02-10 DIAGNOSIS — H92.21 BLOOD IN EAR CANAL, RIGHT: Primary | ICD-10-CM

## 2022-02-10 DIAGNOSIS — J30.9 ALLERGIC RHINITIS, UNSPECIFIED SEASONALITY, UNSPECIFIED TRIGGER: ICD-10-CM

## 2022-02-10 DIAGNOSIS — H65.01 NON-RECURRENT ACUTE SEROUS OTITIS MEDIA OF RIGHT EAR: ICD-10-CM

## 2022-02-10 PROCEDURE — 99213 PR OFFICE/OUTPT VISIT, EST, LEVL III, 20-29 MIN: ICD-10-PCS | Mod: 25,S$GLB,, | Performed by: PHYSICIAN ASSISTANT

## 2022-02-10 PROCEDURE — 99213 OFFICE O/P EST LOW 20 MIN: CPT | Mod: 25,S$GLB,, | Performed by: PHYSICIAN ASSISTANT

## 2022-02-10 PROCEDURE — 69210 REMOVE IMPACTED EAR WAX UNI: CPT | Mod: S$GLB,,, | Performed by: PHYSICIAN ASSISTANT

## 2022-02-10 PROCEDURE — 69210: ICD-10-PCS | Mod: S$GLB,,, | Performed by: PHYSICIAN ASSISTANT

## 2022-02-10 RX ORDER — AZELASTINE 1 MG/ML
1 SPRAY, METERED NASAL 2 TIMES DAILY
Qty: 30 ML | Refills: 11 | Status: SHIPPED | OUTPATIENT
Start: 2022-02-10 | End: 2023-04-17

## 2022-02-10 RX ORDER — ERGOCALCIFEROL 1.25 MG/1
50000 CAPSULE ORAL
Qty: 12 CAPSULE | Refills: 1 | Status: SHIPPED | OUTPATIENT
Start: 2022-02-10 | End: 2022-04-29

## 2022-02-10 NOTE — PROGRESS NOTES
[facility]    Subjective:      Patient: Kristin Guerra Patient PCP: Georgi Mendez MD         :  1950     Sex:  female      MRN:  [id number]          Date of Visit: 02/10/2022      Chief Complaint: Ear pain    Patient ID: [id number] Kristin Guerra is a 72 y.o. female who was self-referred for ear pain. Pt has had right ear pain x 1 month. Pt took a couple of keflex from a prior abx prescription which helped he ear. Pt states that gland under the right ear was swollen, which has improved with keflex. Pt has h/o in right ear hearing loss which was previously evaluated by Dr. Kimbrough. Pt endorses right side aural pressue. Pt denies fever/chills or further hearing loss with new episode of aural fullness. There is not a family history of hearing loss at a young age. There is not a prior history of ear surgery. There is not a prior history of ear infections. She denies a history of significant noise exposure. 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 Pt has had recurrence of right outer helix lesion.     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 Pt states that she had some bleeding from her biopsy and feels as though she had some blood that dried in her right ear canal. Pt feels as though her right ear is clogged. Pt has history of sinus issues controlled, but states she is controlled with use of astelin and flonase. Pt would like refill of astelin nasal spray. Pt has no other complaints today in office.     Review of Systems   Constitutional: Negative for chills and fever.   HENT: Negative for ear discharge, ear pain, sinus pressure and sinus pain.         Fullness of right ear      Past Medical History  She has a past medical history of Allergy, Sterling's esophagus, Colon polyp, Diverticulitis, Diverticulosis, Fatty liver, GERD  (gastroesophageal reflux disease), History of endoscopy, cervical spine surgery, Hyperlipidemia, Hypertension, Lupus (systemic lupus erythematosus), Osteoarthritis, Osteopenia, Syncope, and Tremor.    Family History  Her family history includes Colon cancer in her father; Heart disease in her brother; Hyperlipidemia in her brother; Lung cancer in her brother, brother, and father; Lymphoma in her mother; Prostate cancer in her brother.    Past Surgical History:   Procedure Laterality Date    APPENDECTOMY      removed during colon surgery    CERVICAL FUSION      CERVICAL SPINE SURGERY      Rods and screws.     SECTION      COLON SURGERY      hemicolectomy    COLON SURGERY  2008    repair    COLONOSCOPY N/A 2018    Procedure: COLONOSCOPY;  Surgeon: Maverick Esquivel MD;  Location: [facility];  Service: Endoscopy;  Laterality: N/A; repeat in 5 years for surveillance    CYSTOSCOPY N/A 2021    Procedure: CYSTOSCOPY;  Surgeon: Allison Miranda MD;  Location: [facility];  Service: Urology;  Laterality: N/A;    ESOPHAGOGASTRODUODENOSCOPY N/A 2019    Procedure: EGD (ESOPHAGOGASTRODUODENOSCOPY);  Surgeon: Monika Steele MD;  Location: [facility];  Service: Endoscopy;  Laterality: N/A;    ESOPHAGOGASTRODUODENOSCOPY  2019    with RFA and biopsies    ESOPHAGOGASTRODUODENOSCOPY N/A 2019    Procedure: EGD (ESOPHAGOGASTRODUODENOSCOPY);  Surgeon: Uzair Cantor MD;  Location: [facility];  Service: Endoscopy;  Laterality: N/A;  n+v with anesthesia    ESOPHAGOGASTRODUODENOSCOPY N/A 2019    Procedure: EGD (ESOPHAGOGASTRODUODENOSCOPY);  Surgeon: Uzair Cantor MD;  Location: [facility];  Service: Endoscopy;  Laterality: N/A;    ESOPHAGOGASTRODUODENOSCOPY N/A 10/7/2019    Procedure: EGD (ESOPHAGOGASTRODUODENOSCOPY);  Surgeon: Uzair Cantor MD;  Location: [facility];  Service: Endoscopy;  Laterality: N/A;    ESOPHAGOGASTRODUODENOSCOPY N/A 2/10/2020    Procedure: EGD  (ESOPHAGOGASTRODUODENOSCOPY);  Surgeon: Uzair Cantor MD;  Location: [facility] ENDO;  Service: Endoscopy;  Laterality: N/A;  Requests earliest procedure time    ESOPHAGOGASTRODUODENOSCOPY N/A 8/17/2020    Procedure: EGD (ESOPHAGOGASTRODUODENOSCOPY);  Surgeon: Uzair Cantor MD;  Location: [facility] ENDO;  Service: Endoscopy;  Laterality: N/A;  BARRETTS    ESOPHAGOGASTRODUODENOSCOPY N/A 4/7/2021    Procedure: ESOPHAGOGASTRODUODENOSCOPY (EGD);  Surgeon: Uzair Cantor MD;  Location: [facility] ENDO;  Service: Endoscopy;  Laterality: N/A;  Needs Rapid Covid MP    ESOPHAGOGASTRODUODENOSCOPY N/A 11/30/2021    Procedure: EGD (ESOPHAGOGASTRODUODENOSCOPY);  Surgeon: Uzair Cantor MD;  Location: [facility] END[facility];  Service: Endoscopy;  Laterality: N/A;    HYSTERECTOMY      ovaries remain    INSERTION OF IMPLANTABLE LOOP RECORDER N/A 7/17/2020    Procedure: INSERTION, IMPLANTABLE LOOP RECORDER (MEDTRONIC);  Surgeon: Karyna Vasques MD;  Location: [facility] CATH[facility]EP LAB;  Service: Cardiology;  Laterality: N/A;    SPLENECTOMY, TOTAL  2007    Injured during surgery and removed    UPPER GASTROINTESTINAL ENDOSCOPY  2007    GERD & hiatal hernia per patient report     Social History     Tobacco Use    Smoking status: Current Every Day Smoker     Packs/day: 1.00     Years: 35.00     Pack years: 35.00     Types: Cigarettes    Smokeless tobacco: Never Used   Substance and Sexual Activity    Alcohol use: No    Drug use: No    Sexual activity: Not Currently     Medications  She has a current medication list which includes the following prescription(s): albuterol-ipratropium, aluminum & magnesium hydroxide-simethicone, amlodipine, azelastine, carbidopa-levodopa  mg, clobetasol, esomeprazole, fluticasone propionate, gabapentin, polyethylene glycol, and sertraline.    Review of patient's allergies indicates:   Allergen Reactions    Doxycycline Itching and Swelling     Prescribed 5/2021, took one dose, describes lip swelling, and  ""itch all over"    Mirtazapine Other (See Comments)     Hyperactivity.    Morphine Swelling and Rash     Other reaction(s): swelling in limbs    Lexapro [escitalopram oxalate] Other (See Comments)     Worsening tremor.  Sedation.    Primidone Other (See Comments)     Syncope.  Other reaction(s): dizziness,hypotension  Other reaction(s): Unknown  Other reaction(s): Unknown    Simvastatin Nausea And Vomiting and Other (See Comments)     Weakness.  Other reaction(s): nausea,vomiting    Beta-adrenergic agents Other (See Comments)     Low blood pressure and fainted.     Metoprolol succinate Other (See Comments)    Prochlorperazine      Other reaction(s): weakness    Topiramate     Compazine [prochlorperazine edisylate] Nausea Only    Tramadol Nausea And Vomiting     All medications, allergies, and past history have been reviewed.    Objective:      Vitals:  Vitals - 1 value per visit 1/26/2022 2/10/2022 2/10/2022   SYSTOLIC - - -   DIASTOLIC - - -   Pulse - - -   Temp - - -   Resp - - -   SPO2 - - -   Weight (lb) - - 140.43   Weight (kg) - - 63.7   Height - - 61   BMI (Calculated) - - 26.5   VISIT REPORT - - -   Pain Score  0 3 -   Some encounter information is confidential and restricted. Go to Review Flowsheets activity to see all data.   Some recent data might be hidden       Body surface area is 1.66 meters squared.    Physical Exam  Constitutional:       General: She is not in acute distress.     Appearance: Normal appearance. She is not ill-appearing.   HENT:      Head: Normocephalic and atraumatic.      Right Ear: Ear canal and external ear normal. A middle ear effusion (serous) is present. There is impacted cerumen (blood in right EAC cleared today in office under office microscope with curette and alligator forceps). Tympanic membrane is not perforated, retracted or bulging.      Left Ear: Ear canal and external ear normal.  No middle ear effusion. Tympanic membrane is not perforated, retracted or " bulging.      Nose: Nose normal.      Mouth/Throat:      Lips: Pink. No lesions.      Mouth: Mucous membranes are moist.      Dentition: No dental caries.      Tongue: No lesions.      Palate: No lesions.      Pharynx: Oropharynx is clear. Uvula midline. No pharyngeal swelling, oropharyngeal exudate, posterior oropharyngeal erythema or uvula swelling.   Eyes:      General:         Right eye: No discharge.         Left eye: No discharge.      Extraocular Movements: Extraocular movements intact.      Conjunctiva/sclera: Conjunctivae normal.   Pulmonary:      Effort: Pulmonary effort is normal.   Neurological:      General: No focal deficit present.      Mental Status: She is alert and oriented to person, place, and time. Mental status is at baseline.   Psychiatric:         Mood and Affect: Mood normal.         Behavior: Behavior normal.         Thought Content: Thought content normal.         Judgment: Judgment normal.       Procedure:  Coagulated blood removal from right EAC. Please see procedure note.    Labs:  WBC   Date Value Ref Range Status   06/03/2021 10.14 3.90 - 12.70 K/uL Final     Platelets   Date Value Ref Range Status   06/03/2021 319 150 - 450 K/uL Final     Creatinine   Date Value Ref Range Status   06/03/2021 0.6 0.5 - 1.4 mg/dL Final     TSH   Date Value Ref Range Status   02/09/2022 2.178 0.400 - 4.000 uIU/mL Final     Glucose   Date Value Ref Range Status   06/03/2021 92 70 - 110 mg/dL Final     All lab results and imaging results have been reviewed.    Assessment:        ICD-10-CM ICD-9-CM   1. Blood in ear canal, right  H92.21 388.69   2. Allergic rhinitis, unspecified seasonality, unspecified trigger  J30.9 477.9   3. Non-recurrent acute serous otitis media of right ear  H65.01 381.01            Plan:      Blood in ear canal, right  -     Coagulated blood removal from right EAC. Please see procedure note.    Allergic rhinitis, unspecified seasonality, unspecified trigger  -     azelastine  (ASTELIN) 137 mcg (0.1 %) nasal spray; 1 spray (137 mcg total) by Nasal route 2 (two) times daily.  Dispense: 30 mL; Refill: 11    Non-recurrent acute serous otitis media of right ear  -Pt advised to pop her ears 4-6 times a day (Valsalva) as shown in office. Pt is to use flonase 1 puff to each nostril to help with eustachian tube dysfunction. Pt is to take claritin once daily.     Pt is to follow up in 2 weeks to recheck her right ear. Pt may follow up sooner if she has persistent or worsening symptoms.

## 2022-02-10 NOTE — PATIENT INSTRUCTIONS
Pop your ears 4-6 times a day as shown in office.  Use astelin spray 1 puff to each nostril twice daily  Use flonase 1 puff to each nostril twice daily.  Use claritin once daily.  Follow up in 2 weeks to recheck your right ear

## 2022-02-11 NOTE — PROGRESS NOTES
Please let patient know the following:     Your vitamin d is insufficient am sending you in some prescription vitamin d.     Your thyroid is fine.

## 2022-02-14 ENCOUNTER — LAB VISIT (OUTPATIENT)
Dept: LAB | Facility: HOSPITAL | Age: 72
End: 2022-02-14
Attending: FAMILY MEDICINE
Payer: MEDICARE

## 2022-02-14 DIAGNOSIS — I10 ESSENTIAL HYPERTENSION: ICD-10-CM

## 2022-02-14 DIAGNOSIS — G25.2 TREMOR, COARSE: ICD-10-CM

## 2022-02-14 LAB
ANION GAP SERPL CALC-SCNC: 6 MMOL/L (ref 8–16)
BASOPHILS # BLD AUTO: 0.12 K/UL (ref 0–0.2)
BASOPHILS NFR BLD: 1.3 % (ref 0–1.9)
BUN SERPL-MCNC: 18 MG/DL (ref 8–23)
CALCIUM SERPL-MCNC: 9.2 MG/DL (ref 8.7–10.5)
CHLORIDE SERPL-SCNC: 108 MMOL/L (ref 95–110)
CO2 SERPL-SCNC: 26 MMOL/L (ref 23–29)
CREAT SERPL-MCNC: 0.6 MG/DL (ref 0.5–1.4)
DIFFERENTIAL METHOD: ABNORMAL
EOSINOPHIL # BLD AUTO: 0.4 K/UL (ref 0–0.5)
EOSINOPHIL NFR BLD: 4.6 % (ref 0–8)
ERYTHROCYTE [DISTWIDTH] IN BLOOD BY AUTOMATED COUNT: 14.6 % (ref 11.5–14.5)
EST. GFR  (AFRICAN AMERICAN): >60 ML/MIN/1.73 M^2
EST. GFR  (NON AFRICAN AMERICAN): >60 ML/MIN/1.73 M^2
GLUCOSE SERPL-MCNC: 79 MG/DL (ref 70–110)
HCT VFR BLD AUTO: 37 % (ref 37–48.5)
HGB BLD-MCNC: 12.1 G/DL (ref 12–16)
IMM GRANULOCYTES # BLD AUTO: 0.03 K/UL (ref 0–0.04)
IMM GRANULOCYTES NFR BLD AUTO: 0.3 % (ref 0–0.5)
LYMPHOCYTES # BLD AUTO: 2.5 K/UL (ref 1–4.8)
LYMPHOCYTES NFR BLD: 26 % (ref 18–48)
MCH RBC QN AUTO: 30.9 PG (ref 27–31)
MCHC RBC AUTO-ENTMCNC: 32.7 G/DL (ref 32–36)
MCV RBC AUTO: 95 FL (ref 82–98)
MONOCYTES # BLD AUTO: 0.8 K/UL (ref 0.3–1)
MONOCYTES NFR BLD: 8.6 % (ref 4–15)
NEUTROPHILS # BLD AUTO: 5.6 K/UL (ref 1.8–7.7)
NEUTROPHILS NFR BLD: 59.2 % (ref 38–73)
NRBC BLD-RTO: 0 /100 WBC
PLATELET # BLD AUTO: 356 K/UL (ref 150–450)
PMV BLD AUTO: 10.6 FL (ref 9.2–12.9)
POTASSIUM SERPL-SCNC: 4.3 MMOL/L (ref 3.5–5.1)
RBC # BLD AUTO: 3.91 M/UL (ref 4–5.4)
SODIUM SERPL-SCNC: 140 MMOL/L (ref 136–145)
WBC # BLD AUTO: 9.41 K/UL (ref 3.9–12.7)

## 2022-02-14 PROCEDURE — 85025 COMPLETE CBC W/AUTO DIFF WBC: CPT | Performed by: FAMILY MEDICINE

## 2022-02-14 PROCEDURE — 36415 COLL VENOUS BLD VENIPUNCTURE: CPT | Mod: PO | Performed by: FAMILY MEDICINE

## 2022-02-14 PROCEDURE — 80048 BASIC METABOLIC PNL TOTAL CA: CPT | Performed by: FAMILY MEDICINE

## 2022-02-23 DIAGNOSIS — D84.9 IMMUNOSUPPRESSED STATUS: ICD-10-CM

## 2022-02-24 ENCOUNTER — OFFICE VISIT (OUTPATIENT)
Dept: OTOLARYNGOLOGY | Facility: CLINIC | Age: 72
End: 2022-02-24
Payer: MEDICARE

## 2022-02-24 VITALS — WEIGHT: 141.56 LBS | HEIGHT: 61 IN | BODY MASS INDEX: 26.73 KG/M2

## 2022-02-24 DIAGNOSIS — H91.93 BILATERAL HEARING LOSS, UNSPECIFIED HEARING LOSS TYPE: Primary | ICD-10-CM

## 2022-02-24 PROCEDURE — 99213 PR OFFICE/OUTPT VISIT, EST, LEVL III, 20-29 MIN: ICD-10-PCS | Mod: S$GLB,,, | Performed by: PHYSICIAN ASSISTANT

## 2022-02-24 PROCEDURE — 99999 PR PBB SHADOW E&M-EST. PATIENT-LVL III: CPT | Mod: PBBFAC,,, | Performed by: PHYSICIAN ASSISTANT

## 2022-02-24 PROCEDURE — 99999 PR PBB SHADOW E&M-EST. PATIENT-LVL III: ICD-10-PCS | Mod: PBBFAC,,, | Performed by: PHYSICIAN ASSISTANT

## 2022-02-24 PROCEDURE — 99213 OFFICE O/P EST LOW 20 MIN: CPT | Mod: S$GLB,,, | Performed by: PHYSICIAN ASSISTANT

## 2022-02-24 NOTE — PROGRESS NOTES
[facility]    Subjective:      Patient: Kristin Guerra Patient PCP: Georgi Mendez MD         :  1950     Sex:  female      MRN:  [id number]          Date of Visit: 2022      Chief Complaint: Ear pain    Patient ID: [id number] Kristin Guerra is a 72 y.o. female who was self-referred for ear pain. Pt has had right ear pain x 1 month. Pt took a couple of keflex from a prior abx prescription which helped he ear. Pt states that gland under the right ear was swollen, which has improved with keflex. Pt has h/o in right ear hearing loss which was previously evaluated by Dr. Kimbrough. Pt endorses right side aural pressue. Pt denies fever/chills or further hearing loss with new episode of aural fullness. There is not a family history of hearing loss at a young age. There is not a prior history of ear surgery. There is not a prior history of ear infections. She denies a history of significant noise exposure. 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 Pt has had recurrence of right outer helix lesion.     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 Pt states that she had some bleeding from her biopsy and feels as though she had some blood that dried in her right ear canal. Pt feels as though her right ear is clogged. Pt has history of sinus issues controlled, but states she is controlled with use of astelin and flonase. Pt would like refill of astelin nasal spray. Pt has no other complaints today in office.     2022: Mild improvement of right ear pain. Pt using astelin and flonase and doing valsalva. Pt has fullness of right ear.    Review of Systems   Constitutional: Negative for chills and fever.   HENT: Negative for ear discharge, ear pain, sinus pressure and sinus pain.         Fullness of right ear      Past Medical  History  She has a past medical history of Allergy, Sterling's esophagus, Colon polyp, Diverticulitis, Diverticulosis, Fatty liver, GERD (gastroesophageal reflux disease), History of endoscopy, cervical spine surgery, Hyperlipidemia, Hypertension, Lupus (systemic lupus erythematosus), Osteoarthritis, Osteopenia, Syncope, and Tremor.    Family History  Her family history includes Colon cancer in her father; Heart disease in her brother; Hyperlipidemia in her brother; Lung cancer in her brother, brother, and father; Lymphoma in her mother; Prostate cancer in her brother.    Past Surgical History:   Procedure Laterality Date    APPENDECTOMY      removed during colon surgery    CERVICAL FUSION      CERVICAL SPINE SURGERY      Rods and screws.     SECTION      COLON SURGERY      hemicolectomy    COLON SURGERY  2008    repair    COLONOSCOPY N/A 2018    Procedure: COLONOSCOPY;  Surgeon: Maverick Esquivel MD;  Location: [facility];  Service: Endoscopy;  Laterality: N/A; repeat in 5 years for surveillance    CYSTOSCOPY N/A 2021    Procedure: CYSTOSCOPY;  Surgeon: Allison Miranda MD;  Location: [facility];  Service: Urology;  Laterality: N/A;    ESOPHAGOGASTRODUODENOSCOPY N/A 2019    Procedure: EGD (ESOPHAGOGASTRODUODENOSCOPY);  Surgeon: Monika Steele MD;  Location: [facility];  Service: Endoscopy;  Laterality: N/A;    ESOPHAGOGASTRODUODENOSCOPY  2019    with RFA and biopsies    ESOPHAGOGASTRODUODENOSCOPY N/A 2019    Procedure: EGD (ESOPHAGOGASTRODUODENOSCOPY);  Surgeon: Uzair Cantor MD;  Location: [facility];  Service: Endoscopy;  Laterality: N/A;  n+v with anesthesia    ESOPHAGOGASTRODUODENOSCOPY N/A 2019    Procedure: EGD (ESOPHAGOGASTRODUODENOSCOPY);  Surgeon: Uzair Cantor MD;  Location: [facility];  Service: Endoscopy;  Laterality: N/A;    ESOPHAGOGASTRODUODENOSCOPY N/A 10/7/2019    Procedure: EGD (ESOPHAGOGASTRODUODENOSCOPY);  Surgeon: Uzair RENDON  MD Devaughn;  Location: [facility];  Service: Endoscopy;  Laterality: N/A;    ESOPHAGOGASTRODUODENOSCOPY N/A 2/10/2020    Procedure: EGD (ESOPHAGOGASTRODUODENOSCOPY);  Surgeon: Uzair Cantor MD;  Location: [facility];  Service: Endoscopy;  Laterality: N/A;  Requests earliest procedure time    ESOPHAGOGASTRODUODENOSCOPY N/A 8/17/2020    Procedure: EGD (ESOPHAGOGASTRODUODENOSCOPY);  Surgeon: Uzair Cantor MD;  Location: [facility];  Service: Endoscopy;  Laterality: N/A;  BARRETTS    ESOPHAGOGASTRODUODENOSCOPY N/A 4/7/2021    Procedure: ESOPHAGOGASTRODUODENOSCOPY (EGD);  Surgeon: Uzair Cantor MD;  Location: [facility];  Service: Endoscopy;  Laterality: N/A;  Needs Rapid Covid MP    ESOPHAGOGASTRODUODENOSCOPY N/A 11/30/2021    Procedure: EGD (ESOPHAGOGASTRODUODENOSCOPY);  Surgeon: Uzair Cantor MD;  Location: [facility];  Service: Endoscopy;  Laterality: N/A;    HYSTERECTOMY      ovaries remain    INSERTION OF IMPLANTABLE LOOP RECORDER N/A 7/17/2020    Procedure: INSERTION, IMPLANTABLE LOOP RECORDER (MEDTRONIC);  Surgeon: Karyna Vasques MD;  [facility] CATH[facility]EP LAB;  Service: Cardiology;  Laterality: N/A;    SPLENECTOMY, TOTAL  2007    Injured during surgery and removed    UPPER GASTROINTESTINAL ENDOSCOPY  2007    GERD & hiatal hernia per patient report     Social History     Tobacco Use    Smoking status: Current Every Day Smoker     Packs/day: 1.00     Years: 35.00     Pack years: 35.00     Types: Cigarettes    Smokeless tobacco: Never Used   Substance and Sexual Activity    Alcohol use: No    Drug use: No    Sexual activity: Not Currently     Medications  She has a current medication list which includes the following prescription(s): albuterol-ipratropium, aluminum & magnesium hydroxide-simethicone, amlodipine, azelastine, carbidopa-levodopa  mg, clobetasol, ergocalciferol, esomeprazole, fluticasone propionate, gabapentin, polyethylene glycol, and sertraline.    Review of  "patient's allergies indicates:   Allergen Reactions    Doxycycline Itching and Swelling     Prescribed 5/2021, took one dose, describes lip swelling, and "itch all over"    Mirtazapine Other (See Comments)     Hyperactivity.    Morphine Swelling and Rash     Other reaction(s): swelling in limbs    Lexapro [escitalopram oxalate] Other (See Comments)     Worsening tremor.  Sedation.    Primidone Other (See Comments)     Syncope.  Other reaction(s): dizziness,hypotension  Other reaction(s): Unknown  Other reaction(s): Unknown    Simvastatin Nausea And Vomiting and Other (See Comments)     Weakness.  Other reaction(s): nausea,vomiting    Beta-adrenergic agents Other (See Comments)     Low blood pressure and fainted.     Metoprolol succinate Other (See Comments)    Prochlorperazine      Other reaction(s): weakness    Topiramate     Compazine [prochlorperazine edisylate] Nausea Only    Tramadol Nausea And Vomiting     All medications, allergies, and past history have been reviewed.    Objective:      Vitals:  Vitals - 1 value per visit 2/10/2022 2/24/2022 2/24/2022   SYSTOLIC - - -   DIASTOLIC - - -   Pulse - - -   Temp - - -   Resp - - -   SPO2 - - -   Weight (lb) 140.43 - 141.54   Weight (kg) 63.7 - 64.2   Height 61 - 61   BMI (Calculated) 26.5 - 26.8   VISIT REPORT - - -   Pain Score  - 3 -   Some encounter information is confidential and restricted. Go to Review Flowsheets activity to see all data.   Some recent data might be hidden       Body surface area is 1.66 meters squared.    Physical Exam  Constitutional:       General: She is not in acute distress.     Appearance: Normal appearance. She is not ill-appearing.   HENT:      Head: Normocephalic and atraumatic.      Right Ear: Ear canal and external ear normal. Tympanic membrane is retracted.      Left Ear: Tympanic membrane, ear canal and external ear normal.      Nose: Nose normal.   Eyes:      General:         Right eye: No discharge.         Left " eye: No discharge.      Extraocular Movements: Extraocular movements intact.      Conjunctiva/sclera: Conjunctivae normal.   Pulmonary:      Effort: Pulmonary effort is normal.   Neurological:      General: No focal deficit present.      Mental Status: She is alert and oriented to person, place, and time. Mental status is at baseline.   Psychiatric:         Mood and Affect: Mood normal.         Behavior: Behavior normal.         Thought Content: Thought content normal.         Judgment: Judgment normal.     Labs:  WBC   Date Value Ref Range Status   02/14/2022 9.41 3.90 - 12.70 K/uL Final     Platelets   Date Value Ref Range Status   02/14/2022 356 150 - 450 K/uL Final     Creatinine   Date Value Ref Range Status   02/14/2022 0.6 0.5 - 1.4 mg/dL Final     TSH   Date Value Ref Range Status   02/09/2022 2.178 0.400 - 4.000 uIU/mL Final     Glucose   Date Value Ref Range Status   02/14/2022 79 70 - 110 mg/dL Final         Shoebox audiogram:  Shoebox audiogram completed in office today shows moderate to moderately severe to severe hearing loss in right ear and mild to moderate to moderately severe hearing loss of left ear.   All lab results and imaging results have been reviewed.    Assessment:        ICD-10-CM ICD-9-CM   1. Bilateral hearing loss, unspecified hearing loss type  H91.93 389.9            Plan:      Bilateral hearing loss, unspecified hearing loss type  -Shoebox audiogram completed in office today shows moderate to moderately severe to severe hearing loss in right ear and mild to moderate to moderately severe hearing loss of left ear. Not able to do bone conduction with shoebox audiogram. Pt's aural fullness likely secondary to hearing loss. Pt would like to stay local for hearing aid consultation and comprehensive audiogram. Pt is to follow up with [facility] for comprehensive audiogram and hearing aid consultation.

## 2022-03-04 ENCOUNTER — OFFICE VISIT (OUTPATIENT)
Dept: FAMILY MEDICINE | Facility: CLINIC | Age: 72
End: 2022-03-04
Payer: MEDICARE

## 2022-03-04 VITALS
OXYGEN SATURATION: 98 % | WEIGHT: 142 LBS | TEMPERATURE: 98 F | HEIGHT: 61 IN | BODY MASS INDEX: 26.81 KG/M2 | HEART RATE: 61 BPM | SYSTOLIC BLOOD PRESSURE: 134 MMHG | DIASTOLIC BLOOD PRESSURE: 72 MMHG

## 2022-03-04 DIAGNOSIS — E55.9 VITAMIN D DEFICIENCY: Primary | ICD-10-CM

## 2022-03-04 DIAGNOSIS — E78.2 MIXED DYSLIPIDEMIA: ICD-10-CM

## 2022-03-04 PROCEDURE — 99213 PR OFFICE/OUTPT VISIT, EST, LEVL III, 20-29 MIN: ICD-10-PCS | Mod: S$GLB,,, | Performed by: FAMILY MEDICINE

## 2022-03-04 PROCEDURE — 99999 PR PBB SHADOW E&M-EST. PATIENT-LVL III: ICD-10-PCS | Mod: PBBFAC,,, | Performed by: FAMILY MEDICINE

## 2022-03-04 PROCEDURE — 99999 PR PBB SHADOW E&M-EST. PATIENT-LVL III: CPT | Mod: PBBFAC,,, | Performed by: FAMILY MEDICINE

## 2022-03-04 PROCEDURE — 99213 OFFICE O/P EST LOW 20 MIN: CPT | Mod: S$GLB,,, | Performed by: FAMILY MEDICINE

## 2022-03-04 NOTE — PATIENT INSTRUCTIONS
If you are due for any health screening(s) below please notify me so we can arrange them to be ordered and scheduled to maintain your health.     Health Maintenance   Topic Date Due    TETANUS VACCINE  Never done    Mammogram  07/20/2022    LDCT Lung Screen  09/10/2022    DEXA Scan  07/13/2023    Lipid Panel  02/01/2026    Hepatitis C Screening  Completed

## 2022-03-04 NOTE — PROGRESS NOTES
Subjective:       Patient ID: Kristin Guerra is a 72 y.o. female.    Chief Complaint: Follow-up (Discuss lab results)    Patient is a 59  year old male with history of hypertension, osteoarthritis, anxiety, mixed hyperlipidemia, gastroesophageal reflux disease, hiatal hernia, essential tremior, insomnia, vitamin d deficiency,  presenting for routine health maintenance and follow up and to discuss labs, patient endorses compliance with medication regimen. She is going to see a cardiologist for leg pain, she has statin intolerance and cannot take red yeast rice. She takes fish oil.         Current Outpatient Medications:     albuterol-ipratropium (DUO-NEB) 2.5 mg-0.5 mg/3 mL nebulizer solution, Take 3 mLs by nebulization every 6 (six) hours as needed for Wheezing or Shortness of Breath. Rescue, Disp: 120 each, Rfl: 5    aluminum & magnesium hydroxide-simethicone (MYLANTA MAX STRENGTH) 400-400-40 mg/5 mL suspension, Take 5 mLs by mouth every 6 (six) hours as needed for Indigestion. , Disp: , Rfl:     amLODIPine (NORVASC) 5 MG tablet, Take 5 mg by mouth once daily., Disp: , Rfl:     azelastine (ASTELIN) 137 mcg (0.1 %) nasal spray, 1 spray (137 mcg total) by Nasal route 2 (two) times daily., Disp: 30 mL, Rfl: 11    carbidopa-levodopa  mg (SINEMET)  mg per tablet, Take 1 tablet by mouth 3 (three) times daily., Disp: , Rfl:     clobetasoL (TEMOVATE) 0.05 % cream, Apply to feet daily to bid prn flare (Patient taking differently: Apply 1 application topically 2 (two) times daily. Apply to feet daily to bid prn flare), Disp: 30 g, Rfl: 2    ergocalciferol (ERGOCALCIFEROL) 50,000 unit Cap, Take 1 capsule (50,000 Units total) by mouth every 7 days. for 12 doses, Disp: 12 capsule, Rfl: 1    esomeprazole (NEXIUM) 40 MG capsule, TAKE 1 CAPSULE(40 MG) BY MOUTH TWICE DAILY, Disp: 180 capsule, Rfl: 3    fluticasone (FLONASE) 50 mcg/actuation nasal spray, 1 spray by Each Nostril route daily as needed for  "Allergies. , Disp: , Rfl:     gabapentin (NEURONTIN) 300 MG capsule, Take 300 mg by mouth every evening., Disp: , Rfl:     polyethylene glycol (GLYCOLAX) 17 gram PwPk, Take 17 g by mouth daily as needed., Disp: , Rfl:     sertraline (ZOLOFT) 25 MG tablet, Take 1 tablet (25 mg total) by mouth every evening., Disp: 90 tablet, Rfl: 1    Review of patient's allergies indicates:   Allergen Reactions    Doxycycline Itching and Swelling     Prescribed 5/2021, took one dose, describes lip swelling, and "itch all over"    Mirtazapine Other (See Comments)     Hyperactivity.    Morphine Swelling and Rash     Other reaction(s): swelling in limbs    Lexapro [escitalopram oxalate] Other (See Comments)     Worsening tremor.  Sedation.    Primidone Other (See Comments)     Syncope.  Other reaction(s): dizziness,hypotension  Other reaction(s): Unknown  Other reaction(s): Unknown    Simvastatin Nausea And Vomiting and Other (See Comments)     Weakness.  Other reaction(s): nausea,vomiting    Beta-adrenergic agents Other (See Comments)     Low blood pressure and fainted.     Metoprolol succinate Other (See Comments)    Prochlorperazine      Other reaction(s): weakness    Topiramate     Compazine [prochlorperazine edisylate] Nausea Only    Tramadol Nausea And Vomiting       Past Medical History:   Diagnosis Date    Allergy     Sterling's esophagus     Colon polyp     Diverticulitis     Diverticulosis     Fatty liver     GERD (gastroesophageal reflux disease)     History of endoscopy 4/7/2021    Dr Uzair Cantor  Impression:            - Normal oropharynx.                         - Hiatal hernia.                         - Sterling's esophagus. Treated with radiofrequency                         ablation.                         - Normal stomach.                         - Normal examined duodenum.                         - No specimens collected.     Hx of cervical spine surgery 11/07/2018    Hyperlipidemia     " Hypertension     Lupus (systemic lupus erythematosus)     discoid    Osteoarthritis     Osteopenia     Syncope     Tremor 2005    essential       Past Surgical History:   Procedure Laterality Date    APPENDECTOMY  2007    removed during colon surgery    CERVICAL FUSION      CERVICAL SPINE SURGERY      Rods and screws.     SECTION      COLON SURGERY  2007    hemicolectomy    COLON SURGERY  2008    repair    COLONOSCOPY N/A 2018    Procedure: COLONOSCOPY;  Surgeon: Maverick Esquivel MD;  Location: [facility];  Service: Endoscopy;  Laterality: N/A; repeat in 5 years for surveillance    CYSTOSCOPY N/A 2021    Procedure: CYSTOSCOPY;  Surgeon: Allison Miranda MD;  Location: [facility] OR;  Service: Urology;  Laterality: N/A;    ESOPHAGOGASTRODUODENOSCOPY N/A 2019    Procedure: EGD (ESOPHAGOGASTRODUODENOSCOPY);  Surgeon: Monika Steele MD;  Location: [facility];  Service: Endoscopy;  Laterality: N/A;    ESOPHAGOGASTRODUODENOSCOPY  2019    with RFA and biopsies    ESOPHAGOGASTRODUODENOSCOPY N/A 2019    Procedure: EGD (ESOPHAGOGASTRODUODENOSCOPY);  Surgeon: Uzair Cantor MD;  Location: [facility];  Service: Endoscopy;  Laterality: N/A;  n+v with anesthesia    ESOPHAGOGASTRODUODENOSCOPY N/A 2019    Procedure: EGD (ESOPHAGOGASTRODUODENOSCOPY);  Surgeon: Uzair Cantor MD;  Location: [facility];  Service: Endoscopy;  Laterality: N/A;    ESOPHAGOGASTRODUODENOSCOPY N/A 10/7/2019    Procedure: EGD (ESOPHAGOGASTRODUODENOSCOPY);  Surgeon: Uzair Cantor MD;  Location: [facility];  Service: Endoscopy;  Laterality: N/A;    ESOPHAGOGASTRODUODENOSCOPY N/A 2/10/2020    Procedure: EGD (ESOPHAGOGASTRODUODENOSCOPY);  Surgeon: Uzair Cantor MD;  Location: [facility];  Service: Endoscopy;  Laterality: N/A;  Requests earliest procedure time    ESOPHAGOGASTRODUODENOSCOPY N/A 2020    Procedure: EGD (ESOPHAGOGASTRODUODENOSCOPY);  Surgeon: Uzair Cantor MD;   Location: [facility];  Service: Endoscopy;  Laterality: N/A;  BARRETTS    ESOPHAGOGASTRODUODENOSCOPY N/A 4/7/2021    Procedure: ESOPHAGOGASTRODUODENOSCOPY (EGD);  Surgeon: Uzair Cantor MD;  Location: [facility];  Service: Endoscopy;  Laterality: N/A;  Needs Rapid Covid MP    ESOPHAGOGASTRODUODENOSCOPY N/A 11/30/2021    Procedure: EGD (ESOPHAGOGASTRODUODENOSCOPY);  Surgeon: Uzair Cantor MD;  Location: [facility] ENDO;  Service: Endoscopy;  Laterality: N/A;    HYSTERECTOMY      ovaries remain    INSERTION OF IMPLANTABLE LOOP RECORDER N/A 7/17/2020    Procedure: INSERTION, IMPLANTABLE LOOP RECORDER (IIZI group);  Surgeon: Karyna Vasques MD;  Location: [facility] CATH/EP LAB;  Service: Cardiology;  Laterality: N/A;    SPLENECTOMY, TOTAL  2007    Injured during surgery and removed    UPPER GASTROINTESTINAL ENDOSCOPY  2007    GERD & hiatal hernia per patient report       Family History   Problem Relation Age of Onset    Colon cancer Father         diagnosed in his 60's    Lung cancer Father     Lymphoma Mother         Brain    Lung cancer Brother     Lung cancer Brother         metastatic from prostate    Prostate cancer Brother     Heart disease Brother         valvular disease    Hyperlipidemia Brother     Eczema Neg Hx     Lupus Neg Hx     Psoriasis Neg Hx     Melanoma Neg Hx     Crohn's disease Neg Hx     Ulcerative colitis Neg Hx     Stomach cancer Neg Hx     Esophageal cancer Neg Hx        Social History     Tobacco Use    Smoking status: Current Every Day Smoker     Packs/day: 1.00     Years: 35.00     Pack years: 35.00     Types: Cigarettes    Smokeless tobacco: Never Used   Substance Use Topics    Alcohol use: No    Drug use: No       Review of Systems   Constitutional: Negative for activity change, appetite change, chills, diaphoresis, fatigue, fever and unexpected weight change.   HENT: Negative for hearing loss, postnasal drip, rhinorrhea, sinus pressure/congestion, sore throat and  "trouble swallowing.    Eyes: Negative for visual disturbance.   Respiratory: Negative for apnea, chest tightness, shortness of breath and wheezing.    Cardiovascular: Negative for chest pain and palpitations.   Gastrointestinal: Negative for abdominal pain, blood in stool, change in bowel habit, constipation, diarrhea, nausea, vomiting and change in bowel habit.   Endocrine: Negative for polydipsia and polyphagia.   Genitourinary: Negative for dysuria, enuresis, flank pain, frequency and urgency.   Integumentary:  Negative for rash and mole/lesion.   Neurological: Negative for dizziness, light-headedness, headaches and memory loss.   Psychiatric/Behavioral: Negative for confusion, decreased concentration, sleep disturbance and suicidal ideas. The patient is not nervous/anxious.            Objective:      Vitals:    03/04/22 0952   BP: 134/72   BP Location: Right arm   Patient Position: Sitting   BP Method: Medium (Manual)   Pulse: 61   Temp: 97.9 °F (36.6 °C)   TempSrc: Oral   SpO2: 98%   Weight: 64.4 kg (141 lb 15.6 oz)   Height: 5' 1" (1.549 m)     Physical Exam  Constitutional:       General: She is not in acute distress.     Appearance: She is normal weight. She is not ill-appearing, toxic-appearing or diaphoretic.   HENT:      Head: Normocephalic and atraumatic.      Right Ear: External ear normal.      Left Ear: External ear normal.      Nose: Nose normal. No congestion.      Mouth/Throat:      Mouth: Mucous membranes are moist.      Pharynx: Oropharynx is clear. No oropharyngeal exudate or posterior oropharyngeal erythema.   Eyes:      General: No scleral icterus.     Extraocular Movements: Extraocular movements intact.      Conjunctiva/sclera: Conjunctivae normal.   Cardiovascular:      Rate and Rhythm: Normal rate and regular rhythm.      Pulses: Normal pulses.      Heart sounds: Normal heart sounds. No murmur heard.    No friction rub. No gallop.   Pulmonary:      Effort: Pulmonary effort is normal. No " respiratory distress.      Breath sounds: Normal breath sounds. No stridor. No wheezing, rhonchi or rales.   Chest:      Chest wall: No tenderness.   Abdominal:      General: Abdomen is flat. Bowel sounds are normal. There is no distension.      Palpations: Abdomen is soft. There is no mass.      Tenderness: There is no abdominal tenderness. There is no guarding or rebound.      Hernia: No hernia is present.   Musculoskeletal:      Cervical back: Normal range of motion.      Right lower leg: No edema.      Left lower leg: No edema.   Skin:     Coloration: Skin is not jaundiced or pale.      Findings: No bruising, erythema or rash.   Neurological:      General: No focal deficit present.      Mental Status: She is alert and oriented to person, place, and time. Mental status is at baseline.   Psychiatric:         Mood and Affect: Mood normal.         Behavior: Behavior normal.         Thought Content: Thought content normal.         Judgment: Judgment normal.           Assessment:       1. Vitamin D deficiency    2. Mixed dyslipidemia        Plan:     Vitamin D deficiency  -     Calcitriol (1,25 di-OH Vitamin D); Future; Expected date: 03/04/2022    Mixed dyslipidemia  -     Lipid Panel; Future; Expected date: 03/04/2022        Follow up in about 6 months (around 9/4/2022).    Georgi Mendez MD         DISCLAIMER: This note was prepared with [employer] voice recognition transcription software. Garbled syntax, mangled pronouns, and other bizarre constructions may be attributed to that software system.

## 2022-03-07 ENCOUNTER — TELEPHONE (OUTPATIENT)
Dept: OTOLARYNGOLOGY | Facility: CLINIC | Age: 72
End: 2022-03-07
Payer: MEDICARE

## 2022-03-07 RX ORDER — GABAPENTIN 300 MG/1
300 CAPSULE ORAL NIGHTLY
Qty: 30 CAPSULE | Refills: 3 | Status: SHIPPED | OUTPATIENT
Start: 2022-03-07 | End: 2023-01-09 | Stop reason: SDUPTHER

## 2022-03-09 ENCOUNTER — LAB VISIT (OUTPATIENT)
Dept: LAB | Facility: HOSPITAL | Age: 72
End: 2022-03-09
Attending: FAMILY MEDICINE
Payer: MEDICARE

## 2022-03-09 DIAGNOSIS — E78.2 MIXED DYSLIPIDEMIA: ICD-10-CM

## 2022-03-09 LAB
CHOLEST SERPL-MCNC: 241 MG/DL (ref 120–199)
CHOLEST/HDLC SERPL: 4.2 {RATIO} (ref 2–5)
HDLC SERPL-MCNC: 57 MG/DL (ref 40–75)
HDLC SERPL: 23.7 % (ref 20–50)
LDLC SERPL CALC-MCNC: 168.8 MG/DL (ref 63–159)
NONHDLC SERPL-MCNC: 184 MG/DL
TRIGL SERPL-MCNC: 76 MG/DL (ref 30–150)

## 2022-03-09 PROCEDURE — 80061 LIPID PANEL: CPT | Performed by: FAMILY MEDICINE

## 2022-03-09 PROCEDURE — 36415 COLL VENOUS BLD VENIPUNCTURE: CPT | Mod: PO | Performed by: FAMILY MEDICINE

## 2022-03-18 ENCOUNTER — OFFICE VISIT (OUTPATIENT)
Dept: PULMONOLOGY | Facility: CLINIC | Age: 72
End: 2022-03-18
Payer: MEDICARE

## 2022-03-18 VITALS
HEIGHT: 61 IN | DIASTOLIC BLOOD PRESSURE: 74 MMHG | WEIGHT: 141.44 LBS | SYSTOLIC BLOOD PRESSURE: 137 MMHG | HEART RATE: 53 BPM | OXYGEN SATURATION: 98 % | BODY MASS INDEX: 26.71 KG/M2

## 2022-03-18 DIAGNOSIS — J44.9 CHRONIC OBSTRUCTIVE PULMONARY DISEASE, UNSPECIFIED COPD TYPE: Primary | ICD-10-CM

## 2022-03-18 PROCEDURE — 99999 PR PBB SHADOW E&M-EST. PATIENT-LVL IV: CPT | Mod: PBBFAC,,, | Performed by: NURSE PRACTITIONER

## 2022-03-18 PROCEDURE — 99213 OFFICE O/P EST LOW 20 MIN: CPT | Mod: S$GLB,,, | Performed by: NURSE PRACTITIONER

## 2022-03-18 PROCEDURE — 99213 PR OFFICE/OUTPT VISIT, EST, LEVL III, 20-29 MIN: ICD-10-PCS | Mod: S$GLB,,, | Performed by: NURSE PRACTITIONER

## 2022-03-18 PROCEDURE — 99999 PR PBB SHADOW E&M-EST. PATIENT-LVL IV: ICD-10-PCS | Mod: PBBFAC,,, | Performed by: NURSE PRACTITIONER

## 2022-03-18 RX ORDER — FLUTICASONE PROPIONATE AND SALMETEROL XINAFOATE 115; 21 UG/1; UG/1
2 AEROSOL, METERED RESPIRATORY (INHALATION) EVERY 12 HOURS
Qty: 12 G | Refills: 11 | Status: SHIPPED | OUTPATIENT
Start: 2022-03-18 | End: 2022-06-21 | Stop reason: SINTOL
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
## 2022-03-18 NOTE — PATIENT INSTRUCTIONS
Recommend taking regular Mucinex daily to help wet mucous making it easier to clear    Will start Advair 1 or 2 puffs 1-2 times a day based on what you can handle but take at least once a day every day.     Suppose to be 2 puffs 2 times daily.

## 2022-03-29 ENCOUNTER — OFFICE VISIT (OUTPATIENT)
Dept: PSYCHIATRY | Facility: CLINIC | Age: 72
End: 2022-03-29
Payer: MEDICARE

## 2022-03-29 VITALS
HEART RATE: 55 BPM | WEIGHT: 142 LBS | HEIGHT: 61 IN | DIASTOLIC BLOOD PRESSURE: 79 MMHG | BODY MASS INDEX: 26.81 KG/M2 | SYSTOLIC BLOOD PRESSURE: 170 MMHG

## 2022-03-29 DIAGNOSIS — F51.05 INSOMNIA DUE TO OTHER MENTAL DISORDER: ICD-10-CM

## 2022-03-29 DIAGNOSIS — F99 INSOMNIA DUE TO OTHER MENTAL DISORDER: ICD-10-CM

## 2022-03-29 DIAGNOSIS — F32.9 REACTIVE DEPRESSION: ICD-10-CM

## 2022-03-29 DIAGNOSIS — F41.1 GAD (GENERALIZED ANXIETY DISORDER): Primary | ICD-10-CM

## 2022-03-29 PROCEDURE — 99214 PR OFFICE/OUTPT VISIT, EST, LEVL IV, 30-39 MIN: ICD-10-PCS | Mod: S$GLB,,, | Performed by: REGISTERED NURSE

## 2022-03-29 PROCEDURE — 99999 PR PBB SHADOW E&M-EST. PATIENT-LVL IV: CPT | Mod: PBBFAC,,, | Performed by: REGISTERED NURSE

## 2022-03-29 PROCEDURE — 99999 PR PBB SHADOW E&M-EST. PATIENT-LVL IV: ICD-10-PCS | Mod: PBBFAC,,, | Performed by: REGISTERED NURSE

## 2022-03-29 PROCEDURE — 99214 OFFICE O/P EST MOD 30 MIN: CPT | Mod: S$GLB,,, | Performed by: REGISTERED NURSE

## 2022-03-29 RX ORDER — LISINOPRIL 5 MG/1
5 TABLET ORAL DAILY
COMMUNITY
Start: 2021-10-22 | End: 2022-03-31

## 2022-03-29 RX ORDER — TRAZODONE HYDROCHLORIDE 50 MG/1
TABLET ORAL
Qty: 90 TABLET | Refills: 0 | Status: SHIPPED | OUTPATIENT
Start: 2022-03-29 | End: 2022-08-11 | Stop reason: SDUPTHER

## 2022-03-29 NOTE — PATIENT INSTRUCTIONS
Continue Zoloft 25 mg by mouth daily.     Start Trazodone 50 mg 1/2 tablet by mouth nightly; if in 1 week insomnia is not relieved may take 1 whole tablet by mouth nightly.               Please go to emergency department if feeling as though you are going to harm to yourself or others or if you are in crisis.     Please call the clinic to report any worsening of symptoms or problems associated with medication.
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
## 2022-04-05 ENCOUNTER — TELEPHONE (OUTPATIENT)
Dept: ENDOSCOPY | Facility: HOSPITAL | Age: 72
End: 2022-04-05
Payer: MEDICARE

## 2022-04-05 DIAGNOSIS — K22.719 BARRETT'S ESOPHAGUS WITH DYSPLASIA: Primary | ICD-10-CM

## 2022-04-06 NOTE — TELEPHONE ENCOUNTER
----- Message from Abbie Shoemaker MA sent at 12/7/2021  2:52 PM CST -----  MD CADENCE Galan Staff  Please let the patient know there was small amount of Sterling's without dysplasia.   EGD in 6 months with RFA.

## 2022-05-13 ENCOUNTER — PES CALL (OUTPATIENT)
Dept: ADMINISTRATIVE | Facility: CLINIC | Age: 72
End: 2022-05-13
Payer: MEDICARE

## 2022-05-19 ENCOUNTER — TELEPHONE (OUTPATIENT)
Dept: ENDOSCOPY | Facility: HOSPITAL | Age: 72
End: 2022-05-19
Payer: MEDICARE

## 2022-05-19 NOTE — TELEPHONE ENCOUNTER
Patient only has loop  Recorder, not a pacemaker or defibillator. Pulm patient reports only using inhalers and the pulm MD says the nodules are too small to take a biopsy on. Pt does not use oxygen.

## 2022-05-19 NOTE — TELEPHONE ENCOUNTER
Received request to schedule patient for EGD. Spoke with Patient. Reviewed medical history and medications. Scheduled 6/29/22 at 8 am. Instructed on procedure and prep. Patient verbalized understanding of instructions. Copy of instructions sent to patient via mail per pt request.

## 2022-06-10 ENCOUNTER — PES CALL (OUTPATIENT)
Dept: ADMINISTRATIVE | Facility: CLINIC | Age: 72
End: 2022-06-10
Payer: MEDICARE

## 2022-06-13 ENCOUNTER — TELEPHONE (OUTPATIENT)
Dept: CARDIOLOGY | Facility: CLINIC | Age: 72
End: 2022-06-13
Payer: MEDICARE

## 2022-06-13 NOTE — TELEPHONE ENCOUNTER
----- Message from Bhakti Beachimer sent at 6/13/2022 11:23 AM CDT -----  Contact: pt  Type: Needs Medical Advice         Who Called pt  Best Call Back Number:[phone]  Additional Information: Requesting a call back regarding  pt is wondering if the office having a issue for licenses for echo's. Pt and her  both have one tomorrow and wanting to know if it's still a go to have it done. Pt said her friend had one today and was unable to have the echo.  Pt would like office to call her back.   Please Advise- Thank you

## 2022-06-21 ENCOUNTER — OFFICE VISIT (OUTPATIENT)
Dept: PULMONOLOGY | Facility: CLINIC | Age: 72
End: 2022-06-21
Payer: MEDICARE

## 2022-06-21 VITALS
BODY MASS INDEX: 27.34 KG/M2 | WEIGHT: 144.81 LBS | DIASTOLIC BLOOD PRESSURE: 82 MMHG | HEIGHT: 61 IN | SYSTOLIC BLOOD PRESSURE: 142 MMHG | OXYGEN SATURATION: 98 %

## 2022-06-21 DIAGNOSIS — R91.8 MULTIPLE LUNG NODULES: ICD-10-CM

## 2022-06-21 DIAGNOSIS — J44.9 CHRONIC OBSTRUCTIVE PULMONARY DISEASE, UNSPECIFIED COPD TYPE: Primary | ICD-10-CM

## 2022-06-21 PROCEDURE — 99213 OFFICE O/P EST LOW 20 MIN: CPT | Mod: S$GLB,,, | Performed by: NURSE PRACTITIONER

## 2022-06-21 PROCEDURE — 99999 PR PBB SHADOW E&M-EST. PATIENT-LVL III: CPT | Mod: PBBFAC,,, | Performed by: NURSE PRACTITIONER

## 2022-06-21 PROCEDURE — 99213 PR OFFICE/OUTPT VISIT, EST, LEVL III, 20-29 MIN: ICD-10-PCS | Mod: S$GLB,,, | Performed by: NURSE PRACTITIONER

## 2022-06-21 PROCEDURE — 99999 PR PBB SHADOW E&M-EST. PATIENT-LVL III: ICD-10-PCS | Mod: PBBFAC,,, | Performed by: NURSE PRACTITIONER

## 2022-06-21 RX ORDER — TIOTROPIUM BROMIDE INHALATION SPRAY 3.12 UG/1
2 SPRAY, METERED RESPIRATORY (INHALATION) DAILY
Qty: 4 G | Refills: 11 | Status: SHIPPED | OUTPATIENT
Start: 2022-06-21 | End: 2023-09-21 | Stop reason: ALTCHOICE

## 2022-06-21 NOTE — PROGRESS NOTES
6/21/2022    Kristin Guerra  Office Note    Chief Complaint   Patient presents with    3m f/u       HPI:  6/21/2022- currently smoking 1 pack daily. Not interested quitting and the moment.   Able to keep up with house work but tires quickly has to pace yourself. Wearing supplemental oxygen when needed. Uses husbands machine at 3L.   Cough- stable, unchanged, able to clear occasionally varies from clear to brown mucous. Associated with chest pain. Has Echo scheduled with cardiologist  Using Albuterol daily, tried Symbicort but states to strong and made her choke.     3/18/2022- currently working on cutting back on smoking. Over 1 pack daily. States SOB varies with time, has good/bad days, worse with high humidity.   Cough- persistent complaint, daily complaint, thick clear/brown mucous.   No current complaint of wheeze or chest tightness. Occasionally wearing supplemental oxygen at home at 3L with benefit. Uses 's machine. Uses albuterol inhaler only, tried Symbicort in past caused chest pain.      12/17/2021- Complaint of daily cough- persistent complaint, mild complaint, productive clear/brown mucous, currently smoking 1 pack daily, associated with wheeze  Wearing supplemental oxygen at night 3L at night. No daily metered dose inhaler, using albuterol nebulizer 2-3x weekly with benefit.     SOB- stable, worse with exertion, difficult to lift heavy objects, improves with rest.     9/17/2021- complaint of high blood pressure, daytime fatigue, and generalized body aches, states she faints unexpectedly,     3/17/2021- decided not to do sleep study, Shortness of breath- daily complaint, varies with severity, worse with exertion. Currently smoking 1 pack cigarettes daily.   No cough, chest tightness, or wheeze.   Using albuterol 3-4 days weekly, using nebulizer when needed about 1x weekly, no nocturnal arousals.   Not able to tolerate inhalers, using albuterol nebulizer and inhaler only.       10/6/2020- Cough  and SOB unchanged, daily, improves with albuterol rescue inhaler. Improves ability to bring up sputum, productive clear and brown mucous, not able to bring in sputum samples. Did not fill Trelegy due to high cost. Using albuterol rescue 2x daily to control cough,   Currently smoking 1 pack daily.    7/6/2020- referred by PCP for Ct chest screening with abnormal spot seen. Complaint of weakness and fatigue, being treated for Syncope dx with bradycardia waiting for pacemaker placement.   Cough- onset 2 years, large amounts of mucous, clear color, nickel size, daily.  SOB- onset 2 years, limits activity level, works with exertion, improves quickly with rest, no chest tightness or wheeze,  Complaint of nightly snoring, daytime fatigue, drowsiness in afternoons.   Social Hx: lives with spouse but no pets, currently retired from office and retail stocking, no known Asbestosis exposure, Smoking Hx: currently smoking 1 pack daily, 40 pack years.  Family Hx: Father and 2 brothers (Small cell) Lung Cancer, no COPD, no Asthma  Medical Hx: no previous pneumonia ; no previous shoulder/chest surgery, Neck surgery 2009, 11, 17 for disc infusions.         The chief compliant  problem is stable  PFSH:  Past Medical History:   Diagnosis Date    Allergy     Sterling's esophagus     Colon polyp     Diverticulitis     Diverticulosis     Fatty liver     GERD (gastroesophageal reflux disease)     History of endoscopy 4/7/2021    Dr Uzair Cantor  Impression:            - Normal oropharynx.                         - Hiatal hernia.                         - Sterling's esophagus. Treated with radiofrequency                         ablation.                         - Normal stomach.                         - Normal examined duodenum.                         - No specimens collected.     Hx of cervical spine surgery 11/07/2018    Hyperlipidemia     Hypertension     Lupus (systemic lupus erythematosus) 1999    discoid     Osteoarthritis     Osteopenia     Syncope     Tremor 2005    essential         Past Surgical History:   Procedure Laterality Date    APPENDECTOMY  2007    removed during colon surgery    CERVICAL FUSION      CERVICAL SPINE SURGERY      Rods and screws.     SECTION      COLON SURGERY  2007    hemicolectomy    COLON SURGERY  2008    repair    COLONOSCOPY N/A 2018    Procedure: COLONOSCOPY;  Surgeon: Maverick Esquivel MD;  Location: [facility];  Service: Endoscopy;  Laterality: N/A; repeat in 5 years for surveillance    CYSTOSCOPY N/A 2021    Procedure: CYSTOSCOPY;  Surgeon: Allison Miranda MD;  Location: [facility];  Service: Urology;  Laterality: N/A;    ESOPHAGOGASTRODUODENOSCOPY N/A 2019    Procedure: EGD (ESOPHAGOGASTRODUODENOSCOPY);  Surgeon: Monika Steele MD;  Location: [facility];  Service: Endoscopy;  Laterality: N/A;    ESOPHAGOGASTRODUODENOSCOPY  2019    with RFA and biopsies    ESOPHAGOGASTRODUODENOSCOPY N/A 2019    Procedure: EGD (ESOPHAGOGASTRODUODENOSCOPY);  Surgeon: Uzair Cantor MD;  Location: [facility];  Service: Endoscopy;  Laterality: N/A;  n+v with anesthesia    ESOPHAGOGASTRODUODENOSCOPY N/A 2019    Procedure: EGD (ESOPHAGOGASTRODUODENOSCOPY);  Surgeon: Uzair Cantor MD;  Location: [facility];  Service: Endoscopy;  Laterality: N/A;    ESOPHAGOGASTRODUODENOSCOPY N/A 10/7/2019    Procedure: EGD (ESOPHAGOGASTRODUODENOSCOPY);  Surgeon: Uzair Cantor MD;  Location: [facility];  Service: Endoscopy;  Laterality: N/A;    ESOPHAGOGASTRODUODENOSCOPY N/A 2/10/2020    Procedure: EGD (ESOPHAGOGASTRODUODENOSCOPY);  Surgeon: Uzair Cantor MD;  Location: [facility];  Service: Endoscopy;  Laterality: N/A;  Requests earliest procedure time    ESOPHAGOGASTRODUODENOSCOPY N/A 2020    Procedure: EGD (ESOPHAGOGASTRODUODENOSCOPY);  Surgeon: Uzair Cantor MD;  Location: [facility];  Service: Endoscopy;  Laterality: N/A;  BARRETTS     "ESOPHAGOGASTRODUODENOSCOPY N/A 4/7/2021    Procedure: ESOPHAGOGASTRODUODENOSCOPY (EGD);  Surgeon: Uzair Cantor MD;  Location: [facility] ENDO;  Service: Endoscopy;  Laterality: N/A;  Needs Rapid Covid MP    ESOPHAGOGASTRODUODENOSCOPY N/A 11/30/2021    Procedure: EGD (ESOPHAGOGASTRODUODENOSCOPY);  Surgeon: Uzair Cantor MD;  Location: [facility] ENDO;  Service: Endoscopy;  Laterality: N/A;    HYSTERECTOMY      ovaries remain    INSERTION OF IMPLANTABLE LOOP RECORDER N/A 7/17/2020    Procedure: INSERTION, IMPLANTABLE LOOP RECORDER (MEDTRONIC);  Surgeon: Karyna Vasques MD;  [facility] CATH/EP LAB;  Service: Cardiology;  Laterality: N/A;    SPLENECTOMY, TOTAL  2007    Injured during surgery and removed    UPPER GASTROINTESTINAL ENDOSCOPY  2007    GERD & hiatal hernia per patient report     Social History     Tobacco Use    Smoking status: Current Every Day Smoker     Packs/day: 1.00     Years: 35.00     Pack years: 35.00     Types: Cigarettes    Smokeless tobacco: Never Used   Substance Use Topics    Alcohol use: No    Drug use: No     Family History   Problem Relation Age of Onset    Colon cancer Father         diagnosed in his 60's    Lung cancer Father     Lymphoma Mother         Brain    Lung cancer Brother     Lung cancer Brother         metastatic from prostate    Prostate cancer Brother     Heart disease Brother         valvular disease    Hyperlipidemia Brother     Eczema Neg Hx     Lupus Neg Hx     Psoriasis Neg Hx     Melanoma Neg Hx     Crohn's disease Neg Hx     Ulcerative colitis Neg Hx     Stomach cancer Neg Hx     Esophageal cancer Neg Hx      Review of patient's allergies indicates:   Allergen Reactions    Doxycycline Itching and Swelling     Prescribed 5/2021, took one dose, describes lip swelling, and "itch all over"    Mirtazapine Other (See Comments)     Hyperactivity.    Morphine Swelling and Rash     Other reaction(s): swelling in limbs    Lexapro [escitalopram " "oxalate] Other (See Comments)     Worsening tremor.  Sedation.    Primidone Other (See Comments)     Syncope.  Other reaction(s): dizziness,hypotension  Other reaction(s): Unknown  Other reaction(s): Unknown    Simvastatin Nausea And Vomiting and Other (See Comments)     Weakness.  Other reaction(s): nausea,vomiting    Beta-adrenergic agents Other (See Comments)     Low blood pressure and fainted.     Metoprolol succinate Other (See Comments)    Prochlorperazine      Other reaction(s): weakness    Topiramate     Compazine [prochlorperazine edisylate] Nausea Only    Tramadol Nausea And Vomiting     I have reviewed past medical, family, and social history. I have reviewed previous nurse notes.    Performance Status:The patient's activity level is functions out of house.      Review of Systems:  a review of eleven systems covering constitutional, Eye, HEENT, Psych, Respiratory, Cardiac, GI, , Musculoskeletal, Endocrine, Dermatologic was negative except for pertinent findings as listed ABOVE and below: pertinent positive as above, rest is good  Cough, shortness of breath         Exam:Comprehensive exam done. BP (!) 142/82 (BP Location: Right arm, Patient Position: Sitting, BP Method: Medium (Automatic))   Ht 5' 1" (1.549 m)   Wt 65.7 kg (144 lb 13.5 oz)   SpO2 98% Comment: on room air at rest  BMI 27.37 kg/m²   Exam included Vitals as listed, and patient's appearance and affect and alertness and mood, oral exam for yeast and hygiene and pharynx lesions and Mallapatti (M) score, neck with inspection for jvd and masses and thyroid abnormalities and lymph nodes (supraclavicular and infraclavicular nodes and axillary also examined and noted if abn), chest exam included symmetry and effort and fremitus and percussion and auscultation, cardiac exam included rhythm and gallops and murmur and rubs and jvd and edema, abdominal exam for mass and hepatosplenomegaly and tenderness and hernias and bowel sounds, " Musculoskeletal exam with muscle tone and posture and mobility/gait and  strength, and skin for rashes and cyanosis and pallor and turgor, extremity for clubbing.  Findings were normal except for pertinent findings listed below: M2, BS deminished, bilateral rales.       Radiographs (ct chest and cxr) reviewed: view by direct vision    CT Chest Without Contrast 9/10/2021   Unchanged right lung nodules dating back to 6/18/2020.  2. Mild emphysema.  Coronary artery calcifications     CT Chest Without Contrast 2/22/2021   Thereare no new pulmonary nodules. Mild emphysematous lung disease is again demonstrated bilaterally. There is bibasilar subsegmental atelectasis versus scarring  Multiple pulmonary nodules are again identified as described and are unchanged      CT Chest Without Contrast 09/09/20   IMPRESSION:  Stable 8 mm fissural nodule in the anterior right middle lobe and 4 mm  subpleural nodule in the posterior right medial lobe Resolution of the 6 mm subpleural nodular density in the anterior right costophrenic angle Mild coronary artery calcification No new pulmonary nodules or infiltrates  LUNG-RADS CATEGORY 3: PROBABLY BENIGN FINDINGS  RECOMMENDATION: Short-term imaging follow-up with 6 month LDCT.      Transthoracic echo (TTE) complete 6/3/2020  Mild concentric left ventricular hypertrophy.  Normal left ventricular systolic function. The estimated ejection fraction is 60%.  Normal LV diastolic function.  The estimated PA systolic pressure is 34 mmHg.     X-Ray Chest AP Portable 06/02/20   The lungs are clear. Costophrenic angles are seen without effusion. No  pneumothorax is identified. The heart is normal in size. Atheromatous  calcifications are seen at the aortic arch. Degenerative changes are  seen with an ACDF and paraspinal fixation hardware in the lower  cervical spine. The visualized upper abdomen is unremarkable    CT Chest Lung Screening Low Dose 06/18/20   1.  Nodular density with spiculations  in the anterior segment of  the right middle lobe measures 6 x 9 mm.  2.  6 mm subpleural nodular density in the anterior right  costophrenic angle.  3.  Irregular shaped density in the medial segment of the right  lower lobe is unchanged dating back from 2009 and is benign.     LUNG-RADS CATEGORY 4A: SUSPICIOUS FINDINGS     RECOMMENDATION: 3 month follow-up LDCT (PET/CT may be considered when  solid component greater than 7 mm is present).        Labs reviewed       Lab Results   Component Value Date    WBC 9.41 02/14/2022    RBC 3.91 (L) 02/14/2022    HGB 12.1 02/14/2022    HCT 37.0 02/14/2022    MCV 95 02/14/2022    MCH 30.9 02/14/2022    MCHC 32.7 02/14/2022    RDW 14.6 (H) 02/14/2022     02/14/2022    MPV 10.6 02/14/2022    GRAN 5.6 02/14/2022    GRAN 59.2 02/14/2022    LYMPH 2.5 02/14/2022    LYMPH 26.0 02/14/2022    MONO 0.8 02/14/2022    MONO 8.6 02/14/2022    EOS 0.4 02/14/2022    BASO 0.12 02/14/2022    EOSINOPHIL 4.6 02/14/2022    BASOPHIL 1.3 02/14/2022     Ed Model: 7/6/2020 8.47% probability.    PFT reviewed  Pulmonary Functions Testing Results:  spirometry with bronchodilator, lung volume by gas dilution, and diffusion capacity were measured July 15, 2020.  The FEV1 to FVC ratio was 66% indicating airflow obstruction.  The FEV1 measured 70% of predicted making airflow obstruction moderate.     There was a 25% improvement in FEV1 following bronchodilator, this is statistically significant.  The FEV1 measured about 70% predicted at 1.4 L pre bronchodilator.  Total lung capacity was in normal limits on lung volumes by gas dilution.  Diffusion,   uncorrected for anemia present, was 49% of predicted.       The patient had moderate airflow obstruction that resolved with bronchodilator.  Lung volumes were normal.  Diffusion was reduced.  Clinical correlation recommended.       EPWORTH SLEEPINESS SCALE= 16 9/17/2021  Sleep study AHI 1 9/27/2021  DEA- 1.0 [facility]    Plan:  Clinical impression is  apparently straight forward and impression with management as below.    Kristin was seen today for 3m f/u.    Diagnoses and all orders for this visit:    Chronic obstructive pulmonary disease, unspecified COPD type  -     tiotropium bromide (SPIRIVA RESPIMAT) 2.5 mcg/actuation inhaler; Inhale 2 puffs into the lungs Daily. Controller    Multiple lung nodules  -     CT Chest Without Contrast; Future        Follow up in about 3 months (around 9/21/2022), or if symptoms worsen or fail to improve.    Discussed with patient above for education the following:      Patient Instructions   Will start Spiriva 2 puff once daily for COPD

## 2022-06-24 ENCOUNTER — PES CALL (OUTPATIENT)
Dept: ADMINISTRATIVE | Facility: CLINIC | Age: 72
End: 2022-06-24
Payer: MEDICARE

## 2022-06-24 ENCOUNTER — TELEPHONE (OUTPATIENT)
Dept: ENDOSCOPY | Facility: HOSPITAL | Age: 72
End: 2022-06-24
Payer: MEDICARE

## 2022-06-24 NOTE — TELEPHONE ENCOUNTER
Telephoned pt to reschedule EGD due to MD unavailability.  Spoke with pt and EGD moved to 6/27/22 at 9:30am.  Pt instructed to follow previously mailed instructions and update date and arrival time.  Pt expressed understanding.

## 2022-06-27 ENCOUNTER — ANESTHESIA (OUTPATIENT)
Dept: ENDOSCOPY | Facility: HOSPITAL | Age: 72
End: 2022-06-27
Payer: MEDICARE

## 2022-06-27 ENCOUNTER — ANESTHESIA EVENT (OUTPATIENT)
Dept: ENDOSCOPY | Facility: HOSPITAL | Age: 72
End: 2022-06-27
Payer: MEDICARE

## 2022-06-27 ENCOUNTER — HOSPITAL ENCOUNTER (OUTPATIENT)
Facility: HOSPITAL | Age: 72
Discharge: HOME | End: 2022-06-27
Attending: INTERNAL MEDICINE | Admitting: INTERNAL MEDICINE
Payer: MEDICARE

## 2022-06-27 ENCOUNTER — PES CALL (OUTPATIENT)
Dept: ADMINISTRATIVE | Facility: CLINIC | Age: 72
End: 2022-06-27
Payer: MEDICARE

## 2022-06-27 VITALS
HEART RATE: 54 BPM | RESPIRATION RATE: 19 BRPM | SYSTOLIC BLOOD PRESSURE: 114 MMHG | DIASTOLIC BLOOD PRESSURE: 63 MMHG | TEMPERATURE: 97 F | HEIGHT: 64 IN | BODY MASS INDEX: 24.41 KG/M2 | WEIGHT: 143 LBS | OXYGEN SATURATION: 98 %

## 2022-06-27 DIAGNOSIS — K22.719 BARRETT'S ESOPHAGUS WITH DYSPLASIA: Primary | Chronic | ICD-10-CM

## 2022-06-27 DIAGNOSIS — K22.710 BARRETT'S ESOPHAGUS WITH LOW GRADE DYSPLASIA: ICD-10-CM

## 2022-06-27 PROCEDURE — 37000009 HC ANESTHESIA EA ADD 15 MINS: Performed by: INTERNAL MEDICINE

## 2022-06-27 PROCEDURE — 43270 EGD LESION ABLATION: CPT | Performed by: INTERNAL MEDICINE

## 2022-06-27 PROCEDURE — 25000003 PHARM REV CODE 250: Performed by: NURSE ANESTHETIST, CERTIFIED REGISTERED

## 2022-06-27 PROCEDURE — 37000008 HC ANESTHESIA 1ST 15 MINUTES: Performed by: INTERNAL MEDICINE

## 2022-06-27 PROCEDURE — 25000003 PHARM REV CODE 250: Performed by: INTERNAL MEDICINE

## 2022-06-27 PROCEDURE — 43270 PR EGD, FLEX, W/ABLATION, TUMOR/POLYP/LESION(S), W/ DILATION: ICD-10-PCS | Mod: ,,, | Performed by: INTERNAL MEDICINE

## 2022-06-27 PROCEDURE — C1886 CATHETER, ABLATION: HCPCS | Performed by: INTERNAL MEDICINE

## 2022-06-27 PROCEDURE — 43270 EGD LESION ABLATION: CPT | Mod: ,,, | Performed by: INTERNAL MEDICINE

## 2022-06-27 PROCEDURE — 63600175 PHARM REV CODE 636 W HCPCS: Performed by: NURSE ANESTHETIST, CERTIFIED REGISTERED

## 2022-06-27 RX ORDER — DEXTROMETHORPHAN/PSEUDOEPHED 2.5-7.5/.8
DROPS ORAL
Status: COMPLETED | OUTPATIENT
Start: 2022-06-27 | End: 2022-06-27

## 2022-06-27 RX ORDER — SODIUM CHLORIDE 0.9 % (FLUSH) 0.9 %
10 SYRINGE (ML) INJECTION
Status: DISCONTINUED | OUTPATIENT
Start: 2022-06-27 | End: 2022-06-27 | Stop reason: HOSPADM

## 2022-06-27 RX ORDER — ONDANSETRON 2 MG/ML
INJECTION INTRAMUSCULAR; INTRAVENOUS
Status: DISCONTINUED | OUTPATIENT
Start: 2022-06-27 | End: 2022-06-27

## 2022-06-27 RX ORDER — PROPOFOL 10 MG/ML
VIAL (ML) INTRAVENOUS
Status: DISCONTINUED | OUTPATIENT
Start: 2022-06-27 | End: 2022-06-27

## 2022-06-27 RX ORDER — SODIUM CHLORIDE 9 MG/ML
INJECTION, SOLUTION INTRAVENOUS CONTINUOUS
Status: DISCONTINUED | OUTPATIENT
Start: 2022-06-27 | End: 2022-06-27 | Stop reason: HOSPADM

## 2022-06-27 RX ORDER — PROPOFOL 10 MG/ML
VIAL (ML) INTRAVENOUS CONTINUOUS PRN
Status: DISCONTINUED | OUTPATIENT
Start: 2022-06-27 | End: 2022-06-27

## 2022-06-27 RX ORDER — LIDOCAINE HCL/PF 100 MG/5ML
SYRINGE (ML) INTRAVENOUS
Status: DISCONTINUED | OUTPATIENT
Start: 2022-06-27 | End: 2022-06-27

## 2022-06-27 RX ADMIN — PROPOFOL 20 MG: 10 INJECTION, EMULSION INTRAVENOUS at 09:06

## 2022-06-27 RX ADMIN — SIMETHICONE 66.67 MG: 20 SUSPENSION/ DROPS ORAL at 09:06

## 2022-06-27 RX ADMIN — ONDANSETRON 4 MG: 2 INJECTION, SOLUTION INTRAMUSCULAR; INTRAVENOUS at 09:06

## 2022-06-27 RX ADMIN — SODIUM CHLORIDE: 0.9 INJECTION, SOLUTION INTRAVENOUS at 08:06

## 2022-06-27 RX ADMIN — PROPOFOL 150 MCG/KG/MIN: 10 INJECTION, EMULSION INTRAVENOUS at 08:06

## 2022-06-27 RX ADMIN — LIDOCAINE HYDROCHLORIDE 50 MG: 20 INJECTION, SOLUTION INTRAVENOUS at 08:06

## 2022-06-27 RX ADMIN — PROPOFOL 50 MG: 10 INJECTION, EMULSION INTRAVENOUS at 08:06

## 2022-06-27 NOTE — BRIEF OP NOTE
Discharge Summary/Instructions after an Endoscopic Procedure    Patient Name: Kristin Guerra  Patient MRN: [id number]  Patient YOB: 1950 Monday, June 27, 2022  Uzair Cantor MD  Dear patient,  As a result of recent federal legislation (The Federal Cures Act), you may receive lab or pathology results from your procedure in your [facility] account before your physician is able to contact you. Your physician or their representative will relay the results to you with their recommendations at their soonest availability.  Thank you,    Your health is very important to us during the Covid Crisis. Following your procedure today, you will receive a daily text for 2 weeks asking about signs or symptoms of Covid 19.  Please respond to this text when you receive it so we can follow up and keep you as safe as possible.     RESTRICTIONS:  During your procedure today, you received medications for sedation.  These medications may affect your judgment, balance and coordination.  Therefore, for 24 hours, you have the following restrictions:     - DO NOT drive a car, operate machinery, make legal/financial decisions, sign important papers or drink alcohol.      ACTIVITY:  Today: no heavy lifting, straining or running due to procedural sedation/anesthesia.  The following day: return to full activity including work.    DIET:  Eat and drink normally unless instructed otherwise.     TREATMENT FOR COMMON SIDE EFFECTS:  - Mild abdominal pain, nausea, belching, bloating or excessive gas:  rest, eat lightly and use a heating pad.  - Sore Throat: treat with throat lozenges and/or gargle with warm salt water.  - Because air was used during the procedure, expelling large amounts of air from your rectum or belching is normal.  - If a bowel prep was taken, you may not have a bowel movement for 1-3 days.  This is normal.      SYMPTOMS TO WATCH FOR AND REPORT TO YOUR PHYSICIAN:  1. Abdominal pain or bloating, other than gas cramps.  2.  Chest pain.  3. Back pain.  4. Signs of infection such as: chills or fever occurring within 24 hours after the procedure.  5. Rectal bleeding, which would show as bright red, maroon, or black stools. (A tablespoon of blood from the rectum is not serious, especially if hemorrhoids are present.)  6. Vomiting.  7. Weakness or dizziness.      GO DIRECTLY TO THE NEAREST EMERGENCY ROOM IF YOU HAVE ANY OF THE FOLLOWING:     Difficulty breathing              Chills and/or fever over 101 F   Persistent vomiting and/or vomiting blood   Severe abdominal pain   Severe chest pain   Black, tarry stools   Bleeding- more than one tablespoon   Any other symptom or condition that you feel may need urgent attention    Your doctor recommends these additional instructions:  If any biopsies were taken, your doctors clinic will contact you in 1 to 2 weeks with any results.    - Discharge patient to home (ambulatory).   - Repeat upper endoscopy in 1 year for follow-up of Sterling's ablation and for surveillance.   - Use Nexium (esomeprazole) 40 mg PO BID.   - Resume previous diet.    For questions, problems or results please call your physician - Uzair Cantor MD.    EMERGENCY PHONE NUMBER: [phone],  LAB RESULTS: [phone]    IF A COMPLICATION OR EMERGENCY SITUATION ARISES AND YOU ARE UNABLE TO REACH YOUR PHYSICIAN - GO DIRECTLY TO THE EMERGENCY ROOM.

## 2022-06-27 NOTE — ANESTHESIA POSTPROCEDURE EVALUATION
Anesthesia Post Evaluation    Patient: Kristin Guerra    Procedure(s) Performed: Procedure(s) (LRB):  EGD (ESOPHAGOGASTRODUODENOSCOPY) (N/A)    Final Anesthesia Type: MAC      Patient location during evaluation: GI PACU  Patient participation: Yes- Able to Participate  Level of consciousness: awake and alert and oriented  Post-procedure vital signs: reviewed and stable  Pain management: adequate  Airway patency: patent    PONV status at discharge: No PONV  Anesthetic complications: no      Cardiovascular status: blood pressure returned to baseline and hemodynamically stable  Respiratory status: unassisted, spontaneous ventilation and room air  Hydration status: euvolemic  Follow-up not needed.          Vitals Value Taken Time   BP 98/63 06/27/22 0916   Temp 97.3 06/27/22 0916   Pulse 61 06/27/22 0916   Resp 18 06/27/22 0916   SpO2 94 06/27/22 0916         No case tracking events are documented in the log.      Pain/Kobe Score: No data recorded

## 2022-06-27 NOTE — PR[employer]ATION PATIENT INSTRUCTIONS
Discharge Summary/Instructions after an Endoscopic Procedure  Patient Name: Kristin Guerra  Patient MRN: [id number]  Patient YOB: 1950 Monday, June 27, 2022  Uzair Cantor MD  Dear patient,  As a result of recent federal legislation (The Federal Cures Act), you may   receive lab or pathology results from your procedure in your [facility]   account before your physician is able to contact you. Your physician or   their representative will relay the results to you with their   recommendations at their soonest availability.  Thank you,  Your health is very important to us during the Covid Crisis. Following your   procedure today, you will receive a daily text for 2 weeks asking about   signs or symptoms of Covid 19.  Please respond to this text when you   receive it so we can follow up and keep you as safe as possible.   RESTRICTIONS:  During your procedure today, you received medications for sedation.  These   medications may affect your judgment, balance and coordination.  Therefore,   for 24 hours, you have the following restrictions:   - DO NOT drive a car, operate machinery, make legal/financial decisions,   sign important papers or drink alcohol.    ACTIVITY:  Today: no heavy lifting, straining or running due to procedural   sedation/anesthesia.  The following day: return to full activity including work.  DIET:  Eat and drink normally unless instructed otherwise.     TREATMENT FOR COMMON SIDE EFFECTS:  - Mild abdominal pain, nausea, belching, bloating or excessive gas:  rest,   eat lightly and use a heating pad.  - Sore Throat: treat with throat lozenges and/or gargle with warm salt   water.  - Because air was used during the procedure, expelling large amounts of air   from your rectum or belching is normal.  - If a bowel prep was taken, you may not have a bowel movement for 1-3 days.    This is normal.  SYMPTOMS TO WATCH FOR AND REPORT TO YOUR PHYSICIAN:  1. Abdominal pain or bloating, other than gas  cramps.  2. Chest pain.  3. Back pain.  4. Signs of infection such as: chills or fever occurring within 24 hours   after the procedure.  5. Rectal bleeding, which would show as bright red, maroon, or black stools.   (A tablespoon of blood from the rectum is not serious, especially if   hemorrhoids are present.)  6. Vomiting.  7. Weakness or dizziness.  GO DIRECTLY TO THE NEAREST EMERGENCY ROOM IF YOU HAVE ANY OF THE FOLLOWING:      Difficulty breathing              Chills and/or fever over 101 F   Persistent vomiting and/or vomiting blood   Severe abdominal pain   Severe chest pain   Black, tarry stools   Bleeding- more than one tablespoon   Any other symptom or condition that you feel may need urgent attention  Your doctor recommends these additional instructions:  If any biopsies were taken, your doctors clinic will contact you in 1 to 2   weeks with any results.  - Discharge patient to home (ambulatory).   - Repeat upper endoscopy in 1 year for follow-up of Sterling's ablation and   for surveillance.   - Use Nexium (esomeprazole) 40 mg PO BID.   - Resume previous diet.  For questions, problems or results please call your physician - Uzair Cantor MD.  EMERGENCY PHONE NUMBER: [phone],  LAB RESULTS: [phone]  IF A COMPLICATION OR EMERGENCY SITUATION ARISES AND YOU ARE UNABLE TO REACH   YOUR PHYSICIAN - GO DIRECTLY TO THE EMERGENCY ROOM.  Uzair Cantor MD  6/27/2022 9:14:39 AM  This report has been verified and signed electronically.  Dear patient,  As a result of recent federal legislation (The Federal Cures Act), you may   receive lab or pathology results from your procedure in your [facility]   account before your physician is able to contact you. Your physician or   their representative will relay the results to you with their   recommendations at their soonest availability.  Thank you,  PR[employer]ATION

## 2022-06-27 NOTE — ANESTHESIA PREPROCEDURE EVALUATION
2022  Kristin Guerra is a 72 y.o., female for EGD  Past Medical History:   Diagnosis Date    Allergy     Sterling's esophagus     Colon polyp     Diverticulitis     Diverticulosis     Fatty liver     GERD (gastroesophageal reflux disease)     History of endoscopy 2021    Dr Uzair Cantor  Impression:            - Normal oropharynx.                         - Hiatal hernia.                         - Sterling's esophagus. Treated with radiofrequency                         ablation.                         - Normal stomach.                         - Normal examined duodenum.                         - No specimens collected.     Hx of cervical spine surgery 2018    Hyperlipidemia     Hypertension     Lupus (systemic lupus erythematosus)     discoid    Osteoarthritis     Osteopenia     Syncope     Tremor 2005    essential     Past Surgical History:   Procedure Laterality Date    APPENDECTOMY  2007    removed during colon surgery    CERVICAL FUSION      CERVICAL SPINE SURGERY      Rods and screws.     SECTION      COLON SURGERY      hemicolectomy    COLON SURGERY  2008    repair    COLONOSCOPY N/A 2018    Procedure: COLONOSCOPY;  Surgeon: Maverick Esquivel MD;  Location: [facility];  Service: Endoscopy;  Laterality: N/A; repeat in 5 years for surveillance    CYSTOSCOPY N/A 2021    Procedure: CYSTOSCOPY;  Surgeon: Allison Miranda MD;  Location: [facility] OR;  Service: Urology;  Laterality: N/A;    ESOPHAGOGASTRODUODENOSCOPY N/A 2019    Procedure: EGD (ESOPHAGOGASTRODUODENOSCOPY);  Surgeon: Monika Steele MD;  Location: [facility];  Service: Endoscopy;  Laterality: N/A;    ESOPHAGOGASTRODUODENOSCOPY  2019    with RFA and biopsies    ESOPHAGOGASTRODUODENOSCOPY N/A 2019    Procedure: EGD (ESOPHAGOGASTRODUODENOSCOPY);  Surgeon: Uzair RENDON  MD Devaughn;  Location: [facility];  Service: Endoscopy;  Laterality: N/A;  n+v with anesthesia    ESOPHAGOGASTRODUODENOSCOPY N/A 8/21/2019    Procedure: EGD (ESOPHAGOGASTRODUODENOSCOPY);  Surgeon: Uzair Cantor MD;  Location: [facility];  Service: Endoscopy;  Laterality: N/A;    ESOPHAGOGASTRODUODENOSCOPY N/A 10/7/2019    Procedure: EGD (ESOPHAGOGASTRODUODENOSCOPY);  Surgeon: Uzair Cantor MD;  Location: [facility];  Service: Endoscopy;  Laterality: N/A;    ESOPHAGOGASTRODUODENOSCOPY N/A 2/10/2020    Procedure: EGD (ESOPHAGOGASTRODUODENOSCOPY);  Surgeon: Uzair Cantor MD;  Location: [facility];  Service: Endoscopy;  Laterality: N/A;  Requests earliest procedure time    ESOPHAGOGASTRODUODENOSCOPY N/A 8/17/2020    Procedure: EGD (ESOPHAGOGASTRODUODENOSCOPY);  Surgeon: Uzair Cantor MD;  Location: [facility];  Service: Endoscopy;  Laterality: N/A;  BARRETTS    ESOPHAGOGASTRODUODENOSCOPY N/A 4/7/2021    Procedure: ESOPHAGOGASTRODUODENOSCOPY (EGD);  Surgeon: Uzair Cantor MD;  Location: [facility];  Service: Endoscopy;  Laterality: N/A;  Needs Rapid Covid MP    ESOPHAGOGASTRODUODENOSCOPY N/A 11/30/2021    Procedure: EGD (ESOPHAGOGASTRODUODENOSCOPY);  Surgeon: Uzair Cantor MD;  Location: [facility];  Service: Endoscopy;  Laterality: N/A;    HYSTERECTOMY      ovaries remain    INSERTION OF IMPLANTABLE LOOP RECORDER N/A 7/17/2020    Procedure: INSERTION, IMPLANTABLE LOOP RECORDER (MEDTRONIC);  Surgeon: Karyna Vasques MD;  [facility] CATH[facility]EP LAB;  Service: Cardiology;  Laterality: N/A;    SPLENECTOMY, TOTAL  2007    Injured during surgery and removed    UPPER GASTROINTESTINAL ENDOSCOPY  2007    GERD & hiatal hernia per patient report           Pre-op Assessment       I have reviewed the Medications.     Review of Systems  Anesthesia Hx:  Denies Family Hx of Anesthesia complications.    Social:  Smoker    Cardiovascular:   Hypertension    Hepatic/GI:   Hiatal Hernia, GERD Liver  Disease,    Musculoskeletal:   Arthritis     Neurological:   Neuromuscular Disease,    Psych:   Psychiatric History          Physical Exam  General: Well nourished    Airway:  Mallampati: II   TM Distance: Normal  Neck ROM: Normal ROM    Dental:  Intact    Chest/Lungs:  Clear to auscultation    Heart:  Rate: Normal  Rhythm: Regular Rhythm        Anesthesia Plan  Type of Anesthesia, risks & benefits discussed:    Anesthesia Type: MAC  Intra-op Monitoring Plan: Standard ASA Monitors  Post Op Pain Control Plan: multimodal analgesia  Informed Consent: Informed consent signed with the Patient and all parties understand the risks and agree with anesthesia plan.  All questions answered.   ASA Score: 3    Ready For Surgery From Anesthesia Perspective.     .

## 2022-06-27 NOTE — TRANSFER OF CARE
"Anesthesia Transfer of Care Note    Patient: Kristin Guerra    Procedure(s) Performed: Procedure(s) (LRB):  EGD (ESOPHAGOGASTRODUODENOSCOPY) (N/A)    Patient location: GI    Anesthesia Type: MAC    Transport from OR: Transported from OR on room air with adequate spontaneous ventilation    Post pain: adequate analgesia    Post assessment: no apparent anesthetic complications and tolerated procedure well    Post vital signs: stable    Level of consciousness: awake, alert and oriented    Nausea/Vomiting: no nausea/vomiting    Complications: none    Transfer of care protocol was followed      Last vitals:   Visit Vitals  BP (!) 153/70   Pulse (!) 54   Temp 36.8 °C (98.2 °F)   Resp 18   Ht 5' 4" (1.626 m)   Wt 64.9 kg (143 lb)   SpO2 95%   Breastfeeding No   BMI 24.55 kg/m²     "

## 2022-06-27 NOTE — H&P
History & Physical - Short Stay  Gastroenterology      SUBJECTIVE:     Procedure: EGD    Chief Complaint/Indication for Procedure: Sterling's Esophagus    History of Present Illness:  Patient is a 72 y.o. female presents with Sterling's with LGD S/P RFA eradication with evidence of focal IM on recent surveillance. Stated some bloating and abdominal pain.    PTA Medications   Medication Sig    albuterol-ipratropium (DUO-NEB) 2.5 mg-0.5 mg/3 mL nebulizer solution Take 3 mLs by nebulization every 6 (six) hours as needed for Wheezing or Shortness of Breath. Rescue    amLODIPine (NORVASC) 5 MG tablet Take 5 mg by mouth once daily.    esomeprazole (NEXIUM) 40 MG capsule TAKE 1 CAPSULE(40 MG) BY MOUTH TWICE DAILY    fluticasone (FLONASE) 50 mcg/actuation nasal spray 1 spray by Each Nostril route daily as needed for Allergies.     gabapentin (NEURONTIN) 300 MG capsule Take 1 capsule (300 mg total) by mouth every evening.    sertraline (ZOLOFT) 25 MG tablet Take 1 tablet (25 mg total) by mouth every evening.    traZODone (DESYREL) 50 MG tablet Take 1/2 tablet by mouth nightly; if in 1 week insomnia is not relieved may take 1 whole tablet by mouth nightly.    aluminum & magnesium hydroxide-simethicone (MYLANTA MAX STRENGTH) 400-400-40 mg/5 mL suspension Take 5 mLs by mouth every 6 (six) hours as needed for Indigestion.     azelastine (ASTELIN) 137 mcg (0.1 %) nasal spray 1 spray (137 mcg total) by Nasal route 2 (two) times daily.    carbidopa-levodopa  mg (SINEMET)  mg per tablet Take 1 tablet by mouth 3 (three) times daily.    clobetasoL (TEMOVATE) 0.05 % cream Apply to feet daily to bid prn flare (Patient taking differently: Apply 1 application topically 2 (two) times daily. Apply to feet daily to bid prn flare)    polyethylene glycol (GLYCOLAX) 17 gram PwPk Take 17 g by mouth daily as needed.    tiotropium bromide (SPIRIVA RESPIMAT) 2.5 mcg/actuation inhaler Inhale 2 puffs into the lungs Daily.  "Controller       Review of patient's allergies indicates:   Allergen Reactions    Doxycycline Itching and Swelling     Prescribed 5/2021, took one dose, describes lip swelling, and "itch all over"    Mirtazapine Other (See Comments)     Hyperactivity.    Morphine Swelling and Rash     Other reaction(s): swelling in limbs    Lexapro [escitalopram oxalate] Other (See Comments)     Worsening tremor.  Sedation.    Primidone Other (See Comments)     Syncope.  Other reaction(s): dizziness,hypotension  Other reaction(s): Unknown  Other reaction(s): Unknown    Simvastatin Nausea And Vomiting and Other (See Comments)     Weakness.  Other reaction(s): nausea,vomiting    Beta-adrenergic agents Other (See Comments)     Low blood pressure and fainted.     Metoprolol succinate Other (See Comments)    Prochlorperazine      Other reaction(s): weakness    Topiramate     Compazine [prochlorperazine edisylate] Nausea Only    Tramadol Nausea And Vomiting        Past Medical History:   Diagnosis Date    Allergy     Sterling's esophagus     Colon polyp     Diverticulitis     Diverticulosis     Fatty liver     GERD (gastroesophageal reflux disease)     History of endoscopy 4/7/2021    Dr Uzair Cantor  Impression:            - Normal oropharynx.                         - Hiatal hernia.                         - Sterling's esophagus. Treated with radiofrequency                         ablation.                         - Normal stomach.                         - Normal examined duodenum.                         - No specimens collected.     Hx of cervical spine surgery 11/07/2018    Hyperlipidemia     Hypertension     Lupus (systemic lupus erythematosus) 1999    discoid    Osteoarthritis     Osteopenia     Syncope     Tremor 2005    essential     Past Surgical History:   Procedure Laterality Date    APPENDECTOMY  2007    removed during colon surgery    CERVICAL FUSION      CERVICAL SPINE SURGERY      Rods and " screws.     SECTION      COLON SURGERY  2007    hemicolectomy    COLON SURGERY  2008    repair    COLONOSCOPY N/A 2018    Procedure: COLONOSCOPY;  Surgeon: Maverick Esquivel MD;  Location: [facility];  Service: Endoscopy;  Laterality: N/A; repeat in 5 years for surveillance    CYSTOSCOPY N/A 2021    Procedure: CYSTOSCOPY;  Surgeon: Allison Miranda MD;  Location: [facility] OR;  Service: Urology;  Laterality: N/A;    ESOPHAGOGASTRODUODENOSCOPY N/A 2019    Procedure: EGD (ESOPHAGOGASTRODUODENOSCOPY);  Surgeon: Monika Steele MD;  Location: [facility];  Service: Endoscopy;  Laterality: N/A;    ESOPHAGOGASTRODUODENOSCOPY  2019    with RFA and biopsies    ESOPHAGOGASTRODUODENOSCOPY N/A 2019    Procedure: EGD (ESOPHAGOGASTRODUODENOSCOPY);  Surgeon: Uzair Cantor MD;  Location: [facility];  Service: Endoscopy;  Laterality: N/A;  n+v with anesthesia    ESOPHAGOGASTRODUODENOSCOPY N/A 2019    Procedure: EGD (ESOPHAGOGASTRODUODENOSCOPY);  Surgeon: Uzair Cantor MD;  Location: [facility];  Service: Endoscopy;  Laterality: N/A;    ESOPHAGOGASTRODUODENOSCOPY N/A 10/7/2019    Procedure: EGD (ESOPHAGOGASTRODUODENOSCOPY);  Surgeon: Uzair Cantor MD;  Location: [facility];  Service: Endoscopy;  Laterality: N/A;    ESOPHAGOGASTRODUODENOSCOPY N/A 2/10/2020    Procedure: EGD (ESOPHAGOGASTRODUODENOSCOPY);  Surgeon: Uzair Cantor MD;  Location: [facility];  Service: Endoscopy;  Laterality: N/A;  Requests earliest procedure time    ESOPHAGOGASTRODUODENOSCOPY N/A 2020    Procedure: EGD (ESOPHAGOGASTRODUODENOSCOPY);  Surgeon: Uzair Cantor MD;  Location: [facility];  Service: Endoscopy;  Laterality: N/A;  BARRETTS    ESOPHAGOGASTRODUODENOSCOPY N/A 2021    Procedure: ESOPHAGOGASTRODUODENOSCOPY (EGD);  Surgeon: Uzair Cantor MD;  Location: [facility];  Service: Endoscopy;  Laterality: N/A;  Needs Rapid Covid MP    ESOPHAGOGASTRODUODENOSCOPY N/A 2021     Procedure: EGD (ESOPHAGOGASTRODUODENOSCOPY);  Surgeon: Uzair Cantor MD;  Location: [facility] ENDO;  Service: Endoscopy;  Laterality: N/A;    HYSTERECTOMY      ovaries remain    INSERTION OF IMPLANTABLE LOOP RECORDER N/A 7/17/2020    Procedure: INSERTION, IMPLANTABLE LOOP RECORDER (MEDTRONIC);  Surgeon: Karyna Vasques MD;  Location: [facility] CATH/EP LAB;  Service: Cardiology;  Laterality: N/A;    SPLENECTOMY, TOTAL  2007    Injured during surgery and removed    UPPER GASTROINTESTINAL ENDOSCOPY  2007    GERD & hiatal hernia per patient report     Family History   Problem Relation Age of Onset    Colon cancer Father         diagnosed in his 60's    Lung cancer Father     Lymphoma Mother         Brain    Lung cancer Brother     Lung cancer Brother         metastatic from prostate    Prostate cancer Brother     Heart disease Brother         valvular disease    Hyperlipidemia Brother     Eczema Neg Hx     Lupus Neg Hx     Psoriasis Neg Hx     Melanoma Neg Hx     Crohn's disease Neg Hx     Ulcerative colitis Neg Hx     Stomach cancer Neg Hx     Esophageal cancer Neg Hx      Social History     Tobacco Use    Smoking status: Current Every Day Smoker     Packs/day: 1.00     Years: 35.00     Pack years: 35.00     Types: Cigarettes    Smokeless tobacco: Never Used   Substance Use Topics    Alcohol use: No    Drug use: No       Review of Systems:  Constitutional: no fever or chills  Respiratory: no cough or shortness of breath  Cardiovascular: no chest pain or palpitations    OBJECTIVE:     Vital Signs (Most Recent)  Temp: 98.2 °F (36.8 °C) (06/27/22 0812)  Pulse: (!) 54 (06/27/22 0812)  Resp: 18 (06/27/22 0812)  BP: (!) 153/70 (06/27/22 0812)  SpO2: 95 % (06/27/22 0812)    Physical Exam:  General: well developed, well nourished  Lungs:  normal respiratory effort  Heart: regular rate, S1, S2 normal    Laboratory  CBC: No results for input(s): WBC, RBC, HGB, HCT, PLT, MCV, MCH, MCHC in the last 168  hours.  CMP: No results for input(s): GLU, CALCIUM, ALBUMIN, PROT, NA, K, CO2, CL, BUN, CREATININE, ALKPHOS, ALT, AST, BILITOT in the last 168 hours.  Coagulation: No results for input(s): LABPROT, INR, APTT in the last 168 hours.      Diagnostic Results:        ASSESSMENT/PLAN:     Sterling's with LGD S/P RFA eradication with recent evidence of Sterling's on surveillance    Plan: EGD    Anesthesia Plan: MAC    ASA Grade: ASA 3 - Patient with moderate systemic disease with functional limitations       The impression and plan was discussed in detail with the patient and family. All questions have been answered and the patient voices understanding of our plan at this point. The risk of the procedure was discussed in detail which includes but not limited to bleeding, infection, perforation in some cases requiring surgery with its spectrum of complications.

## 2022-06-28 ENCOUNTER — TELEPHONE (OUTPATIENT)
Dept: ENDOSCOPY | Facility: HOSPITAL | Age: 72
End: 2022-06-28
Payer: MEDICARE

## 2022-07-11 ENCOUNTER — CLINICAL SUPPORT (OUTPATIENT)
Dept: CARDIOLOGY | Facility: HOSPITAL | Age: 72
End: 2022-07-11
Attending: INTERNAL MEDICINE
Payer: MEDICARE

## 2022-07-11 VITALS — BODY MASS INDEX: 24.41 KG/M2 | HEIGHT: 64 IN | WEIGHT: 143 LBS

## 2022-07-11 DIAGNOSIS — I10 ESSENTIAL HYPERTENSION: Chronic | ICD-10-CM

## 2022-07-11 DIAGNOSIS — I95.1 ORTHOSTATIC HYPOTENSION: ICD-10-CM

## 2022-07-11 DIAGNOSIS — E78.2 MIXED DYSLIPIDEMIA: ICD-10-CM

## 2022-07-11 DIAGNOSIS — I10 ESSENTIAL HYPERTENSION: ICD-10-CM

## 2022-07-11 DIAGNOSIS — Z78.9 STATIN INTOLERANCE: ICD-10-CM

## 2022-07-11 DIAGNOSIS — K22.719 BARRETT'S ESOPHAGUS WITH DYSPLASIA: ICD-10-CM

## 2022-07-11 DIAGNOSIS — R00.1 BRADYCARDIA: ICD-10-CM

## 2022-07-11 DIAGNOSIS — E78.00 HYPERCHOLESTEROLEMIA: ICD-10-CM

## 2022-07-11 DIAGNOSIS — M32.9 SYSTEMIC LUPUS ERYTHEMATOSUS, UNSPECIFIED SLE TYPE, UNSPECIFIED ORGAN INVOLVEMENT STATUS: ICD-10-CM

## 2022-07-11 PROCEDURE — 93306 TTE W/DOPPLER COMPLETE: CPT

## 2022-07-11 PROCEDURE — 93306 ECHO (CUPID ONLY): ICD-10-PCS | Mod: 26,,, | Performed by: INTERNAL MEDICINE

## 2022-07-11 PROCEDURE — 93306 TTE W/DOPPLER COMPLETE: CPT | Mod: 26,,, | Performed by: INTERNAL MEDICINE

## 2022-07-12 LAB
AV INDEX (PROSTH): 0.91
AV MEAN GRADIENT: 9 MMHG
AV VALVE AREA: 2.74 CM2
BSA FOR ECHO PROCEDURE: 1.71 M2
DOP CALC AO VTI: 42.92 CM
DOP CALC LVOT AREA: 3 CM2
DOP CALC LVOT DIAMETER: 1.96 CM
DOP CALC LVOT PEAK VEL: 178.93 M/S
DOP CALC LVOT STROKE VOLUME: 117.52 CM3
DOP CALCLVOT PEAK VEL VTI: 38.97 CM
E WAVE DECELERATION TIME: 186.47 MSEC
E/A RATIO: 0.93
E/E' RATIO: 9.05 M/S
EJECTION FRACTION: 72 %
FRACTIONAL SHORTENING: 35 % (ref 28–44)
LEFT ATRIUM SIZE: 3.77 CM
LEFT INTERNAL DIMENSION IN SYSTOLE: 2.22 CM (ref 2.1–4)
LEFT VENTRICLE DIASTOLIC VOLUME INDEX: 23.56 ML/M2
LEFT VENTRICLE DIASTOLIC VOLUME: 40.05 ML
LEFT VENTRICLE SYSTOLIC VOLUME INDEX: 6.4 ML/M2
LEFT VENTRICLE SYSTOLIC VOLUME: 10.88 ML
LEFT VENTRICULAR INTERNAL DIMENSION IN DIASTOLE: 3.42 CM (ref 3.5–6)
LV LATERAL E/E' RATIO: 8.6 M/S
LV SEPTAL E/E' RATIO: 9.56 M/S
MV PEAK A VEL: 0.92 M/S
MV PEAK E VEL: 0.86 M/S
PISA TR MAX VEL: 2.62 M/S
RA PRESSURE: 3 MMHG
RIGHT VENTRICULAR END-DIASTOLIC DIMENSION: 329 CM
TDI LATERAL: 0.1 M/S
TDI SEPTAL: 0.09 M/S
TDI: 0.1 M/S
TR MAX PG: 27 MMHG
TRICUSPID ANNULAR PLANE SYSTOLIC EXCURSION: 253 CM
TV REST PULMONARY ARTERY PRESSURE: 30 MMHG

## 2022-07-13 ENCOUNTER — HOSPITAL ENCOUNTER (OUTPATIENT)
Dept: RADIOLOGY | Facility: CLINIC | Age: 72
Discharge: HOME | End: 2022-07-13
Attending: PODIATRIST
Payer: MEDICARE

## 2022-07-13 ENCOUNTER — OFFICE VISIT (OUTPATIENT)
Dept: PODIATRY | Facility: CLINIC | Age: 72
End: 2022-07-13
Payer: MEDICARE

## 2022-07-13 VITALS
RESPIRATION RATE: 16 BRPM | WEIGHT: 145 LBS | HEART RATE: 51 BPM | OXYGEN SATURATION: 98 % | HEIGHT: 64 IN | BODY MASS INDEX: 24.75 KG/M2

## 2022-07-13 DIAGNOSIS — M79.674 PAIN IN TOES OF BOTH FEET: ICD-10-CM

## 2022-07-13 DIAGNOSIS — L60.0 INGROWN NAIL: ICD-10-CM

## 2022-07-13 DIAGNOSIS — M79.672 PAIN IN BOTH FEET: ICD-10-CM

## 2022-07-13 DIAGNOSIS — M76.62 ACHILLES TENDINITIS OF BOTH LOWER EXTREMITIES: ICD-10-CM

## 2022-07-13 DIAGNOSIS — M72.2 PLANTAR FASCIITIS: Primary | ICD-10-CM

## 2022-07-13 DIAGNOSIS — M76.61 ACHILLES TENDINITIS OF BOTH LOWER EXTREMITIES: ICD-10-CM

## 2022-07-13 DIAGNOSIS — M79.671 PAIN IN BOTH FEET: ICD-10-CM

## 2022-07-13 DIAGNOSIS — M79.675 PAIN IN TOES OF BOTH FEET: ICD-10-CM

## 2022-07-13 PROCEDURE — 99203 PR OFFICE/OUTPT VISIT, NEW, LEVL III, 30-44 MIN: ICD-10-PCS | Mod: S$GLB,,, | Performed by: PODIATRIST

## 2022-07-13 PROCEDURE — 73630 X-RAY EXAM OF FOOT: CPT | Mod: 50,S$GLB,, | Performed by: RADIOLOGY

## 2022-07-13 PROCEDURE — 99203 OFFICE O/P NEW LOW 30 MIN: CPT | Mod: S$GLB,,, | Performed by: PODIATRIST

## 2022-07-13 PROCEDURE — 73630 XR FOOT COMPLETE 3 VIEW BILATERAL: ICD-10-PCS | Mod: 50,S$GLB,, | Performed by: RADIOLOGY

## 2022-07-13 NOTE — PROGRESS NOTES
"  [address] [place] [address]  Phone: [phone]   [phone]    Patient's PCP:Georgi Mendez MD (Inactive)  Referring Provider: Aaareferral Self    Subjective:      Chief Complaint:: Foot Pain (Bilateral foot pain arch through heel)    JOSE Guerra is a 72 y.o. female who presents today with a complaint of bilateral foot and ankle pain  lasting for three to six months. Onset of symptoms pain in heel and arches and reports no trauma.  Current symptoms include pain ranging from aching to sharp and throbbing.  Aggravating factors are walking, weightbearing, first steps in the morning. Symptoms have progressed. Treatment to date have included tylenol.    Patient also has complaints of ingrowing of her bilateral great toenails.  She states that she gets regular pedicures which do help with the symptoms.      Vitals:    22 0909   Pulse: (!) 51   Resp: 16   SpO2: 98%   Weight: 65.8 kg (145 lb)   Height: 5' 4" (1.626 m)   PainSc:   4      Shoe Size: 8    Past Surgical History:   Procedure Laterality Date    APPENDECTOMY      removed during colon surgery    CERVICAL FUSION      CERVICAL SPINE SURGERY      Rods and screws.     SECTION      COLON SURGERY  2007    hemicolectomy    COLON SURGERY  2008    repair    COLONOSCOPY N/A 2018    Procedure: COLONOSCOPY;  Surgeon: Maverick Esquivel MD;  Location: [facility];  Service: Endoscopy;  Laterality: N/A; repeat in 5 years for surveillance    CYSTOSCOPY N/A 2021    Procedure: CYSTOSCOPY;  Surgeon: Allison Miranda MD;  Location: [facility];  Service: Urology;  Laterality: N/A;    ESOPHAGOGASTRODUODENOSCOPY N/A 2019    Procedure: EGD (ESOPHAGOGASTRODUODENOSCOPY);  Surgeon: Monika Steele MD;  Location: [facility];  Service: Endoscopy;  Laterality: N/A;    ESOPHAGOGASTRODUODENOSCOPY  2019    with RFA and biopsies    ESOPHAGOGASTRODUODENOSCOPY N/A 2019    Procedure: EGD " (ESOPHAGOGASTRODUODENOSCOPY);  Surgeon: Uzair Cantor MD;  Location: [facility];  Service: Endoscopy;  Laterality: N/A;  n+v with anesthesia    ESOPHAGOGASTRODUODENOSCOPY N/A 8/21/2019    Procedure: EGD (ESOPHAGOGASTRODUODENOSCOPY);  Surgeon: Uzair Cantor MD;  Location: [facility];  Service: Endoscopy;  Laterality: N/A;    ESOPHAGOGASTRODUODENOSCOPY N/A 10/7/2019    Procedure: EGD (ESOPHAGOGASTRODUODENOSCOPY);  Surgeon: Uzair Cantor MD;  Location: [facility];  Service: Endoscopy;  Laterality: N/A;    ESOPHAGOGASTRODUODENOSCOPY N/A 2/10/2020    Procedure: EGD (ESOPHAGOGASTRODUODENOSCOPY);  Surgeon: Uzair Cantor MD;  Location: [facility];  Service: Endoscopy;  Laterality: N/A;  Requests earliest procedure time    ESOPHAGOGASTRODUODENOSCOPY N/A 8/17/2020    Procedure: EGD (ESOPHAGOGASTRODUODENOSCOPY);  Surgeon: Uzair Cantor MD;  Location: [facility];  Service: Endoscopy;  Laterality: N/A;  BARRETTS    ESOPHAGOGASTRODUODENOSCOPY N/A 4/7/2021    Procedure: ESOPHAGOGASTRODUODENOSCOPY (EGD);  Surgeon: Uzair Cantor MD;  Location: [facility];  Service: Endoscopy;  Laterality: N/A;  Needs Rapid Covid MP    ESOPHAGOGASTRODUODENOSCOPY N/A 11/30/2021    Procedure: EGD (ESOPHAGOGASTRODUODENOSCOPY);  Surgeon: Uzair Cantor MD;  Location: [facility];  Service: Endoscopy;  Laterality: N/A;    ESOPHAGOGASTRODUODENOSCOPY N/A 6/27/2022    Procedure: EGD (ESOPHAGOGASTRODUODENOSCOPY);  Surgeon: Uzair Cantor MD;  Location: [facility];  Service: Endoscopy;  Laterality: N/A;  5/19/22: fully vaccinated. instructions mailed-[place]    loop recorder only    HYSTERECTOMY      ovaries remain    INSERTION OF IMPLANTABLE LOOP RECORDER N/A 7/17/2020    Procedure: INSERTION, IMPLANTABLE LOOP RECORDER (MEDTRONIC);  Surgeon: Karyna Vasques MD;  Location: [facility] CATH/EP [facility];  Service: Cardiology;  Laterality: N/A;    SPLENECTOMY, TOTAL  2007    Injured during surgery and removed    UPPER GASTROINTESTINAL  "ENDOSCOPY  2007    GERD & hiatal hernia per patient report     Past Medical History:   Diagnosis Date    Allergy     Sterling's esophagus     Colon polyp     Diverticulitis     Diverticulosis     Fatty liver     GERD (gastroesophageal reflux disease)     History of endoscopy 4/7/2021    Dr Uzair Cantor  Impression:            - Normal oropharynx.                         - Hiatal hernia.                         - Sterling's esophagus. Treated with radiofrequency                         ablation.                         - Normal stomach.                         - Normal examined duodenum.                         - No specimens collected.     Hx of cervical spine surgery 11/07/2018    Hyperlipidemia     Hypertension     Lupus (systemic lupus erythematosus) 1999    discoid    Osteoarthritis     Osteopenia     Syncope     Tremor 2005    essential     Family History   Problem Relation Age of Onset    Colon cancer Father         diagnosed in his 60's    Lung cancer Father     Lymphoma Mother         Brain    Lung cancer Brother     Lung cancer Brother         metastatic from prostate    Prostate cancer Brother     Heart disease Brother         valvular disease    Hyperlipidemia Brother     Eczema Neg Hx     Lupus Neg Hx     Psoriasis Neg Hx     Melanoma Neg Hx     Crohn's disease Neg Hx     Ulcerative colitis Neg Hx     Stomach cancer Neg Hx     Esophageal cancer Neg Hx         Social History:   Marital Status:   Alcohol History:  reports no history of alcohol use.  Tobacco History:  reports that she has been smoking cigarettes. She has a 35.00 pack-year smoking history. She has never used smokeless tobacco.  Drug History:  reports no history of drug use.    Review of patient's allergies indicates:   Allergen Reactions    Doxycycline Itching and Swelling     Prescribed 5/2021, took one dose, describes lip swelling, and "itch all over"    Mirtazapine Other (See Comments)     " Hyperactivity.    Morphine Swelling and Rash     Other reaction(s): swelling in limbs    Lexapro [escitalopram oxalate] Other (See Comments)     Worsening tremor.  Sedation.    Primidone Other (See Comments)     Syncope.  Other reaction(s): dizziness,hypotension  Other reaction(s): Unknown  Other reaction(s): Unknown    Simvastatin Nausea And Vomiting and Other (See Comments)     Weakness.  Other reaction(s): nausea,vomiting    Beta-adrenergic agents Other (See Comments)     Low blood pressure and fainted.     Metoprolol succinate Other (See Comments)    Prochlorperazine      Other reaction(s): weakness    Topiramate     Compazine [prochlorperazine edisylate] Nausea Only    Tramadol Nausea And Vomiting       Current Outpatient Medications   Medication Sig Dispense Refill    albuterol-ipratropium (DUO-NEB) 2.5 mg-0.5 mg/3 mL nebulizer solution Take 3 mLs by nebulization every 6 (six) hours as needed for Wheezing or Shortness of Breath. Rescue 120 each 5    aluminum & magnesium hydroxide-simethicone (MYLANTA MAX STRENGTH) 400-400-40 mg/5 mL suspension Take 5 mLs by mouth every 6 (six) hours as needed for Indigestion.       amLODIPine (NORVASC) 5 MG tablet Take 5 mg by mouth once daily.      azelastine (ASTELIN) 137 mcg (0.1 %) nasal spray 1 spray (137 mcg total) by Nasal route 2 (two) times daily. 30 mL 11    carbidopa-levodopa  mg (SINEMET)  mg per tablet Take 1 tablet by mouth 3 (three) times daily.      clobetasoL (TEMOVATE) 0.05 % cream Apply to feet daily to bid prn flare (Patient taking differently: Apply 1 application topically 2 (two) times daily. Apply to feet daily to bid prn flare) 30 g 2    esomeprazole (NEXIUM) 40 MG capsule TAKE 1 CAPSULE(40 MG) BY MOUTH TWICE DAILY 180 capsule 3    fluticasone (FLONASE) 50 mcg/actuation nasal spray 1 spray by Each Nostril route daily as needed for Allergies.       gabapentin (NEURONTIN) 300 MG capsule Take 1 capsule (300 mg total) by mouth  every evening. 30 capsule 3    polyethylene glycol (GLYCOLAX) 17 gram PwPk Take 17 g by mouth daily as needed.      sertraline (ZOLOFT) 25 MG tablet Take 1 tablet (25 mg total) by mouth every evening. 90 tablet 1    tiotropium bromide (SPIRIVA RESPIMAT) 2.5 mcg/actuation inhaler Inhale 2 puffs into the lungs Daily. Controller 4 g 11    traZODone (DESYREL) 50 MG tablet Take 1/2 tablet by mouth nightly; if in 1 week insomnia is not relieved may take 1 whole tablet by mouth nightly. 90 tablet 0     No current facility-administered medications for this visit.       Review of Systems   Constitutional: Negative for chills, fatigue, fever and unexpected weight change.   HENT: Positive for hearing loss. Negative for trouble swallowing.    Eyes: Negative for photophobia and visual disturbance.   Respiratory: Negative for cough, shortness of breath and wheezing.    Cardiovascular: Negative for chest pain, palpitations and leg swelling.   Gastrointestinal: Negative for abdominal pain and nausea.   Genitourinary: Negative for dysuria and frequency.   Musculoskeletal: Positive for arthralgias and gait problem. Negative for back pain, joint swelling and myalgias.   Skin: Negative for rash and wound.   Neurological: Positive for numbness. Negative for tremors, seizures, weakness and headaches.   Hematological: Does not bruise/bleed easily.         Objective:        Physical Exam:   Foot Exam    General  General Appearance: appears stated age and healthy   Orientation: alert and oriented to person, place, and time   Affect: appropriate   Gait: unimpaired       Right Foot/Ankle     Inspection and Palpation  Ecchymosis: none  Tenderness: calcaneus tenderness and plantar fascia (Great toenail)  Swelling: none   Arch: normal  Hallux valgus: yes  Hallux limitus: yes  Skin Exam: skin intact; no drainage, no ulcer and no erythema   Neurovascular  Dorsalis pedis: 2+  Posterior tibial: 2+  Capillary Refill: 2+  Varicose veins: not  present  Saphenous nerve sensation: normal  Tibial nerve sensation: normal  Superficial peroneal nerve sensation: normal  Deep peroneal nerve sensation: normal  Sural nerve sensation: normal    Edema  Type of edema: non-pitting    Muscle Strength  Ankle dorsiflexion: 5  Ankle plantar flexion: 5  Ankle inversion: 5  Ankle eversion: 5  Great toe extension: 5  Great toe flexion: 5    Range of Motion    Passive  Ankle dorsiflexion: 5, pain    Active  Ankle plantar flexion: pain    Tests  Anterior drawer: negative   Talar tilt: negative   PT Tinel's sign: negative    Paresthesia: negative  Comments  Pain on palpation of the infeior medial heel, central plantar heel and posterior heel. No pain present  with side to side compression of the calcaneus. Negative tinnel's sign  at the tarsal tunnel. Negative Schofield's nerve pain. Negative Calcaneal nerve pain. No soft tissue masses. Pain present with dorsiflexion of the ankle. No edema, erythema, or ecchymosis noted.       Left Foot/Ankle      Inspection and Palpation  Ecchymosis: none  Tenderness: plantar fascia and calcaneus tenderness (Great toenail)  Swelling: none   Arch: normal  Skin Exam: skin intact; no drainage, no ulcer and no erythema   Neurovascular  Dorsalis pedis: 2+  Posterior tibial: 2+  Capillary refill: 2+  Varicose veins: not present  Saphenous nerve sensation: normal  Tibial nerve sensation: normal  Superficial peroneal nerve sensation: normal  Deep peroneal nerve sensation: normal  Sural nerve sensation: normal    Edema  Type of edema: non-pitting    Muscle Strength  Ankle dorsiflexion: 5  Ankle plantar flexion: 5  Ankle inversion: 5  Ankle eversion: 5  Great toe extension: 5  Great toe flexion: 5    Range of Motion    Passive  Ankle dorsiflexion: 5, pain    Active  Plantar flexion: pain    Tests  Anterior drawer: negative   Talar tilt: negative   PT Tinel's sign: negative  Paresthesia: negative  Comments  Pain on palpation of the infeior medial heel,  central plantar heel and posterior heel. No pain present  with side to side compression of the calcaneus. Negative tinnel's sign  at the tarsal tunnel. Negative Schofield's nerve pain. Negative Calcaneal nerve pain. No soft tissue masses. Pain present with dorsiflexion of the ankle. No edema, erythema, or ecchymosis noted.     Physical Exam  Cardiovascular:      Pulses:           Dorsalis pedis pulses are 2+ on the right side and 2+ on the left side.        Posterior tibial pulses are 2+ on the right side and 2+ on the left side.   Musculoskeletal:      Right foot: Bunion present.   Feet:      Right foot:      Skin integrity: No ulcer or erythema.      Left foot:      Skin integrity: No ulcer or erythema.               Right Ankle/Foot Exam     Comments:  Pain on palpation of the infeior medial heel, central plantar heel and posterior heel. No pain present  with side to side compression of the calcaneus. Negative tinnel's sign  at the tarsal tunnel. Negative Schofield's nerve pain. Negative Calcaneal nerve pain. No soft tissue masses. Pain present with dorsiflexion of the ankle. No edema, erythema, or ecchymosis noted.       Left Ankle/Foot Exam     Comments:  Pain on palpation of the infeior medial heel, central plantar heel and posterior heel. No pain present  with side to side compression of the calcaneus. Negative tinnel's sign  at the tarsal tunnel. Negative Schofield's nerve pain. Negative Calcaneal nerve pain. No soft tissue masses. Pain present with dorsiflexion of the ankle. No edema, erythema, or ecchymosis noted.       Muscle Strength   Right Lower Extremity   Ankle Dorsiflexion:  5   Plantar flexion:  5/5  Left Lower Extremity   Ankle Dorsiflexion:  5   Plantar flexion:  5/5     Vascular Exam     Right Pulses  Dorsalis Pedis:      2+  Posterior Tibial:      2+        Left Pulses  Dorsalis Pedis:      2+  Posterior Tibial:      2+             Imaging: X-Ray Foot Complete Bilateral  Narrative: EXAMINATION:  XR FOOT  COMPLETE 3 VIEW BILATERAL    CLINICAL HISTORY:  Pain in right foot    TECHNIQUE:  AP, lateral, and oblique views of both feet were performed.    COMPARISON:  None    FINDINGS:  On both sides there is mild bony overgrowth of the distal head of the 1st metatarsal without hallux valgus.  There is a moderate right heel spur and a small left heel spur.  Soft tissue swelling or foreign bodies are not seen.  No fractures are noted.  Impression: Moderate right heel spur, small left heel spur.  Bony overgrowth of the distal head of the 1st metatarsal on both sides.    Electronically signed by: Ramone Quigley MD  Date:    07/13/2022  Time:    10:14               Assessment:       1. Plantar fasciitis    2. Achilles tendinitis of both lower extremities    3. Ingrown nail    4. Pain in both feet    5. Pain in toes of both feet      Plan:   Plantar fasciitis    Achilles tendinitis of both lower extremities    Ingrown nail    Pain in both feet  -     X-Ray Foot Complete Bilateral    Pain in toes of both feet      Follow up if symptoms worsen or fail to improve.    Procedures        Plantar Fasciitis Level I Treatment:   Anti-inflammatory  No bare feet  Over the counter insert  Restrict activity level   Use running shoes at full time  No impact exercise and stretch gastrocnemius muscle    I trimmed the bilateral great toenails back today as a courtesy.  I discussed the patient that if her symptoms continue then we would have to consider avulsion or matrixectomy.    Counseling:     I provided patient education verbally regarding:   Patient diagnosis, treatment options, as well as alternatives, risks, and benefits.     Discussed different treatment options for heel pain. I gave written and verbal instructions on heel cord stretching and this was demonstrated for the patient. Patient expressed understanding. Discussed wearing appropriate shoe gear and avoiding flats, slippers, sandals, barefoot, and sockfeet. Recommended arch  supports. My recommendation for OTC supports is Spenco polysorb replacement insoles or patient may elect more aggressive treatment with prescription arch supports. We also discussed cortisone injections and NSAID therapy.     Discussed treatment of Achilles tendonitis with rest, ice, oral NSAID, topical antiinflammatory creams, heel lifts, no barefoot, and cam walker boot if needed. Explained the importance of proper stretching. Discussed possibility of MRI for further evaluation if symptoms do not improve.       Ingrown toenail treatment options of no treatment, avulsion of nail border under local with regrowth of nail, chemical matrixectomy for attempted permanent correction of the problem. Patient was educated about daily dressing changes, soaks, and medications following removal of the nail.       This note was created using Dragon voice recognition software that occasionally misinterpreted phrases or words.

## 2022-07-13 NOTE — PATIENT INSTRUCTIONS
Understanding Ingrown Toenails    An ingrown nail is the result of a nail growing into the skin that surrounds it. This often occurs at either edge of the big toe. Ingrown nails may be caused by improper trimming, inherited nail deformities, injuries, fungal infections, or pressure.    Symptoms  Ingrown nails may cause pain at the tip of the toe or all the way to the base of the toe. The pain is often worse while walking. An ingrown nail may also lead to infection, inflammation, or a more serious condition. If its infected, you might see pus or redness.    Evaluation  To determine the extent of your problem, your healthcare provider examines and possibly presses the painful area. If other problems are suspected, blood tests, cultures, and X-rays may be done as well.    Treatment  If the nail isnt infected, your healthcare provider may trim the corner of it to help relieve your symptoms. He or she may need to remove one side of your nail back to the cuticle. The base of the nail may then be treated with a chemical to keep the ingrown part from growing back. Severe infections or ingrown nails may require antibiotics and temporary or permanent removal of a portion of the nail. To prevent pain, a local anesthetic may be used in these procedures. This treatment is usually done at your healthcare provider's office.    Prevention  Many nail problems can be prevented by wearing the right shoes and trimming your nails properly. To help avoid infection, keep your feet clean and dry. If you have diabetes, talk with your healthcare provider before doing any foot self-care.  The right shoes: Get your feet measured (your size may change as you age). Wear shoes that are supportive and roomy enough for your toes to wiggle. Look for shoes made of natural materials such as leather, which allow your feet to breathe.  Proper trimming: To avoid problems, trim your toenails straight across without cutting down into the corners. If you  cant trim your own nails, ask your healthcare provider to do so for you.    Date Last Reviewed: 10/1/2016  © 0425-9422 [employer]. [address] [place] [address], [place] [address]. All rights reserved. This information is not intended as a substitute for professional medical care. Always follow your healthcare professional's instructions.     Understanding Plantar Fasciitis    Plantar fasciitis is a condition that causes foot and heel pain. The plantar fascia is a tough band of tissue that runs across the bottom of the foot from the heel to the toes. This tissue pulls on the heel bone. It supports the arch of the foot as it pushes off the ground. If the tissue becomes irritated or red and swollen (inflamed), it is called plantar fasciitis.  How to say it  PLAN-tuhr fa-see-IY-[id number]     What causes plantar fasciitis?  Plantar fasciitis most often occurs from overusing the plantar fascia. The tissue may become damaged from activities that put repeated stress on the heel and foot. Or it may wear down over time with age and ankle stiffness. You are more likely to have plantar fasciitis if you:  Do activities that require a lot of running, jumping, or dancing  Have a job that requires being on your feet for long periods  Are overweight or obese  Have certain foot problems, such as a tight Achilles tendon, flat feet, or high arches  Often wear poorly fitting shoes    Symptoms of plantar fasciitis  The condition most often causes pain in the heel and the bottom of the foot. The pain may occur when you take your first steps in the morning. It may get better as you walk throughout the day. But as you continue to put weight on the foot, the pain often returns. Pain may also occur after standing or sitting for long periods.    Treating plantar fasciitis  Treatments for plantar fasciitis include:  Resting the foot. This involves limiting movements that make your foot hurt. You may also need to avoid certain sports and  types of work for a time.  Using cold packs. Put an ice pack on the heel and foot to help reduce pain and swelling.  Taking pain medicines. Prescription and over-the-counter pain medicines can help relieve pain and swelling.  Using heel cups or foot inserts (orthotics). These are placed in the shoes to help support the heel or arch and cushion the heel. You may also be told to buy proper-fitting shoes with good arch support and cushioned soles.  Taping the foot. This supports the arch and limits the movement of the plantar fascia to help relieve symptoms.  Wearing a night splint. This stretches the plantar fascia and leg muscles while you sleep. This may help relieve pain.  Doing exercises and physical therapy. These stretch and strengthen the plantar fascia and the muscles in the leg that support the heel and foot.  Getting shots of medicine into the foot. These may help relieve symptoms for a time.  Having surgery. This may be needed if other treatments fail to relieve symptoms. During surgery, the surgeon may partially cut the plantar fascia to release tension.    Possible complications of plantar fasciitis  Without proper care and treatment, healing may take longer than normal. Also, symptoms may continue or get worse. Over time, the plantar fascia may be damaged. This can make it hard to walk or even stand without pain.    When to call your healthcare provider  Call your healthcare provider right away if you have any of these:  Fever of 100.4°F (38°C) or higher, or as directed  Symptoms that dont get better with treatment, or get worse  New symptoms, such as numbness, tingling, or weakness in the foot     Date Last Reviewed: 3/10/2016  © 1610-3256 [employer]. [address] [place] [address], [place] [address]. All rights reserved. This information is not intended as a substitute for professional medical care. Always follow your healthcare professional's instructions.       Achilles Tendinitis    Achilles  tendinitis is a common condition that occurs when the large tendon that runs down the back of your lower leg becomes irritated and inflamed.  The Achilles tendon is the largest tendon in the body. It connects your calf muscles to your heel bone and is used when you walk, run, climb stairs, jump, and stand on your tip toes. Although the Achilles tendon can withstand great stresses from running and jumping, it is also prone to tendinitis, a condition associated with overuse and degeneration.       Achilles tendinitis pain can occur within the tendon itself or at the point where it attaches to the heel bone, called the Achilles tendon insertion.    Description  Simply defined, tendinitis is inflammation of a tendon. Inflammation is the body's natural response to injury or disease, and often causes swelling, pain, or irritation. There are two types of Achilles tendinitis, based upon which part of the tendon is inflamed.    Noninsertional Achilles tendinitis    Noninsertional Achilles Tendinitis  In noninsertional Achilles tendinitis, fibers in the middle portion of the tendon have begun to break down with tiny tears (degenerate), swell, and thicken.  Tendinitis of the middle portion of the tendon more commonly affects younger, active people.    Insertional Achilles Tendinitis    Insertional Achilles tendinitis    Insertional Achilles tendinitis involves the lower portion of the heel, where the tendon attaches (inserts) to the heel bone.    In both noninsertional and insertional Achilles tendinitis, damaged tendon fibers may also calcify (harden). Bone spurs (extra bone growth) often form with insertional Achilles tendinitis.    Tendinitis that affects the insertion of the tendon can occur at any time, even in patients who are not active. More often than not, however, it comes from years of overuse (long distance runners, sprinters).    Cause  Achilles tendinitis is typically not related to a specific injury. The problem  results from repetitive stress to the tendon. This often happens when we push our bodies to do too much, too soon, but other factors can make it more likely to develop tendinitis, including:  Sudden increase in the amount or intensity of exercise activity--for example, increasing the distance you run every day by a few miles without giving your body a chance to adjust to the new distance  Tight calf muscles--Having tight calf muscles and suddenly starting an aggressive exercise program can put extra stress on the Achilles tendon  Bone spur--Extra bone growth where the Achilles tendon attaches to the heel bone can rub against the tendon and cause pain

## 2022-07-19 ENCOUNTER — OFFICE VISIT (OUTPATIENT)
Dept: CARDIOLOGY | Facility: CLINIC | Age: 72
End: 2022-07-19
Payer: MEDICARE

## 2022-07-19 VITALS
WEIGHT: 145 LBS | HEART RATE: 59 BPM | HEIGHT: 64 IN | RESPIRATION RATE: 16 BRPM | DIASTOLIC BLOOD PRESSURE: 72 MMHG | OXYGEN SATURATION: 98 % | BODY MASS INDEX: 24.75 KG/M2 | SYSTOLIC BLOOD PRESSURE: 142 MMHG

## 2022-07-19 DIAGNOSIS — I95.1 ORTHOSTATIC HYPOTENSION: ICD-10-CM

## 2022-07-19 DIAGNOSIS — R55 SYNCOPE AND COLLAPSE: ICD-10-CM

## 2022-07-19 DIAGNOSIS — E78.00 HYPERCHOLESTEROLEMIA: ICD-10-CM

## 2022-07-19 DIAGNOSIS — F41.0 PANIC ATTACK: ICD-10-CM

## 2022-07-19 DIAGNOSIS — F41.9 ANXIETY: ICD-10-CM

## 2022-07-19 DIAGNOSIS — E78.2 MIXED DYSLIPIDEMIA: ICD-10-CM

## 2022-07-19 DIAGNOSIS — M32.9 SYSTEMIC LUPUS ERYTHEMATOSUS, UNSPECIFIED SLE TYPE, UNSPECIFIED ORGAN INVOLVEMENT STATUS: ICD-10-CM

## 2022-07-19 DIAGNOSIS — Z78.9 STATIN INTOLERANCE: ICD-10-CM

## 2022-07-19 DIAGNOSIS — F17.200 CURRENT EVERY DAY SMOKER: ICD-10-CM

## 2022-07-19 DIAGNOSIS — I10 ESSENTIAL HYPERTENSION: Primary | ICD-10-CM

## 2022-07-19 PROCEDURE — 99214 PR OFFICE/OUTPT VISIT, EST, LEVL IV, 30-39 MIN: ICD-10-PCS | Mod: S$GLB,,, | Performed by: INTERNAL MEDICINE

## 2022-07-19 PROCEDURE — 1125F PR PAIN SEVERITY QUANTIFIED, PAIN PRESENT: ICD-10-PCS | Mod: CPTII,S$GLB,, | Performed by: INTERNAL MEDICINE

## 2022-07-19 PROCEDURE — 1101F PR PT FALLS ASSESS DOC 0-1 FALLS W/OUT INJ PAST YR: ICD-10-PCS | Mod: CPTII,S$GLB,, | Performed by: INTERNAL MEDICINE

## 2022-07-19 PROCEDURE — 99214 OFFICE O/P EST MOD 30 MIN: CPT | Mod: S$GLB,,, | Performed by: INTERNAL MEDICINE

## 2022-07-19 PROCEDURE — 1101F PT FALLS ASSESS-DOCD LE1/YR: CPT | Mod: CPTII,S$GLB,, | Performed by: INTERNAL MEDICINE

## 2022-07-19 PROCEDURE — 3008F BODY MASS INDEX DOCD: CPT | Mod: CPTII,S$GLB,, | Performed by: INTERNAL MEDICINE

## 2022-07-19 PROCEDURE — 3008F PR BODY MASS INDEX (BMI) DOCUMENTED: ICD-10-PCS | Mod: CPTII,S$GLB,, | Performed by: INTERNAL MEDICINE

## 2022-07-19 PROCEDURE — 3077F SYST BP >= 140 MM HG: CPT | Mod: CPTII,S$GLB,, | Performed by: INTERNAL MEDICINE

## 2022-07-19 PROCEDURE — 3078F DIAST BP <80 MM HG: CPT | Mod: CPTII,S$GLB,, | Performed by: INTERNAL MEDICINE

## 2022-07-19 PROCEDURE — 3288F PR FALLS RISK ASSESSMENT DOCUMENTED: ICD-10-PCS | Mod: CPTII,S$GLB,, | Performed by: INTERNAL MEDICINE

## 2022-07-19 PROCEDURE — 3288F FALL RISK ASSESSMENT DOCD: CPT | Mod: CPTII,S$GLB,, | Performed by: INTERNAL MEDICINE

## 2022-07-19 PROCEDURE — 1125F AMNT PAIN NOTED PAIN PRSNT: CPT | Mod: CPTII,S$GLB,, | Performed by: INTERNAL MEDICINE

## 2022-07-19 PROCEDURE — 3078F PR MOST RECENT DIASTOLIC BLOOD PRESSURE < 80 MM HG: ICD-10-PCS | Mod: CPTII,S$GLB,, | Performed by: INTERNAL MEDICINE

## 2022-07-19 PROCEDURE — 3077F PR MOST RECENT SYSTOLIC BLOOD PRESSURE >= 140 MM HG: ICD-10-PCS | Mod: CPTII,S$GLB,, | Performed by: INTERNAL MEDICINE

## 2022-07-19 NOTE — PROGRESS NOTES
"Subjective:    Patient ID:  Kristin Guerra is a 72 y.o. female     Chief Complaint   Patient presents with    Hypertension       HPI:  Ms Kristin Guerra is a 72 y.o. female is here for follow-up care  Patient has been doing okay she has been feeling tired and it is that she is not sleeping as good home medications have is changed.  Her breathing has been stable she uses inhalers.  She is still an active smoker continues to smoke.  Denies any chest pain occasionally when she gets very anxious she can feel the chest tightening up and it spontaneously relieved after she has relief of her anxiety.  Denies any palpitations or lightheadedness dizziness or loss of consciousness.  She has been taking all her medications.    Review of patient's allergies indicates:   Allergen Reactions    Doxycycline Itching and Swelling     Prescribed 5/2021, took one dose, describes lip swelling, and "itch all over"    Mirtazapine Other (See Comments)     Hyperactivity.    Morphine Swelling and Rash     Other reaction(s): swelling in limbs    Lexapro [escitalopram oxalate] Other (See Comments)     Worsening tremor.  Sedation.    Primidone Other (See Comments)     Syncope.  Other reaction(s): dizziness,hypotension  Other reaction(s): Unknown  Other reaction(s): Unknown    Simvastatin Nausea And Vomiting and Other (See Comments)     Weakness.  Other reaction(s): nausea,vomiting    Beta-adrenergic agents Other (See Comments)     Low blood pressure and fainted.     Metoprolol succinate Other (See Comments)    Prochlorperazine      Other reaction(s): weakness    Topiramate     Compazine [prochlorperazine edisylate] Nausea Only    Tramadol Nausea And Vomiting       Past Medical History:   Diagnosis Date    Allergy     Streling's esophagus     Colon polyp     Diverticulitis     Diverticulosis     Fatty liver     GERD (gastroesophageal reflux disease)     History of endoscopy 4/7/2021    Dr Uzair Cantor  Impression:         "    - Normal oropharynx.                         - Hiatal hernia.                         - Sterling's esophagus. Treated with radiofrequency                         ablation.                         - Normal stomach.                         - Normal examined duodenum.                         - No specimens collected.     Hx of cervical spine surgery 2018    Hyperlipidemia     Hypertension     Lupus (systemic lupus erythematosus)     discoid    Osteoarthritis     Osteopenia     Syncope     Tremor 2005    essential     Past Surgical History:   Procedure Laterality Date    APPENDECTOMY      removed during colon surgery    CERVICAL FUSION      CERVICAL SPINE SURGERY      Rods and screws.     SECTION      COLON SURGERY      hemicolectomy    COLON SURGERY  2008    repair    COLONOSCOPY N/A 2018    Procedure: COLONOSCOPY;  Surgeon: Maverick Esquivel MD;  Location: [facility];  Service: Endoscopy;  Laterality: N/A; repeat in 5 years for surveillance    CYSTOSCOPY N/A 2021    Procedure: CYSTOSCOPY;  Surgeon: Allison Miranda MD;  Location: [facility];  Service: Urology;  Laterality: N/A;    ESOPHAGOGASTRODUODENOSCOPY N/A 2019    Procedure: EGD (ESOPHAGOGASTRODUODENOSCOPY);  Surgeon: Monika Steele MD;  Location: [facility];  Service: Endoscopy;  Laterality: N/A;    ESOPHAGOGASTRODUODENOSCOPY  2019    with RFA and biopsies    ESOPHAGOGASTRODUODENOSCOPY N/A 2019    Procedure: EGD (ESOPHAGOGASTRODUODENOSCOPY);  Surgeon: Uzair Cantor MD;  Location: [facility];  Service: Endoscopy;  Laterality: N/A;  n+v with anesthesia    ESOPHAGOGASTRODUODENOSCOPY N/A 2019    Procedure: EGD (ESOPHAGOGASTRODUODENOSCOPY);  Surgeon: Uzair Cantor MD;  Location: [facility];  Service: Endoscopy;  Laterality: N/A;    ESOPHAGOGASTRODUODENOSCOPY N/A 10/7/2019    Procedure: EGD (ESOPHAGOGASTRODUODENOSCOPY);  Surgeon: Uzair Cantor MD;  Location: [facility];   Service: Endoscopy;  Laterality: N/A;    ESOPHAGOGASTRODUODENOSCOPY N/A 2/10/2020    Procedure: EGD (ESOPHAGOGASTRODUODENOSCOPY);  Surgeon: Uzair Cantor MD;  Location: [facility];  Service: Endoscopy;  Laterality: N/A;  Requests earliest procedure time    ESOPHAGOGASTRODUODENOSCOPY N/A 8/17/2020    Procedure: EGD (ESOPHAGOGASTRODUODENOSCOPY);  Surgeon: Uzair Cantor MD;  Location: [facility];  Service: Endoscopy;  Laterality: N/A;  BARRETTS    ESOPHAGOGASTRODUODENOSCOPY N/A 4/7/2021    Procedure: ESOPHAGOGASTRODUODENOSCOPY (EGD);  Surgeon: Uzair Cantor MD;  Location: [facility];  Service: Endoscopy;  Laterality: N/A;  Needs Rapid Covid MP    ESOPHAGOGASTRODUODENOSCOPY N/A 11/30/2021    Procedure: EGD (ESOPHAGOGASTRODUODENOSCOPY);  Surgeon: Uzair Cantor MD;  Location: [facility];  Service: Endoscopy;  Laterality: N/A;    ESOPHAGOGASTRODUODENOSCOPY N/A 6/27/2022    Procedure: EGD (ESOPHAGOGASTRODUODENOSCOPY);  Surgeon: Uzair Cantor MD;  Location: [facility];  Service: Endoscopy;  Laterality: N/A;  5/19/22: fully vaccinated. instructions mailed-[place]    loop recorder only    HYSTERECTOMY      ovaries remain    INSERTION OF IMPLANTABLE LOOP RECORDER N/A 7/17/2020    Procedure: INSERTION, IMPLANTABLE LOOP RECORDER (MEDTRONIC);  Surgeon: Karyna Vasques MD;  Location: [facility] CATH[facility]EP [facility];  Service: Cardiology;  Laterality: N/A;    SPLENECTOMY, TOTAL  2007    Injured during surgery and removed    UPPER GASTROINTESTINAL ENDOSCOPY  2007    GERD & hiatal hernia per patient report     Social History     Tobacco Use    Smoking status: Current Every Day Smoker     Packs/day: 1.00     Years: 35.00     Pack years: 35.00     Types: Cigarettes    Smokeless tobacco: Never Used   Substance Use Topics    Alcohol use: No    Drug use: No     Family History   Problem Relation Age of Onset    Colon cancer Father         diagnosed in his 60's    Lung cancer Father     Lymphoma Mother         Brain     Lung cancer Brother     Lung cancer Brother         metastatic from prostate    Prostate cancer Brother     Heart disease Brother         valvular disease    Hyperlipidemia Brother     Eczema Neg Hx     Lupus Neg Hx     Psoriasis Neg Hx     Melanoma Neg Hx     Crohn's disease Neg Hx     Ulcerative colitis Neg Hx     Stomach cancer Neg Hx     Esophageal cancer Neg Hx         Review of Systems:   Constitution: Negative for diaphoresis and fever.   HEENT: Negative for nosebleeds.    Cardiovascular: Negative for chest pain       Recurrent dyspnea on exertion       No leg swelling        No palpitations  Respiratory:  Positive for shortness of breath and wheezing.    Hematologic/Lymphatic: Negative for bleeding problem. Does not bruise/bleed easily.   Skin: Negative for color change and rash.   Musculoskeletal: Negative for falls and myalgias.   Gastrointestinal: Negative for hematemesis and hematochezia.   Genitourinary: Negative for hematuria.   Neurological: Negative for dizziness and light-headedness.   Psychiatric/Behavioral: Negative for altered mental status and memory loss.          Objective:        Vitals:    07/19/22 1051   BP: (!) 142/72   Pulse: (!) 59   Resp: 16       Lab Results   Component Value Date    WBC 9.41 02/14/2022    HGB 12.1 02/14/2022    HCT 37.0 02/14/2022     02/14/2022    CHOL 241 (H) 03/09/2022    TRIG 76 03/09/2022    HDL 57 03/09/2022    ALT 14 06/01/2021    AST 19 06/01/2021     02/14/2022    K 4.3 02/14/2022     02/14/2022    CREATININE 0.6 02/14/2022    BUN 18 02/14/2022    CO2 26 02/14/2022    TSH 2.178 02/09/2022        ECHOCARDIOGRAM RESULTS  Results for orders placed in visit on 07/11/22    Echo    Interpretation Summary  · The left ventricle is normal in size with hyperdynamic systolic function.  · Normal left ventricular diastolic function.  · The estimated ejection fraction is 72%.  · Normal right ventricular size with normal right ventricular  systolic function.  · Mild left atrial enlargement.  · Mild tricuspid regurgitation.  · Mild mitral regurgitation.  · Normal central venous pressure (3 mmHg).  · The estimated PA systolic pressure is 30 mmHg.        CURRENT/PREVIOUS VISIT EKG  Results for orders placed or performed in visit on 12/15/21   IN OFFICE EKG 12-LEAD (to [place])    Collection Time: 12/15/21  2:58 PM    Narrative    Test Reason : I95.1,I10,E78.2,E78.00,    Vent. Rate : 062 BPM     Atrial Rate : 062 BPM     P-R Int : 160 ms          QRS Dur : 094 ms      QT Int : 446 ms       P-R-T Axes : 067 053 071 degrees     QTc Int : 452 ms    Normal sinus rhythm  Normal ECG  When compared with ECG of 01-JUN-2021 18:26,  No significant change was found  Confirmed by Aron James MD ([id number]) on 1/16/2022 3:16:59 PM    Referred By:             Confirmed By:Aron James MD     No valid procedures specified.   Results for orders placed during the hospital encounter of 06/01/21    Nuclear Stress Test    Interpretation Summary    The EKG portion of this study is negative for ischemia.    The patient reported no chest pain during the stress test.    There were no arrhythmias during stress.    The nuclear portion of this study will be reported separately.      Physical Exam:  CONSTITUTIONAL: No fever, no chills  HEENT: Normocephalic, atraumatic,pupils reactive to light                 NECK:  No JVD no carotid bruit  CVS: S1S2+, RRR, systolic murmurs,   LUNGS:  Bilateral decreased breath sounds at both lung bases  ABDOMEN: Soft, NT, BS+  EXTREMITIES: No cyanosis, edema  : No odonnell catheter  NEURO: AAO X 3  PSY: Normal affect      Medication List with Changes/Refills   Current Medications    ALBUTEROL-IPRATROPIUM (DUO-NEB) 2.5 MG-0.5 MG/3 ML NEBULIZER SOLUTION    Take 3 mLs by nebulization every 6 (six) hours as needed for Wheezing or Shortness of Breath. Rescue    ALUMINUM & MAGNESIUM HYDROXIDE-SIMETHICONE (MYLANTA MAX STRENGTH) 400-400-40 MG/5 ML SUSPENSION     Take 5 mLs by mouth every 6 (six) hours as needed for Indigestion.     AMLODIPINE (NORVASC) 5 MG TABLET    Take 5 mg by mouth once daily.    AZELASTINE (ASTELIN) 137 MCG (0.1 %) NASAL SPRAY    1 spray (137 mcg total) by Nasal route 2 (two) times daily.    CARBIDOPA-LEVODOPA  MG (SINEMET)  MG PER TABLET    Take 1 tablet by mouth 3 (three) times daily.    CLOBETASOL (TEMOVATE) 0.05 % CREAM    Apply to feet daily to bid prn flare    ESOMEPRAZOLE (NEXIUM) 40 MG CAPSULE    TAKE 1 CAPSULE(40 MG) BY MOUTH TWICE DAILY    FLUTICASONE (FLONASE) 50 MCG/ACTUATION NASAL SPRAY    1 spray by Each Nostril route daily as needed for Allergies.     GABAPENTIN (NEURONTIN) 300 MG CAPSULE    Take 1 capsule (300 mg total) by mouth every evening.    POLYETHYLENE GLYCOL (GLYCOLAX) 17 GRAM PWPK    Take 17 g by mouth daily as needed.    SERTRALINE (ZOLOFT) 25 MG TABLET    Take 1 tablet (25 mg total) by mouth every evening.    TIOTROPIUM BROMIDE (SPIRIVA RESPIMAT) 2.5 MCG/ACTUATION INHALER    Inhale 2 puffs into the lungs Daily. Controller    TRAZODONE (DESYREL) 50 MG TABLET    Take 1/2 tablet by mouth nightly; if in 1 week insomnia is not relieved may take 1 whole tablet by mouth nightly.             Assessment:       1. Essential hypertension, labile    2. Mixed dyslipidemia    3. Hypercholesterolemia    4. Systemic lupus erythematosus, unspecified SLE type, unspecified organ involvement status    5. Statin intolerance    6. Anxiety    7. Syncope and collapse    8. Orthostatic hypotension    9. Panic attack    10. Current every day smoker         Plan:   1. Essential hypertension  Blood pressure is stable at 140 2/72 and she is currently on amlodipine 5 mg p.o. q.day continue the same be read  2. Orthostatic hypotension  Patient has history of orthostatic hypotension.  And is usually when she is dehydrated and not drinking adequate fluids.  Patient states that she feels thirsty and has to drink more fluids.  We  3. Mixed  hyperlipidemia  Patient is statin intolerant.  Consideration was that he 10 mg p.o. q.day.  Her primary physician is checking cholesterol and liver function tests.  4. Panic attack  Patient does have intermittent panic attacks and she feels the significant anxiety coming on and it causes a chest tightness and as soon as panic attack that is relieved she feels better.  5. Discussed with patient in regards to the echocardiogram shows normal LV function normal RV function with ejection fraction of 70% with mild mitral regurgitation mild left atrial enlargement.  6. Patient is an active smoker and continues to smoke.  And she follows up with a pulmonologist  7. Parkinsonism  She is on Sinemet and follows up with her primary physician and neurologist who  8. Continue current management and I will see her back in the office in 6 months time.             Problem List Items Addressed This Visit        Psychiatric    Panic attack    Anxiety       Cardiac/Vascular    Essential hypertension, labile - Primary (Chronic)    Mixed dyslipidemia (Chronic)    Hypercholesterolemia    Orthostatic hypotension       Immunology/Multi System    Systemic lupus erythematosus, unspecified SLE type, unspecified organ involvement status       Other    Statin intolerance (Chronic)    Syncope and collapse      Other Visit Diagnoses     Current every day smoker              No follow-ups on file.

## 2022-07-20 NOTE — ANESTHESIA POSTPROCEDURE EVALUATION
"Anesthesia Post Evaluation    Patient: Kristin Guerra    Procedure(s) Performed: Procedure(s) (LRB):  COLONOSCOPY (N/A)    Final Anesthesia Type: general  Patient location during evaluation: PACU  Patient participation: Yes- Able to Participate  Level of consciousness: awake and alert and oriented  Post-procedure vital signs: reviewed and stable  Pain management: adequate  Airway patency: patent  PONV status at discharge: No PONV  Anesthetic complications: no      Cardiovascular status: blood pressure returned to baseline and stable  Respiratory status: unassisted and spontaneous ventilation  Hydration status: euvolemic  Follow-up not needed.        Visit Vitals  /72   Pulse (!) 55   Temp 36.7 °C (98.1 °F)   Resp 18   Ht 5' 1" (1.549 m)   Wt 59 kg (130 lb)   SpO2 99%   Breastfeeding? No   BMI 24.56 kg/m²       Pain/Kobe Score: Pain Assessment Performed: Yes (2/20/2018  8:05 AM)  Presence of Pain: denies (2/20/2018  8:17 AM)  Kobe Score: 10 (2/20/2018  8:35 AM)      "
Discharged

## 2022-07-25 ENCOUNTER — OFFICE VISIT (OUTPATIENT)
Dept: FAMILY MEDICINE | Facility: CLINIC | Age: 72
End: 2022-07-25
Payer: MEDICARE

## 2022-07-25 VITALS
HEIGHT: 64 IN | OXYGEN SATURATION: 97 % | DIASTOLIC BLOOD PRESSURE: 70 MMHG | HEART RATE: 54 BPM | WEIGHT: 145.75 LBS | SYSTOLIC BLOOD PRESSURE: 124 MMHG | TEMPERATURE: 99 F | BODY MASS INDEX: 24.88 KG/M2

## 2022-07-25 DIAGNOSIS — L02.91 CUTANEOUS ABSCESS, UNSPECIFIED SITE: ICD-10-CM

## 2022-07-25 DIAGNOSIS — L03.90 CELLULITIS OF SKIN: Primary | ICD-10-CM

## 2022-07-25 PROCEDURE — 3078F DIAST BP <80 MM HG: CPT | Mod: CPTII,S$GLB,, | Performed by: NURSE PRACTITIONER

## 2022-07-25 PROCEDURE — 3008F BODY MASS INDEX DOCD: CPT | Mod: CPTII,S$GLB,, | Performed by: NURSE PRACTITIONER

## 2022-07-25 PROCEDURE — 1160F PR REVIEW ALL MEDS BY PRESCRIBER/CLIN PHARMACIST DOCUMENTED: ICD-10-PCS | Mod: CPTII,S$GLB,, | Performed by: NURSE PRACTITIONER

## 2022-07-25 PROCEDURE — 1125F AMNT PAIN NOTED PAIN PRSNT: CPT | Mod: CPTII,S$GLB,, | Performed by: NURSE PRACTITIONER

## 2022-07-25 PROCEDURE — 99213 PR OFFICE/OUTPT VISIT, EST, LEVL III, 20-29 MIN: ICD-10-PCS | Mod: S$GLB,,, | Performed by: NURSE PRACTITIONER

## 2022-07-25 PROCEDURE — 99213 OFFICE O/P EST LOW 20 MIN: CPT | Mod: S$GLB,,, | Performed by: NURSE PRACTITIONER

## 2022-07-25 PROCEDURE — 3288F FALL RISK ASSESSMENT DOCD: CPT | Mod: CPTII,S$GLB,, | Performed by: NURSE PRACTITIONER

## 2022-07-25 PROCEDURE — 1159F PR MEDICATION LIST DOCUMENTED IN MEDICAL RECORD: ICD-10-PCS | Mod: CPTII,S$GLB,, | Performed by: NURSE PRACTITIONER

## 2022-07-25 PROCEDURE — 3078F PR MOST RECENT DIASTOLIC BLOOD PRESSURE < 80 MM HG: ICD-10-PCS | Mod: CPTII,S$GLB,, | Performed by: NURSE PRACTITIONER

## 2022-07-25 PROCEDURE — 1125F PR PAIN SEVERITY QUANTIFIED, PAIN PRESENT: ICD-10-PCS | Mod: CPTII,S$GLB,, | Performed by: NURSE PRACTITIONER

## 2022-07-25 PROCEDURE — 3074F PR MOST RECENT SYSTOLIC BLOOD PRESSURE < 130 MM HG: ICD-10-PCS | Mod: CPTII,S$GLB,, | Performed by: NURSE PRACTITIONER

## 2022-07-25 PROCEDURE — 3074F SYST BP LT 130 MM HG: CPT | Mod: CPTII,S$GLB,, | Performed by: NURSE PRACTITIONER

## 2022-07-25 PROCEDURE — 99999 PR PBB SHADOW E&M-EST. PATIENT-LVL V: CPT | Mod: PBBFAC,,, | Performed by: NURSE PRACTITIONER

## 2022-07-25 PROCEDURE — 1160F RVW MEDS BY RX/DR IN RCRD: CPT | Mod: CPTII,S$GLB,, | Performed by: NURSE PRACTITIONER

## 2022-07-25 PROCEDURE — 1101F PT FALLS ASSESS-DOCD LE1/YR: CPT | Mod: CPTII,S$GLB,, | Performed by: NURSE PRACTITIONER

## 2022-07-25 PROCEDURE — 1101F PR PT FALLS ASSESS DOC 0-1 FALLS W/OUT INJ PAST YR: ICD-10-PCS | Mod: CPTII,S$GLB,, | Performed by: NURSE PRACTITIONER

## 2022-07-25 PROCEDURE — 1159F MED LIST DOCD IN RCRD: CPT | Mod: CPTII,S$GLB,, | Performed by: NURSE PRACTITIONER

## 2022-07-25 PROCEDURE — 3008F PR BODY MASS INDEX (BMI) DOCUMENTED: ICD-10-PCS | Mod: CPTII,S$GLB,, | Performed by: NURSE PRACTITIONER

## 2022-07-25 PROCEDURE — 3288F PR FALLS RISK ASSESSMENT DOCUMENTED: ICD-10-PCS | Mod: CPTII,S$GLB,, | Performed by: NURSE PRACTITIONER

## 2022-07-25 PROCEDURE — 99999 PR PBB SHADOW E&M-EST. PATIENT-LVL V: ICD-10-PCS | Mod: PBBFAC,,, | Performed by: NURSE PRACTITIONER

## 2022-07-25 RX ORDER — CLINDAMYCIN HYDROCHLORIDE 300 MG/1
300 CAPSULE ORAL 4 TIMES DAILY
Qty: 40 CAPSULE | Refills: 0 | Status: SHIPPED | OUTPATIENT
Start: 2022-07-25 | End: 2022-08-04

## 2022-07-25 RX ORDER — MUPIROCIN 20 MG/G
OINTMENT TOPICAL 3 TIMES DAILY
Qty: 1 EACH | Refills: 0 | Status: SHIPPED | OUTPATIENT
Start: 2022-07-25 | End: 2022-08-02

## 2022-07-25 NOTE — PROGRESS NOTES
"Subjective:       Patient ID: Kristin Guerra is a 72 y.o. female.    Chief Complaint: boil under right armpit    HPI   73 y/o female patient with medical problems listed below presents for skin boil under right axilla for a week. Patient states has taken tylenol but no relief. Patient also states has felt discomfort in right neck gland for 3 days. Denies throat pain, fever, chills, generalized body ache.     Patient Active Problem List   Diagnosis    Hx of colon cancer, stage II    Hx of diverticulitis of colon    Cataract    Herniation of intervertebral disc of cervical region    Cervical radiculopathy    Degeneration of intervertebral disc of cervical region    Systemic lupus erythematosus, unspecified SLE type, unspecified organ involvement status    Hypercholesterolemia    Gastroesophageal reflux disease    Tremor, coarse    Essential hypertension, labile    Hiatal hernia    Osteoarthritis    RLS (restless legs syndrome)    Panic attack    Heart murmur    Smoking    GERD (gastroesophageal reflux disease)    Sterling's esophagus with dysplasia    Tremor, essential    Sinus problem    S/P splenectomy    Other chronic postprocedural pain    Insomnia due to medical condition    Anxiety    Needs flu shot    Mixed dyslipidemia    Statin intolerance    Statin myopathy    Vitamin D deficiency    Near syncope    Syncope and collapse    History of endoscopy    Incontinence in female    Bradycardia    Seborrheic keratosis, inflamed    Microhematuria    Orthostatic hypotension    History of loop recorder    Malignant neoplasm of colon, unspecified part of colon    Depression, recurrent        Review of patient's allergies indicates:   Allergen Reactions    Doxycycline Itching and Swelling     Prescribed 5/2021, took one dose, describes lip swelling, and "itch all over"    Mirtazapine Other (See Comments)     Hyperactivity.    Morphine Swelling and Rash     Other reaction(s): " swelling in limbs    Lexapro [escitalopram oxalate] Other (See Comments)     Worsening tremor.  Sedation.    Primidone Other (See Comments)     Syncope.  Other reaction(s): dizziness,hypotension  Other reaction(s): Unknown  Other reaction(s): Unknown    Simvastatin Nausea And Vomiting and Other (See Comments)     Weakness.  Other reaction(s): nausea,vomiting    Beta-adrenergic agents Other (See Comments)     Low blood pressure and fainted.     Metoprolol succinate Other (See Comments)    Prochlorperazine      Other reaction(s): weakness    Topiramate     Compazine [prochlorperazine edisylate] Nausea Only    Tramadol Nausea And Vomiting       Past Surgical History:   Procedure Laterality Date    APPENDECTOMY      removed during colon surgery    CERVICAL FUSION      CERVICAL SPINE SURGERY      Rods and screws.     SECTION      COLON SURGERY      hemicolectomy    COLON SURGERY  2008    repair    COLONOSCOPY N/A 2018    Procedure: COLONOSCOPY;  Surgeon: Maverick Esquivel MD;  Location: [facility];  Service: Endoscopy;  Laterality: N/A; repeat in 5 years for surveillance    CYSTOSCOPY N/A 2021    Procedure: CYSTOSCOPY;  Surgeon: Allison Miranda MD;  Location: [facility];  Service: Urology;  Laterality: N/A;    ESOPHAGOGASTRODUODENOSCOPY N/A 2019    Procedure: EGD (ESOPHAGOGASTRODUODENOSCOPY);  Surgeon: Monika Steele MD;  Location: [facility];  Service: Endoscopy;  Laterality: N/A;    ESOPHAGOGASTRODUODENOSCOPY  2019    with RFA and biopsies    ESOPHAGOGASTRODUODENOSCOPY N/A 2019    Procedure: EGD (ESOPHAGOGASTRODUODENOSCOPY);  Surgeon: Uzair Cantor MD;  Location: [facility];  Service: Endoscopy;  Laterality: N/A;  n+v with anesthesia    ESOPHAGOGASTRODUODENOSCOPY N/A 2019    Procedure: EGD (ESOPHAGOGASTRODUODENOSCOPY);  Surgeon: Uzair Cantor MD;  Location: [facility];  Service: Endoscopy;  Laterality: N/A;    ESOPHAGOGASTRODUODENOSCOPY N/A  10/7/2019    Procedure: EGD (ESOPHAGOGASTRODUODENOSCOPY);  Surgeon: Uzair Cantor MD;  Location: [facility] ENDO;  Service: Endoscopy;  Laterality: N/A;    ESOPHAGOGASTRODUODENOSCOPY N/A 2/10/2020    Procedure: EGD (ESOPHAGOGASTRODUODENOSCOPY);  Surgeon: Uzair Cantor MD;  Location: [facility] ENDO;  Service: Endoscopy;  Laterality: N/A;  Requests earliest procedure time    ESOPHAGOGASTRODUODENOSCOPY N/A 8/17/2020    Procedure: EGD (ESOPHAGOGASTRODUODENOSCOPY);  Surgeon: Uzair Cantor MD;  Location: [facility];  Service: Endoscopy;  Laterality: N/A;  BARRETTS    ESOPHAGOGASTRODUODENOSCOPY N/A 4/7/2021    Procedure: ESOPHAGOGASTRODUODENOSCOPY (EGD);  Surgeon: Uzair Cantor MD;  Location: [facility];  Service: Endoscopy;  Laterality: N/A;  Needs Rapid Covid MP    ESOPHAGOGASTRODUODENOSCOPY N/A 11/30/2021    Procedure: EGD (ESOPHAGOGASTRODUODENOSCOPY);  Surgeon: Uzair Cantor MD;  Location: [facility];  Service: Endoscopy;  Laterality: N/A;    ESOPHAGOGASTRODUODENOSCOPY N/A 6/27/2022    Procedure: EGD (ESOPHAGOGASTRODUODENOSCOPY);  Surgeon: Uzair Cantor MD;  Location: [facility];  Service: Endoscopy;  Laterality: N/A;  5/19/22: fully vaccinated. instructions mailed-[place]    loop recorder only    HYSTERECTOMY      ovaries remain    INSERTION OF IMPLANTABLE LOOP RECORDER N/A 7/17/2020    Procedure: INSERTION, IMPLANTABLE LOOP RECORDER (MEDTRONIC);  Surgeon: Karyna Vasques MD;  Location: [facility] CATH[facility]EP LAB;  Service: Cardiology;  Laterality: N/A;    SPLENECTOMY, TOTAL  2007    Injured during surgery and removed    UPPER GASTROINTESTINAL ENDOSCOPY  2007    GERD & hiatal hernia per patient report          Current Outpatient Medications:     albuterol-ipratropium (DUO-NEB) 2.5 mg-0.5 mg/3 mL nebulizer solution, Take 3 mLs by nebulization every 6 (six) hours as needed for Wheezing or Shortness of Breath. Rescue, Disp: 120 each, Rfl: 5    aluminum & magnesium hydroxide-simethicone (MYLANTA MAX  STRENGTH) 400-400-40 mg/5 mL suspension, Take 5 mLs by mouth every 6 (six) hours as needed for Indigestion. , Disp: , Rfl:     amLODIPine (NORVASC) 5 MG tablet, Take 5 mg by mouth once daily., Disp: , Rfl:     azelastine (ASTELIN) 137 mcg (0.1 %) nasal spray, 1 spray (137 mcg total) by Nasal route 2 (two) times daily., Disp: 30 mL, Rfl: 11    clobetasoL (TEMOVATE) 0.05 % cream, Apply to feet daily to bid prn flare (Patient taking differently: Apply 1 application topically 2 (two) times daily. Apply to feet daily to bid prn flare), Disp: 30 g, Rfl: 2    esomeprazole (NEXIUM) 40 MG capsule, TAKE 1 CAPSULE(40 MG) BY MOUTH TWICE DAILY, Disp: 180 capsule, Rfl: 3    fluticasone (FLONASE) 50 mcg/actuation nasal spray, 1 spray by Each Nostril route daily as needed for Allergies. , Disp: , Rfl:     gabapentin (NEURONTIN) 300 MG capsule, Take 1 capsule (300 mg total) by mouth every evening., Disp: 30 capsule, Rfl: 3    polyethylene glycol (GLYCOLAX) 17 gram PwPk, Take 17 g by mouth daily as needed., Disp: , Rfl:     sertraline (ZOLOFT) 25 MG tablet, Take 1 tablet (25 mg total) by mouth every evening., Disp: 90 tablet, Rfl: 1    tiotropium bromide (SPIRIVA RESPIMAT) 2.5 mcg/actuation inhaler, Inhale 2 puffs into the lungs Daily. Controller, Disp: 4 g, Rfl: 11    traZODone (DESYREL) 50 MG tablet, Take 1/2 tablet by mouth nightly; if in 1 week insomnia is not relieved may take 1 whole tablet by mouth nightly., Disp: 90 tablet, Rfl: 0    carbidopa-levodopa  mg (SINEMET)  mg per tablet, Take 1 tablet by mouth 3 (three) times daily., Disp: , Rfl:     clindamycin (CLEOCIN) 300 MG capsule, Take 1 capsule (300 mg total) by mouth 4 (four) times daily. for 10 days, Disp: 40 capsule, Rfl: 0    mupirocin (BACTROBAN) 2 % ointment, Apply topically 3 (three) times daily., Disp: 1 each, Rfl: 0    Lab Results   Component Value Date    WBC 9.41 02/14/2022    HGB 12.1 02/14/2022    HCT 37.0 02/14/2022      "02/14/2022    CHOL 241 (H) 03/09/2022    TRIG 76 03/09/2022    HDL 57 03/09/2022    ALT 14 06/01/2021    AST 19 06/01/2021     02/14/2022    K 4.3 02/14/2022     02/14/2022    CREATININE 0.6 02/14/2022    BUN 18 02/14/2022    CO2 26 02/14/2022    TSH 2.178 02/09/2022     Above labs reviewed    Review of Systems   Constitutional: Negative for chills and fever.   HENT: Negative for sore throat.    Respiratory: Negative for chest tightness and shortness of breath.    Cardiovascular: Negative for chest pain and palpitations.   Gastrointestinal: Negative for abdominal pain.   Skin: Positive for color change.   Neurological: Negative for dizziness and headaches.       Objective:   /70 (BP Location: Left arm, Patient Position: Sitting, BP Method: Large (Manual))   Pulse (!) 54   Temp 98.8 °F (37.1 °C) (Oral)   Ht 5' 4" (1.626 m)   Wt 66.1 kg (145 lb 11.6 oz)   SpO2 97%   BMI 25.01 kg/m²       Physical Exam  Constitutional:       General: She is not in acute distress.     Appearance: Normal appearance.   HENT:      Head: Atraumatic.      Mouth/Throat:      Mouth: Mucous membranes are moist.      Pharynx: No oropharyngeal exudate or posterior oropharyngeal erythema.   Cardiovascular:      Rate and Rhythm: Normal rate and regular rhythm.      Pulses: Normal pulses.      Heart sounds: Normal heart sounds.   Pulmonary:      Effort: Pulmonary effort is normal.      Breath sounds: Normal breath sounds.   Abdominal:      General: Abdomen is flat. Bowel sounds are normal.      Palpations: Abdomen is soft.   Skin:     General: Skin is warm and dry.      Findings: Erythema and lesion present.      Comments: +Tender and erythematous skin mass under right axilla   Neurological:      General: No focal deficit present.      Mental Status: She is alert and oriented to person, place, and time.   Psychiatric:         Mood and Affect: Mood normal.         Assessment:       1. Cellulitis of skin    2. Cutaneous " abscess, unspecified site        Plan:       1. Cellulitis of skin  - Ambulatory referral/consult to General Surgery; Future  - mupirocin (BACTROBAN) 2 % ointment; Apply topically 3 (three) times daily.  Dispense: 1 each; Refill: 0  - clindamycin (CLEOCIN) 300 MG capsule; Take 1 capsule (300 mg total) by mouth 4 (four) times daily. for 10 days  Dispense: 40 capsule; Refill: 0    2. Cutaneous abscess, unspecified site  - clindamycin (CLEOCIN) 300 MG capsule; Take 1 capsule (300 mg total) by mouth 4 (four) times daily. for 10 days  Dispense: 40 capsule; Refill: 0  - mupirocin (BACTROBAN) 2 % ointment; Apply topically 3 (three) times daily.  Dispense: 1 each; Refill: 0  - advised to apply warm compressor to help to drain    Patient with be reevaluated in 5 days or sooner mor Golden NP

## 2022-07-27 ENCOUNTER — TELEPHONE (OUTPATIENT)
Dept: FAMILY MEDICINE | Facility: CLINIC | Age: 72
End: 2022-07-27
Payer: MEDICARE

## 2022-07-27 NOTE — TELEPHONE ENCOUNTER
Candi referral: spoke with patient. She has followup with PCP office next Tuesday to assess boil. If told she needs this consult, she'll call.

## 2022-08-02 ENCOUNTER — OFFICE VISIT (OUTPATIENT)
Dept: FAMILY MEDICINE | Facility: CLINIC | Age: 72
End: 2022-08-02
Payer: MEDICARE

## 2022-08-02 VITALS
SYSTOLIC BLOOD PRESSURE: 150 MMHG | OXYGEN SATURATION: 98 % | HEART RATE: 60 BPM | HEIGHT: 64 IN | DIASTOLIC BLOOD PRESSURE: 74 MMHG | BODY MASS INDEX: 25.18 KG/M2 | WEIGHT: 147.5 LBS | TEMPERATURE: 99 F

## 2022-08-02 DIAGNOSIS — L08.9 SKIN INFECTION: Primary | ICD-10-CM

## 2022-08-02 DIAGNOSIS — L73.2 HIDRADENITIS SUPPURATIVA: ICD-10-CM

## 2022-08-02 DIAGNOSIS — Z12.39 ENCOUNTER FOR SCREENING FOR MALIGNANT NEOPLASM OF BREAST, UNSPECIFIED SCREENING MODALITY: ICD-10-CM

## 2022-08-02 PROCEDURE — 1125F PR PAIN SEVERITY QUANTIFIED, PAIN PRESENT: ICD-10-PCS | Mod: CPTII,S$GLB,, | Performed by: NURSE PRACTITIONER

## 2022-08-02 PROCEDURE — 1159F MED LIST DOCD IN RCRD: CPT | Mod: CPTII,S$GLB,, | Performed by: NURSE PRACTITIONER

## 2022-08-02 PROCEDURE — 3288F FALL RISK ASSESSMENT DOCD: CPT | Mod: CPTII,S$GLB,, | Performed by: NURSE PRACTITIONER

## 2022-08-02 PROCEDURE — 1160F PR REVIEW ALL MEDS BY PRESCRIBER/CLIN PHARMACIST DOCUMENTED: ICD-10-PCS | Mod: CPTII,S$GLB,, | Performed by: NURSE PRACTITIONER

## 2022-08-02 PROCEDURE — 99999 PR PBB SHADOW E&M-EST. PATIENT-LVL V: CPT | Mod: PBBFAC,,, | Performed by: NURSE PRACTITIONER

## 2022-08-02 PROCEDURE — 3078F PR MOST RECENT DIASTOLIC BLOOD PRESSURE < 80 MM HG: ICD-10-PCS | Mod: CPTII,S$GLB,, | Performed by: NURSE PRACTITIONER

## 2022-08-02 PROCEDURE — 99213 OFFICE O/P EST LOW 20 MIN: CPT | Mod: S$GLB,,, | Performed by: NURSE PRACTITIONER

## 2022-08-02 PROCEDURE — 3077F SYST BP >= 140 MM HG: CPT | Mod: CPTII,S$GLB,, | Performed by: NURSE PRACTITIONER

## 2022-08-02 PROCEDURE — 3077F PR MOST RECENT SYSTOLIC BLOOD PRESSURE >= 140 MM HG: ICD-10-PCS | Mod: CPTII,S$GLB,, | Performed by: NURSE PRACTITIONER

## 2022-08-02 PROCEDURE — 99999 PR PBB SHADOW E&M-EST. PATIENT-LVL V: ICD-10-PCS | Mod: PBBFAC,,, | Performed by: NURSE PRACTITIONER

## 2022-08-02 PROCEDURE — 1101F PR PT FALLS ASSESS DOC 0-1 FALLS W/OUT INJ PAST YR: ICD-10-PCS | Mod: CPTII,S$GLB,, | Performed by: NURSE PRACTITIONER

## 2022-08-02 PROCEDURE — 1101F PT FALLS ASSESS-DOCD LE1/YR: CPT | Mod: CPTII,S$GLB,, | Performed by: NURSE PRACTITIONER

## 2022-08-02 PROCEDURE — 3288F PR FALLS RISK ASSESSMENT DOCUMENTED: ICD-10-PCS | Mod: CPTII,S$GLB,, | Performed by: NURSE PRACTITIONER

## 2022-08-02 PROCEDURE — 1159F PR MEDICATION LIST DOCUMENTED IN MEDICAL RECORD: ICD-10-PCS | Mod: CPTII,S$GLB,, | Performed by: NURSE PRACTITIONER

## 2022-08-02 PROCEDURE — 1160F RVW MEDS BY RX/DR IN RCRD: CPT | Mod: CPTII,S$GLB,, | Performed by: NURSE PRACTITIONER

## 2022-08-02 PROCEDURE — 3008F PR BODY MASS INDEX (BMI) DOCUMENTED: ICD-10-PCS | Mod: CPTII,S$GLB,, | Performed by: NURSE PRACTITIONER

## 2022-08-02 PROCEDURE — 3008F BODY MASS INDEX DOCD: CPT | Mod: CPTII,S$GLB,, | Performed by: NURSE PRACTITIONER

## 2022-08-02 PROCEDURE — 99213 PR OFFICE/OUTPT VISIT, EST, LEVL III, 20-29 MIN: ICD-10-PCS | Mod: S$GLB,,, | Performed by: NURSE PRACTITIONER

## 2022-08-02 PROCEDURE — 3078F DIAST BP <80 MM HG: CPT | Mod: CPTII,S$GLB,, | Performed by: NURSE PRACTITIONER

## 2022-08-02 PROCEDURE — 1125F AMNT PAIN NOTED PAIN PRSNT: CPT | Mod: CPTII,S$GLB,, | Performed by: NURSE PRACTITIONER

## 2022-08-02 RX ORDER — MUPIROCIN 20 MG/G
OINTMENT TOPICAL 3 TIMES DAILY
Qty: 1 EACH | Refills: 0 | Status: SHIPPED | OUTPATIENT
Start: 2022-08-02 | End: 2022-08-02

## 2022-08-02 RX ORDER — CLINDAMYCIN PHOSPHATE 11.9 MG/ML
SOLUTION TOPICAL 2 TIMES DAILY
Qty: 30 ML | Refills: 0 | Status: SHIPPED | OUTPATIENT
Start: 2022-08-02 | End: 2023-04-20 | Stop reason: ALTCHOICE

## 2022-08-02 NOTE — PROGRESS NOTES
"Subjective:       Patient ID: Kristin Guerra is a 72 y.o. female.    Chief Complaint: Follow-up    HPI   73 y/o female patient with medical problems listed below presents for follow up of skin boil under right armpit. Patient states had applied warm compressor and it drained itself. Denies pain, erythema, fever, chills, generalized body ache. Patient also states has skin lump under left armpit.     Patient Active Problem List   Diagnosis    Hx of colon cancer, stage II    Hx of diverticulitis of colon    Cataract    Herniation of intervertebral disc of cervical region    Cervical radiculopathy    Degeneration of intervertebral disc of cervical region    Systemic lupus erythematosus, unspecified SLE type, unspecified organ involvement status    Hypercholesterolemia    Gastroesophageal reflux disease    Tremor, coarse    Essential hypertension, labile    Hiatal hernia    Osteoarthritis    RLS (restless legs syndrome)    Panic attack    Heart murmur    Smoking    GERD (gastroesophageal reflux disease)    Sterling's esophagus with dysplasia    Tremor, essential    Sinus problem    S/P splenectomy    Other chronic postprocedural pain    Insomnia due to medical condition    Anxiety    Needs flu shot    Mixed dyslipidemia    Statin intolerance    Statin myopathy    Vitamin D deficiency    Near syncope    Syncope and collapse    History of endoscopy    Incontinence in female    Bradycardia    Seborrheic keratosis, inflamed    Microhematuria    Orthostatic hypotension    History of loop recorder    Malignant neoplasm of colon, unspecified part of colon    Depression, recurrent      Review of patient's allergies indicates:   Allergen Reactions    Doxycycline Itching and Swelling     Prescribed 5/2021, took one dose, describes lip swelling, and "itch all over"    Mirtazapine Other (See Comments)     Hyperactivity.    Morphine Swelling and Rash     Other reaction(s): swelling in limbs "    Lexapro [escitalopram oxalate] Other (See Comments)     Worsening tremor.  Sedation.    Primidone Other (See Comments)     Syncope.  Other reaction(s): dizziness,hypotension  Other reaction(s): Unknown  Other reaction(s): Unknown    Simvastatin Nausea And Vomiting and Other (See Comments)     Weakness.  Other reaction(s): nausea,vomiting    Beta-adrenergic agents Other (See Comments)     Low blood pressure and fainted.     Metoprolol succinate Other (See Comments)    Prochlorperazine      Other reaction(s): weakness    Topiramate     Compazine [prochlorperazine edisylate] Nausea Only    Tramadol Nausea And Vomiting     Past Surgical History:   Procedure Laterality Date    APPENDECTOMY      removed during colon surgery    CERVICAL FUSION      CERVICAL SPINE SURGERY      Rods and screws.     SECTION      COLON SURGERY      hemicolectomy    COLON SURGERY  2008    repair    COLONOSCOPY N/A 2018    Procedure: COLONOSCOPY;  Surgeon: Maverick Esquivel MD;  Location: [facility];  Service: Endoscopy;  Laterality: N/A; repeat in 5 years for surveillance    CYSTOSCOPY N/A 2021    Procedure: CYSTOSCOPY;  Surgeon: Allison Miranda MD;  Location: [facility];  Service: Urology;  Laterality: N/A;    ESOPHAGOGASTRODUODENOSCOPY N/A 2019    Procedure: EGD (ESOPHAGOGASTRODUODENOSCOPY);  Surgeon: Monika Steele MD;  Location: [facility];  Service: Endoscopy;  Laterality: N/A;    ESOPHAGOGASTRODUODENOSCOPY  2019    with RFA and biopsies    ESOPHAGOGASTRODUODENOSCOPY N/A 2019    Procedure: EGD (ESOPHAGOGASTRODUODENOSCOPY);  Surgeon: Uzair Cantor MD;  Location: [facility];  Service: Endoscopy;  Laterality: N/A;  n+v with anesthesia    ESOPHAGOGASTRODUODENOSCOPY N/A 2019    Procedure: EGD (ESOPHAGOGASTRODUODENOSCOPY);  Surgeon: Uzair Cantor MD;  Location: [facility];  Service: Endoscopy;  Laterality: N/A;    ESOPHAGOGASTRODUODENOSCOPY N/A 10/7/2019     Procedure: EGD (ESOPHAGOGASTRODUODENOSCOPY);  Surgeon: Uzair Cantor MD;  Location: [facility];  Service: Endoscopy;  Laterality: N/A;    ESOPHAGOGASTRODUODENOSCOPY N/A 2/10/2020    Procedure: EGD (ESOPHAGOGASTRODUODENOSCOPY);  Surgeon: Uzair Cantor MD;  Location: [facility];  Service: Endoscopy;  Laterality: N/A;  Requests earliest procedure time    ESOPHAGOGASTRODUODENOSCOPY N/A 8/17/2020    Procedure: EGD (ESOPHAGOGASTRODUODENOSCOPY);  Surgeon: Uzair Cantor MD;  Location: [facility];  Service: Endoscopy;  Laterality: N/A;  BARRETTS    ESOPHAGOGASTRODUODENOSCOPY N/A 4/7/2021    Procedure: ESOPHAGOGASTRODUODENOSCOPY (EGD);  Surgeon: Uzair Cantor MD;  Location: [facility];  Service: Endoscopy;  Laterality: N/A;  Needs Rapid Covid MP    ESOPHAGOGASTRODUODENOSCOPY N/A 11/30/2021    Procedure: EGD (ESOPHAGOGASTRODUODENOSCOPY);  Surgeon: Uzair Cantor MD;  Location: [facility];  Service: Endoscopy;  Laterality: N/A;    ESOPHAGOGASTRODUODENOSCOPY N/A 6/27/2022    Procedure: EGD (ESOPHAGOGASTRODUODENOSCOPY);  Surgeon: Uzair Cantor MD;  Location: [facility];  Service: Endoscopy;  Laterality: N/A;  5/19/22: fully vaccinated. instructions mailed-[place]    loop recorder only    HYSTERECTOMY      ovaries remain    INSERTION OF IMPLANTABLE LOOP RECORDER N/A 7/17/2020    Procedure: INSERTION, IMPLANTABLE LOOP RECORDER (MEDTRONIC);  Surgeon: Karyna Vasques MD;  Location: [facility] CATH/EP LAB;  Service: Cardiology;  Laterality: N/A;    SPLENECTOMY, TOTAL  2007    Injured during surgery and removed    UPPER GASTROINTESTINAL ENDOSCOPY  2007    GERD & hiatal hernia per patient report        Current Outpatient Medications:     albuterol-ipratropium (DUO-NEB) 2.5 mg-0.5 mg/3 mL nebulizer solution, Take 3 mLs by nebulization every 6 (six) hours as needed for Wheezing or Shortness of Breath. Rescue, Disp: 120 each, Rfl: 5    aluminum & magnesium hydroxide-simethicone (MYLANTA MAX STRENGTH) 400-400-40  mg/5 mL suspension, Take 5 mLs by mouth every 6 (six) hours as needed for Indigestion. , Disp: , Rfl:     amLODIPine (NORVASC) 5 MG tablet, Take 5 mg by mouth once daily., Disp: , Rfl:     azelastine (ASTELIN) 137 mcg (0.1 %) nasal spray, 1 spray (137 mcg total) by Nasal route 2 (two) times daily., Disp: 30 mL, Rfl: 11    carbidopa-levodopa  mg (SINEMET)  mg per tablet, Take 1 tablet by mouth 3 (three) times daily., Disp: , Rfl:     clindamycin (CLEOCIN T) 1 % external solution, Apply topically 2 (two) times daily. Apply to affected area twice daily, Disp: 30 mL, Rfl: 0    clindamycin (CLEOCIN) 300 MG capsule, Take 1 capsule (300 mg total) by mouth 4 (four) times daily. for 10 days, Disp: 40 capsule, Rfl: 0    clobetasoL (TEMOVATE) 0.05 % cream, Apply to feet daily to bid prn flare (Patient taking differently: Apply 1 application topically 2 (two) times daily. Apply to feet daily to bid prn flare), Disp: 30 g, Rfl: 2    esomeprazole (NEXIUM) 40 MG capsule, TAKE 1 CAPSULE(40 MG) BY MOUTH TWICE DAILY, Disp: 180 capsule, Rfl: 3    fluticasone (FLONASE) 50 mcg/actuation nasal spray, 1 spray by Each Nostril route daily as needed for Allergies. , Disp: , Rfl:     gabapentin (NEURONTIN) 300 MG capsule, Take 1 capsule (300 mg total) by mouth every evening., Disp: 30 capsule, Rfl: 3    polyethylene glycol (GLYCOLAX) 17 gram PwPk, Take 17 g by mouth daily as needed., Disp: , Rfl:     sertraline (ZOLOFT) 25 MG tablet, Take 1 tablet (25 mg total) by mouth every evening., Disp: 90 tablet, Rfl: 1    tiotropium bromide (SPIRIVA RESPIMAT) 2.5 mcg/actuation inhaler, Inhale 2 puffs into the lungs Daily. Controller, Disp: 4 g, Rfl: 11    traZODone (DESYREL) 50 MG tablet, Take 1/2 tablet by mouth nightly; if in 1 week insomnia is not relieved may take 1 whole tablet by mouth nightly., Disp: 90 tablet, Rfl: 0    Lab Results   Component Value Date    WBC 9.41 02/14/2022    HGB 12.1 02/14/2022    HCT 37.0  "02/14/2022     02/14/2022    CHOL 241 (H) 03/09/2022    TRIG 76 03/09/2022    HDL 57 03/09/2022    ALT 14 06/01/2021    AST 19 06/01/2021     02/14/2022    K 4.3 02/14/2022     02/14/2022    CREATININE 0.6 02/14/2022    BUN 18 02/14/2022    CO2 26 02/14/2022    TSH 2.178 02/09/2022     Above labs reviewed    Review of Systems   Constitutional: Negative for chills and fever.   Respiratory: Negative for chest tightness and shortness of breath.    Cardiovascular: Negative for chest pain and palpitations.   Gastrointestinal: Negative for abdominal pain.   Skin: Positive for color change.   Neurological: Negative for dizziness and headaches.       Objective:   BP (!) 150/74 (BP Location: Right arm, Patient Position: Sitting, BP Method: Medium (Manual))   Pulse 60   Temp 98.7 °F (37.1 °C) (Oral)   Ht 5' 4" (1.626 m)   Wt 66.9 kg (147 lb 7.8 oz)   SpO2 98%   BMI 25.32 kg/m²       Physical Exam  Constitutional:       Appearance: Normal appearance. She is normal weight.   HENT:      Head: Atraumatic.   Cardiovascular:      Rate and Rhythm: Normal rate and regular rhythm.      Pulses: Normal pulses.      Heart sounds: Normal heart sounds.   Pulmonary:      Effort: Pulmonary effort is normal.      Breath sounds: Normal breath sounds.   Abdominal:      General: Abdomen is flat. Bowel sounds are normal.      Palpations: Abdomen is soft.   Skin:     General: Skin is warm and dry.      Findings: Erythema and lesion present.      Comments: + small non-tender lumps under b/l armpits   Neurological:      General: No focal deficit present.      Mental Status: She is alert and oriented to person, place, and time.   Psychiatric:         Mood and Affect: Mood normal.         Assessment:       1. Skin infection    2. Encounter for screening for malignant neoplasm of breast, unspecified screening modality    3. Hidradenitis suppurativa        Plan:       1. Encounter for screening for malignant neoplasm of breast, " unspecified screening modality  - Mammo Digital Screening Bilat; Future    2. Skin infection  - Patient reports a skin boil under right armpit drained itself and no longer has pain    3. Hidradenitis suppurativa  + small non-tender lumps under b/l armpits  - Advised to avoid exposure to heat and humidity, to avoid shaving  - advised to apply warm compresses to affected area  - clindamycin (CLEOCIN T) 1 % external solution; Apply topically 2 (two) times daily. Apply to affected area twice daily  Dispense: 30 mL; Refill: 0  - Ambulatory referral/consult to Dermatology; Future    Patient with be reevaluated in as scheduled or sooner mor Golden NP

## 2022-08-11 ENCOUNTER — OFFICE VISIT (OUTPATIENT)
Dept: PSYCHIATRY | Facility: CLINIC | Age: 72
End: 2022-08-11
Payer: MEDICARE

## 2022-08-11 VITALS
HEIGHT: 64 IN | BODY MASS INDEX: 24.65 KG/M2 | HEART RATE: 61 BPM | DIASTOLIC BLOOD PRESSURE: 78 MMHG | WEIGHT: 144.38 LBS | SYSTOLIC BLOOD PRESSURE: 145 MMHG

## 2022-08-11 DIAGNOSIS — F51.05 INSOMNIA DUE TO OTHER MENTAL DISORDER: ICD-10-CM

## 2022-08-11 DIAGNOSIS — F41.1 GAD (GENERALIZED ANXIETY DISORDER): Primary | ICD-10-CM

## 2022-08-11 DIAGNOSIS — F99 INSOMNIA DUE TO OTHER MENTAL DISORDER: ICD-10-CM

## 2022-08-11 DIAGNOSIS — F32.9 REACTIVE DEPRESSION: ICD-10-CM

## 2022-08-11 PROCEDURE — 3288F FALL RISK ASSESSMENT DOCD: CPT | Mod: CPTII,S$GLB,, | Performed by: REGISTERED NURSE

## 2022-08-11 PROCEDURE — 99214 OFFICE O/P EST MOD 30 MIN: CPT | Mod: S$GLB,,, | Performed by: REGISTERED NURSE

## 2022-08-11 PROCEDURE — 3008F PR BODY MASS INDEX (BMI) DOCUMENTED: ICD-10-PCS | Mod: CPTII,S$GLB,, | Performed by: REGISTERED NURSE

## 2022-08-11 PROCEDURE — 99214 PR OFFICE/OUTPT VISIT, EST, LEVL IV, 30-39 MIN: ICD-10-PCS | Mod: S$GLB,,, | Performed by: REGISTERED NURSE

## 2022-08-11 PROCEDURE — 1160F RVW MEDS BY RX/DR IN RCRD: CPT | Mod: CPTII,S$GLB,, | Performed by: REGISTERED NURSE

## 2022-08-11 PROCEDURE — 99999 PR PBB SHADOW E&M-EST. PATIENT-LVL III: ICD-10-PCS | Mod: PBBFAC,,, | Performed by: REGISTERED NURSE

## 2022-08-11 PROCEDURE — 3078F PR MOST RECENT DIASTOLIC BLOOD PRESSURE < 80 MM HG: ICD-10-PCS | Mod: CPTII,S$GLB,, | Performed by: REGISTERED NURSE

## 2022-08-11 PROCEDURE — 90833 PSYTX W PT W E/M 30 MIN: CPT | Mod: S$GLB,,, | Performed by: REGISTERED NURSE

## 2022-08-11 PROCEDURE — 1126F AMNT PAIN NOTED NONE PRSNT: CPT | Mod: CPTII,S$GLB,, | Performed by: REGISTERED NURSE

## 2022-08-11 PROCEDURE — 1160F PR REVIEW ALL MEDS BY PRESCRIBER/CLIN PHARMACIST DOCUMENTED: ICD-10-PCS | Mod: CPTII,S$GLB,, | Performed by: REGISTERED NURSE

## 2022-08-11 PROCEDURE — 1159F MED LIST DOCD IN RCRD: CPT | Mod: CPTII,S$GLB,, | Performed by: REGISTERED NURSE

## 2022-08-11 PROCEDURE — 1101F PR PT FALLS ASSESS DOC 0-1 FALLS W/OUT INJ PAST YR: ICD-10-PCS | Mod: CPTII,S$GLB,, | Performed by: REGISTERED NURSE

## 2022-08-11 PROCEDURE — 1101F PT FALLS ASSESS-DOCD LE1/YR: CPT | Mod: CPTII,S$GLB,, | Performed by: REGISTERED NURSE

## 2022-08-11 PROCEDURE — 3288F PR FALLS RISK ASSESSMENT DOCUMENTED: ICD-10-PCS | Mod: CPTII,S$GLB,, | Performed by: REGISTERED NURSE

## 2022-08-11 PROCEDURE — 3077F SYST BP >= 140 MM HG: CPT | Mod: CPTII,S$GLB,, | Performed by: REGISTERED NURSE

## 2022-08-11 PROCEDURE — 90833 PR PSYCHOTHERAPY W/PATIENT W/E&M, 30 MIN (ADD ON): ICD-10-PCS | Mod: S$GLB,,, | Performed by: REGISTERED NURSE

## 2022-08-11 PROCEDURE — 99999 PR PBB SHADOW E&M-EST. PATIENT-LVL III: CPT | Mod: PBBFAC,,, | Performed by: REGISTERED NURSE

## 2022-08-11 PROCEDURE — 3078F DIAST BP <80 MM HG: CPT | Mod: CPTII,S$GLB,, | Performed by: REGISTERED NURSE

## 2022-08-11 PROCEDURE — 3008F BODY MASS INDEX DOCD: CPT | Mod: CPTII,S$GLB,, | Performed by: REGISTERED NURSE

## 2022-08-11 PROCEDURE — 3077F PR MOST RECENT SYSTOLIC BLOOD PRESSURE >= 140 MM HG: ICD-10-PCS | Mod: CPTII,S$GLB,, | Performed by: REGISTERED NURSE

## 2022-08-11 PROCEDURE — 1159F PR MEDICATION LIST DOCUMENTED IN MEDICAL RECORD: ICD-10-PCS | Mod: CPTII,S$GLB,, | Performed by: REGISTERED NURSE

## 2022-08-11 PROCEDURE — 1126F PR PAIN SEVERITY QUANTIFIED, NO PAIN PRESENT: ICD-10-PCS | Mod: CPTII,S$GLB,, | Performed by: REGISTERED NURSE

## 2022-08-11 RX ORDER — TRAZODONE HYDROCHLORIDE 50 MG/1
50 TABLET ORAL NIGHTLY
Qty: 90 TABLET | Refills: 1 | Status: SHIPPED | OUTPATIENT
Start: 2022-08-11 | End: 2022-10-27

## 2022-08-11 RX ORDER — SERTRALINE HYDROCHLORIDE 25 MG/1
25 TABLET, FILM COATED ORAL NIGHTLY
Qty: 90 TABLET | Refills: 1 | Status: SHIPPED | OUTPATIENT
Start: 2022-08-11 | End: 2022-09-20
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
## 2022-08-11 NOTE — PATIENT INSTRUCTIONS
Continue Zoloft 25 mg by mouth daily.     Continue Trazodone 50 mg by mouth nightly.           Please go to emergency department if feeling as though you are going to harm to yourself or others or if you are in crisis.     Please call the clinic to report any worsening of symptoms or problems associated with medication.      National Suicide Prevention Lifeline    The Lifeline provides 24/7, free and confidential support for people in distress, prevention and crisis resources for you or your loved ones, and best practices for professionals in the [place].    3-874-552-3538    981 has been designated as the new three-digit dialing code that will route callers to the National Suicide Prevention Lifeline. While some areas may be currently able to connect to the Lifeline by dialing 988, this dialing code will be available to everyone across the [place] starting on July 16, 2022.     988      Lifeline Chat    Lifeline Chat is a service of the National Suicide Prevention Lifeline, connecting individuals with counselors for emotional support and other services via web chat. All chat centers in the Lifeline network are accredited by [employer]. Lifeline Chat is available 24/7 across the [place]    https://suicidepreventionlifeline.org/chat/

## 2022-08-25 ENCOUNTER — PES CALL (OUTPATIENT)
Dept: ADMINISTRATIVE | Facility: CLINIC | Age: 72
End: 2022-08-25
Payer: MEDICARE

## 2022-08-26 ENCOUNTER — PES CALL (OUTPATIENT)
Dept: ADMINISTRATIVE | Facility: CLINIC | Age: 72
End: 2022-08-26
Payer: MEDICARE

## 2022-09-07 ENCOUNTER — TELEPHONE (OUTPATIENT)
Dept: CARDIOLOGY | Facility: CLINIC | Age: 72
End: 2022-09-07
Payer: MEDICARE

## 2022-09-07 DIAGNOSIS — Z95.0 CARDIAC PACEMAKER IN SITU: Primary | ICD-10-CM

## 2022-09-14 ENCOUNTER — HOSPITAL ENCOUNTER (OUTPATIENT)
Dept: RADIOLOGY | Facility: HOSPITAL | Age: 72
Discharge: HOME | End: 2022-09-14
Attending: NURSE PRACTITIONER
Payer: MEDICARE

## 2022-09-14 DIAGNOSIS — R91.8 MULTIPLE LUNG NODULES: ICD-10-CM

## 2022-09-14 DIAGNOSIS — Z12.39 ENCOUNTER FOR SCREENING FOR MALIGNANT NEOPLASM OF BREAST, UNSPECIFIED SCREENING MODALITY: ICD-10-CM

## 2022-09-14 PROCEDURE — 77063 BREAST TOMOSYNTHESIS BI: CPT | Mod: TC,PO

## 2022-09-14 PROCEDURE — 71250 CT THORAX DX C-: CPT | Mod: TC,PO

## 2022-09-20 ENCOUNTER — TELEPHONE (OUTPATIENT)
Dept: PSYCHIATRY | Facility: CLINIC | Age: 72
End: 2022-09-20
Payer: MEDICARE

## 2022-09-20 DIAGNOSIS — F41.1 GAD (GENERALIZED ANXIETY DISORDER): ICD-10-CM

## 2022-09-20 DIAGNOSIS — F32.9 REACTIVE DEPRESSION: ICD-10-CM

## 2022-09-20 RX ORDER — SERTRALINE HYDROCHLORIDE 50 MG/1
50 TABLET, FILM COATED ORAL NIGHTLY
Qty: 90 TABLET | Refills: 0 | Status: SHIPPED | OUTPATIENT
Start: 2022-09-20 | End: 2022-11-08

## 2022-09-20 RX ORDER — CLONAZEPAM 0.5 MG/1
0.25 TABLET ORAL 2 TIMES DAILY PRN
Qty: 30 TABLET | Refills: 0 | Status: SHIPPED | OUTPATIENT
Start: 2022-09-20 | End: 2023-03-14 | Stop reason: SDUPTHER

## 2022-09-20 NOTE — TELEPHONE ENCOUNTER
Pt called to report she is having increased anxiety/panic attacks x2 weeks since finding out her  has bladder cancer. She wants to discuss medications with you or she can come in for appointment if you prefer. She wants to be called at [phone]. Please advise.

## 2022-09-20 NOTE — TELEPHONE ENCOUNTER
Which patient via telephone.  Reports nightmares related to trazodone.  Reports increase anxiety and recent daily symptoms of panic.  Reports recent news of  having bladder cancer.  Have not met with oncologists yet and are meeting with physician later this week.  Discussed increasing Zoloft for mood and anxiety.  Discussed using low-dose clonazepam sparingly for severe anxiety and insomnia.  Discussed risks versus benefits of each medication.  Emphasize risk of using benzodiazepines and population over 60 years old.  Patient verbalizes understanding and is agreeable to current treatment plan.  Denies further concerns.

## 2022-09-23 ENCOUNTER — TELEPHONE (OUTPATIENT)
Dept: PULMONOLOGY | Facility: CLINIC | Age: 72
End: 2022-09-23
Payer: MEDICARE

## 2022-09-23 NOTE — TELEPHONE ENCOUNTER
lvm    ----- Message from Jessica Butler NP sent at 9/20/2022  9:10 AM CDT -----  CT Chest reviewed, nodules are unchanged. No new nodules are noted. Consistent atelectasis, scarring of the lungs. Emphysema damage noted. Will review in detail at follow up. Can repeat CT in one year.  Continue current medication regiment. Keep follow up appointment as scheduled. Please call the office if you have any questions or concerns.

## 2022-09-26 ENCOUNTER — TELEPHONE (OUTPATIENT)
Dept: PULMONOLOGY | Facility: CLINIC | Age: 72
End: 2022-09-26
Payer: MEDICARE

## 2022-09-26 NOTE — TELEPHONE ENCOUNTER
Went over CT results with patient. Patient v/u.     ----- Message from Lopez Mcdonald sent at 9/26/2022 10:54 AM CDT -----  Contact: pt at  [phone]  Type:  Patient Returning Call    Who Called:  pt  Who Left Message for Patient:  Brandi  Does the patient know what this is regarding?:  yes  Best Call Back Number:  [phone]  Additional Information:  pt is calling the office returning a call that she missed from brandi. Please call back and advise.

## 2022-10-07 ENCOUNTER — TELEPHONE (OUTPATIENT)
Dept: PSYCHIATRY | Facility: CLINIC | Age: 72
End: 2022-10-07
Payer: MEDICARE

## 2022-10-07 NOTE — TELEPHONE ENCOUNTER
Called pt regarding below message. Pt states she is having side effects to the sertraline. Pt reports terrible headache and increased tremors. Pt states she did discontinue the medication approx 3-4 days but restarted today. Pt states she feels better off of the medication so she is going to discontinue. Advised pt I will give this information to Mohsen and we will return her call. Pt states no further needs.    ----- Message from Brenda Schwarz MA sent at 10/7/2022  3:32 PM CDT -----  Regarding: Med issues  Patient call stated she is having some side effects from the new medication.

## 2022-10-07 NOTE — TELEPHONE ENCOUNTER
Spoke to patient via telephone. Discussed patient having worse tremors and anxiety while on increase dosage of Zoloft. Patient reports even cutting Zoloft down to 12.5 mg by mouth and continuing to have increased symptoms of anxiety. Has stopped medication. Discussed staying off of the law for two weeks to allow for time to adjust. Patient agreeable. Reports she will contact provider in two weeks if mood is still down and anxious. Discu ssed sparing use of clonazepam as previously discussed if tremors or anxiety is too much for sleep. Patient verbalizes understanding. Denies further concerns.

## 2022-10-24 NOTE — TELEPHONE ENCOUNTER
----- Message from Lisa Garcia sent at 10/24/2022  9:52 AM CDT -----  Regarding: medication refill  Please refill the medication(s) listed below. Please call the patient when the prescription(s) is ready for  at this phone number     SOUMYA SOTO [[id number]]  [phone]        Medication #1 amLODIPine (NORVASC) 5 MG tablet      Preferred Pharmacy:    [id number] - [place] [address] AT [address] [facility] [address] [facility] [address]  [address]  [place] [address]  Phone: [phone] Fax: [phone]         Additional Info: Pt states she is a former pt of Dr Mendez

## 2022-10-24 NOTE — TELEPHONE ENCOUNTER
Pt last saw you on 8/2/2022.     Requesting refill of amlodipine.   Next follow up with Dr Mishra on 11/15/2022.

## 2022-10-25 RX ORDER — AMLODIPINE BESYLATE 5 MG/1
5 TABLET ORAL DAILY
Qty: 90 TABLET | Refills: 3 | Status: SHIPPED | OUTPATIENT
Start: 2022-10-25 | End: 2023-10-10

## 2022-10-27 ENCOUNTER — OFFICE VISIT (OUTPATIENT)
Dept: PULMONOLOGY | Facility: CLINIC | Age: 72
End: 2022-10-27
Payer: MEDICARE

## 2022-10-27 ENCOUNTER — PES CALL (OUTPATIENT)
Dept: ADMINISTRATIVE | Facility: CLINIC | Age: 72
End: 2022-10-27
Payer: MEDICARE

## 2022-10-27 VITALS
BODY MASS INDEX: 24.15 KG/M2 | SYSTOLIC BLOOD PRESSURE: 135 MMHG | HEART RATE: 66 BPM | HEIGHT: 64 IN | WEIGHT: 141.44 LBS | OXYGEN SATURATION: 99 % | DIASTOLIC BLOOD PRESSURE: 76 MMHG

## 2022-10-27 DIAGNOSIS — R91.8 LUNG NODULE, MULTIPLE: ICD-10-CM

## 2022-10-27 DIAGNOSIS — J43.2 CENTRILOBULAR EMPHYSEMA: ICD-10-CM

## 2022-10-27 PROCEDURE — 3075F PR MOST RECENT SYSTOLIC BLOOD PRESS GE 130-139MM HG: ICD-10-PCS | Mod: CPTII,S$GLB,, | Performed by: NURSE PRACTITIONER

## 2022-10-27 PROCEDURE — 3075F SYST BP GE 130 - 139MM HG: CPT | Mod: CPTII,S$GLB,, | Performed by: NURSE PRACTITIONER

## 2022-10-27 PROCEDURE — 3288F PR FALLS RISK ASSESSMENT DOCUMENTED: ICD-10-PCS | Mod: CPTII,S$GLB,, | Performed by: NURSE PRACTITIONER

## 2022-10-27 PROCEDURE — 1126F AMNT PAIN NOTED NONE PRSNT: CPT | Mod: CPTII,S$GLB,, | Performed by: NURSE PRACTITIONER

## 2022-10-27 PROCEDURE — 1101F PT FALLS ASSESS-DOCD LE1/YR: CPT | Mod: CPTII,S$GLB,, | Performed by: NURSE PRACTITIONER

## 2022-10-27 PROCEDURE — 99999 PR PBB SHADOW E&M-EST. PATIENT-LVL III: CPT | Mod: PBBFAC,,, | Performed by: NURSE PRACTITIONER

## 2022-10-27 PROCEDURE — 1159F PR MEDICATION LIST DOCUMENTED IN MEDICAL RECORD: ICD-10-PCS | Mod: CPTII,S$GLB,, | Performed by: NURSE PRACTITIONER

## 2022-10-27 PROCEDURE — 3078F DIAST BP <80 MM HG: CPT | Mod: CPTII,S$GLB,, | Performed by: NURSE PRACTITIONER

## 2022-10-27 PROCEDURE — 99999 PR PBB SHADOW E&M-EST. PATIENT-LVL III: ICD-10-PCS | Mod: PBBFAC,,, | Performed by: NURSE PRACTITIONER

## 2022-10-27 PROCEDURE — 99213 PR OFFICE/OUTPT VISIT, EST, LEVL III, 20-29 MIN: ICD-10-PCS | Mod: S$GLB,,, | Performed by: NURSE PRACTITIONER

## 2022-10-27 PROCEDURE — 1126F PR PAIN SEVERITY QUANTIFIED, NO PAIN PRESENT: ICD-10-PCS | Mod: CPTII,S$GLB,, | Performed by: NURSE PRACTITIONER

## 2022-10-27 PROCEDURE — 1101F PR PT FALLS ASSESS DOC 0-1 FALLS W/OUT INJ PAST YR: ICD-10-PCS | Mod: CPTII,S$GLB,, | Performed by: NURSE PRACTITIONER

## 2022-10-27 PROCEDURE — 3288F FALL RISK ASSESSMENT DOCD: CPT | Mod: CPTII,S$GLB,, | Performed by: NURSE PRACTITIONER

## 2022-10-27 PROCEDURE — 3078F PR MOST RECENT DIASTOLIC BLOOD PRESSURE < 80 MM HG: ICD-10-PCS | Mod: CPTII,S$GLB,, | Performed by: NURSE PRACTITIONER

## 2022-10-27 PROCEDURE — 99213 OFFICE O/P EST LOW 20 MIN: CPT | Mod: S$GLB,,, | Performed by: NURSE PRACTITIONER

## 2022-10-27 PROCEDURE — 1159F MED LIST DOCD IN RCRD: CPT | Mod: CPTII,S$GLB,, | Performed by: NURSE PRACTITIONER

## 2022-10-27 NOTE — PROGRESS NOTES
10/27/2022    Kristin Guerra  Office Note    Chief Complaint   Patient presents with    3m f/u    COPD       HPI:  10/27/2022- SOB stable complaint, wearing oxygen at night. Using nebulizer 1-2 times daily, daily cough is productive. On spiriva daily  Under large amount of stress due to husbands cancer diagnosis. PCP provides anxiety medication.     6/21/2022- currently smoking 1 pack daily. Not interested quitting and the moment.   Able to keep up with house work but tires quickly has to pace yourself. Wearing supplemental oxygen when needed. Uses husbands machine at 3L.   Cough- stable, unchanged, able to clear occasionally varies from clear to brown mucous. Associated with chest pain. Has Echo scheduled with cardiologist  Using Albuterol daily, tried Symbicort but states to strong and made her choke.     3/18/2022- currently working on cutting back on smoking. Over 1 pack daily. States SOB varies with time, has good/bad days, worse with high humidity.   Cough- persistent complaint, daily complaint, thick clear/brown mucous.   No current complaint of wheeze or chest tightness. Occasionally wearing supplemental oxygen at home at 3L with benefit. Uses 's machine. Uses albuterol inhaler only, tried Symbicort in past caused chest pain.      12/17/2021- Complaint of daily cough- persistent complaint, mild complaint, productive clear/brown mucous, currently smoking 1 pack daily, associated with wheeze  Wearing supplemental oxygen at night 3L at night. No daily metered dose inhaler, using albuterol nebulizer 2-3x weekly with benefit.     SOB- stable, worse with exertion, difficult to lift heavy objects, improves with rest.     9/17/2021- complaint of high blood pressure, daytime fatigue, and generalized body aches, states she faints unexpectedly,     3/17/2021- decided not to do sleep study, Shortness of breath- daily complaint, varies with severity, worse with exertion. Currently smoking 1 pack cigarettes daily.    No cough, chest tightness, or wheeze.   Using albuterol 3-4 days weekly, using nebulizer when needed about 1x weekly, no nocturnal arousals.   Not able to tolerate inhalers, using albuterol nebulizer and inhaler only.       10/6/2020- Cough and SOB unchanged, daily, improves with albuterol rescue inhaler. Improves ability to bring up sputum, productive clear and brown mucous, not able to bring in sputum samples. Did not fill Trelegy due to high cost. Using albuterol rescue 2x daily to control cough,   Currently smoking 1 pack daily.    7/6/2020- referred by PCP for Ct chest screening with abnormal spot seen. Complaint of weakness and fatigue, being treated for Syncope dx with bradycardia waiting for pacemaker placement.   Cough- onset 2 years, large amounts of mucous, clear color, nickel size, daily.  SOB- onset 2 years, limits activity level, works with exertion, improves quickly with rest, no chest tightness or wheeze,  Complaint of nightly snoring, daytime fatigue, drowsiness in afternoons.   Social Hx: lives with spouse but no pets, currently retired from office and retail stocking, no known Asbestosis exposure, Smoking Hx: currently smoking 1 pack daily, 40 pack years.  Family Hx: Father and 2 brothers (Small cell) Lung Cancer, no COPD, no Asthma  Medical Hx: no previous pneumonia ; no previous shoulder/chest surgery, Neck surgery 2009, 12, 17 for disc infusions.         The chief compliant  problem is stable  PFSH:  Past Medical History:   Diagnosis Date    Allergy     Sterling's esophagus     Colon polyp     Diverticulitis     Diverticulosis     Fatty liver     GERD (gastroesophageal reflux disease)     History of endoscopy 4/7/2021    Dr Uzair Cantor  Impression:            - Normal oropharynx.                         - Hiatal hernia.                         - Sterling's esophagus. Treated with radiofrequency                         ablation.                         - Normal stomach.                          - Normal examined duodenum.                         - No specimens collected.     Hx of cervical spine surgery 2018    Hyperlipidemia     Hypertension     Lupus (systemic lupus erythematosus)     discoid    Osteoarthritis     Osteopenia     Syncope     Tremor 2005    essential         Past Surgical History:   Procedure Laterality Date    APPENDECTOMY      removed during colon surgery    CERVICAL FUSION      CERVICAL SPINE SURGERY      Rods and screws.     SECTION      COLON SURGERY      hemicolectomy    COLON SURGERY  2008    repair    COLONOSCOPY N/A 2018    Procedure: COLONOSCOPY;  Surgeon: Maverick Esquivel MD;  Location: [facility];  Service: Endoscopy;  Laterality: N/A; repeat in 5 years for surveillance    CYSTOSCOPY N/A 2021    Procedure: CYSTOSCOPY;  Surgeon: Allison Miranda MD;  [facility] OR;  Service: Urology;  Laterality: N/A;    ESOPHAGOGASTRODUODENOSCOPY N/A 2019    Procedure: EGD (ESOPHAGOGASTRODUODENOSCOPY);  Surgeon: Monika Steele MD;  Location: [facility];  Service: Endoscopy;  Laterality: N/A;    ESOPHAGOGASTRODUODENOSCOPY  2019    with RFA and biopsies    ESOPHAGOGASTRODUODENOSCOPY N/A 2019    Procedure: EGD (ESOPHAGOGASTRODUODENOSCOPY);  Surgeon: Uzair Cantor MD;  Location: [facility];  Service: Endoscopy;  Laterality: N/A;  n+v with anesthesia    ESOPHAGOGASTRODUODENOSCOPY N/A 2019    Procedure: EGD (ESOPHAGOGASTRODUODENOSCOPY);  Surgeon: Uzair Cantor MD;  Location: [facility];  Service: Endoscopy;  Laterality: N/A;    ESOPHAGOGASTRODUODENOSCOPY N/A 10/7/2019    Procedure: EGD (ESOPHAGOGASTRODUODENOSCOPY);  Surgeon: Uzair Cantor MD;  Location: [facility];  Service: Endoscopy;  Laterality: N/A;    ESOPHAGOGASTRODUODENOSCOPY N/A 2/10/2020    Procedure: EGD (ESOPHAGOGASTRODUODENOSCOPY);  Surgeon: Uzair Cantor MD;  Location: [facility];  Service: Endoscopy;  Laterality: N/A;  Requests earliest procedure time     ESOPHAGOGASTRODUODENOSCOPY N/A 8/17/2020    Procedure: EGD (ESOPHAGOGASTRODUODENOSCOPY);  Surgeon: Uzair Cantor MD;  Location: [facility];  Service: Endoscopy;  Laterality: N/A;  BARRETTS    ESOPHAGOGASTRODUODENOSCOPY N/A 4/7/2021    Procedure: ESOPHAGOGASTRODUODENOSCOPY (EGD);  Surgeon: Uzair Cantor MD;  Location: [facility] ENDO;  Service: Endoscopy;  Laterality: N/A;  Needs Rapid Covid MP    ESOPHAGOGASTRODUODENOSCOPY N/A 11/30/2021    Procedure: EGD (ESOPHAGOGASTRODUODENOSCOPY);  Surgeon: Uzair Cantor MD;  Location: [facility];  Service: Endoscopy;  Laterality: N/A;    ESOPHAGOGASTRODUODENOSCOPY N/A 6/27/2022    Procedure: EGD (ESOPHAGOGASTRODUODENOSCOPY);  Surgeon: Uzair Cantor MD;  Location: [facility];  Service: Endoscopy;  Laterality: N/A;  5/19/22: fully vaccinated. instructions mailed-[place]    loop recorder only    HYSTERECTOMY      ovaries remain    INSERTION OF IMPLANTABLE LOOP RECORDER N/A 7/17/2020    Procedure: INSERTION, IMPLANTABLE LOOP RECORDER (MEDTRONIC);  Surgeon: Karyna Vasques MD;  Location: [facility] CATH/EP LAB;  Service: Cardiology;  Laterality: N/A;    SPLENECTOMY, TOTAL  2007    Injured during surgery and removed    UPPER GASTROINTESTINAL ENDOSCOPY  2007    GERD & hiatal hernia per patient report     Social History     Tobacco Use    Smoking status: Every Day     Packs/day: 1.00     Years: 35.00     Pack years: 35.00     Types: Cigarettes    Smokeless tobacco: Never   Substance Use Topics    Alcohol use: No    Drug use: No     Family History   Problem Relation Age of Onset    Colon cancer Father         diagnosed in his 60's    Lung cancer Father     Lymphoma Mother         Brain    Lung cancer Brother     Lung cancer Brother         metastatic from prostate    Prostate cancer Brother     Heart disease Brother         valvular disease    Hyperlipidemia Brother     Eczema Neg Hx     Lupus Neg Hx     Psoriasis Neg Hx     Melanoma Neg Hx     Crohn's disease Neg Hx     Ulcerative  "colitis Neg Hx     Stomach cancer Neg Hx     Esophageal cancer Neg Hx      Review of patient's allergies indicates:   Allergen Reactions    Doxycycline Itching and Swelling     Prescribed 5/2021, took one dose, describes lip swelling, and "itch all over"    Mirtazapine Other (See Comments)     Hyperactivity.    Morphine Swelling and Rash     Other reaction(s): swelling in limbs    Lexapro [escitalopram oxalate] Other (See Comments)     Worsening tremor.  Sedation.    Primidone Other (See Comments)     Syncope.  Other reaction(s): dizziness,hypotension  Other reaction(s): Unknown  Other reaction(s): Unknown    Simvastatin Nausea And Vomiting and Other (See Comments)     Weakness.  Other reaction(s): nausea,vomiting    Beta-adrenergic agents Other (See Comments)     Low blood pressure and fainted.     Metoprolol succinate Other (See Comments)    Prochlorperazine      Other reaction(s): weakness    Topiramate     Compazine [prochlorperazine edisylate] Nausea Only    Tramadol Nausea And Vomiting     I have reviewed past medical, family, and social history. I have reviewed previous nurse notes.    Performance Status:The patient's activity level is functions out of house.      Review of Systems:  a review of eleven systems covering constitutional, Eye, HEENT, Psych, Respiratory, Cardiac, GI, , Musculoskeletal, Endocrine, Dermatologic was negative except for pertinent findings as listed ABOVE and below: pertinent positive as above, rest is good  Cough, shortness of breath         Exam:Comprehensive exam done. /76 (BP Location: Left arm, Patient Position: Sitting, BP Method: Medium (Automatic))   Pulse 66   Ht 5' 4" (1.626 m)   Wt 64.1 kg (141 lb 6.8 oz)   SpO2 99% Comment: on room air at rest  BMI 24.28 kg/m²   Exam included Vitals as listed, and patient's appearance and affect and alertness and mood, oral exam for yeast and hygiene and pharynx lesions and Mallapatti (M) score, neck with inspection for jvd and " masses and thyroid abnormalities and lymph nodes (supraclavicular and infraclavicular nodes and axillary also examined and noted if abn), chest exam included symmetry and effort and fremitus and percussion and auscultation, cardiac exam included rhythm and gallops and murmur and rubs and jvd and edema, abdominal exam for mass and hepatosplenomegaly and tenderness and hernias and bowel sounds, Musculoskeletal exam with muscle tone and posture and mobility/gait and  strength, and skin for rashes and cyanosis and pallor and turgor, extremity for clubbing.  Findings were normal except for pertinent findings listed below: M2, BS deminished, bilateral rales.       Radiographs (ct chest and cxr) reviewed: view by direct vision   CT chest 09/14/22 lung nodules, atelectasis, and emphysema unchanged, stable; repeat CT in one year recommended     CT Chest Without Contrast 9/10/2021   Unchanged right lung nodules dating back to 6/18/2020.  2. Mild emphysema.  Coronary artery calcifications     CT Chest Without Contrast 2/22/2021   Thereare no new pulmonary nodules. Mild emphysematous lung disease is again demonstrated bilaterally. There is bibasilar subsegmental atelectasis versus scarring  Multiple pulmonary nodules are again identified as described and are unchanged      CT Chest Without Contrast 09/09/20   IMPRESSION:  Stable 8 mm fissural nodule in the anterior right middle lobe and 4 mm  subpleural nodule in the posterior right medial lobe Resolution of the 6 mm subpleural nodular density in the anterior right costophrenic angle Mild coronary artery calcification No new pulmonary nodules or infiltrates  LUNG-RADS CATEGORY 3: PROBABLY BENIGN FINDINGS  RECOMMENDATION: Short-term imaging follow-up with 6 month LDCT.      Transthoracic echo (TTE) complete 6/3/2020  Mild concentric left ventricular hypertrophy.  Normal left ventricular systolic function. The estimated ejection fraction is 60%.  Normal LV diastolic  function.  The estimated PA systolic pressure is 34 mmHg.     X-Ray Chest AP Portable 06/02/20   The lungs are clear. Costophrenic angles are seen without effusion. No  pneumothorax is identified. The heart is normal in size. Atheromatous  calcifications are seen at the aortic arch. Degenerative changes are  seen with an ACDF and paraspinal fixation hardware in the lower  cervical spine. The visualized upper abdomen is unremarkable    CT Chest Lung Screening Low Dose 06/18/20   1.  Nodular density with spiculations in the anterior segment of  the right middle lobe measures 6 x 9 mm.  2.  6 mm subpleural nodular density in the anterior right  costophrenic angle.  3.  Irregular shaped density in the medial segment of the right  lower lobe is unchanged dating back from 2009 and is benign.     LUNG-RADS CATEGORY 4A: SUSPICIOUS FINDINGS     RECOMMENDATION: 3 month follow-up LDCT (PET/CT may be considered when  solid component greater than 7 mm is present).        Labs reviewed       Lab Results   Component Value Date    WBC 9.41 02/14/2022    RBC 3.91 (L) 02/14/2022    HGB 12.1 02/14/2022    HCT 37.0 02/14/2022    MCV 95 02/14/2022    MCH 30.9 02/14/2022    MCHC 32.7 02/14/2022    RDW 14.6 (H) 02/14/2022     02/14/2022    MPV 10.6 02/14/2022    GRAN 5.6 02/14/2022    GRAN 59.2 02/14/2022    LYMPH 2.5 02/14/2022    LYMPH 26.0 02/14/2022    MONO 0.8 02/14/2022    MONO 8.6 02/14/2022    EOS 0.4 02/14/2022    BASO 0.12 02/14/2022    EOSINOPHIL 4.6 02/14/2022    BASOPHIL 1.3 02/14/2022     Ed Model: 7/6/2020 8.47% probability.    PFT reviewed  Pulmonary Functions Testing Results:  spirometry with bronchodilator, lung volume by gas dilution, and diffusion capacity were measured July 15, 2020.  The FEV1 to FVC ratio was 66% indicating airflow obstruction.  The FEV1 measured 70% of predicted making airflow obstruction moderate.     There was a 25% improvement in FEV1 following bronchodilator, this is statistically  significant.  The FEV1 measured about 70% predicted at 1.4 L pre bronchodilator.  Total lung capacity was in normal limits on lung volumes by gas dilution.  Diffusion,   uncorrected for anemia present, was 49% of predicted.       The patient had moderate airflow obstruction that resolved with bronchodilator.  Lung volumes were normal.  Diffusion was reduced.  Clinical correlation recommended.       EPWORTH SLEEPINESS SCALE= 16 9/17/2021  Sleep study AHI 1 9/27/2021  DEA- 1.0 [facility]    Plan:  Clinical impression is apparently straight forward and impression with management as below.    Kristin was seen today for 3m f/u and copd.    Diagnoses and all orders for this visit:    Centrilobular emphysema    Lung nodule, multiple     - Continue current medication regiment   - continue to monitor    Follow up in about 3 months (around 1/27/2023), or if symptoms worsen or fail to improve.    Discussed with patient above for education the following:      Patient Instructions   CT of chest is stable, lung nodules are unchanged  Will repeat CT in one year to monitor    Continue current COPD medication regiment

## 2022-10-27 NOTE — PATIENT INSTRUCTIONS
CT of chest is stable, lung nodules are unchanged  Will repeat CT in one year to monitor    Continue current COPD medication regiment

## 2022-11-08 ENCOUNTER — OFFICE VISIT (OUTPATIENT)
Dept: PSYCHIATRY | Facility: CLINIC | Age: 72
End: 2022-11-08
Payer: MEDICARE

## 2022-11-08 VITALS
BODY MASS INDEX: 24.24 KG/M2 | HEART RATE: 71 BPM | SYSTOLIC BLOOD PRESSURE: 147 MMHG | DIASTOLIC BLOOD PRESSURE: 79 MMHG | WEIGHT: 141.19 LBS

## 2022-11-08 DIAGNOSIS — F32.9 REACTIVE DEPRESSION: ICD-10-CM

## 2022-11-08 DIAGNOSIS — F99 INSOMNIA DUE TO OTHER MENTAL DISORDER: ICD-10-CM

## 2022-11-08 DIAGNOSIS — F41.1 GAD (GENERALIZED ANXIETY DISORDER): Primary | ICD-10-CM

## 2022-11-08 DIAGNOSIS — F51.05 INSOMNIA DUE TO OTHER MENTAL DISORDER: ICD-10-CM

## 2022-11-08 PROCEDURE — 3077F PR MOST RECENT SYSTOLIC BLOOD PRESSURE >= 140 MM HG: ICD-10-PCS | Mod: CPTII,S$GLB,, | Performed by: REGISTERED NURSE

## 2022-11-08 PROCEDURE — 99214 OFFICE O/P EST MOD 30 MIN: CPT | Mod: S$GLB,,, | Performed by: REGISTERED NURSE

## 2022-11-08 PROCEDURE — 3008F BODY MASS INDEX DOCD: CPT | Mod: CPTII,S$GLB,, | Performed by: REGISTERED NURSE

## 2022-11-08 PROCEDURE — 90833 PSYTX W PT W E/M 30 MIN: CPT | Mod: S$GLB,,, | Performed by: REGISTERED NURSE

## 2022-11-08 PROCEDURE — 3078F DIAST BP <80 MM HG: CPT | Mod: CPTII,S$GLB,, | Performed by: REGISTERED NURSE

## 2022-11-08 PROCEDURE — 99999 PR PBB SHADOW E&M-EST. PATIENT-LVL III: ICD-10-PCS | Mod: PBBFAC,,, | Performed by: REGISTERED NURSE

## 2022-11-08 PROCEDURE — 1160F RVW MEDS BY RX/DR IN RCRD: CPT | Mod: CPTII,S$GLB,, | Performed by: REGISTERED NURSE

## 2022-11-08 PROCEDURE — 1101F PR PT FALLS ASSESS DOC 0-1 FALLS W/OUT INJ PAST YR: ICD-10-PCS | Mod: CPTII,S$GLB,, | Performed by: REGISTERED NURSE

## 2022-11-08 PROCEDURE — 3008F PR BODY MASS INDEX (BMI) DOCUMENTED: ICD-10-PCS | Mod: CPTII,S$GLB,, | Performed by: REGISTERED NURSE

## 2022-11-08 PROCEDURE — 3077F SYST BP >= 140 MM HG: CPT | Mod: CPTII,S$GLB,, | Performed by: REGISTERED NURSE

## 2022-11-08 PROCEDURE — 99214 PR OFFICE/OUTPT VISIT, EST, LEVL IV, 30-39 MIN: ICD-10-PCS | Mod: S$GLB,,, | Performed by: REGISTERED NURSE

## 2022-11-08 PROCEDURE — 1159F PR MEDICATION LIST DOCUMENTED IN MEDICAL RECORD: ICD-10-PCS | Mod: CPTII,S$GLB,, | Performed by: REGISTERED NURSE

## 2022-11-08 PROCEDURE — 1101F PT FALLS ASSESS-DOCD LE1/YR: CPT | Mod: CPTII,S$GLB,, | Performed by: REGISTERED NURSE

## 2022-11-08 PROCEDURE — 3078F PR MOST RECENT DIASTOLIC BLOOD PRESSURE < 80 MM HG: ICD-10-PCS | Mod: CPTII,S$GLB,, | Performed by: REGISTERED NURSE

## 2022-11-08 PROCEDURE — 3288F PR FALLS RISK ASSESSMENT DOCUMENTED: ICD-10-PCS | Mod: CPTII,S$GLB,, | Performed by: REGISTERED NURSE

## 2022-11-08 PROCEDURE — 99999 PR PBB SHADOW E&M-EST. PATIENT-LVL III: CPT | Mod: PBBFAC,,, | Performed by: REGISTERED NURSE

## 2022-11-08 PROCEDURE — 3288F FALL RISK ASSESSMENT DOCD: CPT | Mod: CPTII,S$GLB,, | Performed by: REGISTERED NURSE

## 2022-11-08 PROCEDURE — 1160F PR REVIEW ALL MEDS BY PRESCRIBER/CLIN PHARMACIST DOCUMENTED: ICD-10-PCS | Mod: CPTII,S$GLB,, | Performed by: REGISTERED NURSE

## 2022-11-08 PROCEDURE — 1159F MED LIST DOCD IN RCRD: CPT | Mod: CPTII,S$GLB,, | Performed by: REGISTERED NURSE

## 2022-11-08 PROCEDURE — 90833 PR PSYCHOTHERAPY W/PATIENT W/E&M, 30 MIN (ADD ON): ICD-10-PCS | Mod: S$GLB,,, | Performed by: REGISTERED NURSE

## 2022-11-08 RX ORDER — FLUOXETINE 10 MG/1
10 CAPSULE ORAL DAILY
Qty: 90 CAPSULE | Refills: 1 | Status: SHIPPED | OUTPATIENT
Start: 2022-11-08 | End: 2023-04-20

## 2022-11-08 NOTE — PATIENT INSTRUCTIONS
Start Prozac 10 mg by mouth daily.     May take Clonazepam 0.5 mg 1/2-1 tablet by mouth 2 times daily as needed for anxiety or insomnia. USE SPARINGLY.          Please go to emergency department if feeling as though you are going to harm to yourself or others or if you are in crisis.     Please call the clinic to report any worsening of symptoms or problems associated with medication.      National Suicide Prevention Lifeline    The Lifeline provides 24/7, free and confidential support for people in distress, prevention and crisis resources for you or your loved ones, and best practices for professionals in the [place].    1-249-188-2525    988 has been designated as the new three-digit dialing code that will route callers to the National Suicide Prevention Lifeline. While some areas may be currently able to connect to the Lifeline by dialing 988, this dialing code will be available to everyone across the [place] starting on July 16, 2022.     988      Lifeline Chat    Lifeline Chat is a service of the National Suicide Prevention Lifeline, connecting individuals with counselors for emotional support and other services via web chat. All chat centers in the Lifeline network are accredited by [employer]. Lifeline Chat is available 24/7 across the [place]    https://suicidepreventionlifeline.org/chat/

## 2022-11-15 ENCOUNTER — OFFICE VISIT (OUTPATIENT)
Dept: FAMILY MEDICINE | Facility: CLINIC | Age: 72
End: 2022-11-15
Payer: MEDICARE

## 2022-11-15 VITALS
SYSTOLIC BLOOD PRESSURE: 138 MMHG | HEART RATE: 60 BPM | HEIGHT: 64 IN | BODY MASS INDEX: 23.9 KG/M2 | WEIGHT: 140 LBS | DIASTOLIC BLOOD PRESSURE: 80 MMHG | OXYGEN SATURATION: 99 %

## 2022-11-15 DIAGNOSIS — I10 ESSENTIAL HYPERTENSION: Primary | Chronic | ICD-10-CM

## 2022-11-15 DIAGNOSIS — Z23 NEED FOR PNEUMOCOCCAL VACCINATION: ICD-10-CM

## 2022-11-15 DIAGNOSIS — Z85.038 HX OF COLON CANCER, STAGE II: Chronic | ICD-10-CM

## 2022-11-15 DIAGNOSIS — E78.2 MIXED DYSLIPIDEMIA: Chronic | ICD-10-CM

## 2022-11-15 DIAGNOSIS — F33.9 DEPRESSION, RECURRENT: ICD-10-CM

## 2022-11-15 DIAGNOSIS — M32.9 SYSTEMIC LUPUS ERYTHEMATOSUS, UNSPECIFIED SLE TYPE, UNSPECIFIED ORGAN INVOLVEMENT STATUS: ICD-10-CM

## 2022-11-15 DIAGNOSIS — K22.719 BARRETT'S ESOPHAGUS WITH DYSPLASIA: Chronic | ICD-10-CM

## 2022-11-15 DIAGNOSIS — Z23 FLU VACCINE NEED: ICD-10-CM

## 2022-11-15 DIAGNOSIS — E55.9 VITAMIN D DEFICIENCY: ICD-10-CM

## 2022-11-15 PROBLEM — E78.00 HYPERCHOLESTEROLEMIA: Status: RESOLVED | Noted: 2018-04-17 | Resolved: 2022-11-15

## 2022-11-15 PROCEDURE — 1125F AMNT PAIN NOTED PAIN PRSNT: CPT | Mod: CPTII,S$GLB,, | Performed by: FAMILY MEDICINE

## 2022-11-15 PROCEDURE — 99214 OFFICE O/P EST MOD 30 MIN: CPT | Mod: 25,S$GLB,, | Performed by: FAMILY MEDICINE

## 2022-11-15 PROCEDURE — 1160F RVW MEDS BY RX/DR IN RCRD: CPT | Mod: CPTII,S$GLB,, | Performed by: FAMILY MEDICINE

## 2022-11-15 PROCEDURE — G0009 ADMIN PNEUMOCOCCAL VACCINE: HCPCS | Mod: S$GLB,,, | Performed by: FAMILY MEDICINE

## 2022-11-15 PROCEDURE — 1159F MED LIST DOCD IN RCRD: CPT | Mod: CPTII,S$GLB,, | Performed by: FAMILY MEDICINE

## 2022-11-15 PROCEDURE — 3288F PR FALLS RISK ASSESSMENT DOCUMENTED: ICD-10-PCS | Mod: CPTII,S$GLB,, | Performed by: FAMILY MEDICINE

## 2022-11-15 PROCEDURE — 1159F PR MEDICATION LIST DOCUMENTED IN MEDICAL RECORD: ICD-10-PCS | Mod: CPTII,S$GLB,, | Performed by: FAMILY MEDICINE

## 2022-11-15 PROCEDURE — 3288F FALL RISK ASSESSMENT DOCD: CPT | Mod: CPTII,S$GLB,, | Performed by: FAMILY MEDICINE

## 2022-11-15 PROCEDURE — 99999 PR PBB SHADOW E&M-EST. PATIENT-LVL IV: CPT | Mod: PBBFAC,,, | Performed by: FAMILY MEDICINE

## 2022-11-15 PROCEDURE — 1125F PR PAIN SEVERITY QUANTIFIED, PAIN PRESENT: ICD-10-PCS | Mod: CPTII,S$GLB,, | Performed by: FAMILY MEDICINE

## 2022-11-15 PROCEDURE — 90694 VACC AIIV4 NO PRSRV 0.5ML IM: CPT | Mod: S$GLB,,, | Performed by: FAMILY MEDICINE

## 2022-11-15 PROCEDURE — 1101F PR PT FALLS ASSESS DOC 0-1 FALLS W/OUT INJ PAST YR: ICD-10-PCS | Mod: CPTII,S$GLB,, | Performed by: FAMILY MEDICINE

## 2022-11-15 PROCEDURE — 1101F PT FALLS ASSESS-DOCD LE1/YR: CPT | Mod: CPTII,S$GLB,, | Performed by: FAMILY MEDICINE

## 2022-11-15 PROCEDURE — 99214 PR OFFICE/OUTPT VISIT, EST, LEVL IV, 30-39 MIN: ICD-10-PCS | Mod: 25,S$GLB,, | Performed by: FAMILY MEDICINE

## 2022-11-15 PROCEDURE — 3079F PR MOST RECENT DIASTOLIC BLOOD PRESSURE 80-89 MM HG: ICD-10-PCS | Mod: CPTII,S$GLB,, | Performed by: FAMILY MEDICINE

## 2022-11-15 PROCEDURE — 1160F PR REVIEW ALL MEDS BY PRESCRIBER/CLIN PHARMACIST DOCUMENTED: ICD-10-PCS | Mod: CPTII,S$GLB,, | Performed by: FAMILY MEDICINE

## 2022-11-15 PROCEDURE — 90694 FLU VACCINE - QUADRIVALENT - ADJUVANTED: ICD-10-PCS | Mod: S$GLB,,, | Performed by: FAMILY MEDICINE

## 2022-11-15 PROCEDURE — 3079F DIAST BP 80-89 MM HG: CPT | Mod: CPTII,S$GLB,, | Performed by: FAMILY MEDICINE

## 2022-11-15 PROCEDURE — G0008 ADMIN INFLUENZA VIRUS VAC: HCPCS | Mod: S$GLB,,, | Performed by: FAMILY MEDICINE

## 2022-11-15 PROCEDURE — G0008 FLU VACCINE - QUADRIVALENT - ADJUVANTED: ICD-10-PCS | Mod: S$GLB,,, | Performed by: FAMILY MEDICINE

## 2022-11-15 PROCEDURE — 3075F PR MOST RECENT SYSTOLIC BLOOD PRESS GE 130-139MM HG: ICD-10-PCS | Mod: CPTII,S$GLB,, | Performed by: FAMILY MEDICINE

## 2022-11-15 PROCEDURE — 90677 PNEUMOCOCCAL CONJUGATE VACCINE 20-VALENT: ICD-10-PCS | Mod: S$GLB,,, | Performed by: FAMILY MEDICINE

## 2022-11-15 PROCEDURE — 3075F SYST BP GE 130 - 139MM HG: CPT | Mod: CPTII,S$GLB,, | Performed by: FAMILY MEDICINE

## 2022-11-15 PROCEDURE — 99999 PR PBB SHADOW E&M-EST. PATIENT-LVL IV: ICD-10-PCS | Mod: PBBFAC,,, | Performed by: FAMILY MEDICINE

## 2022-11-15 PROCEDURE — 3008F PR BODY MASS INDEX (BMI) DOCUMENTED: ICD-10-PCS | Mod: CPTII,S$GLB,, | Performed by: FAMILY MEDICINE

## 2022-11-15 PROCEDURE — 3008F BODY MASS INDEX DOCD: CPT | Mod: CPTII,S$GLB,, | Performed by: FAMILY MEDICINE

## 2022-11-15 PROCEDURE — G0009 PNEUMOCOCCAL CONJUGATE VACCINE 20-VALENT: ICD-10-PCS | Mod: S$GLB,,, | Performed by: FAMILY MEDICINE

## 2022-11-15 PROCEDURE — 90677 PCV20 VACCINE IM: CPT | Mod: S$GLB,,, | Performed by: FAMILY MEDICINE

## 2022-11-15 RX ORDER — MUPIROCIN 20 MG/G
OINTMENT TOPICAL 3 TIMES DAILY
COMMUNITY
Start: 2022-08-02

## 2022-11-15 RX ORDER — TRAZODONE HYDROCHLORIDE 50 MG/1
50 TABLET ORAL NIGHTLY
COMMUNITY
Start: 2022-11-02 | End: 2023-04-20

## 2022-11-15 NOTE — PROGRESS NOTES
Subjective:       Patient ID: Kristin Guerra is a 72 y.o. female.    Chief Complaint: Establish Care    HPI  Review of Systems   Constitutional:  Negative for fatigue and unexpected weight change.   Respiratory:  Negative for chest tightness and shortness of breath.    Cardiovascular:  Negative for chest pain, palpitations and leg swelling.   Gastrointestinal:  Negative for abdominal pain.   Musculoskeletal:  Negative for arthralgias.   Neurological:  Negative for dizziness, syncope, light-headedness and headaches.     Patient Active Problem List   Diagnosis    Hx of colon cancer, stage II    Hx of diverticulitis of colon    Cataract    Herniation of intervertebral disc of cervical region    Cervical radiculopathy    Degeneration of intervertebral disc of cervical region    history of skin lupus    Gastroesophageal reflux disease    Tremor, coarse    Essential hypertension, labile    Hiatal hernia    Osteoarthritis    RLS (restless legs syndrome)    Panic attack    Heart murmur    Smoking    GERD (gastroesophageal reflux disease)    Sterling's esophagus with dysplasia    Tremor, essential    Sinus problem    S/P splenectomy    Other chronic postprocedural pain    Insomnia due to medical condition    Anxiety    Mixed dyslipidemia    Statin intolerance    Statin myopathy    Vitamin D deficiency    Near syncope    Syncope and collapse    History of endoscopy    Incontinence in female    Bradycardia    Seborrheic keratosis, inflamed    Microhematuria    Orthostatic hypotension    History of loop recorder    Malignant neoplasm of colon, unspecified part of colon    Depression, recurrent    Centrilobular emphysema    Lung nodule, multiple     Patient is here to establish care. New to me   has stage 4 bladder cancer.  Stress, and grief at home    H/o lupus-skin type     Psych Josue CHARLENE, depression    Pulm NP Tomas COPD, lung nodules-low dose CT chest 2022-stable, current smoker     Card Dr. James HPL, statin  intolerance-severe muscle pain, orthostasis    ENT [facility] GARCIA    GI Dr. Steele /Devaughn Lam , h/o precancerous polyps per patient (? Colon cancer stage 2) 2007 . Treated at [facility] and had partial colectomy, appendectomy and had to have splenectomy bc spleen nicked during surgery. Last egd 6/22 -hiatal hernia, barretts lower third esophagus s/p ablation 6/22 -f/u 1 year recommended. On nexium 40mg bid-takes it often once a day. Denies reflux  GI surg Dr. Esquivel 2018 colonoscopy 1 polyp on 5 year surveillance    Pulm/Sleep Dr. Barahona sleep study done-? Sleep apnra    Neuro Dr. Macario Abraham essential tremor    Urology Dr. Miranda mixed stress and urge incontinence      Objective:      Physical Exam  Vitals and nursing note reviewed.   Constitutional:       Appearance: She is well-developed.   Cardiovascular:      Rate and Rhythm: Normal rate and regular rhythm.      Heart sounds: Normal heart sounds.   Pulmonary:      Effort: Pulmonary effort is normal.      Breath sounds: Normal breath sounds.   Skin:     General: Skin is warm and dry.   Neurological:      Mental Status: She is alert and oriented to person, place, and time.       Assessment:       1. Essential hypertension, labile    2. Mixed dyslipidemia    3. Systemic lupus erythematosus, unspecified SLE type, unspecified organ involvement status    4. Hx of colon cancer, stage II    5. Vitamin D deficiency    6. Sterling's esophagus with dysplasia    7. Depression, recurrent    8. Flu vaccine need    9. Need for pneumococcal vaccination        Plan:         1. Essential hypertension, labile  Controlled on current medications.  Continue current medications.      2. Mixed dyslipidemia  I recommend a heart healthy diet rich in fiber, fresh vegetables and fruit and low in saturated fats (fried foods, red meat, etc.).  I also recommend regular exercise including a minimum of 150 minutes of cardio exercise per week and 2-30 minute workouts of strength  training like light weights, yoga, pilates, etc.  You should work toward a body mass index of < 25.      - CBC Auto Differential; Future  - Comprehensive Metabolic Panel; Future  - Lipid Panel; Future    3. Systemic lupus erythematosus, unspecified SLE type, unspecified organ involvement status  Did not have SLE- had skin lupus and is in remission    4. Hx of colon cancer, stage II  H/o precancerous polyps s/p partial colectomy    5. Vitamin D deficiency  Screen and treat as indicated:    - Vitamin D; Future    6. Sterling's esophagus with dysplasia  Cont gi surveillance    7. Depression, recurrent  Cont current mgmt    8. Flu vaccine need  Immunize today.  Counseled patient on risks, benefits and side effects.  Patient elected to proceed with vaccination.    - Influenza (FLUAD) - Quadrivalent (Adjuvanted) *Preferred* (65+) (PF)    9. Need for pneumococcal vaccination  Immunize today.  Counseled patient on risks, benefits and side effects.  Patient elected to proceed with vaccination.    - (In Office Administered) Pneumococcal Conjugate Vaccine (20 Valent) (IM)      Time spent with patient: 20 minutes    Patient with be reevaluated in 3 months or sooner prn    Greater than 50% of this visit was spent counseling as described in above documentation:Yes
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
## 2022-11-25 ENCOUNTER — TELEPHONE (OUTPATIENT)
Dept: FAMILY MEDICINE | Facility: CLINIC | Age: 72
End: 2022-11-25
Payer: MEDICARE

## 2022-11-25 NOTE — TELEPHONE ENCOUNTER
Called patient to advise we do not have any appointments available today. No answer , left voicemail to return call.

## 2022-11-25 NOTE — TELEPHONE ENCOUNTER
----- Message from Bertha Galvez sent at 11/25/2022 11:42 AM CST -----  Contact: patient  Type:  Same Day Appointment Request    Caller is requesting a same day appointment.     Name of Caller: patient     When is the first available appointment? 12/13    Symptoms: upper respiratory infection       Best Call Back Number:[phone] (home)      Additional Information:

## 2022-11-30 ENCOUNTER — HOSPITAL ENCOUNTER (OUTPATIENT)
Dept: RADIOLOGY | Facility: CLINIC | Age: 72
Discharge: HOME | End: 2022-11-30
Attending: STUDENT IN AN ORGANIZED HEALTH CARE EDUCATION/TRAINING PROGRAM
Payer: MEDICARE

## 2022-11-30 ENCOUNTER — OFFICE VISIT (OUTPATIENT)
Dept: FAMILY MEDICINE | Facility: CLINIC | Age: 72
End: 2022-11-30
Payer: MEDICARE

## 2022-11-30 VITALS
BODY MASS INDEX: 23.71 KG/M2 | WEIGHT: 138.88 LBS | HEART RATE: 59 BPM | OXYGEN SATURATION: 98 % | TEMPERATURE: 98 F | RESPIRATION RATE: 16 BRPM | SYSTOLIC BLOOD PRESSURE: 130 MMHG | DIASTOLIC BLOOD PRESSURE: 80 MMHG | HEIGHT: 64 IN

## 2022-11-30 DIAGNOSIS — R05.9 COUGH, UNSPECIFIED TYPE: ICD-10-CM

## 2022-11-30 DIAGNOSIS — J18.9 PNEUMONIA DUE TO INFECTIOUS ORGANISM, UNSPECIFIED LATERALITY, UNSPECIFIED PART OF LUNG: ICD-10-CM

## 2022-11-30 DIAGNOSIS — R53.83 OTHER FATIGUE: ICD-10-CM

## 2022-11-30 DIAGNOSIS — I10 HYPERTENSION, UNSPECIFIED TYPE: ICD-10-CM

## 2022-11-30 DIAGNOSIS — J44.9 CHRONIC OBSTRUCTIVE PULMONARY DISEASE, UNSPECIFIED COPD TYPE: Primary | ICD-10-CM

## 2022-11-30 PROCEDURE — 3288F PR FALLS RISK ASSESSMENT DOCUMENTED: ICD-10-PCS | Mod: CPTII,S$GLB,, | Performed by: STUDENT IN AN ORGANIZED HEALTH CARE EDUCATION/TRAINING PROGRAM

## 2022-11-30 PROCEDURE — 99999 PR PBB SHADOW E&M-EST. PATIENT-LVL IV: CPT | Mod: PBBFAC,,, | Performed by: STUDENT IN AN ORGANIZED HEALTH CARE EDUCATION/TRAINING PROGRAM

## 2022-11-30 PROCEDURE — 99214 PR OFFICE/OUTPT VISIT, EST, LEVL IV, 30-39 MIN: ICD-10-PCS | Mod: S$GLB,,, | Performed by: STUDENT IN AN ORGANIZED HEALTH CARE EDUCATION/TRAINING PROGRAM

## 2022-11-30 PROCEDURE — 3008F BODY MASS INDEX DOCD: CPT | Mod: CPTII,S$GLB,, | Performed by: STUDENT IN AN ORGANIZED HEALTH CARE EDUCATION/TRAINING PROGRAM

## 2022-11-30 PROCEDURE — 1159F PR MEDICATION LIST DOCUMENTED IN MEDICAL RECORD: ICD-10-PCS | Mod: CPTII,S$GLB,, | Performed by: STUDENT IN AN ORGANIZED HEALTH CARE EDUCATION/TRAINING PROGRAM

## 2022-11-30 PROCEDURE — 1126F AMNT PAIN NOTED NONE PRSNT: CPT | Mod: CPTII,S$GLB,, | Performed by: STUDENT IN AN ORGANIZED HEALTH CARE EDUCATION/TRAINING PROGRAM

## 2022-11-30 PROCEDURE — 1126F PR PAIN SEVERITY QUANTIFIED, NO PAIN PRESENT: ICD-10-PCS | Mod: CPTII,S$GLB,, | Performed by: STUDENT IN AN ORGANIZED HEALTH CARE EDUCATION/TRAINING PROGRAM

## 2022-11-30 PROCEDURE — 99999 PR PBB SHADOW E&M-EST. PATIENT-LVL IV: ICD-10-PCS | Mod: PBBFAC,,, | Performed by: STUDENT IN AN ORGANIZED HEALTH CARE EDUCATION/TRAINING PROGRAM

## 2022-11-30 PROCEDURE — 71046 XR CHEST PA AND LATERAL: ICD-10-PCS | Mod: 26,,, | Performed by: RADIOLOGY

## 2022-11-30 PROCEDURE — 3079F PR MOST RECENT DIASTOLIC BLOOD PRESSURE 80-89 MM HG: ICD-10-PCS | Mod: CPTII,S$GLB,, | Performed by: STUDENT IN AN ORGANIZED HEALTH CARE EDUCATION/TRAINING PROGRAM

## 2022-11-30 PROCEDURE — 99214 OFFICE O/P EST MOD 30 MIN: CPT | Mod: S$GLB,,, | Performed by: STUDENT IN AN ORGANIZED HEALTH CARE EDUCATION/TRAINING PROGRAM

## 2022-11-30 PROCEDURE — 71046 X-RAY EXAM CHEST 2 VIEWS: CPT | Mod: 26,,, | Performed by: RADIOLOGY

## 2022-11-30 PROCEDURE — 3079F DIAST BP 80-89 MM HG: CPT | Mod: CPTII,S$GLB,, | Performed by: STUDENT IN AN ORGANIZED HEALTH CARE EDUCATION/TRAINING PROGRAM

## 2022-11-30 PROCEDURE — 3008F PR BODY MASS INDEX (BMI) DOCUMENTED: ICD-10-PCS | Mod: CPTII,S$GLB,, | Performed by: STUDENT IN AN ORGANIZED HEALTH CARE EDUCATION/TRAINING PROGRAM

## 2022-11-30 PROCEDURE — 71046 X-RAY EXAM CHEST 2 VIEWS: CPT | Mod: TC,FY,PO

## 2022-11-30 PROCEDURE — 3288F FALL RISK ASSESSMENT DOCD: CPT | Mod: CPTII,S$GLB,, | Performed by: STUDENT IN AN ORGANIZED HEALTH CARE EDUCATION/TRAINING PROGRAM

## 2022-11-30 PROCEDURE — 1101F PR PT FALLS ASSESS DOC 0-1 FALLS W/OUT INJ PAST YR: ICD-10-PCS | Mod: CPTII,S$GLB,, | Performed by: STUDENT IN AN ORGANIZED HEALTH CARE EDUCATION/TRAINING PROGRAM

## 2022-11-30 PROCEDURE — 3075F PR MOST RECENT SYSTOLIC BLOOD PRESS GE 130-139MM HG: ICD-10-PCS | Mod: CPTII,S$GLB,, | Performed by: STUDENT IN AN ORGANIZED HEALTH CARE EDUCATION/TRAINING PROGRAM

## 2022-11-30 PROCEDURE — 1101F PT FALLS ASSESS-DOCD LE1/YR: CPT | Mod: CPTII,S$GLB,, | Performed by: STUDENT IN AN ORGANIZED HEALTH CARE EDUCATION/TRAINING PROGRAM

## 2022-11-30 PROCEDURE — 3075F SYST BP GE 130 - 139MM HG: CPT | Mod: CPTII,S$GLB,, | Performed by: STUDENT IN AN ORGANIZED HEALTH CARE EDUCATION/TRAINING PROGRAM

## 2022-11-30 PROCEDURE — 1159F MED LIST DOCD IN RCRD: CPT | Mod: CPTII,S$GLB,, | Performed by: STUDENT IN AN ORGANIZED HEALTH CARE EDUCATION/TRAINING PROGRAM

## 2022-11-30 RX ORDER — BENZONATATE 100 MG/1
100 CAPSULE ORAL
COMMUNITY
Start: 2022-11-25 | End: 2023-04-20 | Stop reason: ALTCHOICE

## 2022-11-30 RX ORDER — PREDNISONE 20 MG/1
20 TABLET ORAL DAILY
Qty: 5 TABLET | Refills: 0 | Status: SHIPPED | OUTPATIENT
Start: 2022-11-30 | End: 2022-12-05

## 2022-11-30 RX ORDER — CEFDINIR 300 MG/1
300 CAPSULE ORAL 2 TIMES DAILY
Qty: 10 CAPSULE | Refills: 0 | Status: SHIPPED | OUTPATIENT
Start: 2022-11-30 | End: 2022-12-05

## 2022-11-30 RX ORDER — AZITHROMYCIN 250 MG/1
TABLET, FILM COATED ORAL
Qty: 6 TABLET | Refills: 0 | Status: SHIPPED | OUTPATIENT
Start: 2022-11-30 | End: 2023-04-20 | Stop reason: ALTCHOICE

## 2022-11-30 RX ORDER — GUAIFENESIN/DEXTROMETHORPHAN 100-10MG/5
5 SYRUP ORAL EVERY 4 HOURS PRN
Qty: 354 ML | Refills: 2 | Status: SHIPPED | OUTPATIENT
Start: 2022-11-30 | End: 2022-12-10

## 2022-11-30 NOTE — PROGRESS NOTES
[facility] Note    Subjective:    The HPI and pertinent ROS is included in the Diagnostic Impression Remarks section at the end of the note. Please see below for further details. Chief complaint is at end of note.     Kristin is a pleasant intelligent patient who is here for evaluation.     Modified Medications    No medications on file       Data reviewed 274}  Previous medical records reviewed and summarized in plan section at end of note.      If you are due for any health screening(s) below please notify me so we can arrange them to be ordered and scheduled. Most healthy patients at your age complete them, but you are free to accept or refuse. If you can't do it, I'll definitely understand. If you can, I'd certainly appreciate it!     Tests to Keep You Healthy    Mammogram: Met on 2022  Colon Cancer Screening: Met on 2018  Last Blood Pressure <= 139/89 (2022): Yes  Tobacco Cessation: NO      The following portions of the patient's history were reviewed and updated as appropriate: allergies, current medications, past family history, past medical history, past social history, past surgical history and problem list.    She  has a past medical history of Allergy, Sterling's esophagus, Colon polyp, Diverticulitis, Diverticulosis, Fatty liver, GERD (gastroesophageal reflux disease), History of endoscopy (2021), cervical spine surgery (2018), Hyperlipidemia, Hypertension, Lupus (systemic lupus erythematosus) (), Osteoarthritis, Osteopenia, Syncope, and Tremor ().  She  has a past surgical history that includes  section; Splenectomy, total (); Cervical fusion; Colonoscopy (N/A, 2018); Upper gastrointestinal endoscopy (); Appendectomy (); Hysterectomy; Esophagogastroduodenoscopy (N/A, 2019); Esophagogastroduodenoscopy (2019); Esophagogastroduodenoscopy (N/A, 2019); Esophagogastroduodenoscopy (N/A, 2019);  "Esophagogastroduodenoscopy (N/A, 10/7/2019); Esophagogastroduodenoscopy (N/A, 2/10/2020); Colon surgery (2007); Colon surgery (2008); Cervical spine surgery; Insertion of implantable loop recorder (N/A, 7/17/2020); Esophagogastroduodenoscopy (N/A, 8/17/2020); Esophagogastroduodenoscopy (N/A, 4/7/2021); Cystoscopy (N/A, 5/26/2021); Esophagogastroduodenoscopy (N/A, 11/30/2021); and Esophagogastroduodenoscopy (N/A, 6/27/2022).    She  reports that she has been smoking cigarettes. She has a 35.00 pack-year smoking history. She has been exposed to tobacco smoke. She has never used smokeless tobacco. She reports that she does not drink alcohol and does not use drugs.  She family history includes Colon cancer in her father; Heart disease in her brother; Hyperlipidemia in her brother; Lung cancer in her brother, brother, and father; Lymphoma in her mother; Prostate cancer in her brother.    Review of patient's allergies indicates:   Allergen Reactions    Doxycycline Itching and Swelling     Prescribed 5/2021, took one dose, describes lip swelling, and "itch all over"    Mirtazapine Other (See Comments)     Hyperactivity.    Morphine Swelling and Rash     Other reaction(s): swelling in limbs    Lexapro [escitalopram oxalate] Other (See Comments)     Worsening tremor.  Sedation.    Primidone Other (See Comments)     Syncope.  Other reaction(s): dizziness,hypotension  Other reaction(s): Unknown  Other reaction(s): Unknown    Simvastatin Nausea And Vomiting and Other (See Comments)     Weakness.  Other reaction(s): nausea,vomiting    Beta-adrenergic agents Other (See Comments)     Low blood pressure and fainted.     Metoprolol succinate Other (See Comments)    Prochlorperazine      Other reaction(s): weakness    Topiramate     Compazine [prochlorperazine edisylate] Nausea Only    Tramadol Nausea And Vomiting       Tobacco Use: High Risk    Smoking Tobacco Use: Every Day    Smokeless Tobacco Use: Never    Passive Exposure: " "Current     Physical Examination  General appearance: alert, cooperative, no distress  Neck: no thyromegaly, no neck stiffness  Lungs: clear to auscultation, no wheezes, rales or rhonchi, symmetric air entry  Heart: normal rate, regular rhythm, normal S1, S2, no murmurs, rubs, clicks or gallops  Abdomen: soft, nontender, nondistended, no rigidity, rebound, or guarding.   Back: no point tenderness over spine  Extremities: peripheral pulses normal, no unilateral leg swelling or calf tenderness   Neurological:alert, oriented, normal speech, no new focal findings or movement disorder noted from baseline    BP Readings from Last 3 Encounters:   11/30/22 130/80   11/15/22 138/80   10/27/22 135/76     Wt Readings from Last 3 Encounters:   11/30/22 63 kg (138 lb 14.2 oz)   11/15/22 63.5 kg (139 lb 15.9 oz)   10/27/22 64.1 kg (141 lb 6.8 oz)     /80 (BP Location: Right arm, Patient Position: Sitting, BP Method: Large (Manual))   Pulse (!) 59   Temp 98.2 °F (36.8 °C) (Oral)   Resp 16   Ht 5' 4" (1.626 m)   Wt 63 kg (138 lb 14.2 oz)   SpO2 98%   BMI 23.84 kg/m²    274}  Laboratory: I have reviewed old labs below:    274}    Lab Results   Component Value Date    WBC 9.41 02/14/2022    HGB 12.1 02/14/2022    HCT 37.0 02/14/2022    MCV 95 02/14/2022     02/14/2022     02/14/2022    K 4.3 02/14/2022     02/14/2022    CALCIUM 9.2 02/14/2022    PHOS 3.5 06/03/2021    CO2 26 02/14/2022    GLU 79 02/14/2022    BUN 18 02/14/2022    CREATININE 0.6 02/14/2022    ANIONGAP 6 (L) 02/14/2022    PROT 7.7 06/01/2021    ALBUMIN 3.8 06/01/2021    BILITOT 0.6 06/01/2021    ALKPHOS 69 06/01/2021    ALT 14 06/01/2021    AST 19 06/01/2021    CHOL 241 (H) 03/09/2022    TRIG 76 03/09/2022    HDL 57 03/09/2022    LDLCALC 168.8 (H) 03/09/2022    TSH 2.178 02/09/2022     Lab reviewed by me: Particular labs of significance that I will monitor, workup, or treat to improve are mentioned below in diagnostic impression " "remarks.    Imaging/EKG: I have reviewed the pertinent results and my findings are noted in remarks.  274}    CC:   Chief Complaint   Patient presents with    URI        274}    Assessment/Plan  Kristin Guerra is a 72 y.o. female who presents to clinic with:  1. Chronic obstructive pulmonary disease, unspecified COPD type    2. Cough, unspecified type    3. Hypertension, unspecified type    4. Other fatigue    5. Pneumonia due to infectious organism, unspecified laterality, unspecified part of lung       274}  Diagnostic Impression Remarks + HPI     Documentation entered by me for this encounter may have been done in part using speech-recognition technology. Although I have made an effort to ensure accuracy, "sound like" errors may exist and should be interpreted in context.     Pna-productive cough 2 weeks hasnt improved taken some doxy no fever or chest pain no leg swelling or pain. Will send omnifec and z pack broaden covereage get xray  Copd- has wheezing will send prednisone not hypoxic    HTN-controlled cont current meds    Fatigue multifactorial likely infection monitor      This is the extent of this pleasant patient's concerns at this present time. She did not feel chest pain upon exertion, dyspnea, nausea, vomiting, diaphoresis, or syncope. No pleuritic chest pain, unilateral leg swelling, calf tenderness, or calf pain. Negative for unintentional weight loss night sweats and fevers. Kristin will return to clinic in a few months for further workup and reassessment or sooner as needed. She was instructed to call the clinic or go to the emergency department or urgent care immediately if her symptoms do not improve, worsens, or if any new symptoms develop. As we discussed that symptoms could worsen over the next 24 hours she was advised that if any increased swelling, pain, or numbness arise to go immediately to the ED. Patient knows to call any time if an emergency arises. Shared decision making occurred and " she verbalized understanding in agreement with this plan. I discussed imaging findings, diagnosis, possibilities, treatment options, medications, risks, and benefits. She had many questions regarding the options and long-term effects. All questions were answered. She expressed understanding after counseling regarding the diagnosis and recommendations. She was capable and demonstrated competence with understanding of these options. Shared decision making was performed resulting in her choosing the current treatment plan. Patient handout was given with instructions and recommendations. Advised the patient that if they become pregnant to alert us immediately to assess for medication changes. I also discussed the importance of close follow up to discuss labs, change or modify her medications if needed, monitor side effects, and further evaluation of medical problems.     Additional workup planned: see labs ordered below.    See below for labs and meds ordered with associated diagnosis      1. Chronic obstructive pulmonary disease, unspecified COPD type  - predniSONE (DELTASONE) 20 MG tablet; Take 1 tablet (20 mg total) by mouth once daily. for 5 days  Dispense: 5 tablet; Refill: 0    2. Cough, unspecified type  - X-Ray Chest PA And Lateral; Future  - dextromethorphan-guaiFENesin  mg/5 ml (ROBITUSSIN-DM)  mg/5 mL liquid; Take 5 mLs by mouth every 4 (four) hours as needed (cough).  Dispense: 354 mL; Refill: 2    3. Hypertension, unspecified type    4. Other fatigue    5. Pneumonia due to infectious organism, unspecified laterality, unspecified part of lung  - cefdinir (OMNICEF) 300 MG capsule; Take 1 capsule (300 mg total) by mouth 2 (two) times daily. for 5 days  Dispense: 10 capsule; Refill: 0  - azithromycin (Z-STACY) 250 MG tablet; Take 2 tablets by mouth on day 1; Take 1 tablet by mouth on days 2-5  Dispense: 6 tablet; Refill: 0    Gama Sanon MD   274}    If you are due for any health screening(s)  below please notify me so we can arrange them to be ordered and scheduled. Most healthy patients at your age complete them, but you are free to accept or refuse.     If you can't do it, I'll definitely understand. If you can, I'd certainly appreciate it!   Tests to Keep You Healthy    Mammogram: Met on 9/14/2022  Colon Cancer Screening: Met on 2/20/2018  Last Blood Pressure <= 139/89 (11/30/2022): Yes  Tobacco Cessation: NO

## 2022-12-07 ENCOUNTER — TELEPHONE (OUTPATIENT)
Dept: PODIATRY | Facility: CLINIC | Age: 72
End: 2022-12-07
Payer: MEDICARE

## 2022-12-07 NOTE — TELEPHONE ENCOUNTER
----- Message from Balbina Gr sent at 12/7/2022  2:40 PM CST -----  Contact: Self  Type:  Sooner Appointment Request    Caller is requesting a sooner appointment.  Caller declined first available appointment listed below.  Caller will not accept being placed on the waitlist and is requesting a message be sent to doctor.    Name of Caller:  Patient  When is the first available appointment?  N/A  Symptoms:  Red painful Ingrown toenail  Best Call Back Number:  [phone]  Additional Information:  Pt is looking to get in on Friday if possible, can't do tomorrow due to husbands chemo treatment. Please call pt back to schedule. Thank You.

## 2022-12-08 ENCOUNTER — TELEPHONE (OUTPATIENT)
Dept: CARDIOLOGY | Facility: CLINIC | Age: 72
End: 2022-12-08
Payer: MEDICARE

## 2022-12-08 ENCOUNTER — TELEPHONE (OUTPATIENT)
Dept: PODIATRY | Facility: CLINIC | Age: 72
End: 2022-12-08
Payer: MEDICARE

## 2022-12-08 DIAGNOSIS — Z95.0 PACEMAKER: Primary | ICD-10-CM

## 2022-12-09 DIAGNOSIS — R00.1 BRADYCARDIA: Primary | ICD-10-CM

## 2022-12-12 ENCOUNTER — OFFICE VISIT (OUTPATIENT)
Dept: PODIATRY | Facility: CLINIC | Age: 72
End: 2022-12-12
Payer: MEDICARE

## 2022-12-12 VITALS
OXYGEN SATURATION: 99 % | BODY MASS INDEX: 23.56 KG/M2 | RESPIRATION RATE: 18 BRPM | WEIGHT: 138 LBS | HEIGHT: 64 IN | HEART RATE: 62 BPM

## 2022-12-12 DIAGNOSIS — L60.0 INGROWN NAIL: Primary | ICD-10-CM

## 2022-12-12 DIAGNOSIS — M79.674 PAIN OF TOE OF RIGHT FOOT: ICD-10-CM

## 2022-12-12 PROCEDURE — 99213 PR OFFICE/OUTPT VISIT, EST, LEVL III, 20-29 MIN: ICD-10-PCS | Mod: 25,S$GLB,, | Performed by: PODIATRIST

## 2022-12-12 PROCEDURE — 11750 NAIL REMOVAL: ICD-10-PCS | Mod: T5,S$GLB,, | Performed by: PODIATRIST

## 2022-12-12 PROCEDURE — 3288F PR FALLS RISK ASSESSMENT DOCUMENTED: ICD-10-PCS | Mod: CPTII,S$GLB,, | Performed by: PODIATRIST

## 2022-12-12 PROCEDURE — 1125F AMNT PAIN NOTED PAIN PRSNT: CPT | Mod: CPTII,S$GLB,, | Performed by: PODIATRIST

## 2022-12-12 PROCEDURE — 3008F BODY MASS INDEX DOCD: CPT | Mod: CPTII,S$GLB,, | Performed by: PODIATRIST

## 2022-12-12 PROCEDURE — 99213 OFFICE O/P EST LOW 20 MIN: CPT | Mod: 25,S$GLB,, | Performed by: PODIATRIST

## 2022-12-12 PROCEDURE — 11750 EXCISION NAIL&NAIL MATRIX: CPT | Mod: T5,S$GLB,, | Performed by: PODIATRIST

## 2022-12-12 PROCEDURE — 3008F PR BODY MASS INDEX (BMI) DOCUMENTED: ICD-10-PCS | Mod: CPTII,S$GLB,, | Performed by: PODIATRIST

## 2022-12-12 PROCEDURE — 1101F PR PT FALLS ASSESS DOC 0-1 FALLS W/OUT INJ PAST YR: ICD-10-PCS | Mod: CPTII,S$GLB,, | Performed by: PODIATRIST

## 2022-12-12 PROCEDURE — 3288F FALL RISK ASSESSMENT DOCD: CPT | Mod: CPTII,S$GLB,, | Performed by: PODIATRIST

## 2022-12-12 PROCEDURE — 1101F PT FALLS ASSESS-DOCD LE1/YR: CPT | Mod: CPTII,S$GLB,, | Performed by: PODIATRIST

## 2022-12-12 PROCEDURE — 1159F PR MEDICATION LIST DOCUMENTED IN MEDICAL RECORD: ICD-10-PCS | Mod: CPTII,S$GLB,, | Performed by: PODIATRIST

## 2022-12-12 PROCEDURE — 1160F RVW MEDS BY RX/DR IN RCRD: CPT | Mod: CPTII,S$GLB,, | Performed by: PODIATRIST

## 2022-12-12 PROCEDURE — 1125F PR PAIN SEVERITY QUANTIFIED, PAIN PRESENT: ICD-10-PCS | Mod: CPTII,S$GLB,, | Performed by: PODIATRIST

## 2022-12-12 PROCEDURE — 1159F MED LIST DOCD IN RCRD: CPT | Mod: CPTII,S$GLB,, | Performed by: PODIATRIST

## 2022-12-12 PROCEDURE — 1160F PR REVIEW ALL MEDS BY PRESCRIBER/CLIN PHARMACIST DOCUMENTED: ICD-10-PCS | Mod: CPTII,S$GLB,, | Performed by: PODIATRIST

## 2022-12-12 NOTE — PATIENT INSTRUCTIONS
Understanding Ingrown Toenails    An ingrown nail is the result of a nail growing into the skin that surrounds it. This often occurs at either edge of the big toe. Ingrown nails may be caused by improper trimming, inherited nail deformities, injuries, fungal infections, or pressure.    Symptoms  Ingrown nails may cause pain at the tip of the toe or all the way to the base of the toe. The pain is often worse while walking. An ingrown nail may also lead to infection, inflammation, or a more serious condition. If its infected, you might see pus or redness.    Evaluation  To determine the extent of your problem, your healthcare provider examines and possibly presses the painful area. If other problems are suspected, blood tests, cultures, and X-rays may be done as well.    Treatment  If the nail isnt infected, your healthcare provider may trim the corner of it to help relieve your symptoms. He or she may need to remove one side of your nail back to the cuticle. The base of the nail may then be treated with a chemical to keep the ingrown part from growing back. Severe infections or ingrown nails may require antibiotics and temporary or permanent removal of a portion of the nail. To prevent pain, a local anesthetic may be used in these procedures. This treatment is usually done at your healthcare provider's office.    Prevention  Many nail problems can be prevented by wearing the right shoes and trimming your nails properly. To help avoid infection, keep your feet clean and dry. If you have diabetes, talk with your healthcare provider before doing any foot self-care.  The right shoes: Get your feet measured (your size may change as you age). Wear shoes that are supportive and roomy enough for your toes to wiggle. Look for shoes made of natural materials such as leather, which allow your feet to breathe.  Proper trimming: To avoid problems, trim your toenails straight across without cutting down into the corners. If you  cant trim your own nails, ask your healthcare provider to do so for you.    Date Last Reviewed: 10/1/2016  © 0020-3402 [employer]. [address] [place] [address], [place] [address]. All rights reserved. This information is not intended as a substitute for professional medical care. Always follow your healthcare professional's instructions.     INSTRUCTIONS FOLLOWING CORRECTIVE NAIL SURGERY TODAY    Go directly home and elevate foot.  Restrict your activities the remainder of the day.  Apply ice pack if needed.  Keep bandages clean and dry.  You may notice some oozing coming through the bandages; this is normal.  Do not remove the dressing, apply additional dressing on top should you need to.  If bandage becomes saturated with blood, call us.  Local anesthesia will last 3-6 hours.  For discomfort, take Advil, Tylenol or pain medication if prescribed.  If you were given antibiotics, take them until they are all gone. It is important to finish the antibiotics even if the wound looks better. This ensures that the infection clears.      TOMORROW    When you take your shower, get dressing saturated then remove the dressing so that the dressing does not pull or stick to the toe and bathe as normal.  Soak in 2 Tablespoons of Epsom Salt daily for 1 hour  Pour peroxide over the toe  Dry area.  Apply Neosporin and gauze bandage.  No Band-Aid  Re-bandage twice daily for two weeks until next appointment.  If any problems arise, call the office. We do have a 24 hours answering service.    If you had a procedure with phenol, it is normal for your toe to drain and have redness for 4-6 weeks.  Any redness or streaks going up the foot is NOT normal.     Your post-operative appointment in two weeks is not included in today's charges.  You will be expected to pay any co-payment or deductible.

## 2022-12-12 NOTE — PROGRESS NOTES
"  [address]  [place] [address]  Phone: [phone]   [phone]    Patient's PCP:Gavi Mishra MD  Referring Provider: No ref. provider found    Subjective:      Chief Complaint:: Ingrown Toenail (Right great toe bilateral border)    JOSE Guerra is a 72 y.o. female who presents today with a complaint of right great toe bilateral border ingrown nail lasting for over three months. Onset of symptoms pain and inflammation and reports no trauma.  Current symptoms include sharp to throbbing pain and inflammation.  Aggravating factors are socks, shoe gear, walking, and weightbearing. Symptoms have progressed over time. Treatment to date have included pedicures and previous home trimming.    Systemic Doctor: Gavi Mishra MD  Date Last Seen: 11/15/2022    Vitals:    22 1001   Pulse: 62   Resp: 18   SpO2: 99%   Weight: 62.6 kg (138 lb)   Height: 5' 4" (1.626 m)   PainSc:   7      Shoe Size: 8    Past Surgical History:   Procedure Laterality Date    APPENDECTOMY      removed during colon surgery    CERVICAL FUSION      CERVICAL SPINE SURGERY      Rods and screws.     SECTION      COLON SURGERY  2007    hemicolectomy    COLON SURGERY  2008    repair    COLONOSCOPY N/A 2018    Procedure: COLONOSCOPY;  Surgeon: Maverick Esquivel MD;  Location: [facility];  Service: Endoscopy;  Laterality: N/A; repeat in 5 years for surveillance    CYSTOSCOPY N/A 2021    Procedure: CYSTOSCOPY;  Surgeon: Allison Miranda MD;  Location: [facility] OR;  Service: Urology;  Laterality: N/A;    ESOPHAGOGASTRODUODENOSCOPY N/A 2019    Procedure: EGD (ESOPHAGOGASTRODUODENOSCOPY);  Surgeon: Monika Steele MD;  Location: [facility];  Service: Endoscopy;  Laterality: N/A;    ESOPHAGOGASTRODUODENOSCOPY  2019    with RFA and biopsies    ESOPHAGOGASTRODUODENOSCOPY N/A 2019    Procedure: EGD (ESOPHAGOGASTRODUODENOSCOPY);  Surgeon: Uzair Cantor MD;  Location: [facility];  Service: " Endoscopy;  Laterality: N/A;  n+v with anesthesia    ESOPHAGOGASTRODUODENOSCOPY N/A 8/21/2019    Procedure: EGD (ESOPHAGOGASTRODUODENOSCOPY);  Surgeon: Uzair Cantro MD;  Location: [facility];  Service: Endoscopy;  Laterality: N/A;    ESOPHAGOGASTRODUODENOSCOPY N/A 10/7/2019    Procedure: EGD (ESOPHAGOGASTRODUODENOSCOPY);  Surgeon: Uzair Cantor MD;  Location: [facility];  Service: Endoscopy;  Laterality: N/A;    ESOPHAGOGASTRODUODENOSCOPY N/A 2/10/2020    Procedure: EGD (ESOPHAGOGASTRODUODENOSCOPY);  Surgeon: Uzair Cantor MD;  Location: [facility];  Service: Endoscopy;  Laterality: N/A;  Requests earliest procedure time    ESOPHAGOGASTRODUODENOSCOPY N/A 8/17/2020    Procedure: EGD (ESOPHAGOGASTRODUODENOSCOPY);  Surgeon: Uzair Cantor MD;  Location: [facility];  Service: Endoscopy;  Laterality: N/A;  BARRETTS    ESOPHAGOGASTRODUODENOSCOPY N/A 4/7/2021    Procedure: ESOPHAGOGASTRODUODENOSCOPY (EGD);  Surgeon: Uzair Cantor MD;  Location: [facility];  Service: Endoscopy;  Laterality: N/A;  Needs Rapid Covid MP    ESOPHAGOGASTRODUODENOSCOPY N/A 11/30/2021    Procedure: EGD (ESOPHAGOGASTRODUODENOSCOPY);  Surgeon: Uzair Cantor MD;  Location: [facility];  Service: Endoscopy;  Laterality: N/A;    ESOPHAGOGASTRODUODENOSCOPY N/A 6/27/2022    Procedure: EGD (ESOPHAGOGASTRODUODENOSCOPY);  Surgeon: Uzair Cantor MD;  Location: [facility];  Service: Endoscopy;  Laterality: N/A;  5/19/22: fully vaccinated. instructions mailed-[place]    loop recorder only    HYSTERECTOMY      ovaries remain    INSERTION OF IMPLANTABLE LOOP RECORDER N/A 7/17/2020    Procedure: INSERTION, IMPLANTABLE LOOP RECORDER (MEDTRONIC);  Surgeon: Karyna Vasques MD;  Location: [facility] CATH/EP LAB;  Service: Cardiology;  Laterality: N/A;    SPLENECTOMY, TOTAL  2007    Injured during surgery and removed    UPPER GASTROINTESTINAL ENDOSCOPY  2007    GERD & hiatal hernia per patient report     Past Medical History:   Diagnosis Date     "Allergy     Sterling's esophagus     Colon polyp     Diverticulitis     Diverticulosis     Fatty liver     GERD (gastroesophageal reflux disease)     History of endoscopy 4/7/2021    Dr Uzair Cantor  Impression:            - Normal oropharynx.                         - Hiatal hernia.                         - Sterling's esophagus. Treated with radiofrequency                         ablation.                         - Normal stomach.                         - Normal examined duodenum.                         - No specimens collected.     Hx of cervical spine surgery 11/07/2018    Hyperlipidemia     Hypertension     Lupus (systemic lupus erythematosus) 1999    discoid    Osteoarthritis     Osteopenia     Syncope     Tremor 2005    essential     Family History   Problem Relation Age of Onset    Colon cancer Father         diagnosed in his 60's    Lung cancer Father     Lymphoma Mother         Brain    Lung cancer Brother     Lung cancer Brother         metastatic from prostate    Prostate cancer Brother     Heart disease Brother         valvular disease    Hyperlipidemia Brother     Eczema Neg Hx     Lupus Neg Hx     Psoriasis Neg Hx     Melanoma Neg Hx     Crohn's disease Neg Hx     Ulcerative colitis Neg Hx     Stomach cancer Neg Hx     Esophageal cancer Neg Hx         Social History:   Marital Status:   Alcohol History:  reports no history of alcohol use.  Tobacco History:  reports that she has been smoking cigarettes. She has a 35.00 pack-year smoking history. She has been exposed to tobacco smoke. She has never used smokeless tobacco.  Drug History:  reports no history of drug use.    Review of patient's allergies indicates:   Allergen Reactions    Doxycycline Itching and Swelling     Prescribed 5/2021, took one dose, describes lip swelling, and "itch all over"    Mirtazapine Other (See Comments)     Hyperactivity.    Morphine Swelling and Rash     Other reaction(s): swelling in limbs    Lexapro " [escitalopram oxalate] Other (See Comments)     Worsening tremor.  Sedation.    Primidone Other (See Comments)     Syncope.  Other reaction(s): dizziness,hypotension  Other reaction(s): Unknown  Other reaction(s): Unknown    Simvastatin Nausea And Vomiting and Other (See Comments)     Weakness.  Other reaction(s): nausea,vomiting    Beta-adrenergic agents Other (See Comments)     Low blood pressure and fainted.     Metoprolol succinate Other (See Comments)    Prochlorperazine      Other reaction(s): weakness    Topiramate     Compazine [prochlorperazine edisylate] Nausea Only    Tramadol Nausea And Vomiting       Current Outpatient Medications   Medication Sig Dispense Refill    albuterol-ipratropium (DUO-NEB) 2.5 mg-0.5 mg/3 mL nebulizer solution Take 3 mLs by nebulization every 6 (six) hours as needed for Wheezing or Shortness of Breath. Rescue 120 each 5    aluminum & magnesium hydroxide-simethicone (MYLANTA MAX STRENGTH) 400-400-40 mg/5 mL suspension Take 5 mLs by mouth every 6 (six) hours as needed for Indigestion.       amLODIPine (NORVASC) 5 MG tablet Take 1 tablet (5 mg total) by mouth once daily. 90 tablet 3    azelastine (ASTELIN) 137 mcg (0.1 %) nasal spray 1 spray (137 mcg total) by Nasal route 2 (two) times daily. 30 mL 11    azithromycin (Z-STACY) 250 MG tablet Take 2 tablets by mouth on day 1; Take 1 tablet by mouth on days 2-5 6 tablet 0    benzonatate (TESSALON) 100 MG capsule Take 100 mg by mouth.      clindamycin (CLEOCIN T) 1 % external solution Apply topically 2 (two) times daily. Apply to affected area twice daily 30 mL 0    clobetasoL (TEMOVATE) 0.05 % cream Apply to feet daily to bid prn flare (Patient taking differently: Apply 1 application topically 2 (two) times daily. Apply to feet daily to bid prn flare) 30 g 2    clonazePAM (KLONOPIN) 0.5 MG tablet Take 0.5 tablets (0.25 mg total) by mouth 2 (two) times daily as needed for Anxiety (or insomnia). 30 tablet 0    esomeprazole (NEXIUM) 40 MG  capsule TAKE ONE CAPSULE BY MOUTH TWICE DAILY 180 capsule 3    FLUoxetine 10 MG capsule Take 1 capsule (10 mg total) by mouth once daily. 90 capsule 1    fluticasone (FLONASE) 50 mcg/actuation nasal spray 1 spray by Each Nostril route daily as needed for Allergies.       gabapentin (NEURONTIN) 300 MG capsule Take 1 capsule (300 mg total) by mouth every evening. 30 capsule 3    mupirocin (BACTROBAN) 2 % ointment 3 (three) times daily. Apply to affected area      polyethylene glycol (GLYCOLAX) 17 gram PwPk Take 17 g by mouth daily as needed.      tiotropium bromide (SPIRIVA RESPIMAT) 2.5 mcg/actuation inhaler Inhale 2 puffs into the lungs Daily. Controller 4 g 11    traZODone (DESYREL) 50 MG tablet Take 50 mg by mouth every evening.       No current facility-administered medications for this visit.       Review of Systems   Constitutional:  Negative for chills, fatigue, fever and unexpected weight change.   HENT:  Negative for hearing loss and trouble swallowing.    Eyes:  Negative for photophobia and visual disturbance.   Respiratory:  Negative for cough, shortness of breath and wheezing.    Cardiovascular:  Negative for chest pain, palpitations and leg swelling.   Gastrointestinal:  Negative for abdominal pain and nausea.   Genitourinary:  Negative for dysuria and frequency.   Musculoskeletal:  Positive for back pain. Negative for arthralgias, gait problem, joint swelling and myalgias.   Skin:  Positive for color change. Negative for rash and wound.   Neurological:  Negative for tremors, seizures, weakness, numbness and headaches.   Hematological:  Does not bruise/bleed easily.   Psychiatric/Behavioral:  Positive for dysphoric mood. The patient is nervous/anxious.        Objective:        Physical Exam:   Foot Exam    General  General Appearance: appears stated age and healthy   Orientation: alert and oriented to person, place, and time   Affect: appropriate   Gait: unimpaired       Right Foot/Ankle     Inspection  and Palpation  Ecchymosis: none  Tenderness: (Medial lateral border great toenail)  Swelling: none   Arch: normal  Skin Exam: skin intact; no drainage, no ulcer and no erythema   Neurovascular  Dorsalis pedis: 2+  Posterior tibial: 2+  Capillary Refill: 2+  Varicose veins: not present  Saphenous nerve sensation: normal  Tibial nerve sensation: normal  Superficial peroneal nerve sensation: normal  Deep peroneal nerve sensation: normal  Sural nerve sensation: normal    Edema  Type of edema: non-pitting    Muscle Strength  Ankle dorsiflexion: 5  Ankle plantar flexion: 5  Ankle inversion: 5  Ankle eversion: 5  Great toe extension: 5  Great toe flexion: 5    Range of Motion    Normal right ankle ROM    Tests  Anterior drawer: negative   Talar tilt: negative   PT Tinel's sign: negative    Paresthesia: negative  Comments  Ingrown medial lateral border great toenail.  Tender to palpation.  Mild edema and erythema present.  No purulence.      Physical Exam  Cardiovascular:      Pulses:           Dorsalis pedis pulses are 2+ on the right side.        Posterior tibial pulses are 2+ on the right side.   Feet:      Right foot:      Skin integrity: No ulcer or erythema.             Right Ankle/Foot Exam     Range of Motion   The patient has normal right ankle ROM.    Comments:  Ingrown medial lateral border great toenail.  Tender to palpation.  Mild edema and erythema present.  No purulence.        Muscle Strength   Right Lower Extremity   Ankle Dorsiflexion:  5   Plantar flexion:  5/5    Vascular Exam     Right Pulses  Dorsalis Pedis:      2+  Posterior Tibial:      2+         Imaging: none            Assessment:       1. Ingrown nail    2. Pain of toe of right foot      Plan:   Ingrown nail  -     Nail Removal    Pain of toe of right foot  -     Nail Removal      Follow up if symptoms worsen or fail to improve.    Nail Removal    Date/Time: 12/12/2022 9:50 AM  Performed by: Dutch Hopper DPM  Authorized by: Dutch Hopper DPM      Consent Done?:  Yes (Written)  Time out: Immediately prior to the procedure a time out was called    Prep: patient was prepped and draped in usual sterile fashion    Location:     Location:  Right foot    Location detail:  Right big toe  Anesthesia:     Anesthesia:  Local infiltration    Local anesthetic:  Lidocaine 1% without epinephrine  Procedure Details:     Preparation:  Skin prepped with alcohol    Amount removed:  Partial    Side:  Bilateral    Wedge excision of skin of nail fold: No      Nail bed sutured?: No      Nail matrix removed:  Partial    Removal method:  Phenol and alcohol    Dressing applied:  4x4    Patient tolerance:  Patient tolerated the procedure well with no immediate complications     Medial lateral border          Counseling:     I provided patient education verbally regarding:   Patient diagnosis, treatment options, as well as alternatives, risks, and benefits.     Ingrown toenail treatment options of no treatment, avulsion of nail border under local with regrowth of nail, chemical matrixectomy for attempted permanent correction of the problem. Patient was educated about daily dressing changes, soaks, and medications following removal of the nail.       This note was created using Dragon voice recognition software that occasionally misinterpreted phrases or words.

## 2022-12-19 ENCOUNTER — OFFICE VISIT (OUTPATIENT)
Dept: UROLOGY | Facility: CLINIC | Age: 72
End: 2022-12-19
Payer: MEDICARE

## 2022-12-19 ENCOUNTER — TELEPHONE (OUTPATIENT)
Dept: UROLOGY | Facility: CLINIC | Age: 72
End: 2022-12-19

## 2022-12-19 VITALS
HEART RATE: 83 BPM | HEIGHT: 64 IN | DIASTOLIC BLOOD PRESSURE: 81 MMHG | WEIGHT: 138 LBS | SYSTOLIC BLOOD PRESSURE: 172 MMHG | BODY MASS INDEX: 23.56 KG/M2

## 2022-12-19 DIAGNOSIS — N39.41 URGE INCONTINENCE: ICD-10-CM

## 2022-12-19 DIAGNOSIS — N39.3 STRESS INCONTINENCE: ICD-10-CM

## 2022-12-19 DIAGNOSIS — R35.0 URINARY FREQUENCY: Primary | ICD-10-CM

## 2022-12-19 DIAGNOSIS — R39.15 URINARY URGENCY: ICD-10-CM

## 2022-12-19 PROCEDURE — 99214 OFFICE O/P EST MOD 30 MIN: CPT | Mod: S$GLB,,, | Performed by: NURSE PRACTITIONER

## 2022-12-19 PROCEDURE — 51798 US URINE CAPACITY MEASURE: CPT | Mod: S$GLB,,, | Performed by: NURSE PRACTITIONER

## 2022-12-19 PROCEDURE — 3008F PR BODY MASS INDEX (BMI) DOCUMENTED: ICD-10-PCS | Mod: CPTII,S$GLB,, | Performed by: NURSE PRACTITIONER

## 2022-12-19 PROCEDURE — 1160F PR REVIEW ALL MEDS BY PRESCRIBER/CLIN PHARMACIST DOCUMENTED: ICD-10-PCS | Mod: CPTII,S$GLB,, | Performed by: NURSE PRACTITIONER

## 2022-12-19 PROCEDURE — 1159F MED LIST DOCD IN RCRD: CPT | Mod: CPTII,S$GLB,, | Performed by: NURSE PRACTITIONER

## 2022-12-19 PROCEDURE — 1159F PR MEDICATION LIST DOCUMENTED IN MEDICAL RECORD: ICD-10-PCS | Mod: CPTII,S$GLB,, | Performed by: NURSE PRACTITIONER

## 2022-12-19 PROCEDURE — 1101F PR PT FALLS ASSESS DOC 0-1 FALLS W/OUT INJ PAST YR: ICD-10-PCS | Mod: CPTII,S$GLB,, | Performed by: NURSE PRACTITIONER

## 2022-12-19 PROCEDURE — 1101F PT FALLS ASSESS-DOCD LE1/YR: CPT | Mod: CPTII,S$GLB,, | Performed by: NURSE PRACTITIONER

## 2022-12-19 PROCEDURE — 3288F PR FALLS RISK ASSESSMENT DOCUMENTED: ICD-10-PCS | Mod: CPTII,S$GLB,, | Performed by: NURSE PRACTITIONER

## 2022-12-19 PROCEDURE — 3079F PR MOST RECENT DIASTOLIC BLOOD PRESSURE 80-89 MM HG: ICD-10-PCS | Mod: CPTII,S$GLB,, | Performed by: NURSE PRACTITIONER

## 2022-12-19 PROCEDURE — 1160F RVW MEDS BY RX/DR IN RCRD: CPT | Mod: CPTII,S$GLB,, | Performed by: NURSE PRACTITIONER

## 2022-12-19 PROCEDURE — 99999 PR PBB SHADOW E&M-EST. PATIENT-LVL IV: CPT | Mod: PBBFAC,,, | Performed by: NURSE PRACTITIONER

## 2022-12-19 PROCEDURE — 99214 PR OFFICE/OUTPT VISIT, EST, LEVL IV, 30-39 MIN: ICD-10-PCS | Mod: S$GLB,,, | Performed by: NURSE PRACTITIONER

## 2022-12-19 PROCEDURE — 3288F FALL RISK ASSESSMENT DOCD: CPT | Mod: CPTII,S$GLB,, | Performed by: NURSE PRACTITIONER

## 2022-12-19 PROCEDURE — 99999 PR PBB SHADOW E&M-EST. PATIENT-LVL IV: ICD-10-PCS | Mod: PBBFAC,,, | Performed by: NURSE PRACTITIONER

## 2022-12-19 PROCEDURE — 3077F SYST BP >= 140 MM HG: CPT | Mod: CPTII,S$GLB,, | Performed by: NURSE PRACTITIONER

## 2022-12-19 PROCEDURE — 51798 PR MEAS,POST-VOID RES,US,NON-IMAGING: ICD-10-PCS | Mod: S$GLB,,, | Performed by: NURSE PRACTITIONER

## 2022-12-19 PROCEDURE — 3008F BODY MASS INDEX DOCD: CPT | Mod: CPTII,S$GLB,, | Performed by: NURSE PRACTITIONER

## 2022-12-19 PROCEDURE — 3077F PR MOST RECENT SYSTOLIC BLOOD PRESSURE >= 140 MM HG: ICD-10-PCS | Mod: CPTII,S$GLB,, | Performed by: NURSE PRACTITIONER

## 2022-12-19 PROCEDURE — 3079F DIAST BP 80-89 MM HG: CPT | Mod: CPTII,S$GLB,, | Performed by: NURSE PRACTITIONER

## 2022-12-19 RX ORDER — MIRABEGRON 25 MG/1
25 TABLET, FILM COATED, EXTENDED RELEASE ORAL DAILY
Qty: 30 TABLET | Refills: 3 | Status: SHIPPED | OUTPATIENT
Start: 2022-12-19 | End: 2023-05-16 | Stop reason: SDUPTHER

## 2022-12-19 NOTE — TELEPHONE ENCOUNTER
----- Message from Alisa Tellez sent at 12/19/2022 12:27 PM CST -----  Contact: Patient  Type:  Needs Medical Advice    Who Called:  Patient      Would the patient rather a call back or a response via [facility]?  Call    Best Call Back Number:  [phone] (home)     Additional Information:  Patient states she was just seen and Medication mirabegron (MYRBETRIQ) 25 mg Tb24 ER tablet is not available at that pharmacy and needs something else called in     Please call to advise

## 2022-12-19 NOTE — PROGRESS NOTES
CHIEF COMPLAINT:    Mrs Guerra is a 72 y.o. female presenting for urinary incontinence.  PRESENTING ILLNESS:    Kristin Guerra is a 72 y.o. female who presents for urinary incontinence. Last clinic visit was  5/5/21 with Dr. Miranda.    12/19/22  Patient presents to clinic for urinary incontinence that has gradually worsened since last clinic visit. She reports IRAJ with coughing, daytime frequency every 1-2 hours, nocturia x 2-3, urgency and UUI. She wears 3-4 depends per day for incontinence. Denies dysuria, gross hematuria, flank pain, fever, chills, nausea or vomiting. No UTI's since 2019. She denies constipation and reports constipation was caused in the past from a medication she was taking. She continues to drink coffee, caffeine free diet coke, and 2-3 bottles of water per day.     5/26/21 Cysto with Dr. Miranda  Findings:   - mild trabeculations present in bladder  - urethral caruncle present  - no bladder abnormalities   - no IRAJ on exam with full bladder  The patient will follow up in 1 year. She is not interested in treatment for her OAB or incontinence at this time. I recommended she follow up with GI for her constipation issues.     5/5/21 with Dr. Miranda  Previous patient of Dr. Rivas, last seen in April 2018. Was started on myrbetriq however did not take the medication. Also has hx of microhematuria and was recommended to undergo cysto however patient refused. UA from 2/1/21 showed 7 RBC, 0 WBC, 1 squam.   Current everyday smoker, 1 ppd.  No gross hematuria.      Today complaining of leakage when she coughs and sneezes.   Feels as though she goes frequently and urgently. Leaks on the way to the restroom occasionally. Feels as though she doesn't empty all the way.   Nocturia x 2. Leaks a night.   Occasional dysuria.  Wears 2 ppd. Also wears pads at night.      Drinks coffee in the am and afternoon, 1 diet coke, 1-2 bottles of water.   Has a bowel movement daily, but has to take miralax on  occasion. Does have to strain, and is firm.   She takes pain medication for neck pain.      UA today: +blood, leuk and nitrite negative   PVR today: 25 cc     Last urine culture: Enterococcus (12/13/18)    Urine cultures:   Lab Results   Component Value Date    LABURIN No growth 07/09/2019    LABURIN No growth 04/25/2019    LABURIN No growth 01/14/2019    LABURIN ENTEROCOCCUS FAECALIS  10,000 - 49,999 cfu/ml   01/08/2019    LABURIN  12/13/2018     ENTEROCOCCUS FAECALIS  >100,000 cfu/ml  No other significant isolate      LABURIN No significant growth 10/31/2018    LABURIN No growth 04/19/2018         REVIEW OF SYSTEMS:    Review of Systems    Constitutional: Negative for fever and chills.   HENT: Negative for hearing loss.   Eyes: Negative for visual disturbance.   Respiratory: Negative for shortness of breath.   Cardiovascular: Negative for chest pain.   Gastrointestinal: Negative for nausea, vomiting.   Genitourinary:  See above  Neurological: Negative for dizziness.   Hematological: Does not bruise/bleed easily.   Psychiatric/Behavioral: Negative for confusion.     PATIENT HISTORY:    Past Medical History:   Diagnosis Date    Allergy     Sterling's esophagus     Colon polyp     Diverticulitis     Diverticulosis     Fatty liver     GERD (gastroesophageal reflux disease)     History of endoscopy 4/7/2021    Dr Uzair Cantor  Impression:            - Normal oropharynx.                         - Hiatal hernia.                         - Sterling's esophagus. Treated with radiofrequency                         ablation.                         - Normal stomach.                         - Normal examined duodenum.                         - No specimens collected.     Hx of cervical spine surgery 11/07/2018    Hyperlipidemia     Hypertension     Lupus (systemic lupus erythematosus) 1999    discoid    Osteoarthritis     Osteopenia     Syncope     Tremor 2005    essential       Past Surgical History:   Procedure Laterality  Date    APPENDECTOMY      removed during colon surgery    CERVICAL FUSION      CERVICAL SPINE SURGERY      Rods and screws.     SECTION      COLON SURGERY  2007    hemicolectomy    COLON SURGERY  2008    repair    COLONOSCOPY N/A 2018    Procedure: COLONOSCOPY;  Surgeon: Maverick Esquivel MD;  Location: [facility];  Service: Endoscopy;  Laterality: N/A; repeat in 5 years for surveillance    CYSTOSCOPY N/A 2021    Procedure: CYSTOSCOPY;  Surgeon: Allison Miranda MD;  Location: Replaced by [facility];  Service: Urology;  Laterality: N/A;    ESOPHAGOGASTRODUODENOSCOPY N/A 2019    Procedure: EGD (ESOPHAGOGASTRODUODENOSCOPY);  Surgeon: Monika Steele MD;  Location: [facility];  Service: Endoscopy;  Laterality: N/A;    ESOPHAGOGASTRODUODENOSCOPY  2019    with RFA and biopsies    ESOPHAGOGASTRODUODENOSCOPY N/A 2019    Procedure: EGD (ESOPHAGOGASTRODUODENOSCOPY);  Surgeon: Uzair Cantor MD;  Location: [facility];  Service: Endoscopy;  Laterality: N/A;  n+v with anesthesia    ESOPHAGOGASTRODUODENOSCOPY N/A 2019    Procedure: EGD (ESOPHAGOGASTRODUODENOSCOPY);  Surgeon: Uzair Cantor MD;  Location: [facility];  Service: Endoscopy;  Laterality: N/A;    ESOPHAGOGASTRODUODENOSCOPY N/A 10/7/2019    Procedure: EGD (ESOPHAGOGASTRODUODENOSCOPY);  Surgeon: Uzair Cantor MD;  Location: [facility];  Service: Endoscopy;  Laterality: N/A;    ESOPHAGOGASTRODUODENOSCOPY N/A 2/10/2020    Procedure: EGD (ESOPHAGOGASTRODUODENOSCOPY);  Surgeon: Uzair Cantor MD;  Location: [facility];  Service: Endoscopy;  Laterality: N/A;  Requests earliest procedure time    ESOPHAGOGASTRODUODENOSCOPY N/A 2020    Procedure: EGD (ESOPHAGOGASTRODUODENOSCOPY);  Surgeon: Uzair Cantor MD;  Location: [facility];  Service: Endoscopy;  Laterality: N/A;  BARRETTS    ESOPHAGOGASTRODUODENOSCOPY N/A 2021    Procedure: ESOPHAGOGASTRODUODENOSCOPY (EGD);  Surgeon: Uzair Cantor MD;  Location: [facility];   Service: Endoscopy;  Laterality: N/A;  Needs Rapid Covid MP    ESOPHAGOGASTRODUODENOSCOPY N/A 11/30/2021    Procedure: EGD (ESOPHAGOGASTRODUODENOSCOPY);  Surgeon: Uzair aCntor MD;  Location: [facility] ENDO;  Service: Endoscopy;  Laterality: N/A;    ESOPHAGOGASTRODUODENOSCOPY N/A 6/27/2022    Procedure: EGD (ESOPHAGOGASTRODUODENOSCOPY);  Surgeon: Uzair Cantor MD;  Location: [facility] ENDO;  Service: Endoscopy;  Laterality: N/A;  5/19/22: fully vaccinated. instructions mailed-[place]    loop recorder only    HYSTERECTOMY      ovaries remain    INSERTION OF IMPLANTABLE LOOP RECORDER N/A 7/17/2020    Procedure: INSERTION, IMPLANTABLE LOOP RECORDER (wiMAN);  Surgeon: Karyna Vasques MD;  Location: [facility] CATH/EP LAB;  Service: Cardiology;  Laterality: N/A;    SPLENECTOMY, TOTAL  2007    Injured during surgery and removed    UPPER GASTROINTESTINAL ENDOSCOPY  2007    GERD & hiatal hernia per patient report       Family History   Problem Relation Age of Onset    Colon cancer Father         diagnosed in his 60's    Lung cancer Father     Lymphoma Mother         Brain    Lung cancer Brother     Lung cancer Brother         metastatic from prostate    Prostate cancer Brother     Heart disease Brother         valvular disease    Hyperlipidemia Brother     Eczema Neg Hx     Lupus Neg Hx     Psoriasis Neg Hx     Melanoma Neg Hx     Crohn's disease Neg Hx     Ulcerative colitis Neg Hx     Stomach cancer Neg Hx     Esophageal cancer Neg Hx        Social History     Socioeconomic History    Marital status:     Number of children: 2   Tobacco Use    Smoking status: Every Day     Packs/day: 1.00     Years: 35.00     Pack years: 35.00     Types: Cigarettes     Passive exposure: Current    Smokeless tobacco: Never   Substance and Sexual Activity    Alcohol use: No    Drug use: No    Sexual activity: Not Currently       Allergies:  Doxycycline, Mirtazapine, Morphine, Lexapro [escitalopram oxalate], Primidone, Simvastatin,  Beta-adrenergic agents, Metoprolol succinate, Prochlorperazine, Topiramate, Compazine [prochlorperazine edisylate], and Tramadol    Medications:    Current Outpatient Medications:     aluminum & magnesium hydroxide-simethicone (MYLANTA MAX STRENGTH) 400-400-40 mg/5 mL suspension, Take 5 mLs by mouth every 6 (six) hours as needed for Indigestion. , Disp: , Rfl:     amLODIPine (NORVASC) 5 MG tablet, Take 1 tablet (5 mg total) by mouth once daily., Disp: 90 tablet, Rfl: 3    azelastine (ASTELIN) 137 mcg (0.1 %) nasal spray, 1 spray (137 mcg total) by Nasal route 2 (two) times daily., Disp: 30 mL, Rfl: 11    azithromycin (Z-STACY) 250 MG tablet, Take 2 tablets by mouth on day 1; Take 1 tablet by mouth on days 2-5, Disp: 6 tablet, Rfl: 0    benzonatate (TESSALON) 100 MG capsule, Take 100 mg by mouth., Disp: , Rfl:     clindamycin (CLEOCIN T) 1 % external solution, Apply topically 2 (two) times daily. Apply to affected area twice daily, Disp: 30 mL, Rfl: 0    clobetasoL (TEMOVATE) 0.05 % cream, Apply to feet daily to bid prn flare (Patient taking differently: Apply 1 application topically 2 (two) times daily. Apply to feet daily to bid prn flare), Disp: 30 g, Rfl: 2    clonazePAM (KLONOPIN) 0.5 MG tablet, Take 0.5 tablets (0.25 mg total) by mouth 2 (two) times daily as needed for Anxiety (or insomnia)., Disp: 30 tablet, Rfl: 0    esomeprazole (NEXIUM) 40 MG capsule, TAKE ONE CAPSULE BY MOUTH TWICE DAILY, Disp: 180 capsule, Rfl: 3    FLUoxetine 10 MG capsule, Take 1 capsule (10 mg total) by mouth once daily., Disp: 90 capsule, Rfl: 1    fluticasone (FLONASE) 50 mcg/actuation nasal spray, 1 spray by Each Nostril route daily as needed for Allergies. , Disp: , Rfl:     gabapentin (NEURONTIN) 300 MG capsule, Take 1 capsule (300 mg total) by mouth every evening., Disp: 30 capsule, Rfl: 3    mupirocin (BACTROBAN) 2 % ointment, 3 (three) times daily. Apply to affected area, Disp: , Rfl:     polyethylene glycol (GLYCOLAX) 17 gram  PwPk, Take 17 g by mouth daily as needed., Disp: , Rfl:     tiotropium bromide (SPIRIVA RESPIMAT) 2.5 mcg/actuation inhaler, Inhale 2 puffs into the lungs Daily. Controller, Disp: 4 g, Rfl: 11    traZODone (DESYREL) 50 MG tablet, Take 50 mg by mouth every evening., Disp: , Rfl:     albuterol-ipratropium (DUO-NEB) 2.5 mg-0.5 mg/3 mL nebulizer solution, Take 3 mLs by nebulization every 6 (six) hours as needed for Wheezing or Shortness of Breath. Rescue, Disp: 120 each, Rfl: 5    mirabegron (MYRBETRIQ) 25 mg Tb24 ER tablet, Take 1 tablet (25 mg total) by mouth once daily., Disp: 30 tablet, Rfl: 3    PHYSICAL EXAMINATION:    Constitutional: She is oriented to person, place, and time. She appears well-developed and well-nourished.  She is in no apparent distress.    Neck: Normal ROM.     Cardiovascular: Normal rate.      Pulmonary/Chest: Effort normal. No respiratory distress.     Abdominal:  She exhibits no distension.  There is no CVA tenderness.     Neurological: She is alert and oriented to person, place, and time.     Skin: Skin is warm and dry.     Psych: Cooperative with normal affect.      Physical Exam      LABS:    PVR: 7 ml    Lab Results   Component Value Date    CREATININE 0.6 02/14/2022       IMPRESSION:    Encounter Diagnoses   Name Primary?    Urinary frequency Yes    Urge incontinence     Stress incontinence     Urinary urgency        PLAN:  -Discussed OAB with patient. Conservative measures reviewed with patient. Pt would like to start OAB medication. With hx of constipation, she would prefer to try myrbetriq first as recommended in the past by Dr. Rivas and Dr. Miranda.   Discussed side effects, indications, and MOA for Myrbetriq. Prescription sent to the pharmacy. Pt verbalized understanding.  If Myebetriq too expensive, will try Vesicare and monitor constipation.  -Discussed kegels for stress incontinence. Educated her to do 10 sets of 10 daily- squeeze for 10 seconds and rest for 10 seconds.  Also, gave handout on kegels.   If no improvement with kegels, recommend PT pelvic floor.  -BP elevated today in clinic. Asymptomatic. Monitor BP at home. If elevated, please f/u with PCP. If becomes symptomatic, go to ED for evaluation.  Pt verbalized understanding.  -RTC 8 weeks      I encouraged her or any of her family members to call or email me with questions and/or concerns.      30 minutes of total time spent on the encounter, which includes face to face time and non-face to face time preparing to see the patient (eg, review of tests), Obtaining and/or reviewing separately obtained history, Documenting clinical information in the electronic or other health record, Independently interpreting results (not separately reported) and communicating results to the patient/family/caregiver, or Care coordination (not separately reported).

## 2022-12-19 NOTE — TELEPHONE ENCOUNTER
Spoke with the pt and advised we are waiting on the PA that was started today. Pt verbalized understanding.

## 2022-12-28 ENCOUNTER — TELEPHONE (OUTPATIENT)
Dept: ADMINISTRATIVE | Facility: CLINIC | Age: 72
End: 2022-12-28
Payer: MEDICARE

## 2022-12-30 ENCOUNTER — OFFICE VISIT (OUTPATIENT)
Dept: PODIATRY | Facility: CLINIC | Age: 72
End: 2022-12-30
Payer: MEDICARE

## 2022-12-30 ENCOUNTER — LAB VISIT (OUTPATIENT)
Dept: LAB | Facility: HOSPITAL | Age: 72
End: 2022-12-30
Attending: PODIATRIST
Payer: MEDICARE

## 2022-12-30 VITALS — BODY MASS INDEX: 23.56 KG/M2 | WEIGHT: 138 LBS | HEIGHT: 64 IN | RESPIRATION RATE: 18 BRPM

## 2022-12-30 DIAGNOSIS — M10.071 ACUTE IDIOPATHIC GOUT OF RIGHT FOOT: ICD-10-CM

## 2022-12-30 DIAGNOSIS — L60.0 INGROWN NAIL: ICD-10-CM

## 2022-12-30 DIAGNOSIS — M79.674 PAIN OF TOE OF RIGHT FOOT: ICD-10-CM

## 2022-12-30 DIAGNOSIS — M10.071 ACUTE IDIOPATHIC GOUT OF RIGHT FOOT: Primary | ICD-10-CM

## 2022-12-30 DIAGNOSIS — M79.671 RIGHT FOOT PAIN: ICD-10-CM

## 2022-12-30 LAB — URATE SERPL-MCNC: 4.3 MG/DL (ref 2.4–5.7)

## 2022-12-30 PROCEDURE — 1160F RVW MEDS BY RX/DR IN RCRD: CPT | Mod: CPTII,S$GLB,, | Performed by: PODIATRIST

## 2022-12-30 PROCEDURE — 1160F PR REVIEW ALL MEDS BY PRESCRIBER/CLIN PHARMACIST DOCUMENTED: ICD-10-PCS | Mod: CPTII,S$GLB,, | Performed by: PODIATRIST

## 2022-12-30 PROCEDURE — 1159F PR MEDICATION LIST DOCUMENTED IN MEDICAL RECORD: ICD-10-PCS | Mod: CPTII,S$GLB,, | Performed by: PODIATRIST

## 2022-12-30 PROCEDURE — 3288F PR FALLS RISK ASSESSMENT DOCUMENTED: ICD-10-PCS | Mod: CPTII,S$GLB,, | Performed by: PODIATRIST

## 2022-12-30 PROCEDURE — 1125F AMNT PAIN NOTED PAIN PRSNT: CPT | Mod: CPTII,S$GLB,, | Performed by: PODIATRIST

## 2022-12-30 PROCEDURE — 1101F PT FALLS ASSESS-DOCD LE1/YR: CPT | Mod: CPTII,S$GLB,, | Performed by: PODIATRIST

## 2022-12-30 PROCEDURE — 84550 ASSAY OF BLOOD/URIC ACID: CPT | Performed by: PODIATRIST

## 2022-12-30 PROCEDURE — 99213 OFFICE O/P EST LOW 20 MIN: CPT | Mod: S$GLB,,, | Performed by: PODIATRIST

## 2022-12-30 PROCEDURE — 3008F BODY MASS INDEX DOCD: CPT | Mod: CPTII,S$GLB,, | Performed by: PODIATRIST

## 2022-12-30 PROCEDURE — 1159F MED LIST DOCD IN RCRD: CPT | Mod: CPTII,S$GLB,, | Performed by: PODIATRIST

## 2022-12-30 PROCEDURE — 36415 COLL VENOUS BLD VENIPUNCTURE: CPT | Mod: PO | Performed by: PODIATRIST

## 2022-12-30 PROCEDURE — 99213 PR OFFICE/OUTPT VISIT, EST, LEVL III, 20-29 MIN: ICD-10-PCS | Mod: S$GLB,,, | Performed by: PODIATRIST

## 2022-12-30 PROCEDURE — 3288F FALL RISK ASSESSMENT DOCD: CPT | Mod: CPTII,S$GLB,, | Performed by: PODIATRIST

## 2022-12-30 PROCEDURE — 1101F PR PT FALLS ASSESS DOC 0-1 FALLS W/OUT INJ PAST YR: ICD-10-PCS | Mod: CPTII,S$GLB,, | Performed by: PODIATRIST

## 2022-12-30 PROCEDURE — 3008F PR BODY MASS INDEX (BMI) DOCUMENTED: ICD-10-PCS | Mod: CPTII,S$GLB,, | Performed by: PODIATRIST

## 2022-12-30 PROCEDURE — 1125F PR PAIN SEVERITY QUANTIFIED, PAIN PRESENT: ICD-10-PCS | Mod: CPTII,S$GLB,, | Performed by: PODIATRIST

## 2022-12-30 RX ORDER — INDOMETHACIN 50 MG/1
50 CAPSULE ORAL 2 TIMES DAILY WITH MEALS
Qty: 60 CAPSULE | Refills: 0 | Status: SHIPPED | OUTPATIENT
Start: 2022-12-30 | End: 2023-04-20 | Stop reason: ALTCHOICE

## 2022-12-30 NOTE — PATIENT INSTRUCTIONS

## 2022-12-30 NOTE — PROGRESS NOTES
"  [address] [place] [address]  Phone: [phone]   [phone]    Patient's PCP:Gavi Mishra MD  Referring Provider: No ref. provider found    Subjective:      Chief Complaint:: Follow-up (Ingrown nail removal right great toe) and Foot Pain (Right great toe radiating through ankle)    HPI  Kristin Guerra is a 72 y.o. female who presents today for follow-up on right great toe ingrown nail removal preformed 2022 and new complaint of pain radiating from great toe through ankle lasting for a week to ten days. Onset of symptoms sharp pain and redness and reports no trauma.  Current symptoms include sharp pain radiating from great toe through ankle and redness.  Aggravating factors are walking, weightbearing and applied pressure. Symptoms have progressed. Treatment to date have included epsom salt soaks, over the pain medication and a quarter of oxycodone 10/325.    Vitals:    22 1009   Resp: 18   Weight: 62.6 kg (138 lb)   Height: 5' 4" (1.626 m)   PainSc:   7      Shoe Size:     Past Surgical History:   Procedure Laterality Date    APPENDECTOMY      removed during colon surgery    CERVICAL FUSION      CERVICAL SPINE SURGERY      Rods and screws.     SECTION      COLON SURGERY      hemicolectomy    COLON SURGERY  2008    repair    COLONOSCOPY N/A 2018    Procedure: COLONOSCOPY;  Surgeon: Maverick Esquivel MD;  Location: [facility];  Service: Endoscopy;  Laterality: N/A; repeat in 5 years for surveillance    CYSTOSCOPY N/A 2021    Procedure: CYSTOSCOPY;  Surgeon: Allison Miranda MD;  Location: [facility];  Service: Urology;  Laterality: N/A;    ESOPHAGOGASTRODUODENOSCOPY N/A 2019    Procedure: EGD (ESOPHAGOGASTRODUODENOSCOPY);  Surgeon: Monika Steele MD;  Location: [facility];  Service: Endoscopy;  Laterality: N/A;    ESOPHAGOGASTRODUODENOSCOPY  2019    with RFA and biopsies    ESOPHAGOGASTRODUODENOSCOPY N/A 2019    Procedure: EGD " (ESOPHAGOGASTRODUODENOSCOPY);  Surgeon: Uzair Cantor MD;  Location: [facility];  Service: Endoscopy;  Laterality: N/A;  n+v with anesthesia    ESOPHAGOGASTRODUODENOSCOPY N/A 8/21/2019    Procedure: EGD (ESOPHAGOGASTRODUODENOSCOPY);  Surgeon: Uzair Cantor MD;  Location: [facility];  Service: Endoscopy;  Laterality: N/A;    ESOPHAGOGASTRODUODENOSCOPY N/A 10/7/2019    Procedure: EGD (ESOPHAGOGASTRODUODENOSCOPY);  Surgeon: Uzair Cantor MD;  Location: [facility];  Service: Endoscopy;  Laterality: N/A;    ESOPHAGOGASTRODUODENOSCOPY N/A 2/10/2020    Procedure: EGD (ESOPHAGOGASTRODUODENOSCOPY);  Surgeon: Uzair Cantor MD;  Location: [facility];  Service: Endoscopy;  Laterality: N/A;  Requests earliest procedure time    ESOPHAGOGASTRODUODENOSCOPY N/A 8/17/2020    Procedure: EGD (ESOPHAGOGASTRODUODENOSCOPY);  Surgeon: Uzair Cantor MD;  Location: [facility];  Service: Endoscopy;  Laterality: N/A;  BARRETTS    ESOPHAGOGASTRODUODENOSCOPY N/A 4/7/2021    Procedure: ESOPHAGOGASTRODUODENOSCOPY (EGD);  Surgeon: Uzair Cantor MD;  Location: [facility];  Service: Endoscopy;  Laterality: N/A;  Needs Rapid Covid MP    ESOPHAGOGASTRODUODENOSCOPY N/A 11/30/2021    Procedure: EGD (ESOPHAGOGASTRODUODENOSCOPY);  Surgeon: Uzair Cantor MD;  Location: [facility];  Service: Endoscopy;  Laterality: N/A;    ESOPHAGOGASTRODUODENOSCOPY N/A 6/27/2022    Procedure: EGD (ESOPHAGOGASTRODUODENOSCOPY);  Surgeon: Uzair Cantor MD;  Location: [facility];  Service: Endoscopy;  Laterality: N/A;  5/19/22: fully vaccinated. instructions mailed-SC    loop recorder only    HYSTERECTOMY      ovaries remain    INSERTION OF IMPLANTABLE LOOP RECORDER N/A 7/17/2020    Procedure: INSERTION, IMPLANTABLE LOOP RECORDER (MEDTRONIC);  Surgeon: Karyna Vasques MD;  Location: [facility] CATH/EP LAB;  Service: Cardiology;  Laterality: N/A;    SPLENECTOMY, TOTAL  2007    Injured during surgery and removed    UPPER GASTROINTESTINAL ENDOSCOPY   "2007    GERD & hiatal hernia per patient report     Past Medical History:   Diagnosis Date    Allergy     Sterling's esophagus     Colon polyp     Diverticulitis     Diverticulosis     Fatty liver     GERD (gastroesophageal reflux disease)     History of endoscopy 4/7/2021    Dr Uzair Cantor  Impression:            - Normal oropharynx.                         - Hiatal hernia.                         - Sterling's esophagus. Treated with radiofrequency                         ablation.                         - Normal stomach.                         - Normal examined duodenum.                         - No specimens collected.     Hx of cervical spine surgery 11/07/2018    Hyperlipidemia     Hypertension     Lupus (systemic lupus erythematosus) 1999    discoid    Osteoarthritis     Osteopenia     Syncope     Tremor 2005    essential     Family History   Problem Relation Age of Onset    Colon cancer Father         diagnosed in his 60's    Lung cancer Father     Lymphoma Mother         Brain    Lung cancer Brother     Lung cancer Brother         metastatic from prostate    Prostate cancer Brother     Heart disease Brother         valvular disease    Hyperlipidemia Brother     Eczema Neg Hx     Lupus Neg Hx     Psoriasis Neg Hx     Melanoma Neg Hx     Crohn's disease Neg Hx     Ulcerative colitis Neg Hx     Stomach cancer Neg Hx     Esophageal cancer Neg Hx         Social History:   Marital Status:   Alcohol History:  reports no history of alcohol use.  Tobacco History:  reports that she has been smoking cigarettes. She has a 35.00 pack-year smoking history. She has been exposed to tobacco smoke. She has never used smokeless tobacco.  Drug History:  reports no history of drug use.    Review of patient's allergies indicates:   Allergen Reactions    Doxycycline Itching and Swelling     Prescribed 5/2021, took one dose, describes lip swelling, and "itch all over"    Mirtazapine Other (See Comments)     Hyperactivity. "    Morphine Swelling and Rash     Other reaction(s): swelling in limbs    Lexapro [escitalopram oxalate] Other (See Comments)     Worsening tremor.  Sedation.    Primidone Other (See Comments)     Syncope.  Other reaction(s): dizziness,hypotension  Other reaction(s): Unknown  Other reaction(s): Unknown    Simvastatin Nausea And Vomiting and Other (See Comments)     Weakness.  Other reaction(s): nausea,vomiting    Beta-adrenergic agents Other (See Comments)     Low blood pressure and fainted.     Metoprolol succinate Other (See Comments)    Prochlorperazine      Other reaction(s): weakness    Topiramate     Compazine [prochlorperazine edisylate] Nausea Only    Tramadol Nausea And Vomiting       Current Outpatient Medications   Medication Sig Dispense Refill    albuterol-ipratropium (DUO-NEB) 2.5 mg-0.5 mg/3 mL nebulizer solution Take 3 mLs by nebulization every 6 (six) hours as needed for Wheezing or Shortness of Breath. Rescue 120 each 5    aluminum & magnesium hydroxide-simethicone (MYLANTA MAX STRENGTH) 400-400-40 mg/5 mL suspension Take 5 mLs by mouth every 6 (six) hours as needed for Indigestion.       amLODIPine (NORVASC) 5 MG tablet Take 1 tablet (5 mg total) by mouth once daily. 90 tablet 3    azelastine (ASTELIN) 137 mcg (0.1 %) nasal spray 1 spray (137 mcg total) by Nasal route 2 (two) times daily. 30 mL 11    azithromycin (Z-STACY) 250 MG tablet Take 2 tablets by mouth on day 1; Take 1 tablet by mouth on days 2-5 6 tablet 0    benzonatate (TESSALON) 100 MG capsule Take 100 mg by mouth.      clindamycin (CLEOCIN T) 1 % external solution Apply topically 2 (two) times daily. Apply to affected area twice daily 30 mL 0    clobetasoL (TEMOVATE) 0.05 % cream Apply to feet daily to bid prn flare (Patient taking differently: Apply 1 application topically 2 (two) times daily. Apply to feet daily to bid prn flare) 30 g 2    clonazePAM (KLONOPIN) 0.5 MG tablet Take 0.5 tablets (0.25 mg total) by mouth 2 (two) times  daily as needed for Anxiety (or insomnia). 30 tablet 0    esomeprazole (NEXIUM) 40 MG capsule TAKE ONE CAPSULE BY MOUTH TWICE DAILY 180 capsule 3    FLUoxetine 10 MG capsule Take 1 capsule (10 mg total) by mouth once daily. 90 capsule 1    fluticasone (FLONASE) 50 mcg/actuation nasal spray 1 spray by Each Nostril route daily as needed for Allergies.       gabapentin (NEURONTIN) 300 MG capsule Take 1 capsule (300 mg total) by mouth every evening. 30 capsule 3    indomethacin (INDOCIN) 50 MG capsule Take 1 capsule (50 mg total) by mouth 2 (two) times daily with meals. 60 capsule 0    mirabegron (MYRBETRIQ) 25 mg Tb24 ER tablet Take 1 tablet (25 mg total) by mouth once daily. 30 tablet 3    mupirocin (BACTROBAN) 2 % ointment 3 (three) times daily. Apply to affected area      polyethylene glycol (GLYCOLAX) 17 gram PwPk Take 17 g by mouth daily as needed.      tiotropium bromide (SPIRIVA RESPIMAT) 2.5 mcg/actuation inhaler Inhale 2 puffs into the lungs Daily. Controller 4 g 11    traZODone (DESYREL) 50 MG tablet Take 50 mg by mouth every evening.       No current facility-administered medications for this visit.       Review of Systems   Constitutional:  Negative for chills, fatigue, fever and unexpected weight change.   HENT:  Negative for hearing loss and trouble swallowing.    Eyes:  Negative for photophobia and visual disturbance.   Respiratory:  Negative for cough, shortness of breath and wheezing.    Cardiovascular:  Negative for chest pain, palpitations and leg swelling.   Gastrointestinal:  Negative for abdominal pain and nausea.   Genitourinary:  Negative for dysuria and frequency.   Musculoskeletal:  Positive for arthralgias and joint swelling. Negative for back pain, gait problem and myalgias.   Skin:  Positive for color change. Negative for rash and wound.   Neurological:  Negative for tremors, seizures, weakness, numbness and headaches.   Hematological:  Does not bruise/bleed easily.       Objective:         Physical Exam:   Foot Exam    General  General Appearance: appears stated age and healthy   Orientation: alert and oriented to person, place, and time   Affect: appropriate   Gait: unimpaired       Right Foot/Ankle     Inspection and Palpation  Ecchymosis: none  Tenderness: lisfranc joint (Dorsal medial foot)  Swelling: none   Arch: normal  Skin Exam: abnormal color and erythema; no drainage and no ulcer   Neurovascular  Dorsalis pedis: 2+  Posterior tibial: 2+  Capillary Refill: 2+  Varicose veins: not present  Saphenous nerve sensation: normal  Tibial nerve sensation: normal  Superficial peroneal nerve sensation: normal  Deep peroneal nerve sensation: normal  Sural nerve sensation: normal    Edema  Type of edema: non-pitting    Muscle Strength  Ankle dorsiflexion: 5  Ankle plantar flexion: 5  Ankle inversion: 5  Ankle eversion: 5  Great toe extension: 5  Great toe flexion: 5    Range of Motion    Normal right ankle ROM    Tests  Anterior drawer: negative   Talar tilt: negative   PT Tinel's sign: negative    Paresthesia: positive  Comments  Follow-up matrixectomy of the right great toe shows normal signs of healing.  No edema erythema or drainage is present along the nail borders.      There is localized redness and warmth overlying the dorsal midfoot extending to the ankle.  Tender to palpation.      Physical Exam  Cardiovascular:      Pulses:           Dorsalis pedis pulses are 2+ on the right side.        Posterior tibial pulses are 2+ on the right side.   Feet:      Right foot:      Skin integrity: Erythema present. No ulcer.             Right Ankle/Foot Exam     Range of Motion   The patient has normal right ankle ROM.    Comments:  Follow-up matrixectomy of the right great toe shows normal signs of healing.  No edema erythema or drainage is present along the nail borders.      There is localized redness and warmth overlying the dorsal midfoot extending to the ankle.  Tender to palpation.        Muscle  Strength   Right Lower Extremity   Ankle Dorsiflexion:  5   Plantar flexion:  5/5    Reflexes     Right Side   Paresthesia: present    Vascular Exam     Right Pulses  Dorsalis Pedis:      2+  Posterior Tibial:      2+         Imaging:  None           Assessment:       1. Acute idiopathic gout of right foot    2. Pain of toe of right foot    3. Right foot pain    4. Ingrown nail      Plan:   Acute idiopathic gout of right foot  -     Uric acid; Future; Expected date: 12/30/2022    Pain of toe of right foot  -     indomethacin (INDOCIN) 50 MG capsule; Take 1 capsule (50 mg total) by mouth 2 (two) times daily with meals.  Dispense: 60 capsule; Refill: 0    Right foot pain    Ingrown nail      Follow up if symptoms worsen or fail to improve.    Procedures        I discussed with the patient that her toenail appears to be healing well.  I explained that her new symptoms appear to be an acute gout attack.  I am going to send in indomethacin for her and order a uric acid level.    Counseling:     I provided patient education verbally regarding:   Patient diagnosis, treatment options, as well as alternatives, risks, and benefits.     causes of gout, diet for gout and oral medication for gout.  I discussed lowering the uric acid level to below 6 and as close to 5.5 to stop uric acid crystal formation.      This note was created using Dragon voice recognition software that occasionally misinterpreted phrases or words.

## 2023-01-09 RX ORDER — GABAPENTIN 300 MG/1
300 CAPSULE ORAL NIGHTLY
Qty: 30 CAPSULE | Refills: 11 | Status: SHIPPED | OUTPATIENT
Start: 2023-01-09 | End: 2024-02-06 | Stop reason: SDUPTHER

## 2023-01-09 NOTE — TELEPHONE ENCOUNTER
----- Message from Prema Serrato sent at 1/9/2023  8:15 AM CST -----  Regarding: refill  Can the clinic reply in [facility]:       Please refill the medication(s) listed below. Please call the patient when the prescription(s) is ready for  at this phone number   SOUMYA SOTO [[id number]] [phone] (home) pt has an upcoming appt scheduled.        Medication #1 gabapentin (NEURONTIN) 300 MG capsule          Preferred Pharmacy:   [id number] - [place] - [address] AT [address]  [address]  [place] [address]  Phone: [phone] Fax: [phone]

## 2023-01-09 NOTE — TELEPHONE ENCOUNTER
No new care gaps identified.  [facility] Embedded Care Gaps. Reference number: [id number]. 1/09/2023   8:56:48 AM CST

## 2023-01-15 ENCOUNTER — HOSPITAL ENCOUNTER (EMERGENCY)
Facility: HOSPITAL | Age: 73
Discharge: HOME | End: 2023-01-15
Attending: EMERGENCY MEDICINE
Payer: MEDICARE

## 2023-01-15 VITALS
DIASTOLIC BLOOD PRESSURE: 74 MMHG | RESPIRATION RATE: 18 BRPM | BODY MASS INDEX: 23.17 KG/M2 | HEART RATE: 64 BPM | OXYGEN SATURATION: 98 % | SYSTOLIC BLOOD PRESSURE: 170 MMHG | TEMPERATURE: 98 F | WEIGHT: 135 LBS

## 2023-01-15 DIAGNOSIS — R53.83 FATIGUE, UNSPECIFIED TYPE: Primary | ICD-10-CM

## 2023-01-15 DIAGNOSIS — R53.1 WEAKNESS: ICD-10-CM

## 2023-01-15 DIAGNOSIS — H11.31 SUBCONJUNCTIVAL HEMORRHAGE OF RIGHT EYE: ICD-10-CM

## 2023-01-15 LAB
ALBUMIN SERPL BCP-MCNC: 3.8 G/DL (ref 3.5–5.2)
ALP SERPL-CCNC: 68 U/L (ref 55–135)
ALT SERPL W/O P-5'-P-CCNC: 15 U/L (ref 10–44)
ANION GAP SERPL CALC-SCNC: 8 MMOL/L (ref 8–16)
AST SERPL-CCNC: 16 U/L (ref 10–40)
BACTERIA #/AREA URNS HPF: NEGATIVE /HPF
BASOPHILS # BLD AUTO: 0.1 K/UL (ref 0–0.2)
BASOPHILS NFR BLD: 1.1 % (ref 0–1.9)
BILIRUB SERPL-MCNC: 0.2 MG/DL (ref 0.1–1)
BILIRUB UR QL STRIP: NEGATIVE
BUN SERPL-MCNC: 15 MG/DL (ref 8–23)
CALCIUM SERPL-MCNC: 9.3 MG/DL (ref 8.7–10.5)
CHLORIDE SERPL-SCNC: 106 MMOL/L (ref 95–110)
CLARITY UR: CLEAR
CO2 SERPL-SCNC: 26 MMOL/L (ref 23–29)
COLOR UR: YELLOW
CREAT SERPL-MCNC: 0.8 MG/DL (ref 0.5–1.4)
DIFFERENTIAL METHOD: ABNORMAL
EOSINOPHIL # BLD AUTO: 0.2 K/UL (ref 0–0.5)
EOSINOPHIL NFR BLD: 2.5 % (ref 0–8)
ERYTHROCYTE [DISTWIDTH] IN BLOOD BY AUTOMATED COUNT: 14.6 % (ref 11.5–14.5)
EST. GFR  (NO RACE VARIABLE): >60 ML/MIN/1.73 M^2
GLUCOSE SERPL-MCNC: 97 MG/DL (ref 70–110)
GLUCOSE UR QL STRIP: NEGATIVE
HCT VFR BLD AUTO: 37.9 % (ref 37–48.5)
HGB BLD-MCNC: 12.4 G/DL (ref 12–16)
HGB UR QL STRIP: ABNORMAL
HYALINE CASTS #/AREA URNS LPF: 3 /LPF
IMM GRANULOCYTES # BLD AUTO: 0.03 K/UL (ref 0–0.04)
IMM GRANULOCYTES NFR BLD AUTO: 0.3 % (ref 0–0.5)
INFLUENZA A, MOLECULAR: NEGATIVE
INFLUENZA B, MOLECULAR: NEGATIVE
KETONES UR QL STRIP: NEGATIVE
LEUKOCYTE ESTERASE UR QL STRIP: ABNORMAL
LYMPHOCYTES # BLD AUTO: 2.5 K/UL (ref 1–4.8)
LYMPHOCYTES NFR BLD: 26.8 % (ref 18–48)
MCH RBC QN AUTO: 31.3 PG (ref 27–31)
MCHC RBC AUTO-ENTMCNC: 32.7 G/DL (ref 32–36)
MCV RBC AUTO: 96 FL (ref 82–98)
MICROSCOPIC COMMENT: ABNORMAL
MONOCYTES # BLD AUTO: 0.8 K/UL (ref 0.3–1)
MONOCYTES NFR BLD: 8.9 % (ref 4–15)
NEUTROPHILS # BLD AUTO: 5.5 K/UL (ref 1.8–7.7)
NEUTROPHILS NFR BLD: 60.4 % (ref 38–73)
NITRITE UR QL STRIP: NEGATIVE
NRBC BLD-RTO: 0 /100 WBC
PH UR STRIP: 7 [PH] (ref 5–8)
PLATELET # BLD AUTO: 360 K/UL (ref 150–450)
PMV BLD AUTO: 9.4 FL (ref 9.2–12.9)
POTASSIUM SERPL-SCNC: 3.8 MMOL/L (ref 3.5–5.1)
PROT SERPL-MCNC: 7.7 G/DL (ref 6–8.4)
PROT UR QL STRIP: ABNORMAL
RBC # BLD AUTO: 3.96 M/UL (ref 4–5.4)
RBC #/AREA URNS HPF: 8 /HPF (ref 0–4)
SARS-COV-2 RDRP RESP QL NAA+PROBE: NEGATIVE
SODIUM SERPL-SCNC: 140 MMOL/L (ref 136–145)
SP GR UR STRIP: 1.02 (ref 1–1.03)
SPECIMEN SOURCE: NORMAL
SQUAMOUS #/AREA URNS HPF: 1 /HPF
TROPONIN I SERPL HS-MCNC: 7.4 PG/ML (ref 0–14.9)
URN SPEC COLLECT METH UR: ABNORMAL
UROBILINOGEN UR STRIP-ACNC: ABNORMAL EU/DL
WBC # BLD AUTO: 9.18 K/UL (ref 3.9–12.7)
WBC #/AREA URNS HPF: 1 /HPF (ref 0–5)

## 2023-01-15 PROCEDURE — 80053 COMPREHEN METABOLIC PANEL: CPT | Performed by: NURSE PRACTITIONER

## 2023-01-15 PROCEDURE — 85025 COMPLETE CBC W/AUTO DIFF WBC: CPT | Performed by: NURSE PRACTITIONER

## 2023-01-15 PROCEDURE — 84484 ASSAY OF TROPONIN QUANT: CPT | Performed by: NURSE PRACTITIONER

## 2023-01-15 PROCEDURE — 87502 INFLUENZA DNA AMP PROBE: CPT | Performed by: NURSE PRACTITIONER

## 2023-01-15 PROCEDURE — 99285 EMERGENCY DEPT VISIT HI MDM: CPT | Mod: 25

## 2023-01-15 PROCEDURE — 81001 URINALYSIS AUTO W/SCOPE: CPT | Performed by: NURSE PRACTITIONER

## 2023-01-15 PROCEDURE — U0002 COVID-19 LAB TEST NON-CDC: HCPCS | Performed by: NURSE PRACTITIONER

## 2023-01-15 PROCEDURE — 93010 EKG 12-LEAD: ICD-10-PCS | Mod: ,,, | Performed by: SPECIALIST

## 2023-01-15 PROCEDURE — 93010 ELECTROCARDIOGRAM REPORT: CPT | Mod: ,,, | Performed by: SPECIALIST

## 2023-01-15 PROCEDURE — 93005 ELECTROCARDIOGRAM TRACING: CPT | Performed by: SPECIALIST

## 2023-01-16 NOTE — ED PROVIDER NOTES
"Encounter Date: 1/15/2023       History     Chief Complaint   Patient presents with    Fatigue    Weakness     C/o fatigue, generalized weakness and headache x 2 days.     Patient with increased life stressors.  Patient  has cancer.  Patient has been very fatigued beginning yesterday.  Patient really does not want to perform her activities of daily living.  She feels very sleepy.  Patient has started Prozac approximately 1 month ago.  Patient reports red spot to her right eye.  No visual changes.  No trouble with speech.  No focal weakness.  Patient reports 2 hour history of frontal headache.  No fever chills.    Review of patient's allergies indicates:   Allergen Reactions    Doxycycline Itching and Swelling     Prescribed 5/2021, took one dose, describes lip swelling, and "itch all over"    Mirtazapine Other (See Comments)     Hyperactivity.    Morphine Swelling and Rash     Other reaction(s): swelling in limbs    Lexapro [escitalopram oxalate] Other (See Comments)     Worsening tremor.  Sedation.    Primidone Other (See Comments)     Syncope.  Other reaction(s): dizziness,hypotension  Other reaction(s): Unknown  Other reaction(s): Unknown    Simvastatin Nausea And Vomiting and Other (See Comments)     Weakness.  Other reaction(s): nausea,vomiting    Beta-adrenergic agents Other (See Comments)     Low blood pressure and fainted.     Metoprolol succinate Other (See Comments)    Prochlorperazine      Other reaction(s): weakness    Topiramate     Compazine [prochlorperazine edisylate] Nausea Only    Tramadol Nausea And Vomiting     Past Medical History:   Diagnosis Date    Allergy     Sterling's esophagus     Colon polyp     Diverticulitis     Diverticulosis     Fatty liver     GERD (gastroesophageal reflux disease)     History of endoscopy 4/7/2021    Dr Uzair Cantor  Impression:            - Normal oropharynx.                         - Hiatal hernia.                         - Sterling's esophagus. Treated " with radiofrequency                         ablation.                         - Normal stomach.                         - Normal examined duodenum.                         - No specimens collected.     Hx of cervical spine surgery 2018    Hyperlipidemia     Hypertension     Lupus (systemic lupus erythematosus)     discoid    Osteoarthritis     Osteopenia     Syncope     Tremor 2005    essential     Past Surgical History:   Procedure Laterality Date    APPENDECTOMY      removed during colon surgery    CERVICAL FUSION      CERVICAL SPINE SURGERY      Rods and screws.     SECTION      COLON SURGERY      hemicolectomy    COLON SURGERY  2008    repair    COLONOSCOPY N/A 2018    Procedure: COLONOSCOPY;  Surgeon: Maverick Esquivel MD;  Location: [facility];  Service: Endoscopy;  Laterality: N/A; repeat in 5 years for surveillance    CYSTOSCOPY N/A 2021    Procedure: CYSTOSCOPY;  Surgeon: Allison Miranda MD;  Location: [facility] OR;  Service: Urology;  Laterality: N/A;    ESOPHAGOGASTRODUODENOSCOPY N/A 2019    Procedure: EGD (ESOPHAGOGASTRODUODENOSCOPY);  Surgeon: Monika Steele MD;  Location: [facility];  Service: Endoscopy;  Laterality: N/A;    ESOPHAGOGASTRODUODENOSCOPY  2019    with RFA and biopsies    ESOPHAGOGASTRODUODENOSCOPY N/A 2019    Procedure: EGD (ESOPHAGOGASTRODUODENOSCOPY);  Surgeon: Uzair Cantor MD;  Location: [facility];  Service: Endoscopy;  Laterality: N/A;  n+v with anesthesia    ESOPHAGOGASTRODUODENOSCOPY N/A 2019    Procedure: EGD (ESOPHAGOGASTRODUODENOSCOPY);  Surgeon: Uzair Cantor MD;  Location: [facility];  Service: Endoscopy;  Laterality: N/A;    ESOPHAGOGASTRODUODENOSCOPY N/A 10/7/2019    Procedure: EGD (ESOPHAGOGASTRODUODENOSCOPY);  Surgeon: Uzair Cantor MD;  Location: [facility];  Service: Endoscopy;  Laterality: N/A;    ESOPHAGOGASTRODUODENOSCOPY N/A 2/10/2020    Procedure: EGD (ESOPHAGOGASTRODUODENOSCOPY);  Surgeon: Uzair  JULIETA Cantor MD;  Location: [facility];  Service: Endoscopy;  Laterality: N/A;  Requests earliest procedure time    ESOPHAGOGASTRODUODENOSCOPY N/A 8/17/2020    Procedure: EGD (ESOPHAGOGASTRODUODENOSCOPY);  Surgeon: Uzair Cantor MD;  Location: [facility];  Service: Endoscopy;  Laterality: N/A;  BARRETTS    ESOPHAGOGASTRODUODENOSCOPY N/A 4/7/2021    Procedure: ESOPHAGOGASTRODUODENOSCOPY (EGD);  Surgeon: Uzair Cantor MD;  Location: [facility];  Service: Endoscopy;  Laterality: N/A;  Needs Rapid Covid MP    ESOPHAGOGASTRODUODENOSCOPY N/A 11/30/2021    Procedure: EGD (ESOPHAGOGASTRODUODENOSCOPY);  Surgeon: Uzair Cantor MD;  Location: [facility];  Service: Endoscopy;  Laterality: N/A;    ESOPHAGOGASTRODUODENOSCOPY N/A 6/27/2022    Procedure: EGD (ESOPHAGOGASTRODUODENOSCOPY);  Surgeon: Uzair Cantor MD;  Location: [facility];  Service: Endoscopy;  Laterality: N/A;  5/19/22: fully vaccinated. instructions mailed-[place]    loop recorder only    HYSTERECTOMY      ovaries remain    INSERTION OF IMPLANTABLE LOOP RECORDER N/A 7/17/2020    Procedure: INSERTION, IMPLANTABLE LOOP RECORDER (MEDTRONIC);  Surgeon: Karyna Vasques MD;  Location: [facility] CATH/EP LAB;  Service: Cardiology;  Laterality: N/A;    SPLENECTOMY, TOTAL  2007    Injured during surgery and removed    UPPER GASTROINTESTINAL ENDOSCOPY  2007    GERD & hiatal hernia per patient report     Family History   Problem Relation Age of Onset    Colon cancer Father         diagnosed in his 60's    Lung cancer Father     Lymphoma Mother         Brain    Lung cancer Brother     Lung cancer Brother         metastatic from prostate    Prostate cancer Brother     Heart disease Brother         valvular disease    Hyperlipidemia Brother     Eczema Neg Hx     Lupus Neg Hx     Psoriasis Neg Hx     Melanoma Neg Hx     Crohn's disease Neg Hx     Ulcerative colitis Neg Hx     Stomach cancer Neg Hx     Esophageal cancer Neg Hx      Social History     Tobacco Use    Smoking  status: Every Day     Packs/day: 1.00     Years: 35.00     Pack years: 35.00     Types: Cigarettes     Passive exposure: Current    Smokeless tobacco: Never   Substance Use Topics    Alcohol use: No    Drug use: No     Review of Systems   Constitutional:  Negative for chills and fever.   HENT:  Negative for congestion.    Eyes:  Positive for redness. Negative for visual disturbance.   Respiratory:  Negative for shortness of breath.    Cardiovascular:  Negative for chest pain and palpitations.   Gastrointestinal:  Negative for abdominal pain and vomiting.   Genitourinary:  Negative for dysuria.   Musculoskeletal:  Negative for joint swelling.   Neurological:  Negative for seizures, syncope and headaches.   Psychiatric/Behavioral:  Negative for confusion.      Physical Exam     Initial Vitals [01/15/23 1949]   BP Pulse Resp Temp SpO2   (!) 172/77 64 14 98.1 °F (36.7 °C) 98 %      MAP       --         Physical Exam    Nursing note and vitals reviewed.  Constitutional: She is not diaphoretic. No distress.   HENT:   Head: Normocephalic and atraumatic.   Eyes: Conjunctivae and EOM are normal.   Visual acuity 20/15 to left and right eye.  Patient does have a subconjunctival hemorrhage to superior conjunctival.   Neck:   Normal range of motion.  Cardiovascular:  Normal rate.           Pulmonary/Chest: Breath sounds normal.   Abdominal: Abdomen is soft. There is no abdominal tenderness.   Musculoskeletal:         General: Normal range of motion.      Cervical back: Normal range of motion.     Neurological: She is alert and oriented to person, place, and time. She has normal strength. No cranial nerve deficit.   No gross deficits   Skin: No rash noted.   Psychiatric:   Depressed, flat affect       ED Course   Procedures  Labs Reviewed   CBC W/ AUTO DIFFERENTIAL - Abnormal; Notable for the following components:       Result Value    RBC 3.96 (*)     MCH 31.3 (*)     RDW 14.6 (*)     All other components within normal limits    URINALYSIS, REFLEX TO URINE CULTURE - Abnormal; Notable for the following components:    Protein, UA Trace (*)     Occult Blood UA Trace (*)     Urobilinogen, UA 2.0-3.0 (*)     Leukocytes, UA 1+ (*)     All other components within normal limits    Narrative:     Specimen Source->Urine   URINALYSIS MICROSCOPIC - Abnormal; Notable for the following components:    RBC, UA 8 (*)     Hyaline Casts, UA 3 (*)     All other components within normal limits    Narrative:     Specimen Source->Urine   SARS-COV-2 RNA AMPLIFICATION, QUAL   INFLUENZA A AND B ANTIGEN    Narrative:     Specimen Source->Nasopharyngeal Swab   COMPREHENSIVE METABOLIC PANEL   TROPONIN I HIGH SENSITIVITY          Imaging Results              CT Head Without Contrast (Final result)  Result time 01/15/23 22:45:41      Final result by Mark Oneal DO (01/15/23 22:45:41)                   Narrative:    EXAM DESCRIPTION:  CT HEAD WITHOUT CONTRAST  RadLex: CT HEAD WITHOUT IV CONTRAST    CLINICAL HISTORY:  Mental status change, unknown cause.    TECHNIQUE:  Noncontrast CT head.  All CT scans at this facility use dose modulation, iterative reconstruction, and/or weight based dosing when appropriate to reduce radiation dose to as low as reasonably achievable.    COMPARISON: CT brain 6/2/2021.    FINDINGS:  There is no acute intracranial bleed. No acute major territorial infarct is identified. There is stable volume loss and chronic white matter changes. There is an old posterior right basal ganglia lacunar infarct. There is no midline shift or abnormal mass effect. The subarachnoid spaces appear unremarkable. No acute fracture is seen.    IMPRESSION:    1.  No acute intracranial abnormality.    Electronically signed by:  Mark Oneal DO  1/15/2023 10:45 PM [facility] Workstation: [id number]                                     X-Ray Chest 1 View (In process)    Procedure changed from X-Ray Chest AP Portable                    Medications - No data to display  Medical  Decision Making:   History:   Old Medical Records: I decided to obtain old medical records.  Clinical Tests:   Lab Tests: Reviewed  The following lab test(s) were unremarkable: CBC, CMP and Troponin  Radiological Study: Reviewed  Medical Tests: Reviewed  ED Management:  Patient presents with fatigue.  Neurologic intact.  Patient has several vague complaints.  Patient seems to be having increasing life stressors as likely etiology.  Old records reviewed including recent thyroid testing.  Laboratory evaluation here is essentially unremarkable.  CT the head is unremarkable.  EKG normal sinus rhythm at a rate of 52.  Patient does have a subconjunctival hemorrhage on right.  Visual acuity remains intact.  Patient has no pain to that area.  Daughter at bedside.  Patient is stable for discharge home.                          Clinical Impression:   Final diagnoses:  [R53.1] Weakness  [R53.83] Fatigue, unspecified type (Primary)  [H11.31] Subconjunctival hemorrhage of right eye               George Gutiérrez MD  01/15/23 [id number]

## 2023-01-17 ENCOUNTER — TELEPHONE (OUTPATIENT)
Dept: CARDIOLOGY | Facility: CLINIC | Age: 73
End: 2023-01-17
Payer: MEDICARE

## 2023-01-17 DIAGNOSIS — Z95.0 PACEMAKER: Primary | ICD-10-CM

## 2023-01-22 ENCOUNTER — TELEPHONE (OUTPATIENT)
Dept: PODIATRY | Facility: CLINIC | Age: 73
End: 2023-01-22
Payer: MEDICARE

## 2023-02-22 ENCOUNTER — PES CALL (OUTPATIENT)
Dept: ADMINISTRATIVE | Facility: CLINIC | Age: 73
End: 2023-02-22
Payer: MEDICARE

## 2023-03-08 ENCOUNTER — LAB VISIT (OUTPATIENT)
Dept: LAB | Facility: HOSPITAL | Age: 73
End: 2023-03-08
Payer: MEDICARE

## 2023-03-08 DIAGNOSIS — E78.2 MIXED DYSLIPIDEMIA: Chronic | ICD-10-CM

## 2023-03-08 DIAGNOSIS — E55.9 VITAMIN D DEFICIENCY: ICD-10-CM

## 2023-03-08 LAB
BASOPHILS # BLD AUTO: 0.14 K/UL (ref 0–0.2)
BASOPHILS NFR BLD: 1.5 % (ref 0–1.9)
DIFFERENTIAL METHOD: ABNORMAL
EOSINOPHIL # BLD AUTO: 0 K/UL (ref 0–0.5)
EOSINOPHIL NFR BLD: 0.3 % (ref 0–8)
ERYTHROCYTE [DISTWIDTH] IN BLOOD BY AUTOMATED COUNT: 14.6 % (ref 11.5–14.5)
HCT VFR BLD AUTO: 37.7 % (ref 37–48.5)
HGB BLD-MCNC: 11.9 G/DL (ref 12–16)
IMM GRANULOCYTES # BLD AUTO: 0.02 K/UL (ref 0–0.04)
IMM GRANULOCYTES NFR BLD AUTO: 0.2 % (ref 0–0.5)
LYMPHOCYTES # BLD AUTO: 2.7 K/UL (ref 1–4.8)
LYMPHOCYTES NFR BLD: 28.4 % (ref 18–48)
MCH RBC QN AUTO: 30.1 PG (ref 27–31)
MCHC RBC AUTO-ENTMCNC: 31.6 G/DL (ref 32–36)
MCV RBC AUTO: 95 FL (ref 82–98)
MONOCYTES # BLD AUTO: 0.8 K/UL (ref 0.3–1)
MONOCYTES NFR BLD: 8.9 % (ref 4–15)
NEUTROPHILS # BLD AUTO: 5.7 K/UL (ref 1.8–7.7)
NEUTROPHILS NFR BLD: 60.7 % (ref 38–73)
NRBC BLD-RTO: 0 /100 WBC
PLATELET # BLD AUTO: 360 K/UL (ref 150–450)
PMV BLD AUTO: 10.5 FL (ref 9.2–12.9)
RBC # BLD AUTO: 3.96 M/UL (ref 4–5.4)
WBC # BLD AUTO: 9.32 K/UL (ref 3.9–12.7)

## 2023-03-08 PROCEDURE — 85025 COMPLETE CBC W/AUTO DIFF WBC: CPT | Performed by: FAMILY MEDICINE

## 2023-03-08 PROCEDURE — 82306 VITAMIN D 25 HYDROXY: CPT | Performed by: FAMILY MEDICINE

## 2023-03-09 ENCOUNTER — TELEPHONE (OUTPATIENT)
Dept: CARDIOLOGY | Facility: CLINIC | Age: 73
End: 2023-03-09
Payer: MEDICARE

## 2023-03-09 DIAGNOSIS — Z95.0 CARDIAC PACEMAKER IN SITU: Primary | ICD-10-CM

## 2023-03-09 LAB — 25(OH)D3+25(OH)D2 SERPL-MCNC: 17 NG/ML (ref 30–96)

## 2023-03-14 ENCOUNTER — OFFICE VISIT (OUTPATIENT)
Dept: FAMILY MEDICINE | Facility: CLINIC | Age: 73
End: 2023-03-14
Payer: MEDICARE

## 2023-03-14 VITALS
BODY MASS INDEX: 23.6 KG/M2 | TEMPERATURE: 98 F | DIASTOLIC BLOOD PRESSURE: 80 MMHG | HEIGHT: 64 IN | OXYGEN SATURATION: 96 % | WEIGHT: 138.25 LBS | HEART RATE: 55 BPM | SYSTOLIC BLOOD PRESSURE: 130 MMHG

## 2023-03-14 DIAGNOSIS — Z02.89 ENCOUNTER FOR COMPLETION OF FORM WITH PATIENT: ICD-10-CM

## 2023-03-14 DIAGNOSIS — F41.1 GAD (GENERALIZED ANXIETY DISORDER): ICD-10-CM

## 2023-03-14 DIAGNOSIS — J32.9 SINUSITIS, UNSPECIFIED CHRONICITY, UNSPECIFIED LOCATION: Primary | ICD-10-CM

## 2023-03-14 DIAGNOSIS — F41.9 ANXIETY: ICD-10-CM

## 2023-03-14 PROCEDURE — 99999 PR PBB SHADOW E&M-EST. PATIENT-LVL V: CPT | Mod: PBBFAC,,, | Performed by: NURSE PRACTITIONER

## 2023-03-14 PROCEDURE — 1125F PR PAIN SEVERITY QUANTIFIED, PAIN PRESENT: ICD-10-PCS | Mod: CPTII,S$GLB,, | Performed by: NURSE PRACTITIONER

## 2023-03-14 PROCEDURE — 3075F SYST BP GE 130 - 139MM HG: CPT | Mod: CPTII,S$GLB,, | Performed by: NURSE PRACTITIONER

## 2023-03-14 PROCEDURE — 99213 PR OFFICE/OUTPT VISIT, EST, LEVL III, 20-29 MIN: ICD-10-PCS | Mod: S$GLB,,, | Performed by: NURSE PRACTITIONER

## 2023-03-14 PROCEDURE — 3288F PR FALLS RISK ASSESSMENT DOCUMENTED: ICD-10-PCS | Mod: CPTII,S$GLB,, | Performed by: NURSE PRACTITIONER

## 2023-03-14 PROCEDURE — 1160F PR REVIEW ALL MEDS BY PRESCRIBER/CLIN PHARMACIST DOCUMENTED: ICD-10-PCS | Mod: CPTII,S$GLB,, | Performed by: NURSE PRACTITIONER

## 2023-03-14 PROCEDURE — 3008F PR BODY MASS INDEX (BMI) DOCUMENTED: ICD-10-PCS | Mod: CPTII,S$GLB,, | Performed by: NURSE PRACTITIONER

## 2023-03-14 PROCEDURE — 3075F PR MOST RECENT SYSTOLIC BLOOD PRESS GE 130-139MM HG: ICD-10-PCS | Mod: CPTII,S$GLB,, | Performed by: NURSE PRACTITIONER

## 2023-03-14 PROCEDURE — 1160F RVW MEDS BY RX/DR IN RCRD: CPT | Mod: CPTII,S$GLB,, | Performed by: NURSE PRACTITIONER

## 2023-03-14 PROCEDURE — 3079F DIAST BP 80-89 MM HG: CPT | Mod: CPTII,S$GLB,, | Performed by: NURSE PRACTITIONER

## 2023-03-14 PROCEDURE — 99999 PR PBB SHADOW E&M-EST. PATIENT-LVL V: ICD-10-PCS | Mod: PBBFAC,,, | Performed by: NURSE PRACTITIONER

## 2023-03-14 PROCEDURE — 99213 OFFICE O/P EST LOW 20 MIN: CPT | Mod: S$GLB,,, | Performed by: NURSE PRACTITIONER

## 2023-03-14 PROCEDURE — 3288F FALL RISK ASSESSMENT DOCD: CPT | Mod: CPTII,S$GLB,, | Performed by: NURSE PRACTITIONER

## 2023-03-14 PROCEDURE — 3079F PR MOST RECENT DIASTOLIC BLOOD PRESSURE 80-89 MM HG: ICD-10-PCS | Mod: CPTII,S$GLB,, | Performed by: NURSE PRACTITIONER

## 2023-03-14 PROCEDURE — 1101F PR PT FALLS ASSESS DOC 0-1 FALLS W/OUT INJ PAST YR: ICD-10-PCS | Mod: CPTII,S$GLB,, | Performed by: NURSE PRACTITIONER

## 2023-03-14 PROCEDURE — 1159F PR MEDICATION LIST DOCUMENTED IN MEDICAL RECORD: ICD-10-PCS | Mod: CPTII,S$GLB,, | Performed by: NURSE PRACTITIONER

## 2023-03-14 PROCEDURE — 3008F BODY MASS INDEX DOCD: CPT | Mod: CPTII,S$GLB,, | Performed by: NURSE PRACTITIONER

## 2023-03-14 PROCEDURE — 1125F AMNT PAIN NOTED PAIN PRSNT: CPT | Mod: CPTII,S$GLB,, | Performed by: NURSE PRACTITIONER

## 2023-03-14 PROCEDURE — 1101F PT FALLS ASSESS-DOCD LE1/YR: CPT | Mod: CPTII,S$GLB,, | Performed by: NURSE PRACTITIONER

## 2023-03-14 PROCEDURE — 1159F MED LIST DOCD IN RCRD: CPT | Mod: CPTII,S$GLB,, | Performed by: NURSE PRACTITIONER

## 2023-03-14 RX ORDER — AMOXICILLIN AND CLAVULANATE POTASSIUM 875; 125 MG/1; MG/1
1 TABLET, FILM COATED ORAL EVERY 12 HOURS
Qty: 20 TABLET | Refills: 0 | Status: SHIPPED | OUTPATIENT
Start: 2023-03-14 | End: 2023-03-24

## 2023-03-14 NOTE — PROGRESS NOTES
Subjective:       Patient ID: Kristin Guerra is a 73 y.o. female.    Chief Complaint: Sinus Problem     HPI   72 y/o female patient with medical problems listed below presents for headache, nasal congestion, sinus pain/pressure, postnasal dripping,  for one month. Patient states used flonase nasal spray and claritin which helped but symptoms returned lately. Associated symptoms are sneezing and mild dry cough but denies chest pain, sob, nausea, abdominal pain, fever, chills. Denies recent travel or sick contacts.     She brought in a form of handicap parking.     She also requests refill on clonopin for anxiety and insomnia. She takes clonopin 0.5 mg nightly as needed. She states her  recently passed away in 1/2023. She is followed by [facility] Josue, last seen in 11/2022  but she left the practice and they will find new psychiatrist.     Labs reviewed from 3/2023    Patient Active Problem List   Diagnosis    Hx of colon cancer, stage II    Hx of diverticulitis of colon    Cataract    Herniation of intervertebral disc of cervical region    Cervical radiculopathy    Degeneration of intervertebral disc of cervical region    history of skin lupus    Gastroesophageal reflux disease    Tremor, coarse    Essential hypertension, labile    Hiatal hernia    Osteoarthritis    RLS (restless legs syndrome)    Panic attack    Heart murmur    Smoking    GERD (gastroesophageal reflux disease)    Sterling's esophagus with dysplasia    Tremor, essential    Sinus problem    S/P splenectomy    Other chronic postprocedural pain    Insomnia due to medical condition    Anxiety    Mixed dyslipidemia    Statin intolerance    Statin myopathy    Vitamin D deficiency    Near syncope    Syncope and collapse    History of endoscopy    Incontinence in female    Bradycardia    Seborrheic keratosis, inflamed    Microhematuria    Orthostatic hypotension    History of loop recorder    Malignant neoplasm of colon, unspecified part of colon  "   Depression, recurrent    Centrilobular emphysema    Lung nodule, multiple      Review of patient's allergies indicates:   Allergen Reactions    Doxycycline Itching and Swelling     Prescribed 2021, took one dose, describes lip swelling, and "itch all over"    Mirtazapine Other (See Comments)     Hyperactivity.    Morphine Swelling and Rash     Other reaction(s): swelling in limbs    Lexapro [escitalopram oxalate] Other (See Comments)     Worsening tremor.  Sedation.    Primidone Other (See Comments)     Syncope.  Other reaction(s): dizziness,hypotension  Other reaction(s): Unknown  Other reaction(s): Unknown    Simvastatin Nausea And Vomiting and Other (See Comments)     Weakness.  Other reaction(s): nausea,vomiting    Beta-adrenergic agents Other (See Comments)     Low blood pressure and fainted.     Metoprolol succinate Other (See Comments)    Prochlorperazine      Other reaction(s): weakness    Topiramate     Compazine [prochlorperazine edisylate] Nausea Only    Tramadol Nausea And Vomiting     Past Surgical History:   Procedure Laterality Date    APPENDECTOMY      removed during colon surgery    CERVICAL FUSION      CERVICAL SPINE SURGERY      Rods and screws.     SECTION      COLON SURGERY  2007    hemicolectomy    COLON SURGERY  2008    repair    COLONOSCOPY N/A 2018    Procedure: COLONOSCOPY;  Surgeon: Maverick Esquivel MD;  Location: [facility];  Service: Endoscopy;  Laterality: N/A; repeat in 5 years for surveillance    CYSTOSCOPY N/A 2021    Procedure: CYSTOSCOPY;  Surgeon: Allison Miranda MD;  Location: [facility] OR;  Service: Urology;  Laterality: N/A;    ESOPHAGOGASTRODUODENOSCOPY N/A 2019    Procedure: EGD (ESOPHAGOGASTRODUODENOSCOPY);  Surgeon: Monika Steele MD;  Location: [facility];  Service: Endoscopy;  Laterality: N/A;    ESOPHAGOGASTRODUODENOSCOPY  2019    with RFA and biopsies    ESOPHAGOGASTRODUODENOSCOPY N/A 2019    Procedure: EGD " (ESOPHAGOGASTRODUODENOSCOPY);  Surgeon: Uzair Cantor MD;  Location: [facility];  Service: Endoscopy;  Laterality: N/A;  n+v with anesthesia    ESOPHAGOGASTRODUODENOSCOPY N/A 8/21/2019    Procedure: EGD (ESOPHAGOGASTRODUODENOSCOPY);  Surgeon: Uzair Cantor MD;  Location: [facility];  Service: Endoscopy;  Laterality: N/A;    ESOPHAGOGASTRODUODENOSCOPY N/A 10/7/2019    Procedure: EGD (ESOPHAGOGASTRODUODENOSCOPY);  Surgeon: Uzair Cantor MD;  Location: [facility];  Service: Endoscopy;  Laterality: N/A;    ESOPHAGOGASTRODUODENOSCOPY N/A 2/10/2020    Procedure: EGD (ESOPHAGOGASTRODUODENOSCOPY);  Surgeon: Uzair Cantor MD;  Location: [facility];  Service: Endoscopy;  Laterality: N/A;  Requests earliest procedure time    ESOPHAGOGASTRODUODENOSCOPY N/A 8/17/2020    Procedure: EGD (ESOPHAGOGASTRODUODENOSCOPY);  Surgeon: Uzair Cantor MD;  Location: [facility];  Service: Endoscopy;  Laterality: N/A;  BARRETTS    ESOPHAGOGASTRODUODENOSCOPY N/A 4/7/2021    Procedure: ESOPHAGOGASTRODUODENOSCOPY (EGD);  Surgeon: Uzair Cantor MD;  Location: [facility];  Service: Endoscopy;  Laterality: N/A;  Needs Rapid Covid MP    ESOPHAGOGASTRODUODENOSCOPY N/A 11/30/2021    Procedure: EGD (ESOPHAGOGASTRODUODENOSCOPY);  Surgeon: Uzair Cantor MD;  Location: [facility];  Service: Endoscopy;  Laterality: N/A;    ESOPHAGOGASTRODUODENOSCOPY N/A 6/27/2022    Procedure: EGD (ESOPHAGOGASTRODUODENOSCOPY);  Surgeon: Uzair Cantor MD;  Location: [facility];  Service: Endoscopy;  Laterality: N/A;  5/19/22: fully vaccinated. instructions mailed-[place]    loop recorder only    HYSTERECTOMY      ovaries remain    INSERTION OF IMPLANTABLE LOOP RECORDER N/A 7/17/2020    Procedure: INSERTION, IMPLANTABLE LOOP RECORDER (MEDTRONIC);  Surgeon: Karyna Vasques MD;  Location: [facility] CATH/EP LAB;  Service: Cardiology;  Laterality: N/A;    SPLENECTOMY, TOTAL  2007    Injured during surgery and removed    UPPER GASTROINTESTINAL ENDOSCOPY   2007    GERD & hiatal hernia per patient report        Current Outpatient Medications:     aluminum & magnesium hydroxide-simethicone (MYLANTA MAX STRENGTH) 400-400-40 mg/5 mL suspension, Take 5 mLs by mouth every 6 (six) hours as needed for Indigestion. , Disp: , Rfl:     amLODIPine (NORVASC) 5 MG tablet, Take 1 tablet (5 mg total) by mouth once daily., Disp: 90 tablet, Rfl: 3    benzonatate (TESSALON) 100 MG capsule, Take 100 mg by mouth., Disp: , Rfl:     clindamycin (CLEOCIN T) 1 % external solution, Apply topically 2 (two) times daily. Apply to affected area twice daily, Disp: 30 mL, Rfl: 0    clobetasoL (TEMOVATE) 0.05 % cream, Apply to feet daily to bid prn flare (Patient taking differently: Apply 1 application topically 2 (two) times daily. Apply to feet daily to bid prn flare), Disp: 30 g, Rfl: 2    clonazePAM (KLONOPIN) 0.5 MG tablet, Take 0.5 tablets (0.25 mg total) by mouth 2 (two) times daily as needed for Anxiety (or insomnia)., Disp: 30 tablet, Rfl: 0    esomeprazole (NEXIUM) 40 MG capsule, TAKE ONE CAPSULE BY MOUTH TWICE DAILY, Disp: 180 capsule, Rfl: 3    fluticasone (FLONASE) 50 mcg/actuation nasal spray, 1 spray by Each Nostril route daily as needed for Allergies. , Disp: , Rfl:     gabapentin (NEURONTIN) 300 MG capsule, Take 1 capsule (300 mg total) by mouth every evening., Disp: 30 capsule, Rfl: 11    indomethacin (INDOCIN) 50 MG capsule, Take 1 capsule (50 mg total) by mouth 2 (two) times daily with meals., Disp: 60 capsule, Rfl: 0    mirabegron (MYRBETRIQ) 25 mg Tb24 ER tablet, Take 1 tablet (25 mg total) by mouth once daily., Disp: 30 tablet, Rfl: 3    mupirocin (BACTROBAN) 2 % ointment, 3 (three) times daily. Apply to affected area, Disp: , Rfl:     polyethylene glycol (GLYCOLAX) 17 gram PwPk, Take 17 g by mouth daily as needed., Disp: , Rfl:     tiotropium bromide (SPIRIVA RESPIMAT) 2.5 mcg/actuation inhaler, Inhale 2 puffs into the lungs Daily. Controller, Disp: 4 g, Rfl: 11    traZODone  "(DESYREL) 50 MG tablet, Take 50 mg by mouth every evening., Disp: , Rfl:     albuterol-ipratropium (DUO-NEB) 2.5 mg-0.5 mg/3 mL nebulizer solution, Take 3 mLs by nebulization every 6 (six) hours as needed for Wheezing or Shortness of Breath. Rescue, Disp: 120 each, Rfl: 5    amoxicillin-clavulanate 875-125mg (AUGMENTIN) 875-125 mg per tablet, Take 1 tablet by mouth every 12 (twelve) hours. for 10 days, Disp: 20 tablet, Rfl: 0    azelastine (ASTELIN) 137 mcg (0.1 %) nasal spray, 1 spray (137 mcg total) by Nasal route 2 (two) times daily., Disp: 30 mL, Rfl: 11    azithromycin (Z-STACY) 250 MG tablet, Take 2 tablets by mouth on day 1; Take 1 tablet by mouth on days 2-5 (Patient not taking: Reported on 3/14/2023), Disp: 6 tablet, Rfl: 0    FLUoxetine 10 MG capsule, Take 1 capsule (10 mg total) by mouth once daily. (Patient not taking: Reported on 3/14/2023), Disp: 90 capsule, Rfl: 1    Lab Results   Component Value Date    WBC 9.32 03/08/2023    HGB 11.9 (L) 03/08/2023    HCT 37.7 03/08/2023     03/08/2023    CHOL 231 (H) 03/08/2023    TRIG 72 03/08/2023    HDL 53 03/08/2023    ALT 14 03/08/2023    AST 17 03/08/2023     03/08/2023    K 3.5 03/08/2023     03/08/2023    CREATININE 0.6 03/08/2023    BUN 13 03/08/2023    CO2 27 03/08/2023    TSH 2.178 02/09/2022     Review of Systems   Constitutional:  Negative for chills and fever.   HENT:  Positive for congestion, postnasal drip, sinus pressure, sinus pain and sneezing.    Respiratory:  Positive for cough. Negative for chest tightness and shortness of breath.    Cardiovascular:  Negative for chest pain and palpitations.   Gastrointestinal:  Negative for abdominal pain.   Neurological:  Negative for dizziness and headaches.       Objective:   /80 (BP Location: Left arm, Patient Position: Sitting, BP Method: Medium (Manual))   Pulse (!) 55   Temp 98.2 °F (36.8 °C) (Oral)   Ht 5' 4" (1.626 m)   Wt 62.7 kg (138 lb 3.7 oz)   SpO2 96%   BMI 23.73 " kg/m²         Physical Exam  Constitutional:       General: She is not in acute distress.     Appearance: Normal appearance.   HENT:      Head: Atraumatic.   Cardiovascular:      Rate and Rhythm: Normal rate and regular rhythm.      Pulses: Normal pulses.      Heart sounds: Normal heart sounds.   Pulmonary:      Effort: Pulmonary effort is normal.      Breath sounds: Normal breath sounds.   Abdominal:      General: Abdomen is flat. Bowel sounds are normal.      Palpations: Abdomen is soft.   Skin:     General: Skin is warm and dry.   Neurological:      General: No focal deficit present.      Mental Status: She is alert and oriented to person, place, and time.   Psychiatric:         Mood and Affect: Mood normal.       Assessment:       1. Sinusitis, unspecified chronicity, unspecified location    2. Encounter for completion of form with patient    3. Anxiety        Plan:       1. Sinusitis, unspecified chronicity, unspecified location  - amoxicillin-clavulanate 875-125mg (AUGMENTIN) 875-125 mg per tablet; Take 1 tablet by mouth every 12 (twelve) hours. for 10 days  Dispense: 20 tablet; Refill: 0  - continue with flonase nasal spray and/or claritin  - advised to call us if no improvement or worsening symptoms    2. Encounter for completion of form with patient  - form completed and directly given to patient     3. Anxiety  - refill request on clonopin submitted to dr. gold    Patient with be reevaluated in  as scheduled in 5/2023  or sooner mor Golden NP

## 2023-03-15 RX ORDER — CLONAZEPAM 0.5 MG/1
0.25 TABLET ORAL 2 TIMES DAILY PRN
Qty: 30 TABLET | Refills: 0 | Status: SHIPPED | OUTPATIENT
Start: 2023-03-15 | End: 2023-05-16 | Stop reason: SDUPTHER

## 2023-04-04 ENCOUNTER — TELEPHONE (OUTPATIENT)
Dept: ENDOSCOPY | Facility: HOSPITAL | Age: 73
End: 2023-04-04

## 2023-04-04 DIAGNOSIS — K22.719 BARRETT'S ESOPHAGUS WITH DYSPLASIA: Primary | ICD-10-CM

## 2023-04-12 ENCOUNTER — OFFICE VISIT (OUTPATIENT)
Dept: CARDIOLOGY | Facility: CLINIC | Age: 73
End: 2023-04-12
Payer: MEDICARE

## 2023-04-12 VITALS
SYSTOLIC BLOOD PRESSURE: 132 MMHG | RESPIRATION RATE: 16 BRPM | WEIGHT: 137 LBS | BODY MASS INDEX: 23.39 KG/M2 | OXYGEN SATURATION: 99 % | HEIGHT: 64 IN | HEART RATE: 57 BPM | DIASTOLIC BLOOD PRESSURE: 80 MMHG

## 2023-04-12 DIAGNOSIS — Z78.9 STATIN INTOLERANCE: ICD-10-CM

## 2023-04-12 DIAGNOSIS — I10 ESSENTIAL HYPERTENSION: Primary | ICD-10-CM

## 2023-04-12 DIAGNOSIS — F41.9 ANXIETY: ICD-10-CM

## 2023-04-12 DIAGNOSIS — Z98.890 HISTORY OF LOOP RECORDER: ICD-10-CM

## 2023-04-12 DIAGNOSIS — E78.2 MIXED DYSLIPIDEMIA: ICD-10-CM

## 2023-04-12 DIAGNOSIS — R55 SYNCOPE AND COLLAPSE: ICD-10-CM

## 2023-04-12 DIAGNOSIS — M32.9 SYSTEMIC LUPUS ERYTHEMATOSUS, UNSPECIFIED SLE TYPE, UNSPECIFIED ORGAN INVOLVEMENT STATUS: ICD-10-CM

## 2023-04-12 DIAGNOSIS — R00.1 BRADYCARDIA: ICD-10-CM

## 2023-04-12 DIAGNOSIS — I95.1 ORTHOSTATIC HYPOTENSION: ICD-10-CM

## 2023-04-12 PROCEDURE — 1160F PR REVIEW ALL MEDS BY PRESCRIBER/CLIN PHARMACIST DOCUMENTED: ICD-10-PCS | Mod: CPTII,S$GLB,, | Performed by: INTERNAL MEDICINE

## 2023-04-12 PROCEDURE — 99215 OFFICE O/P EST HI 40 MIN: CPT | Mod: S$GLB,,, | Performed by: INTERNAL MEDICINE

## 2023-04-12 PROCEDURE — 99215 PR OFFICE/OUTPT VISIT, EST, LEVL V, 40-54 MIN: ICD-10-PCS | Mod: S$GLB,,, | Performed by: INTERNAL MEDICINE

## 2023-04-12 PROCEDURE — 1160F RVW MEDS BY RX/DR IN RCRD: CPT | Mod: CPTII,S$GLB,, | Performed by: INTERNAL MEDICINE

## 2023-04-12 PROCEDURE — 99999 PR PBB SHADOW E&M-EST. PATIENT-LVL IV: ICD-10-PCS | Mod: PBBFAC,,, | Performed by: INTERNAL MEDICINE

## 2023-04-12 PROCEDURE — 99999 PR PBB SHADOW E&M-EST. PATIENT-LVL IV: CPT | Mod: PBBFAC,,, | Performed by: INTERNAL MEDICINE

## 2023-04-12 PROCEDURE — 3008F PR BODY MASS INDEX (BMI) DOCUMENTED: ICD-10-PCS | Mod: CPTII,S$GLB,, | Performed by: INTERNAL MEDICINE

## 2023-04-12 PROCEDURE — 3288F PR FALLS RISK ASSESSMENT DOCUMENTED: ICD-10-PCS | Mod: CPTII,S$GLB,, | Performed by: INTERNAL MEDICINE

## 2023-04-12 PROCEDURE — 3008F BODY MASS INDEX DOCD: CPT | Mod: CPTII,S$GLB,, | Performed by: INTERNAL MEDICINE

## 2023-04-12 PROCEDURE — 3075F SYST BP GE 130 - 139MM HG: CPT | Mod: CPTII,S$GLB,, | Performed by: INTERNAL MEDICINE

## 2023-04-12 PROCEDURE — 1125F PR PAIN SEVERITY QUANTIFIED, PAIN PRESENT: ICD-10-PCS | Mod: CPTII,S$GLB,, | Performed by: INTERNAL MEDICINE

## 2023-04-12 PROCEDURE — 1101F PR PT FALLS ASSESS DOC 0-1 FALLS W/OUT INJ PAST YR: ICD-10-PCS | Mod: CPTII,S$GLB,, | Performed by: INTERNAL MEDICINE

## 2023-04-12 PROCEDURE — 1159F PR MEDICATION LIST DOCUMENTED IN MEDICAL RECORD: ICD-10-PCS | Mod: CPTII,S$GLB,, | Performed by: INTERNAL MEDICINE

## 2023-04-12 PROCEDURE — 3075F PR MOST RECENT SYSTOLIC BLOOD PRESS GE 130-139MM HG: ICD-10-PCS | Mod: CPTII,S$GLB,, | Performed by: INTERNAL MEDICINE

## 2023-04-12 PROCEDURE — 1101F PT FALLS ASSESS-DOCD LE1/YR: CPT | Mod: CPTII,S$GLB,, | Performed by: INTERNAL MEDICINE

## 2023-04-12 PROCEDURE — 1159F MED LIST DOCD IN RCRD: CPT | Mod: CPTII,S$GLB,, | Performed by: INTERNAL MEDICINE

## 2023-04-12 PROCEDURE — 3288F FALL RISK ASSESSMENT DOCD: CPT | Mod: CPTII,S$GLB,, | Performed by: INTERNAL MEDICINE

## 2023-04-12 PROCEDURE — 3079F DIAST BP 80-89 MM HG: CPT | Mod: CPTII,S$GLB,, | Performed by: INTERNAL MEDICINE

## 2023-04-12 PROCEDURE — 3079F PR MOST RECENT DIASTOLIC BLOOD PRESSURE 80-89 MM HG: ICD-10-PCS | Mod: CPTII,S$GLB,, | Performed by: INTERNAL MEDICINE

## 2023-04-12 PROCEDURE — 1125F AMNT PAIN NOTED PAIN PRSNT: CPT | Mod: CPTII,S$GLB,, | Performed by: INTERNAL MEDICINE

## 2023-04-12 RX ORDER — EZETIMIBE 10 MG/1
10 TABLET ORAL DAILY
Qty: 90 TABLET | Refills: 3 | Status: CANCELLED | OUTPATIENT
Start: 2023-04-12 | End: 2024-04-11

## 2023-04-12 NOTE — PROGRESS NOTES
"Subjective:    Patient ID:  Kristin Guerra is a 73 y.o. female     Chief Complaint   Patient presents with    Hypertension    Leg Pain       HPI:  Ms Kristin Guerra is a 73 y.o. female is here for follow-up.    Unfortunately recently her  was diagnosed with bladder cancer stage IV and he passed away.  Patient is grieving at the present time.  Her breathing is stable denies any chest pain or tightness or heaviness denies any loss of consciousness falls or head injury.  Denies any loss of consciousness the present time.  She is taking all her medications regularly.    Review of patient's allergies indicates:   Allergen Reactions    Doxycycline Itching and Swelling     Prescribed 5/2021, took one dose, describes lip swelling, and "itch all over"    Mirtazapine Other (See Comments)     Hyperactivity.    Morphine Swelling and Rash     Other reaction(s): swelling in limbs    Lexapro [escitalopram oxalate] Other (See Comments)     Worsening tremor.  Sedation.    Primidone Other (See Comments)     Syncope.  Other reaction(s): dizziness,hypotension  Other reaction(s): Unknown  Other reaction(s): Unknown    Simvastatin Nausea And Vomiting and Other (See Comments)     Weakness.  Other reaction(s): nausea,vomiting    Beta-adrenergic agents Other (See Comments)     Low blood pressure and fainted.     Metoprolol succinate Other (See Comments)    Prochlorperazine      Other reaction(s): weakness    Topiramate     Compazine [prochlorperazine edisylate] Nausea Only    Tramadol Nausea And Vomiting       Past Medical History:   Diagnosis Date    Allergy     Sterling's esophagus     Colon polyp     Diverticulitis     Diverticulosis     Fatty liver     GERD (gastroesophageal reflux disease)     History of endoscopy 4/7/2021    Dr Uzair Cantor  Impression:            - Normal oropharynx.                         - Hiatal hernia.                         - Sterling's esophagus. Treated with radiofrequency                         " ablation.                         - Normal stomach.                         - Normal examined duodenum.                         - No specimens collected.     Hx of cervical spine surgery 2018    Hyperlipidemia     Hypertension     Lupus (systemic lupus erythematosus)     discoid    Osteoarthritis     Osteopenia     Syncope     Tremor 2005    essential     Past Surgical History:   Procedure Laterality Date    APPENDECTOMY  2007    removed during colon surgery    CERVICAL FUSION      CERVICAL SPINE SURGERY      Rods and screws.     SECTION      COLON SURGERY      hemicolectomy    COLON SURGERY  2008    repair    COLONOSCOPY N/A 2018    Procedure: COLONOSCOPY;  Surgeon: Maverick Esquivel MD;  Location: [facility];  Service: Endoscopy;  Laterality: N/A; repeat in 5 years for surveillance    CYSTOSCOPY N/A 2021    Procedure: CYSTOSCOPY;  Surgeon: Allison Miranda MD;  Location: [facility];  Service: Urology;  Laterality: N/A;    ESOPHAGOGASTRODUODENOSCOPY N/A 2019    Procedure: EGD (ESOPHAGOGASTRODUODENOSCOPY);  Surgeon: Monika Steele MD;  Location: [facility];  Service: Endoscopy;  Laterality: N/A;    ESOPHAGOGASTRODUODENOSCOPY  2019    with RFA and biopsies    ESOPHAGOGASTRODUODENOSCOPY N/A 2019    Procedure: EGD (ESOPHAGOGASTRODUODENOSCOPY);  Surgeon: Uzair Cantor MD;  Location: [facility];  Service: Endoscopy;  Laterality: N/A;  n+v with anesthesia    ESOPHAGOGASTRODUODENOSCOPY N/A 2019    Procedure: EGD (ESOPHAGOGASTRODUODENOSCOPY);  Surgeon: Uzair Cantor MD;  Location: [facility];  Service: Endoscopy;  Laterality: N/A;    ESOPHAGOGASTRODUODENOSCOPY N/A 10/7/2019    Procedure: EGD (ESOPHAGOGASTRODUODENOSCOPY);  Surgeon: Uzair Cantor MD;  Location: [facility];  Service: Endoscopy;  Laterality: N/A;    ESOPHAGOGASTRODUODENOSCOPY N/A 2/10/2020    Procedure: EGD (ESOPHAGOGASTRODUODENOSCOPY);  Surgeon: Uzair Cantor MD;  Location: [facility];   Service: Endoscopy;  Laterality: N/A;  Requests earliest procedure time    ESOPHAGOGASTRODUODENOSCOPY N/A 8/17/2020    Procedure: EGD (ESOPHAGOGASTRODUODENOSCOPY);  Surgeon: Uzair Cantor MD;  Location: [facility];  Service: Endoscopy;  Laterality: N/A;  BARRETTS    ESOPHAGOGASTRODUODENOSCOPY N/A 4/7/2021    Procedure: ESOPHAGOGASTRODUODENOSCOPY (EGD);  Surgeon: Uzair Cantor MD;  Location: [facility];  Service: Endoscopy;  Laterality: N/A;  Needs Rapid Covid MP    ESOPHAGOGASTRODUODENOSCOPY N/A 11/30/2021    Procedure: EGD (ESOPHAGOGASTRODUODENOSCOPY);  Surgeon: Uzair Cantor MD;  Location: [facility];  Service: Endoscopy;  Laterality: N/A;    ESOPHAGOGASTRODUODENOSCOPY N/A 6/27/2022    Procedure: EGD (ESOPHAGOGASTRODUODENOSCOPY);  Surgeon: Uzair Cantor MD;  Location: [facility];  Service: Endoscopy;  Laterality: N/A;  5/19/22: fully vaccinated. instructions mailed-[place]    loop recorder only    HYSTERECTOMY      ovaries remain    INSERTION OF IMPLANTABLE LOOP RECORDER N/A 7/17/2020    Procedure: INSERTION, IMPLANTABLE LOOP RECORDER (MEDTRONIC);  Surgeon: Karyna Vasques MD;  Location: [facility] CATH/EP LAB;  Service: Cardiology;  Laterality: N/A;    SPLENECTOMY, TOTAL  2007    Injured during surgery and removed    UPPER GASTROINTESTINAL ENDOSCOPY  2007    GERD & hiatal hernia per patient report     Social History     Tobacco Use    Smoking status: Every Day     Packs/day: 1.00     Years: 35.00     Pack years: 35.00     Types: Cigarettes     Passive exposure: Current    Smokeless tobacco: Never   Substance Use Topics    Alcohol use: No    Drug use: No     Family History   Problem Relation Age of Onset    Colon cancer Father         diagnosed in his 60's    Lung cancer Father     Lymphoma Mother         Brain    Lung cancer Brother     Lung cancer Brother         metastatic from prostate    Prostate cancer Brother     Heart disease Brother         valvular disease    Hyperlipidemia Brother     Eczema  Neg Hx     Lupus Neg Hx     Psoriasis Neg Hx     Melanoma Neg Hx     Crohn's disease Neg Hx     Ulcerative colitis Neg Hx     Stomach cancer Neg Hx     Esophageal cancer Neg Hx         Review of Systems:   Constitution: Negative for diaphoresis and fever.   HEENT: Negative for nosebleeds.    Cardiovascular: Negative for chest pain       Intermittent dyspnea on exertion       No leg swelling        No palpitations  Respiratory: Negative for shortness of breath and wheezing.    Hematologic/Lymphatic: Negative for bleeding problem. Does not bruise/bleed easily.   Skin: Negative for color change and rash.   Musculoskeletal: Negative for falls and myalgias.   Gastrointestinal: Negative for hematemesis and hematochezia.   Genitourinary: Negative for hematuria.   Neurological: Negative for dizziness and light-headedness.   Psychiatric/Behavioral: Negative for altered mental status and memory loss.  Recent family loss and she is grieving.         Objective:        Vitals:    04/12/23 1034   BP: 132/80   Pulse: (!) 57   Resp: 16       Lab Results   Component Value Date    WBC 9.32 03/08/2023    HGB 11.9 (L) 03/08/2023    HCT 37.7 03/08/2023     03/08/2023    CHOL 231 (H) 03/08/2023    TRIG 72 03/08/2023    HDL 53 03/08/2023    ALT 14 03/08/2023    AST 17 03/08/2023     03/08/2023    K 3.5 03/08/2023     03/08/2023    CREATININE 0.6 03/08/2023    BUN 13 03/08/2023    CO2 27 03/08/2023    TSH 2.178 02/09/2022        ECHOCARDIOGRAM RESULTS  Results for orders placed in visit on 07/11/22    Echo    Interpretation Summary  · The left ventricle is normal in size with hyperdynamic systolic function.  · Normal left ventricular diastolic function.  · The estimated ejection fraction is 72%.  · Normal right ventricular size with normal right ventricular systolic function.  · Mild left atrial enlargement.  · Mild tricuspid regurgitation.  · Mild mitral regurgitation.  · Normal central venous pressure (3 mmHg).  · The  estimated PA systolic pressure is 30 mmHg.        CURRENT/PREVIOUS VISIT EKG  Results for orders placed or performed during the hospital encounter of 01/15/23   EKG 12-lead    Collection Time: 01/15/23  9:56 PM    Narrative    Test Reason : R53.1,    Vent. Rate : 052 BPM     Atrial Rate : 052 BPM     P-R Int : 162 ms          QRS Dur : 088 ms      QT Int : 476 ms       P-R-T Axes : 066 042 060 degrees     QTc Int : 442 ms    Sinus bradycardia  Cannot rule out Anterior infarct ,age undetermined  Abnormal ECG  When compared with ECG of 15-DEC-2021 14:58,  Minimal criteria for Anterior infarct are now Present  Confirmed by Thomas QUIROGA, Daniel SCOTT ([id number]) on 1/20/2023 9:24:16 AM    Referred By: SHERI   SELF           Confirmed By:Daniel Guzman MD     No valid procedures specified.   Results for orders placed during the hospital encounter of 06/01/21    Nuclear Stress Test    Interpretation Summary    The EKG portion of this study is negative for ischemia.    The patient reported no chest pain during the stress test.    There were no arrhythmias during stress.    The nuclear portion of this study will be reported separately.      Physical Exam:  CONSTITUTIONAL: No fever, no chills  HEENT: Normocephalic, atraumatic,pupils reactive to light                 NECK:  No JVD no carotid bruit  CVS: S1S2+, RRR, systolic murmurs,   LUNGS: Clear  ABDOMEN: Soft, NT, BS+  EXTREMITIES: No cyanosis, edema  : No odonnell catheter  NEURO: AAO X 3  PSY: Normal affect      Medication List with Changes/Refills   Current Medications    ALBUTEROL-IPRATROPIUM (DUO-NEB) 2.5 MG-0.5 MG/3 ML NEBULIZER SOLUTION    Take 3 mLs by nebulization every 6 (six) hours as needed for Wheezing or Shortness of Breath. Rescue    ALUMINUM & MAGNESIUM HYDROXIDE-SIMETHICONE (MYLANTA MAX STRENGTH) 400-400-40 MG/5 ML SUSPENSION    Take 5 mLs by mouth every 6 (six) hours as needed for Indigestion.     AMLODIPINE (NORVASC) 5 MG TABLET    Take 1 tablet (5 mg total) by  mouth once daily.    AZELASTINE (ASTELIN) 137 MCG (0.1 %) NASAL SPRAY    1 spray (137 mcg total) by Nasal route 2 (two) times daily.    AZITHROMYCIN (Z-STACY) 250 MG TABLET    Take 2 tablets by mouth on day 1; Take 1 tablet by mouth on days 2-5    BENZONATATE (TESSALON) 100 MG CAPSULE    Take 100 mg by mouth.    CLINDAMYCIN (CLEOCIN T) 1 % EXTERNAL SOLUTION    Apply topically 2 (two) times daily. Apply to affected area twice daily    CLOBETASOL (TEMOVATE) 0.05 % CREAM    Apply to feet daily to bid prn flare    CLONAZEPAM (KLONOPIN) 0.5 MG TABLET    Take 0.5 tablets (0.25 mg total) by mouth 2 (two) times daily as needed for Anxiety (or insomnia).    ESOMEPRAZOLE (NEXIUM) 40 MG CAPSULE    TAKE ONE CAPSULE BY MOUTH TWICE DAILY    FLUOXETINE 10 MG CAPSULE    Take 1 capsule (10 mg total) by mouth once daily.    FLUTICASONE (FLONASE) 50 MCG/ACTUATION NASAL SPRAY    1 spray by Each Nostril route daily as needed for Allergies.     GABAPENTIN (NEURONTIN) 300 MG CAPSULE    Take 1 capsule (300 mg total) by mouth every evening.    INDOMETHACIN (INDOCIN) 50 MG CAPSULE    Take 1 capsule (50 mg total) by mouth 2 (two) times daily with meals.    MIRABEGRON (MYRBETRIQ) 25 MG TB24 ER TABLET    Take 1 tablet (25 mg total) by mouth once daily.    MUPIROCIN (BACTROBAN) 2 % OINTMENT    3 (three) times daily. Apply to affected area    POLYETHYLENE GLYCOL (GLYCOLAX) 17 GRAM PWPK    Take 17 g by mouth daily as needed.    TIOTROPIUM BROMIDE (SPIRIVA RESPIMAT) 2.5 MCG/ACTUATION INHALER    Inhale 2 puffs into the lungs Daily. Controller    TRAZODONE (DESYREL) 50 MG TABLET    Take 50 mg by mouth every evening.     Conclusion       1. Normal device function.  2. No new observations.           Assessment:       1. Essential hypertension, labile    2. Orthostatic hypotension    3. Bradycardia    4. Syncope and collapse    5. Statin intolerance    6. Anxiety    7. Systemic lupus erythematosus, unspecified SLE type, unspecified organ involvement  status    8. Mixed dyslipidemia         Plan:   1. Essential hypertension   Patient's blood pressure is stable 132/80 and she is currently on amlodipine 5 mg p.o. daily continue the same.    2. Grief process   She is currently grieving at the present time.  Due to the loss of her  and she does take trazodone at bedtime.  She also takes something to help her to sleep better.  3. Orthostatic hypotension   Patient would benefit from wearing compression stockings.    4. Syncope and bradycardia status post loop recorder  Her loop recorder is functioning normal no new observations no bradycardia.  5.   Mixed hyperlipidemia   Patient is currently not on any statins due to her statin intolerance.  She recently had blood work done and her total cholesterol is 231 HDL is 53 LDL is 163 and triglycerides are 72.  Discussed with patient that consideration for Zetia 10 mg p.o. q.day might help a little bit.    6. SLE  Patient had blood work done and her CHACE is positive with speckled pattern.  7. Reviewed the blood recent blood work she has had she does not have a follow-up with her primary physician for quite some time.  Her hemoglobin is 11.9 WBC is 9.3 hematocrit is 37.7 and platelets are 360.  She is stable  She also had blood work for CMP are sodium is 140 potassium is 3.5 chloride is 108 CO2 is 27 BUN is 13 and creatinine is 0.6 with glucose at 72.  Her liver function tests are within normal limits.    8. Low vitamin-D measuring 17 and patient to follow-up with the primary physician in regards with starting on vitamin-D.    She also had normal TSH of 2.17 and T4 of  8.2 need to be evaluated for free T4 levels.    9. EKG  Reviewed EKG independently patient is in sinus bradycardia with a heart rate of 52 beats per minute poor R-wave progression in the anterior precordial leads can not rule out anterior infarct age undetermined.  No acute ST T-wave changes.    10. I will see her back in the office in 6 months  time.            Problem List Items Addressed This Visit          Psychiatric    Anxiety       Cardiac/Vascular    Essential hypertension, labile - Primary (Chronic)    Mixed dyslipidemia (Chronic)    Bradycardia    Orthostatic hypotension       Immunology/Multi System    history of skin lupus       Other    Statin intolerance (Chronic)    Syncope and collapse       No follow-ups on file.

## 2023-04-13 ENCOUNTER — TELEPHONE (OUTPATIENT)
Dept: CARDIOLOGY | Facility: CLINIC | Age: 73
End: 2023-04-13
Payer: MEDICARE

## 2023-04-13 DIAGNOSIS — Z95.0 PACEMAKER: Primary | ICD-10-CM

## 2023-04-17 ENCOUNTER — OFFICE VISIT (OUTPATIENT)
Dept: OTOLARYNGOLOGY | Facility: CLINIC | Age: 73
End: 2023-04-17
Payer: MEDICARE

## 2023-04-17 VITALS
WEIGHT: 140 LBS | DIASTOLIC BLOOD PRESSURE: 81 MMHG | BODY MASS INDEX: 23.9 KG/M2 | HEART RATE: 59 BPM | SYSTOLIC BLOOD PRESSURE: 135 MMHG | HEIGHT: 64 IN

## 2023-04-17 DIAGNOSIS — R09.81 NASAL CONGESTION: ICD-10-CM

## 2023-04-17 DIAGNOSIS — H69.91 DYSFUNCTION OF RIGHT EUSTACHIAN TUBE: Primary | ICD-10-CM

## 2023-04-17 PROCEDURE — 1160F RVW MEDS BY RX/DR IN RCRD: CPT | Mod: CPTII,S$GLB,, | Performed by: PHYSICIAN ASSISTANT

## 2023-04-17 PROCEDURE — 1101F PT FALLS ASSESS-DOCD LE1/YR: CPT | Mod: CPTII,S$GLB,, | Performed by: PHYSICIAN ASSISTANT

## 2023-04-17 PROCEDURE — 3079F PR MOST RECENT DIASTOLIC BLOOD PRESSURE 80-89 MM HG: ICD-10-PCS | Mod: CPTII,S$GLB,, | Performed by: PHYSICIAN ASSISTANT

## 2023-04-17 PROCEDURE — 1160F PR REVIEW ALL MEDS BY PRESCRIBER/CLIN PHARMACIST DOCUMENTED: ICD-10-PCS | Mod: CPTII,S$GLB,, | Performed by: PHYSICIAN ASSISTANT

## 2023-04-17 PROCEDURE — 99999 PR PBB SHADOW E&M-EST. PATIENT-LVL IV: ICD-10-PCS | Mod: PBBFAC,,, | Performed by: PHYSICIAN ASSISTANT

## 2023-04-17 PROCEDURE — 3075F SYST BP GE 130 - 139MM HG: CPT | Mod: CPTII,S$GLB,, | Performed by: PHYSICIAN ASSISTANT

## 2023-04-17 PROCEDURE — 1159F PR MEDICATION LIST DOCUMENTED IN MEDICAL RECORD: ICD-10-PCS | Mod: CPTII,S$GLB,, | Performed by: PHYSICIAN ASSISTANT

## 2023-04-17 PROCEDURE — 99999 PR PBB SHADOW E&M-EST. PATIENT-LVL IV: CPT | Mod: PBBFAC,,, | Performed by: PHYSICIAN ASSISTANT

## 2023-04-17 PROCEDURE — 3288F PR FALLS RISK ASSESSMENT DOCUMENTED: ICD-10-PCS | Mod: CPTII,S$GLB,, | Performed by: PHYSICIAN ASSISTANT

## 2023-04-17 PROCEDURE — 3079F DIAST BP 80-89 MM HG: CPT | Mod: CPTII,S$GLB,, | Performed by: PHYSICIAN ASSISTANT

## 2023-04-17 PROCEDURE — 1101F PR PT FALLS ASSESS DOC 0-1 FALLS W/OUT INJ PAST YR: ICD-10-PCS | Mod: CPTII,S$GLB,, | Performed by: PHYSICIAN ASSISTANT

## 2023-04-17 PROCEDURE — 99214 OFFICE O/P EST MOD 30 MIN: CPT | Mod: S$GLB,,, | Performed by: PHYSICIAN ASSISTANT

## 2023-04-17 PROCEDURE — 3288F FALL RISK ASSESSMENT DOCD: CPT | Mod: CPTII,S$GLB,, | Performed by: PHYSICIAN ASSISTANT

## 2023-04-17 PROCEDURE — 3008F PR BODY MASS INDEX (BMI) DOCUMENTED: ICD-10-PCS | Mod: CPTII,S$GLB,, | Performed by: PHYSICIAN ASSISTANT

## 2023-04-17 PROCEDURE — 1126F PR PAIN SEVERITY QUANTIFIED, NO PAIN PRESENT: ICD-10-PCS | Mod: CPTII,S$GLB,, | Performed by: PHYSICIAN ASSISTANT

## 2023-04-17 PROCEDURE — 1126F AMNT PAIN NOTED NONE PRSNT: CPT | Mod: CPTII,S$GLB,, | Performed by: PHYSICIAN ASSISTANT

## 2023-04-17 PROCEDURE — 3075F PR MOST RECENT SYSTOLIC BLOOD PRESS GE 130-139MM HG: ICD-10-PCS | Mod: CPTII,S$GLB,, | Performed by: PHYSICIAN ASSISTANT

## 2023-04-17 PROCEDURE — 99214 PR OFFICE/OUTPT VISIT, EST, LEVL IV, 30-39 MIN: ICD-10-PCS | Mod: S$GLB,,, | Performed by: PHYSICIAN ASSISTANT

## 2023-04-17 PROCEDURE — 1159F MED LIST DOCD IN RCRD: CPT | Mod: CPTII,S$GLB,, | Performed by: PHYSICIAN ASSISTANT

## 2023-04-17 PROCEDURE — 3008F BODY MASS INDEX DOCD: CPT | Mod: CPTII,S$GLB,, | Performed by: PHYSICIAN ASSISTANT

## 2023-04-17 RX ORDER — FLUTICASONE PROPIONATE 50 MCG
1 SPRAY, SUSPENSION (ML) NASAL 2 TIMES DAILY
Qty: 48 G | Refills: 0 | Status: SHIPPED | OUTPATIENT
Start: 2023-04-17

## 2023-04-17 RX ORDER — AZELASTINE 1 MG/ML
1 SPRAY, METERED NASAL 2 TIMES DAILY
Qty: 90 ML | Refills: 0 | Status: SHIPPED | OUTPATIENT
Start: 2023-04-17 | End: 2023-08-14

## 2023-04-17 NOTE — TELEPHONE ENCOUNTER
----- Message from Abdirizak Coates sent at 4/17/2023 12:44 PM CDT -----  Contact: Self  Type: Needs Medical Advice  Who Called:  Patient  Best Call Back Number: [phone]  Additional Information:  States cholesterol Rx discussed in appt never sent to [facility]. Please call.

## 2023-04-17 NOTE — PATIENT INSTRUCTIONS
popping ears (nasal valsalva) 4-6 times a day for the next month. Use flonase 1 spray to each nostril twice daily for the next month. Use astelin nasal spray 1 spray to each nostril twice daily for the next month. This will help with eustachian tube dysfunction and help with nasal congestion. Do this for 1 month and then as needed for nasal congestion/sinus issues. Follow up if having worsening of symptoms or symptoms are not clearing.

## 2023-04-17 NOTE — PROGRESS NOTES
[facility]    Subjective:      Patient: Kristin Guerra Patient PCP: Gavi Mishra MD         :  1950     Sex:  female      MRN:  [id number]          Date of Visit: 2022      Chief Complaint: Nasal congestion/right aural fullness    Patient ID: 2022 Kristin Guerra is a 73 y.o. female who was self-referred for ear pain. Pt has had right ear pain x 1 month. Pt took a couple of keflex from a prior abx prescription which helped he ear. Pt states that gland under the right ear was swollen, which has improved with keflex. Pt has h/o in right ear hearing loss which was previously evaluated by Dr. Kimbrough. Pt endorses right side aural pressue. Pt denies fever/chills or further hearing loss with new episode of aural fullness. There is not a family history of hearing loss at a young age. There is not a prior history of ear surgery. There is not a prior history of ear infections. She denies a history of significant noise exposure. 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 Pt has had recurrence of right outer helix lesion.     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 Pt states that she had some bleeding from her biopsy and feels as though she had some blood that dried in her right ear canal. Pt feels as though her right ear is clogged. Pt has history of sinus issues controlled, but states she is controlled with use of astelin and flonase. Pt would like refill of astelin nasal spray. Pt has no other complaints today in office.     2022: Mild improvement of right ear pain. Pt using astelin and flonase and doing valsalva. Pt has fullness of right ear.    Interval History 2023: Pt has bilateral hearing aids through [facility] for her bilateral hearing loss. Pt states that she was recently treated for sinusitis with  Augmentin via family medicine. Pt states that she has had issues with nasal congestion x 2 weeks and right aural fullness with right ear popping and muffled hearing of right ear for around 2 weeks. Pt states she is currently not using any nasal sprays or sinus medication. Pt denies fever/chills, purulent nasal discharge, smelll changes, ear pain/drainage. Pt is a current smoker and smokes around 1ppd.     Past Medical History  She has a past medical history of Allergy, Sterling's esophagus, Colon polyp, Diverticulitis, Diverticulosis, Fatty liver, GERD (gastroesophageal reflux disease), History of endoscopy, cervical spine surgery, Hyperlipidemia, Hypertension, Lupus (systemic lupus erythematosus), Osteoarthritis, Osteopenia, Syncope, and Tremor.    Family History  Her family history includes Colon cancer in her father; Heart disease in her brother; Hyperlipidemia in her brother; Lung cancer in her brother, brother, and father; Lymphoma in her mother; Prostate cancer in her brother.    Past Surgical History:   Procedure Laterality Date    APPENDECTOMY      removed during colon surgery    CERVICAL FUSION      CERVICAL SPINE SURGERY      Rods and screws.     SECTION      COLON SURGERY      hemicolectomy    COLON SURGERY  2008    repair    COLONOSCOPY N/A 2018    Procedure: COLONOSCOPY;  Surgeon: Maverick Esquviel MD;  Location: [facility];  Service: Endoscopy;  Laterality: N/A; repeat in 5 years for surveillance    CYSTOSCOPY N/A 2021    Procedure: CYSTOSCOPY;  Surgeon: Allison Miranda MD;  Location: [facility];  Service: Urology;  Laterality: N/A;    ESOPHAGOGASTRODUODENOSCOPY N/A 2019    Procedure: EGD (ESOPHAGOGASTRODUODENOSCOPY);  Surgeon: Monika Stelee MD;  Location: [facility];  Service: Endoscopy;  Laterality: N/A;    ESOPHAGOGASTRODUODENOSCOPY  2019    with RFA and biopsies    ESOPHAGOGASTRODUODENOSCOPY N/A 2019    Procedure: EGD (ESOPHAGOGASTRODUODENOSCOPY);  Surgeon:  Uzair Cantor MD;  Location: [facility];  Service: Endoscopy;  Laterality: N/A;  n+v with anesthesia    ESOPHAGOGASTRODUODENOSCOPY N/A 8/21/2019    Procedure: EGD (ESOPHAGOGASTRODUODENOSCOPY);  Surgeon: Uzair Cantor MD;  Location: [facility];  Service: Endoscopy;  Laterality: N/A;    ESOPHAGOGASTRODUODENOSCOPY N/A 10/7/2019    Procedure: EGD (ESOPHAGOGASTRODUODENOSCOPY);  Surgeon: Uzair Cantor MD;  Location: [facility];  Service: Endoscopy;  Laterality: N/A;    ESOPHAGOGASTRODUODENOSCOPY N/A 2/10/2020    Procedure: EGD (ESOPHAGOGASTRODUODENOSCOPY);  Surgeon: Uzair Cantor MD;  Location: [facility];  Service: Endoscopy;  Laterality: N/A;  Requests earliest procedure time    ESOPHAGOGASTRODUODENOSCOPY N/A 8/17/2020    Procedure: EGD (ESOPHAGOGASTRODUODENOSCOPY);  Surgeon: Uzair Cantor MD;  Location: [facility];  Service: Endoscopy;  Laterality: N/A;  BARRETTS    ESOPHAGOGASTRODUODENOSCOPY N/A 4/7/2021    Procedure: ESOPHAGOGASTRODUODENOSCOPY (EGD);  Surgeon: Uzair Cantor MD;  Location: [facility];  Service: Endoscopy;  Laterality: N/A;  Needs Rapid Covid MP    ESOPHAGOGASTRODUODENOSCOPY N/A 11/30/2021    Procedure: EGD (ESOPHAGOGASTRODUODENOSCOPY);  Surgeon: Uzair Cantor MD;  Location: [facility];  Service: Endoscopy;  Laterality: N/A;    ESOPHAGOGASTRODUODENOSCOPY N/A 6/27/2022    Procedure: EGD (ESOPHAGOGASTRODUODENOSCOPY);  Surgeon: Uzair Cantor MD;  Location: [facility];  Service: Endoscopy;  Laterality: N/A;  5/19/22: fully vaccinated. instructions mailed-[place]    loop recorder only    HYSTERECTOMY      ovaries remain    INSERTION OF IMPLANTABLE LOOP RECORDER N/A 7/17/2020    Procedure: INSERTION, IMPLANTABLE LOOP RECORDER (MEDTRONIC);  Surgeon: Karyna Vasques MD;  Location: [facility];  Service: Cardiology;  Laterality: N/A;    SPLENECTOMY, TOTAL  2007    Injured during surgery and removed    UPPER GASTROINTESTINAL ENDOSCOPY  2007    GERD & hiatal hernia per patient  "report     Social History     Tobacco Use    Smoking status: Every Day     Packs/day: 1.00     Years: 35.00     Pack years: 35.00     Types: Cigarettes     Passive exposure: Current    Smokeless tobacco: Never   Substance and Sexual Activity    Alcohol use: No    Drug use: No    Sexual activity: Not Currently     Medications  She has a current medication list which includes the following prescription(s): albuterol-ipratropium, aluminum & magnesium hydroxide-simethicone, amlodipine, azelastine, azithromycin, benzonatate, clindamycin, clobetasol, clonazepam, esomeprazole, fluoxetine, fluticasone propionate, gabapentin, indomethacin, myrbetriq, mupirocin, polyethylene glycol, spiriva respimat, and trazodone.    Review of patient's allergies indicates:   Allergen Reactions    Doxycycline Itching and Swelling     Prescribed 5/2021, took one dose, describes lip swelling, and "itch all over"    Mirtazapine Other (See Comments)     Hyperactivity.    Morphine Swelling and Rash     Other reaction(s): swelling in limbs    Lexapro [escitalopram oxalate] Other (See Comments)     Worsening tremor.  Sedation.    Primidone Other (See Comments)     Syncope.  Other reaction(s): dizziness,hypotension  Other reaction(s): Unknown  Other reaction(s): Unknown    Simvastatin Nausea And Vomiting and Other (See Comments)     Weakness.  Other reaction(s): nausea,vomiting    Beta-adrenergic agents Other (See Comments)     Low blood pressure and fainted.     Metoprolol succinate Other (See Comments)    Prochlorperazine      Other reaction(s): weakness    Topiramate     Compazine [prochlorperazine edisylate] Nausea Only    Tramadol Nausea And Vomiting     All medications, allergies, and past history have been reviewed.    Objective:      Vitals:  Vitals - 1 value per visit 4/12/2023 4/17/2023 4/17/2023   SYSTOLIC 132 - 135   DIASTOLIC 80 - 81   Remote BP - - -   Pulse 57 - 59   Temp - - -   Resp 16 - -   SPO2 99 - -   Weight (lb) 137 - 139.99 "   Weight (kg) 62.143 - 63.5   Height 64 - 64   BMI (Calculated) 23.5 - 24   VISIT REPORT - - -   Pain Score  - 0 -   Some encounter information is confidential and restricted. Go to Review Flowsheets activity to see all data.   Some recent data might be hidden       Body surface area is 1.69 meters squared.    Physical Exam  Constitutional:       General: She is not in acute distress.     Appearance: Normal appearance. She is not ill-appearing.   HENT:      Head: Normocephalic and atraumatic.      Right Ear: Ear canal and external ear normal. No middle ear effusion. Tympanic membrane is retracted (mild). Tympanic membrane is not injected, scarred, perforated, erythematous or bulging.      Left Ear: Tympanic membrane, ear canal and external ear normal.      Nose: Mucosal edema and congestion present.      Mouth/Throat:      Lips: Pink.      Mouth: Mucous membranes are moist. No oral lesions.      Tongue: No lesions.      Palate: No lesions.      Pharynx: Oropharynx is clear. Uvula midline. No pharyngeal swelling, oropharyngeal exudate, posterior oropharyngeal erythema or uvula swelling.      Tonsils: No tonsillar exudate or tonsillar abscesses.   Eyes:      General:         Right eye: No discharge.         Left eye: No discharge.      Extraocular Movements: Extraocular movements intact.      Conjunctiva/sclera: Conjunctivae normal.   Pulmonary:      Effort: Pulmonary effort is normal.   Neurological:      General: No focal deficit present.      Mental Status: She is alert and oriented to person, place, and time. Mental status is at baseline.   Psychiatric:         Mood and Affect: Mood normal.         Behavior: Behavior normal.         Thought Content: Thought content normal.         Judgment: Judgment normal.   Labs:  WBC   Date Value Ref Range Status   03/08/2023 9.32 3.90 - 12.70 K/uL Final     Platelets   Date Value Ref Range Status   03/08/2023 360 150 - 450 K/uL Final     Creatinine   Date Value Ref Range  Status   03/08/2023 0.6 0.5 - 1.4 mg/dL Final     TSH   Date Value Ref Range Status   02/09/2022 2.178 0.400 - 4.000 uIU/mL Final     Glucose   Date Value Ref Range Status   03/08/2023 72 70 - 110 mg/dL Final     All lab results and imaging results have been reviewed.    Assessment:        ICD-10-CM ICD-9-CM   1. Dysfunction of right eustachian tube  H69.81 381.81   2. Nasal congestion  R09.81 478.19              Plan:     Pt recently treated for sinusitis by family medicine with Augmentin. Pt has had issues with nasal congestion x 2 weeks and is not on any nasal sprays. Pt denies fever/chills, purulent nasal discharge, smelll changes, ear pain/drainage. Pt has also had right aural fullness with muffled hearing with popping of right ear x 2 weeks. Pt advised about ETD secondary to viral URI, allergies, weather changes. Pt is to start popping ears (nasal valsalva) 4-6 times a day for the next month. Use flonase 1 spray to each nostril twice daily for the next month. Use astelin nasal spray 1 spray to each nostril twice daily for the next month. This will help with eustachian tube dysfunction and help with nasal congestion. Do this for 1 month and then as needed for nasal congestion/sinus issues. Follow up if having worsening of symptoms or symptoms are not clearing. Pt offered smoking cessation consult today, but she declines wanting to proceed with smoking cessation at this time.

## 2023-04-18 RX ORDER — EZETIMIBE 10 MG/1
10 TABLET ORAL DAILY
Qty: 30 TABLET | Refills: 3 | Status: SHIPPED | OUTPATIENT
Start: 2023-04-18 | End: 2023-08-15

## 2023-04-19 ENCOUNTER — TELEPHONE (OUTPATIENT)
Dept: ADMINISTRATIVE | Facility: CLINIC | Age: 73
End: 2023-04-19
Payer: MEDICARE

## 2023-04-20 ENCOUNTER — OFFICE VISIT (OUTPATIENT)
Dept: FAMILY MEDICINE | Facility: CLINIC | Age: 73
End: 2023-04-20
Payer: MEDICARE

## 2023-04-20 VITALS
TEMPERATURE: 98 F | HEART RATE: 76 BPM | SYSTOLIC BLOOD PRESSURE: 136 MMHG | HEIGHT: 64 IN | WEIGHT: 140.88 LBS | DIASTOLIC BLOOD PRESSURE: 84 MMHG | BODY MASS INDEX: 24.05 KG/M2 | OXYGEN SATURATION: 97 %

## 2023-04-20 DIAGNOSIS — J44.9 CHRONIC OBSTRUCTIVE PULMONARY DISEASE, UNSPECIFIED COPD TYPE: ICD-10-CM

## 2023-04-20 DIAGNOSIS — Z85.038 HX OF COLON CANCER, STAGE II: Chronic | ICD-10-CM

## 2023-04-20 DIAGNOSIS — Z00.00 ENCOUNTER FOR PREVENTIVE HEALTH EXAMINATION: Primary | ICD-10-CM

## 2023-04-20 DIAGNOSIS — F33.9 DEPRESSION, RECURRENT: ICD-10-CM

## 2023-04-20 DIAGNOSIS — I70.0 AORTIC ATHEROSCLEROSIS: ICD-10-CM

## 2023-04-20 PROBLEM — C18.9 MALIGNANT NEOPLASM OF COLON, UNSPECIFIED PART OF COLON: Status: RESOLVED | Noted: 2021-10-26 | Resolved: 2023-04-20

## 2023-04-20 PROCEDURE — 1126F AMNT PAIN NOTED NONE PRSNT: CPT | Mod: CPTII,S$GLB,, | Performed by: NURSE PRACTITIONER

## 2023-04-20 PROCEDURE — 3075F PR MOST RECENT SYSTOLIC BLOOD PRESS GE 130-139MM HG: ICD-10-PCS | Mod: CPTII,S$GLB,, | Performed by: NURSE PRACTITIONER

## 2023-04-20 PROCEDURE — 99999 PR PBB SHADOW E&M-EST. PATIENT-LVL V: ICD-10-PCS | Mod: PBBFAC,,, | Performed by: NURSE PRACTITIONER

## 2023-04-20 PROCEDURE — 1101F PR PT FALLS ASSESS DOC 0-1 FALLS W/OUT INJ PAST YR: ICD-10-PCS | Mod: CPTII,S$GLB,, | Performed by: NURSE PRACTITIONER

## 2023-04-20 PROCEDURE — G0439 PPPS, SUBSEQ VISIT: HCPCS | Mod: S$GLB,,, | Performed by: NURSE PRACTITIONER

## 2023-04-20 PROCEDURE — 3288F FALL RISK ASSESSMENT DOCD: CPT | Mod: CPTII,S$GLB,, | Performed by: NURSE PRACTITIONER

## 2023-04-20 PROCEDURE — 3288F PR FALLS RISK ASSESSMENT DOCUMENTED: ICD-10-PCS | Mod: CPTII,S$GLB,, | Performed by: NURSE PRACTITIONER

## 2023-04-20 PROCEDURE — 1101F PT FALLS ASSESS-DOCD LE1/YR: CPT | Mod: CPTII,S$GLB,, | Performed by: NURSE PRACTITIONER

## 2023-04-20 PROCEDURE — 3075F SYST BP GE 130 - 139MM HG: CPT | Mod: CPTII,S$GLB,, | Performed by: NURSE PRACTITIONER

## 2023-04-20 PROCEDURE — 3008F PR BODY MASS INDEX (BMI) DOCUMENTED: ICD-10-PCS | Mod: CPTII,S$GLB,, | Performed by: NURSE PRACTITIONER

## 2023-04-20 PROCEDURE — 3079F DIAST BP 80-89 MM HG: CPT | Mod: CPTII,S$GLB,, | Performed by: NURSE PRACTITIONER

## 2023-04-20 PROCEDURE — 1170F PR FUNCTIONAL STATUS ASSESSED: ICD-10-PCS | Mod: CPTII,S$GLB,, | Performed by: NURSE PRACTITIONER

## 2023-04-20 PROCEDURE — 1126F PR PAIN SEVERITY QUANTIFIED, NO PAIN PRESENT: ICD-10-PCS | Mod: CPTII,S$GLB,, | Performed by: NURSE PRACTITIONER

## 2023-04-20 PROCEDURE — 99999 PR PBB SHADOW E&M-EST. PATIENT-LVL V: CPT | Mod: PBBFAC,,, | Performed by: NURSE PRACTITIONER

## 2023-04-20 PROCEDURE — 1160F PR REVIEW ALL MEDS BY PRESCRIBER/CLIN PHARMACIST DOCUMENTED: ICD-10-PCS | Mod: CPTII,S$GLB,, | Performed by: NURSE PRACTITIONER

## 2023-04-20 PROCEDURE — 1159F MED LIST DOCD IN RCRD: CPT | Mod: CPTII,S$GLB,, | Performed by: NURSE PRACTITIONER

## 2023-04-20 PROCEDURE — 3008F BODY MASS INDEX DOCD: CPT | Mod: CPTII,S$GLB,, | Performed by: NURSE PRACTITIONER

## 2023-04-20 PROCEDURE — 1159F PR MEDICATION LIST DOCUMENTED IN MEDICAL RECORD: ICD-10-PCS | Mod: CPTII,S$GLB,, | Performed by: NURSE PRACTITIONER

## 2023-04-20 PROCEDURE — 1160F RVW MEDS BY RX/DR IN RCRD: CPT | Mod: CPTII,S$GLB,, | Performed by: NURSE PRACTITIONER

## 2023-04-20 PROCEDURE — 3079F PR MOST RECENT DIASTOLIC BLOOD PRESSURE 80-89 MM HG: ICD-10-PCS | Mod: CPTII,S$GLB,, | Performed by: NURSE PRACTITIONER

## 2023-04-20 PROCEDURE — G0439 PR MEDICARE ANNUAL WELLNESS SUBSEQUENT VISIT: ICD-10-PCS | Mod: S$GLB,,, | Performed by: NURSE PRACTITIONER

## 2023-04-20 PROCEDURE — 1170F FXNL STATUS ASSESSED: CPT | Mod: CPTII,S$GLB,, | Performed by: NURSE PRACTITIONER

## 2023-04-20 NOTE — PROGRESS NOTES
"  Kristin Guerra presented for a  Medicare AWV and comprehensive Health Risk Assessment today. The following components were reviewed and updated:    Medical history  Family History  Social history  Allergies and Current Medications  Health Risk Assessment  Health Maintenance  Care Team         ** See Completed Assessments for Annual Wellness Visit within the encounter summary.**         The following assessments were completed:  Living Situation  CAGE  Depression Screening  Timed Get Up and Go  Whisper Test  Cognitive Function Screening  Nutrition Screening  ADL Screening  PAQ Screening          Vitals:    04/20/23 1057   BP: 136/84   Pulse: 76   Temp: 97.8 °F (36.6 °C)   TempSrc: Oral   SpO2: 97%   Weight: 63.9 kg (140 lb 14 oz)   Height: 5' 4" (1.626 m)     Body mass index is 24.18 kg/m².  Physical Exam  Constitutional:       Appearance: She is well-developed.   HENT:      Head: Normocephalic and atraumatic.      Nose: Nose normal.   Eyes:      General: Lids are normal.      Conjunctiva/sclera: Conjunctivae normal.      Pupils: Pupils are equal, round, and reactive to light.   Cardiovascular:      Rate and Rhythm: Normal rate.   Pulmonary:      Effort: Pulmonary effort is normal.   Musculoskeletal:      Cervical back: Full passive range of motion without pain.   Skin:     General: Skin is warm and dry.   Neurological:      Mental Status: She is alert and oriented to person, place, and time.   Psychiatric:         Speech: Speech normal.         Behavior: Behavior normal.             Diagnoses and health risks identified today and associated recommendations/orders:    1. Encounter for preventive health examination  Discussed health maintenance guidelines appropriate for age.    Review for Opioid Screening: Patient does not have rx for Opioids.    Review for Substance Use Disorders: Patient does not use substance.      2. Aortic atherosclerosis  Stable, continue to monitor   Followed by pcp    3. Depression, " "recurrent  Stable, patient is dealing with grief due to the loss of her , discussed support groups   Patient states that klonopin helps "get through most of it"  Has discontinued use of prozac at some point remotely - does not wish to take this or any other antidepressant      4. Chronic obstructive pulmonary disease, unspecified COPD type  Stable, continue current medications  Smoking cessation encouraged     5. Hx of colon cancer, stage II  Patient is due to colonoscopy=  she plans to contact Dr. Steele directly     Provided Kristin with a 5-10 year written screening schedule and personal prevention plan. Recommendations were developed using the USPSTF age appropriate recommendations. Education, counseling, and referrals were provided as needed. After Visit Summary printed and given to patient which includes a list of additional screenings\tests needed.    Follow up for One year for Annual Wellness Visit.    Kayla Andrew, NP      I offered to discuss advanced care planning, including how to pick a person who would make decisions for you if you were unable to make them for yourself, called a health care power of , and what kind of decisions you might make such as use of life sustaining treatments such as ventilators and tube feeding when faced with a life limiting illness recorded on a living will that they will need to know. (How you want to be cared for as you near the end of your natural life)     X Patient is interested in learning more about how to make advanced directives.  I provided them paperwork and offered to discuss this with them.  "

## 2023-04-20 NOTE — PATIENT INSTRUCTIONS
Counseling and Referral of Other Preventative  (Italic type indicates deductible and co-insurance are waived)    Patient Name: Kristin Guerra  Today's Date: 4/20/2023    Health Maintenance       Date Due Completion Date    TETANUS VACCINE Never done ---    Shingles Vaccine (1 of 2) Never done ---    COVID-19 Vaccine (4 - Booster for Pfizer series) 12/01/2021 10/6/2021    Colorectal Cancer Screening 02/20/2023 2/20/2018    DEXA Scan 07/13/2023 7/13/2020    LDCT Lung Screen 09/14/2023 9/14/2022    Mammogram 09/14/2023 9/14/2022    Lipid Panel 03/08/2028 3/8/2023        No orders of the defined types were placed in this encounter.    The following information is provided to all patients.  This information is to help you find resources for any of the problems found today that may be affecting your health:                Living healthy guide: www.[facility].[place].[facility]      Understanding Diabetes: www.diabetes.org      Eating healthy: www.cdc.gov/healthyweight      CDC home safety checklist: www.cdc.gov/steadi/patient.html      Agency on Aging: www.goea.[place].[facility]      Alcoholics anonymous (AA): www.aa.org      Physical Activity: www.arcelia.nih.gov/wz2rfeq      Tobacco use: www.quitwithusla.org

## 2023-05-16 ENCOUNTER — OFFICE VISIT (OUTPATIENT)
Dept: FAMILY MEDICINE | Facility: CLINIC | Age: 73
End: 2023-05-16
Payer: MEDICARE

## 2023-05-16 VITALS
TEMPERATURE: 98 F | HEART RATE: 70 BPM | HEIGHT: 64 IN | OXYGEN SATURATION: 97 % | BODY MASS INDEX: 24.28 KG/M2 | SYSTOLIC BLOOD PRESSURE: 139 MMHG | WEIGHT: 142.19 LBS | RESPIRATION RATE: 16 BRPM | DIASTOLIC BLOOD PRESSURE: 88 MMHG

## 2023-05-16 DIAGNOSIS — N39.41 URGE INCONTINENCE: ICD-10-CM

## 2023-05-16 DIAGNOSIS — R39.15 URINARY URGENCY: ICD-10-CM

## 2023-05-16 DIAGNOSIS — L30.1 DYSHIDROTIC ECZEMA: ICD-10-CM

## 2023-05-16 DIAGNOSIS — F41.1 GAD (GENERALIZED ANXIETY DISORDER): ICD-10-CM

## 2023-05-16 DIAGNOSIS — E78.2 MIXED DYSLIPIDEMIA: Primary | ICD-10-CM

## 2023-05-16 DIAGNOSIS — E55.9 VITAMIN D DEFICIENCY: ICD-10-CM

## 2023-05-16 DIAGNOSIS — I10 ESSENTIAL HYPERTENSION: ICD-10-CM

## 2023-05-16 DIAGNOSIS — R35.0 URINARY FREQUENCY: ICD-10-CM

## 2023-05-16 DIAGNOSIS — D64.9 NORMOCYTIC ANEMIA: ICD-10-CM

## 2023-05-16 DIAGNOSIS — N39.3 STRESS INCONTINENCE: ICD-10-CM

## 2023-05-16 PROCEDURE — 3075F SYST BP GE 130 - 139MM HG: CPT | Mod: CPTII,,, | Performed by: FAMILY MEDICINE

## 2023-05-16 PROCEDURE — 99214 OFFICE O/P EST MOD 30 MIN: CPT | Mod: ,,, | Performed by: FAMILY MEDICINE

## 2023-05-16 PROCEDURE — 3079F PR MOST RECENT DIASTOLIC BLOOD PRESSURE 80-89 MM HG: ICD-10-PCS | Mod: CPTII,,, | Performed by: FAMILY MEDICINE

## 2023-05-16 PROCEDURE — 1159F PR MEDICATION LIST DOCUMENTED IN MEDICAL RECORD: ICD-10-PCS | Mod: CPTII,,, | Performed by: FAMILY MEDICINE

## 2023-05-16 PROCEDURE — 99999 PR PBB SHADOW E&M-EST. PATIENT-LVL III: ICD-10-PCS | Mod: PBBFAC,,, | Performed by: FAMILY MEDICINE

## 2023-05-16 PROCEDURE — 1159F MED LIST DOCD IN RCRD: CPT | Mod: CPTII,,, | Performed by: FAMILY MEDICINE

## 2023-05-16 PROCEDURE — 3288F PR FALLS RISK ASSESSMENT DOCUMENTED: ICD-10-PCS | Mod: CPTII,,, | Performed by: FAMILY MEDICINE

## 2023-05-16 PROCEDURE — 99214 PR OFFICE/OUTPT VISIT, EST, LEVL IV, 30-39 MIN: ICD-10-PCS | Mod: ,,, | Performed by: FAMILY MEDICINE

## 2023-05-16 PROCEDURE — 3075F PR MOST RECENT SYSTOLIC BLOOD PRESS GE 130-139MM HG: ICD-10-PCS | Mod: CPTII,,, | Performed by: FAMILY MEDICINE

## 2023-05-16 PROCEDURE — 3008F PR BODY MASS INDEX (BMI) DOCUMENTED: ICD-10-PCS | Mod: CPTII,,, | Performed by: FAMILY MEDICINE

## 2023-05-16 PROCEDURE — 1126F AMNT PAIN NOTED NONE PRSNT: CPT | Mod: CPTII,,, | Performed by: FAMILY MEDICINE

## 2023-05-16 PROCEDURE — 1126F PR PAIN SEVERITY QUANTIFIED, NO PAIN PRESENT: ICD-10-PCS | Mod: CPTII,,, | Performed by: FAMILY MEDICINE

## 2023-05-16 PROCEDURE — 3079F DIAST BP 80-89 MM HG: CPT | Mod: CPTII,,, | Performed by: FAMILY MEDICINE

## 2023-05-16 PROCEDURE — 3008F BODY MASS INDEX DOCD: CPT | Mod: CPTII,,, | Performed by: FAMILY MEDICINE

## 2023-05-16 PROCEDURE — 1160F PR REVIEW ALL MEDS BY PRESCRIBER/CLIN PHARMACIST DOCUMENTED: ICD-10-PCS | Mod: CPTII,,, | Performed by: FAMILY MEDICINE

## 2023-05-16 PROCEDURE — 3288F FALL RISK ASSESSMENT DOCD: CPT | Mod: CPTII,,, | Performed by: FAMILY MEDICINE

## 2023-05-16 PROCEDURE — 99999 PR PBB SHADOW E&M-EST. PATIENT-LVL III: CPT | Mod: PBBFAC,,, | Performed by: FAMILY MEDICINE

## 2023-05-16 PROCEDURE — 1101F PT FALLS ASSESS-DOCD LE1/YR: CPT | Mod: CPTII,,, | Performed by: FAMILY MEDICINE

## 2023-05-16 PROCEDURE — 1101F PR PT FALLS ASSESS DOC 0-1 FALLS W/OUT INJ PAST YR: ICD-10-PCS | Mod: CPTII,,, | Performed by: FAMILY MEDICINE

## 2023-05-16 PROCEDURE — 1160F RVW MEDS BY RX/DR IN RCRD: CPT | Mod: CPTII,,, | Performed by: FAMILY MEDICINE

## 2023-05-16 RX ORDER — CLOBETASOL PROPIONATE 0.5 MG/G
CREAM TOPICAL
Qty: 30 G | Refills: 2 | Status: SHIPPED | OUTPATIENT
Start: 2023-05-16

## 2023-05-16 RX ORDER — MIRABEGRON 25 MG/1
25 TABLET, FILM COATED, EXTENDED RELEASE ORAL DAILY
Qty: 90 TABLET | Refills: 3 | Status: SHIPPED | OUTPATIENT
Start: 2023-05-16 | End: 2024-02-06 | Stop reason: SDUPTHER

## 2023-05-16 RX ORDER — CALCITRIOL 0.25 UG/1
0.25 CAPSULE ORAL DAILY
Qty: 90 CAPSULE | Refills: 3 | Status: SHIPPED | OUTPATIENT
Start: 2023-05-16 | End: 2024-02-06 | Stop reason: SDUPTHER

## 2023-05-16 RX ORDER — CLONAZEPAM 0.5 MG/1
0.25 TABLET ORAL 2 TIMES DAILY PRN
Qty: 30 TABLET | Refills: 0 | Status: SHIPPED | OUTPATIENT
Start: 2023-05-16 | End: 2023-08-14

## 2023-05-16 NOTE — PROGRESS NOTES
Subjective:       Patient ID: Kristin Guerra is a 73 y.o. female.    Chief Complaint: Follow-up (6mth f/u)    HPI  Review of Systems   Constitutional:  Negative for fatigue and unexpected weight change.   Respiratory:  Negative for chest tightness and shortness of breath.    Cardiovascular:  Negative for chest pain, palpitations and leg swelling.   Gastrointestinal:  Negative for abdominal pain.   Musculoskeletal:  Negative for arthralgias.   Neurological:  Negative for dizziness, syncope, light-headedness and headaches.     Patient Active Problem List   Diagnosis    Hx of colon cancer, stage II    Hx of diverticulitis of colon    Cataract    Herniation of intervertebral disc of cervical region    Cervical radiculopathy    Degeneration of intervertebral disc of cervical region    history of skin lupus    Gastroesophageal reflux disease    Tremor, coarse    Essential hypertension, labile    Hiatal hernia    Osteoarthritis    RLS (restless legs syndrome)    Panic attack    Heart murmur    Smoking    GERD (gastroesophageal reflux disease)    Sterling's esophagus with dysplasia    Tremor, essential    Sinus problem    S/P splenectomy    Other chronic postprocedural pain    Insomnia due to medical condition    Anxiety    Mixed dyslipidemia    Statin intolerance    Statin myopathy    Vitamin D deficiency    Near syncope    Syncope and collapse    History of endoscopy    Incontinence in female    Bradycardia    Seborrheic keratosis, inflamed    Microhematuria    Orthostatic hypotension    History of loop recorder    Depression, recurrent    Centrilobular emphysema    Lung nodule, multiple    Aortic atherosclerosis    Chronic obstructive pulmonary disease     Patient is here for a chronic conditions follow up.    Reviewed labs 3/23 low vit D, mild normocytic anemia    Hpl- Your cholesterol is high.  Your 10 year risk of having a heart attack or a stroke is:The 10-year ASCVD risk score (J Carlos JEFF, et al., 2019) is: 27.7%     Values used to calculate the score:      Age: 73 years      Sex: Female      Is Non- : No      Diabetic: No      Tobacco smoker: Yes      Systolic Blood Pressure: 139 mmHg      Is BP treated: Yes      HDL Cholesterol: 53 mg/dL      Total Cholesterol: 231 mg/dL  H/o intolerance to zocor-n/v. On zetia. Nuc stress test neg for ischemia . Followed by card     of 52 years  2023 of stage 4 bladder cancer.  Stress, and grief at home. Son lives next door. Declines referral to counselor but considering grief share at [facility].      Podiatry Dr. Hopper gout     H/o lupus-skin type      Psych Josue CHARLENE, depression     Pulm NP Tomas COPD, lung nodules-low dose CT chest -stable, current smoker      Card Dr. Erika VAZL, statin intolerance-severe muscle pain, orthostasis     ENT [facility] GARCIA     GI Dr. Steele /Devaughn Lam , h/o precancerous polyps per patient (? Colon cancer stage 2)  . Treated at [facility] and had partial colectomy, appendectomy and had to have splenectomy bc spleen nicked during surgery. Last egd  -hiatal hernia, barretts lower third esophagus s/p ablation  -f/u 1 year recommended. On nexium 40mg bid-takes it often once a day. Denies reflux  GI surg Dr. Esquivel  colonoscopy 1 polyp on 5 year surveillance     Pulm/Sleep Dr. Barahona sleep study done-? Sleep apnra     Neuro Dr. Macario Abraham essential tremor     Urology Dr. Miranda mixed stress and urge incontinence     Objective:      Physical Exam  Vitals and nursing note reviewed.   Constitutional:       Appearance: She is well-developed.   Cardiovascular:      Rate and Rhythm: Normal rate and regular rhythm.      Heart sounds: Normal heart sounds.   Pulmonary:      Effort: Pulmonary effort is normal.      Breath sounds: Normal breath sounds.   Skin:     General: Skin is warm and dry.   Neurological:      Mental Status: She is alert and oriented to person, place, and time.   Psychiatric:          Mood and Affect: Affect is tearful.         Speech: Speech normal.         Behavior: Behavior normal.       Assessment:       1. Mixed dyslipidemia    2. Essential hypertension    3. Vitamin D deficiency    4. Normocytic anemia    5. CHARLENE (generalized anxiety disorder)    6. Dyshidrotic eczema    7. Urinary frequency    8. Urge incontinence    9. Stress incontinence    10. Urinary urgency        Plan:         1. Mixed dyslipidemia  I recommend a heart healthy diet rich in fiber, fresh vegetables and fruit and low in saturated fats (fried foods, red meat, etc.).  I also recommend regular exercise including a minimum of 150 minutes of cardio exercise per week and 2-30 minute workouts of strength training like light weights, yoga, pilates, etc.  You should work toward a body mass index of < 25.    Statin intolerant. Cont zetia. Cont card consult  - Comprehensive Metabolic Panel; Future  - Lipid Panel; Future    2. Essential hypertension  Controlled on current medications.  Continue current medications.      3. Vitamin D deficiency  Add and monitor  - calcitRIOL (ROCALTROL) 0.25 MCG Cap; Take 1 capsule (0.25 mcg total) by mouth once daily.  Dispense: 90 capsule; Refill: 3  - Vitamin D; Future    4. Normocytic anemia  Screen and treat as indicated:    - CBC Auto Differential; Future  - Ferritin; Future  - Iron and TIBC; Future    5. CHARLENE (generalized anxiety disorder)  Cont prn use  - clonazePAM (KLONOPIN) 0.5 MG tablet; Take 0.5 tablets (0.25 mg total) by mouth 2 (two) times daily as needed for Anxiety (or insomnia).  Dispense: 30 tablet; Refill: 0    6. Dyshidrotic eczema  Apply to affected area prn  - clobetasoL (TEMOVATE) 0.05 % cream; Apply to feet daily to bid prn flare  Dispense: 30 g; Refill: 2    7. Urinary frequency  continue  - mirabegron (MYRBETRIQ) 25 mg Tb24 ER tablet; Take 1 tablet (25 mg total) by mouth once daily.  Dispense: 90 tablet; Refill: 3    8. Urge incontinence  continue  - mirabegron  (MYRBETRIQ) 25 mg Tb24 ER tablet; Take 1 tablet (25 mg total) by mouth once daily.  Dispense: 90 tablet; Refill: 3    9. Stress incontinence  continue  - mirabegron (MYRBETRIQ) 25 mg Tb24 ER tablet; Take 1 tablet (25 mg total) by mouth once daily.  Dispense: 90 tablet; Refill: 3    10. Urinary urgency  Continue urology consult and   - mirabegron (MYRBETRIQ) 25 mg Tb24 ER tablet; Take 1 tablet (25 mg total) by mouth once daily.  Dispense: 90 tablet; Refill: 3        Time spent with patient: 20 minutes    Patient with be reevaluated in 6 months or sooner prn    Greater than 50% of this visit was spent counseling as described in above documentation:Yes

## 2023-06-02 ENCOUNTER — TELEPHONE (OUTPATIENT)
Dept: ENDOSCOPY | Facility: HOSPITAL | Age: 73
End: 2023-06-02

## 2023-06-02 ENCOUNTER — TELEPHONE (OUTPATIENT)
Dept: ENDOSCOPY | Facility: HOSPITAL | Age: 73
End: 2023-06-02
Payer: MEDICARE

## 2023-06-02 NOTE — TELEPHONE ENCOUNTER
----- Message from Melissa Whitman sent at 6/2/2023 10:28 AM CDT -----  Contact: pt  Type:  Call back    Who Called:pt    Does the patient know what this is regarding?:procedure   Would the patient rather a call back or a response via [facility]? Call   Best Call Back Number:[phone]  Additional Information:

## 2023-06-02 NOTE — TELEPHONE ENCOUNTER
Called pt to schedule EGD.  Pt states that she doesn't have anybody that can bring her to [place].  Pt is requesting [facility].

## 2023-06-05 ENCOUNTER — TELEPHONE (OUTPATIENT)
Dept: ENDOSCOPY | Facility: HOSPITAL | Age: 73
End: 2023-06-05
Payer: MEDICARE

## 2023-06-08 ENCOUNTER — TELEPHONE (OUTPATIENT)
Dept: CARDIOLOGY | Facility: CLINIC | Age: 73
End: 2023-06-08
Payer: MEDICARE

## 2023-06-08 DIAGNOSIS — Z95.0 PACEMAKER: Primary | ICD-10-CM

## 2023-06-19 ENCOUNTER — TELEPHONE (OUTPATIENT)
Dept: ENDOSCOPY | Facility: HOSPITAL | Age: 73
End: 2023-06-19

## 2023-06-19 ENCOUNTER — TELEPHONE (OUTPATIENT)
Dept: SURGERY | Facility: CLINIC | Age: 73
End: 2023-06-19
Payer: MEDICARE

## 2023-06-19 NOTE — TELEPHONE ENCOUNTER
Spoke to pt, informed that Dr Esquivel does not do EGD's but we can schedule her colonoscopy. She will check with Dr. Cantor to see if he can do both and if not will check with Dr. Steele and will call me back if needed.

## 2023-06-19 NOTE — TELEPHONE ENCOUNTER
----- Message from Augusto Kramer sent at 6/19/2023  8:42 AM CDT -----  Type: cancel procedure     Who Called:pt   Does the patient know what this is regarding?:cancel procedure   Would the patient rather a call back or a response via [facility]? Call   Best Call Back Number:[phone]  Additional Information: Due to transportation

## 2023-06-19 NOTE — TELEPHONE ENCOUNTER
----- Message from Hemanth Chávez sent at 6/19/2023  8:34 AM CDT -----  Contact: self  Type: Needs Medical Advice  Who Called: Patient   Best Call Back Number: [phone]  Additional Information: Pt has questions about colonoscopy. Pt states she wants to talk to the doctor first before scheduling  anything about both and EGD and colonoscopy wants to know can she have both done at the same time. Plz call to advise. Thanks

## 2023-06-21 ENCOUNTER — TELEPHONE (OUTPATIENT)
Dept: ENDOSCOPY | Facility: HOSPITAL | Age: 73
End: 2023-06-21
Payer: MEDICARE

## 2023-06-22 ENCOUNTER — TELEPHONE (OUTPATIENT)
Dept: DERMATOLOGY | Facility: CLINIC | Age: 73
End: 2023-06-22
Payer: MEDICARE

## 2023-06-22 NOTE — TELEPHONE ENCOUNTER
----- Message from Inge Snider sent at 6/22/2023 11:31 AM CDT -----  Regarding: Needs to schedule  Type: Needs Medical Advice  Who Called:  Kristin  Symptoms (please be specific):  Cyst back on ears very painful    Best Call Back Number: [phone]    Additional Information: Please call patient with soonest appt

## 2023-06-22 NOTE — TELEPHONE ENCOUNTER
----- Message from Uzair Cantor MD sent at 6/21/2023  1:37 PM CDT -----  Contact: pt  OK  R  ----- Message -----  From: Abbie Shoemaker MA  Sent: 6/21/2023   1:31 PM CDT  To: Uzair Cantor MD    Ok to add Colonoscopy?  ----- Message -----  From: Abhi Guthrie  Sent: 6/21/2023   9:31 AM CDT  To: Devaughn Dunne V Staff    Type: Requesting to speak with nurse        Who Called: PT  Regarding: would like to schedule EDG and colonoscopy at the same time   Would the patient rather a call back or a response via [facility]? Call back  Best Call Back Number: [phone]  Additional Information:

## 2023-06-23 ENCOUNTER — OFFICE VISIT (OUTPATIENT)
Dept: DERMATOLOGY | Facility: CLINIC | Age: 73
End: 2023-06-23
Payer: MEDICARE

## 2023-06-23 DIAGNOSIS — H61.001 CHONDRODERMATITIS NODULARIS HELICIS OF RIGHT EAR: Primary | ICD-10-CM

## 2023-06-23 PROCEDURE — 11900 INJECT SKIN LESIONS </W 7: CPT | Mod: S$GLB,,, | Performed by: DERMATOLOGY

## 2023-06-23 PROCEDURE — 99499 NO LOS: ICD-10-PCS | Mod: S$GLB,,, | Performed by: DERMATOLOGY

## 2023-06-23 PROCEDURE — 11900 PR INJECTION INTO SKIN LESIONS, UP TO 7: ICD-10-PCS | Mod: S$GLB,,, | Performed by: DERMATOLOGY

## 2023-06-23 PROCEDURE — 99499 UNLISTED E&M SERVICE: CPT | Mod: S$GLB,,, | Performed by: DERMATOLOGY

## 2023-06-23 NOTE — PROGRESS NOTES
Subjective:      Patient ID:  Kristin Guerra is a 73 y.o. female who presents for   Chief Complaint   Patient presents with    Cyst     Cyst on right helix     LOV 1/26/2022 JAMAR     2022  Skin, right helix, shave biopsy:   -CHONDRODERMATITIS NODULARIS HELICIS, consistent with    2021 1.  Skin, right superior helix rim, shave biopsy:   - Chondrodermatitis nodularis helicis.     Patient here today for cyst on right helix  No drainage, not bothersome not itchy, no Tx    Denies Phx NMSC      Current Outpatient Medications:   ·  aluminum & magnesium hydroxide-simethicone (MYLANTA MAX STRENGTH) 400-400-40 mg/5 mL suspension, Take 5 mLs by mouth every 6 (six) hours as needed for Indigestion. , Disp: , Rfl:   ·  amLODIPine (NORVASC) 5 MG tablet, Take 1 tablet (5 mg total) by mouth once daily., Disp: 90 tablet, Rfl: 3  ·  azelastine (ASTELIN) 137 mcg (0.1 %) nasal spray, 1 spray (137 mcg total) by Nasal route 2 (two) times daily., Disp: 90 mL, Rfl: 0  ·  calcitRIOL (ROCALTROL) 0.25 MCG Cap, Take 1 capsule (0.25 mcg total) by mouth once daily., Disp: 90 capsule, Rfl: 3  ·  clobetasoL (TEMOVATE) 0.05 % cream, Apply to feet daily to bid prn flare, Disp: 30 g, Rfl: 2  ·  clonazePAM (KLONOPIN) 0.5 MG tablet, Take 0.5 tablets (0.25 mg total) by mouth 2 (two) times daily as needed for Anxiety (or insomnia)., Disp: 30 tablet, Rfl: 0  ·  esomeprazole (NEXIUM) 40 MG capsule, TAKE ONE CAPSULE BY MOUTH TWICE DAILY, Disp: 180 capsule, Rfl: 3  ·  ezetimibe (ZETIA) 10 mg tablet, Take 1 tablet (10 mg total) by mouth once daily., Disp: 30 tablet, Rfl: 3  ·  fluticasone propionate (FLONASE) 50 mcg/actuation nasal spray, 1 spray (50 mcg total) by Each Nostril route 2 (two) times daily., Disp: 48 g, Rfl: 0  ·  gabapentin (NEURONTIN) 300 MG capsule, Take 1 capsule (300 mg total) by mouth every evening., Disp: 30 capsule, Rfl: 11  ·  mirabegron (MYRBETRIQ) 25 mg Tb24 ER tablet, Take 1 tablet (25 mg total) by mouth once daily., Disp: 90  tablet, Rfl: 3  ·  mupirocin (BACTROBAN) 2 % ointment, 3 (three) times daily. Apply to affected area, Disp: , Rfl:   ·  polyethylene glycol (GLYCOLAX) 17 gram PwPk, Take 17 g by mouth daily as needed., Disp: , Rfl:   ·  tiotropium bromide (SPIRIVA RESPIMAT) 2.5 mcg/actuation inhaler, Inhale 2 puffs into the lungs Daily. Controller, Disp: 4 g, Rfl: 11  ·  albuterol-ipratropium (DUO-NEB) 2.5 mg-0.5 mg/3 mL nebulizer solution, Take 3 mLs by nebulization every 6 (six) hours as needed for Wheezing or Shortness of Breath. Rescue, Disp: 120 each, Rfl: 5           Review of Systems   Constitutional:  Negative for fever and chills.   Respiratory:  Negative for cough and shortness of breath.    Gastrointestinal:  Negative for nausea and vomiting.     Objective:   Physical Exam   Constitutional: She appears well-developed and well-nourished.   Neurological: She is alert and oriented to person, place, and time.   Psychiatric: She has a normal mood and affect.   Skin:   Areas Examined (abnormalities noted in diagram):   Head / Face Inspection Performed     Diagram Legend     Erythematous scaling macule/papule c/w actinic keratosis       Vascular papule c/w angioma      Pigmented verrucoid papule/plaque c/w seborrheic keratosis      Yellow umbilicated papule c/w sebaceous hyperplasia      Irregularly shaped tan macule c/w lentigo     1-2 mm smooth white papules consistent with Milia      Movable subcutaneous cyst with punctum c/w epidermal inclusion cyst      Subcutaneous movable cyst c/w pilar cyst      Firm pink to brown papule c/w dermatofibroma      Pedunculated fleshy papule(s) c/w skin tag(s)      Evenly pigmented macule c/w junctional nevus     Mildly variegated pigmented, slightly irregular-bordered macule c/w mildly atypical nevus      Flesh colored to evenly pigmented papule c/w intradermal nevus       Pink pearly papule/plaque c/w basal cell carcinoma      Erythematous hyperkeratotic cursted plaque c/w SCC       Surgical scar with no sign of skin cancer recurrence      Open and closed comedones      Inflammatory papules and pustules      Verrucoid papule consistent consistent with wart     Erythematous eczematous patches and plaques     Dystrophic onycholytic nail with subungual debris c/w onychomycosis     Umbilicated papule    Erythematous-base heme-crusted tan verrucoid plaque consistent with inflamed seborrheic keratosis     Erythematous Silvery Scaling Plaque c/w Psoriasis     See annotation                Assessment / Plan:        Chondrodermatitis nodularis helicis of right ear    Favored  Insertion of helix, no flucutence, no punctum  Trial of ILK  Intralesional Kenalog 10mg/cc (0.2 cc total) injected into 1 lesions on the R ear today after obtaining verbal consent including risk of surrounding hypopigmentation. Patient tolerated procedure well.    Units: 1  NDC for Kenalog 10mg/cc:  1608-8692-45           Follow up if symptoms worsen or fail to improve.

## 2023-07-05 ENCOUNTER — PATIENT MESSAGE (OUTPATIENT)
Dept: ENDOSCOPY | Facility: HOSPITAL | Age: 73
End: 2023-07-05
Payer: MEDICARE

## 2023-07-11 ENCOUNTER — TELEPHONE (OUTPATIENT)
Dept: ENDOSCOPY | Facility: HOSPITAL | Age: 73
End: 2023-07-11
Payer: MEDICARE

## 2023-07-28 ENCOUNTER — TELEPHONE (OUTPATIENT)
Dept: ENDOSCOPY | Facility: HOSPITAL | Age: 73
End: 2023-07-28
Payer: OTHER GOVERNMENT

## 2023-07-28 NOTE — TELEPHONE ENCOUNTER
----- Message from Liza Alvarez sent at 7/28/2023  8:30 AM CDT -----  Pt Requesting Call Back    Who called: pt  Who called for pt:  Best call back #: [phone]  Add notes: regarding question about EGD

## 2023-08-02 ENCOUNTER — TELEPHONE (OUTPATIENT)
Dept: ENDOSCOPY | Facility: HOSPITAL | Age: 73
End: 2023-08-02
Payer: OTHER GOVERNMENT

## 2023-08-02 NOTE — TELEPHONE ENCOUNTER
Spoke with patient and her daughter about arrival time @ 1000.   EGD/Colon    On the day before your procedure   What You CAN do:   You may have clear liquids ONLY-see below for list.     Liquids That Are OK to Drink:   Water  Sports drinks (Gatorade, Power-Aid)  Coffee or tea (no cream or nondairy creamer)  Clear juices without pulp (apple, white grape)  Gelatin desserts (no fruit or toppings)  Clear soda (sprite, coke, ginger ale)  Chicken broth (until 12 midnight the night before procedure)        What You CANNOT do:   Do not EAT solid food, drink milk or anything colored red.  Do not drink alcohol.  Do not take oral medications within 1 hour of starting   each dose of MIRALAX.  No gum chewing or candy morning of procedure.     Note:   (Please disregard the MIRALAX bottle instructions).     It is not uncommon to experience some abdominal cramping, nausea and/or vomiting when taking the prep. If you have nausea and/or vomiting while taking the prep, stop drinking for 20 to 30 minutes then resume.     IMPORTANT: If you experience preparation-related symptoms (for example, nausea, bloating, or cramping), pause, or slow the rate of drinking the additional water until your symptoms decrease.     How to take prep:     MIRALAX Bowel Prep-One (1) bottle-8.3 oz. (238 grams).     You must drink water with each dose, and additional water after each dose.        DOSE 1--Day Before Procedure  8/6/2023      Drink at least 6 to 8 glasses of clear liquids from time you wake up until you begin your prep and then continue until bedtime to avoid dehydration.         Step 1- 12:00 pm (NOON) - Mix the 8.3 oz. bottle of Miralax (238 grams) and 64 oz. of Gatorade/Power-Aid, place in the refrigerator (do not add ice). Take four (4) Dulcolax (Bisacodyl) tablets with at least 8 oz. or more of clear liquids.     Step 2- 6:00 pm- Drink one 8 oz. glass of the Miralax/Gatorade prep every 15 minutes until half the mixture (32 oz./quart) is  gone. Set a timer as a reminder. Refrigerate the remaining half of prep for dose 2. See below when to begin this step.      Drink additional water/clear liquids     DOSE 2--Day of the Procedure  8/7/2023     Step 1- 2-3 AM.-Drink the 2nd half of the prep.   Step 2- You may continue drinking water/clear liquids until   4 hours before your colonoscopy or as directed by the scheduling nurse 7:00 AM.    Medications: Do not take Insulin or oral diabetic medications the day of the procedure.    Take as prescribed: heart, seizure and blood pressure medication in the morning with a sip of water (less than an ounce).  Take any breathing medications and bring inhalers to hospital with you.     Leave all valuables and jewelry at home. Wear comfortable clothes to procedure to change into hospital gown.   You cannot drive for 24 hours after your procedure because you will receive sedation for your procedure to make you comfortable.    A ride must be provided at discharge.

## 2023-08-07 ENCOUNTER — HOSPITAL ENCOUNTER (OUTPATIENT)
Facility: HOSPITAL | Age: 73
Discharge: HOME | End: 2023-08-07
Attending: INTERNAL MEDICINE | Admitting: INTERNAL MEDICINE
Payer: MEDICARE

## 2023-08-07 ENCOUNTER — ANESTHESIA (OUTPATIENT)
Dept: ENDOSCOPY | Facility: HOSPITAL | Age: 73
End: 2023-08-07
Payer: OTHER GOVERNMENT

## 2023-08-07 ENCOUNTER — ANESTHESIA EVENT (OUTPATIENT)
Dept: ENDOSCOPY | Facility: HOSPITAL | Age: 73
End: 2023-08-07
Payer: MEDICARE

## 2023-08-07 VITALS
HEIGHT: 61 IN | OXYGEN SATURATION: 95 % | TEMPERATURE: 98 F | BODY MASS INDEX: 26.43 KG/M2 | RESPIRATION RATE: 16 BRPM | WEIGHT: 140 LBS | DIASTOLIC BLOOD PRESSURE: 66 MMHG | HEART RATE: 72 BPM | SYSTOLIC BLOOD PRESSURE: 100 MMHG

## 2023-08-07 DIAGNOSIS — K22.719 BARRETT'S ESOPHAGUS WITH DYSPLASIA: Primary | Chronic | ICD-10-CM

## 2023-08-07 PROCEDURE — 88305 TISSUE EXAM BY PATHOLOGIST: CPT | Mod: 26,,, | Performed by: STUDENT IN AN ORGANIZED HEALTH CARE EDUCATION/TRAINING PROGRAM

## 2023-08-07 PROCEDURE — G0105 COLORECTAL SCRN; HI RISK IND: HCPCS | Performed by: INTERNAL MEDICINE

## 2023-08-07 PROCEDURE — G0105 COLORECTAL SCRN; HI RISK IND: ICD-10-PCS | Mod: ,,, | Performed by: INTERNAL MEDICINE

## 2023-08-07 PROCEDURE — 27201012 HC FORCEPS, HOT/COLD, DISP: Performed by: INTERNAL MEDICINE

## 2023-08-07 PROCEDURE — G0105 COLORECTAL SCRN; HI RISK IND: HCPCS | Mod: ,,, | Performed by: INTERNAL MEDICINE

## 2023-08-07 PROCEDURE — 88305 TISSUE EXAM BY PATHOLOGIST: CPT | Performed by: STUDENT IN AN ORGANIZED HEALTH CARE EDUCATION/TRAINING PROGRAM

## 2023-08-07 PROCEDURE — D9220A PRA ANESTHESIA: ICD-10-PCS | Mod: ANES,,, | Performed by: ANESTHESIOLOGY

## 2023-08-07 PROCEDURE — 43239 PR EGD, FLEX, W/BIOPSY, SGL/MULTI: ICD-10-PCS | Mod: 51,,, | Performed by: INTERNAL MEDICINE

## 2023-08-07 PROCEDURE — D9220A PRA ANESTHESIA: Mod: CRNA,,, | Performed by: NURSE ANESTHETIST, CERTIFIED REGISTERED

## 2023-08-07 PROCEDURE — 37000009 HC ANESTHESIA EA ADD 15 MINS: Performed by: INTERNAL MEDICINE

## 2023-08-07 PROCEDURE — D9220A PRA ANESTHESIA: ICD-10-PCS | Mod: CRNA,,, | Performed by: NURSE ANESTHETIST, CERTIFIED REGISTERED

## 2023-08-07 PROCEDURE — 43239 EGD BIOPSY SINGLE/MULTIPLE: CPT | Mod: 51,,, | Performed by: INTERNAL MEDICINE

## 2023-08-07 PROCEDURE — D9220A PRA ANESTHESIA: Mod: ANES,,, | Performed by: ANESTHESIOLOGY

## 2023-08-07 PROCEDURE — 25000003 PHARM REV CODE 250: Performed by: NURSE ANESTHETIST, CERTIFIED REGISTERED

## 2023-08-07 PROCEDURE — 88305 TISSUE EXAM BY PATHOLOGIST: ICD-10-PCS | Mod: 26,,, | Performed by: STUDENT IN AN ORGANIZED HEALTH CARE EDUCATION/TRAINING PROGRAM

## 2023-08-07 PROCEDURE — 43239 EGD BIOPSY SINGLE/MULTIPLE: CPT | Performed by: INTERNAL MEDICINE

## 2023-08-07 PROCEDURE — 25000003 PHARM REV CODE 250: Performed by: INTERNAL MEDICINE

## 2023-08-07 PROCEDURE — 63600175 PHARM REV CODE 636 W HCPCS: Performed by: NURSE ANESTHETIST, CERTIFIED REGISTERED

## 2023-08-07 PROCEDURE — 37000008 HC ANESTHESIA 1ST 15 MINUTES: Performed by: INTERNAL MEDICINE

## 2023-08-07 RX ORDER — LIDOCAINE HYDROCHLORIDE 20 MG/ML
INJECTION INTRAVENOUS
Status: DISCONTINUED | OUTPATIENT
Start: 2023-08-07 | End: 2023-08-07

## 2023-08-07 RX ORDER — PROPOFOL 10 MG/ML
VIAL (ML) INTRAVENOUS
Status: DISCONTINUED | OUTPATIENT
Start: 2023-08-07 | End: 2023-08-07

## 2023-08-07 RX ORDER — PHENYLEPHRINE HYDROCHLORIDE 10 MG/ML
INJECTION INTRAVENOUS
Status: DISCONTINUED | OUTPATIENT
Start: 2023-08-07 | End: 2023-08-07

## 2023-08-07 RX ORDER — SODIUM CHLORIDE 0.9 % (FLUSH) 0.9 %
10 SYRINGE (ML) INJECTION
Status: DISCONTINUED | OUTPATIENT
Start: 2023-08-07 | End: 2023-08-07 | Stop reason: HOSPADM

## 2023-08-07 RX ORDER — DEXMEDETOMIDINE HYDROCHLORIDE 100 UG/ML
INJECTION, SOLUTION INTRAVENOUS
Status: DISCONTINUED | OUTPATIENT
Start: 2023-08-07 | End: 2023-08-07

## 2023-08-07 RX ORDER — SODIUM CHLORIDE 9 MG/ML
INJECTION, SOLUTION INTRAVENOUS CONTINUOUS
Status: DISCONTINUED | OUTPATIENT
Start: 2023-08-07 | End: 2023-08-07 | Stop reason: HOSPADM

## 2023-08-07 RX ORDER — PROPOFOL 10 MG/ML
VIAL (ML) INTRAVENOUS CONTINUOUS PRN
Status: DISCONTINUED | OUTPATIENT
Start: 2023-08-07 | End: 2023-08-07

## 2023-08-07 RX ADMIN — DEXMEDETOMIDINE HCL 8 MCG: 100 INJECTION INTRAVENOUS at 11:08

## 2023-08-07 RX ADMIN — PHENYLEPHRINE HYDROCHLORIDE 100 MCG: 10 INJECTION INTRAVENOUS at 11:08

## 2023-08-07 RX ADMIN — PROPOFOL 50 MG: 10 INJECTION, EMULSION INTRAVENOUS at 11:08

## 2023-08-07 RX ADMIN — PROPOFOL 150 MCG/KG/MIN: 10 INJECTION, EMULSION INTRAVENOUS at 11:08

## 2023-08-07 RX ADMIN — SODIUM CHLORIDE: 9 INJECTION, SOLUTION INTRAVENOUS at 10:08

## 2023-08-07 RX ADMIN — LIDOCAINE HYDROCHLORIDE 100 MG: 20 INJECTION, SOLUTION INTRAVENOUS at 11:08

## 2023-08-07 NOTE — [employer] PATIENT INSTRUCTIONS
Discharge Summary/Instructions after an Endoscopic Procedure  Patient Name: Kristin Guerra  Patient MRN: [id number]  Patient YOB: 1950 Monday, August 7, 2023  Uzair Cantor MD  Dear patient,  As a result of recent federal legislation (The Federal Cures Act), you may   receive lab or pathology results from your procedure in your [facility]   account before your physician is able to contact you. Your physician or   their representative will relay the results to you with their   recommendations at their soonest availability.  Thank you,  Your health is very important to us during the Covid Crisis. Following your   procedure today, you will receive a daily text for 2 weeks asking about   signs or symptoms of Covid 19.  Please respond to this text when you   receive it so we can follow up and keep you as safe as possible.   RESTRICTIONS:  During your procedure today, you received medications for sedation.  These   medications may affect your judgment, balance and coordination.  Therefore,   for 24 hours, you have the following restrictions:   - DO NOT drive a car, operate machinery, make legal/financial decisions,   sign important papers or drink alcohol.    ACTIVITY:  Today: no heavy lifting, straining or running due to procedural   sedation/anesthesia.  The following day: return to full activity including work.  DIET:  Eat and drink normally unless instructed otherwise.     TREATMENT FOR COMMON SIDE EFFECTS:  - Mild abdominal pain, nausea, belching, bloating or excessive gas:  rest,   eat lightly and use a heating pad.  - Sore Throat: treat with throat lozenges and/or gargle with warm salt   water.  - Because air was used during the procedure, expelling large amounts of air   from your rectum or belching is normal.  - If a bowel prep was taken, you may not have a bowel movement for 1-3 days.    This is normal.  SYMPTOMS TO WATCH FOR AND REPORT TO YOUR PHYSICIAN:  1. Abdominal pain or bloating, other than gas  cramps.  2. Chest pain.  3. Back pain.  4. Signs of infection such as: chills or fever occurring within 24 hours   after the procedure.  5. Rectal bleeding, which would show as bright red, maroon, or black stools.   (A tablespoon of blood from the rectum is not serious, especially if   hemorrhoids are present.)  6. Vomiting.  7. Weakness or dizziness.  GO DIRECTLY TO THE NEAREST EMERGENCY ROOM IF YOU HAVE ANY OF THE FOLLOWING:      Difficulty breathing              Chills and/or fever over 101 F   Persistent vomiting and/or vomiting blood   Severe abdominal pain   Severe chest pain   Black, tarry stools   Bleeding- more than one tablespoon   Any other symptom or condition that you feel may need urgent attention  Your doctor recommends these additional instructions:  If any biopsies were taken, your doctors clinic will contact you in 1 to 2   weeks with any results.  - Discharge patient to home (ambulatory).   - Repeat colonoscopy in 5 years for surveillance (colon cancer diagnosed and   treated in 2007 without obvious evidence of recurrence).   - High fiber diet.   - Continue present medications.   - Patient has a contact number available for emergencies.  The signs and   symptoms of potential delayed complications were discussed with the   patient.  Return to normal activities tomorrow.  Written discharge   instructions were provided to the patient.  For questions, problems or results please call your physician - Uzair Cantor MD.  EMERGENCY PHONE NUMBER: [phone],  LAB RESULTS: [phone]  IF A COMPLICATION OR EMERGENCY SITUATION ARISES AND YOU ARE UNABLE TO REACH   YOUR PHYSICIAN - GO DIRECTLY TO THE EMERGENCY ROOM.  Uzair Cantor MD  8/7/2023 11:42:14 AM  This report has been verified and signed electronically.  Dear patient,  As a result of recent federal legislation (The Federal Cures Act), you may   receive lab or pathology results from your procedure in your [facility]   account before your  physician is able to contact you. Your physician or   their representative will relay the results to you with their   recommendations at their soonest availability.  Thank you,  [employer]OVATION

## 2023-08-07 NOTE — [employer]ATION PATIENT INSTRUCTIONS
Discharge Summary/Instructions after an Endoscopic Procedure  Patient Name: Kristin Guerra  Patient MRN: [id number]  Patient YOB: 1950 Monday, August 7, 2023  Uzair Cantor MD  Dear patient,  As a result of recent federal legislation (The Federal Cures Act), you may   receive lab or pathology results from your procedure in your [facility]   account before your physician is able to contact you. Your physician or   their representative will relay the results to you with their   recommendations at their soonest availability.  Thank you,  Your health is very important to us during the Covid Crisis. Following your   procedure today, you will receive a daily text for 2 weeks asking about   signs or symptoms of Covid 19.  Please respond to this text when you   receive it so we can follow up and keep you as safe as possible.   RESTRICTIONS:  During your procedure today, you received medications for sedation.  These   medications may affect your judgment, balance and coordination.  Therefore,   for 24 hours, you have the following restrictions:   - DO NOT drive a car, operate machinery, make legal/financial decisions,   sign important papers or drink alcohol.    ACTIVITY:  Today: no heavy lifting, straining or running due to procedural   sedation/anesthesia.  The following day: return to full activity including work.  DIET:  Eat and drink normally unless instructed otherwise.     TREATMENT FOR COMMON SIDE EFFECTS:  - Mild abdominal pain, nausea, belching, bloating or excessive gas:  rest,   eat lightly and use a heating pad.  - Sore Throat: treat with throat lozenges and/or gargle with warm salt   water.  - Because air was used during the procedure, expelling large amounts of air   from your rectum or belching is normal.  - If a bowel prep was taken, you may not have a bowel movement for 1-3 days.    This is normal.  SYMPTOMS TO WATCH FOR AND REPORT TO YOUR PHYSICIAN:  1. Abdominal pain or bloating, other than gas  cramps.  2. Chest pain.  3. Back pain.  4. Signs of infection such as: chills or fever occurring within 24 hours   after the procedure.  5. Rectal bleeding, which would show as bright red, maroon, or black stools.   (A tablespoon of blood from the rectum is not serious, especially if   hemorrhoids are present.)  6. Vomiting.  7. Weakness or dizziness.  GO DIRECTLY TO THE NEAREST EMERGENCY ROOM IF YOU HAVE ANY OF THE FOLLOWING:      Difficulty breathing              Chills and/or fever over 101 F   Persistent vomiting and/or vomiting blood   Severe abdominal pain   Severe chest pain   Black, tarry stools   Bleeding- more than one tablespoon   Any other symptom or condition that you feel may need urgent attention  Your doctor recommends these additional instructions:  If any biopsies were taken, your doctors clinic will contact you in 1 to 2   weeks with any results.  - Discharge patient to home (ambulatory).   - Await pathology results.   - Resume previous diet.   - Use Nexium (esomeprazole) 40 mg PO daily.   - Repeat upper endoscopy in 1 year for surveillance based on pathology   results.   - Perform a colonoscopy today.   - Refer to a surgeon for possible hiatal hernia repair.  For questions, problems or results please call your physician - Uzair Cantor MD.  EMERGENCY PHONE NUMBER: [phone],  LAB RESULTS: [phone]  IF A COMPLICATION OR EMERGENCY SITUATION ARISES AND YOU ARE UNABLE TO REACH   YOUR PHYSICIAN - GO DIRECTLY TO THE EMERGENCY ROOM.  Uzair Cantor MD  8/7/2023 11:21:24 AM  This report has been verified and signed electronically.  Dear patient,  As a result of recent federal legislation (The Federal Cures Act), you may   receive lab or pathology results from your procedure in your [facility]   account before your physician is able to contact you. Your physician or   their representative will relay the results to you with their   recommendations at their soonest availability.  Thank  you,  PROVATION

## 2023-08-07 NOTE — BRIEF OP NOTE
Discharge Summary/Instructions after an Endoscopic Procedure    Patient Name: Kristin Guerra  Patient MRN: [id number]  Patient YOB: 1950 Monday, August 7, 2023  Uzair Cantor MD  Dear patient,  As a result of recent federal legislation (The Federal Cures Act), you may receive lab or pathology results from your procedure in your [facility] account before your physician is able to contact you. Your physician or their representative will relay the results to you with their recommendations at their soonest availability.  Thank you,    Your health is very important to us during the Covid Crisis. Following your procedure today, you will receive a daily text for 2 weeks asking about signs or symptoms of Covid 19.  Please respond to this text when you receive it so we can follow up and keep you as safe as possible.     RESTRICTIONS:  During your procedure today, you received medications for sedation.  These medications may affect your judgment, balance and coordination.  Therefore, for 24 hours, you have the following restrictions:     - DO NOT drive a car, operate machinery, make legal/financial decisions, sign important papers or drink alcohol.      ACTIVITY:  Today: no heavy lifting, straining or running due to procedural sedation/anesthesia.  The following day: return to full activity including work.    DIET:  Eat and drink normally unless instructed otherwise.     TREATMENT FOR COMMON SIDE EFFECTS:  - Mild abdominal pain, nausea, belching, bloating or excessive gas:  rest, eat lightly and use a heating pad.  - Sore Throat: treat with throat lozenges and/or gargle with warm salt water.  - Because air was used during the procedure, expelling large amounts of air from your rectum or belching is normal.  - If a bowel prep was taken, you may not have a bowel movement for 1-3 days.  This is normal.      SYMPTOMS TO WATCH FOR AND REPORT TO YOUR PHYSICIAN:  1. Abdominal pain or bloating, other than gas cramps.  2.  Chest pain.  3. Back pain.  4. Signs of infection such as: chills or fever occurring within 24 hours after the procedure.  5. Rectal bleeding, which would show as bright red, maroon, or black stools. (A tablespoon of blood from the rectum is not serious, especially if hemorrhoids are present.)  6. Vomiting.  7. Weakness or dizziness.      GO DIRECTLY TO THE NEAREST EMERGENCY ROOM IF YOU HAVE ANY OF THE FOLLOWING:     Difficulty breathing              Chills and/or fever over 101 F   Persistent vomiting and/or vomiting blood   Severe abdominal pain   Severe chest pain   Black, tarry stools   Bleeding- more than one tablespoon   Any other symptom or condition that you feel may need urgent attention    Your doctor recommends these additional instructions:  If any biopsies were taken, your doctors clinic will contact you in 1 to 2 weeks with any results.    - Discharge patient to home (ambulatory).   - Repeat colonoscopy in 5 years for surveillance (colon cancer diagnosed and treated in 2007 without obvious evidence of recurrence).   - High fiber diet.   - Continue present medications.   - Patient has a contact number available for emergencies.  The signs and symptoms of potential delayed complications were discussed with the patient.  Return to normal activities tomorrow.  Written discharge instructions were provided to the patient.    For questions, problems or results please call your physician - Uzair Cantor MD.    EMERGENCY PHONE NUMBER: [phone],  LAB RESULTS: [phone]    IF A COMPLICATION OR EMERGENCY SITUATION ARISES AND YOU ARE UNABLE TO REACH YOUR PHYSICIAN - GO DIRECTLY TO THE EMERGENCY ROOM.

## 2023-08-07 NOTE — ANESTHESIA PREPROCEDURE EVALUATION
2023  Kristin Guerra is a 73 y.o., female for EGD  Past Medical History:   Diagnosis Date    Allergy     Sterling's esophagus     Colon polyp     Diverticulitis     Diverticulosis     Fatty liver     GERD (gastroesophageal reflux disease)     History of endoscopy 2021    Dr Uzair Cantor  Impression:            - Normal oropharynx.                         - Hiatal hernia.                         - Sterling's esophagus. Treated with radiofrequency                         ablation.                         - Normal stomach.                         - Normal examined duodenum.                         - No specimens collected.     Hx of cervical spine surgery 2018    Hyperlipidemia     Hypertension     Lupus (systemic lupus erythematosus)     discoid    Osteoarthritis     Osteopenia     Syncope     Tremor 2005    essential     Past Surgical History:   Procedure Laterality Date    APPENDECTOMY  2007    removed during colon surgery    CERVICAL FUSION      CERVICAL SPINE SURGERY      Rods and screws.     SECTION      COLON SURGERY      hemicolectomy    COLON SURGERY  2008    repair    COLONOSCOPY N/A 2018    Procedure: COLONOSCOPY;  Surgeon: Maverick Esquivel MD;  Location: [facility];  Service: Endoscopy;  Laterality: N/A; repeat in 5 years for surveillance    CYSTOSCOPY N/A 2021    Procedure: CYSTOSCOPY;  Surgeon: Allison Miranda MD;  Location: [facility];  Service: Urology;  Laterality: N/A;    ESOPHAGOGASTRODUODENOSCOPY N/A 2019    Procedure: EGD (ESOPHAGOGASTRODUODENOSCOPY);  Surgeon: Monika Steele MD;  Location: [facility];  Service: Endoscopy;  Laterality: N/A;    ESOPHAGOGASTRODUODENOSCOPY  2019    with RFA and biopsies    ESOPHAGOGASTRODUODENOSCOPY N/A 2019    Procedure: EGD (ESOPHAGOGASTRODUODENOSCOPY);  Surgeon: Uzair  JULIETA Cantor MD;  Location: [facility];  Service: Endoscopy;  Laterality: N/A;  n+v with anesthesia    ESOPHAGOGASTRODUODENOSCOPY N/A 8/21/2019    Procedure: EGD (ESOPHAGOGASTRODUODENOSCOPY);  Surgeon: Uzair Cantor MD;  Location: [facility];  Service: Endoscopy;  Laterality: N/A;    ESOPHAGOGASTRODUODENOSCOPY N/A 10/7/2019    Procedure: EGD (ESOPHAGOGASTRODUODENOSCOPY);  Surgeon: Uzair Cantor MD;  Location: [facility];  Service: Endoscopy;  Laterality: N/A;    ESOPHAGOGASTRODUODENOSCOPY N/A 2/10/2020    Procedure: EGD (ESOPHAGOGASTRODUODENOSCOPY);  Surgeon: Uzair Cantor MD;  Location: [facility];  Service: Endoscopy;  Laterality: N/A;  Requests earliest procedure time    ESOPHAGOGASTRODUODENOSCOPY N/A 8/17/2020    Procedure: EGD (ESOPHAGOGASTRODUODENOSCOPY);  Surgeon: Uzair Cantor MD;  Location: [facility];  Service: Endoscopy;  Laterality: N/A;  BARRETTS    ESOPHAGOGASTRODUODENOSCOPY N/A 4/7/2021    Procedure: ESOPHAGOGASTRODUODENOSCOPY (EGD);  Surgeon: Uzair Cantor MD;  Location: [facility];  Service: Endoscopy;  Laterality: N/A;  Needs Rapid Covid MP    ESOPHAGOGASTRODUODENOSCOPY N/A 11/30/2021    Procedure: EGD (ESOPHAGOGASTRODUODENOSCOPY);  Surgeon: Uzair Cantor MD;  Location: [facility];  Service: Endoscopy;  Laterality: N/A;    ESOPHAGOGASTRODUODENOSCOPY N/A 6/27/2022    Procedure: EGD (ESOPHAGOGASTRODUODENOSCOPY);  Surgeon: Uzair Cantor MD;  Location: [facility];  Service: Endoscopy;  Laterality: N/A;  5/19/22: fully vaccinated. instructions mailed-[place]    loop recorder only    HYSTERECTOMY      ovaries remain    INSERTION OF IMPLANTABLE LOOP RECORDER N/A 7/17/2020    Procedure: INSERTION, IMPLANTABLE LOOP RECORDER (MEDTRONIC);  Surgeon: Karyna Vasques MD;  [facility]: [facility] CATH/EP LAB;  Service: Cardiology;  Laterality: N/A;    SPLENECTOMY, TOTAL  2007    Injured during surgery and removed    UPPER GASTROINTESTINAL ENDOSCOPY  2007    GERD & hiatal hernia per  patient report           Pre-op Assessment    I have reviewed the Patient Summary Reports.    I have reviewed the NPO Status.   I have reviewed the Medications.     Review of Systems  Anesthesia Hx:  Denies Family Hx of Anesthesia complications.    Social:  Smoker    Cardiovascular:   Hypertension    Hepatic/GI:   Hiatal Hernia, GERD Liver Disease,    Musculoskeletal:   Arthritis     Neurological:   Neuromuscular Disease,    Psych:   Psychiatric History          Physical Exam  General: Well nourished    Airway:  Mallampati: II   TM Distance: Normal  Neck ROM: Normal ROM    Dental:  Intact    Chest/Lungs:  Clear to auscultation    Heart:  Rate: Normal  Rhythm: Regular Rhythm        Anesthesia Plan  Type of Anesthesia, risks & benefits discussed:    Anesthesia Type: Gen Natural Airway  Intra-op Monitoring Plan: Standard ASA Monitors  Post Op Pain Control Plan: multimodal analgesia  Informed Consent: Informed consent signed with the Patient and all parties understand the risks and agree with anesthesia plan.  All questions answered.   ASA Score: 3  Day of Surgery Review of History & Physical: H&P Update referred to the surgeon/provider.    Ready For Surgery From Anesthesia Perspective.     .

## 2023-08-07 NOTE — H&P
History & Physical - Short Stay  Gastroenterology      SUBJECTIVE:     Procedure: Colonoscopy and EGD    Chief Complaint/Indication for Procedure: Sterling's Esophagus    History of Present Illness:  Patient is a 73 y.o. female presents with Sterling's with LGD S/P RFA here for follow up. Schedule for colonoscopy for previous polyps and personal history of colon cancer.    PTA Medications   Medication Sig    albuterol-ipratropium (DUO-NEB) 2.5 mg-0.5 mg/3 mL nebulizer solution Take 3 mLs by nebulization every 6 (six) hours as needed for Wheezing or Shortness of Breath. Rescue    aluminum & magnesium hydroxide-simethicone (MYLANTA MAX STRENGTH) 400-400-40 mg/5 mL suspension Take 5 mLs by mouth every 6 (six) hours as needed for Indigestion.     amLODIPine (NORVASC) 5 MG tablet Take 1 tablet (5 mg total) by mouth once daily.    azelastine (ASTELIN) 137 mcg (0.1 %) nasal spray 1 spray (137 mcg total) by Nasal route 2 (two) times daily.    calcitRIOL (ROCALTROL) 0.25 MCG Cap Take 1 capsule (0.25 mcg total) by mouth once daily.    clobetasoL (TEMOVATE) 0.05 % cream Apply to feet daily to bid prn flare    clonazePAM (KLONOPIN) 0.5 MG tablet Take 0.5 tablets (0.25 mg total) by mouth 2 (two) times daily as needed for Anxiety (or insomnia).    esomeprazole (NEXIUM) 40 MG capsule TAKE ONE CAPSULE BY MOUTH TWICE DAILY    ezetimibe (ZETIA) 10 mg tablet Take 1 tablet (10 mg total) by mouth once daily.    fluticasone propionate (FLONASE) 50 mcg/actuation nasal spray 1 spray (50 mcg total) by Each Nostril route 2 (two) times daily.    gabapentin (NEURONTIN) 300 MG capsule Take 1 capsule (300 mg total) by mouth every evening.    mirabegron (MYRBETRIQ) 25 mg Tb24 ER tablet Take 1 tablet (25 mg total) by mouth once daily.    mupirocin (BACTROBAN) 2 % ointment 3 (three) times daily. Apply to affected area    polyethylene glycol (GLYCOLAX) 17 gram PwPk Take 17 g by mouth daily as needed.    tiotropium bromide (SPIRIVA RESPIMAT) 2.5  "mcg/actuation inhaler Inhale 2 puffs into the lungs Daily. Controller       Review of patient's allergies indicates:   Allergen Reactions    Doxycycline Itching and Swelling     Prescribed 5/2021, took one dose, describes lip swelling, and "itch all over"    Mirtazapine Other (See Comments)     Hyperactivity.    Morphine Swelling and Rash     Other reaction(s): swelling in limbs    Lexapro [escitalopram oxalate] Other (See Comments)     Worsening tremor.  Sedation.    Primidone Other (See Comments)     Syncope.  Other reaction(s): dizziness,hypotension  Other reaction(s): Unknown  Other reaction(s): Unknown    Simvastatin Nausea And Vomiting and Other (See Comments)     Weakness.  Other reaction(s): nausea,vomiting    Beta-adrenergic agents Other (See Comments)     Low blood pressure and fainted.     Metoprolol succinate Other (See Comments)    Prochlorperazine      Other reaction(s): weakness    Topiramate     Compazine [prochlorperazine edisylate] Nausea Only    Tramadol Nausea And Vomiting        Past Medical History:   Diagnosis Date    Allergy     Sterling's esophagus     Colon polyp     Diverticulitis     Diverticulosis     Fatty liver     GERD (gastroesophageal reflux disease)     History of endoscopy 04/07/2021    Dr Uzair Cantor  Impression:            - Normal oropharynx.                         - Hiatal hernia.                         - Sterling's esophagus. Treated with radiofrequency                         ablation.                         - Normal stomach.                         - Normal examined duodenum.                         - No specimens collected.     Hx of cervical spine surgery 11/07/2018    Hyperlipidemia     Hypertension     Lupus (systemic lupus erythematosus) 1999    discoid    Osteoarthritis     Osteopenia     Syncope     Tremor 2005    essential     Past Surgical History:   Procedure Laterality Date    APPENDECTOMY  2007    removed during colon surgery    CERVICAL FUSION      CERVICAL " SPINE SURGERY      Rods and screws.     SECTION      COLON SURGERY  2007    hemicolectomy    COLON SURGERY  2008    repair    COLONOSCOPY N/A 2018    Procedure: COLONOSCOPY;  Surgeon: Maverick Esquivel MD;  Location: [facility];  Service: Endoscopy;  Laterality: N/A; repeat in 5 years for surveillance    CYSTOSCOPY N/A 2021    Procedure: CYSTOSCOPY;  Surgeon: Allison Miranda MD;  Location: [facility] OR;  Service: Urology;  Laterality: N/A;    ESOPHAGOGASTRODUODENOSCOPY N/A 2019    Procedure: EGD (ESOPHAGOGASTRODUODENOSCOPY);  Surgeon: Monika Steele MD;  Location: [facility];  Service: Endoscopy;  Laterality: N/A;    ESOPHAGOGASTRODUODENOSCOPY  2019    with RFA and biopsies    ESOPHAGOGASTRODUODENOSCOPY N/A 2019    Procedure: EGD (ESOPHAGOGASTRODUODENOSCOPY);  Surgeon: Uzair Cantor MD;  Location: [facility];  Service: Endoscopy;  Laterality: N/A;  n+v with anesthesia    ESOPHAGOGASTRODUODENOSCOPY N/A 2019    Procedure: EGD (ESOPHAGOGASTRODUODENOSCOPY);  Surgeon: Uzair Cantor MD;  Location: [facility];  Service: Endoscopy;  Laterality: N/A;    ESOPHAGOGASTRODUODENOSCOPY N/A 10/7/2019    Procedure: EGD (ESOPHAGOGASTRODUODENOSCOPY);  Surgeon: Uzair Cantor MD;  Location: [facility];  Service: Endoscopy;  Laterality: N/A;    ESOPHAGOGASTRODUODENOSCOPY N/A 2/10/2020    Procedure: EGD (ESOPHAGOGASTRODUODENOSCOPY);  Surgeon: Uzair Cantor MD;  Location: [facility];  Service: Endoscopy;  Laterality: N/A;  Requests earliest procedure time    ESOPHAGOGASTRODUODENOSCOPY N/A 2020    Procedure: EGD (ESOPHAGOGASTRODUODENOSCOPY);  Surgeon: Uzair Cantor MD;  Location: [facility];  Service: Endoscopy;  Laterality: N/A;  BARRETTS    ESOPHAGOGASTRODUODENOSCOPY N/A 2021    Procedure: ESOPHAGOGASTRODUODENOSCOPY (EGD);  Surgeon: Uzair Cantor MD;  Location: [facility];  Service: Endoscopy;  Laterality: N/A;  Needs Rapid Covid MP    ESOPHAGOGASTRODUODENOSCOPY N/A  11/30/2021    Procedure: EGD (ESOPHAGOGASTRODUODENOSCOPY);  Surgeon: Uzair Cantor MD;  Location: [facility] ENDO;  Service: Endoscopy;  Laterality: N/A;    ESOPHAGOGASTRODUODENOSCOPY N/A 6/27/2022    Procedure: EGD (ESOPHAGOGASTRODUODENOSCOPY);  Surgeon: Uzair Cantor MD;  Location: [facility] ENDO;  Service: Endoscopy;  Laterality: N/A;  5/19/22: fully vaccinated. instructions mailed-[place]    loop recorder only    HYSTERECTOMY      ovaries remain    INSERTION OF IMPLANTABLE LOOP RECORDER N/A 7/17/2020    Procedure: INSERTION, IMPLANTABLE LOOP RECORDER (MEDTRONIC);  Surgeon: Karyna Vasques MD;  Location: [facility] CATH/EP LAB;  Service: Cardiology;  Laterality: N/A;    SPLENECTOMY, TOTAL  2007    Injured during surgery and removed    UPPER GASTROINTESTINAL ENDOSCOPY  2007    GERD & hiatal hernia per patient report     Family History   Problem Relation Age of Onset    Lymphoma Mother         Brain    Colon cancer Father         diagnosed in his 60's    Lung cancer Father     Cancer Brother         prostate    Lung cancer Brother     Lung cancer Brother         metastatic from prostate    Prostate cancer Brother     Heart disease Brother         valvular disease    Hyperlipidemia Brother     Eczema Neg Hx     Lupus Neg Hx     Psoriasis Neg Hx     Melanoma Neg Hx     Crohn's disease Neg Hx     Ulcerative colitis Neg Hx     Stomach cancer Neg Hx     Esophageal cancer Neg Hx      Social History     Tobacco Use    Smoking status: Every Day     Current packs/day: 1.00     Average packs/day: 1 pack/day for 35.0 years (35.0 ttl pk-yrs)     Types: Cigarettes     Passive exposure: Current    Smokeless tobacco: Never   Substance Use Topics    Alcohol use: No    Drug use: No       Review of Systems:  Constitutional: no fever or chills  Respiratory: no cough or shortness of breath  Cardiovascular: no chest pain or palpitations    OBJECTIVE:     Vital Signs (Most Recent)       Physical Exam:  General: well developed, well  "nourished  Lungs:  normal respiratory effort  Heart: regular rate, S1, S2 normal    Laboratory  CBC: No results for input(s): "WBC", "RBC", "HGB", "HCT", "PLT", "MCV", "MCH", "MCHC" in the last 168 hours.  CMP: No results for input(s): "GLU", "CALCIUM", "ALBUMIN", "PROT", "NA", "K", "CO2", "CL", "BUN", "CREATININE", "ALKPHOS", "ALT", "AST", "BILITOT" in the last 168 hours.  Coagulation: No results for input(s): "LABPROT", "INR", "APTT" in the last 168 hours.    Diagnostic Results:      ASSESSMENT/PLAN:     Sterling's with LGD  Previous polyp  Personal history of colon cancer.    Plan: Colonoscopy and EGD    Anesthesia Plan: MAC    ASA Grade: ASA 3 - Patient with moderate systemic disease with functional limitations       The impression and plan was discussed in detail with the patient and family. All questions have been answered and the patient voices understanding of our plan at this point. The risk of the procedure was discussed in detail which includes but not limited to bleeding, infection, perforation in some cases requiring surgery with its spectrum of complications.    "

## 2023-08-07 NOTE — TRANSFER OF CARE
"Anesthesia Transfer of Care Note    Patient: Kristin Guerra    Procedure(s) Performed: Procedure(s) (LRB):  EGD (ESOPHAGOGASTRODUODENOSCOPY) (N/A)  COLONOSCOPY (N/A)    Patient location: GI    Anesthesia Type: general    Transport from OR: Transported from OR on room air with adequate spontaneous ventilation    Post pain: adequate analgesia    Post assessment: no apparent anesthetic complications and tolerated procedure well    Post vital signs: stable    Level of consciousness: awake, alert and oriented    Nausea/Vomiting: no nausea/vomiting    Complications: none    Transfer of care protocol was followed      Last vitals:   Visit Vitals  BP (!) 141/81 (BP Location: Left arm, Patient Position: Lying)   Pulse 79   Temp 36.9 °C (98.4 °F) (Temporal)   Resp 18   Ht 5' 1" (1.549 m)   Wt 63.5 kg (140 lb)   SpO2 100%   Breastfeeding No   BMI 26.45 kg/m²     "

## 2023-08-07 NOTE — BRIEF OP NOTE
Discharge Summary/Instructions after an Endoscopic Procedure    Patient Name: Kristin Guerra  Patient MRN: [id number]  Patient YOB: 1950 Monday, August 7, 2023  Uzair Cantor MD  Dear patient,  As a result of recent federal legislation (The Federal Cures Act), you may receive lab or pathology results from your procedure in your [facility] account before your physician is able to contact you. Your physician or their representative will relay the results to you with their recommendations at their soonest availability.  Thank you,    Your health is very important to us during the Covid Crisis. Following your procedure today, you will receive a daily text for 2 weeks asking about signs or symptoms of Covid 19.  Please respond to this text when you receive it so we can follow up and keep you as safe as possible.     RESTRICTIONS:  During your procedure today, you received medications for sedation.  These medications may affect your judgment, balance and coordination.  Therefore, for 24 hours, you have the following restrictions:     - DO NOT drive a car, operate machinery, make legal/financial decisions, sign important papers or drink alcohol.      ACTIVITY:  Today: no heavy lifting, straining or running due to procedural sedation/anesthesia.  The following day: return to full activity including work.    DIET:  Eat and drink normally unless instructed otherwise.     TREATMENT FOR COMMON SIDE EFFECTS:  - Mild abdominal pain, nausea, belching, bloating or excessive gas:  rest, eat lightly and use a heating pad.  - Sore Throat: treat with throat lozenges and/or gargle with warm salt water.  - Because air was used during the procedure, expelling large amounts of air from your rectum or belching is normal.  - If a bowel prep was taken, you may not have a bowel movement for 1-3 days.  This is normal.      SYMPTOMS TO WATCH FOR AND REPORT TO YOUR PHYSICIAN:  1. Abdominal pain or bloating, other than gas cramps.  2.  Chest pain.  3. Back pain.  4. Signs of infection such as: chills or fever occurring within 24 hours after the procedure.  5. Rectal bleeding, which would show as bright red, maroon, or black stools. (A tablespoon of blood from the rectum is not serious, especially if hemorrhoids are present.)  6. Vomiting.  7. Weakness or dizziness.      GO DIRECTLY TO THE NEAREST EMERGENCY ROOM IF YOU HAVE ANY OF THE FOLLOWING:     Difficulty breathing              Chills and/or fever over 101 F   Persistent vomiting and/or vomiting blood   Severe abdominal pain   Severe chest pain   Black, tarry stools   Bleeding- more than one tablespoon   Any other symptom or condition that you feel may need urgent attention    Your doctor recommends these additional instructions:  If any biopsies were taken, your doctors clinic will contact you in 1 to 2 weeks with any results.    - Discharge patient to home (ambulatory).   - Await pathology results.   - Resume previous diet.   - Use Nexium (esomeprazole) 40 mg PO daily.   - Repeat upper endoscopy in 1 year for surveillance based on pathology results.   - Perform a colonoscopy today.   - Refer to a surgeon for possible hiatal hernia repair.    For questions, problems or results please call your physician - Uzair Cantor MD.    EMERGENCY PHONE NUMBER: [phone],  LAB RESULTS: [phone]    IF A COMPLICATION OR EMERGENCY SITUATION ARISES AND YOU ARE UNABLE TO REACH YOUR PHYSICIAN - GO DIRECTLY TO THE EMERGENCY ROOM.

## 2023-08-08 NOTE — ANESTHESIA POSTPROCEDURE EVALUATION
Anesthesia Post Evaluation    Patient: Kristin Guerra    Procedure(s) Performed: Procedure(s) (LRB):  EGD (ESOPHAGOGASTRODUODENOSCOPY) (N/A)  COLONOSCOPY (N/A)    Final Anesthesia Type: general      Patient location during evaluation: GI PACU  Patient participation: Yes- Able to Participate  Level of consciousness: awake and alert  Post-procedure vital signs: reviewed and stable  Pain management: adequate  Airway patency: patent    PONV status at discharge: No PONV  Anesthetic complications: no      Cardiovascular status: stable  Respiratory status: spontaneous ventilation  Hydration status: euvolemic  Follow-up not needed.          Vitals Value Taken Time   /66 08/07/23 1210   Temp 36.6 °C (97.9 °F) 08/07/23 1140   Pulse 72 08/07/23 1210   Resp 16 08/07/23 1210   SpO2 95 % 08/07/23 1210         Event Time   Out of Recovery 12:10:00         Pain/Kobe Score: Kobe Score: 9 (8/7/2023 11:40 AM)

## 2023-08-09 LAB
FINAL PATHOLOGIC DIAGNOSIS: NORMAL
GROSS: NORMAL
Lab: NORMAL
MICROSCOPIC EXAM: NORMAL

## 2023-08-11 DIAGNOSIS — F41.1 GAD (GENERALIZED ANXIETY DISORDER): ICD-10-CM

## 2023-08-11 DIAGNOSIS — R09.81 NASAL CONGESTION: ICD-10-CM

## 2023-08-11 DIAGNOSIS — H69.91 DYSFUNCTION OF RIGHT EUSTACHIAN TUBE: ICD-10-CM

## 2023-08-14 RX ORDER — AZELASTINE 1 MG/ML
SPRAY, METERED NASAL
Qty: 90 ML | Refills: 0 | Status: SHIPPED | OUTPATIENT
Start: 2023-08-14

## 2023-08-14 RX ORDER — CLONAZEPAM 0.5 MG/1
TABLET ORAL
Qty: 30 TABLET | Refills: 0 | Status: SHIPPED | OUTPATIENT
Start: 2023-08-14 | End: 2023-11-16 | Stop reason: SDUPTHER

## 2023-08-15 RX ORDER — EZETIMIBE 10 MG/1
10 TABLET ORAL
Qty: 90 TABLET | Refills: 3 | Status: SHIPPED | OUTPATIENT
Start: 2023-08-15 | End: 2024-01-17

## 2023-09-11 ENCOUNTER — TELEPHONE (OUTPATIENT)
Dept: FAMILY MEDICINE | Facility: CLINIC | Age: 73
End: 2023-09-11

## 2023-09-11 DIAGNOSIS — N95.9 MENOPAUSAL AND POSTMENOPAUSAL DISORDER: ICD-10-CM

## 2023-09-11 DIAGNOSIS — Z12.31 ENCOUNTER FOR SCREENING MAMMOGRAM FOR BREAST CANCER: Primary | ICD-10-CM

## 2023-09-11 NOTE — TELEPHONE ENCOUNTER
----- Message from Laura Dickinson, Patient Care Assistant sent at 9/11/2023  2:27 PM CDT -----  Contact: self  Pt is calling to have orders for a mammo and bone density [phone]. Please call when orders are ready to be rodrigue   thanks

## 2023-09-21 ENCOUNTER — OFFICE VISIT (OUTPATIENT)
Dept: PULMONOLOGY | Facility: CLINIC | Age: 73
End: 2023-09-21
Payer: MEDICARE

## 2023-09-21 VITALS
HEIGHT: 61 IN | BODY MASS INDEX: 27.2 KG/M2 | HEART RATE: 82 BPM | OXYGEN SATURATION: 87 % | SYSTOLIC BLOOD PRESSURE: 181 MMHG | WEIGHT: 144.06 LBS | DIASTOLIC BLOOD PRESSURE: 92 MMHG

## 2023-09-21 DIAGNOSIS — J44.9 CHRONIC OBSTRUCTIVE PULMONARY DISEASE, UNSPECIFIED COPD TYPE: ICD-10-CM

## 2023-09-21 DIAGNOSIS — R91.8 MULTIPLE LUNG NODULES ON CT: Primary | ICD-10-CM

## 2023-09-21 PROCEDURE — 3288F FALL RISK ASSESSMENT DOCD: CPT | Mod: CPTII,S$GLB,, | Performed by: NURSE PRACTITIONER

## 2023-09-21 PROCEDURE — 1159F PR MEDICATION LIST DOCUMENTED IN MEDICAL RECORD: ICD-10-PCS | Mod: CPTII,S$GLB,, | Performed by: NURSE PRACTITIONER

## 2023-09-21 PROCEDURE — 3077F PR MOST RECENT SYSTOLIC BLOOD PRESSURE >= 140 MM HG: ICD-10-PCS | Mod: CPTII,S$GLB,, | Performed by: NURSE PRACTITIONER

## 2023-09-21 PROCEDURE — 3080F PR MOST RECENT DIASTOLIC BLOOD PRESSURE >= 90 MM HG: ICD-10-PCS | Mod: CPTII,S$GLB,, | Performed by: NURSE PRACTITIONER

## 2023-09-21 PROCEDURE — 99213 PR OFFICE/OUTPT VISIT, EST, LEVL III, 20-29 MIN: ICD-10-PCS | Mod: S$GLB,,, | Performed by: NURSE PRACTITIONER

## 2023-09-21 PROCEDURE — 1101F PR PT FALLS ASSESS DOC 0-1 FALLS W/OUT INJ PAST YR: ICD-10-PCS | Mod: CPTII,S$GLB,, | Performed by: NURSE PRACTITIONER

## 2023-09-21 PROCEDURE — 3008F BODY MASS INDEX DOCD: CPT | Mod: CPTII,S$GLB,, | Performed by: NURSE PRACTITIONER

## 2023-09-21 PROCEDURE — 1101F PT FALLS ASSESS-DOCD LE1/YR: CPT | Mod: CPTII,S$GLB,, | Performed by: NURSE PRACTITIONER

## 2023-09-21 PROCEDURE — 3077F SYST BP >= 140 MM HG: CPT | Mod: CPTII,S$GLB,, | Performed by: NURSE PRACTITIONER

## 2023-09-21 PROCEDURE — 99999 PR PBB SHADOW E&M-EST. PATIENT-LVL IV: CPT | Mod: PBBFAC,,, | Performed by: NURSE PRACTITIONER

## 2023-09-21 PROCEDURE — 99213 OFFICE O/P EST LOW 20 MIN: CPT | Mod: S$GLB,,, | Performed by: NURSE PRACTITIONER

## 2023-09-21 PROCEDURE — 3008F PR BODY MASS INDEX (BMI) DOCUMENTED: ICD-10-PCS | Mod: CPTII,S$GLB,, | Performed by: NURSE PRACTITIONER

## 2023-09-21 PROCEDURE — 1126F AMNT PAIN NOTED NONE PRSNT: CPT | Mod: CPTII,S$GLB,, | Performed by: NURSE PRACTITIONER

## 2023-09-21 PROCEDURE — 1126F PR PAIN SEVERITY QUANTIFIED, NO PAIN PRESENT: ICD-10-PCS | Mod: CPTII,S$GLB,, | Performed by: NURSE PRACTITIONER

## 2023-09-21 PROCEDURE — 3080F DIAST BP >= 90 MM HG: CPT | Mod: CPTII,S$GLB,, | Performed by: NURSE PRACTITIONER

## 2023-09-21 PROCEDURE — 99999 PR PBB SHADOW E&M-EST. PATIENT-LVL IV: ICD-10-PCS | Mod: PBBFAC,,, | Performed by: NURSE PRACTITIONER

## 2023-09-21 PROCEDURE — 1159F MED LIST DOCD IN RCRD: CPT | Mod: CPTII,S$GLB,, | Performed by: NURSE PRACTITIONER

## 2023-09-21 PROCEDURE — 3288F PR FALLS RISK ASSESSMENT DOCUMENTED: ICD-10-PCS | Mod: CPTII,S$GLB,, | Performed by: NURSE PRACTITIONER

## 2023-09-21 RX ORDER — TIOTROPIUM BROMIDE AND OLODATEROL 3.124; 2.736 UG/1; UG/1
1 SPRAY, METERED RESPIRATORY (INHALATION) DAILY
Qty: 1 G | Refills: 11 | Status: SHIPPED | OUTPATIENT
Start: 2023-09-21 | End: 2023-09-28

## 2023-09-21 NOTE — PROGRESS NOTES
9/21/2023    Kristin Guerra  Office Note    Chief Complaint   Patient presents with    Follow-up    Medication Refill       HPI:    9/21/2023- complaint of worsening shortness of breath, associated wheeze. Using nebulizer 1 x daily, has productive thick clear to brown mucous  Currently smoking 1 pack daily. States very difficult to quit.     10/27/2022- SOB stable complaint, wearing oxygen at night. Using nebulizer 1-2 times daily, daily cough is productive. On spiriva daily  Under large amount of stress due to husbands cancer diagnosis. PCP provides anxiety medication.     6/21/2022- currently smoking 1 pack daily. Not interested quitting and the moment.   Able to keep up with house work but tires quickly has to pace yourself. Wearing supplemental oxygen when needed. Uses husbands machine at 3L.   Cough- stable, unchanged, able to clear occasionally varies from clear to brown mucous. Associated with chest pain. Has Echo scheduled with cardiologist  Using Albuterol daily, tried Symbicort but states to strong and made her choke.     3/18/2022- currently working on cutting back on smoking. Over 1 pack daily. States SOB varies with time, has good/bad days, worse with high humidity.   Cough- persistent complaint, daily complaint, thick clear/brown mucous.   No current complaint of wheeze or chest tightness. Occasionally wearing supplemental oxygen at home at 3L with benefit. Uses 's machine. Uses albuterol inhaler only, tried Symbicort in past caused chest pain.      12/17/2021- Complaint of daily cough- persistent complaint, mild complaint, productive clear/brown mucous, currently smoking 1 pack daily, associated with wheeze  Wearing supplemental oxygen at night 3L at night. No daily metered dose inhaler, using albuterol nebulizer 2-3x weekly with benefit.     SOB- stable, worse with exertion, difficult to lift heavy objects, improves with rest.     9/17/2021- complaint of high blood pressure, daytime fatigue,  and generalized body aches, states she faints unexpectedly,     3/17/2021- decided not to do sleep study, Shortness of breath- daily complaint, varies with severity, worse with exertion. Currently smoking 1 pack cigarettes daily.   No cough, chest tightness, or wheeze.   Using albuterol 3-4 days weekly, using nebulizer when needed about 1x weekly, no nocturnal arousals.   Not able to tolerate inhalers, using albuterol nebulizer and inhaler only.       10/6/2020- Cough and SOB unchanged, daily, improves with albuterol rescue inhaler. Improves ability to bring up sputum, productive clear and brown mucous, not able to bring in sputum samples. Did not fill Trelegy due to high cost. Using albuterol rescue 2x daily to control cough,   Currently smoking 1 pack daily.    7/6/2020- referred by PCP for Ct chest screening with abnormal spot seen. Complaint of weakness and fatigue, being treated for Syncope dx with bradycardia waiting for pacemaker placement.   Cough- onset 2 years, large amounts of mucous, clear color, nickel size, daily.  SOB- onset 2 years, limits activity level, works with exertion, improves quickly with rest, no chest tightness or wheeze,  Complaint of nightly snoring, daytime fatigue, drowsiness in afternoons.   Social Hx: lives with spouse but no pets, currently retired from office and retail stocking, no known Asbestosis exposure, Smoking Hx: currently smoking 1 pack daily, 40 pack years.  Family Hx: Father and 2 brothers (Small cell) Lung Cancer, no COPD, no Asthma  Medical Hx: no previous pneumonia ; no previous shoulder/chest surgery, Neck surgery 2009, 12, 17 for disc infusions.         The chief compliant  problem is stable  PFSH:  Past Medical History:   Diagnosis Date    Allergy     Sterling's esophagus     Colon polyp     Diverticulitis     Diverticulosis     Fatty liver     GERD (gastroesophageal reflux disease)     History of endoscopy 04/07/2021    Dr Uzair Cantor  Impression:            -  Normal oropharynx.                         - Hiatal hernia.                         - Sterling's esophagus. Treated with radiofrequency                         ablation.                         - Normal stomach.                         - Normal examined duodenum.                         - No specimens collected.     Hx of cervical spine surgery 2018    Hyperlipidemia     Hypertension     Lupus (systemic lupus erythematosus)     discoid    Osteoarthritis     Osteopenia     Syncope     Tremor 2005    essential         Past Surgical History:   Procedure Laterality Date    APPENDECTOMY  2007    removed during colon surgery    CERVICAL FUSION      CERVICAL SPINE SURGERY      Rods and screws.     SECTION      COLON SURGERY      hemicolectomy    COLON SURGERY  2008    repair    COLONOSCOPY N/A 2018    Procedure: COLONOSCOPY;  Surgeon: Maverick Esquivel MD;  Location: [facility];  Service: Endoscopy;  Laterality: N/A; repeat in 5 years for surveillance    COLONOSCOPY N/A 2023    Procedure: COLONOSCOPY;  Surgeon: Uzair Cantor MD;  Location: [facility];  Service: Endoscopy;  Laterality: N/A;    CYSTOSCOPY N/A 2021    Procedure: CYSTOSCOPY;  Surgeon: Allison Miranda MD;  Location: [facility] OR;  Service: Urology;  Laterality: N/A;    ESOPHAGOGASTRODUODENOSCOPY N/A 2019    Procedure: EGD (ESOPHAGOGASTRODUODENOSCOPY);  Surgeon: Monika Steele MD;  Location: [facility];  Service: Endoscopy;  Laterality: N/A;    ESOPHAGOGASTRODUODENOSCOPY  2019    with RFA and biopsies    ESOPHAGOGASTRODUODENOSCOPY N/A 2019    Procedure: EGD (ESOPHAGOGASTRODUODENOSCOPY);  Surgeon: Uzair Cantor MD;  Location: [facility];  Service: Endoscopy;  Laterality: N/A;  n+v with anesthesia    ESOPHAGOGASTRODUODENOSCOPY N/A 2019    Procedure: EGD (ESOPHAGOGASTRODUODENOSCOPY);  Surgeon: Uzair Cantor MD;  Location: [facility];  Service: Endoscopy;  Laterality: N/A;     ESOPHAGOGASTRODUODENOSCOPY N/A 10/7/2019    Procedure: EGD (ESOPHAGOGASTRODUODENOSCOPY);  Surgeon: Uzair Cantor MD;  Location: [facility];  Service: Endoscopy;  Laterality: N/A;    ESOPHAGOGASTRODUODENOSCOPY N/A 2/10/2020    Procedure: EGD (ESOPHAGOGASTRODUODENOSCOPY);  Surgeon: Uzair Cantor MD;  Location: [facility];  Service: Endoscopy;  Laterality: N/A;  Requests earliest procedure time    ESOPHAGOGASTRODUODENOSCOPY N/A 8/17/2020    Procedure: EGD (ESOPHAGOGASTRODUODENOSCOPY);  Surgeon: Uzair Cantor MD;  Location: [facility];  Service: Endoscopy;  Laterality: N/A;  [place]    ESOPHAGOGASTRODUODENOSCOPY N/A 4/7/2021    Procedure: ESOPHAGOGASTRODUODENOSCOPY (EGD);  Surgeon: Uzair Cantor MD;  Location: [facility];  Service: Endoscopy;  Laterality: N/A;  Needs Rapid Covid MP    ESOPHAGOGASTRODUODENOSCOPY N/A 11/30/2021    Procedure: EGD (ESOPHAGOGASTRODUODENOSCOPY);  Surgeon: Uzair Cantor MD;  Location: [facility];  Service: Endoscopy;  Laterality: N/A;    ESOPHAGOGASTRODUODENOSCOPY N/A 6/27/2022    Procedure: EGD (ESOPHAGOGASTRODUODENOSCOPY);  Surgeon: Uzair Cantor MD;  Location: [facility];  Service: Endoscopy;  Laterality: N/A;  5/19/22: fully vaccinated. instructions mailed-[place]    loop recorder only    ESOPHAGOGASTRODUODENOSCOPY N/A 8/7/2023    Procedure: EGD (ESOPHAGOGASTRODUODENOSCOPY);  Surgeon: Uzair Cantor MD;  Location: [facility];  Service: Endoscopy;  Laterality: N/A;    HYSTERECTOMY      ovaries remain    INSERTION OF IMPLANTABLE LOOP RECORDER N/A 7/17/2020    Procedure: INSERTION, IMPLANTABLE LOOP RECORDER (MEDTRONIC);  Surgeon: Karyna Vasques MD;  [facility];  Service: Cardiology;  Laterality: N/A;    SPLENECTOMY, TOTAL  2007    Injured during surgery and removed    UPPER GASTROINTESTINAL ENDOSCOPY  2007    GERD & hiatal hernia per patient report     Social History     Tobacco Use    Smoking status: Every Day     Current packs/day: 1.00      "Average packs/day: 1 pack/day for 35.0 years (35.0 ttl pk-yrs)     Types: Cigarettes     Passive exposure: Current    Smokeless tobacco: Never   Substance Use Topics    Alcohol use: No    Drug use: No     Family History   Problem Relation Age of Onset    Lymphoma Mother         Brain    Colon cancer Father         diagnosed in his 60's    Lung cancer Father     Cancer Brother         prostate    Lung cancer Brother     Lung cancer Brother         metastatic from prostate    Prostate cancer Brother     Heart disease Brother         valvular disease    Hyperlipidemia Brother     Eczema Neg Hx     Lupus Neg Hx     Psoriasis Neg Hx     Melanoma Neg Hx     Crohn's disease Neg Hx     Ulcerative colitis Neg Hx     Stomach cancer Neg Hx     Esophageal cancer Neg Hx      Review of patient's allergies indicates:   Allergen Reactions    Doxycycline Itching and Swelling     Prescribed 5/2021, took one dose, describes lip swelling, and "itch all over"    Mirtazapine Other (See Comments)     Hyperactivity.    Morphine Swelling and Rash     Other reaction(s): swelling in limbs    Lexapro [escitalopram oxalate] Other (See Comments)     Worsening tremor.  Sedation.    Primidone Other (See Comments)     Syncope.  Other reaction(s): dizziness,hypotension  Other reaction(s): Unknown  Other reaction(s): Unknown    Simvastatin Nausea And Vomiting and Other (See Comments)     Weakness.  Other reaction(s): nausea,vomiting    Beta-adrenergic agents Other (See Comments)     Low blood pressure and fainted.     Metoprolol succinate Other (See Comments)    Prochlorperazine Other (See Comments)     Other reaction(s): weakness    Topiramate     Compazine [prochlorperazine edisylate] Nausea Only    Tramadol Nausea And Vomiting     I have reviewed past medical, family, and social history. I have reviewed previous nurse notes.    Performance Status:The patient's activity level is functions out of house.      Review of Systems:  a review of eleven " "systems covering constitutional, Eye, HEENT, Psych, Respiratory, Cardiac, GI, , Musculoskeletal, Endocrine, Dermatologic was negative except for pertinent findings as listed ABOVE and below: pertinent positive as above, rest is good  Cough, shortness of breath         Exam:Comprehensive exam done. BP (!) 181/92 (BP Location: Right arm, Patient Position: Sitting, BP Method: Medium (Automatic))   Pulse 82   Ht 5' 1" (1.549 m)   Wt 65.4 kg (144 lb 1.1 oz)   SpO2 (!) 87% Comment: on room air at rest  BMI 27.22 kg/m²   Exam included Vitals as listed, and patient's appearance and affect and alertness and mood, oral exam for yeast and hygiene and pharynx lesions and Mallapatti (M) score, neck with inspection for jvd and masses and thyroid abnormalities and lymph nodes (supraclavicular and infraclavicular nodes and axillary also examined and noted if abn), chest exam included symmetry and effort and fremitus and percussion and auscultation, cardiac exam included rhythm and gallops and murmur and rubs and jvd and edema, abdominal exam for mass and hepatosplenomegaly and tenderness and hernias and bowel sounds, Musculoskeletal exam with muscle tone and posture and mobility/gait and  strength, and skin for rashes and cyanosis and pallor and turgor, extremity for clubbing.  Findings were normal except for pertinent findings listed below: M2, BS deminished, bilateral rales.       Radiographs (ct chest and cxr) reviewed: view by direct vision   CT chest 09/14/22 lung nodules, atelectasis, and emphysema unchanged, stable; repeat CT in one year recommended     CT Chest Without Contrast 9/10/2021   Unchanged right lung nodules dating back to 6/18/2020.  2. Mild emphysema.  Coronary artery calcifications     CT Chest Without Contrast 2/22/2021   Thereare no new pulmonary nodules. Mild emphysematous lung disease is again demonstrated bilaterally. There is bibasilar subsegmental atelectasis versus scarring  Multiple " pulmonary nodules are again identified as described and are unchanged      CT Chest Without Contrast 09/09/20   IMPRESSION:  Stable 8 mm fissural nodule in the anterior right middle lobe and 4 mm  subpleural nodule in the posterior right medial lobe Resolution of the 6 mm subpleural nodular density in the anterior right costophrenic angle Mild coronary artery calcification No new pulmonary nodules or infiltrates  LUNG-RADS CATEGORY 3: PROBABLY BENIGN FINDINGS  RECOMMENDATION: Short-term imaging follow-up with 6 month LDCT.      Transthoracic echo (TTE) complete 6/3/2020  Mild concentric left ventricular hypertrophy.  Normal left ventricular systolic function. The estimated ejection fraction is 60%.  Normal LV diastolic function.  The estimated PA systolic pressure is 34 mmHg.     X-Ray Chest AP Portable 06/02/20   The lungs are clear. Costophrenic angles are seen without effusion. No  pneumothorax is identified. The heart is normal in size. Atheromatous  calcifications are seen at the aortic arch. Degenerative changes are  seen with an ACDF and paraspinal fixation hardware in the lower  cervical spine. The visualized upper abdomen is unremarkable    CT Chest Lung Screening Low Dose 06/18/20   1.  Nodular density with spiculations in the anterior segment of  the right middle lobe measures 6 x 9 mm.  2.  6 mm subpleural nodular density in the anterior right  costophrenic angle.  3.  Irregular shaped density in the medial segment of the right  lower lobe is unchanged dating back from 2009 and is benign.     LUNG-RADS CATEGORY 4A: SUSPICIOUS FINDINGS     RECOMMENDATION: 3 month follow-up LDCT (PET/CT may be considered when  solid component greater than 7 mm is present).        Labs reviewed       Lab Results   Component Value Date    WBC 9.32 03/08/2023    RBC 3.96 (L) 03/08/2023    HGB 11.9 (L) 03/08/2023    HCT 37.7 03/08/2023    MCV 95 03/08/2023    MCH 30.1 03/08/2023    MCHC 31.6 (L) 03/08/2023    RDW 14.6 (H)  03/08/2023     03/08/2023    MPV 10.5 03/08/2023    GRAN 5.7 03/08/2023    GRAN 60.7 03/08/2023    LYMPH 2.7 03/08/2023    LYMPH 28.4 03/08/2023    MONO 0.8 03/08/2023    MONO 8.9 03/08/2023    EOS 0.0 03/08/2023    BASO 0.14 03/08/2023    EOSINOPHIL 0.3 03/08/2023    BASOPHIL 1.5 03/08/2023      Latest Reference Range & Units 02/14/22 08:24 01/15/23 20:28 03/08/23 09:32   Eos # 0.0 - 0.5 K/uL 0.4 0.2 0.0         Ed Model: 7/6/2020 8.47% probability.    PFT reviewed  Pulmonary Functions Testing Results:  spirometry with bronchodilator, lung volume by gas dilution, and diffusion capacity were measured July 15, 2020.  The FEV1 to FVC ratio was 66% indicating airflow obstruction.  The FEV1 measured 70% of predicted making airflow obstruction moderate.     There was a 25% improvement in FEV1 following bronchodilator, this is statistically significant.  The FEV1 measured about 70% predicted at 1.4 L pre bronchodilator.  Total lung capacity was in normal limits on lung volumes by gas dilution.  Diffusion,   uncorrected for anemia present, was 49% of predicted.       The patient had moderate airflow obstruction that resolved with bronchodilator.  Lung volumes were normal.  Diffusion was reduced.  Clinical correlation recommended.       EPWORTH SLEEPINESS SCALE= 16 9/17/2021  Sleep study AHI 1 9/27/2021  DEA- 1.0 [facility]    Plan:  Clinical impression is apparently straight forward and impression with management as below.    Kristin was seen today for follow-up and medication refill.    Diagnoses and all orders for this visit:    Multiple lung nodules on CT  -     CT Chest Without Contrast; Future    Chronic obstructive pulmonary disease, unspecified COPD type  -     tiotropium-olodateroL (STIOLTO RESPIMAT) 2.5-2.5 mcg/actuation Mist; Inhale 1 puff into the lungs once daily. Controller        Follow up in about 3 months (around 12/21/2023), or if symptoms worsen or fail to improve.    Discussed with patient above for  education the following:      Patient Instructions   Start new medication Stiolto daily    Have ct of chest to evaluate previously seen lung nodules.

## 2023-09-25 DIAGNOSIS — J44.9 CHRONIC OBSTRUCTIVE PULMONARY DISEASE, UNSPECIFIED COPD TYPE: ICD-10-CM

## 2023-09-25 DIAGNOSIS — R06.02 SHORTNESS OF BREATH: ICD-10-CM

## 2023-09-25 NOTE — TELEPHONE ENCOUNTER
Placed a call to the pt in regards to a call back. Pt albuterol neb solution was not sent to the [facility] on [facility]. A refill was sent to the [facility].     ----- Message from Florence Ramirez sent at 9/25/2023 12:39 PM CDT -----  Name of Who is Calling:Patient           What is the request in detail:Patient states she is having trouble at pharmacy getting her medication. Patient states the medication needs to be authorized.    albuterol-ipratropium (DUO-NEB) 2.5 mg-0.5 mg/3 mL nebulizer solution 120 each 5 12/17/2021 8/7/2023 No  Sig - Route: Take 3 mLs by nebulization every 6 (six) hours as needed for Wheezing or Shortness of Breath. Rescue - Nebulization  Sent to pharmacy as: albuterol-ipratropium (DUO-NEB) 2.5 mg-0.5 mg/3 mL nebulizer solution  Class: Normal  Order: [id number]  Date/Time Signed: 12/17/2021 11:04      E-Prescribing Status: Receipt confirmed by pharmacy (12/17/2021 11:04 AM CST)      [id number] - [place] - [address] AT [facility] & [facility]  [address] [place] [address]  Phone: [phone] Fax: [phone]               Can the clinic reply by [facility]:no           [facility]: [phone]

## 2023-09-26 ENCOUNTER — HOSPITAL ENCOUNTER (OUTPATIENT)
Dept: RADIOLOGY | Facility: HOSPITAL | Age: 73
Discharge: HOME | End: 2023-09-26
Attending: FAMILY MEDICINE
Payer: OTHER GOVERNMENT

## 2023-09-26 ENCOUNTER — HOSPITAL ENCOUNTER (OUTPATIENT)
Dept: RADIOLOGY | Facility: HOSPITAL | Age: 73
Discharge: HOME | End: 2023-09-26
Attending: NURSE PRACTITIONER
Payer: OTHER GOVERNMENT

## 2023-09-26 DIAGNOSIS — R91.8 MULTIPLE LUNG NODULES ON CT: ICD-10-CM

## 2023-09-26 DIAGNOSIS — Z12.31 ENCOUNTER FOR SCREENING MAMMOGRAM FOR BREAST CANCER: ICD-10-CM

## 2023-09-26 DIAGNOSIS — N95.9 MENOPAUSAL AND POSTMENOPAUSAL DISORDER: ICD-10-CM

## 2023-09-26 PROCEDURE — 71250 CT CHEST WITHOUT CONTRAST: ICD-10-PCS | Mod: 26,,, | Performed by: RADIOLOGY

## 2023-09-26 PROCEDURE — 77067 SCR MAMMO BI INCL CAD: CPT | Mod: TC,PO

## 2023-09-26 PROCEDURE — 71250 CT THORAX DX C-: CPT | Mod: TC

## 2023-09-26 PROCEDURE — 71250 CT THORAX DX C-: CPT | Mod: 26,,, | Performed by: RADIOLOGY

## 2023-09-26 PROCEDURE — 77080 DXA BONE DENSITY AXIAL: CPT | Mod: TC,PO

## 2023-09-26 RX ORDER — IPRATROPIUM BROMIDE AND ALBUTEROL SULFATE 2.5; .5 MG/3ML; MG/3ML
3 SOLUTION RESPIRATORY (INHALATION) EVERY 6 HOURS PRN
Qty: 120 EACH | Refills: 5 | Status: SHIPPED | OUTPATIENT
Start: 2023-09-26 | End: 2024-09-25

## 2023-09-27 ENCOUNTER — TELEPHONE (OUTPATIENT)
Dept: PULMONOLOGY | Facility: CLINIC | Age: 73
End: 2023-09-27
Payer: OTHER GOVERNMENT

## 2023-09-27 NOTE — TELEPHONE ENCOUNTER
Spoke to patient in f/u to results.  She wanted me to let you know that the Stiolito inhaler was $765.99.  Her insurance covered it but she said she would not be ordering that again because of the cost.

## 2023-09-28 ENCOUNTER — TELEPHONE (OUTPATIENT)
Dept: PULMONOLOGY | Facility: CLINIC | Age: 73
End: 2023-09-28
Payer: OTHER GOVERNMENT

## 2023-09-28 DIAGNOSIS — J44.9 CHRONIC OBSTRUCTIVE PULMONARY DISEASE, UNSPECIFIED COPD TYPE: ICD-10-CM

## 2023-09-28 RX ORDER — TIOTROPIUM BROMIDE INHALATION SPRAY 3.12 UG/1
2 SPRAY, METERED RESPIRATORY (INHALATION) DAILY
Qty: 4 G | Refills: 11 | Status: SHIPPED | OUTPATIENT
Start: 2023-09-28

## 2023-10-10 ENCOUNTER — HOSPITAL ENCOUNTER (EMERGENCY)
Facility: HOSPITAL | Age: 73
Discharge: HOME | End: 2023-10-10
Attending: EMERGENCY MEDICINE
Payer: MEDICARE

## 2023-10-10 VITALS
OXYGEN SATURATION: 97 % | WEIGHT: 135 LBS | RESPIRATION RATE: 22 BRPM | SYSTOLIC BLOOD PRESSURE: 153 MMHG | TEMPERATURE: 98 F | HEIGHT: 61 IN | BODY MASS INDEX: 25.49 KG/M2 | HEART RATE: 66 BPM | DIASTOLIC BLOOD PRESSURE: 70 MMHG
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
## 2023-10-10 DIAGNOSIS — R53.1 WEAKNESS: ICD-10-CM

## 2023-10-10 DIAGNOSIS — S81.812A LACERATION OF LEFT LEG, INITIAL ENCOUNTER: Primary | ICD-10-CM

## 2023-10-10 LAB
ALBUMIN SERPL BCP-MCNC: 3.7 G/DL (ref 3.5–5.2)
ALP SERPL-CCNC: 63 U/L (ref 55–135)
ALT SERPL W/O P-5'-P-CCNC: 17 U/L (ref 10–44)
ANION GAP SERPL CALC-SCNC: 4 MMOL/L (ref 8–16)
AST SERPL-CCNC: 20 U/L (ref 10–40)
BASOPHILS # BLD AUTO: 0.06 K/UL (ref 0–0.2)
BASOPHILS NFR BLD: 0.4 % (ref 0–1.9)
BILIRUB SERPL-MCNC: 0.2 MG/DL (ref 0.1–1)
BILIRUB UR QL STRIP: NEGATIVE
BNP SERPL-MCNC: 25 PG/ML (ref 0–99)
BUN SERPL-MCNC: 17 MG/DL (ref 8–23)
CALCIUM SERPL-MCNC: 8.6 MG/DL (ref 8.7–10.5)
CHLORIDE SERPL-SCNC: 110 MMOL/L (ref 95–110)
CLARITY UR: CLEAR
CO2 SERPL-SCNC: 25 MMOL/L (ref 23–29)
COLOR UR: YELLOW
CREAT SERPL-MCNC: 0.7 MG/DL (ref 0.5–1.4)
DIFFERENTIAL METHOD: ABNORMAL
EOSINOPHIL # BLD AUTO: 0.2 K/UL (ref 0–0.5)
EOSINOPHIL NFR BLD: 1.4 % (ref 0–8)
ERYTHROCYTE [DISTWIDTH] IN BLOOD BY AUTOMATED COUNT: 14.9 % (ref 11.5–14.5)
EST. GFR  (NO RACE VARIABLE): >60 ML/MIN/1.73 M^2
GLUCOSE SERPL-MCNC: 114 MG/DL (ref 70–110)
GLUCOSE UR QL STRIP: NEGATIVE
HCT VFR BLD AUTO: 34.8 % (ref 37–48.5)
HGB BLD-MCNC: 11.6 G/DL (ref 12–16)
HGB UR QL STRIP: NEGATIVE
IMM GRANULOCYTES # BLD AUTO: 0.07 K/UL (ref 0–0.04)
IMM GRANULOCYTES NFR BLD AUTO: 0.5 % (ref 0–0.5)
KETONES UR QL STRIP: ABNORMAL
LEUKOCYTE ESTERASE UR QL STRIP: NEGATIVE
LIPASE SERPL-CCNC: 18 U/L (ref 4–60)
LYMPHOCYTES # BLD AUTO: 2.3 K/UL (ref 1–4.8)
LYMPHOCYTES NFR BLD: 15.8 % (ref 18–48)
MCH RBC QN AUTO: 31.7 PG (ref 27–31)
MCHC RBC AUTO-ENTMCNC: 33.3 G/DL (ref 32–36)
MCV RBC AUTO: 95 FL (ref 82–98)
MONOCYTES # BLD AUTO: 0.6 K/UL (ref 0.3–1)
MONOCYTES NFR BLD: 4.4 % (ref 4–15)
NEUTROPHILS # BLD AUTO: 11.2 K/UL (ref 1.8–7.7)
NEUTROPHILS NFR BLD: 77.5 % (ref 38–73)
NITRITE UR QL STRIP: NEGATIVE
NRBC BLD-RTO: 0 /100 WBC
PH UR STRIP: 7 [PH] (ref 5–8)
PLATELET # BLD AUTO: 309 K/UL (ref 150–450)
PMV BLD AUTO: 9.4 FL (ref 9.2–12.9)
POTASSIUM SERPL-SCNC: 3.8 MMOL/L (ref 3.5–5.1)
PROT SERPL-MCNC: 6.9 G/DL (ref 6–8.4)
PROT UR QL STRIP: NEGATIVE
RBC # BLD AUTO: 3.66 M/UL (ref 4–5.4)
SODIUM SERPL-SCNC: 139 MMOL/L (ref 136–145)
SP GR UR STRIP: 1.01 (ref 1–1.03)
TROPONIN I SERPL HS-MCNC: 5.4 PG/ML (ref 0–14.9)
URN SPEC COLLECT METH UR: ABNORMAL
UROBILINOGEN UR STRIP-ACNC: NEGATIVE EU/DL
WBC # BLD AUTO: 14.5 K/UL (ref 3.9–12.7)

## 2023-10-10 PROCEDURE — 96374 THER/PROPH/DIAG INJ IV PUSH: CPT | Mod: 59

## 2023-10-10 PROCEDURE — 83690 ASSAY OF LIPASE: CPT | Performed by: EMERGENCY MEDICINE

## 2023-10-10 PROCEDURE — 93005 ELECTROCARDIOGRAM TRACING: CPT | Performed by: INTERNAL MEDICINE

## 2023-10-10 PROCEDURE — 93010 EKG 12-LEAD: ICD-10-PCS | Mod: ,,, | Performed by: INTERNAL MEDICINE

## 2023-10-10 PROCEDURE — 90471 IMMUNIZATION ADMIN: CPT | Performed by: EMERGENCY MEDICINE

## 2023-10-10 PROCEDURE — 99285 EMERGENCY DEPT VISIT HI MDM: CPT | Mod: 25

## 2023-10-10 PROCEDURE — 83880 ASSAY OF NATRIURETIC PEPTIDE: CPT | Performed by: EMERGENCY MEDICINE

## 2023-10-10 PROCEDURE — 63600175 PHARM REV CODE 636 W HCPCS: Performed by: EMERGENCY MEDICINE

## 2023-10-10 PROCEDURE — 12004 RPR S/N/AX/GEN/TRK7.6-12.5CM: CPT

## 2023-10-10 PROCEDURE — 80053 COMPREHEN METABOLIC PANEL: CPT | Performed by: EMERGENCY MEDICINE

## 2023-10-10 PROCEDURE — 90715 TDAP VACCINE 7 YRS/> IM: CPT | Performed by: EMERGENCY MEDICINE

## 2023-10-10 PROCEDURE — 84484 ASSAY OF TROPONIN QUANT: CPT | Performed by: EMERGENCY MEDICINE

## 2023-10-10 PROCEDURE — 81003 URINALYSIS AUTO W/O SCOPE: CPT | Performed by: EMERGENCY MEDICINE

## 2023-10-10 PROCEDURE — 93010 ELECTROCARDIOGRAM REPORT: CPT | Mod: ,,, | Performed by: INTERNAL MEDICINE

## 2023-10-10 PROCEDURE — 85025 COMPLETE CBC W/AUTO DIFF WBC: CPT | Performed by: EMERGENCY MEDICINE

## 2023-10-10 RX ORDER — AMLODIPINE BESYLATE 5 MG/1
5 TABLET ORAL
Qty: 90 TABLET | Refills: 0 | Status: SHIPPED | OUTPATIENT
Start: 2023-10-10 | End: 2024-02-06 | Stop reason: SDUPTHER

## 2023-10-10 RX ORDER — LIDOCAINE HYDROCHLORIDE 10 MG/ML
10 INJECTION, SOLUTION EPIDURAL; INFILTRATION; INTRACAUDAL; PERINEURAL
Status: DISCONTINUED | OUTPATIENT
Start: 2023-10-10 | End: 2023-10-10 | Stop reason: HOSPADM

## 2023-10-10 RX ORDER — CEFUROXIME AXETIL 500 MG/1
500 TABLET ORAL EVERY 12 HOURS
Qty: 14 TABLET | Refills: 0 | Status: SHIPPED | OUTPATIENT
Start: 2023-10-10 | End: 2023-10-17

## 2023-10-10 RX ORDER — ONDANSETRON 2 MG/ML
4 INJECTION INTRAMUSCULAR; INTRAVENOUS
Status: COMPLETED | OUTPATIENT
Start: 2023-10-10 | End: 2023-10-10

## 2023-10-10 RX ADMIN — CLOSTRIDIUM TETANI TOXOID ANTIGEN (FORMALDEHYDE INACTIVATED), CORYNEBACTERIUM DIPHTHERIAE TOXOID ANTIGEN (FORMALDEHYDE INACTIVATED), BORDETELLA PERTUSSIS TOXOID ANTIGEN (GLUTARALDEHYDE INACTIVATED), BORDETELLA PERTUSSIS FILAMENTOUS HEMAGGLUTININ ANTIGEN (FORMALDEHYDE INACTIVATED), BORDETELLA PERTUSSIS PERTACTIN ANTIGEN, AND BORDETELLA PERTUSSIS FIMBRIAE 2/3 ANTIGEN 0.5 ML: 5; 2; 2.5; 5; 3; 5 INJECTION, SUSPENSION INTRAMUSCULAR at 10:10

## 2023-10-10 RX ADMIN — ONDANSETRON 4 MG: 2 INJECTION INTRAMUSCULAR; INTRAVENOUS at 10:10

## 2023-10-10 NOTE — ED PROVIDER NOTES
"Encounter Date: 10/10/2023       History     Chief Complaint   Patient presents with    Weakness    Nausea     Pt arrived via EMS stating that she woke up feeling very fatigued and nauseated. Vomited twice. EMS reports that pt was bradycardic upon arrival with HR in the 40s     This is a 73-year-old female, presenting via EMS for evaluation of generalized weakness.  Patient says that she felt normal this morning, tripped and fell in her front yd while walking her dog.  She says since then she is been very weak and has been hurting all over.  She could not stand back up and crawled back inside.  She says she is not sure if she hit her head or not.  She feels very weak all over.  She has a left lower leg laceration that is hurting her.  Unknown last tetanus.  She also threw up prior to EMS arrival.  EMS reports that she was bradycardic in the 40s upon their arrival but this improved EN route.  Patient denies any chest pain or abdominal pain.  She denies headache, says that she has chronic neck pain all the time but no new pain.    The history is provided by the patient and the EMS personnel.     Review of patient's allergies indicates:   Allergen Reactions    Doxycycline Itching and Swelling     Prescribed 5/2021, took one dose, describes lip swelling, and "itch all over"    Mirtazapine Other (See Comments)     Hyperactivity.    Morphine Swelling and Rash     Other reaction(s): swelling in limbs    Lexapro [escitalopram oxalate] Other (See Comments)     Worsening tremor.  Sedation.    Primidone Other (See Comments)     Syncope.  Other reaction(s): dizziness,hypotension  Other reaction(s): Unknown  Other reaction(s): Unknown    Simvastatin Nausea And Vomiting and Other (See Comments)     Weakness.  Other reaction(s): nausea,vomiting    Beta-adrenergic agents Other (See Comments)     Low blood pressure and fainted.     Metoprolol succinate Other (See Comments)    Prochlorperazine Other (See Comments)     Other " reaction(s): weakness    Topiramate     Compazine [prochlorperazine edisylate] Nausea Only    Tramadol Nausea And Vomiting     Past Medical History:   Diagnosis Date    Allergy     Sterling's esophagus     Colon polyp     Diverticulitis     Diverticulosis     Fatty liver     GERD (gastroesophageal reflux disease)     History of endoscopy 2021    Dr Uzair Cantor  Impression:            - Normal oropharynx.                         - Hiatal hernia.                         - Sterling's esophagus. Treated with radiofrequency                         ablation.                         - Normal stomach.                         - Normal examined duodenum.                         - No specimens collected.     Hx of cervical spine surgery 2018    Hyperlipidemia     Hypertension     Lupus (systemic lupus erythematosus)     discoid    Osteoarthritis     Osteopenia     Syncope     Tremor 2005    essential     Past Surgical History:   Procedure Laterality Date    APPENDECTOMY  2007    removed during colon surgery    CERVICAL FUSION      CERVICAL SPINE SURGERY      Rods and screws.     SECTION      COLON SURGERY      hemicolectomy    COLON SURGERY  2008    repair    COLONOSCOPY N/A 2018    Procedure: COLONOSCOPY;  Surgeon: Maverick Esquivel MD;  Location: [facility];  Service: Endoscopy;  Laterality: N/A; repeat in 5 years for surveillance    COLONOSCOPY N/A 2023    Procedure: COLONOSCOPY;  Surgeon: Uzair Cantor MD;  Location: [facility];  Service: Endoscopy;  Laterality: N/A;    CYSTOSCOPY N/A 2021    Procedure: CYSTOSCOPY;  Surgeon: Allison Miranda MD;  Location: [facility];  Service: Urology;  Laterality: N/A;    ESOPHAGOGASTRODUODENOSCOPY N/A 2019    Procedure: EGD (ESOPHAGOGASTRODUODENOSCOPY);  Surgeon: Monika Steele MD;  Location: [facility];  Service: Endoscopy;  Laterality: N/A;    ESOPHAGOGASTRODUODENOSCOPY  2019    with RFA and biopsies     ESOPHAGOGASTRODUODENOSCOPY N/A 6/4/2019    Procedure: EGD (ESOPHAGOGASTRODUODENOSCOPY);  Surgeon: Uzair Cantor MD;  Location: [facility];  Service: Endoscopy;  Laterality: N/A;  n+v with anesthesia    ESOPHAGOGASTRODUODENOSCOPY N/A 8/21/2019    Procedure: EGD (ESOPHAGOGASTRODUODENOSCOPY);  Surgeon: Uzair Cantor MD;  Location: [facility];  Service: Endoscopy;  Laterality: N/A;    ESOPHAGOGASTRODUODENOSCOPY N/A 10/7/2019    Procedure: EGD (ESOPHAGOGASTRODUODENOSCOPY);  Surgeon: Uzair Cantor MD;  Location: [facility];  Service: Endoscopy;  Laterality: N/A;    ESOPHAGOGASTRODUODENOSCOPY N/A 2/10/2020    Procedure: EGD (ESOPHAGOGASTRODUODENOSCOPY);  Surgeon: Uzair Cantor MD;  Location: [facility];  Service: Endoscopy;  Laterality: N/A;  Requests earliest procedure time    ESOPHAGOGASTRODUODENOSCOPY N/A 8/17/2020    Procedure: EGD (ESOPHAGOGASTRODUODENOSCOPY);  Surgeon: Uzair Cantor MD;  Location: [facility];  Service: Endoscopy;  Laterality: N/A;  BARRETTS    ESOPHAGOGASTRODUODENOSCOPY N/A 4/7/2021    Procedure: ESOPHAGOGASTRODUODENOSCOPY (EGD);  Surgeon: Uzair Cantor MD;  Location: [facility];  Service: Endoscopy;  Laterality: N/A;  Needs Rapid Covid MP    ESOPHAGOGASTRODUODENOSCOPY N/A 11/30/2021    Procedure: EGD (ESOPHAGOGASTRODUODENOSCOPY);  Surgeon: Uzair Cantor MD;  Location: [facility];  Service: Endoscopy;  Laterality: N/A;    ESOPHAGOGASTRODUODENOSCOPY N/A 6/27/2022    Procedure: EGD (ESOPHAGOGASTRODUODENOSCOPY);  Surgeon: Uzair Cantor MD;  Location: [facility];  Service: Endoscopy;  Laterality: N/A;  5/19/22: fully vaccinated. instructions mailed-[place]    loop recorder only    ESOPHAGOGASTRODUODENOSCOPY N/A 8/7/2023    Procedure: EGD (ESOPHAGOGASTRODUODENOSCOPY);  Surgeon: Uzair Cantor MD;  Location: [facility];  Service: Endoscopy;  Laterality: N/A;    HYSTERECTOMY      ovaries remain    INSERTION OF IMPLANTABLE LOOP RECORDER N/A 7/17/2020    Procedure: INSERTION,  IMPLANTABLE LOOP RECORDER (MEDTRONIC);  Surgeon: Karyna Vasques MD;  Location: [facility] CATH/EP LAB;  Service: Cardiology;  Laterality: N/A;    SPLENECTOMY, TOTAL  2007    Injured during surgery and removed    UPPER GASTROINTESTINAL ENDOSCOPY  2007    GERD & hiatal hernia per patient report     Family History   Problem Relation Age of Onset    Lymphoma Mother         Brain    Colon cancer Father         diagnosed in his 60's    Lung cancer Father     Cancer Brother         prostate    Lung cancer Brother     Lung cancer Brother         metastatic from prostate    Prostate cancer Brother     Heart disease Brother         valvular disease    Hyperlipidemia Brother     Eczema Neg Hx     Lupus Neg Hx     Psoriasis Neg Hx     Melanoma Neg Hx     Crohn's disease Neg Hx     Ulcerative colitis Neg Hx     Stomach cancer Neg Hx     Esophageal cancer Neg Hx      Social History     Tobacco Use    Smoking status: Every Day     Current packs/day: 1.00     Average packs/day: 1 pack/day for 35.0 years (35.0 ttl pk-yrs)     Types: Cigarettes     Passive exposure: Current    Smokeless tobacco: Never   Substance Use Topics    Alcohol use: No    Drug use: No     Review of Systems   Gastrointestinal:  Positive for nausea and vomiting.   Skin:  Positive for wound.   Neurological:  Positive for weakness.   All other systems reviewed and are negative.      Physical Exam     Initial Vitals [10/10/23 0946]   BP Pulse Resp Temp SpO2   116/62 69 18 97.4 °F (36.3 °C) 95 %      MAP       --         Physical Exam    Nursing note and vitals reviewed.  Constitutional: She appears well-developed and well-nourished. She is not diaphoretic. No distress.   HENT:   Head: Normocephalic.   Eyes: Conjunctivae are normal.   Neck: Neck supple.   Normal range of motion.  Cardiovascular:  Normal rate and regular rhythm.           Pulmonary/Chest: Breath sounds normal. No respiratory distress.   Abdominal: She exhibits no distension. There is no abdominal  tenderness.   Musculoskeletal:         General: Tenderness present. No edema.      Cervical back: Normal range of motion and neck supple.      Comments: Left lower leg tenderness to palpation     Neurological: She is alert. She has normal strength. GCS eye subscore is 4. GCS verbal subscore is 5. GCS motor subscore is 6.   Skin: Skin is warm and dry.   8 cm anterior left upper leg laceration   Psychiatric: She has a normal mood and affect.         ED Course   Lac Repair    Date/Time: 10/10/2023 1:52 PM    Performed by: Mk Schmidt MD  Authorized by: Mk Schmidt MD    Consent:     Consent obtained:  Verbal    Consent given by:  Patient    Risks discussed:  Infection, pain and poor cosmetic result    Alternatives discussed:  No treatment  Universal protocol:     Procedure explained and questions answered to patient or proxy's satisfaction: yes      Patient identity confirmed:  Verbally with patient  Anesthesia:     Anesthesia method:  Local infiltration    Local anesthetic:  Lidocaine 1% w/o epi  Laceration details:     Location:  Leg    Leg location:  L lower leg    Length (cm):  8  Exploration:     Wound exploration: entire depth of wound visualized      Wound extent: no muscle damage noted, no nerve damage noted, no tendon damage noted, no underlying fracture noted and no vascular damage noted    Treatment:     Area cleansed with:  Saline    Amount of cleaning:  Extensive    Irrigation solution:  Sterile saline    Irrigation volume:  500 ml  Skin repair:     Repair method:  Sutures    Suture size:  4-0    Suture material:  Nylon    Suture technique:  Simple interrupted    Number of sutures:  8  Approximation:     Approximation:  Close  Repair type:     Repair type:  Simple  Post-procedure details:     Dressing:  Sterile dressing    Procedure completion:  Tolerated well, no immediate complications    Labs Reviewed   CBC W/ AUTO DIFFERENTIAL - Abnormal; Notable for the following components:       Result  Value    WBC 14.50 (*)     RBC 3.66 (*)     Hemoglobin 11.6 (*)     Hematocrit 34.8 (*)     MCH 31.7 (*)     RDW 14.9 (*)     Gran # (ANC) 11.2 (*)     Immature Grans (Abs) 0.07 (*)     Gran % 77.5 (*)     Lymph % 15.8 (*)     All other components within normal limits   COMPREHENSIVE METABOLIC PANEL - Abnormal; Notable for the following components:    Glucose 114 (*)     Calcium 8.6 (*)     Anion Gap 4 (*)     All other components within normal limits   URINALYSIS, REFLEX TO URINE CULTURE - Abnormal; Notable for the following components:    Ketones, UA Trace (*)     All other components within normal limits    Narrative:     Specimen Source->Urine   LIPASE   TROPONIN I HIGH SENSITIVITY   B-TYPE NATRIURETIC PEPTIDE     EKG Readings: (Independently Interpreted)   Sinus rhythm.  Fifty-six beats/minute.  Normal axis.  No ST elevation.     ECG Results              EKG 12-lead (In process)  Result time 10/10/23 12:03:46      In process by Interface, Lab In [facility] (10/10/23 12:03:46)                   Narrative:    Test Reason : W19.XXXA,    Vent. Rate : 056 BPM     Atrial Rate : 056 BPM     P-R Int : 166 ms          QRS Dur : 094 ms      QT Int : 472 ms       P-R-T Axes : 043 -03 024 degrees     QTc Int : 455 ms    Sinus bradycardia  Otherwise normal ECG  When compared with ECG of 15-ABDI-2023 21:56,  Minimal criteria for Anterior infarct are no longer Present  Nonspecific T wave abnormality now evident in Inferior leads    Referred By: [email or web address]   SELF           Confirmed By:                       In process by Interface, Lab In [facility] (10/10/23 10:00:54)                   Narrative:    Test Reason : W19.XXXA,    Vent. Rate : 056 BPM     Atrial Rate : 056 BPM     P-R Int : 166 ms          QRS Dur : 094 ms      QT Int : 472 ms       P-R-T Axes : 043 -03 024 degrees     QTc Int : 455 ms    Sinus bradycardia  Otherwise normal ECG  When compared with ECG of 15-ABDI-2023 21:56,  Minimal criteria for Anterior infarct are no  longer Present  Nonspecific T wave abnormality now evident in Inferior leads    Referred By: [email or web address]   SELF           Confirmed By:                                   Imaging Results              CT Head Without Contrast (Final result)  Result time 10/10/23 13:01:14      Final result by Александр Diaz MD (10/10/23 13:01:14)                   Narrative:    CMS MANDATED QUALITY DATA-CT RADIATION DOSE-436  All CT scans at this facility dose modulation, iterative reconstruction, and or weight-based dosing when appropriate to reduce radiation dose to as low as reasonably achievable.    HISTORY: Head trauma, minor (Age >= 65y)  weakness and nausea.    FINDINGS: Comparison to multiple prior exams. There is no acute intracranial hemorrhage, with no mass effect or abnormal extra-axial fluid. Mild scattered hypoattenuating foci involve the white matter, with gray-white differentiation maintained. Right basal ganglia hypodensity suggesting remote infarct is unchanged.    There is mild generalized prominence of the cortical sulci and ventricles. The cerebellum and brainstem are unremarkable. The visualized paranasal sinuses and mastoid air cells are clear. There is no acute calvarial fracture or scalp hematoma.    IMPRESSION:  1. No acute intracranial abnormality.  2. Stable mild chronic microvascular ischemic changes in the white matter, and remote lacunar infarct in the right basal ganglia.    Electronically signed by:  Александр Diaz MD  10/10/2023 01:01 PM CDT Workstation: [id number]                                     X-Ray Tibia Fibula 2 View Left (Final result)  Result time 10/10/23 10:45:59      Final result by Leonidas Wild MD (10/10/23 10:45:59)                   Narrative:    Reason: Fall.    FINDINGS:    AP and lateral radiographs of the left tibia and fibula show no fracture, dislocation, or destructive osseous lesion. Soft tissues are unremarkable. Large calcaneal spur noted.    IMPRESSION:    No acute osseous  abnormality.    Electronically signed by:  Leonidas Wild DO  10/10/2023 10:45 AM CDT Workstation: [id number]                                     X-Ray Chest AP Portable (Final result)  Result time 10/10/23 10:45:03      Final result by Leonidas Wild MD (10/10/23 10:45:03)                   Narrative:    Reason: Weakness.    FINDINGS:    Portable chest with comparison chest x-ray January 15, 2023 show normal cardiomediastinal silhouette. Left loop recorder device noted.  Lungs are clear. Pulmonary vasculature is normal. No acute osseous abnormality.    IMPRESSION:    No acute cardiopulmonary abnormality.    Electronically signed by:  Leonidas Wild DO  10/10/2023 10:45 AM [employer] Workstation: [id number]                                     Medications   LIDOcaine (PF) 10 mg/ml (1%) injection 100 mg (has no administration in time range)   Tdap vaccine injection 0.5 mL (0.5 mLs Intramuscular Given 10/10/23 1040)   ondansetron injection 4 mg (4 mg Intravenous Given 10/10/23 1010)     Medical Decision Making  73-year-old with generalized weakness, nausea, bradycardia after a mechanical fall prior to arrival.  Heart rate is normal upon arrival to the ED. she is normotensive and has no neurologic deficits.  Only evidence of trauma is her left lower leg laceration on exam.  EKG shows normal sinus rhythm with no ischemic changes.  X-ray shows no evidence of fracture.  CT head is negative for acute process.  CXR is clear.  She has a mild leukocytosis of 14.  H&H is normal.  Renal function and electrolytes are unremarkable.  Troponin is normal.  UA shows no infection.  Upon re-evaluation patient says she feels okay and would like to go home.  Vital signs remained stable..  Her laceration was irrigated and repaired and her tetanus was updated.  Patient will be discharged home with return precautions.    Amount and/or Complexity of Data Reviewed  Labs: ordered.  Radiology: ordered.    Risk  Prescription drug management.                                Clinical Impression:   Final diagnoses:  [W19.XXXA] Fall  [R53.1] Weakness  [S81.812A] Laceration of left leg, initial encounter (Primary)        ED Disposition Condition    Discharge Stable          ED Prescriptions       Medication Sig Dispense Start Date End Date Auth. Provider    cefUROXime (CEFTIN) 500 MG tablet Take 1 tablet (500 mg total) by mouth every 12 (twelve) hours. for 7 days 14 tablet 10/10/2023 10/17/2023 Mk Schmidt MD          Follow-up Information       Follow up With Specialties Details Why Contact Info    Gavi Mishra MD Family Medicine In 2 weeks For suture removal [address] [facility] [id number]  [phone]               Mk Schmidt MD  10/10/23 [id number]       Mk Schmidt MD  10/10/23 [id number]

## 2023-10-10 NOTE — DISCHARGE INSTRUCTIONS
Return to the ER for worsening symptoms or for any other concerns.  Follow-up with your PCP for re-evaluation.

## 2023-10-11 ENCOUNTER — PATIENT OUTREACH (OUTPATIENT)
Dept: EMERGENCY MEDICINE | Facility: HOSPITAL | Age: 73
End: 2023-10-11

## 2023-10-11 NOTE — PROGRESS NOTES
Spoke with patient to follow up from ED visit for arm laceration. Patient advised in the ED to follow up with PCP in 2 weeks for suture removal. I have scheduled a follow up with GUANACO Madrid for 10/26/23 at 10:30 am. Patient will receive an appointment reminder.

## 2023-10-25 ENCOUNTER — PATIENT OUTREACH (OUTPATIENT)
Dept: EMERGENCY MEDICINE | Facility: HOSPITAL | Age: 73
End: 2023-10-25

## 2023-10-25 NOTE — PROGRESS NOTES
ED navigator reminded patient about her appointment for tomorrow 10/26/23 at 10:30 am with NP Uvaldo Madrid through voicemail, as she did not answer the call. ED navigator to close encounter at this time.

## 2023-10-26 ENCOUNTER — OFFICE VISIT (OUTPATIENT)
Dept: FAMILY MEDICINE | Facility: CLINIC | Age: 73
End: 2023-10-26
Payer: MEDICARE

## 2023-10-26 ENCOUNTER — HOSPITAL ENCOUNTER (OUTPATIENT)
Dept: RADIOLOGY | Facility: CLINIC | Age: 73
Discharge: HOME | End: 2023-10-26
Attending: NURSE PRACTITIONER
Payer: MEDICARE

## 2023-10-26 VITALS
DIASTOLIC BLOOD PRESSURE: 82 MMHG | WEIGHT: 144.19 LBS | BODY MASS INDEX: 27.22 KG/M2 | OXYGEN SATURATION: 98 % | SYSTOLIC BLOOD PRESSURE: 148 MMHG | HEIGHT: 61 IN | HEART RATE: 68 BPM | TEMPERATURE: 98 F
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
## 2023-10-26 DIAGNOSIS — S81.812D LACERATION OF LEFT LOWER EXTREMITY, SUBSEQUENT ENCOUNTER: Primary | ICD-10-CM

## 2023-10-26 PROCEDURE — 73562 X-RAY EXAM OF KNEE 3: CPT | Mod: TC,FY,PO,LT

## 2023-10-26 PROCEDURE — 1125F PR PAIN SEVERITY QUANTIFIED, PAIN PRESENT: ICD-10-PCS | Mod: CPTII,S$GLB,, | Performed by: NURSE PRACTITIONER

## 2023-10-26 PROCEDURE — 1160F PR REVIEW ALL MEDS BY PRESCRIBER/CLIN PHARMACIST DOCUMENTED: ICD-10-PCS | Mod: CPTII,S$GLB,, | Performed by: NURSE PRACTITIONER

## 2023-10-26 PROCEDURE — 3077F PR MOST RECENT SYSTOLIC BLOOD PRESSURE >= 140 MM HG: ICD-10-PCS | Mod: CPTII,S$GLB,, | Performed by: NURSE PRACTITIONER

## 2023-10-26 PROCEDURE — 1159F PR MEDICATION LIST DOCUMENTED IN MEDICAL RECORD: ICD-10-PCS | Mod: CPTII,S$GLB,, | Performed by: NURSE PRACTITIONER

## 2023-10-26 PROCEDURE — 3008F PR BODY MASS INDEX (BMI) DOCUMENTED: ICD-10-PCS | Mod: CPTII,S$GLB,, | Performed by: NURSE PRACTITIONER

## 2023-10-26 PROCEDURE — 73562 XR KNEE 3 VIEW LEFT: ICD-10-PCS | Mod: 26,LT,S$GLB, | Performed by: RADIOLOGY

## 2023-10-26 PROCEDURE — 3079F DIAST BP 80-89 MM HG: CPT | Mod: CPTII,S$GLB,, | Performed by: NURSE PRACTITIONER

## 2023-10-26 PROCEDURE — 3288F FALL RISK ASSESSMENT DOCD: CPT | Mod: CPTII,S$GLB,, | Performed by: NURSE PRACTITIONER

## 2023-10-26 PROCEDURE — 3008F BODY MASS INDEX DOCD: CPT | Mod: CPTII,S$GLB,, | Performed by: NURSE PRACTITIONER

## 2023-10-26 PROCEDURE — 1100F PTFALLS ASSESS-DOCD GE2>/YR: CPT | Mod: CPTII,S$GLB,, | Performed by: NURSE PRACTITIONER

## 2023-10-26 PROCEDURE — 1125F AMNT PAIN NOTED PAIN PRSNT: CPT | Mod: CPTII,S$GLB,, | Performed by: NURSE PRACTITIONER

## 2023-10-26 PROCEDURE — 73590 X-RAY EXAM OF LOWER LEG: CPT | Mod: 26,LT,S$GLB, | Performed by: RADIOLOGY

## 2023-10-26 PROCEDURE — 1159F MED LIST DOCD IN RCRD: CPT | Mod: CPTII,S$GLB,, | Performed by: NURSE PRACTITIONER

## 2023-10-26 PROCEDURE — 99213 OFFICE O/P EST LOW 20 MIN: CPT | Mod: S$GLB,,, | Performed by: NURSE PRACTITIONER

## 2023-10-26 PROCEDURE — 99999 PR PBB SHADOW E&M-EST. PATIENT-LVL V: CPT | Mod: PBBFAC,,, | Performed by: NURSE PRACTITIONER

## 2023-10-26 PROCEDURE — 1100F PR PT FALLS ASSESS DOC 2+ FALLS/FALL W/INJURY/YR: ICD-10-PCS | Mod: CPTII,S$GLB,, | Performed by: NURSE PRACTITIONER

## 2023-10-26 PROCEDURE — 1160F RVW MEDS BY RX/DR IN RCRD: CPT | Mod: CPTII,S$GLB,, | Performed by: NURSE PRACTITIONER

## 2023-10-26 PROCEDURE — 73562 X-RAY EXAM OF KNEE 3: CPT | Mod: 26,LT,S$GLB, | Performed by: RADIOLOGY

## 2023-10-26 PROCEDURE — 3077F SYST BP >= 140 MM HG: CPT | Mod: CPTII,S$GLB,, | Performed by: NURSE PRACTITIONER

## 2023-10-26 PROCEDURE — 3288F PR FALLS RISK ASSESSMENT DOCUMENTED: ICD-10-PCS | Mod: CPTII,S$GLB,, | Performed by: NURSE PRACTITIONER

## 2023-10-26 PROCEDURE — 73590 X-RAY EXAM OF LOWER LEG: CPT | Mod: TC,FY,PO,LT

## 2023-10-26 PROCEDURE — 99999 PR PBB SHADOW E&M-EST. PATIENT-LVL V: ICD-10-PCS | Mod: PBBFAC,,, | Performed by: NURSE PRACTITIONER

## 2023-10-26 PROCEDURE — 3079F PR MOST RECENT DIASTOLIC BLOOD PRESSURE 80-89 MM HG: ICD-10-PCS | Mod: CPTII,S$GLB,, | Performed by: NURSE PRACTITIONER

## 2023-10-26 PROCEDURE — 73590 XR TIBIA FIBULA 2 VIEW LEFT: ICD-10-PCS | Mod: 26,LT,S$GLB, | Performed by: RADIOLOGY

## 2023-10-26 PROCEDURE — 99213 PR OFFICE/OUTPT VISIT, EST, LEVL III, 20-29 MIN: ICD-10-PCS | Mod: S$GLB,,, | Performed by: NURSE PRACTITIONER

## 2023-10-26 RX ORDER — MUPIROCIN 20 MG/G
OINTMENT TOPICAL 3 TIMES DAILY
Qty: 30 G | Refills: 0 | Status: SHIPPED | OUTPATIENT
Start: 2023-10-26

## 2023-10-26 RX ORDER — CEPHALEXIN 500 MG/1
500 CAPSULE ORAL 4 TIMES DAILY
Qty: 28 CAPSULE | Refills: 0 | Status: SHIPPED | OUTPATIENT
Start: 2023-10-26 | End: 2023-11-16

## 2023-10-26 NOTE — PROGRESS NOTES
"Subjective:       Patient ID: Kristin Guerra is a 73 y.o. female.    Chief Complaint: ER follow up     HPI   72 y/o female patient with medical problems listed below presents for suture removal. Patient presented to ED via EMS on 10/10/2023 for generalized weakness and left lower leg laceration. Patient underwent EKG, X-ray, CT Head, CXR which were unremarkable. Labs in ED showed mild leukocytosis of 14. Left lower leg laceration was irrigated and repaired. She was discharged with Ceftin for 7 days. She states took Ceftin for a few days and then stopped since it made her stomach upset. She states she has constant pain behind left knee and left lower leg. She currently takes tylenol 650 mg bid daily as needed for the pain with mild relief. Denies wound discharge, fever, chills, generalized body ache. States has follow up appt with pcp on 11/16.     ED Note 10/10/2023  "This is a 73-year-old female, presenting via EMS for evaluation of generalized weakness.  Patient says that she felt normal this morning, tripped and fell in her front yd while walking her dog.  She says since then she is been very weak and has been hurting all over.  She could not stand back up and crawled back inside.  She says she is not sure if she hit her head or not.  She feels very weak all over.  She has a left lower leg laceration that is hurting her.  Unknown last tetanus.  She also threw up prior to EMS arrival.  EMS reports that she was bradycardic in the 40s upon their arrival but this improved EN route.  Patient denies any chest pain or abdominal pain.  She denies headache, says that she has chronic neck pain all the time but no new pain."     Patient Active Problem List   Diagnosis    Hx of colon cancer, stage II    Hx of diverticulitis of colon    Cataract    Herniation of intervertebral disc of cervical region    Cervical radiculopathy    Degeneration of intervertebral disc of cervical region    history of skin lupus    " "Gastroesophageal reflux disease    Tremor, coarse    Essential hypertension, labile    Hiatal hernia    Osteoarthritis    RLS (restless legs syndrome)    Panic attack    Heart murmur    Smoking    GERD (gastroesophageal reflux disease)    Sterling's esophagus with dysplasia    Tremor, essential    Sinus problem    S/P splenectomy    Other chronic postprocedural pain    Insomnia due to medical condition    Anxiety    Mixed dyslipidemia    Statin intolerance    Statin myopathy    Vitamin D deficiency    Near syncope    Syncope and collapse    History of endoscopy    Incontinence in female    Bradycardia    Seborrheic keratosis, inflamed    Microhematuria    Orthostatic hypotension    History of loop recorder    Depression, recurrent    Centrilobular emphysema    Lung nodule, multiple    Aortic atherosclerosis    Chronic obstructive pulmonary disease      Review of patient's allergies indicates:   Allergen Reactions    Doxycycline Itching and Swelling     Prescribed 5/2021, took one dose, describes lip swelling, and "itch all over"    Mirtazapine Other (See Comments)     Hyperactivity.    Morphine Swelling and Rash     Other reaction(s): swelling in limbs    Lexapro [escitalopram oxalate] Other (See Comments)     Worsening tremor.  Sedation.    Primidone Other (See Comments)     Syncope.  Other reaction(s): dizziness,hypotension  Other reaction(s): Unknown  Other reaction(s): Unknown    Simvastatin Nausea And Vomiting and Other (See Comments)     Weakness.  Other reaction(s): nausea,vomiting    Beta-adrenergic agents Other (See Comments)     Low blood pressure and fainted.     Metoprolol succinate Other (See Comments)    Prochlorperazine Other (See Comments)     Other reaction(s): weakness    Topiramate     Compazine [prochlorperazine edisylate] Nausea Only    Tramadol Nausea And Vomiting     Past Surgical History:   Procedure Laterality Date    APPENDECTOMY  2007    removed during colon surgery    CERVICAL FUSION      " CERVICAL SPINE SURGERY      Rods and screws.     SECTION      COLON SURGERY  2007    hemicolectomy    COLON SURGERY  2008    repair    COLONOSCOPY N/A 2018    Procedure: COLONOSCOPY;  Surgeon: Maverick Esquivel MD;  Location: [facility];  Service: Endoscopy;  Laterality: N/A; repeat in 5 years for surveillance    COLONOSCOPY N/A 2023    Procedure: COLONOSCOPY;  Surgeon: Uzair Cantor MD;  Location: [facility];  Service: Endoscopy;  Laterality: N/A;    CYSTOSCOPY N/A 2021    Procedure: CYSTOSCOPY;  Surgeon: Allison Miranda MD;  Location: [facility] OR;  Service: Urology;  Laterality: N/A;    ESOPHAGOGASTRODUODENOSCOPY N/A 2019    Procedure: EGD (ESOPHAGOGASTRODUODENOSCOPY);  Surgeon: Monika Steele MD;  Location: [facility];  Service: Endoscopy;  Laterality: N/A;    ESOPHAGOGASTRODUODENOSCOPY  2019    with RFA and biopsies    ESOPHAGOGASTRODUODENOSCOPY N/A 2019    Procedure: EGD (ESOPHAGOGASTRODUODENOSCOPY);  Surgeon: Uzair Cantor MD;  Location: [facility];  Service: Endoscopy;  Laterality: N/A;  n+v with anesthesia    ESOPHAGOGASTRODUODENOSCOPY N/A 2019    Procedure: EGD (ESOPHAGOGASTRODUODENOSCOPY);  Surgeon: Uzair Cantor MD;  Location: [facility];  Service: Endoscopy;  Laterality: N/A;    ESOPHAGOGASTRODUODENOSCOPY N/A 10/7/2019    Procedure: EGD (ESOPHAGOGASTRODUODENOSCOPY);  Surgeon: Uzair Cantor MD;  Location: [facility];  Service: Endoscopy;  Laterality: N/A;    ESOPHAGOGASTRODUODENOSCOPY N/A 2/10/2020    Procedure: EGD (ESOPHAGOGASTRODUODENOSCOPY);  Surgeon: Uzair Cantor MD;  Location: [facility];  Service: Endoscopy;  Laterality: N/A;  Requests earliest procedure time    ESOPHAGOGASTRODUODENOSCOPY N/A 2020    Procedure: EGD (ESOPHAGOGASTRODUODENOSCOPY);  Surgeon: Uzair Cantor MD;  Location: [facility];  Service: Endoscopy;  Laterality: N/A;  BARRETTS    ESOPHAGOGASTRODUODENOSCOPY N/A 2021    Procedure: ESOPHAGOGASTRODUODENOSCOPY  (EGD);  Surgeon: Uzair Cantor MD;  Location: [facility] ENDO;  Service: Endoscopy;  Laterality: N/A;  Needs Rapid Covid MP    ESOPHAGOGASTRODUODENOSCOPY N/A 11/30/2021    Procedure: EGD (ESOPHAGOGASTRODUODENOSCOPY);  Surgeon: Uzair Cantor MD;  Location: [facility] ENDO;  Service: Endoscopy;  Laterality: N/A;    ESOPHAGOGASTRODUODENOSCOPY N/A 6/27/2022    Procedure: EGD (ESOPHAGOGASTRODUODENOSCOPY);  Surgeon: Uzair Cantor MD;  Location: [facility] ENDO;  Service: Endoscopy;  Laterality: N/A;  5/19/22: fully vaccinated. instructions mailed-[place]    loop recorder only    ESOPHAGOGASTRODUODENOSCOPY N/A 8/7/2023    Procedure: EGD (ESOPHAGOGASTRODUODENOSCOPY);  Surgeon: Uzair Cantor MD;  Location: [facility] ENDO;  Service: Endoscopy;  Laterality: N/A;    HYSTERECTOMY      ovaries remain    INSERTION OF IMPLANTABLE LOOP RECORDER N/A 7/17/2020    Procedure: INSERTION, IMPLANTABLE LOOP RECORDER ([facility]);  Surgeon: Karyna Vasques MD;  Location: [facility] CATH/EP LAB;  Service: Cardiology;  Laterality: N/A;    SPLENECTOMY, TOTAL  2007    Injured during surgery and removed    UPPER GASTROINTESTINAL ENDOSCOPY  2007    GERD & hiatal hernia per patient report        Current Outpatient Medications:     albuterol-ipratropium (DUO-NEB) 2.5 mg-0.5 mg/3 mL nebulizer solution, Take 3 mLs by nebulization every 6 (six) hours as needed for Wheezing or Shortness of Breath. Rescue, Disp: 120 each, Rfl: 5    aluminum & magnesium hydroxide-simethicone (MYLANTA MAX STRENGTH) 400-400-40 mg/5 mL suspension, Take 5 mLs by mouth every 6 (six) hours as needed for Indigestion. , Disp: , Rfl:     amLODIPine (NORVASC) 5 MG tablet, TAKE 1 TABLET(5 MG) BY MOUTH EVERY DAY, Disp: 90 tablet, Rfl: 0    azelastine (ASTELIN) 137 mcg (0.1 %) nasal spray, 1 SPRAY BY NASAL ROUTE TWICE DAILY, Disp: 90 mL, Rfl: 0    calcitRIOL (ROCALTROL) 0.25 MCG Cap, Take 1 capsule (0.25 mcg total) by mouth once daily., Disp: 90 capsule, Rfl: 3    clobetasoL (TEMOVATE) 0.05 %  "cream, Apply to feet daily to bid prn flare, Disp: 30 g, Rfl: 2    clonazePAM (KLONOPIN) 0.5 MG tablet, TAKE 1/2 TABLET(0.25 MG) BY MOUTH TWICE DAILY AS NEEDED FOR ANXIETY OR INSOMNIA, Disp: 30 tablet, Rfl: 0    esomeprazole (NEXIUM) 40 MG capsule, TAKE ONE CAPSULE BY MOUTH TWICE DAILY, Disp: 180 capsule, Rfl: 3    ezetimibe (ZETIA) 10 mg tablet, TAKE 1 TABLET(10 MG) BY MOUTH EVERY DAY, Disp: 90 tablet, Rfl: 3    fluticasone propionate (FLONASE) 50 mcg/actuation nasal spray, 1 spray (50 mcg total) by Each Nostril route 2 (two) times daily., Disp: 48 g, Rfl: 0    gabapentin (NEURONTIN) 300 MG capsule, Take 1 capsule (300 mg total) by mouth every evening., Disp: 30 capsule, Rfl: 11    mirabegron (MYRBETRIQ) 25 mg Tb24 ER tablet, Take 1 tablet (25 mg total) by mouth once daily., Disp: 90 tablet, Rfl: 3    mupirocin (BACTROBAN) 2 % ointment, 3 (three) times daily. Apply to affected area, Disp: , Rfl:     polyethylene glycol (GLYCOLAX) 17 gram PwPk, Take 17 g by mouth daily as needed., Disp: , Rfl:     tiotropium bromide (SPIRIVA RESPIMAT) 2.5 mcg/actuation inhaler, Inhale 2 puffs into the lungs Daily. Controller, Disp: 4 g, Rfl: 11    cephALEXin (KEFLEX) 500 MG capsule, Take 1 capsule (500 mg total) by mouth 4 (four) times daily. for 7 days, Disp: 28 capsule, Rfl: 0    mupirocin (BACTROBAN) 2 % ointment, Apply topically 3 (three) times daily., Disp: 30 g, Rfl: 0    Review of Systems   Constitutional:  Negative for chills and fever.   Respiratory:  Negative for cough, chest tightness and shortness of breath.    Cardiovascular:  Negative for chest pain and palpitations.   Gastrointestinal:  Negative for abdominal pain.   Musculoskeletal:         Left knee and lower leg pain   Neurological:  Negative for dizziness and headaches.       Objective:   BP (!) 148/82 (BP Location: Right arm, Patient Position: Sitting, BP Method: Medium (Manual))   Pulse 68   Temp 97.9 °F (36.6 °C) (Oral)   Ht 5' 1" (1.549 m)   Wt 65.4 kg " (144 lb 2.9 oz)   SpO2 98%   BMI 27.24 kg/m²      Physical Exam  Constitutional:       General: She is not in acute distress.     Appearance: Normal appearance.   HENT:      Head: Atraumatic.   Cardiovascular:      Rate and Rhythm: Normal rate and regular rhythm.      Pulses: Normal pulses.      Heart sounds: Normal heart sounds.   Pulmonary:      Effort: Pulmonary effort is normal.      Breath sounds: Normal breath sounds.   Abdominal:      General: Abdomen is flat. Bowel sounds are normal.      Palpations: Abdomen is soft.      Tenderness: There is no abdominal tenderness.   Skin:     Comments: + 8 stitches right below left patella with mild eryhema   Neurological:      Mental Status: She is oriented to person, place, and time.         Assessment:       1. Laceration of left lower extremity, subsequent encounter    2. Fall, subsequent encounter        Plan:       1. Laceration of left lower extremity, subsequent encounter  - Removed 8 stitches with no complications   - cephALEXin (KEFLEX) 500 MG capsule; Take 1 capsule (500 mg total) by mouth 4 (four) times daily. for 7 days  Dispense: 28 capsule; Refill: 0  - mupirocin (BACTROBAN) 2 % ointment; Apply topically 3 (three) times daily.  Dispense: 30 g; Refill: 0    2. Fall, subsequent encounter  - X-Ray Knee 3 View Left; Future  - X-Ray Tibia Fibula 2 View Left; Future     Patient with be reevaluated in  as scheduled  or sooner mor Goldne NP

## 2023-10-31 DIAGNOSIS — M79.662 PAIN IN LEFT LOWER LEG: Primary | ICD-10-CM

## 2023-11-08 RX ORDER — ESOMEPRAZOLE MAGNESIUM 40 MG/1
CAPSULE, DELAYED RELEASE ORAL
Qty: 180 CAPSULE | Refills: 3 | Status: SHIPPED | OUTPATIENT
Start: 2023-11-08

## 2023-11-09 ENCOUNTER — LAB VISIT (OUTPATIENT)
Dept: LAB | Facility: HOSPITAL | Age: 73
End: 2023-11-09
Attending: FAMILY MEDICINE
Payer: OTHER GOVERNMENT

## 2023-11-09 DIAGNOSIS — D64.9 NORMOCYTIC ANEMIA: ICD-10-CM

## 2023-11-09 DIAGNOSIS — E78.2 MIXED DYSLIPIDEMIA: ICD-10-CM

## 2023-11-09 DIAGNOSIS — E55.9 VITAMIN D DEFICIENCY: ICD-10-CM

## 2023-11-09 LAB
25(OH)D3+25(OH)D2 SERPL-MCNC: 21 NG/ML (ref 30–96)
ALBUMIN SERPL BCP-MCNC: 3.5 G/DL (ref 3.5–5.2)
ALP SERPL-CCNC: 87 U/L (ref 55–135)
ALT SERPL W/O P-5'-P-CCNC: 12 U/L (ref 10–44)
ANION GAP SERPL CALC-SCNC: 14 MMOL/L (ref 8–16)
AST SERPL-CCNC: 17 U/L (ref 10–40)
BASOPHILS # BLD AUTO: 0.12 K/UL (ref 0–0.2)
BASOPHILS NFR BLD: 1.4 % (ref 0–1.9)
BILIRUB SERPL-MCNC: 0.4 MG/DL (ref 0.1–1)
BUN SERPL-MCNC: 17 MG/DL (ref 8–23)
CALCIUM SERPL-MCNC: 9.2 MG/DL (ref 8.7–10.5)
CHLORIDE SERPL-SCNC: 109 MMOL/L (ref 95–110)
CHOLEST SERPL-MCNC: 202 MG/DL (ref 120–199)
CHOLEST/HDLC SERPL: 4.3 {RATIO} (ref 2–5)
CO2 SERPL-SCNC: 21 MMOL/L (ref 23–29)
CREAT SERPL-MCNC: 0.7 MG/DL (ref 0.5–1.4)
DIFFERENTIAL METHOD: ABNORMAL
EOSINOPHIL # BLD AUTO: 0.4 K/UL (ref 0–0.5)
EOSINOPHIL NFR BLD: 5.1 % (ref 0–8)
ERYTHROCYTE [DISTWIDTH] IN BLOOD BY AUTOMATED COUNT: 14.9 % (ref 11.5–14.5)
EST. GFR  (NO RACE VARIABLE): >60 ML/MIN/1.73 M^2
FERRITIN SERPL-MCNC: 39 NG/ML (ref 20–300)
GLUCOSE SERPL-MCNC: 69 MG/DL (ref 70–110)
HCT VFR BLD AUTO: 38 % (ref 37–48.5)
HDLC SERPL-MCNC: 47 MG/DL (ref 40–75)
HDLC SERPL: 23.3 % (ref 20–50)
HGB BLD-MCNC: 12.6 G/DL (ref 12–16)
IMM GRANULOCYTES # BLD AUTO: 0.03 K/UL (ref 0–0.04)
IMM GRANULOCYTES NFR BLD AUTO: 0.3 % (ref 0–0.5)
IRON SERPL-MCNC: 88 UG/DL (ref 30–160)
LDLC SERPL CALC-MCNC: 139 MG/DL (ref 63–159)
LYMPHOCYTES # BLD AUTO: 3.4 K/UL (ref 1–4.8)
LYMPHOCYTES NFR BLD: 39.7 % (ref 18–48)
MCH RBC QN AUTO: 31.2 PG (ref 27–31)
MCHC RBC AUTO-ENTMCNC: 33.2 G/DL (ref 32–36)
MCV RBC AUTO: 94 FL (ref 82–98)
MONOCYTES # BLD AUTO: 0.8 K/UL (ref 0.3–1)
MONOCYTES NFR BLD: 8.8 % (ref 4–15)
NEUTROPHILS # BLD AUTO: 3.9 K/UL (ref 1.8–7.7)
NEUTROPHILS NFR BLD: 44.7 % (ref 38–73)
NONHDLC SERPL-MCNC: 155 MG/DL
NRBC BLD-RTO: 0 /100 WBC
PLATELET # BLD AUTO: 404 K/UL (ref 150–450)
PMV BLD AUTO: 10 FL (ref 9.2–12.9)
POTASSIUM SERPL-SCNC: 3.9 MMOL/L (ref 3.5–5.1)
PROT SERPL-MCNC: 7.6 G/DL (ref 6–8.4)
RBC # BLD AUTO: 4.04 M/UL (ref 4–5.4)
SATURATED IRON: 24 % (ref 20–50)
SODIUM SERPL-SCNC: 144 MMOL/L (ref 136–145)
TOTAL IRON BINDING CAPACITY: 361 UG/DL (ref 250–450)
TRANSFERRIN SERPL-MCNC: 244 MG/DL (ref 200–375)
TRIGL SERPL-MCNC: 80 MG/DL (ref 30–150)
WBC # BLD AUTO: 8.62 K/UL (ref 3.9–12.7)

## 2023-11-09 PROCEDURE — 80061 LIPID PANEL: CPT | Performed by: FAMILY MEDICINE

## 2023-11-09 PROCEDURE — 82306 VITAMIN D 25 HYDROXY: CPT | Performed by: FAMILY MEDICINE

## 2023-11-09 PROCEDURE — 82728 ASSAY OF FERRITIN: CPT | Performed by: FAMILY MEDICINE

## 2023-11-09 PROCEDURE — 83540 ASSAY OF IRON: CPT | Performed by: FAMILY MEDICINE

## 2023-11-09 PROCEDURE — 84466 ASSAY OF TRANSFERRIN: CPT | Performed by: FAMILY MEDICINE

## 2023-11-09 PROCEDURE — 85025 COMPLETE CBC W/AUTO DIFF WBC: CPT | Performed by: FAMILY MEDICINE

## 2023-11-09 PROCEDURE — 80053 COMPREHEN METABOLIC PANEL: CPT | Performed by: FAMILY MEDICINE

## 2023-11-09 PROCEDURE — 36415 COLL VENOUS BLD VENIPUNCTURE: CPT | Mod: PO | Performed by: FAMILY MEDICINE

## 2023-11-16 ENCOUNTER — OFFICE VISIT (OUTPATIENT)
Dept: FAMILY MEDICINE | Facility: CLINIC | Age: 73
End: 2023-11-16
Payer: MEDICARE

## 2023-11-16 VITALS
TEMPERATURE: 98 F | WEIGHT: 142.63 LBS | OXYGEN SATURATION: 96 % | SYSTOLIC BLOOD PRESSURE: 139 MMHG | HEART RATE: 74 BPM | HEIGHT: 61 IN | BODY MASS INDEX: 26.93 KG/M2 | RESPIRATION RATE: 12 BRPM | DIASTOLIC BLOOD PRESSURE: 80 MMHG

## 2023-11-16 DIAGNOSIS — R91.8 LUNG NODULE, MULTIPLE: ICD-10-CM

## 2023-11-16 DIAGNOSIS — J44.9 CHRONIC OBSTRUCTIVE PULMONARY DISEASE, UNSPECIFIED COPD TYPE: ICD-10-CM

## 2023-11-16 DIAGNOSIS — E55.9 VITAMIN D DEFICIENCY: ICD-10-CM

## 2023-11-16 DIAGNOSIS — I70.0 AORTIC ATHEROSCLEROSIS: ICD-10-CM

## 2023-11-16 DIAGNOSIS — Z23 FLU VACCINE NEED: ICD-10-CM

## 2023-11-16 DIAGNOSIS — K22.719 BARRETT'S ESOPHAGUS WITH DYSPLASIA: Chronic | ICD-10-CM

## 2023-11-16 DIAGNOSIS — I10 ESSENTIAL HYPERTENSION: Primary | Chronic | ICD-10-CM

## 2023-11-16 DIAGNOSIS — F41.1 GAD (GENERALIZED ANXIETY DISORDER): ICD-10-CM

## 2023-11-16 DIAGNOSIS — E78.2 MIXED DYSLIPIDEMIA: Chronic | ICD-10-CM

## 2023-11-16 PROCEDURE — 3288F FALL RISK ASSESSMENT DOCD: CPT | Mod: CPTII,S$GLB,, | Performed by: FAMILY MEDICINE

## 2023-11-16 PROCEDURE — 3008F BODY MASS INDEX DOCD: CPT | Mod: CPTII,S$GLB,, | Performed by: FAMILY MEDICINE

## 2023-11-16 PROCEDURE — 3079F DIAST BP 80-89 MM HG: CPT | Mod: CPTII,S$GLB,, | Performed by: FAMILY MEDICINE

## 2023-11-16 PROCEDURE — 99214 OFFICE O/P EST MOD 30 MIN: CPT | Mod: S$GLB,,, | Performed by: FAMILY MEDICINE

## 2023-11-16 PROCEDURE — 3075F PR MOST RECENT SYSTOLIC BLOOD PRESS GE 130-139MM HG: ICD-10-PCS | Mod: CPTII,S$GLB,, | Performed by: FAMILY MEDICINE

## 2023-11-16 PROCEDURE — 99999 PR PBB SHADOW E&M-EST. PATIENT-LVL III: ICD-10-PCS | Mod: PBBFAC,,, | Performed by: FAMILY MEDICINE

## 2023-11-16 PROCEDURE — 1160F PR REVIEW ALL MEDS BY PRESCRIBER/CLIN PHARMACIST DOCUMENTED: ICD-10-PCS | Mod: CPTII,S$GLB,, | Performed by: FAMILY MEDICINE

## 2023-11-16 PROCEDURE — 99999 PR PBB SHADOW E&M-EST. PATIENT-LVL III: CPT | Mod: PBBFAC,,, | Performed by: FAMILY MEDICINE

## 2023-11-16 PROCEDURE — 3008F PR BODY MASS INDEX (BMI) DOCUMENTED: ICD-10-PCS | Mod: CPTII,S$GLB,, | Performed by: FAMILY MEDICINE

## 2023-11-16 PROCEDURE — 1100F PTFALLS ASSESS-DOCD GE2>/YR: CPT | Mod: CPTII,S$GLB,, | Performed by: FAMILY MEDICINE

## 2023-11-16 PROCEDURE — 1100F PR PT FALLS ASSESS DOC 2+ FALLS/FALL W/INJURY/YR: ICD-10-PCS | Mod: CPTII,S$GLB,, | Performed by: FAMILY MEDICINE

## 2023-11-16 PROCEDURE — 99214 PR OFFICE/OUTPT VISIT, EST, LEVL IV, 30-39 MIN: ICD-10-PCS | Mod: S$GLB,,, | Performed by: FAMILY MEDICINE

## 2023-11-16 PROCEDURE — 3075F SYST BP GE 130 - 139MM HG: CPT | Mod: CPTII,S$GLB,, | Performed by: FAMILY MEDICINE

## 2023-11-16 PROCEDURE — 1125F PR PAIN SEVERITY QUANTIFIED, PAIN PRESENT: ICD-10-PCS | Mod: CPTII,S$GLB,, | Performed by: FAMILY MEDICINE

## 2023-11-16 PROCEDURE — 3288F PR FALLS RISK ASSESSMENT DOCUMENTED: ICD-10-PCS | Mod: CPTII,S$GLB,, | Performed by: FAMILY MEDICINE

## 2023-11-16 PROCEDURE — 1159F MED LIST DOCD IN RCRD: CPT | Mod: CPTII,S$GLB,, | Performed by: FAMILY MEDICINE

## 2023-11-16 PROCEDURE — 3079F PR MOST RECENT DIASTOLIC BLOOD PRESSURE 80-89 MM HG: ICD-10-PCS | Mod: CPTII,S$GLB,, | Performed by: FAMILY MEDICINE

## 2023-11-16 PROCEDURE — 1159F PR MEDICATION LIST DOCUMENTED IN MEDICAL RECORD: ICD-10-PCS | Mod: CPTII,S$GLB,, | Performed by: FAMILY MEDICINE

## 2023-11-16 PROCEDURE — 1160F RVW MEDS BY RX/DR IN RCRD: CPT | Mod: CPTII,S$GLB,, | Performed by: FAMILY MEDICINE

## 2023-11-16 PROCEDURE — 1125F AMNT PAIN NOTED PAIN PRSNT: CPT | Mod: CPTII,S$GLB,, | Performed by: FAMILY MEDICINE

## 2023-11-16 RX ORDER — CLONAZEPAM 0.5 MG/1
TABLET ORAL
Qty: 30 TABLET | Refills: 2 | Status: SHIPPED | OUTPATIENT
Start: 2023-11-16 | End: 2024-02-06 | Stop reason: SDUPTHER

## 2023-11-16 NOTE — PROGRESS NOTES
Subjective:       Patient ID: Kristin Guerra is a 73 y.o. female.    Chief Complaint: Follow-up (6mth f/u)    HPI  Review of Systems   Constitutional:  Negative for fatigue and unexpected weight change.   Respiratory:  Negative for chest tightness and shortness of breath.    Cardiovascular:  Negative for chest pain, palpitations and leg swelling.   Gastrointestinal:  Negative for abdominal pain.   Musculoskeletal:  Negative for arthralgias.   Neurological:  Negative for dizziness, syncope, light-headedness and headaches.       Patient Active Problem List   Diagnosis    Hx of colon cancer, stage II    Hx of diverticulitis of colon    Cataract    Herniation of intervertebral disc of cervical region    Cervical radiculopathy    Degeneration of intervertebral disc of cervical region    history of skin lupus    Gastroesophageal reflux disease    Tremor, coarse    Essential hypertension, labile    Hiatal hernia    Osteoarthritis    RLS (restless legs syndrome)    Panic attack    Heart murmur    Smoking    GERD (gastroesophageal reflux disease)    Sterling's esophagus with dysplasia    Tremor, essential    Sinus problem    S/P splenectomy    Other chronic postprocedural pain    Insomnia due to medical condition    Anxiety    Mixed dyslipidemia    Statin intolerance    Statin myopathy    Vitamin D deficiency    Near syncope    Syncope and collapse    History of endoscopy    Incontinence in female    Bradycardia    Seborrheic keratosis, inflamed    Microhematuria    Orthostatic hypotension    History of loop recorder    Depression, recurrent    Centrilobular emphysema    Lung nodule, multiple    Aortic atherosclerosis    Chronic obstructive pulmonary disease     Patient is here for a chronic conditions follow up.    Reviewed labs 11/23  low vit D, mild normocytic anemia, nl iron     Hpl- H/o intolerance to zocor-n/v. On zetia. Nuc stress test neg for ischemia 6/21. Followed by card. Added krill oil      of 52 years   2023 of stage 4 bladder cancer.  Stress, and grief at home. Son lives next door. Declines referral to counselor but considering grief share at [facility].       Podiatry Dr. Hopper gout     H/o lupus-skin type      Psych Josue CHARLENE, depression     Pulm NP Tomas COPD, lung nodules-low dose CT chest -stable, current smoker      Card Dr. James HPL, statin intolerance-severe muscle pain, orthostasis     ENT [facility]IA     GI Dr. Steele /Devaughn Lam , h/o precancerous polyps per patient (? Colon cancer stage 2)  . Treated at [facility] and had partial colectomy, appendectomy and had to have splenectomy bc spleen nicked during surgery. Last egd  -hiatal hernia, barretts lower third esophagus s/p ablation  -followed yearly.  On nexium 40mg bid-takes it often once a day. Denies reflux  GI surg Dr. Cantor colonoscopy  h/o polyp on 5 year surveillance     Pulm/Sleep Dr. Barahona sleep study done-? Sleep apnra     Neuro Dr. Macario Abraham essential tremor     Urology Dr. Miranda mixed stress and urge incontinence  Objective:      Physical Exam  Vitals and nursing note reviewed.   Constitutional:       Appearance: She is well-developed.   Cardiovascular:      Rate and Rhythm: Normal rate and regular rhythm.      Heart sounds: Normal heart sounds.   Pulmonary:      Effort: Pulmonary effort is normal.      Breath sounds: Normal breath sounds.   Skin:     General: Skin is warm and dry.   Neurological:      Mental Status: She is alert and oriented to person, place, and time.         Assessment:       1. Essential hypertension, labile    2. Aortic atherosclerosis    3. Flu vaccine need    4. Mixed dyslipidemia    5. Chronic obstructive pulmonary disease, unspecified COPD type    6. Lung nodule, multiple    7. Vitamin D deficiency    8. Sterling's esophagus with dysplasia    9. CHARLENE (generalized anxiety disorder)        Plan:         1. Essential hypertension, labile  Controlled on current  medications.  Continue current medications.    - CBC Auto Differential; Future    2. Aortic atherosclerosis  Cont to lower risk factors  - CBC Auto Differential; Future    3. Flu vaccine need  declined    4. Mixed dyslipidemia  Improved. Controlled on current medications.  Continue current medications.    - CBC Auto Differential; Future  - Comprehensive Metabolic Panel; Future  - Lipid Panel; Future    5. Chronic obstructive pulmonary disease, unspecified COPD type  Controlled on current medications.  Continue current medications.    - CBC Auto Differential; Future    6. Lung nodule, multiple  Cont current monitoring    7. Vitamin D deficiency  Cont supplement and monitoring  - Vitamin D; Future    8. Sterling's esophagus with dysplasia  Cont current treatment and GI surveillance    9. CHARLENE (generalized anxiety disorder)  Cont current prn use for anxiety or insomnia  - clonazePAM (KLONOPIN) 0.5 MG tablet; TAKE 1/2 TABLET(0.25 MG) BY MOUTH TWICE DAILY AS NEEDED FOR ANXIETY OR INSOMNIA  Dispense: 30 tablet; Refill: 2        Time spent with patient: 20 minutes    Patient with be reevaluated in 6 months or sooner prn    Greater than 50% of this visit was spent counseling as described in above documentation:Yes

## 2023-11-17 ENCOUNTER — HOSPITAL ENCOUNTER (OUTPATIENT)
Dept: RADIOLOGY | Facility: HOSPITAL | Age: 73
Discharge: HOME | End: 2023-11-17
Attending: NURSE PRACTITIONER
Payer: OTHER GOVERNMENT

## 2023-11-17 ENCOUNTER — HOSPITAL ENCOUNTER (OUTPATIENT)
Dept: RADIOLOGY | Facility: HOSPITAL | Age: 73
Discharge: HOME | End: 2023-11-17
Attending: NURSE PRACTITIONER
Payer: MEDICARE

## 2023-11-17 DIAGNOSIS — M79.662 PAIN IN LEFT LOWER LEG: ICD-10-CM

## 2023-11-17 PROCEDURE — 93926 LOWER EXTREMITY STUDY: CPT | Mod: TC,PO,LT

## 2023-11-17 PROCEDURE — 93971 EXTREMITY STUDY: CPT | Mod: TC,PO,LT

## 2023-12-15 ENCOUNTER — PATIENT OUTREACH (OUTPATIENT)
Dept: ADMINISTRATIVE | Facility: HOSPITAL | Age: 73
End: 2023-12-15
Payer: OTHER GOVERNMENT

## 2023-12-15 NOTE — PROGRESS NOTES
Non-compliant report chart audits for [facility] Non-Compliant BP    BP WNL: 11.16.23 BP WNL  =  139/80

## 2023-12-22 ENCOUNTER — OFFICE VISIT (OUTPATIENT)
Dept: FAMILY MEDICINE | Facility: CLINIC | Age: 73
End: 2023-12-22
Payer: MEDICARE

## 2023-12-22 VITALS
RESPIRATION RATE: 18 BRPM | WEIGHT: 147.94 LBS | DIASTOLIC BLOOD PRESSURE: 74 MMHG | HEART RATE: 72 BPM | OXYGEN SATURATION: 98 % | SYSTOLIC BLOOD PRESSURE: 120 MMHG | HEIGHT: 61 IN | BODY MASS INDEX: 27.93 KG/M2 | TEMPERATURE: 98 F

## 2023-12-22 DIAGNOSIS — T36.95XA ANTIBIOTIC-INDUCED YEAST INFECTION: ICD-10-CM

## 2023-12-22 DIAGNOSIS — J02.9 SORE THROAT: Primary | ICD-10-CM

## 2023-12-22 DIAGNOSIS — E66.3 OVERWEIGHT (BMI 25.0-29.9): ICD-10-CM

## 2023-12-22 DIAGNOSIS — B37.9 ANTIBIOTIC-INDUCED YEAST INFECTION: ICD-10-CM

## 2023-12-22 DIAGNOSIS — R09.81 HEAD CONGESTION: ICD-10-CM

## 2023-12-22 DIAGNOSIS — R52 GENERALIZED BODY ACHES: ICD-10-CM

## 2023-12-22 DIAGNOSIS — J03.90 TONSILLITIS: ICD-10-CM

## 2023-12-22 LAB
CTP QC/QA: YES
CTP QC/QA: YES
POC MOLECULAR INFLUENZA A AGN: NEGATIVE
POC MOLECULAR INFLUENZA B AGN: NEGATIVE
SARS-COV-2 RDRP RESP QL NAA+PROBE: NEGATIVE

## 2023-12-22 PROCEDURE — 87502 INFLUENZA DNA AMP PROBE: CPT | Mod: QW,S$GLB,, | Performed by: FAMILY MEDICINE

## 2023-12-22 PROCEDURE — 87635: ICD-10-PCS | Mod: QW,S$GLB,, | Performed by: FAMILY MEDICINE

## 2023-12-22 PROCEDURE — 99214 PR OFFICE/OUTPT VISIT, EST, LEVL IV, 30-39 MIN: ICD-10-PCS | Mod: S$GLB,,, | Performed by: FAMILY MEDICINE

## 2023-12-22 PROCEDURE — 87502 POCT INFLUENZA A/B MOLECULAR: ICD-10-PCS | Mod: QW,S$GLB,, | Performed by: FAMILY MEDICINE

## 2023-12-22 PROCEDURE — 1125F AMNT PAIN NOTED PAIN PRSNT: CPT | Mod: CPTII,S$GLB,, | Performed by: FAMILY MEDICINE

## 2023-12-22 PROCEDURE — 3288F PR FALLS RISK ASSESSMENT DOCUMENTED: ICD-10-PCS | Mod: CPTII,S$GLB,, | Performed by: FAMILY MEDICINE

## 2023-12-22 PROCEDURE — 3078F DIAST BP <80 MM HG: CPT | Mod: CPTII,S$GLB,, | Performed by: FAMILY MEDICINE

## 2023-12-22 PROCEDURE — 1159F PR MEDICATION LIST DOCUMENTED IN MEDICAL RECORD: ICD-10-PCS | Mod: CPTII,S$GLB,, | Performed by: FAMILY MEDICINE

## 2023-12-22 PROCEDURE — 87635 SARS-COV-2 COVID-19 AMP PRB: CPT | Mod: QW,S$GLB,, | Performed by: FAMILY MEDICINE

## 2023-12-22 PROCEDURE — 1160F PR REVIEW ALL MEDS BY PRESCRIBER/CLIN PHARMACIST DOCUMENTED: ICD-10-PCS | Mod: CPTII,S$GLB,, | Performed by: FAMILY MEDICINE

## 2023-12-22 PROCEDURE — 99999 PR PBB SHADOW E&M-EST. PATIENT-LVL V: ICD-10-PCS | Mod: PBBFAC,,, | Performed by: FAMILY MEDICINE

## 2023-12-22 PROCEDURE — 3008F BODY MASS INDEX DOCD: CPT | Mod: CPTII,S$GLB,, | Performed by: FAMILY MEDICINE

## 2023-12-22 PROCEDURE — 3078F PR MOST RECENT DIASTOLIC BLOOD PRESSURE < 80 MM HG: ICD-10-PCS | Mod: CPTII,S$GLB,, | Performed by: FAMILY MEDICINE

## 2023-12-22 PROCEDURE — 1160F RVW MEDS BY RX/DR IN RCRD: CPT | Mod: CPTII,S$GLB,, | Performed by: FAMILY MEDICINE

## 2023-12-22 PROCEDURE — 3074F SYST BP LT 130 MM HG: CPT | Mod: CPTII,S$GLB,, | Performed by: FAMILY MEDICINE

## 2023-12-22 PROCEDURE — 1125F PR PAIN SEVERITY QUANTIFIED, PAIN PRESENT: ICD-10-PCS | Mod: CPTII,S$GLB,, | Performed by: FAMILY MEDICINE

## 2023-12-22 PROCEDURE — 99214 OFFICE O/P EST MOD 30 MIN: CPT | Mod: S$GLB,,, | Performed by: FAMILY MEDICINE

## 2023-12-22 PROCEDURE — 3074F PR MOST RECENT SYSTOLIC BLOOD PRESSURE < 130 MM HG: ICD-10-PCS | Mod: CPTII,S$GLB,, | Performed by: FAMILY MEDICINE

## 2023-12-22 PROCEDURE — 1101F PR PT FALLS ASSESS DOC 0-1 FALLS W/OUT INJ PAST YR: ICD-10-PCS | Mod: CPTII,S$GLB,, | Performed by: FAMILY MEDICINE

## 2023-12-22 PROCEDURE — 1101F PT FALLS ASSESS-DOCD LE1/YR: CPT | Mod: CPTII,S$GLB,, | Performed by: FAMILY MEDICINE

## 2023-12-22 PROCEDURE — 99999 PR PBB SHADOW E&M-EST. PATIENT-LVL V: CPT | Mod: PBBFAC,,, | Performed by: FAMILY MEDICINE

## 2023-12-22 PROCEDURE — 3008F PR BODY MASS INDEX (BMI) DOCUMENTED: ICD-10-PCS | Mod: CPTII,S$GLB,, | Performed by: FAMILY MEDICINE

## 2023-12-22 PROCEDURE — 1159F MED LIST DOCD IN RCRD: CPT | Mod: CPTII,S$GLB,, | Performed by: FAMILY MEDICINE

## 2023-12-22 PROCEDURE — 3288F FALL RISK ASSESSMENT DOCD: CPT | Mod: CPTII,S$GLB,, | Performed by: FAMILY MEDICINE

## 2023-12-22 RX ORDER — AMOXICILLIN AND CLAVULANATE POTASSIUM 875; 125 MG/1; MG/1
1 TABLET, FILM COATED ORAL EVERY 12 HOURS
Qty: 14 TABLET | Refills: 0 | Status: SHIPPED | OUTPATIENT
Start: 2023-12-22 | End: 2023-12-29

## 2023-12-22 RX ORDER — FLUCONAZOLE 150 MG/1
150 TABLET ORAL
Qty: 3 TABLET | Refills: 0 | Status: SHIPPED | OUTPATIENT
Start: 2023-12-22 | End: 2023-12-29

## 2023-12-22 NOTE — PROGRESS NOTES
Subjective:       Patient ID: Kristin Guerra is a 73 y.o. female.    Chief Complaint: Sore Throat, Sinusitis, Otalgia (Both ears), Generalized Body Aches, and Fatigue    This patient is new to me.  Kristin Guerra presents to the clinic today with complaints of upper respiratory infection and body aches. Patient states symptoms started yesterday suddenly. Patient states she had sore throat, fatigue, and sinus congestion. Patient states she has not had known ill contacts but has been out iwi shopping so could have been exposed there.   Patient educated on plan of care, verbalized understanding.          Review of Systems   Constitutional:  Negative for activity change, appetite change, chills, diaphoresis and fever.   HENT:  Positive for congestion and sore throat. Negative for ear pain, postnasal drip, sinus pressure and sneezing.    Eyes:  Negative for pain, discharge, redness and itching.   Respiratory:  Negative for apnea, cough, chest tightness, shortness of breath and wheezing.    Cardiovascular:  Negative for chest pain and leg swelling.   Gastrointestinal:  Negative for abdominal distention, abdominal pain, constipation, diarrhea, nausea and vomiting.   Genitourinary:  Negative for difficulty urinating, dysuria, flank pain and frequency.   Skin:  Negative for color change, rash and wound.   Neurological:  Negative for dizziness.       Patient Active Problem List   Diagnosis    Hx of colon cancer, stage II    Hx of diverticulitis of colon    Cataract    Herniation of intervertebral disc of cervical region    Cervical radiculopathy    Degeneration of intervertebral disc of cervical region    history of skin lupus    Gastroesophageal reflux disease    Tremor, coarse    Essential hypertension, labile    Hiatal hernia    Osteoarthritis    RLS (restless legs syndrome)    Panic attack    Heart murmur    Smoking    GERD (gastroesophageal reflux disease)    Sterling's esophagus with dysplasia    Tremor,  essential    Sinus problem    S/P splenectomy    Other chronic postprocedural pain    Insomnia due to medical condition    Anxiety    Mixed dyslipidemia    Statin intolerance    Statin myopathy    Vitamin D deficiency    Near syncope    Syncope and collapse    History of endoscopy    Incontinence in female    Bradycardia    Seborrheic keratosis, inflamed    Microhematuria    Orthostatic hypotension    History of loop recorder    Depression, recurrent    Centrilobular emphysema    Lung nodule, multiple    Aortic atherosclerosis    Chronic obstructive pulmonary disease       Objective:      Physical Exam  Vitals reviewed.   Constitutional:       General: She is not in acute distress.     Appearance: Normal appearance. She is well-developed.   HENT:      Head: Normocephalic.      Right Ear: Ear canal and external ear normal. A middle ear effusion is present. Tympanic membrane is not injected, erythematous or bulging.      Left Ear: Ear canal and external ear normal. A middle ear effusion is present. Tympanic membrane is not injected, erythematous or bulging.      Nose: Nose normal.      Mouth/Throat:      Lips: Pink.      Mouth: Mucous membranes are moist.      Pharynx: Uvula midline. Oropharyngeal exudate and posterior oropharyngeal erythema present.      Tonsils: Tonsillar exudate present.   Eyes:      Conjunctiva/sclera: Conjunctivae normal.      Pupils: Pupils are equal, round, and reactive to light.   Cardiovascular:      Rate and Rhythm: Normal rate and regular rhythm.      Heart sounds: Normal heart sounds.   Pulmonary:      Effort: Pulmonary effort is normal. No respiratory distress.      Breath sounds: Normal breath sounds.   Musculoskeletal:      Cervical back: Normal range of motion and neck supple.   Skin:     General: Skin is warm and dry.      Findings: No rash.   Neurological:      Mental Status: She is alert and oriented to person, place, and time.   Psychiatric:         Behavior: Behavior normal.        "  Lab Results   Component Value Date    WBC 8.62 11/09/2023    HGB 12.6 11/09/2023    HCT 38.0 11/09/2023     11/09/2023    CHOL 202 (H) 11/09/2023    TRIG 80 11/09/2023    HDL 47 11/09/2023    ALT 12 11/09/2023    AST 17 11/09/2023     11/09/2023    K 3.9 11/09/2023     11/09/2023    CREATININE 0.7 11/09/2023    BUN 17 11/09/2023    CO2 21 (L) 11/09/2023    TSH 2.178 02/09/2022     The 10-year ASCVD risk score (J Carlos JEFF, et al., 2019) is: 21.1%    Values used to calculate the score:      Age: 73 years      Sex: Female      Is Non- : No      Diabetic: No      Tobacco smoker: Yes      Systolic Blood Pressure: 120 mmHg      Is BP treated: Yes      HDL Cholesterol: 47 mg/dL      Total Cholesterol: 202 mg/dL  Visit Vitals  /74 (BP Location: Right arm, Patient Position: Sitting, BP Method: Medium (Manual))   Pulse 72   Temp 98.2 °F (36.8 °C) (Oral)   Resp 18   Ht 5' 1" (1.549 m)   Wt 67.1 kg (147 lb 14.9 oz)   SpO2 98%   BMI 27.95 kg/m²      Assessment:       1. Sore throat    2. Generalized body aches    3. Head congestion    4. Tonsillitis    5. Antibiotic-induced yeast infection    6. Overweight (BMI 25.0-29.9)        Plan:       Kristin was seen today for sore throat, sinusitis, otalgia, generalized body aches and fatigue.    Diagnoses and all orders for this visit:    Sore throat / Generalized body aches / Head congestion / Tonsillitis  -     amoxicillin-clavulanate 875-125mg (AUGMENTIN) 875-125 mg per tablet; Take 1 tablet by mouth every 12 (twelve) hours. for 7 days  -     POCT Influenza A/B Molecular  -     POCT COVID-19 Rapid Screening  Continue medications as prescribed.  Follow up with PCP     Antibiotic-induced yeast infection  -     fluconazole (DIFLUCAN) 150 MG Tab; Take 1 tablet (150 mg total) by mouth every 72 hours. for 3 doses    Overweight (BMI 25.0-29.9)  Body mass index is 27.95 kg/m².  Continue healthy diet and regular exercise as tolerated.  Continue " medications as prescribed.  Follow up with PCP      Follow up if symptoms worsen or fail to improve.      Future Appointments       Date Provider Specialty Appt Notes    4/22/2024 Kayla Andrew, NP Family Medicine [facility]    5/14/2024  Lab Non Fasting Labs    5/21/2024 Gavi Mishra MD Family Medicine [facility] F/U             Tests to Keep You Healthy    Mammogram: Met on 9/26/2023  Colon Cancer Screening: Met on 8/7/2023  Last Blood Pressure <= 139/89 (12/22/2023): Yes  Tobacco Cessation: NO

## 2024-01-17 RX ORDER — EZETIMIBE 10 MG/1
10 TABLET ORAL
Qty: 90 TABLET | Refills: 3 | Status: SHIPPED | OUTPATIENT
Start: 2024-01-17

## 2024-01-23 ENCOUNTER — OFFICE VISIT (OUTPATIENT)
Dept: PULMONOLOGY | Facility: CLINIC | Age: 74
End: 2024-01-23
Payer: MEDICARE

## 2024-01-23 VITALS
SYSTOLIC BLOOD PRESSURE: 142 MMHG | HEIGHT: 61 IN | DIASTOLIC BLOOD PRESSURE: 87 MMHG | WEIGHT: 151.88 LBS | OXYGEN SATURATION: 97 % | BODY MASS INDEX: 28.67 KG/M2 | HEART RATE: 65 BPM

## 2024-01-23 DIAGNOSIS — J44.9 CHRONIC OBSTRUCTIVE PULMONARY DISEASE, UNSPECIFIED COPD TYPE: Primary | ICD-10-CM

## 2024-01-23 PROCEDURE — 3077F SYST BP >= 140 MM HG: CPT | Mod: CPTII,S$GLB,, | Performed by: NURSE PRACTITIONER

## 2024-01-23 PROCEDURE — 1125F AMNT PAIN NOTED PAIN PRSNT: CPT | Mod: CPTII,S$GLB,, | Performed by: NURSE PRACTITIONER

## 2024-01-23 PROCEDURE — 3079F DIAST BP 80-89 MM HG: CPT | Mod: CPTII,S$GLB,, | Performed by: NURSE PRACTITIONER

## 2024-01-23 PROCEDURE — 99213 OFFICE O/P EST LOW 20 MIN: CPT | Mod: S$GLB,,, | Performed by: NURSE PRACTITIONER

## 2024-01-23 PROCEDURE — 3288F FALL RISK ASSESSMENT DOCD: CPT | Mod: CPTII,S$GLB,, | Performed by: NURSE PRACTITIONER

## 2024-01-23 PROCEDURE — 3008F BODY MASS INDEX DOCD: CPT | Mod: CPTII,S$GLB,, | Performed by: NURSE PRACTITIONER

## 2024-01-23 PROCEDURE — 99999 PR PBB SHADOW E&M-EST. PATIENT-LVL IV: CPT | Mod: PBBFAC,,, | Performed by: NURSE PRACTITIONER

## 2024-01-23 PROCEDURE — 1159F MED LIST DOCD IN RCRD: CPT | Mod: CPTII,S$GLB,, | Performed by: NURSE PRACTITIONER

## 2024-01-23 PROCEDURE — 1101F PT FALLS ASSESS-DOCD LE1/YR: CPT | Mod: CPTII,S$GLB,, | Performed by: NURSE PRACTITIONER

## 2024-01-23 RX ORDER — TIOTROPIUM BROMIDE AND OLODATEROL 3.124; 2.736 UG/1; UG/1
1 SPRAY, METERED RESPIRATORY (INHALATION)
COMMUNITY
Start: 2023-12-23

## 2024-01-23 RX ORDER — ALBUTEROL SULFATE 90 UG/1
2 AEROSOL, METERED RESPIRATORY (INHALATION) EVERY 4 HOURS PRN
Qty: 18 G | Refills: 11 | Status: SHIPPED | OUTPATIENT
Start: 2024-01-23 | End: 2024-01-23 | Stop reason: SDUPTHER

## 2024-01-23 RX ORDER — ALBUTEROL SULFATE 90 UG/1
2 AEROSOL, METERED RESPIRATORY (INHALATION) EVERY 4 HOURS PRN
Qty: 18 G | Refills: 11 | Status: SHIPPED | OUTPATIENT
Start: 2024-01-23

## 2024-01-23 NOTE — PROGRESS NOTES
1/23/2024    Krisitn Guerra  Office Note    Chief Complaint   Patient presents with    3m f/u    COPD       HPI:  1/23/2024- states SOB is stable, worse when walking for extended periods of time. Cutting down on smoking. Switching to vape to help cut back. Down to less than a pack a day.   No recent systemic steroid therapy.   On Spiriva daily with benefit. Using nebulizer once daily with improvement in daily cough.     9/21/2023- complaint of worsening shortness of breath, associated wheeze. Using nebulizer 1 x daily, has productive thick clear to brown mucous  Currently smoking 1 pack daily. States very difficult to quit.     10/27/2022- SOB stable complaint, wearing oxygen at night. Using nebulizer 1-2 times daily, daily cough is productive. On spiriva daily  Under large amount of stress due to husbands cancer diagnosis. PCP provides anxiety medication.     6/21/2022- currently smoking 1 pack daily. Not interested quitting and the moment.   Able to keep up with house work but tires quickly has to pace yourself. Wearing supplemental oxygen when needed. Uses [employer] machine at 3L.   Cough- stable, unchanged, able to clear occasionally varies from clear to brown mucous. Associated with chest pain. Has Echo scheduled with cardiologist  Using Albuterol daily, tried Symbicort but states to strong and made her choke.     3/18/2022- currently working on cutting back on smoking. Over 1 pack daily. States SOB varies with time, has good/bad days, worse with high humidity.   Cough- persistent complaint, daily complaint, thick clear/brown mucous.   No current complaint of wheeze or chest tightness. Occasionally wearing supplemental oxygen at home at 3L with benefit. Uses 's machine. Uses albuterol inhaler only, tried Symbicort in past caused chest pain.      12/17/2021- Complaint of daily cough- persistent complaint, mild complaint, productive clear/brown mucous, currently smoking 1 pack daily, associated with  wheeze  Wearing supplemental oxygen at night 3L at night. No daily metered dose inhaler, using albuterol nebulizer 2-3x weekly with benefit.     SOB- stable, worse with exertion, difficult to lift heavy objects, improves with rest.     9/17/2021- complaint of high blood pressure, daytime fatigue, and generalized body aches, states she faints unexpectedly,     3/17/2021- decided not to do sleep study, Shortness of breath- daily complaint, varies with severity, worse with exertion. Currently smoking 1 pack cigarettes daily.   No cough, chest tightness, or wheeze.   Using albuterol 3-4 days weekly, using nebulizer when needed about 1x weekly, no nocturnal arousals.   Not able to tolerate inhalers, using albuterol nebulizer and inhaler only.       10/6/2020- Cough and SOB unchanged, daily, improves with albuterol rescue inhaler. Improves ability to bring up sputum, productive clear and brown mucous, not able to bring in sputum samples. Did not fill Trelegy due to high cost. Using albuterol rescue 2x daily to control cough,   Currently smoking 1 pack daily.    7/6/2020- referred by PCP for Ct chest screening with abnormal spot seen. Complaint of weakness and fatigue, being treated for Syncope dx with bradycardia waiting for pacemaker placement.   Cough- onset 2 years, large amounts of mucous, clear color, nickel size, daily.  SOB- onset 2 years, limits activity level, works with exertion, improves quickly with rest, no chest tightness or wheeze,  Complaint of nightly snoring, daytime fatigue, drowsiness in afternoons.   Social Hx: lives with spouse but no pets, currently retired from office and retail stocking, no known Asbestosis exposure, Smoking Hx: currently smoking 1 pack daily, 40 pack years.  Family Hx: Father and 2 brothers (Small cell) Lung Cancer, no COPD, no Asthma  Medical Hx: no previous pneumonia ; no previous shoulder/chest surgery, Neck surgery 2009, 12, 17 for disc infusions.         The chief compliant   problem is stable  PFSH:  Past Medical History:   Diagnosis Date    Allergy     Sterling's esophagus     Colon polyp     Diverticulitis     Diverticulosis     Fatty liver     GERD (gastroesophageal reflux disease)     History of endoscopy 2021    Dr Uzair Cantor  Impression:            - Normal oropharynx.                         - Hiatal hernia.                         - Sterling's esophagus. Treated with radiofrequency                         ablation.                         - Normal stomach.                         - Normal examined duodenum.                         - No specimens collected.     Hx of cervical spine surgery 2018    Hyperlipidemia     Hypertension     Lupus (systemic lupus erythematosus)     discoid    Osteoarthritis     Osteopenia     Syncope     Tremor     essential         Past Surgical History:   Procedure Laterality Date    APPENDECTOMY      removed during colon surgery    CERVICAL FUSION      CERVICAL SPINE SURGERY      Rods and screws.     SECTION      COLON SURGERY      hemicolectomy    COLON SURGERY  2008    repair    COLONOSCOPY N/A 2018    Procedure: COLONOSCOPY;  Surgeon: Maverick Esquivel MD;  Location: [facility];  Service: Endoscopy;  Laterality: N/A; repeat in 5 years for surveillance    COLONOSCOPY N/A 2023    Procedure: COLONOSCOPY;  Surgeon: Uzair Cantor MD;  Location: [facility];  Service: Endoscopy;  Laterality: N/A;    CYSTOSCOPY N/A 2021    Procedure: CYSTOSCOPY;  Surgeon: Allison Miranda MD;  Location: [facility];  Service: Urology;  Laterality: N/A;    ESOPHAGOGASTRODUODENOSCOPY N/A 2019    Procedure: EGD (ESOPHAGOGASTRODUODENOSCOPY);  Surgeon: Monika Steele MD;  Location: [facility];  Service: Endoscopy;  Laterality: N/A;    ESOPHAGOGASTRODUODENOSCOPY  2019    with RFA and biopsies    ESOPHAGOGASTRODUODENOSCOPY N/A 2019    Procedure: EGD (ESOPHAGOGASTRODUODENOSCOPY);  Surgeon: Uzair Cantor MD;   Location: [facility];  Service: Endoscopy;  Laterality: N/A;  n+v with anesthesia    ESOPHAGOGASTRODUODENOSCOPY N/A 8/21/2019    Procedure: EGD (ESOPHAGOGASTRODUODENOSCOPY);  Surgeon: Uzair Cantor MD;  Location: [facility];  Service: Endoscopy;  Laterality: N/A;    ESOPHAGOGASTRODUODENOSCOPY N/A 10/7/2019    Procedure: EGD (ESOPHAGOGASTRODUODENOSCOPY);  Surgeon: Uzair Cantor MD;  Location: [facility];  Service: Endoscopy;  Laterality: N/A;    ESOPHAGOGASTRODUODENOSCOPY N/A 2/10/2020    Procedure: EGD (ESOPHAGOGASTRODUODENOSCOPY);  Surgeon: Uzair Cantor MD;  Location: [facility];  Service: Endoscopy;  Laterality: N/A;  Requests earliest procedure time    ESOPHAGOGASTRODUODENOSCOPY N/A 8/17/2020    Procedure: EGD (ESOPHAGOGASTRODUODENOSCOPY);  Surgeon: Uzair Cantor MD;  Location: [facility];  Service: Endoscopy;  Laterality: N/A;  BARRETTS    ESOPHAGOGASTRODUODENOSCOPY N/A 4/7/2021    Procedure: ESOPHAGOGASTRODUODENOSCOPY (EGD);  Surgeon: Uzair Cantor MD;  Location: [facility];  Service: Endoscopy;  Laterality: N/A;  Needs Rapid Covid MP    ESOPHAGOGASTRODUODENOSCOPY N/A 11/30/2021    Procedure: EGD (ESOPHAGOGASTRODUODENOSCOPY);  Surgeon: Uzair Cantor MD;  Location: [facility];  Service: Endoscopy;  Laterality: N/A;    ESOPHAGOGASTRODUODENOSCOPY N/A 6/27/2022    Procedure: EGD (ESOPHAGOGASTRODUODENOSCOPY);  Surgeon: Uzair Cantor MD;  Location: [facility];  Service: Endoscopy;  Laterality: N/A;  5/19/22: fully vaccinated. instructions mailed-[place]    loop recorder only    ESOPHAGOGASTRODUODENOSCOPY N/A 8/7/2023    Procedure: EGD (ESOPHAGOGASTRODUODENOSCOPY);  Surgeon: Uzair Cantor MD;  Location: [facility];  Service: Endoscopy;  Laterality: N/A;    HYSTERECTOMY      ovaries remain    INSERTION OF IMPLANTABLE LOOP RECORDER N/A 7/17/2020    Procedure: INSERTION, IMPLANTABLE LOOP RECORDER (MEDTRONIC);  Surgeon: Karyna Vasques MD;  Location: [facility] CATH/EP LAB;  Service: Cardiology;  " Laterality: N/A;    SPLENECTOMY, TOTAL  2007    Injured during surgery and removed    UPPER GASTROINTESTINAL ENDOSCOPY  2007    GERD & hiatal hernia per patient report     Social History     Tobacco Use    Smoking status: Every Day     Current packs/day: 1.00     Average packs/day: 1 pack/day for 35.0 years (35.0 ttl pk-yrs)     Types: Cigarettes     Passive exposure: Current    Smokeless tobacco: Never   Substance Use Topics    Alcohol use: No    Drug use: No     Family History   Problem Relation Age of Onset    Lymphoma Mother         Brain    Colon cancer Father         diagnosed in his 60's    Lung cancer Father     Cancer Brother         prostate    Lung cancer Brother     Lung cancer Brother         metastatic from prostate    Prostate cancer Brother     Heart disease Brother         valvular disease    Hyperlipidemia Brother     Eczema Neg Hx     Lupus Neg Hx     Psoriasis Neg Hx     Melanoma Neg Hx     Crohn's disease Neg Hx     Ulcerative colitis Neg Hx     Stomach cancer Neg Hx     Esophageal cancer Neg Hx      Review of patient's allergies indicates:   Allergen Reactions    Doxycycline Itching and Swelling     Prescribed 5/2021, took one dose, describes lip swelling, and "itch all over"    Mirtazapine Other (See Comments)     Hyperactivity.    Morphine Swelling and Rash     Other reaction(s): swelling in limbs    Lexapro [escitalopram oxalate] Other (See Comments)     Worsening tremor.  Sedation.    Primidone Other (See Comments)     Syncope.  Other reaction(s): dizziness,hypotension  Other reaction(s): Unknown  Other reaction(s): Unknown    Simvastatin Nausea And Vomiting and Other (See Comments)     Weakness.  Other reaction(s): nausea,vomiting    Beta-adrenergic agents Other (See Comments)     Low blood pressure and fainted.     Metoprolol succinate Other (See Comments)    Prochlorperazine Other (See Comments)     Other reaction(s): weakness    Topiramate     Compazine [prochlorperazine edisylate] " "Nausea Only    Tramadol Nausea And Vomiting     I have reviewed past medical, family, and social history. I have reviewed previous nurse notes.    Performance Status:The patient's activity level is functions out of house.      Review of Systems:  a review of eleven systems covering constitutional, Eye, HEENT, Psych, Respiratory, Cardiac, GI, , Musculoskeletal, Endocrine, Dermatologic was negative except for pertinent findings as listed ABOVE and below: pertinent positive as above, rest is good  Cough, shortness of breath         Exam:Comprehensive exam done. BP (!) 142/87 (BP Location: Right arm, Patient Position: Sitting, BP Method: Medium (Automatic))   Pulse 65   Ht 5' 1" (1.549 m)   Wt 68.9 kg (151 lb 14.4 oz)   SpO2 97% Comment: on room air at rest  BMI 28.70 kg/m²   Exam included Vitals as listed, and patient's appearance and affect and alertness and mood, oral exam for yeast and hygiene and pharynx lesions and Mallapatti (M) score, neck with inspection for jvd and masses and thyroid abnormalities and lymph nodes (supraclavicular and infraclavicular nodes and axillary also examined and noted if abn), chest exam included symmetry and effort and fremitus and percussion and auscultation, cardiac exam included rhythm and gallops and murmur and rubs and jvd and edema, abdominal exam for mass and hepatosplenomegaly and tenderness and hernias and bowel sounds, Musculoskeletal exam with muscle tone and posture and mobility/gait and  strength, and skin for rashes and cyanosis and pallor and turgor, extremity for clubbing.  Findings were normal except for pertinent findings listed below: M2, BS deminished, bilateral rales.       Radiographs (ct chest and cxr) reviewed: view by direct vision   CT chest 09/14/22 lung nodules, atelectasis, and emphysema unchanged, stable; repeat CT in one year recommended     CT Chest Without Contrast 9/10/2021   Unchanged right lung nodules dating back to 6/18/2020.  2. Mild " emphysema.  Coronary artery calcifications     CT Chest Without Contrast 2/22/2021   Thereare no new pulmonary nodules. Mild emphysematous lung disease is again demonstrated bilaterally. There is bibasilar subsegmental atelectasis versus scarring  Multiple pulmonary nodules are again identified as described and are unchanged      CT Chest Without Contrast 09/09/20   IMPRESSION:  Stable 8 mm fissural nodule in the anterior right middle lobe and 4 mm  subpleural nodule in the posterior right medial lobe Resolution of the 6 mm subpleural nodular density in the anterior right costophrenic angle Mild coronary artery calcification No new pulmonary nodules or infiltrates  LUNG-RADS CATEGORY 3: PROBABLY BENIGN FINDINGS  RECOMMENDATION: Short-term imaging follow-up with 6 month LDCT.      Transthoracic echo (TTE) complete 6/3/2020  Mild concentric left ventricular hypertrophy.  Normal left ventricular systolic function. The estimated ejection fraction is 60%.  Normal LV diastolic function.  The estimated PA systolic pressure is 34 mmHg.     X-Ray Chest AP Portable 06/02/20   The lungs are clear. Costophrenic angles are seen without effusion. No  pneumothorax is identified. The heart is normal in size. Atheromatous  calcifications are seen at the aortic arch. Degenerative changes are  seen with an ACDF and paraspinal fixation hardware in the lower  cervical spine. The visualized upper abdomen is unremarkable    CT Chest Lung Screening Low Dose 06/18/20   1.  Nodular density with spiculations in the anterior segment of  the right middle lobe measures 6 x 9 mm.  2.  6 mm subpleural nodular density in the anterior right  costophrenic angle.  3.  Irregular shaped density in the medial segment of the right  lower lobe is unchanged dating back from 2009 and is benign.     LUNG-RADS CATEGORY 4A: SUSPICIOUS FINDINGS     RECOMMENDATION: 3 month follow-up LDCT (PET/CT may be considered when  solid component greater than 7 mm is  present).        Labs reviewed       Lab Results   Component Value Date    WBC 8.62 11/09/2023    RBC 4.04 11/09/2023    HGB 12.6 11/09/2023    HCT 38.0 11/09/2023    MCV 94 11/09/2023    MCH 31.2 (H) 11/09/2023    MCHC 33.2 11/09/2023    RDW 14.9 (H) 11/09/2023     11/09/2023    MPV 10.0 11/09/2023    GRAN 3.9 11/09/2023    GRAN 44.7 11/09/2023    LYMPH 3.4 11/09/2023    LYMPH 39.7 11/09/2023    MONO 0.8 11/09/2023    MONO 8.8 11/09/2023    EOS 0.4 11/09/2023    BASO 0.12 11/09/2023    EOSINOPHIL 5.1 11/09/2023    BASOPHIL 1.4 11/09/2023      Latest Reference Range & Units 02/14/22 08:24 01/15/23 20:28 03/08/23 09:32   Eos # 0.0 - 0.5 K/uL 0.4 0.2 0.0         Ed Model: 7/6/2020 8.47% probability.    PFT reviewed  Pulmonary Functions Testing Results:  spirometry with bronchodilator, lung volume by gas dilution, and diffusion capacity were measured July 15, 2020.  The FEV1 to FVC ratio was 66% indicating airflow obstruction.  The FEV1 measured 70% of predicted making airflow obstruction moderate.     There was a 25% improvement in FEV1 following bronchodilator, this is statistically significant.  The FEV1 measured about 70% predicted at 1.4 L pre bronchodilator.  Total lung capacity was in normal limits on lung volumes by gas dilution.  Diffusion,   uncorrected for anemia present, was 49% of predicted.       The patient had moderate airflow obstruction that resolved with bronchodilator.  Lung volumes were normal.  Diffusion was reduced.  Clinical correlation recommended.       EPWORTH SLEEPINESS SCALE= 16 9/17/2021  Sleep study AHI 1 9/27/2021  DEA- 1.0 [facility]    Plan:  Clinical impression is apparently straight forward and impression with management as below.    Kristin was seen today for 3m f/u and copd.    Diagnoses and all orders for this visit:    Chronic obstructive pulmonary disease, unspecified COPD type  -     Discontinue: albuterol (VENTOLIN HFA) 90 mcg/actuation inhaler; Inhale 2 puffs into the lungs  every 4 (four) hours as needed. Rescue  -     albuterol (VENTOLIN HFA) 90 mcg/actuation inhaler; Inhale 2 puffs into the lungs every 4 (four) hours as needed. Rescue        Follow up in about 6 months (around 7/23/2024), or if symptoms worsen or fail to improve.    Discussed with patient above for education the following:      Patient Instructions   Will repeat CT chest in September 2024 for nodule monitoring, they are stable    Continue current prescription COPD medication regiment

## 2024-01-23 NOTE — PATIENT INSTRUCTIONS
Will repeat CT chest in September 2024 for nodule monitoring, they are stable    Continue current prescription COPD medication regiment

## 2024-01-24 ENCOUNTER — OFFICE VISIT (OUTPATIENT)
Dept: CARDIOLOGY | Facility: CLINIC | Age: 74
End: 2024-01-24
Payer: MEDICARE

## 2024-01-24 VITALS
SYSTOLIC BLOOD PRESSURE: 128 MMHG | BODY MASS INDEX: 28.32 KG/M2 | WEIGHT: 150 LBS | HEIGHT: 61 IN | HEART RATE: 59 BPM | OXYGEN SATURATION: 98 % | RESPIRATION RATE: 16 BRPM | DIASTOLIC BLOOD PRESSURE: 80 MMHG

## 2024-01-24 DIAGNOSIS — E78.2 MIXED DYSLIPIDEMIA: ICD-10-CM

## 2024-01-24 DIAGNOSIS — Z78.9 STATIN INTOLERANCE: ICD-10-CM

## 2024-01-24 DIAGNOSIS — R00.1 BRADYCARDIA: ICD-10-CM

## 2024-01-24 DIAGNOSIS — F41.9 ANXIETY: ICD-10-CM

## 2024-01-24 DIAGNOSIS — I10 ESSENTIAL HYPERTENSION: ICD-10-CM

## 2024-01-24 DIAGNOSIS — I95.1 ORTHOSTATIC HYPOTENSION: ICD-10-CM

## 2024-01-24 DIAGNOSIS — R07.9 CHEST PAIN, UNSPECIFIED TYPE: ICD-10-CM

## 2024-01-24 DIAGNOSIS — I73.9 CLAUDICATION OF BOTH LOWER EXTREMITIES: Primary | ICD-10-CM

## 2024-01-24 PROCEDURE — 3288F FALL RISK ASSESSMENT DOCD: CPT | Mod: CPTII,S$GLB,, | Performed by: INTERNAL MEDICINE

## 2024-01-24 PROCEDURE — 3079F DIAST BP 80-89 MM HG: CPT | Mod: CPTII,S$GLB,, | Performed by: INTERNAL MEDICINE

## 2024-01-24 PROCEDURE — 99999 PR PBB SHADOW E&M-EST. PATIENT-LVL IV: CPT | Mod: PBBFAC,,, | Performed by: INTERNAL MEDICINE

## 2024-01-24 PROCEDURE — 1159F MED LIST DOCD IN RCRD: CPT | Mod: CPTII,S$GLB,, | Performed by: INTERNAL MEDICINE

## 2024-01-24 PROCEDURE — 1126F AMNT PAIN NOTED NONE PRSNT: CPT | Mod: CPTII,S$GLB,, | Performed by: INTERNAL MEDICINE

## 2024-01-24 PROCEDURE — 3008F BODY MASS INDEX DOCD: CPT | Mod: CPTII,S$GLB,, | Performed by: INTERNAL MEDICINE

## 2024-01-24 PROCEDURE — 1160F RVW MEDS BY RX/DR IN RCRD: CPT | Mod: CPTII,S$GLB,, | Performed by: INTERNAL MEDICINE

## 2024-01-24 PROCEDURE — 1100F PTFALLS ASSESS-DOCD GE2>/YR: CPT | Mod: CPTII,S$GLB,, | Performed by: INTERNAL MEDICINE

## 2024-01-24 PROCEDURE — 99214 OFFICE O/P EST MOD 30 MIN: CPT | Mod: S$GLB,,, | Performed by: INTERNAL MEDICINE

## 2024-01-24 PROCEDURE — 3074F SYST BP LT 130 MM HG: CPT | Mod: CPTII,S$GLB,, | Performed by: INTERNAL MEDICINE

## 2024-01-24 NOTE — PROGRESS NOTES
"Subjective:    Patient ID:  Kristin Guerra is a 73 y.o. female     Chief Complaint   Patient presents with    Hypertension    Hyperlipidemia       HPI:  Ms Kristin Guerra is a 73 y.o. female is here for follow-up.    Patient has been having pain in both the lower extremities right worse than the left and she can barely walk a block before she starts having pain in the legs.  And her breathing seems to be stable however but she does have some chest heaviness and it is intermittent and it feels like she can not breathe and have difficulty in breathing at times.  Patient is still an active smoker and does smoke.    She is taking her medications regularly.  She is stopped taking Zetia because she believes that that is what is causing the peripheral claudication.      Review of patient's allergies indicates:   Allergen Reactions    Doxycycline Itching and Swelling     Prescribed 5/2021, took one dose, describes lip swelling, and "itch all over"    Mirtazapine Other (See Comments)     Hyperactivity.    Morphine Swelling and Rash     Other reaction(s): swelling in limbs    Lexapro [escitalopram oxalate] Other (See Comments)     Worsening tremor.  Sedation.    Primidone Other (See Comments)     Syncope.  Other reaction(s): dizziness,hypotension  Other reaction(s): Unknown  Other reaction(s): Unknown    Simvastatin Nausea And Vomiting and Other (See Comments)     Weakness.  Other reaction(s): nausea,vomiting    Beta-adrenergic agents Other (See Comments)     Low blood pressure and fainted.     Metoprolol succinate Other (See Comments)    Prochlorperazine Other (See Comments)     Other reaction(s): weakness    Topiramate     Compazine [prochlorperazine edisylate] Nausea Only    Tramadol Nausea And Vomiting       Past Medical History:   Diagnosis Date    Allergy     Sterling's esophagus     Colon polyp     Diverticulitis     Diverticulosis     Fatty liver     GERD (gastroesophageal reflux disease)     History of endoscopy " 2021    Dr Uzair Cantor  Impression:            - Normal oropharynx.                         - Hiatal hernia.                         - Sterling's esophagus. Treated with radiofrequency                         ablation.                         - Normal stomach.                         - Normal examined duodenum.                         - No specimens collected.     Hx of cervical spine surgery 2018    Hyperlipidemia     Hypertension     Lupus (systemic lupus erythematosus)     discoid    Osteoarthritis     Osteopenia     Syncope     Tremor 2005    essential     Past Surgical History:   Procedure Laterality Date    APPENDECTOMY  2007    removed during colon surgery    CERVICAL FUSION      CERVICAL SPINE SURGERY      Rods and screws.     SECTION      COLON SURGERY      hemicolectomy    COLON SURGERY  2008    repair    COLONOSCOPY N/A 2018    Procedure: COLONOSCOPY;  Surgeon: Maverick Esquivel MD;  Location: [facility];  Service: Endoscopy;  Laterality: N/A; repeat in 5 years for surveillance    COLONOSCOPY N/A 2023    Procedure: COLONOSCOPY;  Surgeon: Uzair Cantor MD;  Location: [facility];  Service: Endoscopy;  Laterality: N/A;    CYSTOSCOPY N/A 2021    Procedure: CYSTOSCOPY;  Surgeon: Allison Miranda MD;  Location: [facility] OR;  Service: Urology;  Laterality: N/A;    ESOPHAGOGASTRODUODENOSCOPY N/A 2019    Procedure: EGD (ESOPHAGOGASTRODUODENOSCOPY);  Surgeon: Monika Steele MD;  Location: [facility];  Service: Endoscopy;  Laterality: N/A;    ESOPHAGOGASTRODUODENOSCOPY  2019    with RFA and biopsies    ESOPHAGOGASTRODUODENOSCOPY N/A 2019    Procedure: EGD (ESOPHAGOGASTRODUODENOSCOPY);  Surgeon: Uzair Cantor MD;  Location: [facility];  Service: Endoscopy;  Laterality: N/A;  n+v with anesthesia    ESOPHAGOGASTRODUODENOSCOPY N/A 2019    Procedure: EGD (ESOPHAGOGASTRODUODENOSCOPY);  Surgeon: Uzair Cantor MD;  Location: [facility];  Service:  Endoscopy;  Laterality: N/A;    ESOPHAGOGASTRODUODENOSCOPY N/A 10/7/2019    Procedure: EGD (ESOPHAGOGASTRODUODENOSCOPY);  Surgeon: Uzair Cantor MD;  Location: [facility];  Service: Endoscopy;  Laterality: N/A;    ESOPHAGOGASTRODUODENOSCOPY N/A 2/10/2020    Procedure: EGD (ESOPHAGOGASTRODUODENOSCOPY);  Surgeon: Uzair Cantor MD;  Location: [facility];  Service: Endoscopy;  Laterality: N/A;  Requests earliest procedure time    ESOPHAGOGASTRODUODENOSCOPY N/A 8/17/2020    Procedure: EGD (ESOPHAGOGASTRODUODENOSCOPY);  Surgeon: Uzair Cantor MD;  Location: [facility];  Service: Endoscopy;  Laterality: N/A;  BARRETTS    ESOPHAGOGASTRODUODENOSCOPY N/A 4/7/2021    Procedure: ESOPHAGOGASTRODUODENOSCOPY (EGD);  Surgeon: Uzair Cantor MD;  Location: [facility];  Service: Endoscopy;  Laterality: N/A;  Needs Rapid Covid MP    ESOPHAGOGASTRODUODENOSCOPY N/A 11/30/2021    Procedure: EGD (ESOPHAGOGASTRODUODENOSCOPY);  Surgeon: Uzair Cantor MD;  Location: [facility];  Service: Endoscopy;  Laterality: N/A;    ESOPHAGOGASTRODUODENOSCOPY N/A 6/27/2022    Procedure: EGD (ESOPHAGOGASTRODUODENOSCOPY);  Surgeon: Uzair Cantor MD;  Location: [facility];  Service: Endoscopy;  Laterality: N/A;  5/19/22: fully vaccinated. instructions mailed-[place]    loop recorder only    ESOPHAGOGASTRODUODENOSCOPY N/A 8/7/2023    Procedure: EGD (ESOPHAGOGASTRODUODENOSCOPY);  Surgeon: Uzair Cantor MD;  Location: [facility];  Service: Endoscopy;  Laterality: N/A;    HYSTERECTOMY      ovaries remain    INSERTION OF IMPLANTABLE LOOP RECORDER N/A 7/17/2020    Procedure: INSERTION, IMPLANTABLE LOOP RECORDER (MEDTRONIC);  Surgeon: Karyna Vasques MD;  Location: [facility] CATH/EP LAB;  Service: Cardiology;  Laterality: N/A;    SPLENECTOMY, TOTAL  2007    Injured during surgery and removed    UPPER GASTROINTESTINAL ENDOSCOPY  2007    GERD & hiatal hernia per patient report     Social History     Tobacco Use    Smoking status: Every Day      Current packs/day: 1.00     Average packs/day: 1 pack/day for 35.0 years (35.0 ttl pk-yrs)     Types: Cigarettes     Passive exposure: Current    Smokeless tobacco: Never   Substance Use Topics    Alcohol use: No    Drug use: No     Family History   Problem Relation Age of Onset    Lymphoma Mother         Brain    Colon cancer Father         diagnosed in his 60's    Lung cancer Father     Cancer Brother         prostate    Lung cancer Brother     Lung cancer Brother         metastatic from prostate    Prostate cancer Brother     Heart disease Brother         valvular disease    Hyperlipidemia Brother     Eczema Neg Hx     Lupus Neg Hx     Psoriasis Neg Hx     Melanoma Neg Hx     Crohn's disease Neg Hx     Ulcerative colitis Neg Hx     Stomach cancer Neg Hx     Esophageal cancer Neg Hx         Review of Systems:   Constitution: Negative for diaphoresis and fever.   HEENT: Negative for nosebleeds.    Cardiovascular:  Positive for chest pain       Recurrent dyspnea on exertion       No leg swelling        No palpitations  Respiratory: Negative for shortness of breath and wheezing.    Hematologic/Lymphatic: Negative for bleeding problem. Does not bruise/bleed easily.   Skin: Negative for color change and rash.   Musculoskeletal: Negative for falls and intermittent claudication and myalgias.   Gastrointestinal: Negative for hematemesis and hematochezia.   Genitourinary: Negative for hematuria.   Neurological: Negative for dizziness and light-headedness.   Psychiatric/Behavioral: Negative for altered mental status and memory loss.          Objective:        Vitals:    01/24/24 1316   BP: 128/80   Pulse: (!) 59   Resp: 16       Lab Results   Component Value Date    WBC 8.62 11/09/2023    HGB 12.6 11/09/2023    HCT 38.0 11/09/2023     11/09/2023    CHOL 202 (H) 11/09/2023    TRIG 80 11/09/2023    HDL 47 11/09/2023    ALT 12 11/09/2023    AST 17 11/09/2023     11/09/2023    K 3.9 11/09/2023     11/09/2023     CREATININE 0.7 11/09/2023    BUN 17 11/09/2023    CO2 21 (L) 11/09/2023    TSH 2.178 02/09/2022        ECHOCARDIOGRAM RESULTS  Results for orders placed in visit on 07/11/22    Echo    Interpretation Summary  · The left ventricle is normal in size with hyperdynamic systolic function.  · Normal left ventricular diastolic function.  · The estimated ejection fraction is 72%.  · Normal right ventricular size with normal right ventricular systolic function.  · Mild left atrial enlargement.  · Mild tricuspid regurgitation.  · Mild mitral regurgitation.  · Normal central venous pressure (3 mmHg).  · The estimated PA systolic pressure is 30 mmHg.        CURRENT/PREVIOUS VISIT EKG  Results for orders placed or performed during the hospital encounter of 10/10/23   EKG 12-lead    Collection Time: 10/10/23  9:56 AM    Narrative    Test Reason : W19.XXXA,    Vent. Rate : 056 BPM     Atrial Rate : 056 BPM     P-R Int : 166 ms          QRS Dur : 094 ms      QT Int : 472 ms       P-R-T Axes : 043 -03 024 degrees     QTc Int : 455 ms    Sinus bradycardia  Nonspecific ST and T wave abnormality  When compared with ECG of 15-ABDI-2023 21:56,  Minimal criteria for Anterior infarct are no longer Present  Nonspecific T wave abnormality now evident in Inferior leads  Confirmed by Aron James MD ([id number]) on 10/12/2023 12:45:09 PM    Referred By: SHERI   SELF           Confirmed By:Aron James MD     No valid procedures specified.   Results for orders placed during the hospital encounter of 06/01/21    Nuclear Stress Test    Interpretation Summary    The EKG portion of this study is negative for ischemia.    The patient reported no chest pain during the stress test.    There were no arrhythmias during stress.    The nuclear portion of this study will be reported separately.      Physical Exam:  CONSTITUTIONAL: No fever, no chills  HEENT: Normocephalic, atraumatic,pupils reactive to light                 NECK:  No JVD no carotid  bruit  CVS: S1S2+, RRR, systolic murmurs,   LUNGS: Clear decreased at the bases.  ABDOMEN: Soft, NT, BS+  EXTREMITIES: No cyanosis, edema.  Decreased pulses.  : No odonnell catheter  NEURO: AAO X 3  PSY: Normal affect      Medication List with Changes/Refills   Current Medications    ALBUTEROL (VENTOLIN HFA) 90 MCG/ACTUATION INHALER    Inhale 2 puffs into the lungs every 4 (four) hours as needed. Rescue    ALBUTEROL-IPRATROPIUM (DUO-NEB) 2.5 MG-0.5 MG/3 ML NEBULIZER SOLUTION    Take 3 mLs by nebulization every 6 (six) hours as needed for Wheezing or Shortness of Breath. Rescue    ALUMINUM & MAGNESIUM HYDROXIDE-SIMETHICONE (MYLANTA MAX STRENGTH) 400-400-40 MG/5 ML SUSPENSION    Take 5 mLs by mouth every 6 (six) hours as needed for Indigestion.     AMLODIPINE (NORVASC) 5 MG TABLET    TAKE 1 TABLET(5 MG) BY MOUTH EVERY DAY    AZELASTINE (ASTELIN) 137 MCG (0.1 %) NASAL SPRAY    1 SPRAY BY NASAL ROUTE TWICE DAILY    CALCITRIOL (ROCALTROL) 0.25 MCG CAP    Take 1 capsule (0.25 mcg total) by mouth once daily.    CLOBETASOL (TEMOVATE) 0.05 % CREAM    Apply to feet daily to bid prn flare    CLONAZEPAM (KLONOPIN) 0.5 MG TABLET    TAKE 1/2 TABLET(0.25 MG) BY MOUTH TWICE DAILY AS NEEDED FOR ANXIETY OR INSOMNIA    ESOMEPRAZOLE (NEXIUM) 40 MG CAPSULE    TAKE ONE CAPSULE BY MOUTH TWICE DAILY    EZETIMIBE (ZETIA) 10 MG TABLET    TAKE 1 TABLET(10 MG) BY MOUTH EVERY DAY    FLUTICASONE PROPIONATE (FLONASE) 50 MCG/ACTUATION NASAL SPRAY    1 spray (50 mcg total) by Each Nostril route 2 (two) times daily.    GABAPENTIN (NEURONTIN) 300 MG CAPSULE    Take 1 capsule (300 mg total) by mouth every evening.    MIRABEGRON (MYRBETRIQ) 25 MG TB24 ER TABLET    Take 1 tablet (25 mg total) by mouth once daily.    MUPIROCIN (BACTROBAN) 2 % OINTMENT    3 (three) times daily. Apply to affected area    MUPIROCIN (BACTROBAN) 2 % OINTMENT    Apply topically 3 (three) times daily.    POLYETHYLENE GLYCOL (GLYCOLAX) 17 GRAM PWPK    Take 17 g by mouth daily as  needed.    STIOLTO RESPIMAT 2.5-2.5 MCG/ACTUATION MIST    Inhale 1 puff into the lungs.    TIOTROPIUM BROMIDE (SPIRIVA RESPIMAT) 2.5 MCG/ACTUATION INHALER    Inhale 2 puffs into the lungs Daily. Controller             Assessment:       1. Claudication of both lower extremities    2. Chest pain, unspecified type    3. Essential hypertension, labile    4. Orthostatic hypotension    5. Bradycardia    6. Statin intolerance    7. Anxiety    8. Mixed dyslipidemia         Plan:   1. Claudication of both lower extremities.    Patient has significant PVD and needs further evaluation will do an arterial survey of the lower extremities as well as the LINCOLN of both lower extremities.  2. Intermittent chest pain and heaviness.    Will schedule her for Lexiscan Cardiolite study to evaluate for ischemia.    3. Essential hypertension   Her blood pressure is stable at 120 8/80 and continue amlodipine 5 mg p.o. daily and will also schedule her for a 2D echocardiogram for LV function ejection fraction and wall motion abnormality  4. Orthostatic hypotension   Patient has had history of orthostatic hypotension and syncope she has not had any further episodes in the recent past.    5. Bradycardia   Patient has loop recorder and in the recent loop recorder evaluation she has not had any bradycardia.    6. Statin intolerance   Patient is unable to tolerate statin therapy.  And patient was started on Zetia what she could not take it and she has stopped taking Zetia.    7. Mixed hyperlipidemia    Patient follows up with the primary physician is checking her cholesterol and liver function tests.  8. Anxiety   Patient is on Klonopin and she follows up with the primary physician.    9. I will see her back in the office after the testing has been completed.            Problem List Items Addressed This Visit          Psychiatric    Anxiety       Cardiac/Vascular    Essential hypertension, labile (Chronic)    Mixed dyslipidemia (Chronic)     Bradycardia    Orthostatic hypotension       Other    Statin intolerance (Chronic)     Other Visit Diagnoses       Claudication of both lower extremities    -  Primary    Chest pain, unspecified type                No follow-ups on file.

## 2024-01-29 ENCOUNTER — TELEPHONE (OUTPATIENT)
Dept: CARDIOLOGY | Facility: HOSPITAL | Age: 74
End: 2024-01-29

## 2024-01-29 NOTE — TELEPHONE ENCOUNTER
Patient advised, test will be at [facility]   Will need to register on the first floor at the main entrance.   Patient advised that arrival time is 6:40am.  Patient advised that she may be here about 3.5-4 hours, and may want to bring something to occupy their time, as there will be periods of waiting.    Patient advised, may take her medications prior to testing if you need to.  Advised if she needs to eat to take her medications, please keep it light, like toast and juice.    Patient advised to avoid all caffeine 12 hours prior to testing.  This includes decaf tea and coffee.    Will provide peanut butter crackers for a snack after stress test.  If patient would prefer something else, please bring a snack from home.    Wear comfortable clothing.   No lotions, oils, or powders to the upper chest area. May wear deodorant.    No metal jewelry, buttons, or zippers to the upper body.  Patient verbalizes understanding of instructions.

## 2024-01-30 ENCOUNTER — HOSPITAL ENCOUNTER (OUTPATIENT)
Dept: CARDIOLOGY | Facility: HOSPITAL | Age: 74
Discharge: HOME | End: 2024-01-30
Attending: INTERNAL MEDICINE
Payer: MEDICARE

## 2024-01-30 ENCOUNTER — HOSPITAL ENCOUNTER (OUTPATIENT)
Dept: RADIOLOGY | Facility: HOSPITAL | Age: 74
Discharge: HOME | End: 2024-01-30
Attending: INTERNAL MEDICINE
Payer: MEDICARE

## 2024-01-30 VITALS — WEIGHT: 150 LBS | BODY MASS INDEX: 28.32 KG/M2 | HEIGHT: 61 IN

## 2024-01-30 DIAGNOSIS — I10 ESSENTIAL HYPERTENSION: ICD-10-CM

## 2024-01-30 DIAGNOSIS — F41.9 ANXIETY: ICD-10-CM

## 2024-01-30 DIAGNOSIS — R07.9 CHEST PAIN, UNSPECIFIED TYPE: ICD-10-CM

## 2024-01-30 DIAGNOSIS — I73.9 CLAUDICATION OF BOTH LOWER EXTREMITIES: ICD-10-CM

## 2024-01-30 DIAGNOSIS — Z78.9 STATIN INTOLERANCE: ICD-10-CM

## 2024-01-30 DIAGNOSIS — E78.2 MIXED DYSLIPIDEMIA: ICD-10-CM

## 2024-01-30 DIAGNOSIS — I95.1 ORTHOSTATIC HYPOTENSION: ICD-10-CM

## 2024-01-30 DIAGNOSIS — R00.1 BRADYCARDIA: ICD-10-CM

## 2024-01-30 LAB
AORTIC ROOT ANNULUS: 3.3 CM
AORTIC VALVE CUSP SEPERATION: 1.5 CM
AV INDEX (PROSTH): 0.82
AV MEAN GRADIENT: 6 MMHG
AV PEAK GRADIENT: 12 MMHG
AV VALVE AREA BY VELOCITY RATIO: 2.32 CM²
AV VALVE AREA: 2.33 CM²
AV VELOCITY RATIO: 0.82
BSA FOR ECHO PROCEDURE: 1.71 M2
CV ECHO LV RWT: 0.4 CM
CV PHARM DOSE: 0.4 MG
CV STRESS BASE HR: 60 BPM
DIASTOLIC BLOOD PRESSURE: 65 MMHG
DOP CALC AO PEAK VEL: 1.72 M/S
DOP CALC AO VTI: 38.2 CM
DOP CALC LVOT AREA: 2.8 CM2
DOP CALC LVOT DIAMETER: 1.9 CM
DOP CALC LVOT PEAK VEL: 1.41 M/S
DOP CALC LVOT STROKE VOLUME: 88.98 CM3
DOP CALCLVOT PEAK VEL VTI: 31.4 CM
E WAVE DECELERATION TIME: 211 MSEC
E/A RATIO: 0.94
E/E' RATIO: 9.14 M/S
ECHO LV POSTERIOR WALL: 0.96 CM (ref 0.6–1.1)
EJECTION FRACTION- HIGH: 65 %
END DIASTOLIC INDEX-HIGH: 153 ML/M2
END DIASTOLIC INDEX-LOW: 93 ML/M2
END SYSTOLIC INDEX-HIGH: 71 ML/M2
END SYSTOLIC INDEX-LOW: 31 ML/M2
FRACTIONAL SHORTENING: 36 % (ref 28–44)
INTERVENTRICULAR SEPTUM: 0.87 CM (ref 0.6–1.1)
IVRT: 116 MSEC
LEFT ATRIUM SIZE: 3.8 CM
LEFT ATRIUM VOLUME INDEX MOD: 27.3 ML/M2
LEFT ATRIUM VOLUME MOD: 45.6 CM3
LEFT INTERNAL DIMENSION IN SYSTOLE: 3.07 CM (ref 2.1–4)
LEFT VENTRICLE DIASTOLIC VOLUME INDEX: 64.67 ML/M2
LEFT VENTRICLE DIASTOLIC VOLUME: 108 ML
LEFT VENTRICLE MASS INDEX: 91 G/M2
LEFT VENTRICLE SYSTOLIC VOLUME INDEX: 22.2 ML/M2
LEFT VENTRICLE SYSTOLIC VOLUME: 37 ML
LEFT VENTRICULAR INTERNAL DIMENSION IN DIASTOLE: 4.81 CM (ref 3.5–6)
LEFT VENTRICULAR MASS: 151.58 G
LV LATERAL E/E' RATIO: 10.67 M/S
LV SEPTAL E/E' RATIO: 8 M/S
LVOT MG: 4 MMHG
LVOT MV: 0.93 CM/S
MV PEAK A VEL: 1.02 M/S
MV PEAK E VEL: 0.96 M/S
MV STENOSIS PRESSURE HALF TIME: 48 MS
MV VALVE AREA P 1/2 METHOD: 4.58 CM2
NUC REST DIASTOLIC VOLUME INDEX: 70
NUC REST EJECTION FRACTION: 74
NUC REST SYSTOLIC VOLUME INDEX: 18
NUC STRESS DIASTOLIC VOLUME INDEX: 60
NUC STRESS EJECTION FRACTION: 80 %
NUC STRESS SYSTOLIC VOLUME INDEX: 12
OHS CV CPX 1 MINUTE RECOVERY HEART RATE: 86 BPM
OHS CV CPX 85 PERCENT MAX PREDICTED HEART RATE MALE: 125
OHS CV CPX MAX PREDICTED HEART RATE: 147
OHS CV CPX PATIENT IS FEMALE: 1
OHS CV CPX PATIENT IS MALE: 0
OHS CV CPX PEAK DIASTOLIC BLOOD PRESSURE: 59 MMHG
OHS CV CPX PEAK HEAR RATE: 86 BPM
OHS CV CPX PEAK RATE PRESSURE PRODUCT: 9804
OHS CV CPX PEAK SYSTOLIC BLOOD PRESSURE: 114 MMHG
OHS CV CPX PERCENT MAX PREDICTED HEART RATE ACHIEVED: 61
OHS CV CPX RATE PRESSURE PRODUCT PRESENTING: 8940
PISA TR MAX VEL: 2.18 M/S
RA PRESSURE ESTIMATED: 3 MMHG
RETIRED EF AND QEF - SEE NOTES: 53 %
RIGHT VENTRICULAR END-DIASTOLIC DIMENSION: 2.01 CM
RV TB RVSP: 5 MMHG
SYSTOLIC BLOOD PRESSURE: 149 MMHG
TDI LATERAL: 0.09 M/S
TDI SEPTAL: 0.12 M/S
TDI: 0.11 M/S
TR MAX PG: 19 MMHG
TV REST PULMONARY ARTERY PRESSURE: 22 MMHG
Z-SCORE OF LEFT VENTRICULAR DIMENSION IN END DIASTOLE: 0.3
Z-SCORE OF LEFT VENTRICULAR DIMENSION IN END SYSTOLE: 0.47

## 2024-01-30 PROCEDURE — 93306 TTE W/DOPPLER COMPLETE: CPT

## 2024-01-30 PROCEDURE — 93306 TTE W/DOPPLER COMPLETE: CPT | Mod: 26,,, | Performed by: INTERNAL MEDICINE

## 2024-01-30 PROCEDURE — 93016 CV STRESS TEST SUPVJ ONLY: CPT | Mod: ,,, | Performed by: NURSE PRACTITIONER

## 2024-01-30 PROCEDURE — 78452 HT MUSCLE IMAGE SPECT MULT: CPT | Mod: 26,,, | Performed by: INTERNAL MEDICINE

## 2024-01-30 PROCEDURE — 93018 CV STRESS TEST I&R ONLY: CPT | Mod: ,,, | Performed by: INTERNAL MEDICINE

## 2024-01-30 RX ORDER — REGADENOSON 0.08 MG/ML
0.4 INJECTION, SOLUTION INTRAVENOUS ONCE
Status: COMPLETED | OUTPATIENT
Start: 2024-01-30 | End: 2024-01-30

## 2024-01-30 RX ADMIN — REGADENOSON 0.4 MG: 0.08 INJECTION, SOLUTION INTRAVENOUS at 08:01

## 2024-02-01 ENCOUNTER — TELEPHONE (OUTPATIENT)
Dept: CARDIOLOGY | Facility: CLINIC | Age: 74
End: 2024-02-01
Payer: MEDICARE

## 2024-02-01 NOTE — TELEPHONE ENCOUNTER
----- Message from Gaviota Ramirez NP sent at 1/30/2024  3:28 PM CST -----  Stress test is negative for reversible ischemia. This means there does not appear to be any blockages that are causing a problem (meaning over 70% blocked). According to this test, you are getting enough blood flow to the coronary arteries (the arteries of the heart). The test is about 90% accurate. However if you are having symptoms, we can discuss at follow up unless symptoms are severe in which case please go to the nearest ER. If you are doing well and would like to move your next appointment to a later date, please contact our office.

## 2024-02-06 DIAGNOSIS — R35.0 URINARY FREQUENCY: ICD-10-CM

## 2024-02-06 DIAGNOSIS — F41.1 GAD (GENERALIZED ANXIETY DISORDER): ICD-10-CM

## 2024-02-06 DIAGNOSIS — E55.9 VITAMIN D DEFICIENCY: ICD-10-CM

## 2024-02-06 DIAGNOSIS — R39.15 URINARY URGENCY: ICD-10-CM

## 2024-02-06 DIAGNOSIS — N39.41 URGE INCONTINENCE: ICD-10-CM

## 2024-02-06 DIAGNOSIS — N39.3 STRESS INCONTINENCE: ICD-10-CM

## 2024-02-06 RX ORDER — AMLODIPINE BESYLATE 5 MG/1
5 TABLET ORAL DAILY
Qty: 90 TABLET | Refills: 3 | Status: SHIPPED | OUTPATIENT
Start: 2024-02-06

## 2024-02-06 RX ORDER — CALCITRIOL 0.25 UG/1
0.25 CAPSULE ORAL DAILY
Qty: 90 CAPSULE | Refills: 3 | Status: SHIPPED | OUTPATIENT
Start: 2024-02-06

## 2024-02-06 RX ORDER — CLONAZEPAM 0.5 MG/1
TABLET ORAL
Qty: 30 TABLET | Refills: 3 | Status: SHIPPED | OUTPATIENT
Start: 2024-02-06

## 2024-02-06 RX ORDER — GABAPENTIN 300 MG/1
300 CAPSULE ORAL NIGHTLY
Qty: 30 CAPSULE | Refills: 3 | Status: SHIPPED | OUTPATIENT
Start: 2024-02-06 | End: 2024-06-07

## 2024-02-06 RX ORDER — MIRABEGRON 25 MG/1
25 TABLET, FILM COATED, EXTENDED RELEASE ORAL DAILY
Qty: 90 TABLET | Refills: 3 | Status: SHIPPED | OUTPATIENT
Start: 2024-02-06

## 2024-02-06 NOTE — TELEPHONE ENCOUNTER
No care due was identified.  Health [facility] Embedded Care Due Messages. Reference number: [id number].   2/06/2024 9:50:17 AM CST

## 2024-02-06 NOTE — TELEPHONE ENCOUNTER
----- Message from Meena Ulloa sent at 2/6/2024  9:08 AM CST -----  Contact: pt [phone]  Type:  RX Refill Request    Who Called:  Pt   Refill or New Rx:  new   RX Name and Strength:    calcitRIOL (ROCALTROL) 0.25 MCG Cap  clonazePAM (KLONOPIN) 0.5 MG tablet  amLODIPine (NORVASC) 5 MG tablet    gabapentin (NEURONTIN) 300 MG capsule   mirabegron (MYRBETRIQ) 25 mg Tb24 ER tablet     Is this a 30 day or 90 day RX:  90  Preferred Pharmacy with phone number:    [facility] - [place], [place] - [address] [facility] [address] [id number] [facility] [address]  [place] [address]  Phone: [phone] Fax: [phone]      Local or Mail Order:  Local   Ordering Provider:  GILBERT Farr Call Back Number:  [phone]    Additional Information:  Pt changing pharmacies/ Pls call back and advise

## 2024-02-16 NOTE — TELEPHONE ENCOUNTER
Called patient to discuss a message she sent in to the clinic. Patient stated that she needs a refill on her Gabapentin 300 mg.I am forwarding a message over to Dr Mendez.   Mario calling to advise that procedure has been moved earlier, 2/29/24. Stating was to call office to request review for card clearance.       \"bronchoscopy / EBUS on 2/29/24 (due to cancellation)- please provide cardiac risk assessment and management of Pletal \"      Please review and call to Mario to advise.

## 2024-02-21 ENCOUNTER — TELEPHONE (OUTPATIENT)
Dept: CARDIOLOGY | Facility: CLINIC | Age: 74
End: 2024-02-21
Payer: MEDICARE

## 2024-02-21 NOTE — TELEPHONE ENCOUNTER
----- Message from Gaviota Ramirez NP sent at 1/30/2024  3:26 PM CST -----  No concerning or significant abnormalities noted on echocardiogram.

## 2024-02-22 ENCOUNTER — OFFICE VISIT (OUTPATIENT)
Dept: CARDIOLOGY | Facility: CLINIC | Age: 74
End: 2024-02-22
Payer: MEDICARE

## 2024-02-22 VITALS
SYSTOLIC BLOOD PRESSURE: 134 MMHG | DIASTOLIC BLOOD PRESSURE: 80 MMHG | HEIGHT: 61 IN | BODY MASS INDEX: 27.94 KG/M2 | OXYGEN SATURATION: 98 % | HEART RATE: 60 BPM | WEIGHT: 148 LBS | RESPIRATION RATE: 16 BRPM

## 2024-02-22 DIAGNOSIS — R07.9 CHEST PAIN, UNSPECIFIED TYPE: Primary | ICD-10-CM

## 2024-02-22 DIAGNOSIS — E78.2 MIXED HYPERLIPIDEMIA: ICD-10-CM

## 2024-02-22 DIAGNOSIS — I70.0 AORTIC ATHEROSCLEROSIS: ICD-10-CM

## 2024-02-22 DIAGNOSIS — Z01.818 PRE-OP TESTING: ICD-10-CM

## 2024-02-22 DIAGNOSIS — R00.1 BRADYCARDIA: ICD-10-CM

## 2024-02-22 DIAGNOSIS — R94.39 ABNORMAL NUCLEAR STRESS TEST: ICD-10-CM

## 2024-02-22 DIAGNOSIS — R07.89 CHEST HEAVINESS: ICD-10-CM

## 2024-02-22 DIAGNOSIS — R94.39 ABNORMAL FINDING ON CARDIOVASCULAR STRESS TEST: ICD-10-CM

## 2024-02-22 DIAGNOSIS — I95.1 ORTHOSTATIC HYPOTENSION: ICD-10-CM

## 2024-02-22 DIAGNOSIS — Z78.9 STATIN INTOLERANCE: Chronic | ICD-10-CM

## 2024-02-22 DIAGNOSIS — I10 ESSENTIAL HYPERTENSION: Chronic | ICD-10-CM

## 2024-02-22 PROCEDURE — 99215 OFFICE O/P EST HI 40 MIN: CPT | Mod: S$GLB,,, | Performed by: INTERNAL MEDICINE

## 2024-02-22 PROCEDURE — 1100F PTFALLS ASSESS-DOCD GE2>/YR: CPT | Mod: CPTII,S$GLB,, | Performed by: INTERNAL MEDICINE

## 2024-02-22 PROCEDURE — 1160F RVW MEDS BY RX/DR IN RCRD: CPT | Mod: CPTII,S$GLB,, | Performed by: INTERNAL MEDICINE

## 2024-02-22 PROCEDURE — 1125F AMNT PAIN NOTED PAIN PRSNT: CPT | Mod: CPTII,S$GLB,, | Performed by: INTERNAL MEDICINE

## 2024-02-22 PROCEDURE — 3075F SYST BP GE 130 - 139MM HG: CPT | Mod: CPTII,S$GLB,, | Performed by: INTERNAL MEDICINE

## 2024-02-22 PROCEDURE — 3288F FALL RISK ASSESSMENT DOCD: CPT | Mod: CPTII,S$GLB,, | Performed by: INTERNAL MEDICINE

## 2024-02-22 PROCEDURE — 99999 PR PBB SHADOW E&M-EST. PATIENT-LVL IV: CPT | Mod: PBBFAC,,, | Performed by: INTERNAL MEDICINE

## 2024-02-22 PROCEDURE — 3008F BODY MASS INDEX DOCD: CPT | Mod: CPTII,S$GLB,, | Performed by: INTERNAL MEDICINE

## 2024-02-22 PROCEDURE — 3079F DIAST BP 80-89 MM HG: CPT | Mod: CPTII,S$GLB,, | Performed by: INTERNAL MEDICINE

## 2024-02-22 PROCEDURE — 1159F MED LIST DOCD IN RCRD: CPT | Mod: CPTII,S$GLB,, | Performed by: INTERNAL MEDICINE

## 2024-02-22 RX ORDER — SODIUM CHLORIDE 9 MG/ML
INJECTION, SOLUTION INTRAVENOUS ONCE
Status: CANCELLED | OUTPATIENT
Start: 2024-02-22 | End: 2024-02-22

## 2024-02-22 RX ORDER — SODIUM CHLORIDE 0.9 % (FLUSH) 0.9 %
2 SYRINGE (ML) INJECTION EVERY 6 HOURS PRN
Status: SHIPPED | OUTPATIENT
Start: 2024-02-22

## 2024-02-22 NOTE — H&P (VIEW-ONLY)
"Subjective:    Patient ID:  Kristin Guerra is a 74 y.o. female   \  Chief Complaint   Patient presents with    Results       HPI:  Ms Kristin Guerra is a 74 y.o. female is here for follow-up.    Patient recently had a stress test and an echocardiogram done.    Patient's breathing is good denies any shortness a breath or difficulty in breathing patient does have intermittent chest heaviness approximately about once a week or so , also complains of having pain in the left breast  and denies any loss of consciousness falls or head injury.      Review of patient's allergies indicates:   Allergen Reactions    Doxycycline Itching and Swelling     Prescribed 5/2021, took one dose, describes lip swelling, and "itch all over"    Mirtazapine Other (See Comments)     Hyperactivity.    Morphine Swelling and Rash     Other reaction(s): swelling in limbs    Lexapro [escitalopram oxalate] Other (See Comments)     Worsening tremor.  Sedation.    Primidone Other (See Comments)     Syncope.  Other reaction(s): dizziness,hypotension  Other reaction(s): Unknown  Other reaction(s): Unknown    Simvastatin Nausea And Vomiting and Other (See Comments)     Weakness.  Other reaction(s): nausea,vomiting    Beta-adrenergic agents Other (See Comments)     Low blood pressure and fainted.     Metoprolol succinate Other (See Comments)    Prochlorperazine Other (See Comments)     Other reaction(s): weakness    Topiramate     Compazine [prochlorperazine edisylate] Nausea Only    Tramadol Nausea And Vomiting       Past Medical History:   Diagnosis Date    Allergy     Sterling's esophagus     Colon polyp     Diverticulitis     Diverticulosis     Fatty liver     GERD (gastroesophageal reflux disease)     History of endoscopy 04/07/2021    Dr Uzair Cantor  Impression:            - Normal oropharynx.                         - Hiatal hernia.                         - Sterling's esophagus. Treated with radiofrequency                         ablation.     "                     - Normal stomach.                         - Normal examined duodenum.                         - No specimens collected.     Hx of cervical spine surgery 2018    Hyperlipidemia     Hypertension     Lupus (systemic lupus erythematosus)     discoid    Osteoarthritis     Osteopenia     Syncope     Tremor 2005    essential     Past Surgical History:   Procedure Laterality Date    APPENDECTOMY      removed during colon surgery    CERVICAL FUSION      CERVICAL SPINE SURGERY      Rods and screws.     SECTION      COLON SURGERY  2007    hemicolectomy    COLON SURGERY  2008    repair    COLONOSCOPY N/A 2018    Procedure: COLONOSCOPY;  Surgeon: Maverick Esquivel MD;  Location: [facility];  Service: Endoscopy;  Laterality: N/A; repeat in 5 years for surveillance    COLONOSCOPY N/A 2023    Procedure: COLONOSCOPY;  Surgeon: Uzair Cantor MD;  Location: [facility];  Service: Endoscopy;  Laterality: N/A;    CYSTOSCOPY N/A 2021    Procedure: CYSTOSCOPY;  Surgeon: Allison Miranda MD;  Location: [facility] OR;  Service: Urology;  Laterality: N/A;    ESOPHAGOGASTRODUODENOSCOPY N/A 2019    Procedure: EGD (ESOPHAGOGASTRODUODENOSCOPY);  Surgeon: Monika Steele MD;  Location: [facility];  Service: Endoscopy;  Laterality: N/A;    ESOPHAGOGASTRODUODENOSCOPY  2019    with RFA and biopsies    ESOPHAGOGASTRODUODENOSCOPY N/A 2019    Procedure: EGD (ESOPHAGOGASTRODUODENOSCOPY);  Surgeon: Uzair Cantor MD;  Location: [facility];  Service: Endoscopy;  Laterality: N/A;  n+v with anesthesia    ESOPHAGOGASTRODUODENOSCOPY N/A 2019    Procedure: EGD (ESOPHAGOGASTRODUODENOSCOPY);  Surgeon: Uzair Cantor MD;  Location: [facility];  Service: Endoscopy;  Laterality: N/A;    ESOPHAGOGASTRODUODENOSCOPY N/A 10/7/2019    Procedure: EGD (ESOPHAGOGASTRODUODENOSCOPY);  Surgeon: Uzair Cantor MD;  Location: [facility];  Service: Endoscopy;  Laterality: N/A;     ESOPHAGOGASTRODUODENOSCOPY N/A 2/10/2020    Procedure: EGD (ESOPHAGOGASTRODUODENOSCOPY);  Surgeon: Uzair Cantor MD;  Location: [facility];  Service: Endoscopy;  Laterality: N/A;  Requests earliest procedure time    ESOPHAGOGASTRODUODENOSCOPY N/A 8/17/2020    Procedure: EGD (ESOPHAGOGASTRODUODENOSCOPY);  Surgeon: Uzair Cantor MD;  Location: [facility];  Service: Endoscopy;  Laterality: N/A;  BARRETTS    ESOPHAGOGASTRODUODENOSCOPY N/A 4/7/2021    Procedure: ESOPHAGOGASTRODUODENOSCOPY (EGD);  Surgeon: Uzair Cantor MD;  Location: [facility];  Service: Endoscopy;  Laterality: N/A;  Needs Rapid Covid MP    ESOPHAGOGASTRODUODENOSCOPY N/A 11/30/2021    Procedure: EGD (ESOPHAGOGASTRODUODENOSCOPY);  Surgeon: Uzair Cantor MD;  Location: [facility];  Service: Endoscopy;  Laterality: N/A;    ESOPHAGOGASTRODUODENOSCOPY N/A 6/27/2022    Procedure: EGD (ESOPHAGOGASTRODUODENOSCOPY);  Surgeon: Uzair Cantor MD;  Location: [facility];  Service: Endoscopy;  Laterality: N/A;  5/19/22: fully vaccinated. instructions mailed-[place]    loop recorder only    ESOPHAGOGASTRODUODENOSCOPY N/A 8/7/2023    Procedure: EGD (ESOPHAGOGASTRODUODENOSCOPY);  Surgeon: Uzair Cantor MD;  Location: [facility];  Service: Endoscopy;  Laterality: N/A;    HYSTERECTOMY      ovaries remain    INSERTION OF IMPLANTABLE LOOP RECORDER N/A 7/17/2020    Procedure: INSERTION, IMPLANTABLE LOOP RECORDER (MEDTRONIC);  Surgeon: Karyna Vasques MD;  Location: [facility] CATH/EP LAB;  Service: Cardiology;  Laterality: N/A;    SPLENECTOMY, TOTAL  2007    Injured during surgery and removed    UPPER GASTROINTESTINAL ENDOSCOPY  2007    GERD & hiatal hernia per patient report     Social History     Tobacco Use    Smoking status: Every Day     Current packs/day: 1.00     Average packs/day: 1 pack/day for 35.0 years (35.0 ttl pk-yrs)     Types: Cigarettes     Passive exposure: Current    Smokeless tobacco: Never   Substance Use Topics    Alcohol use: No     Drug use: No     Family History   Problem Relation Age of Onset    Lymphoma Mother         Brain    Colon cancer Father         diagnosed in his 60's    Lung cancer Father     Cancer Brother         prostate    Lung cancer Brother     Lung cancer Brother         metastatic from prostate    Prostate cancer Brother     Heart disease Brother         valvular disease    Hyperlipidemia Brother     Eczema Neg Hx     Lupus Neg Hx     Psoriasis Neg Hx     Melanoma Neg Hx     Crohn's disease Neg Hx     Ulcerative colitis Neg Hx     Stomach cancer Neg Hx     Esophageal cancer Neg Hx         Review of Systems:   Constitution: Negative for diaphoresis and fever.   HEENT: Negative for nosebleeds.    Cardiovascular:  Positive for chest pain       No dyspnea on exertion       No leg swelling        No palpitations  Respiratory: Negative for shortness of breath and wheezing.    Hematologic/Lymphatic: Negative for bleeding problem. Does not bruise/bleed easily.   Skin: Negative for color change and rash.   Musculoskeletal: Negative for falls and myalgias.   Gastrointestinal: Negative for hematemesis and hematochezia.   Genitourinary: Negative for hematuria.   Neurological: Negative for dizziness and light-headedness.   Psychiatric/Behavioral: Negative for altered mental status and memory loss.          Objective:        Vitals:    02/22/24 1349   BP: 134/80   Pulse: 60   Resp: 16       Lab Results   Component Value Date    WBC 8.62 11/09/2023    HGB 12.6 11/09/2023    HCT 38.0 11/09/2023     11/09/2023    CHOL 202 (H) 11/09/2023    TRIG 80 11/09/2023    HDL 47 11/09/2023    ALT 12 11/09/2023    AST 17 11/09/2023     11/09/2023    K 3.9 11/09/2023     11/09/2023    CREATININE 0.7 11/09/2023    BUN 17 11/09/2023    CO2 21 (L) 11/09/2023    TSH 2.178 02/09/2022        ECHOCARDIOGRAM RESULTS  Results for orders placed during the hospital encounter of 01/30/24    Echo    Interpretation Summary    Left Ventricle: The  left ventricle is normal in size. Normal wall thickness. Normal wall motion. There is normal systolic function with a visually estimated ejection fraction of 65 - 70%. There is normal diastolic function.    Right Ventricle: Normal right ventricular cavity size. Wall thickness is normal. Right ventricle wall motion  is normal. Systolic function is normal.    Aortic Valve: The aortic valve is a trileaflet valve. Mildly calcified left, right and noncoronary cusps.    Mitral Valve: There is mild posterior mitral annular calcification present.    Pulmonary Artery: The estimated pulmonary artery systolic pressure is 22 mmHg.    IVC/SVC: Normal venous pressure at 3 mmHg.        CURRENT/PREVIOUS VISIT EKG  Results for orders placed or performed during the hospital encounter of 10/10/23   EKG 12-lead    Collection Time: 10/10/23  9:56 AM    Narrative    Test Reason : W19.XXXA,    Vent. Rate : 056 BPM     Atrial Rate : 056 BPM     P-R Int : 166 ms          QRS Dur : 094 ms      QT Int : 472 ms       P-R-T Axes : 043 -03 024 degrees     QTc Int : 455 ms    Sinus bradycardia  Nonspecific ST and T wave abnormality  When compared with ECG of 15-ABDI-2023 21:56,  Minimal criteria for Anterior infarct are no longer Present  Nonspecific T wave abnormality now evident in Inferior leads  Confirmed by Aron James MD ([id number]) on 10/12/2023 12:45:09 PM    Referred By: [email or web address]   SELF           Confirmed By:Aron James MD     No valid procedures specified.   Results for orders placed during the hospital encounter of 01/30/24    Nuclear Stress - Cardiology Interpreted    Interpretation Summary    Abnormal myocardial perfusion scan.    There is a mild to moderate intensity, small to moderate sized, mostly fixed perfusion abnormality with some reversibilty in the anterior and lateral wall(s).    There are no other significant perfusion abnormalities.    The gated perfusion images showed an ejection fraction of 74% at rest. The gated  perfusion images showed an ejection fraction of 80% post stress. Normal ejection fraction is greater than 53%.    There is normal wall motion at rest and post stress.    LV cavity size is normal at rest and normal at stress.    The ECG portion of the study is negative for ischemia.    The patient reported chest pain during the stress test.    There were no arrhythmias during stress.      Physical Exam:  CONSTITUTIONAL: No fever, no chills  HEENT: Normocephalic, atraumatic,pupils reactive to light                 NECK:  No JVD no carotid bruit  CVS: S1S2+, RRR, systolic murmurs,   LUNGS: Clear  ABDOMEN: Soft, NT, BS+  EXTREMITIES: No cyanosis, edema  : No odonnell catheter  NEURO: AAO X 3  PSY: Normal affect      Medication List with Changes/Refills   Current Medications    ALBUTEROL (VENTOLIN HFA) 90 MCG/ACTUATION INHALER    Inhale 2 puffs into the lungs every 4 (four) hours as needed. Rescue    ALBUTEROL-IPRATROPIUM (DUO-NEB) 2.5 MG-0.5 MG/3 ML NEBULIZER SOLUTION    Take 3 mLs by nebulization every 6 (six) hours as needed for Wheezing or Shortness of Breath. Rescue    ALUMINUM & MAGNESIUM HYDROXIDE-SIMETHICONE (MYLANTA MAX STRENGTH) 400-400-40 MG/5 ML SUSPENSION    Take 5 mLs by mouth every 6 (six) hours as needed for Indigestion.     AMLODIPINE (NORVASC) 5 MG TABLET    Take 1 tablet (5 mg total) by mouth once daily.    AZELASTINE (ASTELIN) 137 MCG (0.1 %) NASAL SPRAY    1 SPRAY BY NASAL ROUTE TWICE DAILY    CALCITRIOL (ROCALTROL) 0.25 MCG CAP    Take 1 capsule (0.25 mcg total) by mouth once daily.    CLOBETASOL (TEMOVATE) 0.05 % CREAM    Apply to feet daily to bid prn flare    CLONAZEPAM (KLONOPIN) 0.5 MG TABLET    TAKE 1/2 TABLET(0.25 MG) BY MOUTH TWICE DAILY AS NEEDED FOR ANXIETY OR INSOMNIA    ESOMEPRAZOLE (NEXIUM) 40 MG CAPSULE    TAKE ONE CAPSULE BY MOUTH TWICE DAILY    EZETIMIBE (ZETIA) 10 MG TABLET    TAKE 1 TABLET(10 MG) BY MOUTH EVERY DAY    FLUTICASONE PROPIONATE (FLONASE) 50 MCG/ACTUATION NASAL SPRAY     1 spray (50 mcg total) by Each Nostril route 2 (two) times daily.    GABAPENTIN (NEURONTIN) 300 MG CAPSULE    Take 1 capsule (300 mg total) by mouth every evening.    MIRABEGRON (MYRBETRIQ) 25 MG TB24 ER TABLET    Take 1 tablet (25 mg total) by mouth once daily.    MUPIROCIN (BACTROBAN) 2 % OINTMENT    3 (three) times daily. Apply to affected area    MUPIROCIN (BACTROBAN) 2 % OINTMENT    Apply topically 3 (three) times daily.    POLYETHYLENE GLYCOL (GLYCOLAX) 17 GRAM PWPK    Take 17 g by mouth daily as needed.    STIOLTO RESPIMAT 2.5-2.5 MCG/ACTUATION MIST    Inhale 1 puff into the lungs.    TIOTROPIUM BROMIDE (SPIRIVA RESPIMAT) 2.5 MCG/ACTUATION INHALER    Inhale 2 puffs into the lungs Daily. Controller             Assessment:       1. Chest pain, unspecified type    2. Chest heaviness    3. Abnormal finding on cardiovascular stress test    4. Orthostatic hypotension    5. Essential hypertension, labile    6. Statin intolerance    7. Bradycardia    8. Aortic atherosclerosis    9. Mixed hyperlipidemia         Plan:   1. Intermittent chest pain and heavy chest heaviness.    Patient also had pain during the stress myocardial perfusion study.    Essentially need to rule out coronary artery disease.    2. Abnormal myocardial perfusion study.    She has got a fixed anterior and apical wall perfusion defect with some reversibility.  Suggestive of possible ischemia.  Discussed with patient the risks benefits and options and options of cardiac catheterization and coronary angiography versus medical management versus CTA of the chest versus stress echo.  Patient wants to proceed with doing cardiac catheterization and coronary angiography.  Discussed with patient the benefits and options at length in detail.  Coronary Angiogram +/- PCI  AntiPlatelet therapy-  Asprin Plavix Brilinta  Access - Bilateral CFA/ Radial  Allergies : No Iodine Allergy  Pre Hydration IVF  cc /hr  Pre Procedure Medication : Benadryl 25 - 50 mg po  prior to procedure X 1  No recent head trauma.  No bleeding issues or Blood in the stools or Urine.   Risks benefits and alternate options were explained to the Patient.    Risks include but not limited to bleeding, allergic reaction to the dye, vascular damage, renal failure with potential need for Dialysis, Infection, Rhythm abnormalities, stroke, myocardial infarction, emergency bypass surgery and death.    The risks is of moderate sedation include hypotension respiratory depression arrhythmias bronchospasm and death.  Patient does understand these risks and wants to proceed with cardiac catheterization.  Should stenting be indicated, patient has agreed to dual antiplatelet therapy for 12 to 18 months with drug-eluting stent and a minimum of 1 month of dual antiplatelet therapy with the use of bare metal stent.  Additionally patient is aware of the noncompliance with medications is likely to result in the stent clotting with heart attack and heart failure and or death.  All questions have been answered to patient's satisfaction.And patient has voiced to proceed with the procedure.  Verbal consent has been obtained and patient is agreeable to proceed with the procedure.     3. Orthostatic hypotension   Patient's blood pressure is stable at 130 4/80 and continue her current medications.  4. Statin intolerance   She is currently on Zetia 10 mg p.o. daily continue the same and she is currently not on any cholesterol-lowering agent.  5. Bradycardia   Patient is bradycardic a seemed however she is asymptomatic and she has not on any medication that would cause bradycardia  6. Mixed hyperlipidemia continue Zetia for now.  7. Aortic atherosclerosis   Will start her on aspirin 81 mg p.o. daily.  8. Continue current management and she had a recent echocardiogram done which showed normal left ventricle systolic murmur and mild calcification of the aortic valve as well as the mitral valve.  9. I will see her back in the  office after the testing has been completed.          Problem List Items Addressed This Visit          Cardiac/Vascular    Essential hypertension, labile (Chronic)    Bradycardia    Orthostatic hypotension    Aortic atherosclerosis       Other    Statin intolerance (Chronic)     Other Visit Diagnoses       Chest pain, unspecified type    -  Primary    Chest heaviness        Abnormal finding on cardiovascular stress test        Mixed hyperlipidemia                No follow-ups on file.

## 2024-02-22 NOTE — PROGRESS NOTES
"Subjective:    Patient ID:  Kristin Guerra is a 74 y.o. female   \  Chief Complaint   Patient presents with    Results       HPI:  Ms Kristin Guerra is a 74 y.o. female is here for follow-up.    Patient recently had a stress test and an echocardiogram done.    Patient's breathing is good denies any shortness a breath or difficulty in breathing patient does have intermittent chest heaviness approximately about once a week or so , also complains of having pain in the left breast  and denies any loss of consciousness falls or head injury.      Review of patient's allergies indicates:   Allergen Reactions    Doxycycline Itching and Swelling     Prescribed 5/2021, took one dose, describes lip swelling, and "itch all over"    Mirtazapine Other (See Comments)     Hyperactivity.    Morphine Swelling and Rash     Other reaction(s): swelling in limbs    Lexapro [escitalopram oxalate] Other (See Comments)     Worsening tremor.  Sedation.    Primidone Other (See Comments)     Syncope.  Other reaction(s): dizziness,hypotension  Other reaction(s): Unknown  Other reaction(s): Unknown    Simvastatin Nausea And Vomiting and Other (See Comments)     Weakness.  Other reaction(s): nausea,vomiting    Beta-adrenergic agents Other (See Comments)     Low blood pressure and fainted.     Metoprolol succinate Other (See Comments)    Prochlorperazine Other (See Comments)     Other reaction(s): weakness    Topiramate     Compazine [prochlorperazine edisylate] Nausea Only    Tramadol Nausea And Vomiting       Past Medical History:   Diagnosis Date    Allergy     Sterling's esophagus     Colon polyp     Diverticulitis     Diverticulosis     Fatty liver     GERD (gastroesophageal reflux disease)     History of endoscopy 04/07/2021    Dr Uzair Cantor  Impression:            - Normal oropharynx.                         - Hiatal hernia.                         - Sterling's esophagus. Treated with radiofrequency                         ablation.     "                     - Normal stomach.                         - Normal examined duodenum.                         - No specimens collected.     Hx of cervical spine surgery 2018    Hyperlipidemia     Hypertension     Lupus (systemic lupus erythematosus)     discoid    Osteoarthritis     Osteopenia     Syncope     Tremor 2005    essential     Past Surgical History:   Procedure Laterality Date    APPENDECTOMY      removed during colon surgery    CERVICAL FUSION      CERVICAL SPINE SURGERY      Rods and screws.     SECTION      COLON SURGERY  2007    hemicolectomy    COLON SURGERY  2008    repair    COLONOSCOPY N/A 2018    Procedure: COLONOSCOPY;  Surgeon: Maverick Esquivel MD;  Location: [facility];  Service: Endoscopy;  Laterality: N/A; repeat in 5 years for surveillance    COLONOSCOPY N/A 2023    Procedure: COLONOSCOPY;  Surgeon: Uzair Cantor MD;  Location: [facility];  Service: Endoscopy;  Laterality: N/A;    CYSTOSCOPY N/A 2021    Procedure: CYSTOSCOPY;  Surgeon: Allison Miranda MD;  [facility];  Service: Urology;  Laterality: N/A;    ESOPHAGOGASTRODUODENOSCOPY N/A 2019    Procedure: EGD (ESOPHAGOGASTRODUODENOSCOPY);  Surgeon: Monika Steele MD;  Location: [facility];  Service: Endoscopy;  Laterality: N/A;    ESOPHAGOGASTRODUODENOSCOPY  2019    with RFA and biopsies    ESOPHAGOGASTRODUODENOSCOPY N/A 2019    Procedure: EGD (ESOPHAGOGASTRODUODENOSCOPY);  Surgeon: Uzair Cantor MD;  Location: [facility];  Service: Endoscopy;  Laterality: N/A;  n+v with anesthesia    ESOPHAGOGASTRODUODENOSCOPY N/A 2019    Procedure: EGD (ESOPHAGOGASTRODUODENOSCOPY);  Surgeon: Uzair Cantor MD;  Location: [facility];  Service: Endoscopy;  Laterality: N/A;    ESOPHAGOGASTRODUODENOSCOPY N/A 10/7/2019    Procedure: EGD (ESOPHAGOGASTRODUODENOSCOPY);  Surgeon: Uzair Cantor MD;  Location: [facility];  Service: Endoscopy;  Laterality: N/A;     ESOPHAGOGASTRODUODENOSCOPY N/A 2/10/2020    Procedure: EGD (ESOPHAGOGASTRODUODENOSCOPY);  Surgeon: Uzair Cantor MD;  Location: [facility];  Service: Endoscopy;  Laterality: N/A;  Requests earliest procedure time    ESOPHAGOGASTRODUODENOSCOPY N/A 8/17/2020    Procedure: EGD (ESOPHAGOGASTRODUODENOSCOPY);  Surgeon: Uzair Cantor MD;  Location: [facility];  Service: Endoscopy;  Laterality: N/A;  BARRETTS    ESOPHAGOGASTRODUODENOSCOPY N/A 4/7/2021    Procedure: ESOPHAGOGASTRODUODENOSCOPY (EGD);  Surgeon: Uzair Cantor MD;  Location: [facility];  Service: Endoscopy;  Laterality: N/A;  Needs Rapid Covid MP    ESOPHAGOGASTRODUODENOSCOPY N/A 11/30/2021    Procedure: EGD (ESOPHAGOGASTRODUODENOSCOPY);  Surgeon: Uzair Cantor MD;  Location: [facility];  Service: Endoscopy;  Laterality: N/A;    ESOPHAGOGASTRODUODENOSCOPY N/A 6/27/2022    Procedure: EGD (ESOPHAGOGASTRODUODENOSCOPY);  Surgeon: Uzair Cantor MD;  Location: [facility];  Service: Endoscopy;  Laterality: N/A;  5/19/22: fully vaccinated. instructions mailed-[place]    loop recorder only    ESOPHAGOGASTRODUODENOSCOPY N/A 8/7/2023    Procedure: EGD (ESOPHAGOGASTRODUODENOSCOPY);  Surgeon: Uzari Cantor MD;  Location: [facility];  Service: Endoscopy;  Laterality: N/A;    HYSTERECTOMY      ovaries remain    INSERTION OF IMPLANTABLE LOOP RECORDER N/A 7/17/2020    Procedure: INSERTION, IMPLANTABLE LOOP RECORDER (MEDTRONIC);  Surgeon: Karyna Vasques MD;  Location: [facility] CATH/EP LAB;  Service: Cardiology;  Laterality: N/A;    SPLENECTOMY, TOTAL  2007    Injured during surgery and removed    UPPER GASTROINTESTINAL ENDOSCOPY  2007    GERD & hiatal hernia per patient report     Social History     Tobacco Use    Smoking status: Every Day     Current packs/day: 1.00     Average packs/day: 1 pack/day for 35.0 years (35.0 ttl pk-yrs)     Types: Cigarettes     Passive exposure: Current    Smokeless tobacco: Never   Substance Use Topics    Alcohol use: No     Drug use: No     Family History   Problem Relation Age of Onset    Lymphoma Mother         Brain    Colon cancer Father         diagnosed in his 60's    Lung cancer Father     Cancer Brother         prostate    Lung cancer Brother     Lung cancer Brother         metastatic from prostate    Prostate cancer Brother     Heart disease Brother         valvular disease    Hyperlipidemia Brother     Eczema Neg Hx     Lupus Neg Hx     Psoriasis Neg Hx     Melanoma Neg Hx     Crohn's disease Neg Hx     Ulcerative colitis Neg Hx     Stomach cancer Neg Hx     Esophageal cancer Neg Hx         Review of Systems:   Constitution: Negative for diaphoresis and fever.   HEENT: Negative for nosebleeds.    Cardiovascular:  Positive for chest pain       No dyspnea on exertion       No leg swelling        No palpitations  Respiratory: Negative for shortness of breath and wheezing.    Hematologic/Lymphatic: Negative for bleeding problem. Does not bruise/bleed easily.   Skin: Negative for color change and rash.   Musculoskeletal: Negative for falls and myalgias.   Gastrointestinal: Negative for hematemesis and hematochezia.   Genitourinary: Negative for hematuria.   Neurological: Negative for dizziness and light-headedness.   Psychiatric/Behavioral: Negative for altered mental status and memory loss.          Objective:        Vitals:    02/22/24 1349   BP: 134/80   Pulse: 60   Resp: 16       Lab Results   Component Value Date    WBC 8.62 11/09/2023    HGB 12.6 11/09/2023    HCT 38.0 11/09/2023     11/09/2023    CHOL 202 (H) 11/09/2023    TRIG 80 11/09/2023    HDL 47 11/09/2023    ALT 12 11/09/2023    AST 17 11/09/2023     11/09/2023    K 3.9 11/09/2023     11/09/2023    CREATININE 0.7 11/09/2023    BUN 17 11/09/2023    CO2 21 (L) 11/09/2023    TSH 2.178 02/09/2022        ECHOCARDIOGRAM RESULTS  Results for orders placed during the hospital encounter of 01/30/24    Echo    Interpretation Summary    Left Ventricle: The  left ventricle is normal in size. Normal wall thickness. Normal wall motion. There is normal systolic function with a visually estimated ejection fraction of 65 - 70%. There is normal diastolic function.    Right Ventricle: Normal right ventricular cavity size. Wall thickness is normal. Right ventricle wall motion  is normal. Systolic function is normal.    Aortic Valve: The aortic valve is a trileaflet valve. Mildly calcified left, right and noncoronary cusps.    Mitral Valve: There is mild posterior mitral annular calcification present.    Pulmonary Artery: The estimated pulmonary artery systolic pressure is 22 mmHg.    IVC/SVC: Normal venous pressure at 3 mmHg.        CURRENT/PREVIOUS VISIT EKG  Results for orders placed or performed during the hospital encounter of 10/10/23   EKG 12-lead    Collection Time: 10/10/23  9:56 AM    Narrative    Test Reason : W19.XXXA,    Vent. Rate : 056 BPM     Atrial Rate : 056 BPM     P-R Int : 166 ms          QRS Dur : 094 ms      QT Int : 472 ms       P-R-T Axes : 043 -03 024 degrees     QTc Int : 455 ms    Sinus bradycardia  Nonspecific ST and T wave abnormality  When compared with ECG of 15-ABDI-2023 21:56,  Minimal criteria for Anterior infarct are no longer Present  Nonspecific T wave abnormality now evident in Inferior leads  Confirmed by Aron James MD ([id number]) on 10/12/2023 12:45:09 PM    Referred By: [email or web address]   SELF           Confirmed By:Aron James MD     No valid procedures specified.   Results for orders placed during the hospital encounter of 01/30/24    Nuclear Stress - Cardiology Interpreted    Interpretation Summary    Abnormal myocardial perfusion scan.    There is a mild to moderate intensity, small to moderate sized, mostly fixed perfusion abnormality with some reversibilty in the anterior and lateral wall(s).    There are no other significant perfusion abnormalities.    The gated perfusion images showed an ejection fraction of 74% at rest. The gated  perfusion images showed an ejection fraction of 80% post stress. Normal ejection fraction is greater than 53%.    There is normal wall motion at rest and post stress.    LV cavity size is normal at rest and normal at stress.    The ECG portion of the study is negative for ischemia.    The patient reported chest pain during the stress test.    There were no arrhythmias during stress.      Physical Exam:  CONSTITUTIONAL: No fever, no chills  HEENT: Normocephalic, atraumatic,pupils reactive to light                 NECK:  No JVD no carotid bruit  CVS: S1S2+, RRR, systolic murmurs,   LUNGS: Clear  ABDOMEN: Soft, NT, BS+  EXTREMITIES: No cyanosis, edema  : No odonnell catheter  NEURO: AAO X 3  PSY: Normal affect      Medication List with Changes/Refills   Current Medications    ALBUTEROL (VENTOLIN HFA) 90 MCG/ACTUATION INHALER    Inhale 2 puffs into the lungs every 4 (four) hours as needed. Rescue    ALBUTEROL-IPRATROPIUM (DUO-NEB) 2.5 MG-0.5 MG/3 ML NEBULIZER SOLUTION    Take 3 mLs by nebulization every 6 (six) hours as needed for Wheezing or Shortness of Breath. Rescue    ALUMINUM & MAGNESIUM HYDROXIDE-SIMETHICONE (MYLANTA MAX STRENGTH) 400-400-40 MG/5 ML SUSPENSION    Take 5 mLs by mouth every 6 (six) hours as needed for Indigestion.     AMLODIPINE (NORVASC) 5 MG TABLET    Take 1 tablet (5 mg total) by mouth once daily.    AZELASTINE (ASTELIN) 137 MCG (0.1 %) NASAL SPRAY    1 SPRAY BY NASAL ROUTE TWICE DAILY    CALCITRIOL (ROCALTROL) 0.25 MCG CAP    Take 1 capsule (0.25 mcg total) by mouth once daily.    CLOBETASOL (TEMOVATE) 0.05 % CREAM    Apply to feet daily to bid prn flare    CLONAZEPAM (KLONOPIN) 0.5 MG TABLET    TAKE 1/2 TABLET(0.25 MG) BY MOUTH TWICE DAILY AS NEEDED FOR ANXIETY OR INSOMNIA    ESOMEPRAZOLE (NEXIUM) 40 MG CAPSULE    TAKE ONE CAPSULE BY MOUTH TWICE DAILY    EZETIMIBE (ZETIA) 10 MG TABLET    TAKE 1 TABLET(10 MG) BY MOUTH EVERY DAY    FLUTICASONE PROPIONATE (FLONASE) 50 MCG/ACTUATION NASAL SPRAY     1 spray (50 mcg total) by Each Nostril route 2 (two) times daily.    GABAPENTIN (NEURONTIN) 300 MG CAPSULE    Take 1 capsule (300 mg total) by mouth every evening.    MIRABEGRON (MYRBETRIQ) 25 MG TB24 ER TABLET    Take 1 tablet (25 mg total) by mouth once daily.    MUPIROCIN (BACTROBAN) 2 % OINTMENT    3 (three) times daily. Apply to affected area    MUPIROCIN (BACTROBAN) 2 % OINTMENT    Apply topically 3 (three) times daily.    POLYETHYLENE GLYCOL (GLYCOLAX) 17 GRAM PWPK    Take 17 g by mouth daily as needed.    STIOLTO RESPIMAT 2.5-2.5 MCG/ACTUATION MIST    Inhale 1 puff into the lungs.    TIOTROPIUM BROMIDE (SPIRIVA RESPIMAT) 2.5 MCG/ACTUATION INHALER    Inhale 2 puffs into the lungs Daily. Controller             Assessment:       1. Chest pain, unspecified type    2. Chest heaviness    3. Abnormal finding on cardiovascular stress test    4. Orthostatic hypotension    5. Essential hypertension, labile    6. Statin intolerance    7. Bradycardia    8. Aortic atherosclerosis    9. Mixed hyperlipidemia         Plan:   1. Intermittent chest pain and heavy chest heaviness.    Patient also had pain during the stress myocardial perfusion study.    Essentially need to rule out coronary artery disease.    2. Abnormal myocardial perfusion study.    She has got a fixed anterior and apical wall perfusion defect with some reversibility.  Suggestive of possible ischemia.  Discussed with patient the risks benefits and options and options of cardiac catheterization and coronary angiography versus medical management versus CTA of the chest versus stress echo.  Patient wants to proceed with doing cardiac catheterization and coronary angiography.  Discussed with patient the benefits and options at length in detail.  Coronary Angiogram +/- PCI  AntiPlatelet therapy-  Asprin Plavix Brilinta  Access - Bilateral CFA/ Radial  Allergies : No Iodine Allergy  Pre Hydration IVF  cc /hr  Pre Procedure Medication : Benadryl 25 - 50 mg po  prior to procedure X 1  No recent head trauma.  No bleeding issues or Blood in the stools or Urine.   Risks benefits and alternate options were explained to the Patient.    Risks include but not limited to bleeding, allergic reaction to the dye, vascular damage, renal failure with potential need for Dialysis, Infection, Rhythm abnormalities, stroke, myocardial infarction, emergency bypass surgery and death.    The risks is of moderate sedation include hypotension respiratory depression arrhythmias bronchospasm and death.  Patient does understand these risks and wants to proceed with cardiac catheterization.  Should stenting be indicated, patient has agreed to dual antiplatelet therapy for 12 to 18 months with drug-eluting stent and a minimum of 1 month of dual antiplatelet therapy with the use of bare metal stent.  Additionally patient is aware of the noncompliance with medications is likely to result in the stent clotting with heart attack and heart failure and or death.  All questions have been answered to patient's satisfaction.And patient has voiced to proceed with the procedure.  Verbal consent has been obtained and patient is agreeable to proceed with the procedure.     3. Orthostatic hypotension   Patient's blood pressure is stable at 130 4/80 and continue her current medications.  4. Statin intolerance   She is currently on Zetia 10 mg p.o. daily continue the same and she is currently not on any cholesterol-lowering agent.  5. Bradycardia   Patient is bradycardic a seemed however she is asymptomatic and she has not on any medication that would cause bradycardia  6. Mixed hyperlipidemia continue Zetia for now.  7. Aortic atherosclerosis   Will start her on aspirin 81 mg p.o. daily.  8. Continue current management and she had a recent echocardiogram done which showed normal left ventricle systolic murmur and mild calcification of the aortic valve as well as the mitral valve.  9. I will see her back in the  office after the testing has been completed.          Problem List Items Addressed This Visit          Cardiac/Vascular    Essential hypertension, labile (Chronic)    Bradycardia    Orthostatic hypotension    Aortic atherosclerosis       Other    Statin intolerance (Chronic)     Other Visit Diagnoses       Chest pain, unspecified type    -  Primary    Chest heaviness        Abnormal finding on cardiovascular stress test        Mixed hyperlipidemia                No follow-ups on file.

## 2024-03-07 ENCOUNTER — HOSPITAL ENCOUNTER (OUTPATIENT)
Dept: RADIOLOGY | Facility: HOSPITAL | Age: 74
Discharge: HOME | End: 2024-03-07
Attending: INTERNAL MEDICINE
Payer: MEDICARE

## 2024-03-07 ENCOUNTER — HOSPITAL ENCOUNTER (OUTPATIENT)
Dept: PREADMISSION TESTING | Facility: HOSPITAL | Age: 74
Discharge: HOME | End: 2024-03-07
Attending: STUDENT IN AN ORGANIZED HEALTH CARE EDUCATION/TRAINING PROGRAM
Payer: MEDICARE

## 2024-03-07 VITALS
WEIGHT: 146.88 LBS | TEMPERATURE: 98 F | BODY MASS INDEX: 27.73 KG/M2 | SYSTOLIC BLOOD PRESSURE: 138 MMHG | HEIGHT: 61 IN | OXYGEN SATURATION: 97 % | HEART RATE: 74 BPM | RESPIRATION RATE: 18 BRPM | DIASTOLIC BLOOD PRESSURE: 79 MMHG

## 2024-03-07 DIAGNOSIS — R94.39 ABNORMAL FINDING ON CARDIOVASCULAR STRESS TEST: ICD-10-CM

## 2024-03-07 DIAGNOSIS — R94.39 ABNORMAL NUCLEAR STRESS TEST: ICD-10-CM

## 2024-03-07 DIAGNOSIS — R07.9 CHEST PAIN, UNSPECIFIED TYPE: ICD-10-CM

## 2024-03-07 LAB
ALBUMIN SERPL BCP-MCNC: 4.1 G/DL (ref 3.5–5.2)
ALP SERPL-CCNC: 78 U/L (ref 55–135)
ALT SERPL W/O P-5'-P-CCNC: 13 U/L (ref 10–44)
ANION GAP SERPL CALC-SCNC: 7 MMOL/L (ref 8–16)
APTT PPP: 26.5 SEC (ref 21–32)
AST SERPL-CCNC: 16 U/L (ref 10–40)
BASOPHILS # BLD AUTO: 0.08 K/UL (ref 0–0.2)
BASOPHILS NFR BLD: 0.8 % (ref 0–1.9)
BILIRUB SERPL-MCNC: 0.4 MG/DL (ref 0.1–1)
BUN SERPL-MCNC: 14 MG/DL (ref 8–23)
CALCIUM SERPL-MCNC: 9.4 MG/DL (ref 8.7–10.5)
CHLORIDE SERPL-SCNC: 107 MMOL/L (ref 95–110)
CO2 SERPL-SCNC: 26 MMOL/L (ref 23–29)
CREAT SERPL-MCNC: 0.6 MG/DL (ref 0.5–1.4)
DIFFERENTIAL METHOD BLD: ABNORMAL
EOSINOPHIL # BLD AUTO: 0.1 K/UL (ref 0–0.5)
EOSINOPHIL NFR BLD: 1.1 % (ref 0–8)
ERYTHROCYTE [DISTWIDTH] IN BLOOD BY AUTOMATED COUNT: 15 % (ref 11.5–14.5)
EST. GFR  (NO RACE VARIABLE): >60 ML/MIN/1.73 M^2
GLUCOSE SERPL-MCNC: 92 MG/DL (ref 70–110)
HCT VFR BLD AUTO: 39.9 % (ref 37–48.5)
HGB BLD-MCNC: 13.1 G/DL (ref 12–16)
IMM GRANULOCYTES # BLD AUTO: 0.02 K/UL (ref 0–0.04)
IMM GRANULOCYTES NFR BLD AUTO: 0.2 % (ref 0–0.5)
INR PPP: 0.9 (ref 0.8–1.2)
LYMPHOCYTES # BLD AUTO: 2.4 K/UL (ref 1–4.8)
LYMPHOCYTES NFR BLD: 23.2 % (ref 18–48)
MCH RBC QN AUTO: 30.8 PG (ref 27–31)
MCHC RBC AUTO-ENTMCNC: 32.8 G/DL (ref 32–36)
MCV RBC AUTO: 94 FL (ref 82–98)
MONOCYTES # BLD AUTO: 0.9 K/UL (ref 0.3–1)
MONOCYTES NFR BLD: 8.2 % (ref 4–15)
NEUTROPHILS # BLD AUTO: 6.9 K/UL (ref 1.8–7.7)
NEUTROPHILS NFR BLD: 66.5 % (ref 38–73)
NRBC BLD-RTO: 0 /100 WBC
PLATELET # BLD AUTO: 368 K/UL (ref 150–450)
PMV BLD AUTO: 9.6 FL (ref 9.2–12.9)
POTASSIUM SERPL-SCNC: 4.4 MMOL/L (ref 3.5–5.1)
PROT SERPL-MCNC: 7.9 G/DL (ref 6–8.4)
PROTHROMBIN TIME: 10.4 SEC (ref 9–12.5)
RBC # BLD AUTO: 4.25 M/UL (ref 4–5.4)
SODIUM SERPL-SCNC: 140 MMOL/L (ref 136–145)
WBC # BLD AUTO: 10.41 K/UL (ref 3.9–12.7)

## 2024-03-07 PROCEDURE — 71046 X-RAY EXAM CHEST 2 VIEWS: CPT | Mod: TC

## 2024-03-07 PROCEDURE — 93005 ELECTROCARDIOGRAM TRACING: CPT | Performed by: GENERAL PRACTICE

## 2024-03-07 PROCEDURE — 85610 PROTHROMBIN TIME: CPT | Performed by: INTERNAL MEDICINE

## 2024-03-07 PROCEDURE — 85025 COMPLETE CBC W/AUTO DIFF WBC: CPT | Performed by: INTERNAL MEDICINE

## 2024-03-07 PROCEDURE — 93010 ELECTROCARDIOGRAM REPORT: CPT | Mod: ,,, | Performed by: GENERAL PRACTICE

## 2024-03-07 PROCEDURE — 36415 COLL VENOUS BLD VENIPUNCTURE: CPT | Performed by: INTERNAL MEDICINE

## 2024-03-07 PROCEDURE — 80053 COMPREHEN METABOLIC PANEL: CPT | Performed by: INTERNAL MEDICINE

## 2024-03-07 PROCEDURE — 85730 THROMBOPLASTIN TIME PARTIAL: CPT | Performed by: INTERNAL MEDICINE

## 2024-03-07 RX ORDER — NAPROXEN SODIUM 220 MG/1
81 TABLET, FILM COATED ORAL DAILY
COMMUNITY

## 2024-03-07 RX ORDER — AMOXICILLIN 500 MG/1
500 CAPSULE ORAL 3 TIMES DAILY
COMMUNITY
Start: 2024-02-29 | End: 2024-04-22 | Stop reason: ALTCHOICE

## 2024-03-07 NOTE — DISCHARGE INSTRUCTIONS
Your procedure is scheduled for:3/12/24          [facility] at:0900    Nothing to eat or drink after midnight the night before your procedure.  Do not take any medications the morning of your procedure  Bring all your medications with you in the original pill bottles from pharmacy.  If you take blood thinners, ask your doctor if you should stop taking them.  Do not take metformin 24 hours prior to your procedure.  Adjust your insulin or other diabetes medications if needed.   Do your chlorhexidine wash the night before and morning of your procedure.  If you use a CPAP or BiPAP at home, please bring it with you the day of your procedure.  Make arrangements for someone you know to drive you home after your procedure. Taxi and Uber are not acceptable.         Any questions call the [facility] at [phone]

## 2024-03-12 ENCOUNTER — HOSPITAL ENCOUNTER (OUTPATIENT)
Facility: HOSPITAL | Age: 74
Discharge: HOME | End: 2024-03-12
Attending: STUDENT IN AN ORGANIZED HEALTH CARE EDUCATION/TRAINING PROGRAM | Admitting: STUDENT IN AN ORGANIZED HEALTH CARE EDUCATION/TRAINING PROGRAM
Payer: MEDICARE

## 2024-03-12 VITALS
HEART RATE: 62 BPM | OXYGEN SATURATION: 95 % | DIASTOLIC BLOOD PRESSURE: 56 MMHG | SYSTOLIC BLOOD PRESSURE: 138 MMHG | RESPIRATION RATE: 22 BRPM

## 2024-03-12 DIAGNOSIS — R07.9 CHEST PAIN, UNSPECIFIED TYPE: ICD-10-CM

## 2024-03-12 DIAGNOSIS — R94.39 ABNORMAL FINDING ON CARDIOVASCULAR STRESS TEST: ICD-10-CM

## 2024-03-12 DIAGNOSIS — R94.39 ABNORMAL NUCLEAR STRESS TEST: ICD-10-CM

## 2024-03-12 LAB
POC ACTIVATED CLOTTING TIME K: 239 SEC (ref 74–137)
POC ACTIVATED CLOTTING TIME K: 266 SEC (ref 74–137)
SAMPLE: ABNORMAL
SAMPLE: ABNORMAL

## 2024-03-12 PROCEDURE — 93005 ELECTROCARDIOGRAM TRACING: CPT | Mod: 59 | Performed by: GENERAL PRACTICE

## 2024-03-12 PROCEDURE — 99152 MOD SED SAME PHYS/QHP 5/>YRS: CPT | Performed by: STUDENT IN AN ORGANIZED HEALTH CARE EDUCATION/TRAINING PROGRAM

## 2024-03-12 PROCEDURE — C1887 CATHETER, GUIDING: HCPCS | Performed by: STUDENT IN AN ORGANIZED HEALTH CARE EDUCATION/TRAINING PROGRAM

## 2024-03-12 PROCEDURE — C1894 INTRO/SHEATH, NON-LASER: HCPCS | Performed by: STUDENT IN AN ORGANIZED HEALTH CARE EDUCATION/TRAINING PROGRAM

## 2024-03-12 PROCEDURE — 92978 ENDOLUMINL IVUS OCT C 1ST: CPT | Mod: RC | Performed by: STUDENT IN AN ORGANIZED HEALTH CARE EDUCATION/TRAINING PROGRAM

## 2024-03-12 PROCEDURE — 93010 ELECTROCARDIOGRAM REPORT: CPT | Mod: ,,, | Performed by: GENERAL PRACTICE

## 2024-03-12 PROCEDURE — 25000003 PHARM REV CODE 250: Performed by: INTERNAL MEDICINE

## 2024-03-12 PROCEDURE — 92928 PRQ TCAT PLMT NTRAC ST 1 LES: CPT | Mod: RC,,, | Performed by: STUDENT IN AN ORGANIZED HEALTH CARE EDUCATION/TRAINING PROGRAM

## 2024-03-12 PROCEDURE — C1725 CATH, TRANSLUMIN NON-LASER: HCPCS | Performed by: STUDENT IN AN ORGANIZED HEALTH CARE EDUCATION/TRAINING PROGRAM

## 2024-03-12 PROCEDURE — 93458 L HRT ARTERY/VENTRICLE ANGIO: CPT | Mod: 26,59,, | Performed by: STUDENT IN AN ORGANIZED HEALTH CARE EDUCATION/TRAINING PROGRAM

## 2024-03-12 PROCEDURE — C1760 CLOSURE DEV, VASC: HCPCS | Performed by: STUDENT IN AN ORGANIZED HEALTH CARE EDUCATION/TRAINING PROGRAM

## 2024-03-12 PROCEDURE — 25000003 PHARM REV CODE 250: Performed by: STUDENT IN AN ORGANIZED HEALTH CARE EDUCATION/TRAINING PROGRAM

## 2024-03-12 PROCEDURE — C1769 GUIDE WIRE: HCPCS | Performed by: STUDENT IN AN ORGANIZED HEALTH CARE EDUCATION/TRAINING PROGRAM

## 2024-03-12 PROCEDURE — 63600175 PHARM REV CODE 636 W HCPCS: Performed by: STUDENT IN AN ORGANIZED HEALTH CARE EDUCATION/TRAINING PROGRAM

## 2024-03-12 PROCEDURE — 92978 ENDOLUMINL IVUS OCT C 1ST: CPT | Mod: 26,RC,, | Performed by: STUDENT IN AN ORGANIZED HEALTH CARE EDUCATION/TRAINING PROGRAM

## 2024-03-12 PROCEDURE — 27201423 OPTIME MED/SURG SUP & DEVICES STERILE SUPPLY: Performed by: STUDENT IN AN ORGANIZED HEALTH CARE EDUCATION/TRAINING PROGRAM

## 2024-03-12 PROCEDURE — 99152 MOD SED SAME PHYS/QHP 5/>YRS: CPT | Mod: ,,, | Performed by: STUDENT IN AN ORGANIZED HEALTH CARE EDUCATION/TRAINING PROGRAM

## 2024-03-12 PROCEDURE — C1753 CATH, INTRAVAS ULTRASOUND: HCPCS | Performed by: STUDENT IN AN ORGANIZED HEALTH CARE EDUCATION/TRAINING PROGRAM

## 2024-03-12 PROCEDURE — C9600 PERC DRUG-EL COR STENT SING: HCPCS | Mod: RC | Performed by: STUDENT IN AN ORGANIZED HEALTH CARE EDUCATION/TRAINING PROGRAM

## 2024-03-12 PROCEDURE — C1874 STENT, COATED/COV W/DEL SYS: HCPCS | Performed by: STUDENT IN AN ORGANIZED HEALTH CARE EDUCATION/TRAINING PROGRAM

## 2024-03-12 PROCEDURE — 93458 L HRT ARTERY/VENTRICLE ANGIO: CPT | Mod: 59 | Performed by: STUDENT IN AN ORGANIZED HEALTH CARE EDUCATION/TRAINING PROGRAM

## 2024-03-12 PROCEDURE — 99153 MOD SED SAME PHYS/QHP EA: CPT | Performed by: STUDENT IN AN ORGANIZED HEALTH CARE EDUCATION/TRAINING PROGRAM

## 2024-03-12 DEVICE — ANGIO-SEAL VIP VASCULAR CLOSURE DEVICE
Type: IMPLANTABLE DEVICE | Site: GROIN | Status: FUNCTIONAL
Brand: ANGIO-SEAL

## 2024-03-12 DEVICE — STENT ONYXNG25026UX ONYX 2.50X26RX
Type: IMPLANTABLE DEVICE | Site: CORONARY | Status: FUNCTIONAL
Brand: ONYX FRONTIER™

## 2024-03-12 RX ORDER — LIDOCAINE HYDROCHLORIDE 10 MG/ML
INJECTION, SOLUTION EPIDURAL; INFILTRATION; INTRACAUDAL; PERINEURAL
Status: DISCONTINUED | OUTPATIENT
Start: 2024-03-12 | End: 2024-03-12 | Stop reason: HOSPADM

## 2024-03-12 RX ORDER — SODIUM CHLORIDE 9 MG/ML
INJECTION, SOLUTION INTRAVENOUS CONTINUOUS
Status: DISCONTINUED | OUTPATIENT
Start: 2024-03-12 | End: 2024-03-12 | Stop reason: HOSPADM

## 2024-03-12 RX ORDER — ONDANSETRON HYDROCHLORIDE 2 MG/ML
4 INJECTION, SOLUTION INTRAVENOUS ONCE
Status: COMPLETED | OUTPATIENT
Start: 2024-03-12 | End: 2024-03-12

## 2024-03-12 RX ORDER — ASPIRIN 325 MG
TABLET ORAL
Status: DISCONTINUED | OUTPATIENT
Start: 2024-03-12 | End: 2024-03-12 | Stop reason: HOSPADM

## 2024-03-12 RX ORDER — SODIUM CHLORIDE 9 MG/ML
INJECTION, SOLUTION INTRAVENOUS ONCE
Status: COMPLETED | OUTPATIENT
Start: 2024-03-12 | End: 2024-03-12

## 2024-03-12 RX ORDER — FENTANYL CITRATE 50 UG/ML
INJECTION, SOLUTION INTRAMUSCULAR; INTRAVENOUS
Status: DISCONTINUED | OUTPATIENT
Start: 2024-03-12 | End: 2024-03-12 | Stop reason: HOSPADM

## 2024-03-12 RX ORDER — CLOPIDOGREL BISULFATE 75 MG/1
TABLET ORAL
Status: DISCONTINUED | OUTPATIENT
Start: 2024-03-12 | End: 2024-03-12 | Stop reason: HOSPADM

## 2024-03-12 RX ORDER — MIDAZOLAM HYDROCHLORIDE 1 MG/ML
INJECTION INTRAMUSCULAR; INTRAVENOUS
Status: DISCONTINUED | OUTPATIENT
Start: 2024-03-12 | End: 2024-03-12 | Stop reason: HOSPADM

## 2024-03-12 RX ORDER — HEPARIN SODIUM 1000 [USP'U]/ML
INJECTION, SOLUTION INTRAVENOUS; SUBCUTANEOUS
Status: DISCONTINUED | OUTPATIENT
Start: 2024-03-12 | End: 2024-03-12 | Stop reason: HOSPADM

## 2024-03-12 RX ORDER — ONDANSETRON 4 MG/1
8 TABLET, ORALLY DISINTEGRATING ORAL EVERY 8 HOURS PRN
Status: DISCONTINUED | OUTPATIENT
Start: 2024-03-12 | End: 2024-03-12 | Stop reason: HOSPADM

## 2024-03-12 RX ORDER — CLOPIDOGREL BISULFATE 75 MG/1
75 TABLET ORAL DAILY
Qty: 90 TABLET | Refills: 3 | Status: SHIPPED | OUTPATIENT
Start: 2024-03-12 | End: 2025-03-12

## 2024-03-12 RX ADMIN — ONDANSETRON 4 MG: 2 INJECTION INTRAMUSCULAR; INTRAVENOUS at 02:03

## 2024-03-12 RX ADMIN — SODIUM CHLORIDE: 9 INJECTION, SOLUTION INTRAVENOUS at 08:03

## 2024-03-22 ENCOUNTER — TELEPHONE (OUTPATIENT)
Dept: CARDIOLOGY | Facility: CLINIC | Age: 74
End: 2024-03-22
Payer: MEDICARE

## 2024-03-22 NOTE — TELEPHONE ENCOUNTER
----- Message from Kye Hernandez sent at 3/22/2024 10:30 AM CDT -----  Regarding: return call/fax  Contact: Olimpia with [employer]  Type:  Patient Returning Call    Who Called:[employer]/ Olimpia  Who Left Message for Patient:office nurse  Does the patient know what this is regarding?:cardiac cath report  Would the patient rather a call back or a response via [facility]? fax  Best Call Back Number:# [phone] ext [phone]  Additional Information: Fax# [phone]

## 2024-03-23 LAB
OHS QRS DURATION: 82 MS
OHS QRS DURATION: 84 MS
OHS QTC CALCULATION: 435 MS
OHS QTC CALCULATION: 455 MS

## 2024-04-05 ENCOUNTER — OFFICE VISIT (OUTPATIENT)
Dept: URGENT CARE | Facility: CLINIC | Age: 74
End: 2024-04-05
Payer: MEDICARE

## 2024-04-05 VITALS
HEIGHT: 61 IN | TEMPERATURE: 97 F | WEIGHT: 149 LBS | DIASTOLIC BLOOD PRESSURE: 84 MMHG | HEART RATE: 88 BPM | SYSTOLIC BLOOD PRESSURE: 144 MMHG | OXYGEN SATURATION: 99 % | RESPIRATION RATE: 18 BRPM | BODY MASS INDEX: 28.13 KG/M2

## 2024-04-05 DIAGNOSIS — J06.9 VIRAL URI WITH COUGH: ICD-10-CM

## 2024-04-05 DIAGNOSIS — J40 BRONCHITIS: Primary | ICD-10-CM

## 2024-04-05 DIAGNOSIS — Z87.891 HISTORY OF CIGARETTE SMOKING: ICD-10-CM

## 2024-04-05 DIAGNOSIS — Z20.822 COVID-19 VIRUS NOT DETECTED: ICD-10-CM

## 2024-04-05 LAB
CTP QC/QA: YES
CTP QC/QA: YES
FLUAV AG NPH QL: NEGATIVE
FLUBV AG NPH QL: NEGATIVE
SARS-COV-2 AG RESP QL IA.RAPID: NEGATIVE

## 2024-04-05 PROCEDURE — 87804 INFLUENZA ASSAY W/OPTIC: CPT | Mod: 59,QW,, | Performed by: NURSE PRACTITIONER

## 2024-04-05 PROCEDURE — 99204 OFFICE O/P NEW MOD 45 MIN: CPT | Mod: 25,S$GLB,, | Performed by: NURSE PRACTITIONER

## 2024-04-05 PROCEDURE — 87811 SARS-COV-2 COVID19 W/OPTIC: CPT | Mod: QW,S$GLB,, | Performed by: NURSE PRACTITIONER

## 2024-04-05 RX ORDER — PREDNISONE 20 MG/1
20 TABLET ORAL 2 TIMES DAILY
Qty: 6 TABLET | Refills: 0 | Status: SHIPPED | OUTPATIENT
Start: 2024-04-05 | End: 2024-04-08

## 2024-04-05 RX ORDER — BENZONATATE 100 MG/1
100 CAPSULE ORAL 3 TIMES DAILY PRN
Qty: 21 CAPSULE | Refills: 0 | Status: SHIPPED | OUTPATIENT
Start: 2024-04-05 | End: 2024-04-12

## 2024-04-05 RX ORDER — GUAIFENESIN 200 MG/1
400 TABLET ORAL EVERY 4 HOURS PRN
Qty: 30 TABLET | Refills: 0 | Status: SHIPPED | OUTPATIENT
Start: 2024-04-05 | End: 2024-04-12

## 2024-04-05 RX ORDER — ALBUTEROL SULFATE 0.83 MG/ML
2.5 SOLUTION RESPIRATORY (INHALATION) EVERY 6 HOURS PRN
Qty: 75 ML | Refills: 0 | Status: SHIPPED | OUTPATIENT
Start: 2024-04-05 | End: 2024-05-09 | Stop reason: SDUPTHER

## 2024-04-05 NOTE — PATIENT INSTRUCTIONS
Increase clear fluid intake  Stop all current over the counter cough, cold, flu medicine  Tylenol/motrin otc for fever or pain  Start prednisone and take as prescribed  Use Astelin nasal spray as needed for sinus congestion and pressure.   . Start taking Mucinex   as needed for chest congestion and coughing. Take mucinex with a full glass of water at each dose  May use Tessalon Perles as needed for excessive daytime coughing  Add a humidifier to your room at bedtime for respiratory comfort.  Continue albuterol nebulizer treatments for wheezing  Continue all respiratory medications as previously prescribed  Follow up with PCP or pulmonology  Go immediately to the nearest emergency room for shortness of breath, chest pain,  or other emergent concern.  Return to clinic for new, worse, or unresolving symptoms

## 2024-04-05 NOTE — PROGRESS NOTES
"Subjective:      Patient ID: Kristin Guerra is a 74 y.o. female.    Vitals:  height is 5' 1" (1.549 m) and weight is 67.6 kg (149 lb). Her temperature is 97.2 °F (36.2 °C). Her blood pressure is 144/84 (abnormal) and her pulse is 88. Her respiration is 18 and oxygen saturation is 99%.     Chief Complaint: Cough    74-year-old female seen today for chest congestion and coughing times 8-9 days.  She states she began with sinus congestion and sneezing and self-treated at home.  She states she began to have productive yellow cough a few days ago.  There has been no fever or shortness a breath.  She does have a history of nicotine use by cigarette smoking.  She states she has not smoked a cigarette in 3 days.    Cough  This is a new problem. The current episode started in the past 7 days. The problem has been rapidly worsening. The problem occurs constantly. The cough is Productive of sputum. Associated symptoms include nasal congestion and wheezing. Pertinent negatives include no chest pain, chills, fever, sore throat or shortness of breath. Associated symptoms comments: weak. The symptoms are aggravated by lying down. She has tried steroid inhaler and OTC cough suppressant (albuteral in nebulizer) for the symptoms. The treatment provided mild relief. Her past medical history is significant for bronchitis.       Constitution: Negative for chills and fever.   HENT:  Negative for congestion and sore throat.    Cardiovascular:  Negative for chest pain, palpitations and sob on exertion.   Respiratory:  Positive for cough, sputum production and wheezing. Negative for chest tightness, shortness of breath and asthma.    Gastrointestinal:  Negative for nausea and vomiting.   Allergic/Immunologic: Negative for asthma.   Neurological:  Negative for dizziness, light-headedness, passing out, disorientation and altered mental status.   Psychiatric/Behavioral:  Negative for altered mental status, disorientation and confusion.     "   Objective:     Physical Exam   Constitutional: She is oriented to person, place, and time. She appears well-developed. She is cooperative.  Non-toxic appearance. She does not appear ill. No distress.   HENT:   Head: Normocephalic and atraumatic.   Ears:   Right Ear: Hearing and external ear normal.   Left Ear: Hearing and external ear normal.   Nose: Congestion present. No mucosal edema, rhinorrhea or nasal deformity. No epistaxis. Right sinus exhibits no maxillary sinus tenderness and no frontal sinus tenderness. Left sinus exhibits no maxillary sinus tenderness and no frontal sinus tenderness.   Mouth/Throat: Uvula is midline, oropharynx is clear and moist and mucous membranes are normal. Mucous membranes are moist. No trismus in the jaw. Normal dentition. No uvula swelling. No oropharyngeal exudate, posterior oropharyngeal edema or posterior oropharyngeal erythema.   Eyes: Conjunctivae and lids are normal. No scleral icterus.   Neck: Trachea normal and phonation normal. Neck supple. No edema present. No erythema present. No neck rigidity present.   Cardiovascular: Normal rate, regular rhythm, normal heart sounds and normal pulses.   Pulmonary/Chest: Effort normal. No accessory muscle usage. No respiratory distress. She has no decreased breath sounds. She has wheezes (Diffuse). She has rhonchi in the right upper field and the left upper field.   Rhonchi resolved and wheezing improved greatly with deep cough.         Comments: Rhonchi resolved and wheezing improved greatly with deep cough.    Abdominal: Normal appearance.   Musculoskeletal: Normal range of motion.         General: No deformity. Normal range of motion.   Lymphadenopathy:     She has no cervical adenopathy.   Neurological: no focal deficit. She is alert and oriented to person, place, and time. She exhibits normal muscle tone. Coordination normal.   Skin: Skin is warm, dry, intact, not diaphoretic and not pale. Capillary refill takes 2 to 3 seconds.    Psychiatric: Her speech is normal and behavior is normal. Judgment and thought content normal.   Nursing note and vitals reviewed.      Assessment:     1. Bronchitis    2. Viral URI with cough    3. History of cigarette smoking    4. COVID-19 virus not detected        Plan:       Bronchitis  -     guaiFENesin 200 mg tablet; Take 2 tablets (400 mg total) by mouth every 4 (four) hours as needed for Congestion.  Dispense: 30 tablet; Refill: 0  -     benzonatate (TESSALON) 100 MG capsule; Take 1 capsule (100 mg total) by mouth 3 (three) times daily as needed for Cough.  Dispense: 21 capsule; Refill: 0  -     predniSONE (DELTASONE) 20 MG tablet; Take 1 tablet (20 mg total) by mouth 2 (two) times daily. for 3 days  Dispense: 6 tablet; Refill: 0  -     albuterol (PROVENTIL) 2.5 mg /3 mL (0.083 %) nebulizer solution; Take 3 mLs (2.5 mg total) by nebulization every 6 (six) hours as needed for Wheezing or Shortness of Breath. Rescue  Dispense: 75 mL; Refill: 0    Viral URI with cough  -     SARS Coronavirus 2 Antigen, POCT Manual Read  -     POCT Influenza A/B Rapid Antigen  -     guaiFENesin 200 mg tablet; Take 2 tablets (400 mg total) by mouth every 4 (four) hours as needed for Congestion.  Dispense: 30 tablet; Refill: 0  -     benzonatate (TESSALON) 100 MG capsule; Take 1 capsule (100 mg total) by mouth 3 (three) times daily as needed for Cough.  Dispense: 21 capsule; Refill: 0  -     predniSONE (DELTASONE) 20 MG tablet; Take 1 tablet (20 mg total) by mouth 2 (two) times daily. for 3 days  Dispense: 6 tablet; Refill: 0  -     albuterol (PROVENTIL) 2.5 mg /3 mL (0.083 %) nebulizer solution; Take 3 mLs (2.5 mg total) by nebulization every 6 (six) hours as needed for Wheezing or Shortness of Breath. Rescue  Dispense: 75 mL; Refill: 0    History of cigarette smoking    COVID-19 virus not detected    Flu negative    The test results and physical exam findings were discussed with the patient and all questions answered.  She  has a favorable physical exam without any evidence of distress.  She was able to speak in clear complete sentences without lengthy pauses due to dyspnea.  She denies any shortness a breath in his more concerned about her wheezing and continuous coughing.  We discussed the use of over-the-counter expectorants in the need to stay well hydrated while taking these.  We discussed symptom monitoring, conservative care methods, medication use, and follow up orders. she verbalized understanding and agreement with the plan of care.

## 2024-04-19 ENCOUNTER — TELEPHONE (OUTPATIENT)
Dept: ADMINISTRATIVE | Facility: CLINIC | Age: 74
End: 2024-04-19
Payer: MEDICARE

## 2024-04-22 ENCOUNTER — OFFICE VISIT (OUTPATIENT)
Dept: FAMILY MEDICINE | Facility: CLINIC | Age: 74
End: 2024-04-22
Payer: MEDICARE

## 2024-04-22 VITALS
HEIGHT: 61 IN | SYSTOLIC BLOOD PRESSURE: 132 MMHG | TEMPERATURE: 98 F | OXYGEN SATURATION: 98 % | WEIGHT: 150.38 LBS | DIASTOLIC BLOOD PRESSURE: 76 MMHG | BODY MASS INDEX: 28.39 KG/M2 | HEART RATE: 55 BPM

## 2024-04-22 DIAGNOSIS — Z00.00 ENCOUNTER FOR PREVENTIVE HEALTH EXAMINATION: Primary | ICD-10-CM

## 2024-04-22 DIAGNOSIS — I10 ESSENTIAL HYPERTENSION: Chronic | ICD-10-CM

## 2024-04-22 DIAGNOSIS — F33.9 DEPRESSION, RECURRENT: ICD-10-CM

## 2024-04-22 PROCEDURE — 3078F DIAST BP <80 MM HG: CPT | Mod: CPTII,S$GLB,, | Performed by: NURSE PRACTITIONER

## 2024-04-22 PROCEDURE — 3288F FALL RISK ASSESSMENT DOCD: CPT | Mod: CPTII,S$GLB,, | Performed by: NURSE PRACTITIONER

## 2024-04-22 PROCEDURE — 1159F MED LIST DOCD IN RCRD: CPT | Mod: CPTII,S$GLB,, | Performed by: NURSE PRACTITIONER

## 2024-04-22 PROCEDURE — 1170F FXNL STATUS ASSESSED: CPT | Mod: CPTII,S$GLB,, | Performed by: NURSE PRACTITIONER

## 2024-04-22 PROCEDURE — 3075F SYST BP GE 130 - 139MM HG: CPT | Mod: CPTII,S$GLB,, | Performed by: NURSE PRACTITIONER

## 2024-04-22 PROCEDURE — 99999 PR PBB SHADOW E&M-EST. PATIENT-LVL V: CPT | Mod: PBBFAC,,, | Performed by: NURSE PRACTITIONER

## 2024-04-22 PROCEDURE — 1158F ADVNC CARE PLAN TLK DOCD: CPT | Mod: CPTII,S$GLB,, | Performed by: NURSE PRACTITIONER

## 2024-04-22 PROCEDURE — G0439 PPPS, SUBSEQ VISIT: HCPCS | Mod: S$GLB,,, | Performed by: NURSE PRACTITIONER

## 2024-04-22 PROCEDURE — 1125F AMNT PAIN NOTED PAIN PRSNT: CPT | Mod: CPTII,S$GLB,, | Performed by: NURSE PRACTITIONER

## 2024-04-22 PROCEDURE — 1100F PTFALLS ASSESS-DOCD GE2>/YR: CPT | Mod: CPTII,S$GLB,, | Performed by: NURSE PRACTITIONER

## 2024-04-22 RX ORDER — OFLOXACIN 3 MG/ML
1 SOLUTION/ DROPS OPHTHALMIC 4 TIMES DAILY
COMMUNITY
Start: 2024-04-12

## 2024-04-22 RX ORDER — VALACYCLOVIR HYDROCHLORIDE 500 MG/1
500 TABLET, FILM COATED ORAL 2 TIMES DAILY
COMMUNITY
Start: 2024-04-12 | End: 2024-05-10

## 2024-04-22 RX ORDER — GANCICLOVIR 1.5 MG/G
GEL OPHTHALMIC
COMMUNITY
Start: 2024-04-12

## 2024-04-22 NOTE — PATIENT INSTRUCTIONS
Counseling and Referral of Other Preventative  (Italic type indicates deductible and co-insurance are waived)    Patient Name: Kristin Guerra  Today's Date: 4/22/2024    Health Maintenance       Date Due Completion Date    Shingles Vaccine (1 of 2) Never done ---    RSV Vaccine (Age 60+ and Pregnant patients) (1 - 1-dose 60+ series) Never done ---    Influenza Vaccine (1) 09/01/2023 11/15/2022    COVID-19 Vaccine (4 - 2023-24 season) 09/01/2023 10/6/2021    LDCT Lung Screen 09/26/2024 9/26/2023    Mammogram 09/26/2024 9/26/2023    DEXA Scan 09/26/2026 9/26/2023    Colorectal Cancer Screening 08/07/2028 8/7/2023    Lipid Panel 11/09/2028 11/9/2023    TETANUS VACCINE 10/10/2033 10/10/2023        No orders of the defined types were placed in this encounter.    The following information is provided to all patients.  This information is to help you find resources for any of the problems found today that may be affecting your health:                  Living healthy guide: www.[facility].[place].gov      Understanding Diabetes: www.diabetes.org      Eating healthy: www.cdc.gov/healthyweight      CDC home safety checklist: www.cdc.gov/steadi/patient.html      Agency on Aging: www.goea.[place].[facility]      Alcoholics anonymous (AA): www.aa.org      Physical Activity: www.arcelia.nih.gov/kq8gtsj      Tobacco use: www.quitwithusla.org

## 2024-04-22 NOTE — PROGRESS NOTES
"  Kristin Guerra presented for a  Medicare AWV and comprehensive Health Risk Assessment today. The following components were reviewed and updated:    Medical history  Family History  Social history  Allergies and Current Medications  Health Risk Assessment  Health Maintenance  Care Team         ** See Completed Assessments for Annual Wellness Visit within the encounter summary.**         The following assessments were completed:  Living Situation  CAGE  Depression Screening  Timed Get Up and Go  Whisper Test  Cognitive Function Screening  Nutrition Screening  ADL Screening  PAQ Screening    Clock in media   Opioid documentation:      Patient does not have a current opioid prescription.        Vitals:    04/22/24 1008   BP: 132/76   Pulse: (!) 55   Temp: 98.1 °F (36.7 °C)   TempSrc: Oral   SpO2: 98%   Weight: 68.2 kg (150 lb 5.7 oz)   Height: 5' 1" (1.549 m)     Body mass index is 28.41 kg/m².  Physical Exam  Constitutional:       Appearance: She is well-developed.   HENT:      Head: Normocephalic and atraumatic.      Nose: Nose normal.   Eyes:      General: Lids are normal.      Conjunctiva/sclera: Conjunctivae normal.   Cardiovascular:      Rate and Rhythm: Normal rate.   Pulmonary:      Effort: Pulmonary effort is normal.   Neurological:      Mental Status: She is alert and oriented to person, place, and time.   Psychiatric:         Speech: Speech normal.         Behavior: Behavior normal.               Diagnoses and health risks identified today and associated recommendations/orders:    1. Encounter for preventive health examination  Discussed health maintenance guidelines appropriate for age.        2. Depression, recurrent  Stable, continue current medication   Followed by pcp     3. Essential hypertension, labile  Controlled, continue current medication regimen  Low salt diet  Increase physical activity  Followed by pcp        Provided Kristin with a 5-10 year written screening schedule and personal prevention " plan. Recommendations were developed using the USPSTF age appropriate recommendations. Education, counseling, and referrals were provided as needed. After Visit Summary printed and given to patient which includes a list of additional screenings\tests needed.    Follow up for One year for Annual Wellness Visit.    Kayla Andrew, NP      I offered to discuss advanced care planning, including how to pick a person who would make decisions for you if you were unable to make them for yourself, called a health care power of , and what kind of decisions you might make such as use of life sustaining treatments such as ventilators and tube feeding when faced with a life limiting illness recorded on a living will that they will need to know. (How you want to be cared for as you near the end of your natural life)     X  Patient has advanced directives written and agrees to provide copies to the institution.

## 2024-04-23 ENCOUNTER — TELEPHONE (OUTPATIENT)
Dept: CARDIOLOGY | Facility: CLINIC | Age: 74
End: 2024-04-23

## 2024-04-23 NOTE — TELEPHONE ENCOUNTER
----- Message from Shawna Garcia sent at 4/23/2024  9:54 AM CDT -----  Type: Needs Medical Advice  Who Called:  patient  Best Call Back Number: [phone] (home)   Additional Information: patient requesting to speak to someone regarding her last procedure

## 2024-05-09 DIAGNOSIS — J40 BRONCHITIS: ICD-10-CM

## 2024-05-09 DIAGNOSIS — J06.9 VIRAL URI WITH COUGH: ICD-10-CM

## 2024-05-09 RX ORDER — ALBUTEROL SULFATE 0.83 MG/ML
2.5 SOLUTION RESPIRATORY (INHALATION) EVERY 6 HOURS PRN
Qty: 75 ML | Refills: 0 | Status: SHIPPED | OUTPATIENT
Start: 2024-05-09 | End: 2024-06-07 | Stop reason: SDUPTHER

## 2024-05-09 NOTE — TELEPHONE ENCOUNTER
----- Message from Leelee Larsen, Patient Care Assistant sent at 5/9/2024  3:23 PM CDT -----  Regarding: refill  Contact: pt  Type:  RX Refill Request    Who Called:  pt   Refill or New Rx:  refill   RX Name and Strength:  albuterol (PROVENTIL) 2.5 mg /3 mL (0.083 %) nebulizer solution    Preferred Pharmacy with phone number:    [facility] [place] [address] [facility] [id number]  [place] [address]  Phone: [phone] Fax: [phone]    Local or Mail Order:  local   Ordering Provider:  Tomas Farr Call Back Number:  [phone] (home)     Additional Information:  please call to advise. Thanks!

## 2024-05-10 ENCOUNTER — OFFICE VISIT (OUTPATIENT)
Dept: CARDIOLOGY | Facility: CLINIC | Age: 74
End: 2024-05-10
Payer: MEDICARE

## 2024-05-10 VITALS
HEART RATE: 84 BPM | HEIGHT: 61 IN | DIASTOLIC BLOOD PRESSURE: 67 MMHG | SYSTOLIC BLOOD PRESSURE: 145 MMHG | WEIGHT: 150.38 LBS | OXYGEN SATURATION: 99 % | BODY MASS INDEX: 28.39 KG/M2

## 2024-05-10 DIAGNOSIS — R07.9 CHEST PAIN, UNSPECIFIED TYPE: ICD-10-CM

## 2024-05-10 DIAGNOSIS — I10 ESSENTIAL HYPERTENSION: Chronic | ICD-10-CM

## 2024-05-10 DIAGNOSIS — I70.0 AORTIC ATHEROSCLEROSIS: ICD-10-CM

## 2024-05-10 DIAGNOSIS — R00.1 BRADYCARDIA: ICD-10-CM

## 2024-05-10 DIAGNOSIS — R06.02 SOB (SHORTNESS OF BREATH): Primary | ICD-10-CM

## 2024-05-10 DIAGNOSIS — E78.2 MIXED DYSLIPIDEMIA: Chronic | ICD-10-CM

## 2024-05-10 PROCEDURE — 1100F PTFALLS ASSESS-DOCD GE2>/YR: CPT | Mod: CPTII,S$GLB,,

## 2024-05-10 PROCEDURE — 3077F SYST BP >= 140 MM HG: CPT | Mod: CPTII,S$GLB,,

## 2024-05-10 PROCEDURE — 93000 ELECTROCARDIOGRAM COMPLETE: CPT | Mod: S$GLB,,, | Performed by: GENERAL PRACTICE

## 2024-05-10 PROCEDURE — 3078F DIAST BP <80 MM HG: CPT | Mod: CPTII,S$GLB,,

## 2024-05-10 PROCEDURE — 99214 OFFICE O/P EST MOD 30 MIN: CPT | Mod: S$GLB,,,

## 2024-05-10 PROCEDURE — 3008F BODY MASS INDEX DOCD: CPT | Mod: CPTII,S$GLB,,

## 2024-05-10 PROCEDURE — 99999 PR PBB SHADOW E&M-EST. PATIENT-LVL III: CPT | Mod: PBBFAC,,,

## 2024-05-10 PROCEDURE — 1125F AMNT PAIN NOTED PAIN PRSNT: CPT | Mod: CPTII,S$GLB,,

## 2024-05-10 PROCEDURE — 3288F FALL RISK ASSESSMENT DOCD: CPT | Mod: CPTII,S$GLB,,

## 2024-05-10 RX ORDER — FERROUS SULFATE, DRIED 160(50) MG
1 TABLET, EXTENDED RELEASE ORAL 2 TIMES DAILY WITH MEALS
COMMUNITY

## 2024-05-10 RX ORDER — PYRIDOXINE HCL (VITAMIN B6) 100 MG
50 TABLET ORAL DAILY
COMMUNITY

## 2024-05-10 NOTE — PROGRESS NOTES
Subjective:    Patient ID:  Kristin Guerra is a 74 y.o. female who presents for follow-up .   Chief Complaint   Patient presents with    Follow-up     2 wk f/u       HPI:  Ms. Guerra is a 74-year-old female who comes in today after having a recent left heart catheterization on March 12, 2024 with Dr. Valdivia.  Results below:    Coronary Findings    Diagnostic  Dominance: Right  Left Anterior Descending   Large caliber. Gives rise to septal  and diagonal branches. Minimal irregularities in the vessels. LAD gives epicardial collateral to RPDA.      Left Circumflex   The vessel is large. Large caliber continues to become large caliber obtuse marginal branch which gives rise to multiple branches. Circ proper is a small vessel after the takeoff the OM branch. A branch from the large obtuse marginal branch has 60-70% focal ostial stenosis.      First Obtuse Marginal Branch   The vessel is large. The vessel exhibits minimal luminal irregularities.      Right Coronary Artery   Large caliber. Dominant. Gives rise to medium caliber RPDA and small caliber RPL branches. Mild-to-moderate diffuse disease in distal RCA with a segment of 99% tubular stenosis. Mid RCA has 35% disease.   Dist RCA lesion was 99% stenosed. The lesion was a type B lesion. The stenosis was measured using IVUS with CATH EAGLE EYE PLATINUM.      Intervention     Dist RCA lesion   Angioplasty   The balloon was inserted, placed across lesion and repositioned. An angioplasty using a standard balloon: BLLN SC EUPHORA RX [id number] was performed independent of stent deployment. The balloon was inflated multiple times.   Supplies used: BLLN SC EUPHORA RX [id number]   Angioplasty   The balloon was inserted, placed across lesion, inflated and removed. An angioplasty using a standard balloon: CATH SAPPH NC PLUS NC [id number] was performed. The balloon was inflated multiple times. The balloon was removed following the angioplasty.   Supplies used: CATH  "SAPPH NC PLUS NC 2.5X15MM   Stent   A drug-eluting stent was not successfully placed. The stent was deployed by way of balloon expansion. The stent balloon placed across lesion, balloon was removed and was deployed. There was a single inflation. The stent was deployed at 14 mike. The inflation time was 30 sec.   Supplies used: STENT FRONTIER JOSE MANUEL [id number]   Post-Intervention Lesion Assessment   The diagnostic NAOMY flow was 3. The post-intervention NAOMY flow was 3. Postintervention IVUS assessment showed well-expanded and apposed stent and no evidence of edge dissection. IVUS was performed on the lesion.   There is a 0% residual stenosis post intervention.        She comes in today and states that she is feeling overall well.  She does complain of some pain in her shoulders head and neck however she does have chronic neck pain and has had multiple neck surgeries in the past.  She has been compliant with her Plavix and aspirin therapy.  Denies any blood in her urine or stool.  She denies any cardiac-related chest pain and shortness of breath at this time.        Review of patient's allergies indicates:   Allergen Reactions    Doxycycline Itching and Swelling     Prescribed 5/2021, took one dose, describes lip swelling, and "itch all over"    Mirtazapine Other (See Comments)     Hyperactivity.    Morphine Swelling and Rash     Other reaction(s): swelling in limbs    Lexapro [escitalopram oxalate] Other (See Comments)     Worsening tremor.  Sedation.    Primidone Other (See Comments)     Syncope.  Other reaction(s): dizziness,hypotension  Other reaction(s): Unknown  Other reaction(s): Unknown    Simvastatin Nausea And Vomiting and Other (See Comments)     Weakness.  Other reaction(s): nausea,vomiting    Beta-adrenergic agents Other (See Comments)     Low blood pressure and fainted.     Metoprolol succinate Other (See Comments)    Prochlorperazine Other (See Comments)     Other reaction(s): weakness    Topiramate     " Compazine [prochlorperazine edisylate] Nausea Only    Tramadol Nausea And Vomiting       Past Medical History:   Diagnosis Date    Allergy     Sterling's esophagus     Colon polyp     Diverticulitis     Diverticulosis     Fatty liver     GERD (gastroesophageal reflux disease)     History of endoscopy 2021    Dr Uzair Cantor  Impression:            - Normal oropharynx.                         - Hiatal hernia.                         - Sterling's esophagus. Treated with radiofrequency                         ablation.                         - Normal stomach.                         - Normal examined duodenum.                         - No specimens collected.     Hx of cervical spine surgery 2018    Hyperlipidemia     Hypertension     Lupus (systemic lupus erythematosus)     discoid    Osteoarthritis     Osteopenia     Syncope     Tremor 2005    essential     Past Surgical History:   Procedure Laterality Date    ANGIOGRAM, CORONARY, WITH LEFT HEART CATHETERIZATION N/A 3/12/2024    Procedure: Angiogram, Coronary, with Left Heart Cath;  Surgeon: Fernando Valdivia MD;  Location: [facility] CATH[facility]EP LAB;  Service: Cardiology;  Laterality: N/A;    APPENDECTOMY      removed during colon surgery    CERVICAL FUSION      CERVICAL SPINE SURGERY      Rods and screws.     SECTION      COLON SURGERY  2007    hemicolectomy    COLON SURGERY  2008    repair    COLONOSCOPY N/A 2018    Procedure: COLONOSCOPY;  Surgeon: Maverick Esquivel MD;  Location: [facility];  Service: Endoscopy;  Laterality: N/A; repeat in 5 years for surveillance    COLONOSCOPY N/A 2023    Procedure: COLONOSCOPY;  Surgeon: Uzair Cantor MD;  Location: [facility];  Service: Endoscopy;  Laterality: N/A;    CYSTOSCOPY N/A 2021    Procedure: CYSTOSCOPY;  Surgeon: Allison Miranda MD;  Location: [facility] OR;  Service: Urology;  Laterality: N/A;    ESOPHAGOGASTRODUODENOSCOPY N/A 2019    Procedure: EGD  (ESOPHAGOGASTRODUODENOSCOPY);  Surgeon: Monika Steele MD;  Location: [facility];  Service: Endoscopy;  Laterality: N/A;    ESOPHAGOGASTRODUODENOSCOPY  06/04/2019    with RFA and biopsies    ESOPHAGOGASTRODUODENOSCOPY N/A 6/4/2019    Procedure: EGD (ESOPHAGOGASTRODUODENOSCOPY);  Surgeon: Uzair Cantor MD;  Location: [facility];  Service: Endoscopy;  Laterality: N/A;  n+v with anesthesia    ESOPHAGOGASTRODUODENOSCOPY N/A 8/21/2019    Procedure: EGD (ESOPHAGOGASTRODUODENOSCOPY);  Surgeon: Uzair Cantor MD;  Location: [facility];  Service: Endoscopy;  Laterality: N/A;    ESOPHAGOGASTRODUODENOSCOPY N/A 10/7/2019    Procedure: EGD (ESOPHAGOGASTRODUODENOSCOPY);  Surgeon: Uzair Cantor MD;  Location: [facility];  Service: Endoscopy;  Laterality: N/A;    ESOPHAGOGASTRODUODENOSCOPY N/A 2/10/2020    Procedure: EGD (ESOPHAGOGASTRODUODENOSCOPY);  Surgeon: Uzair Cantor MD;  Location: [facility];  Service: Endoscopy;  Laterality: N/A;  Requests earliest procedure time    ESOPHAGOGASTRODUODENOSCOPY N/A 8/17/2020    Procedure: EGD (ESOPHAGOGASTRODUODENOSCOPY);  Surgeon: Uzair Cantor MD;  Location: [facility];  Service: Endoscopy;  Laterality: N/A;  BARRETTS    ESOPHAGOGASTRODUODENOSCOPY N/A 4/7/2021    Procedure: ESOPHAGOGASTRODUODENOSCOPY (EGD);  Surgeon: Uzair Cantor MD;  Location: [facility];  Service: Endoscopy;  Laterality: N/A;  Needs Rapid Covid MP    ESOPHAGOGASTRODUODENOSCOPY N/A 11/30/2021    Procedure: EGD (ESOPHAGOGASTRODUODENOSCOPY);  Surgeon: Uzair Cantor MD;  Location: [facility];  Service: Endoscopy;  Laterality: N/A;    ESOPHAGOGASTRODUODENOSCOPY N/A 6/27/2022    Procedure: EGD (ESOPHAGOGASTRODUODENOSCOPY);  Surgeon: Uzair Cantor MD;  Location: [facility];  Service: Endoscopy;  Laterality: N/A;  5/19/22: fully vaccinated. instructions mailed-SC    loop recorder only    ESOPHAGOGASTRODUODENOSCOPY N/A 8/7/2023    Procedure: EGD (ESOPHAGOGASTRODUODENOSCOPY);  Surgeon: Devaughn,  Uzair RENDON MD;  Location: [facility] ENDO;  Service: Endoscopy;  Laterality: N/A;    HYSTERECTOMY      ovaries remain    INSERTION OF IMPLANTABLE LOOP RECORDER N/A 7/17/2020    Procedure: INSERTION, IMPLANTABLE LOOP RECORDER (MEDTRONIC);  Surgeon: Karyna Vasques MD;  Location: [facility] CATH/EP LAB;  Service: Cardiology;  Laterality: N/A;    IVUS, CORONARY  3/12/2024    Procedure: IVUS, Coronary;  Surgeon: Fernando Valdivia MD;  Location: [facility] CATH/EP LAB;  Service: Cardiology;;    PERCUTANEOUS CORONARY INTERVENTION, ARTERY N/A 3/12/2024    Procedure: Percutaneous coronary intervention;  Surgeon: Fernando Valdivia MD;  Location: [facility] CATH/EP LAB;  Service: Cardiology;  Laterality: N/A;    SPLENECTOMY, TOTAL  2007    Injured during surgery and removed    UPPER GASTROINTESTINAL ENDOSCOPY  2007    GERD & hiatal hernia per patient report     Social History     Tobacco Use    Smoking status: Every Day     Current packs/day: 1.00     Average packs/day: 1 pack/day for 35.0 years (35.0 ttl pk-yrs)     Types: Cigarettes     Passive exposure: Current    Smokeless tobacco: Never   Substance Use Topics    Alcohol use: No    Drug use: No     Family History   Problem Relation Name Age of Onset    Lymphoma Mother          Brain    Colon cancer Father          diagnosed in his 60's    Lung cancer Father      Cancer Brother          prostate    Lung cancer Brother      Lung cancer Brother          metastatic from prostate    Prostate cancer Brother      Heart disease Brother          valvular disease    Hyperlipidemia Brother      Eczema Neg Hx      Lupus Neg Hx      Psoriasis Neg Hx      Melanoma Neg Hx      Crohn's disease Neg Hx      Ulcerative colitis Neg Hx      Stomach cancer Neg Hx      Esophageal cancer Neg Hx          Review of Systems:   Constitution: Negative for diaphoresis and fever.   HEENT: Negative for nosebleeds.  +neck pain, + headache   Cardiovascular: Negative for chest pain       No dyspnea on exertion       No leg  swelling        No palpitations  Respiratory: Negative for shortness of breath and wheezing.    Hematologic/Lymphatic: Negative for bleeding problem. Does not bruise/bleed easily.   Skin: Negative for color change and rash.   Musculoskeletal: Negative for falls and myalgias.   Gastrointestinal: Negative for hematemesis and hematochezia.   Genitourinary: Negative for hematuria.   Neurological: Negative for dizziness and light-headedness.   Psychiatric/Behavioral: Negative for altered mental status and memory loss.          Objective:        Vitals:    05/10/24 1017   BP: (!) 145/67   Pulse: 84       Lab Results   Component Value Date    WBC 10.41 03/07/2024    HGB 13.1 03/07/2024    HCT 39.9 03/07/2024     03/07/2024    CHOL 202 (H) 11/09/2023    TRIG 80 11/09/2023    HDL 47 11/09/2023    ALT 13 03/07/2024    AST 16 03/07/2024     03/07/2024    K 4.4 03/07/2024     03/07/2024    CREATININE 0.6 03/07/2024    BUN 14 03/07/2024    CO2 26 03/07/2024    TSH 2.178 02/09/2022    INR 0.9 03/07/2024        ECHOCARDIOGRAM RESULTS  Results for orders placed during the hospital encounter of 01/30/24    Echo    Interpretation Summary    Left Ventricle: The left ventricle is normal in size. Normal wall thickness. Normal wall motion. There is normal systolic function with a visually estimated ejection fraction of 65 - 70%. There is normal diastolic function.    Right Ventricle: Normal right ventricular cavity size. Wall thickness is normal. Right ventricle wall motion  is normal. Systolic function is normal.    Aortic Valve: The aortic valve is a trileaflet valve. Mildly calcified left, right and noncoronary cusps.    Mitral Valve: There is mild posterior mitral annular calcification present.    Pulmonary Artery: The estimated pulmonary artery systolic pressure is 22 mmHg.    IVC/SVC: Normal venous pressure at 3 mmHg.        CURRENT/PREVIOUS VISIT EKG  Results for orders placed or performed during the hospital  encounter of 03/07/24   EKG 12-lead    Collection Time: 03/12/24  1:30 PM   Result Value Ref Range    QRS Duration 84 ms    [facility]HS QTC Calculation 455 ms    Narrative    Test Reason : R94.39,R07.9,    Vent. Rate : 056 BPM     Atrial Rate : 056 BPM     P-R Int : 178 ms          QRS Dur : 084 ms      QT Int : 472 ms       P-R-T Axes : 062 062 072 degrees     QTc Int : 455 ms      Sinus bradycardia  Otherwise normal ECG  When compared with ECG of 07-MAR-2024 10:37,  Minimal criteria for Anterior infarct are no longer Present  Confirmed by Santa QUIROGA, Gama MOULTON ([id number]) on 3/23/2024 10:37:30 PM    Referred By: ALYSON BERRY           Confirmed By:Gama Pratt MD     No valid procedures specified.   Results for orders placed during the hospital encounter of 01/30/24    Nuclear Stress - Cardiology Interpreted    Interpretation Summary    Abnormal myocardial perfusion scan.    There is a mild to moderate intensity, small to moderate sized, mostly fixed perfusion abnormality with some reversibilty in the anterior and lateral wall(s).    There are no other significant perfusion abnormalities.    The gated perfusion images showed an ejection fraction of 74% at rest. The gated perfusion images showed an ejection fraction of 80% post stress. Normal ejection fraction is greater than 53%.    There is normal wall motion at rest and post stress.    LV cavity size is normal at rest and normal at stress.    The ECG portion of the study is negative for ischemia.    The patient reported chest pain during the stress test.    There were no arrhythmias during stress.      Physical Exam:  CONSTITUTIONAL: No fever, no chills  HEENT: Normocephalic, atraumatic,pupils reactive to light                 NECK:  No JVD no carotid bruit  CVS: S1S2+, RRR, no murmurs,   LUNGS: Clear  ABDOMEN: Soft, NT, BS+  EXTREMITIES: No cyanosis, edema  : No odonnell catheter  NEURO: AAO X 3  PSY: Normal affect      Medication List with Changes/Refills   New  Medications    EVOLOCUMAB (REPATHA SURECLICK) 140 MG/ML PNIJ    Inject 1 mL (140 mg total) into the skin every 14 (fourteen) days.   Current Medications    ALBUTEROL (PROVENTIL) 2.5 MG /3 ML (0.083 %) NEBULIZER SOLUTION    Take 3 mLs (2.5 mg total) by nebulization every 6 (six) hours as needed for Wheezing or Shortness of Breath. Rescue    ALBUTEROL (VENTOLIN HFA) 90 MCG/ACTUATION INHALER    Inhale 2 puffs into the lungs every 4 (four) hours as needed. Rescue    ALUMINUM & MAGNESIUM HYDROXIDE-SIMETHICONE (MYLANTA MAX STRENGTH) 400-400-40 MG/5 ML SUSPENSION    Take 5 mLs by mouth every 6 (six) hours as needed for Indigestion.     AMLODIPINE (NORVASC) 5 MG TABLET    Take 1 tablet (5 mg total) by mouth once daily.    ASPIRIN 81 MG CHEW    Take 81 mg by mouth once daily.    AZELASTINE (ASTELIN) 137 MCG (0.1 %) NASAL SPRAY    1 SPRAY BY NASAL ROUTE TWICE DAILY    CALCITRIOL (ROCALTROL) 0.25 MCG CAP    Take 1 capsule (0.25 mcg total) by mouth once daily.    CALCIUM-VITAMIN D3 (OS-FIDE 500 + D3) 500 MG-5 MCG (200 UNIT) PER TABLET    Take 1 tablet by mouth 2 (two) times daily with meals.    CLOBETASOL (TEMOVATE) 0.05 % CREAM    Apply to feet daily to bid prn flare    CLONAZEPAM (KLONOPIN) 0.5 MG TABLET    TAKE 1/2 TABLET(0.25 MG) BY MOUTH TWICE DAILY AS NEEDED FOR ANXIETY OR INSOMNIA    CLOPIDOGREL (PLAVIX) 75 MG TABLET    Take 1 tablet (75 mg total) by mouth once daily.    ESOMEPRAZOLE (NEXIUM) 40 MG CAPSULE    TAKE ONE CAPSULE BY MOUTH TWICE DAILY    EZETIMIBE (ZETIA) 10 MG TABLET    TAKE 1 TABLET(10 MG) BY MOUTH EVERY DAY    FLUTICASONE PROPIONATE (FLONASE) 50 MCG/ACTUATION NASAL SPRAY    1 spray (50 mcg total) by Each Nostril route 2 (two) times daily.    GABAPENTIN (NEURONTIN) 300 MG CAPSULE    Take 1 capsule (300 mg total) by mouth every evening.    MIRABEGRON (MYRBETRIQ) 25 MG TB24 ER TABLET    Take 1 tablet (25 mg total) by mouth once daily.    OFLOXACIN (OCUFLOX) 0.3 % OPHTHALMIC SOLUTION    Place 1 drop into the  left eye 4 (four) times daily.    POLYETHYLENE GLYCOL (GLYCOLAX) 17 GRAM PWPK    Take 17 g by mouth daily as needed.    PYRIDOXINE, VITAMIN B6, (VITAMIN B-6) 100 MG TAB    Take 50 mg by mouth once daily.    STIOLTO RESPIMAT 2.5-2.5 MCG/ACTUATION MIST    Inhale 1 puff into the lungs.    TIOTROPIUM BROMIDE (SPIRIVA RESPIMAT) 2.5 MCG/ACTUATION INHALER    Inhale 2 puffs into the lungs Daily. Controller    ZIRGAN 0.15 % GEL    Place into both eyes.   Discontinued Medications    MUPIROCIN (BACTROBAN) 2 % OINTMENT    3 (three) times daily. Apply to affected area    MUPIROCIN (BACTROBAN) 2 % OINTMENT    Apply topically 3 (three) times daily.    VALACYCLOVIR (VALTREX) 500 MG TABLET    Take 500 mg by mouth 2 (two) times daily.             Assessment:       1. SOB (shortness of breath)    2. Chest pain, unspecified type    3. Mixed dyslipidemia    4. Bradycardia    5. Essential hypertension, labile    6. Aortic atherosclerosis         Plan:     Problem List Items Addressed This Visit          Cardiac/Vascular    Essential hypertension, labile (Chronic)    Current Assessment & Plan     Blood pressure 145/67 today in clinic.  Patient does state that it runs higher at the doctor's office.  We will continue amlodipine 5 mg daily.         Mixed dyslipidemia (Chronic)    Current Assessment & Plan     Patient is intolerant to statin therapy.  She has been managed with Zetia 10 mg daily as well as fish oil supplements.  Due to risk factors, would like to try Repatha 140 mg once every other week.  We will redraw lipid panel before next visit.         Relevant Medications    evolocumab (REPATHA SURECLICK) 140 mg/mL PnIj    Other Relevant Orders    LIPID PANEL    Bradycardia    Current Assessment & Plan     Heart rate currently stable in clinic today at 84.  EKG shows normal sinus rhythm with ventricular rate of 72.          Aortic atherosclerosis    Current Assessment & Plan     Echocardiogram done on January 30, 2024 shows mildly  calcified left, right and non coronary cusps of the aortic valve.  We will start Repatha bi-weekly as patient is statin intolerant.           Other Visit Diagnoses       SOB (shortness of breath)    -  Primary    Chest pain, unspecified type        Relevant Orders    IN OFFICE EKG 12-LEAD (to Ada)            Follow up in about 4 months (around 9/10/2024).

## 2024-05-10 NOTE — ASSESSMENT & PLAN NOTE
Patient is intolerant to statin therapy.  She has been managed with Zetia 10 mg daily as well as fish oil supplements.  Due to risk factors, would like to try Repatha 140 mg once every other week.  We will redraw lipid panel before next visit.

## 2024-05-10 NOTE — ASSESSMENT & PLAN NOTE
Echocardiogram done on January 30, 2024 shows mildly calcified left, right and non coronary cusps of the aortic valve.  We will start Repatha bi-weekly as patient is statin intolerant.

## 2024-05-10 NOTE — ASSESSMENT & PLAN NOTE
Heart rate currently stable in clinic today at 84.  EKG shows normal sinus rhythm with ventricular rate of 72.

## 2024-05-10 NOTE — ASSESSMENT & PLAN NOTE
Blood pressure 145/67 today in clinic.  Patient does state that it runs higher at the doctor's office.  We will continue amlodipine 5 mg daily.

## 2024-05-23 LAB
OHS QRS DURATION: 90 MS
OHS QTC CALCULATION: 444 MS

## 2024-05-23 NOTE — TELEPHONE ENCOUNTER
Africa Hernandez is here to follow up with Yvonne Lockhart PA-C after Her bilateral breast reduction on 04/09/2024.  She reports ***. No changes to medication list, health history, or allergies.     No care due was identified.  [facility] Embedded Care Due Messages. Reference number: [id number].   8/11/2023 6:56:43 PM CDT

## 2024-06-07 DIAGNOSIS — J06.9 VIRAL URI WITH COUGH: ICD-10-CM

## 2024-06-07 DIAGNOSIS — J40 BRONCHITIS: ICD-10-CM

## 2024-06-07 RX ORDER — GABAPENTIN 300 MG/1
300 CAPSULE ORAL NIGHTLY
Qty: 90 CAPSULE | Refills: 3 | Status: SHIPPED | OUTPATIENT
Start: 2024-06-07

## 2024-06-07 NOTE — TELEPHONE ENCOUNTER
No care due was identified.  [facility] Embedded Care Due Messages. Reference number: [id number].   6/07/2024 11:12:04 AM CDT

## 2024-06-10 RX ORDER — ALBUTEROL SULFATE 0.83 MG/ML
2.5 SOLUTION RESPIRATORY (INHALATION) EVERY 6 HOURS PRN
Qty: 75 ML | Refills: 2 | Status: SHIPPED | OUTPATIENT
Start: 2024-06-10 | End: 2025-06-10

## 2024-06-24 ENCOUNTER — TELEPHONE (OUTPATIENT)
Dept: DERMATOLOGY | Facility: CLINIC | Age: 74
End: 2024-06-24
Payer: MEDICARE

## 2024-06-24 NOTE — TELEPHONE ENCOUNTER
Called and got patient scheduled for an appointment     ----- Message from Cassia Campos sent at 6/24/2024  1:02 PM CDT -----  Regarding: follow up  Contact: pt  Type:  Sooner Appointment Request    Caller is requesting a sooner appointment.  Caller declined first available appointment listed below.  Caller will not accept being placed on the waitlist and is requesting a message be sent to doctor.    Name of Caller:  patient  When is the first available appointment?    Symptoms: seeking a biopsy   Would the patient rather a call back or a response via [facility]?   Best Call Back Number: [phone]/ [phone]  Additional Information:  has a growth on her nose

## 2024-07-02 ENCOUNTER — TELEPHONE (OUTPATIENT)
Dept: DERMATOLOGY | Facility: CLINIC | Age: 74
End: 2024-07-02
Payer: MEDICARE

## 2024-07-02 NOTE — TELEPHONE ENCOUNTER
Called and got patient rescheduled.     ----- Message from Chandan Chanel sent at 7/2/2024  1:23 PM CDT -----  Type:  Sooner Appointment Request    Caller is requesting a sooner appointment.  Caller declined first available appointment listed below.  Caller will not accept being placed on the waitlist and is requesting a message be sent to doctor.    Name of Caller:  pt  When is the first available appointment?  7/11--said she need to cancel that one and schedule on in August but the next avail I could schedule was in January-please call and advise  Symptoms:  growth on nose  Would the patient rather a call back or a response via [facility]? call  Best Call Back Number:  [phone] (home)     Additional Information:  thank you

## 2024-07-23 ENCOUNTER — OFFICE VISIT (OUTPATIENT)
Dept: PULMONOLOGY | Facility: CLINIC | Age: 74
End: 2024-07-23
Payer: MEDICARE

## 2024-07-23 VITALS
HEIGHT: 61 IN | WEIGHT: 148.38 LBS | SYSTOLIC BLOOD PRESSURE: 150 MMHG | OXYGEN SATURATION: 94 % | DIASTOLIC BLOOD PRESSURE: 86 MMHG | BODY MASS INDEX: 28.01 KG/M2 | HEART RATE: 68 BPM

## 2024-07-23 DIAGNOSIS — B99.9 RECURRENT INFECTIONS: ICD-10-CM

## 2024-07-23 DIAGNOSIS — R53.83 FATIGUE, UNSPECIFIED TYPE: ICD-10-CM

## 2024-07-23 DIAGNOSIS — F17.210 NICOTINE DEPENDENCE, CIGARETTES, UNCOMPLICATED: ICD-10-CM

## 2024-07-23 DIAGNOSIS — J45.51 SEVERE PERSISTENT ASTHMA WITH ACUTE EXACERBATION: ICD-10-CM

## 2024-07-23 DIAGNOSIS — J44.9 CHRONIC OBSTRUCTIVE PULMONARY DISEASE, UNSPECIFIED COPD TYPE: Primary | ICD-10-CM

## 2024-07-23 DIAGNOSIS — I25.119 CORONARY ARTERY DISEASE INVOLVING NATIVE CORONARY ARTERY OF NATIVE HEART WITH ANGINA PECTORIS: ICD-10-CM

## 2024-07-23 PROCEDURE — 1159F MED LIST DOCD IN RCRD: CPT | Mod: CPTII,S$GLB,, | Performed by: NURSE PRACTITIONER

## 2024-07-23 PROCEDURE — 99999 PR PBB SHADOW E&M-EST. PATIENT-LVL IV: CPT | Mod: PBBFAC,,, | Performed by: NURSE PRACTITIONER

## 2024-07-23 PROCEDURE — 1125F AMNT PAIN NOTED PAIN PRSNT: CPT | Mod: CPTII,S$GLB,, | Performed by: NURSE PRACTITIONER

## 2024-07-23 PROCEDURE — 3079F DIAST BP 80-89 MM HG: CPT | Mod: CPTII,S$GLB,, | Performed by: NURSE PRACTITIONER

## 2024-07-23 PROCEDURE — 3077F SYST BP >= 140 MM HG: CPT | Mod: CPTII,S$GLB,, | Performed by: NURSE PRACTITIONER

## 2024-07-23 PROCEDURE — 1101F PT FALLS ASSESS-DOCD LE1/YR: CPT | Mod: CPTII,S$GLB,, | Performed by: NURSE PRACTITIONER

## 2024-07-23 PROCEDURE — 3288F FALL RISK ASSESSMENT DOCD: CPT | Mod: CPTII,S$GLB,, | Performed by: NURSE PRACTITIONER

## 2024-07-23 PROCEDURE — 3008F BODY MASS INDEX DOCD: CPT | Mod: CPTII,S$GLB,, | Performed by: NURSE PRACTITIONER

## 2024-07-23 PROCEDURE — 99213 OFFICE O/P EST LOW 20 MIN: CPT | Mod: S$GLB,,, | Performed by: NURSE PRACTITIONER

## 2024-07-23 RX ORDER — TRAZODONE HYDROCHLORIDE 50 MG/1
TABLET ORAL
COMMUNITY
Start: 2024-06-13

## 2024-07-23 RX ORDER — MULTIVIT-MIN/IRON/FOLIC ACID/K 18-600-40
CAPSULE ORAL
COMMUNITY
Start: 2024-06-13

## 2024-07-23 RX ORDER — NITROGLYCERIN 0.4 MG/1
0.4 TABLET SUBLINGUAL
COMMUNITY
Start: 2024-06-13

## 2024-07-23 RX ORDER — AMOXICILLIN AND CLAVULANATE POTASSIUM 875; 125 MG/1; MG/1
1 TABLET, FILM COATED ORAL EVERY 12 HOURS
Qty: 14 TABLET | Refills: 0 | Status: SHIPPED | OUTPATIENT
Start: 2024-07-23 | End: 2024-07-30

## 2024-07-23 RX ORDER — ACETAMINOPHEN 500 MG
1 TABLET ORAL
COMMUNITY
Start: 2024-06-13

## 2024-07-23 RX ORDER — BUDESONIDE, GLYCOPYRROLATE, AND FORMOTEROL FUMARATE 160; 9; 4.8 UG/1; UG/1; UG/1
2 AEROSOL, METERED RESPIRATORY (INHALATION) 2 TIMES DAILY
Qty: 10.7 G | Refills: 11 | Status: SHIPPED | OUTPATIENT
Start: 2024-07-23

## 2024-07-23 RX ORDER — PREDNISONE 20 MG/1
20 TABLET ORAL 2 TIMES DAILY
Qty: 6 TABLET | Refills: 0 | Status: SHIPPED | OUTPATIENT
Start: 2024-07-23 | End: 2024-07-26

## 2024-07-23 NOTE — PROGRESS NOTES
7/23/2024    Kristin Guerra  Office Note    Chief Complaint   Patient presents with    6m f/u    Wheezing    Fatigue       HPI:  7/23/2024- complaint of fatigue and generalized weakness, using nebulizer twice daily, on Spiriva but causes chocking.   Complaint of chest tightness, wheeze, shortness of breath, and productive cough with clear mucous. Took steroids with antibiotics in April 2024 with benefit but symptoms returned and have been gradually worsening with time.     1/23/2024- states SOB is stable, worse when walking for extended periods of time. Cutting down on smoking. Switching to vape to help cut back. Down to less than a pack a day.   No recent systemic steroid therapy.   On Spiriva daily with benefit. Using nebulizer once daily with improvement in daily cough.     9/21/2023- complaint of worsening shortness of breath, associated wheeze. Using nebulizer 1 x daily, has productive thick clear to brown mucous  Currently smoking 1 pack daily. States very difficult to quit.     10/27/2022- SOB stable complaint, wearing oxygen at night. Using nebulizer 1-2 times daily, daily cough is productive. On spiriva daily  Under large amount of stress due to husbands cancer diagnosis. PCP provides anxiety medication.     6/21/2022- currently smoking 1 pack daily. Not interested quitting and the moment.   Able to keep up with house work but tires quickly has to pace yourself. Wearing supplemental oxygen when needed. Uses husbands machine at 3L.   Cough- stable, unchanged, able to clear occasionally varies from clear to brown mucous. Associated with chest pain. Has Echo scheduled with cardiologist  Using Albuterol daily, tried Symbicort but states to strong and made her choke.     3/18/2022- currently working on cutting back on smoking. Over 1 pack daily. States SOB varies with time, has good/bad days, worse with high humidity.   Cough- persistent complaint, daily complaint, thick clear/brown mucous.   No current  complaint of wheeze or chest tightness. Occasionally wearing supplemental oxygen at home at 3L with benefit. Uses 's machine. Uses albuterol inhaler only, tried Symbicort in past caused chest pain.      12/17/2021- Complaint of daily cough- persistent complaint, mild complaint, productive clear/brown mucous, currently smoking 1 pack daily, associated with wheeze  Wearing supplemental oxygen at night 3L at night. No daily metered dose inhaler, using albuterol nebulizer 2-3x weekly with benefit.     SOB- stable, worse with exertion, difficult to lift heavy objects, improves with rest.     9/17/2021- complaint of high blood pressure, daytime fatigue, and generalized body aches, states she faints unexpectedly,     3/17/2021- decided not to do sleep study, Shortness of breath- daily complaint, varies with severity, worse with exertion. Currently smoking 1 pack cigarettes daily.   No cough, chest tightness, or wheeze.   Using albuterol 3-4 days weekly, using nebulizer when needed about 1x weekly, no nocturnal arousals.   Not able to tolerate inhalers, using albuterol nebulizer and inhaler only.       10/6/2020- Cough and SOB unchanged, daily, improves with albuterol rescue inhaler. Improves ability to bring up sputum, productive clear and brown mucous, not able to bring in sputum samples. Did not fill Trelegy due to high cost. Using albuterol rescue 2x daily to control cough,   Currently smoking 1 pack daily.    7/6/2020- referred by PCP for Ct chest screening with abnormal spot seen. Complaint of weakness and fatigue, being treated for Syncope dx with bradycardia waiting for pacemaker placement.   Cough- onset 2 years, large amounts of mucous, clear color, nickel size, daily.  SOB- onset 2 years, limits activity level, works with exertion, improves quickly with rest, no chest tightness or wheeze,  Complaint of nightly snoring, daytime fatigue, drowsiness in afternoons.   Social Hx: lives with spouse but no pets,  currently retired from office and retail stocking, no known Asbestosis exposure, Smoking Hx: currently smoking 1 pack daily, 40 pack years.  Family Hx: Father and 2 brothers (Small cell) Lung Cancer, no COPD, no Asthma  Medical Hx: no previous pneumonia ; no previous shoulder/chest surgery, Neck surgery , , 16 for disc infusions.         The chief compliant  problem is stable  PFSH:  Past Medical History:   Diagnosis Date    Allergy     Sterling's esophagus     Colon polyp     Diverticulitis     Diverticulosis     Fatty liver     GERD (gastroesophageal reflux disease)     History of endoscopy 2021    Dr Uzair Cantor  Impression:            - Normal oropharynx.                         - Hiatal hernia.                         - Sterling's esophagus. Treated with radiofrequency                         ablation.                         - Normal stomach.                         - Normal examined duodenum.                         - No specimens collected.     Hx of cervical spine surgery 2018    Hyperlipidemia     Hypertension     Lupus (systemic lupus erythematosus)     discoid    Osteoarthritis     Osteopenia     Syncope     Tremor 2005    essential         Past Surgical History:   Procedure Laterality Date    ANGIOGRAM, CORONARY, WITH LEFT HEART CATHETERIZATION N/A 3/12/2024    Procedure: Angiogram, Coronary, with Left Heart Cath;  Surgeon: Fernando Valdivia MD;  Location[facility] CATH[facility]EP LAB;  Service: Cardiology;  Laterality: N/A;    APPENDECTOMY      removed during colon surgery    CERVICAL FUSION      CERVICAL SPINE SURGERY      Rods and screws.     SECTION      COLON SURGERY      hemicolectomy    COLON SURGERY  2008    repair    COLONOSCOPY N/A 2018    Procedure: COLONOSCOPY;  Surgeon: Maverick Esquivel MD;  Location: [facility];  Service: Endoscopy;  Laterality: N/A; repeat in 5 years for surveillance    COLONOSCOPY N/A 2023    Procedure: COLONOSCOPY;  Surgeon: Devaughn  Uzair RENDON MD;  Location: [facility];  Service: Endoscopy;  Laterality: N/A;    CYSTOSCOPY N/A 5/26/2021    Procedure: CYSTOSCOPY;  Surgeon: Allison Miranda MD;  Location: [facility] OR;  Service: Urology;  Laterality: N/A;    ESOPHAGOGASTRODUODENOSCOPY N/A 4/2/2019    Procedure: EGD (ESOPHAGOGASTRODUODENOSCOPY);  Surgeon: Monika Steele MD;  Location: [facility];  Service: Endoscopy;  Laterality: N/A;    ESOPHAGOGASTRODUODENOSCOPY  06/04/2019    with RFA and biopsies    ESOPHAGOGASTRODUODENOSCOPY N/A 6/4/2019    Procedure: EGD (ESOPHAGOGASTRODUODENOSCOPY);  Surgeon: Uzair Cantor MD;  Location: [facility];  Service: Endoscopy;  Laterality: N/A;  n+v with anesthesia    ESOPHAGOGASTRODUODENOSCOPY N/A 8/21/2019    Procedure: EGD (ESOPHAGOGASTRODUODENOSCOPY);  Surgeon: Uzair Cantor MD;  Location: [facility];  Service: Endoscopy;  Laterality: N/A;    ESOPHAGOGASTRODUODENOSCOPY N/A 10/7/2019    Procedure: EGD (ESOPHAGOGASTRODUODENOSCOPY);  Surgeon: Uzair Cantor MD;  Location: [facility];  Service: Endoscopy;  Laterality: N/A;    ESOPHAGOGASTRODUODENOSCOPY N/A 2/10/2020    Procedure: EGD (ESOPHAGOGASTRODUODENOSCOPY);  Surgeon: Uzair Cantor MD;  Location: [facility];  Service: Endoscopy;  Laterality: N/A;  Requests earliest procedure time    ESOPHAGOGASTRODUODENOSCOPY N/A 8/17/2020    Procedure: EGD (ESOPHAGOGASTRODUODENOSCOPY);  Surgeon: Uzair Cantor MD;  Location: [facility];  Service: Endoscopy;  Laterality: N/A;  BARRETTS    ESOPHAGOGASTRODUODENOSCOPY N/A 4/7/2021    Procedure: ESOPHAGOGASTRODUODENOSCOPY (EGD);  Surgeon: Uzair Cantor MD;  Location: [facility];  Service: Endoscopy;  Laterality: N/A;  Needs Rapid Covid MP    ESOPHAGOGASTRODUODENOSCOPY N/A 11/30/2021    Procedure: EGD (ESOPHAGOGASTRODUODENOSCOPY);  Surgeon: Uzair Cantor MD;  Location: [facility];  Service: Endoscopy;  Laterality: N/A;    ESOPHAGOGASTRODUODENOSCOPY N/A 6/27/2022    Procedure: EGD  (ESOPHAGOGASTRODUODENOSCOPY);  Surgeon: Uzair Cantor MD;  Location: [facility] ENDO;  Service: Endoscopy;  Laterality: N/A;  5/19/22: fully vaccinated. instructions mailed-[place]    loop recorder only    ESOPHAGOGASTRODUODENOSCOPY N/A 8/7/2023    Procedure: EGD (ESOPHAGOGASTRODUODENOSCOPY);  Surgeon: Uzair Cantor MD;  Location: [facility] ENDO;  Service: Endoscopy;  Laterality: N/A;    HYSTERECTOMY      ovaries remain    INSERTION OF IMPLANTABLE LOOP RECORDER N/A 7/17/2020    Procedure: INSERTION, IMPLANTABLE LOOP RECORDER (MEDTRONIC);  Surgeon: Karyna Vasques MD;  Location: [facility] CATH/EP LAB;  Service: Cardiology;  Laterality: N/A;    IVUS, CORONARY  3/12/2024    Procedure: IVUS, Coronary;  Surgeon: Fernando Valdivia MD;  Location: [facility] CATH/EP LAB;  Service: Cardiology;;    PERCUTANEOUS CORONARY INTERVENTION, ARTERY N/A 3/12/2024    Procedure: Percutaneous coronary intervention;  Surgeon: Fernando Valdivia MD;  Location: [facility] CATH/EP LAB;  Service: Cardiology;  Laterality: N/A;    SPLENECTOMY, TOTAL  2007    Injured during surgery and removed    UPPER GASTROINTESTINAL ENDOSCOPY  2007    GERD & hiatal hernia per patient report     Social History     Tobacco Use    Smoking status: Every Day     Current packs/day: 1.00     Average packs/day: 1 pack/day for 35.0 years (35.0 ttl pk-yrs)     Types: Cigarettes     Passive exposure: Current    Smokeless tobacco: Never   Substance Use Topics    Alcohol use: No    Drug use: No     Family History   Problem Relation Name Age of Onset    Lymphoma Mother          Brain    Colon cancer Father          diagnosed in his 60's    Lung cancer Father      Cancer Brother          prostate    Lung cancer Brother      Lung cancer Brother          metastatic from prostate    Prostate cancer Brother      Heart disease Brother          valvular disease    Hyperlipidemia Brother      Eczema Neg Hx      Lupus Neg Hx      Psoriasis Neg Hx      Melanoma Neg Hx      Crohn's disease Neg Hx       "Ulcerative colitis Neg Hx      Stomach cancer Neg Hx      Esophageal cancer Neg Hx       Review of patient's allergies indicates:   Allergen Reactions    Doxycycline Itching and Swelling     Prescribed 5/2021, took one dose, describes lip swelling, and "itch all over"    Mirtazapine Other (See Comments)     Hyperactivity.    Morphine Swelling and Rash     Other reaction(s): swelling in limbs    Lexapro [escitalopram oxalate] Other (See Comments)     Worsening tremor.  Sedation.    Primidone Other (See Comments)     Syncope.  Other reaction(s): dizziness,hypotension  Other reaction(s): Unknown  Other reaction(s): Unknown    Simvastatin Nausea And Vomiting and Other (See Comments)     Weakness.  Other reaction(s): nausea,vomiting    Beta-adrenergic agents Other (See Comments)     Low blood pressure and fainted.     Metoprolol succinate Other (See Comments)    Prochlorperazine Other (See Comments)     Other reaction(s): weakness    Topiramate     Compazine [prochlorperazine edisylate] Nausea Only    Tramadol Nausea And Vomiting     I have reviewed past medical, family, and social history. I have reviewed previous nurse notes.    Performance Status:The patient's activity level is functions out of house.      Review of Systems:  a review of eleven systems covering constitutional, Eye, HEENT, Psych, Respiratory, Cardiac, GI, , Musculoskeletal, Endocrine, Dermatologic was negative except for pertinent findings as listed ABOVE and below: pertinent positive as above, rest is good  Cough, shortness of breath         Exam:Comprehensive exam done. BP (!) 150/86 (BP Location: Right arm, Patient Position: Sitting, BP Method: Medium (Automatic))   Pulse 68   Ht 5' 1" (1.549 m)   Wt 67.3 kg (148 lb 5.9 oz)   SpO2 (!) 94% Comment: on room air at rest  BMI 28.03 kg/m²   Exam included Vitals as listed, and patient's appearance and affect and alertness and mood, oral exam for yeast and hygiene and pharynx lesions and Mallapatti " (M) score, neck with inspection for jvd and masses and thyroid abnormalities and lymph nodes (supraclavicular and infraclavicular nodes and axillary also examined and noted if abn), chest exam included symmetry and effort and fremitus and percussion and auscultation, cardiac exam included rhythm and gallops and murmur and rubs and jvd and edema, abdominal exam for mass and hepatosplenomegaly and tenderness and hernias and bowel sounds, Musculoskeletal exam with muscle tone and posture and mobility/gait and  strength, and skin for rashes and cyanosis and pallor and turgor, extremity for clubbing.  Findings were normal except for pertinent findings listed below: M2, BS deminished, bilateral rales.       Radiographs (ct chest and cxr) reviewed: view by direct vision   CT chest 09/14/22 lung nodules, atelectasis, and emphysema unchanged, stable; repeat CT in one year recommended     CT Chest Without Contrast 9/10/2021   Unchanged right lung nodules dating back to 6/18/2020.  2. Mild emphysema.  Coronary artery calcifications     CT Chest Without Contrast 2/22/2021   Thereare no new pulmonary nodules. Mild emphysematous lung disease is again demonstrated bilaterally. There is bibasilar subsegmental atelectasis versus scarring  Multiple pulmonary nodules are again identified as described and are unchanged      CT Chest Without Contrast 09/09/20   IMPRESSION:  Stable 8 mm fissural nodule in the anterior right middle lobe and 4 mm  subpleural nodule in the posterior right medial lobe Resolution of the 6 mm subpleural nodular density in the anterior right costophrenic angle Mild coronary artery calcification No new pulmonary nodules or infiltrates  LUNG-RADS CATEGORY 3: PROBABLY BENIGN FINDINGS  RECOMMENDATION: Short-term imaging follow-up with 6 month LDCT.      Transthoracic echo (TTE) complete 6/3/2020  Mild concentric left ventricular hypertrophy.  Normal left ventricular systolic function. The estimated ejection  fraction is 60%.  Normal LV diastolic function.  The estimated PA systolic pressure is 34 mmHg.     X-Ray Chest AP Portable 06/02/20   The lungs are clear. Costophrenic angles are seen without effusion. No  pneumothorax is identified. The heart is normal in size. Atheromatous  calcifications are seen at the aortic arch. Degenerative changes are  seen with an ACDF and paraspinal fixation hardware in the lower  cervical spine. The visualized upper abdomen is unremarkable    CT Chest Lung Screening Low Dose 06/18/20   1.  Nodular density with spiculations in the anterior segment of  the right middle lobe measures 6 x 9 mm.  2.  6 mm subpleural nodular density in the anterior right  costophrenic angle.  3.  Irregular shaped density in the medial segment of the right  lower lobe is unchanged dating back from 2009 and is benign.     LUNG-RADS CATEGORY 4A: SUSPICIOUS FINDINGS     RECOMMENDATION: 3 month follow-up LDCT (PET/CT may be considered when  solid component greater than 7 mm is present).        Labs reviewed       Lab Results   Component Value Date    WBC 10.41 03/07/2024    RBC 4.25 03/07/2024    HGB 13.1 03/07/2024    HCT 39.9 03/07/2024    MCV 94 03/07/2024    MCH 30.8 03/07/2024    MCHC 32.8 03/07/2024    RDW 15.0 (H) 03/07/2024     03/07/2024    MPV 9.6 03/07/2024    GRAN 6.9 03/07/2024    GRAN 66.5 03/07/2024    LYMPH 2.4 03/07/2024    LYMPH 23.2 03/07/2024    MONO 0.9 03/07/2024    MONO 8.2 03/07/2024    EOS 0.1 03/07/2024    BASO 0.08 03/07/2024    EOSINOPHIL 1.1 03/07/2024    BASOPHIL 0.8 03/07/2024      Latest Reference Range & Units 02/14/22 08:24 01/15/23 20:28 03/08/23 09:32   Eos # 0.0 - 0.5 K/uL 0.4 0.2 0.0         Ed Model: 7/6/2020 8.47% probability.    PFT reviewed  Pulmonary Functions Testing Results:  spirometry with bronchodilator, lung volume by gas dilution, and diffusion capacity were measured July 15, 2020.  The FEV1 to FVC ratio was 66% indicating airflow obstruction.  The FEV1  measured 70% of predicted making airflow obstruction moderate.     There was a 25% improvement in FEV1 following bronchodilator, this is statistically significant.  The FEV1 measured about 70% predicted at 1.4 L pre bronchodilator.  Total lung capacity was in normal limits on lung volumes by gas dilution.  Diffusion,   uncorrected for anemia present, was 49% of predicted.       The patient had moderate airflow obstruction that resolved with bronchodilator.  Lung volumes were normal.  Diffusion was reduced.  Clinical correlation recommended.       EPWORTH SLEEPINESS SCALE= 16 9/17/2021  Sleep study AHI 1 9/27/2021  DEA- 1.0 [facility]    Plan:  Clinical impression is apparently straight forward and impression with management as below.    Kristin was seen today for 6m f/u, wheezing and fatigue.    Diagnoses and all orders for this visit:    Chronic obstructive pulmonary disease, unspecified COPD type  -     budesonide-glycopyr-formoterol (BREZTRI AEROSPHERE) 160-9-4.8 mcg/actuation HFAA; Inhale 2 puffs into the lungs 2 (two) times a day.  -     Six Minute Walk Test to qualify for Home Oxygen; Future    Fatigue, unspecified type  -     Thyroid peroxidase antibody; Future  -     TSH; Future  -     T3; Future  -     T4; Future    Recurrent infections  -     CBC auto differential; Future  -     Comprehensive Metabolic Panel; Future  -     IGG; Future  -     IGA; Future  -     IGM; Future    Coronary artery disease involving native coronary artery of native heart with angina pectoris  -     EKG 12-lead; Future  -     LIPID PANEL; Future    Severe persistent asthma with acute exacerbation  -     IGE; Future  -     predniSONE (DELTASONE) 20 MG tablet; Take 1 tablet (20 mg total) by mouth 2 (two) times daily. for 3 days  -     amoxicillin-clavulanate 875-125mg (AUGMENTIN) 875-125 mg per tablet; Take 1 tablet by mouth every 12 (twelve) hours. for 7 days    Nicotine dependence, cigarettes, uncomplicated  -     CT Chest Lung Screening  Low Dose; Future          No follow-ups on file.    Discussed with patient above for education the following:      There are no Patient Instructions on file for this visit.

## 2024-07-24 ENCOUNTER — HOSPITAL ENCOUNTER (OUTPATIENT)
Dept: PULMONOLOGY | Facility: HOSPITAL | Age: 74
Discharge: HOME | End: 2024-07-24
Attending: NURSE PRACTITIONER
Payer: MEDICARE

## 2024-07-24 ENCOUNTER — HOSPITAL ENCOUNTER (OUTPATIENT)
Dept: CARDIOLOGY | Facility: HOSPITAL | Age: 74
Discharge: HOME | End: 2024-07-24
Attending: NURSE PRACTITIONER
Payer: MEDICARE

## 2024-07-24 DIAGNOSIS — J44.9 CHRONIC OBSTRUCTIVE PULMONARY DISEASE, UNSPECIFIED COPD TYPE: ICD-10-CM

## 2024-07-24 DIAGNOSIS — I25.119 CORONARY ARTERY DISEASE INVOLVING NATIVE CORONARY ARTERY OF NATIVE HEART WITH ANGINA PECTORIS: ICD-10-CM

## 2024-07-24 PROCEDURE — 94618 PULMONARY STRESS TESTING: CPT

## 2024-07-24 PROCEDURE — 93010 ELECTROCARDIOGRAM REPORT: CPT | Mod: ,,, | Performed by: GENERAL PRACTICE

## 2024-07-24 PROCEDURE — 93005 ELECTROCARDIOGRAM TRACING: CPT

## 2024-07-26 DIAGNOSIS — J44.9 CHRONIC OBSTRUCTIVE PULMONARY DISEASE, UNSPECIFIED COPD TYPE: Primary | ICD-10-CM

## 2024-07-26 LAB
OHS QRS DURATION: 92 MS
OHS QTC CALCULATION: 413 MS

## 2024-07-31 ENCOUNTER — PATIENT MESSAGE (OUTPATIENT)
Dept: PULMONOLOGY | Facility: CLINIC | Age: 74
End: 2024-07-31
Payer: MEDICARE

## 2024-08-06 ENCOUNTER — TELEPHONE (OUTPATIENT)
Dept: ENDOSCOPY | Facility: HOSPITAL | Age: 74
End: 2024-08-06
Payer: MEDICARE

## 2024-08-20 DIAGNOSIS — Z12.31 BREAST CANCER SCREENING BY MAMMOGRAM: Primary | ICD-10-CM

## 2024-09-04 ENCOUNTER — OFFICE VISIT (OUTPATIENT)
Dept: DERMATOLOGY | Facility: CLINIC | Age: 74
End: 2024-09-04
Payer: MEDICARE

## 2024-09-04 VITALS — BODY MASS INDEX: 28.01 KG/M2 | HEIGHT: 61 IN | WEIGHT: 148.38 LBS

## 2024-09-04 DIAGNOSIS — L73.2 HIDRADENITIS SUPPURATIVA: ICD-10-CM

## 2024-09-04 DIAGNOSIS — D22.9 MULTIPLE BENIGN NEVI: ICD-10-CM

## 2024-09-04 DIAGNOSIS — L82.1 SEBORRHEIC KERATOSES: ICD-10-CM

## 2024-09-04 DIAGNOSIS — L02.91 ABSCESS: Primary | ICD-10-CM

## 2024-09-04 DIAGNOSIS — Z12.83 SKIN CANCER SCREENING: ICD-10-CM

## 2024-09-04 DIAGNOSIS — L81.4 SOLAR LENTIGO: ICD-10-CM

## 2024-09-04 PROCEDURE — 3008F BODY MASS INDEX DOCD: CPT | Mod: CPTII,S$GLB,, | Performed by: DERMATOLOGY

## 2024-09-04 PROCEDURE — 99214 OFFICE O/P EST MOD 30 MIN: CPT | Mod: 25,S$GLB,, | Performed by: DERMATOLOGY

## 2024-09-04 PROCEDURE — 3288F FALL RISK ASSESSMENT DOCD: CPT | Mod: CPTII,S$GLB,, | Performed by: DERMATOLOGY

## 2024-09-04 PROCEDURE — 10061 I&D ABSCESS COMP/MULTIPLE: CPT | Mod: S$GLB,,, | Performed by: DERMATOLOGY

## 2024-09-04 PROCEDURE — 1160F RVW MEDS BY RX/DR IN RCRD: CPT | Mod: CPTII,S$GLB,, | Performed by: DERMATOLOGY

## 2024-09-04 PROCEDURE — 1159F MED LIST DOCD IN RCRD: CPT | Mod: CPTII,S$GLB,, | Performed by: DERMATOLOGY

## 2024-09-04 PROCEDURE — 1101F PT FALLS ASSESS-DOCD LE1/YR: CPT | Mod: CPTII,S$GLB,, | Performed by: DERMATOLOGY

## 2024-09-04 PROCEDURE — 87070 CULTURE OTHR SPECIMN AEROBIC: CPT | Performed by: DERMATOLOGY

## 2024-09-04 RX ORDER — CEFADROXIL 500 MG/1
500 CAPSULE ORAL EVERY 12 HOURS
Qty: 14 CAPSULE | Refills: 0 | Status: SHIPPED | OUTPATIENT
Start: 2024-09-04

## 2024-09-04 NOTE — PROGRESS NOTES
Subjective:      Patient ID:  Kristin Guerra is a 74 y.o. female who presents for   Chief Complaint   Patient presents with    Skin Check     TBSC      LOV 6/23/23 Chondrodermatitis Nodularis helicis of right ear    Patient here today for TBSC  C/O spot to left nose x years.   C/O spots to left upper thigh and underarms.  Left thigh lesion is really painful.    Derm Hx:  Denies Phx of NMSC  Denies Fhx of MM    Current Outpatient Medications:   ·  albuterol (PROVENTIL) 2.5 mg /3 mL (0.083 %) nebulizer solution, Take 3 mLs (2.5 mg total) by nebulization every 6 (six) hours as needed for Wheezing or Shortness of Breath. Rescue, Disp: 75 mL, Rfl: 2  ·  albuterol (VENTOLIN HFA) 90 mcg/actuation inhaler, Inhale 2 puffs into the lungs every 4 (four) hours as needed. Rescue, Disp: 18 g, Rfl: 11  ·  aluminum & magnesium hydroxide-simethicone (MYLANTA MAX STRENGTH) 400-400-40 mg/5 mL suspension, Take 5 mLs by mouth every 6 (six) hours as needed for Indigestion. , Disp: , Rfl:   ·  amLODIPine (NORVASC) 5 MG tablet, Take 1 tablet (5 mg total) by mouth once daily., Disp: 90 tablet, Rfl: 3  ·  ascorbic acid, vitamin C, 500 mg Cap, as directed Orally once daily, Disp: , Rfl:   ·  aspirin 81 MG Chew, Take 81 mg by mouth once daily., Disp: , Rfl:   ·  azelastine (ASTELIN) 137 mcg (0.1 %) nasal spray, 1 SPRAY BY NASAL ROUTE TWICE DAILY, Disp: 90 mL, Rfl: 0  ·  budesonide-glycopyr-formoterol (BREZTRI AEROSPHERE) 160-9-4.8 mcg/actuation HFAA, Inhale 2 puffs into the lungs 2 (two) times a day., Disp: 10.7 g, Rfl: 11  ·  calcitRIOL (ROCALTROL) 0.25 MCG Cap, Take 1 capsule (0.25 mcg total) by mouth once daily., Disp: 90 capsule, Rfl: 3  ·  calcium-vitamin D3 (OS-FIDE 500 + D3) 500 mg-5 mcg (200 unit) per tablet, Take 1 tablet by mouth 2 (two) times daily with meals., Disp: , Rfl:   ·  cholecalciferol, vitamin D3, (VITAMIN D3) 50 mcg (2,000 unit) Cap capsule, 1 capsule., Disp: , Rfl:   ·  clobetasoL (TEMOVATE) 0.05 % cream, Apply to feet  daily to bid prn flare, Disp: 30 g, Rfl: 2  ·  clonazePAM (KLONOPIN) 0.5 MG tablet, TAKE 1/2 TABLET(0.25 MG) BY MOUTH TWICE DAILY AS NEEDED FOR ANXIETY OR INSOMNIA, Disp: 30 tablet, Rfl: 3  ·  clopidogreL (PLAVIX) 75 mg tablet, Take 1 tablet (75 mg total) by mouth once daily., Disp: 90 tablet, Rfl: 3  ·  esomeprazole (NEXIUM) 40 MG capsule, TAKE ONE CAPSULE BY MOUTH TWICE DAILY, Disp: 180 capsule, Rfl: 3  ·  evolocumab (REPATHA SURECLICK) 140 mg/mL PnIj, Inject 1 mL (140 mg total) into the skin every 14 (fourteen) days., Disp: 6 each, Rfl: 3  ·  ezetimibe (ZETIA) 10 mg tablet, TAKE 1 TABLET(10 MG) BY MOUTH EVERY DAY, Disp: 90 tablet, Rfl: 3  ·  fluticasone propionate (FLONASE) 50 mcg/actuation nasal spray, 1 spray (50 mcg total) by Each Nostril route 2 (two) times daily., Disp: 48 g, Rfl: 0  ·  gabapentin (NEURONTIN) 300 MG capsule, TAKE ONE CAPSULE BY MOUTH EVERY EVENING, Disp: 90 capsule, Rfl: 3  ·  mirabegron (MYRBETRIQ) 25 mg Tb24 ER tablet, Take 1 tablet (25 mg total) by mouth once daily., Disp: 90 tablet, Rfl: 3  ·  nitroGLYCERIN (NITROSTAT) 0.4 MG SL tablet, Place 0.4 mg under the tongue., Disp: , Rfl:   ·  ofloxacin (OCUFLOX) 0.3 % ophthalmic solution, Place 1 drop into the left eye 4 (four) times daily., Disp: , Rfl:   ·  polyethylene glycol (GLYCOLAX) 17 gram PwPk, Take 17 g by mouth daily as needed., Disp: , Rfl:   ·  pyridoxine, vitamin B6, (VITAMIN B-6) 100 MG Tab, Take 50 mg by mouth once daily., Disp: , Rfl:   ·  traZODone (DESYREL) 50 MG tablet, 1 tablet at bedtime as needed Orally Once a day for 30 days, Disp: , Rfl:   ·  ZIRGAN 0.15 % Gel, Place into both eyes., Disp: , Rfl:         Review of Systems   Constitutional:  Negative for fever and chills.   Respiratory:  Negative for cough and shortness of breath.    Gastrointestinal:  Negative for nausea and vomiting.   Skin:  Negative for daily sunscreen use and activity-related sunscreen use.   Hematologic/Lymphatic: Bruises/bleeds easily.        Objective:   Physical Exam   Constitutional: She appears well-developed and well-nourished. No distress.   Neurological: She is alert and oriented to person, place, and time. She is not disoriented.   Psychiatric: She has a normal mood and affect.   Skin:   Areas Examined (abnormalities noted in diagram):   Scalp / Hair Palpated and Inspected  Head / Face Inspection Performed  Neck Inspection Performed  Chest / Axilla Inspection Performed  Abdomen Inspection Performed  Back Inspection Performed  RUE Inspected  LUE Inspection Performed  RLE Inspected  LLE Inspection Performed  Nails and Digits Inspection Performed                             Diagram Legend     Erythematous scaling macule/papule c/w actinic keratosis       Vascular papule c/w angioma      Pigmented verrucoid papule/plaque c/w seborrheic keratosis      Yellow umbilicated papule c/w sebaceous hyperplasia      Irregularly shaped tan macule c/w lentigo     1-2 mm smooth white papules consistent with Milia      Movable subcutaneous cyst with punctum c/w epidermal inclusion cyst      Subcutaneous movable cyst c/w pilar cyst      Firm pink to brown papule c/w dermatofibroma      Pedunculated fleshy papule(s) c/w skin tag(s)      Evenly pigmented macule c/w junctional nevus     Mildly variegated pigmented, slightly irregular-bordered macule c/w mildly atypical nevus      Flesh colored to evenly pigmented papule c/w intradermal nevus       Pink pearly papule/plaque c/w basal cell carcinoma      Erythematous hyperkeratotic cursted plaque c/w SCC      Surgical scar with no sign of skin cancer recurrence      Open and closed comedones      Inflammatory papules and pustules      Verrucoid papule consistent consistent with wart     Erythematous eczematous patches and plaques     Dystrophic onycholytic nail with subungual debris c/w onychomycosis     Umbilicated papule    Erythematous-base heme-crusted tan verrucoid plaque consistent with inflamed seborrheic  keratosis     Erythematous Silvery Scaling Plaque c/w Psoriasis     See annotation      Assessment / Plan:        Abscess, left thigh  -     Aerobic culture  -     cefadroxil (DURICEF) 500 MG Cap; Take 1 capsule (500 mg total) by mouth every 12 (twelve) hours.  Dispense: 14 capsule; Refill: 0  Risks, benefits and alternatives discussed. Informed verbal consent obtained.  Area cleaned with alcohol. 6cc lidocaine with  epinephrine was used as a local anesthetic. Lesion incised with #11 blade and drained on today's date. Bacterial culture performed.  Cavity curetted with disposable curette. Defect packed with iodoform gauze. Procedure tolerated without complications. Wound dressed with gauze and tape.     Instructions for removal provided to patient's daughter present today.   Patient instructed to perform warm compresses 2 - 3 times/daily.    Seborrheic keratoses  These are benign inherited growths without a malignant potential. Reassurance given to patient. No treatment is necessary.     Solar lentigo  This is a benign hyperpigmented sun induced lesion. Daily sun protection will reduce the number of new lesions. Treatment of these benign lesions are considered cosmetic.    Skin cancer screening  Area of previous melanoma examined. Site well healed with no signs of recurrence.    Total body skin examination performed today including at least 12 points as noted in physical examination. No lesions suspicious for malignancy noted.    Multiple being nevi  Careful dermoscopy evaluation of nevi performed with none identified as needing biopsy today  Monitor for new mole or moles that are becoming bigger, darker, irritated, or developing irregular borders.       Hidradenitis suppurativa  Encouraged smoking cessation  Mostly comedonal with mulitple superficial cysts  No sinus tract, no drainage, no erythema  Continue BP wash  SMOKING NEGATIVELY IMPACTS HISDRADENITIS, ENCOURAGED CESSATION         Follow up if symptoms worsen  or fail to improve.

## 2024-09-06 ENCOUNTER — TELEPHONE (OUTPATIENT)
Dept: DERMATOLOGY | Facility: CLINIC | Age: 74
End: 2024-09-06
Payer: MEDICARE

## 2024-09-06 LAB — BACTERIA SPEC AEROBE CULT: NO GROWTH

## 2024-09-06 NOTE — TELEPHONE ENCOUNTER
----- Message from Elidia Smith MD sent at 9/6/2024 12:55 PM CDT -----  Culture negative to date. How is patient doing?

## 2024-09-06 NOTE — TELEPHONE ENCOUNTER
Contyacted pt in regards to culture, pt voices understanding. States she's doing well, she has no further concerns.

## 2024-09-09 ENCOUNTER — OFFICE VISIT (OUTPATIENT)
Dept: CARDIOLOGY | Facility: CLINIC | Age: 74
End: 2024-09-09
Payer: MEDICARE

## 2024-09-09 DIAGNOSIS — I25.10 CORONARY ARTERY DISEASE INVOLVING NATIVE CORONARY ARTERY OF NATIVE HEART WITHOUT ANGINA PECTORIS: Primary | ICD-10-CM

## 2024-09-09 DIAGNOSIS — E78.2 MIXED DYSLIPIDEMIA: Chronic | ICD-10-CM

## 2024-09-09 DIAGNOSIS — I10 PRIMARY HYPERTENSION: ICD-10-CM

## 2024-09-09 PROCEDURE — 99999 PR PBB SHADOW E&M-EST. PATIENT-LVL V: CPT | Mod: PBBFAC,,, | Performed by: STUDENT IN AN ORGANIZED HEALTH CARE EDUCATION/TRAINING PROGRAM

## 2024-09-09 NOTE — PROGRESS NOTES
Patient ID:  Kristin Guerra  74 y.o.  female      Assessment:       1. Coronary artery disease involving native coronary artery of native heart without angina pectoris    2. Mixed dyslipidemia    3. Primary hypertension      PCI of distal RCA on March 12, 2024.    Plan:   Stable from cardiac standpoint.  Angina free.  Continue dual antiplatelet therapy with aspirin 81 mg daily and Plavix 75 mg daily for 12 months post PCI followed by aspirin 81 mg daily monotherapy lifelong.  Continue Zetia 10 mg daily.  She has not gotten her Repatha yet.  Will resend the script to [facility].  She has had issues with different statins.  LDL in July is 162.  Goal is less than 55.  Continue amlodipine 5 mg daily.  Blood pressure is blood pressure is elevated at 142/91 in the office today.  She continues to emphasize that she has white coat syndrome blood pressure is usually high appointments.  She says her home BP is within normal limits.  Asked her to keep home BP log and bring it with her next visit.  Counseled on ASCVD risk factor control and heart healthy lifestyle including diet and exercise.     Subjective:     Chief Complaint   Patient presents with    Shortness of Breath    Gastroesophageal Reflux    Results     labs       HPI:  Kristin Guerra is a 74 y.o. female who presents for follow-up.  She has a history of CAD status post PCI of distal RCA in March 2024.  She was experiencing chest pain at the time and had an abnormal stress test. Reports no chest pain, orthopnea, PND, swelling of extremities, palpitations, syncope or near syncope.  Has some shortness of breath on exertion at baseline due to COPD.  She has not gotten Repatha yet.    PREVIOUS CARDIAC TESTING/PROCEDURES HISTORY:  Most Recent Echocardiogram Results  Results for orders placed during the hospital encounter of 01/30/24    Echo    Interpretation Summary    Left Ventricle: The left ventricle is normal in size. Normal wall thickness. Normal  wall motion. There is normal systolic function with a visually estimated ejection fraction of 65 - 70%. There is normal diastolic function.    Right Ventricle: Normal right ventricular cavity size. Wall thickness is normal. Right ventricle wall motion  is normal. Systolic function is normal.    Aortic Valve: The aortic valve is a trileaflet valve. Mildly calcified left, right and noncoronary cusps.    Mitral Valve: There is mild posterior mitral annular calcification present.    Pulmonary Artery: The estimated pulmonary artery systolic pressure is 22 mmHg.    IVC/SVC: Normal venous pressure at 3 mmHg.      Most Recent Stress Test Results  Results for orders placed during the hospital encounter of 01/30/24    Nuclear Stress - Cardiology Interpreted    Interpretation Summary    Abnormal myocardial perfusion scan.    There is a mild to moderate intensity, small to moderate sized, mostly fixed perfusion abnormality with some reversibilty in the anterior and lateral wall(s).    There are no other significant perfusion abnormalities.    The gated perfusion images showed an ejection fraction of 74% at rest. The gated perfusion images showed an ejection fraction of 80% post stress. Normal ejection fraction is greater than 53%.    There is normal wall motion at rest and post stress.    LV cavity size is normal at rest and normal at stress.    The ECG portion of the study is negative for ischemia.    The patient reported chest pain during the stress test.    There were no arrhythmias during stress.      Most Recent Cardiac PET Stress Test Results  No results found for this or any previous visit.      Most Recent Cardiovascular Angiogram results  Results for orders placed during the hospital encounter of 03/12/24    Cardiac catheterization    Conclusion    The Dist RCA lesion was 99% stenosed with 0% stenosis post-intervention.    LVEDP:  8 mmHg    The procedure log was documented by Documenter: Teetee Hamilton RT; Tschume,  "NATASHA Sesay and verified by Fernando Valdivia MD.    Date: 3/13/2024  Time: 6:42 PM      Other Most Recent Cardiology Results  Results for orders placed during the hospital encounter of 05/18/23    Cardiac device check - Remote    Narrative  · [employer] Reveal Summary Report-03/23 thru 05/18/2023  · No new observations.      Verified      CARDIAC SURGERY:    Most Recent EKG Results  Results for orders placed or performed during the hospital encounter of 07/24/24   EKG 12-lead    Collection Time: 07/24/24 10:51 AM   Result Value Ref Range    QRS Duration 92 ms    [facility] QTC Calculation 413 ms    Narrative    Test Reason : CAD    Vent. Rate : 054 BPM     Atrial Rate : 054 BPM     P-R Int : 162 ms          QRS Dur : 092 ms      QT Int : 436 ms       P-R-T Axes : 065 047 073 degrees     QTc Int : 413 ms    Sinus bradycardia  Nonspecific ST abnormality  Abnormal ECG  When compared with ECG of 10-MAY-2024 10:35,  Criteria for Inferior infarct are no longer Present  Confirmed by Santa QUIROGA, Gama MOULTON ([id number]) on 7/26/2024 7:33:55 PM    Referred By: ANYA MCKEON           Confirmed By:Gama Pratt MD       The 10-year ASCVD risk score (J Carlos DK, et al., 2019) is: 30.8%    Values used to calculate the score:      Age: 74 years      Sex: Female      Is Non- : No      Diabetic: No      Tobacco smoker: Yes      Systolic Blood Pressure: 142 mmHg      Is BP treated: Yes      HDL Cholesterol: 54 mg/dL      Total Cholesterol: 232 mg/dL    Review of patient's allergies indicates:   Allergen Reactions    Doxycycline Itching and Swelling     Prescribed 5/2021, took one dose, describes lip swelling, and "itch all over"    Mirtazapine Other (See Comments)     Hyperactivity.    Morphine Swelling and Rash     Other reaction(s): swelling in limbs    Lexapro [escitalopram oxalate] Other (See Comments)     Worsening tremor.  Sedation.    Primidone Other (See Comments)     Syncope.  Other reaction(s): " dizziness,hypotension  Other reaction(s): Unknown  Other reaction(s): Unknown    Simvastatin Nausea And Vomiting and Other (See Comments)     Weakness.  Other reaction(s): nausea,vomiting    Beta-adrenergic agents Other (See Comments)     Low blood pressure and fainted.     Metoprolol succinate Other (See Comments)    Prochlorperazine Other (See Comments)     Other reaction(s): weakness    Topiramate     Compazine [prochlorperazine edisylate] Nausea Only    Tramadol Nausea And Vomiting       Past Medical History:   Diagnosis Date    Allergy     Sterling's esophagus     Colon polyp     Diverticulitis     Diverticulosis     Fatty liver     GERD (gastroesophageal reflux disease)     History of endoscopy 2021    Dr Uzair Cantor  Impression:            - Normal oropharynx.                         - Hiatal hernia.                         - Sterling's esophagus. Treated with radiofrequency                         ablation.                         - Normal stomach.                         - Normal examined duodenum.                         - No specimens collected.     Hx of cervical spine surgery 2018    Hyperlipidemia     Hypertension     Lupus (systemic lupus erythematosus)     discoid    Osteoarthritis     Osteopenia     Syncope     Tremor 2005    essential     Past Surgical History:   Procedure Laterality Date    ANGIOGRAM, CORONARY, WITH LEFT HEART CATHETERIZATION N/A 3/12/2024    Procedure: Angiogram, Coronary, with Left Heart Cath;  Surgeon: Fernando Valdivia MD;  Location: [facility] CATH/EP LAB;  Service: Cardiology;  Laterality: N/A;    APPENDECTOMY      removed during colon surgery    CERVICAL FUSION      CERVICAL SPINE SURGERY      Rods and screws.     SECTION      COLON SURGERY  2007    hemicolectomy    COLON SURGERY  2008    repair    COLONOSCOPY N/A 2018    Procedure: COLONOSCOPY;  Surgeon: Maverick Esquivel MD;  Location: [facility];  Service: Endoscopy;  Laterality: N/A; repeat in  5 years for surveillance    COLONOSCOPY N/A 8/7/2023    Procedure: COLONOSCOPY;  Surgeon: Uzair Cantor MD;  Location: [facility];  Service: Endoscopy;  Laterality: N/A;    CYSTOSCOPY N/A 5/26/2021    Procedure: CYSTOSCOPY;  Surgeon: Allison Miranda MD;  [facility] OR;  Service: Urology;  Laterality: N/A;    ESOPHAGOGASTRODUODENOSCOPY N/A 4/2/2019    Procedure: EGD (ESOPHAGOGASTRODUODENOSCOPY);  Surgeon: Monika Steele MD;  Location: [facility];  Service: Endoscopy;  Laterality: N/A;    ESOPHAGOGASTRODUODENOSCOPY  06/04/2019    with RFA and biopsies    ESOPHAGOGASTRODUODENOSCOPY N/A 6/4/2019    Procedure: EGD (ESOPHAGOGASTRODUODENOSCOPY);  Surgeon: Uzair Cantor MD;  Location: [facility];  Service: Endoscopy;  Laterality: N/A;  n+v with anesthesia    ESOPHAGOGASTRODUODENOSCOPY N/A 8/21/2019    Procedure: EGD (ESOPHAGOGASTRODUODENOSCOPY);  Surgeon: Uzair Cantor MD;  Location: [facility];  Service: Endoscopy;  Laterality: N/A;    ESOPHAGOGASTRODUODENOSCOPY N/A 10/7/2019    Procedure: EGD (ESOPHAGOGASTRODUODENOSCOPY);  Surgeon: Uzair Cantor MD;  Location: [facility];  Service: Endoscopy;  Laterality: N/A;    ESOPHAGOGASTRODUODENOSCOPY N/A 2/10/2020    Procedure: EGD (ESOPHAGOGASTRODUODENOSCOPY);  Surgeon: Uzair Cantor MD;  Location: [facility];  Service: Endoscopy;  Laterality: N/A;  Requests earliest procedure time    ESOPHAGOGASTRODUODENOSCOPY N/A 8/17/2020    Procedure: EGD (ESOPHAGOGASTRODUODENOSCOPY);  Surgeon: Uzair Cantor MD;  Location: [facility];  Service: Endoscopy;  Laterality: N/A;  BARRETTS    ESOPHAGOGASTRODUODENOSCOPY N/A 4/7/2021    Procedure: ESOPHAGOGASTRODUODENOSCOPY (EGD);  Surgeon: Uzair Cantor MD;  Location: [facility];  Service: Endoscopy;  Laterality: N/A;  Needs Rapid Covid MP    ESOPHAGOGASTRODUODENOSCOPY N/A 11/30/2021    Procedure: EGD (ESOPHAGOGASTRODUODENOSCOPY);  Surgeon: Uzair Cantor MD;  Location: [facility];  Service: Endoscopy;   Laterality: N/A;    ESOPHAGOGASTRODUODENOSCOPY N/A 6/27/2022    Procedure: EGD (ESOPHAGOGASTRODUODENOSCOPY);  Surgeon: Uzair Cantor MD;  Location: [facility] ENDO;  Service: Endoscopy;  Laterality: N/A;  5/19/22: fully vaccinated. instructions mailed-SC    loop recorder only    ESOPHAGOGASTRODUODENOSCOPY N/A 8/7/2023    Procedure: EGD (ESOPHAGOGASTRODUODENOSCOPY);  Surgeon: Uzair Cantor MD;  Location: [facility] ENDO;  Service: Endoscopy;  Laterality: N/A;    HYSTERECTOMY      ovaries remain    INSERTION OF IMPLANTABLE LOOP RECORDER N/A 7/17/2020    Procedure: INSERTION, IMPLANTABLE LOOP RECORDER ([employer]);  Surgeon: Karyna Vasques MD;  Location: [facility] CATH/EP LAB;  Service: Cardiology;  Laterality: N/A;    IVUS, CORONARY  3/12/2024    Procedure: IVUS, Coronary;  Surgeon: Fernando Valdivia MD;  Location: [facility] CATH/EP LAB;  Service: Cardiology;;    PERCUTANEOUS CORONARY INTERVENTION, ARTERY N/A 3/12/2024    Procedure: Percutaneous coronary intervention;  Surgeon: Fernando Valdivia MD;  Location: [facility] CATH/EP LAB;  Service: Cardiology;  Laterality: N/A;    SPLENECTOMY, TOTAL  2007    Injured during surgery and removed    UPPER GASTROINTESTINAL ENDOSCOPY  2007    GERD & hiatal hernia per patient report     Social History     Tobacco Use    Smoking status: Every Day     Current packs/day: 1.00     Average packs/day: 1 pack/day for 35.0 years (35.0 ttl pk-yrs)     Types: Cigarettes     Passive exposure: Current    Smokeless tobacco: Never   Substance Use Topics    Alcohol use: No    Drug use: No          REVIEW OF SYSTEMS  CONSTITUTIONAL: No chills, no fatigue, no fever.   EYES: No double vision, no blurred vision  NEURO: No headaches, no dizziness  RESPIRATORY: No cough, no wheezing.    CARDIOVASCULAR: See HPI   GI: No abdominal pain, no melena, no diarrhea, no nausea or vomiting.   : No  dysuria and frequency, no hematuria  SKIN: no bruising, no discoloration  ENDOCRINE: no polyphagia, no heat intolerance, no  "cold intolerance  PSYCHIATRIC: no depression, no anxiety, no memory loss  MUSCULOSKELETAL: no  neck pain, no muscle weakness,no back pain          Objective:        Vitals:    09/09/24 1138   BP: (!) 142/91   Pulse: 70       PHYSICAL EXAM  CONSTITUTIONAL: In no apparent distress  HEENT: Normocephalic. Pupils normal and conjunctivae normal. No pallor  NECK: No JVD  LUNGS: B/L air entry to the lungs, clear to auscultation. No rales, wheezing or rhonchi.  HEART: Normal rate and regular rhythm. Normal S1 and S2.  No murmur   ABDOMEN: soft, non-tender; bowel sounds normal  EXTREMITIES: No edema. Good palpable distal pulses.  NEURO: AAO X 3, no gross sensory or motor deficits  SKIN:  No rash  Psych:  Normal affect    Lab Results   Component Value Date    WBC 10.75 07/24/2024    HGB 12.7 07/24/2024    HCT 38.7 07/24/2024     07/24/2024    CHOL 232 (H) 07/24/2024    TRIG 77 07/24/2024    HDL 54 07/24/2024    ALT 14 07/24/2024    AST 15 07/24/2024     07/24/2024    K 4.0 07/24/2024     07/24/2024    CREATININE 0.8 07/24/2024    BUN 13 07/24/2024    CO2 24 07/24/2024    TSH 1.581 07/24/2024    INR 0.9 03/07/2024        @  Lab Results   Component Value Date    CHOL 232 (H) 07/24/2024    CHOL 202 (H) 11/09/2023    CHOL 231 (H) 03/08/2023     Lab Results   Component Value Date    HDL 54 07/24/2024    HDL 47 11/09/2023    HDL 53 03/08/2023     Lab Results   Component Value Date    LDLCALC 162.6 (H) 07/24/2024    LDLCALC 139.0 11/09/2023    LDLCALC 163.6 (H) 03/08/2023     No results found for: "DLDL"  Lab Results   Component Value Date    TRIG 77 07/24/2024    TRIG 80 11/09/2023    TRIG 72 03/08/2023       f1   Lab Results   Component Value Date    CHOLHDL 23.3 07/24/2024    CHOLHDL 23.3 11/09/2023    CHOLHDL 22.9 03/08/2023            Current Outpatient Medications   Medication Instructions    albuterol (PROVENTIL) 2.5 mg, Nebulization, Every 6 hours PRN, Rescue    albuterol (VENTOLIN HFA) 90 mcg/actuation " inhaler 2 puffs, Inhalation, Every 4 hours PRN, Rescue    aluminum & magnesium hydroxide-simethicone (MYLANTA MAX STRENGTH) 400-400-40 mg/5 mL suspension 5 mLs, Oral, Every 6 hours PRN    amLODIPine (NORVASC) 5 mg, Oral, Daily    ascorbic acid, vitamin C, 500 mg Cap as directed Orally once daily    aspirin 81 mg, Oral, Daily    azelastine (ASTELIN) 137 mcg (0.1 %) nasal spray 1 SPRAY BY NASAL ROUTE TWICE DAILY    budesonide-glycopyr-formoterol (BREZTRI AEROSPHERE) 160-9-4.8 mcg/actuation HFAA 2 puffs, Inhalation, 2 times daily    calcitRIOL (ROCALTROL) 0.25 mcg, Oral, Daily    calcium-vitamin D3 (OS-FIDE 500 + D3) 500 mg-5 mcg (200 unit) per tablet 1 tablet, Oral, 2 times daily with meals    cefadroxil (DURICEF) 500 mg, Oral, Every 12 hours    cholecalciferol, vitamin D3, (VITAMIN D3) 50 mcg (2,000 unit) Cap capsule 1 capsule    clobetasoL (TEMOVATE) 0.05 % cream Apply to feet daily to bid prn flare    clonazePAM (KLONOPIN) 0.5 MG tablet TAKE 1/2 TABLET(0.25 MG) BY MOUTH TWICE DAILY AS NEEDED FOR ANXIETY OR INSOMNIA    clopidogreL (PLAVIX) 75 mg, Oral, Daily    esomeprazole (NEXIUM) 40 MG capsule TAKE ONE CAPSULE BY MOUTH TWICE DAILY    ezetimibe (ZETIA) 10 mg, Oral, Nightly    fluticasone propionate (FLONASE) 50 mcg, Each Nostril, 2 times daily    gabapentin (NEURONTIN) 300 mg, Oral, Nightly    MYRBETRIQ 25 mg, Oral, Daily    nitroGLYCERIN (NITROSTAT) 0.4 mg, Sublingual    ofloxacin (OCUFLOX) 0.3 % ophthalmic solution 1 drop, Left Eye, 4 times daily    polyethylene glycol (GLYCOLAX) 17 g, Oral, Daily PRN    pyridoxine (vitamin B6) (VITAMIN B-6) 50 mg, Oral, Daily    traZODone (DESYREL) 50 MG tablet 1 tablet at bedtime as needed Orally Once a day for 30 days    ZIRGAN 0.15 % Gel Both Eyes                   All pertinent labs, imaging, and EKGs reviewed.  Patient's most recent EKG tracing was personally interpreted by this provider.    Problem List Items Addressed This Visit          Cardiac/Vascular    Mixed  dyslipidemia (Chronic)     Other Visit Diagnoses       Coronary artery disease involving native coronary artery of native heart without angina pectoris    -  Primary    Primary hypertension                Follow up in about 4 months (around 1/9/2025).

## 2024-09-23 ENCOUNTER — TELEPHONE (OUTPATIENT)
Dept: CARDIOLOGY | Facility: CLINIC | Age: 74
End: 2024-09-23
Payer: MEDICARE

## 2024-09-23 DIAGNOSIS — J06.9 VIRAL URI WITH COUGH: ICD-10-CM

## 2024-09-23 DIAGNOSIS — J40 BRONCHITIS: ICD-10-CM

## 2024-09-23 RX ORDER — ALBUTEROL SULFATE 0.83 MG/ML
2.5 SOLUTION RESPIRATORY (INHALATION) EVERY 6 HOURS PRN
Qty: 75 ML | Refills: 2 | Status: SHIPPED | OUTPATIENT
Start: 2024-09-23 | End: 2025-09-23

## 2024-09-23 NOTE — TELEPHONE ENCOUNTER
----- Message from Tiffany Ley PharmD sent at 9/23/2024  4:05 PM CDT -----  Regarding: FW: Incomplete chart notes  Hello,    Again, I am following up on the uncompleted chart notes from your visit with Ms. Guerra. Please complete so we may assist the patient with her medication. This will be our last attempt to reach out before closing her enrollment here at [facility].     Thanks,  Tiffany Ley PharmD  Clinical Pharmacist  [facility]   Phone: [phone]  Fax: [phone]  ----- Message -----  From: Tiffany Ley PharmD  Sent: 9/18/2024  10:18 AM CDT  To: Fernando Valdivia MD; [facility] Staff  Subject: Incomplete chart notes                           Good morning,    I am following up on the uncompleted chart notes from your visit with Ms. Guerra. Please complete so we may assist the patient with her medication.     Thanks,  Tiffany Ley PharmD  Clinical Pharmacist  [facility]   Phone: [phone]  Fax: [phone]

## 2024-09-27 ENCOUNTER — HOSPITAL ENCOUNTER (OUTPATIENT)
Dept: RADIOLOGY | Facility: HOSPITAL | Age: 74
Discharge: HOME | End: 2024-09-27
Attending: INTERNAL MEDICINE
Payer: MEDICARE

## 2024-09-27 ENCOUNTER — HOSPITAL ENCOUNTER (OUTPATIENT)
Dept: RADIOLOGY | Facility: HOSPITAL | Age: 74
Discharge: HOME | End: 2024-09-27
Attending: NURSE PRACTITIONER
Payer: MEDICARE

## 2024-09-27 DIAGNOSIS — F17.210 NICOTINE DEPENDENCE, CIGARETTES, UNCOMPLICATED: ICD-10-CM

## 2024-09-27 DIAGNOSIS — Z12.31 BREAST CANCER SCREENING BY MAMMOGRAM: ICD-10-CM

## 2024-09-27 PROCEDURE — 77067 SCR MAMMO BI INCL CAD: CPT | Mod: 26,,, | Performed by: RADIOLOGY

## 2024-09-27 PROCEDURE — 71271 CT THORAX LUNG CANCER SCR C-: CPT | Mod: TC,PO

## 2024-09-27 PROCEDURE — 77063 BREAST TOMOSYNTHESIS BI: CPT | Mod: 26,,, | Performed by: RADIOLOGY

## 2024-09-27 PROCEDURE — 71271 CT THORAX LUNG CANCER SCR C-: CPT | Mod: 26,,, | Performed by: RADIOLOGY

## 2024-09-27 PROCEDURE — 77067 SCR MAMMO BI INCL CAD: CPT | Mod: TC,PO

## 2024-09-30 NOTE — TELEPHONE ENCOUNTER
----- Message from Cassia sent at 9/30/2024 10:16 AM CDT -----  Regarding: refill  Contact: patient  Type:  RX Refill Request    Who Called:  patient   Refill or New Rx:  refill  RX Name and Strength:  ezetimibe (ZETIA) 10 mg tablet  How is the patient currently taking it? (ex. 1XDay):    Is this a 30 day or 90 day RX:    Preferred Pharmacy with phone number:    [facility] - [place], [place] - [id number]  [address] [facility] [address]  [place] [address]  Phone: [phone] Fax: [phone]        Local or Mail Order:    Ordering Provider:    Best Call Back Number:  [phone]    Additional Information:  call once sent thanks

## 2024-10-02 RX ORDER — EZETIMIBE 10 MG/1
10 TABLET ORAL NIGHTLY
Qty: 90 TABLET | Refills: 3 | Status: SHIPPED | OUTPATIENT
Start: 2024-10-02

## 2024-10-03 VITALS
WEIGHT: 135.69 LBS | HEIGHT: 61 IN | SYSTOLIC BLOOD PRESSURE: 142 MMHG | DIASTOLIC BLOOD PRESSURE: 91 MMHG | BODY MASS INDEX: 25.62 KG/M2 | HEART RATE: 70 BPM | OXYGEN SATURATION: 100 %

## 2024-10-25 ENCOUNTER — OFFICE VISIT (OUTPATIENT)
Dept: PULMONOLOGY | Facility: CLINIC | Age: 74
End: 2024-10-25
Payer: MEDICARE

## 2024-10-25 ENCOUNTER — TELEPHONE (OUTPATIENT)
Dept: PULMONOLOGY | Facility: CLINIC | Age: 74
End: 2024-10-25

## 2024-10-25 ENCOUNTER — HOSPITAL ENCOUNTER (OUTPATIENT)
Dept: PULMONOLOGY | Facility: HOSPITAL | Age: 74
Discharge: HOME | End: 2024-10-25
Attending: NURSE PRACTITIONER
Payer: MEDICARE

## 2024-10-25 VITALS
OXYGEN SATURATION: 99 % | DIASTOLIC BLOOD PRESSURE: 81 MMHG | WEIGHT: 148.81 LBS | BODY MASS INDEX: 28.1 KG/M2 | HEART RATE: 77 BPM | HEIGHT: 61 IN | SYSTOLIC BLOOD PRESSURE: 154 MMHG

## 2024-10-25 DIAGNOSIS — J44.9 CHRONIC OBSTRUCTIVE PULMONARY DISEASE, UNSPECIFIED COPD TYPE: Primary | ICD-10-CM

## 2024-10-25 DIAGNOSIS — J44.9 CHRONIC OBSTRUCTIVE PULMONARY DISEASE, UNSPECIFIED COPD TYPE: ICD-10-CM

## 2024-10-25 PROCEDURE — 94618 PULMONARY STRESS TESTING: CPT

## 2024-10-25 PROCEDURE — 99999 PR PBB SHADOW E&M-EST. PATIENT-LVL IV: CPT | Mod: PBBFAC,,, | Performed by: NURSE PRACTITIONER

## 2024-10-25 RX ORDER — PREDNISONE 10 MG/1
TABLET ORAL
Qty: 18 TABLET | Refills: 0 | Status: SHIPPED | OUTPATIENT
Start: 2024-10-25

## 2024-10-25 NOTE — PROGRESS NOTES
10/25/2024    Kristin Guerra  Office Note    Chief Complaint   Patient presents with    3m f/u       HPI:  10/25/24- did not receive the supplemental oxygen ordered 7/26/24.   Currently on Breztri daily, using nebulizer once daily. States SOB is persistent complaint, worse with exertion, varies in severity. Limits her ability to clean her home due to severity of symptoms  Associated with productive cough and sneezing. Improves with systemic steroid therapy. Took steroids on 7/23/24 and 4/5/24,       7/23/2024- complaint of fatigue and generalized weakness, using nebulizer twice daily, on Spiriva but causes chocking.   Complaint of chest tightness, wheeze, shortness of breath, and productive cough with clear mucous. Took steroids with antibiotics in April 2024 with benefit but symptoms returned and have been gradually worsening with time.     1/23/2024- states SOB is stable, worse when walking for extended periods of time. Cutting down on smoking. Switching to vape to help cut back. Down to less than a pack a day.   No recent systemic steroid therapy.   On Spiriva daily with benefit. Using nebulizer once daily with improvement in daily cough.     9/21/2023- complaint of worsening shortness of breath, associated wheeze. Using nebulizer 1 x daily, has productive thick clear to brown mucous  Currently smoking 1 pack daily. States very difficult to quit.     10/27/2022- SOB stable complaint, wearing oxygen at night. Using nebulizer 1-2 times daily, daily cough is productive. On spiriva daily  Under large amount of stress due to husbands cancer diagnosis. PCP provides anxiety medication.     6/21/2022- currently smoking 1 pack daily. Not interested quitting and the moment.   Able to keep up with house work but tires quickly has to pace yourself. Wearing supplemental oxygen when needed. Uses husbands machine at 3L.   Cough- stable, unchanged, able to clear occasionally varies from clear to brown mucous. Associated with  chest pain. Has Echo scheduled with cardiologist  Using Albuterol daily, tried Symbicort but states to strong and made her choke.     3/18/2022- currently working on cutting back on smoking. Over 1 pack daily. States SOB varies with time, has good/bad days, worse with high humidity.   Cough- persistent complaint, daily complaint, thick clear/brown mucous.   No current complaint of wheeze or chest tightness. Occasionally wearing supplemental oxygen at home at 3L with benefit. Uses 's machine. Uses albuterol inhaler only, tried Symbicort in past caused chest pain.      12/17/2021- Complaint of daily cough- persistent complaint, mild complaint, productive clear/brown mucous, currently smoking 1 pack daily, associated with wheeze  Wearing supplemental oxygen at night 3L at night. No daily metered dose inhaler, using albuterol nebulizer 2-3x weekly with benefit.     SOB- stable, worse with exertion, difficult to lift heavy objects, improves with rest.     9/17/2021- complaint of high blood pressure, daytime fatigue, and generalized body aches, states she faints unexpectedly,     3/17/2021- decided not to do sleep study, Shortness of breath- daily complaint, varies with severity, worse with exertion. Currently smoking 1 pack cigarettes daily.   No cough, chest tightness, or wheeze.   Using albuterol 3-4 days weekly, using nebulizer when needed about 1x weekly, no nocturnal arousals.   Not able to tolerate inhalers, using albuterol nebulizer and inhaler only.       10/6/2020- Cough and SOB unchanged, daily, improves with albuterol rescue inhaler. Improves ability to bring up sputum, productive clear and brown mucous, not able to bring in sputum samples. Did not fill Trelegy due to high cost. Using albuterol rescue 2x daily to control cough,   Currently smoking 1 pack daily.    7/6/2020- referred by PCP for Ct chest screening with abnormal spot seen. Complaint of weakness and fatigue, being treated for Syncope dx  with bradycardia waiting for pacemaker placement.   Cough- onset 2 years, large amounts of mucous, clear color, nickel size, daily.  SOB- onset 2 years, limits activity level, works with exertion, improves quickly with rest, no chest tightness or wheeze,  Complaint of nightly snoring, daytime fatigue, drowsiness in afternoons.   Social Hx: lives with spouse but no pets, currently retired from office and retail stocking, no known Asbestosis exposure, Smoking Hx: currently smoking 1 pack daily, 40 pack years.  Family Hx: Father and 2 brothers (Small cell) Lung Cancer, no COPD, no Asthma  Medical Hx: no previous pneumonia ; no previous shoulder/chest surgery, Neck surgery 2009, 11, 16 for disc infusions.         The chief compliant  problem is stable  PFSH:  Past Medical History:   Diagnosis Date    Allergy     Sterling's esophagus     Colon polyp     Diverticulitis     Diverticulosis     Fatty liver     GERD (gastroesophageal reflux disease)     History of endoscopy 04/07/2021    Dr Uzair Cantor  Impression:            - Normal oropharynx.                         - Hiatal hernia.                         - Sterling's esophagus. Treated with radiofrequency                         ablation.                         - Normal stomach.                         - Normal examined duodenum.                         - No specimens collected.     Hx of cervical spine surgery 11/07/2018    Hyperlipidemia     Hypertension     Lupus (systemic lupus erythematosus) 1999    discoid    Osteoarthritis     Osteopenia     Syncope     Tremor 2005    essential         Past Surgical History:   Procedure Laterality Date    ANGIOGRAM, CORONARY, WITH LEFT HEART CATHETERIZATION N/A 3/12/2024    Procedure: Angiogram, Coronary, with Left Heart Cath;  Surgeon: Fernando Valdivia MD;  Location: [facility] CATH/EP LAB;  Service: Cardiology;  Laterality: N/A;    APPENDECTOMY  2007    removed during colon surgery    CERVICAL FUSION      CERVICAL SPINE SURGERY       Rods and screws.     SECTION      COLON SURGERY  2007    hemicolectomy    COLON SURGERY  2008    repair    COLONOSCOPY N/A 2018    Procedure: COLONOSCOPY;  Surgeon: Maverick Esquivel MD;  Location: [facility];  Service: Endoscopy;  Laterality: N/A; repeat in 5 years for surveillance    COLONOSCOPY N/A 2023    Procedure: COLONOSCOPY;  Surgeon: Uzair Cantor MD;  Location: [facility];  Service: Endoscopy;  Laterality: N/A;    CYSTOSCOPY N/A 2021    Procedure: CYSTOSCOPY;  Surgeon: Allison Miranda MD;  Location[facility];  Service: Urology;  Laterality: N/A;    ESOPHAGOGASTRODUODENOSCOPY N/A 2019    Procedure: EGD (ESOPHAGOGASTRODUODENOSCOPY);  Surgeon: Monika Steele MD;  Location: [facility];  Service: Endoscopy;  Laterality: N/A;    ESOPHAGOGASTRODUODENOSCOPY  2019    with RFA and biopsies    ESOPHAGOGASTRODUODENOSCOPY N/A 2019    Procedure: EGD (ESOPHAGOGASTRODUODENOSCOPY);  Surgeon: Uzair Cantor MD;  Location: [facility];  Service: Endoscopy;  Laterality: N/A;  n+v with anesthesia    ESOPHAGOGASTRODUODENOSCOPY N/A 2019    Procedure: EGD (ESOPHAGOGASTRODUODENOSCOPY);  Surgeon: Uzair Cantor MD;  Location: [facility];  Service: Endoscopy;  Laterality: N/A;    ESOPHAGOGASTRODUODENOSCOPY N/A 10/7/2019    Procedure: EGD (ESOPHAGOGASTRODUODENOSCOPY);  Surgeon: Uzair Cantor MD;  Location: [facility];  Service: Endoscopy;  Laterality: N/A;    ESOPHAGOGASTRODUODENOSCOPY N/A 2/10/2020    Procedure: EGD (ESOPHAGOGASTRODUODENOSCOPY);  Surgeon: Uzair Cantor MD;  Location: [facility];  Service: Endoscopy;  Laterality: N/A;  Requests earliest procedure time    ESOPHAGOGASTRODUODENOSCOPY N/A 2020    Procedure: EGD (ESOPHAGOGASTRODUODENOSCOPY);  Surgeon: Uzair Cantor MD;  Location: [facility];  Service: Endoscopy;  Laterality: N/A;  BARRETTS    ESOPHAGOGASTRODUODENOSCOPY N/A 2021    Procedure: ESOPHAGOGASTRODUODENOSCOPY (EGD);  Surgeon: Uzair  JULIETA Cantor MD;  Location: [facility];  Service: Endoscopy;  Laterality: N/A;  Needs Rapid Covid MP    ESOPHAGOGASTRODUODENOSCOPY N/A 11/30/2021    Procedure: EGD (ESOPHAGOGASTRODUODENOSCOPY);  Surgeon: Uzair Cantor MD;  Location: [facility];  Service: Endoscopy;  Laterality: N/A;    ESOPHAGOGASTRODUODENOSCOPY N/A 6/27/2022    Procedure: EGD (ESOPHAGOGASTRODUODENOSCOPY);  Surgeon: Uzair Cantor MD;  Location: [facility];  Service: Endoscopy;  Laterality: N/A;  5/19/22: fully vaccinated. instructions mailed-SC    loop recorder only    ESOPHAGOGASTRODUODENOSCOPY N/A 8/7/2023    Procedure: EGD (ESOPHAGOGASTRODUODENOSCOPY);  Surgeon: Uzair Cantor MD;  Location: [facility];  Service: Endoscopy;  Laterality: N/A;    HYSTERECTOMY      ovaries remain    INSERTION OF IMPLANTABLE LOOP RECORDER N/A 7/17/2020    Procedure: INSERTION, IMPLANTABLE LOOP RECORDER (MEDTRONIC);  Surgeon: Karyna Vasques MD;  Location: [facility] CATH/EP LAB;  Service: Cardiology;  Laterality: N/A;    IVUS, CORONARY  3/12/2024    Procedure: IVUS, Coronary;  Surgeon: Fernando Valdivia MD;  Location: [facility] CATH/EP LAB;  Service: Cardiology;;    PERCUTANEOUS CORONARY INTERVENTION, ARTERY N/A 3/12/2024    Procedure: Percutaneous coronary intervention;  Surgeon: Fernando Valdivia MD;  Location: [facility] CATH/EP LAB;  Service: Cardiology;  Laterality: N/A;    SPLENECTOMY, TOTAL  2007    Injured during surgery and removed    UPPER GASTROINTESTINAL ENDOSCOPY  2007    GERD & hiatal hernia per patient report     Social History     Tobacco Use    Smoking status: Every Day     Current packs/day: 1.00     Average packs/day: 1 pack/day for 35.0 years (35.0 ttl pk-yrs)     Types: Cigarettes     Passive exposure: Current    Smokeless tobacco: Never   Substance Use Topics    Alcohol use: No    Drug use: No     Family History   Problem Relation Name Age of Onset    Lymphoma Mother          Brain    Colon cancer Father          diagnosed in his 60's    Lung cancer  "Father      Breast cancer Maternal Aunt      Cancer Brother          prostate    Lung cancer Brother      Lung cancer Brother          metastatic from prostate    Prostate cancer Brother      Heart disease Brother          valvular disease    Hyperlipidemia Brother      Eczema Neg Hx      Lupus Neg Hx      Psoriasis Neg Hx      Melanoma Neg Hx      Crohn's disease Neg Hx      Ulcerative colitis Neg Hx      Stomach cancer Neg Hx      Esophageal cancer Neg Hx       Review of patient's allergies indicates:   Allergen Reactions    Doxycycline Itching and Swelling     Prescribed 5/2021, took one dose, describes lip swelling, and "itch all over"    Mirtazapine Other (See Comments)     Hyperactivity.    Morphine Swelling and Rash     Other reaction(s): swelling in limbs    Lexapro [escitalopram oxalate] Other (See Comments)     Worsening tremor.  Sedation.    Primidone Other (See Comments)     Syncope.  Other reaction(s): dizziness,hypotension  Other reaction(s): Unknown  Other reaction(s): Unknown    Simvastatin Nausea And Vomiting and Other (See Comments)     Weakness.  Other reaction(s): nausea,vomiting    Beta-adrenergic agents Other (See Comments)     Low blood pressure and fainted.     Metoprolol succinate Other (See Comments)    Prochlorperazine Other (See Comments)     Other reaction(s): weakness    Topiramate     Compazine [prochlorperazine edisylate] Nausea Only    Tramadol Nausea And Vomiting     I have reviewed past medical, family, and social history. I have reviewed previous nurse notes.    Performance Status:The patient's activity level is functions out of house.      Review of Systems:  a review of eleven systems covering constitutional, Eye, HEENT, Psych, Respiratory, Cardiac, GI, , Musculoskeletal, Endocrine, Dermatologic was negative except for pertinent findings as listed ABOVE and below: pertinent positive as above, rest is good  Cough, shortness of breath         Exam:Comprehensive exam done. BP (!) " "154/81 (BP Location: Right arm, Patient Position: Sitting)   Pulse 77   Ht 5' 1" (1.549 m)   Wt 67.5 kg (148 lb 13 oz)   SpO2 99% Comment: on room air at rest  BMI 28.12 kg/m²   Exam included Vitals as listed, and patient's appearance and affect and alertness and mood, oral exam for yeast and hygiene and pharynx lesions and Mallapatti (M) score, neck with inspection for jvd and masses and thyroid abnormalities and lymph nodes (supraclavicular and infraclavicular nodes and axillary also examined and noted if abn), chest exam included symmetry and effort and fremitus and percussion and auscultation, cardiac exam included rhythm and gallops and murmur and rubs and jvd and edema, abdominal exam for mass and hepatosplenomegaly and tenderness and hernias and bowel sounds, Musculoskeletal exam with muscle tone and posture and mobility/gait and  strength, and skin for rashes and cyanosis and pallor and turgor, extremity for clubbing.  Findings were normal except for pertinent findings listed below: M2, BS deminished, bilateral rales.       Radiographs (ct chest and cxr) reviewed: view by direct vision   CT Chest Without Contrast  09/26/2023 stable, unchanged lung nodules largest 7 mm and emphysema.     CT chest 09/14/22 lung nodules, atelectasis, and emphysema unchanged, stable; repeat CT in one year recommended     CT Chest Without Contrast 9/10/2021   Unchanged right lung nodules dating back to 6/18/2020.  2. Mild emphysema.  Coronary artery calcifications     CT Chest Without Contrast 2/22/2021   Thereare no new pulmonary nodules. Mild emphysematous lung disease is again demonstrated bilaterally. There is bibasilar subsegmental atelectasis versus scarring  Multiple pulmonary nodules are again identified as described and are unchanged      CT Chest Without Contrast 09/09/20   IMPRESSION:  Stable 8 mm fissural nodule in the anterior right middle lobe and 4 mm  subpleural nodule in the posterior right medial lobe " Resolution of the 6 mm subpleural nodular density in the anterior right costophrenic angle Mild coronary artery calcification No new pulmonary nodules or infiltrates  LUNG-RADS CATEGORY 3: PROBABLY BENIGN FINDINGS  RECOMMENDATION: Short-term imaging follow-up with 6 month LDCT.      Transthoracic echo (TTE) complete 6/3/2020  Mild concentric left ventricular hypertrophy.  Normal left ventricular systolic function. The estimated ejection fraction is 60%.  Normal LV diastolic function.  The estimated PA systolic pressure is 34 mmHg.     X-Ray Chest AP Portable 06/02/20   The lungs are clear. Costophrenic angles are seen without effusion. No  pneumothorax is identified. The heart is normal in size. Atheromatous  calcifications are seen at the aortic arch. Degenerative changes are  seen with an ACDF and paraspinal fixation hardware in the lower  cervical spine. The visualized upper abdomen is unremarkable    CT Chest Lung Screening Low Dose 06/18/20   1.  Nodular density with spiculations in the anterior segment of  the right middle lobe measures 6 x 9 mm.  2.  6 mm subpleural nodular density in the anterior right  costophrenic angle.  3.  Irregular shaped density in the medial segment of the right  lower lobe is unchanged dating back from 2009 and is benign.     LUNG-RADS CATEGORY 4A: SUSPICIOUS FINDINGS     RECOMMENDATION: 3 month follow-up LDCT (PET/CT may be considered when  solid component greater than 7 mm is present).        Labs reviewed       Lab Results   Component Value Date    WBC 10.75 07/24/2024    RBC 4.17 07/24/2024    HGB 12.7 07/24/2024    HCT 38.7 07/24/2024    MCV 93 07/24/2024    MCH 30.5 07/24/2024    MCHC 32.8 07/24/2024    RDW 14.7 (H) 07/24/2024     07/24/2024    MPV 9.3 07/24/2024    GRAN 6.9 07/24/2024    GRAN 64.6 07/24/2024    LYMPH 2.7 07/24/2024    LYMPH 24.8 07/24/2024    MONO 0.8 07/24/2024    MONO 7.6 07/24/2024    EOS 0.2 07/24/2024    BASO 0.12 07/24/2024    EOSINOPHIL 1.6  07/24/2024    BASOPHIL 1.1 07/24/2024        Latest Reference Range & Units 10/10/23 10:07 11/09/23 12:29 03/07/24 11:37 07/24/24 09:28   Eos # 0.0 - 0.5 K/uL 0.2 0.4 0.1 0.2       Ed Model: 7/6/2020 8.47% probability.    PFT reviewed  Pulmonary Functions Testing Results:  spirometry with bronchodilator, lung volume by gas dilution, and diffusion capacity were measured July 15, 2020.  The FEV1 to FVC ratio was 66% indicating airflow obstruction.  The FEV1 measured 70% of predicted making airflow obstruction moderate.     There was a 25% improvement in FEV1 following bronchodilator, this is statistically significant.  The FEV1 measured about 70% predicted at 1.4 L pre bronchodilator.  Total lung capacity was in normal limits on lung volumes by gas dilution.  Diffusion,   uncorrected for anemia present, was 49% of predicted.       The patient had moderate airflow obstruction that resolved with bronchodilator.  Lung volumes were normal.  Diffusion was reduced.  Clinical correlation recommended.       EPWORTH SLEEPINESS SCALE= 16 9/17/2021  Sleep study AHI 1 9/27/2021  DEA- 1.0 [facility]    Plan:  Clinical impression is apparently straight forward and impression with management as below.    Kristin was seen today for 3m f/u.    Diagnoses and all orders for this visit:    Chronic obstructive pulmonary disease, unspecified COPD type  -     Six Minute Walk Test to qualify for Home Oxygen; Future  -     predniSONE (DELTASONE) 10 MG tablet; Take one pill a day for three days, repeat for shortness of breath  -     CBC auto differential; Future  -     dupilumab (DUPIXENT) 300 mg/2 mL Syrg; Inject 2 mLs (300 mg total) into the skin every 14 (fourteen) days.            Follow up in about 3 months (around 1/25/2025), or if symptoms worsen or fail to improve.    Discussed with patient above for education the following:      Patient Instructions   Will repeat 6 minute walk, if you do not hear from [employer] company about delivering your  oxygen in 2 weeks contact the pulmonary clinic    Continue Breztri 2 puffs twice daily    Starting  new medication. Expect phone call from [facility] in [place] to assist with paper work.

## 2024-10-25 NOTE — PATIENT INSTRUCTIONS
Will repeat 6 minute walk, if you do not hear from [employer] company about delivering your oxygen in 2 weeks contact the pulmonary clinic    Continue Breztri 2 puffs twice daily    Starting  new medication. Expect phone call from [facility] in [place] to assist with paper work.

## 2024-10-25 NOTE — TELEPHONE ENCOUNTER
----- Message from Dimitri sent at 10/25/2024  3:56 PM CDT -----  Regarding: advice  Contact: SOUMYA SOTO [[id number]]  Type: Needs Medical Advice  Who Called:  Soumya    Symptoms (please be specific):  na    How long has patient had these symptoms:  na    Pharmacy name and phone #:  na    Best Call Back Number: [phone] (home)     Additional Information: O2 condenser is being delivered Monday. Please call to advise.

## 2024-10-28 PROCEDURE — 94618 PULMONARY STRESS TESTING: CPT | Mod: 26,,, | Performed by: INTERNAL MEDICINE

## 2025-02-10 ENCOUNTER — TELEPHONE (OUTPATIENT)
Dept: CARDIOLOGY | Facility: CLINIC | Age: 75
End: 2025-02-10
Payer: MEDICARE

## 2025-02-10 NOTE — LETTER
February 10, 2025    Kristin Guerra  [address] [facility] [address]             [facility] [place]  [address]  [address]  [facility] [address]  Phone: [phone]  Fax: [phone] Patient: Kristin Guerra  : 1950  Referring Doctor:  Type of procedure: EGD    Current Outpatient Medications   Medication Sig    albuterol (PROVENTIL) 2.5 mg /3 mL (0.083 %) nebulizer solution Take 3 mLs (2.5 mg total) by nebulization every 6 (six) hours as needed for Wheezing or Shortness of Breath. Rescue    albuterol (VENTOLIN HFA) 90 mcg/actuation inhaler Inhale 2 puffs into the lungs every 4 (four) hours as needed. Rescue    aluminum & magnesium hydroxide-simethicone (MYLANTA MAX STRENGTH) 400-400-40 mg/5 mL suspension Take 5 mLs by mouth every 6 (six) hours as needed for Indigestion.     amLODIPine (NORVASC) 5 MG tablet Take 1 tablet (5 mg total) by mouth once daily.    ascorbic acid, vitamin C, 500 mg Cap as directed Orally once daily    aspirin 81 MG Chew Take 81 mg by mouth once daily.    azelastine (ASTELIN) 137 mcg (0.1 %) nasal spray 1 SPRAY BY NASAL ROUTE TWICE DAILY    budesonide-glycopyr-formoterol (BREZTRI AEROSPHERE) 160-9-4.8 mcg/actuation HFAA Inhale 2 puffs into the lungs 2 (two) times a day.    calcitRIOL (ROCALTROL) 0.25 MCG Cap Take 1 capsule (0.25 mcg total) by mouth once daily.    calcium-vitamin D3 (OS-FIDE 500 + D3) 500 mg-5 mcg (200 unit) per tablet Take 1 tablet by mouth 2 (two) times daily with meals.    cefadroxil (DURICEF) 500 MG Cap Take 1 capsule (500 mg total) by mouth every 12 (twelve) hours.    cholecalciferol, vitamin D3, (VITAMIN D3) 50 mcg (2,000 unit) Cap capsule 1 capsule.    clobetasoL (TEMOVATE) 0.05 % cream Apply to feet daily to bid prn flare    clonazePAM (KLONOPIN) 0.5 MG tablet TAKE 1/2 TABLET(0.25 MG) BY MOUTH TWICE DAILY AS NEEDED FOR ANXIETY OR INSOMNIA    clopidogreL (PLAVIX) 75 mg tablet Take 1 tablet (75  mg total) by mouth once daily.    dupilumab (DUPIXENT) 300 mg/2 mL Syrg Inject 2 mLs (300 mg total) into the skin every 14 (fourteen) days.    esomeprazole (NEXIUM) 40 MG capsule TAKE ONE CAPSULE BY MOUTH TWICE DAILY    ezetimibe (ZETIA) 10 mg tablet Take 1 tablet (10 mg total) by mouth every evening.    fluticasone propionate (FLONASE) 50 mcg/actuation nasal spray 1 spray (50 mcg total) by Each Nostril route 2 (two) times daily.    gabapentin (NEURONTIN) 300 MG capsule TAKE ONE CAPSULE BY MOUTH EVERY EVENING    mirabegron (MYRBETRIQ) 25 mg Tb24 ER tablet Take 1 tablet (25 mg total) by mouth once daily.    nitroGLYCERIN (NITROSTAT) 0.4 MG SL tablet Place 0.4 mg under the tongue.    ofloxacin (OCUFLOX) 0.3 % ophthalmic solution Place 1 drop into the left eye 4 (four) times daily.    polyethylene glycol (GLYCOLAX) 17 gram PwPk Take 17 g by mouth daily as needed.    predniSONE (DELTASONE) 10 MG tablet Take one pill a day for three days, repeat for shortness of breath    pyridoxine, vitamin B6, (VITAMIN B-6) 100 MG Tab Take 50 mg by mouth once daily.    traZODone (DESYREL) 50 MG tablet 1 tablet at bedtime as needed Orally Once a day for 30 days    ZIRGAN 0.15 % Gel Place into both eyes.     Current Facility-Administered Medications   Medication    sodium chloride 0.9% flush 2 mL       This patient has been assessed for risk factors for clearance of surgery with the following stipulations:      __x_ Recommendations for antiplatelet/anticoagulant medications: ok to stop ASA and plavix for *** days prior  __x_ Cleared for surgery with moderate risk  ___ Not cleared for surgery due to the following reasons:      If you have any questions regarding the above, please contact my office at [phone]    Sincerely,

## 2025-02-12 ENCOUNTER — TELEPHONE (OUTPATIENT)
Facility: CLINIC | Age: 75
End: 2025-02-12
Payer: MEDICARE

## 2025-02-12 ENCOUNTER — OFFICE VISIT (OUTPATIENT)
Dept: PULMONOLOGY | Facility: CLINIC | Age: 75
End: 2025-02-12
Payer: MEDICARE

## 2025-02-12 VITALS
SYSTOLIC BLOOD PRESSURE: 140 MMHG | DIASTOLIC BLOOD PRESSURE: 80 MMHG | OXYGEN SATURATION: 93 % | BODY MASS INDEX: 27.28 KG/M2 | WEIGHT: 144.5 LBS | HEIGHT: 61 IN | HEART RATE: 72 BPM

## 2025-02-12 DIAGNOSIS — J45.50 SEVERE PERSISTENT ASTHMA WITHOUT COMPLICATION: ICD-10-CM

## 2025-02-12 DIAGNOSIS — B37.0 ORAL THRUSH: ICD-10-CM

## 2025-02-12 DIAGNOSIS — J44.9 CHRONIC OBSTRUCTIVE PULMONARY DISEASE, UNSPECIFIED COPD TYPE: Primary | ICD-10-CM

## 2025-02-12 PROBLEM — J45.51 SEVERE PERSISTENT ASTHMA WITH ACUTE EXACERBATION: Status: ACTIVE | Noted: 2025-02-12

## 2025-02-12 PROCEDURE — 1101F PT FALLS ASSESS-DOCD LE1/YR: CPT | Mod: CPTII,S$GLB,, | Performed by: NURSE PRACTITIONER

## 2025-02-12 PROCEDURE — 3008F BODY MASS INDEX DOCD: CPT | Mod: CPTII,S$GLB,, | Performed by: NURSE PRACTITIONER

## 2025-02-12 PROCEDURE — 99213 OFFICE O/P EST LOW 20 MIN: CPT | Mod: S$GLB,,, | Performed by: NURSE PRACTITIONER

## 2025-02-12 PROCEDURE — 3079F DIAST BP 80-89 MM HG: CPT | Mod: CPTII,S$GLB,, | Performed by: NURSE PRACTITIONER

## 2025-02-12 PROCEDURE — 1160F RVW MEDS BY RX/DR IN RCRD: CPT | Mod: CPTII,S$GLB,, | Performed by: NURSE PRACTITIONER

## 2025-02-12 PROCEDURE — 3077F SYST BP >= 140 MM HG: CPT | Mod: CPTII,S$GLB,, | Performed by: NURSE PRACTITIONER

## 2025-02-12 PROCEDURE — 1159F MED LIST DOCD IN RCRD: CPT | Mod: CPTII,S$GLB,, | Performed by: NURSE PRACTITIONER

## 2025-02-12 PROCEDURE — 99999 PR PBB SHADOW E&M-EST. PATIENT-LVL V: CPT | Mod: PBBFAC,,, | Performed by: NURSE PRACTITIONER

## 2025-02-12 PROCEDURE — 3288F FALL RISK ASSESSMENT DOCD: CPT | Mod: CPTII,S$GLB,, | Performed by: NURSE PRACTITIONER

## 2025-02-12 RX ORDER — BUDESONIDE AND FORMOTEROL FUMARATE DIHYDRATE 160; 4.5 UG/1; UG/1
2 AEROSOL RESPIRATORY (INHALATION) EVERY 12 HOURS
Qty: 30.6 G | Refills: 11 | Status: SHIPPED | OUTPATIENT
Start: 2025-02-12 | End: 2026-02-12

## 2025-02-12 RX ORDER — CLOTRIMAZOLE 10 MG/1
10 LOZENGE ORAL
Qty: 70 TABLET | Refills: 0 | Status: SHIPPED | OUTPATIENT
Start: 2025-02-12 | End: 2025-02-26

## 2025-02-12 RX ORDER — FAMOTIDINE 20 MG/1
20 TABLET, FILM COATED ORAL 2 TIMES DAILY
COMMUNITY
Start: 2025-02-04

## 2025-02-12 RX ORDER — TIOTROPIUM BROMIDE INHALATION SPRAY 3.12 UG/1
2 SPRAY, METERED RESPIRATORY (INHALATION) DAILY
Qty: 12 G | Refills: 3 | Status: SHIPPED | OUTPATIENT
Start: 2025-02-12

## 2025-02-12 RX ORDER — MIRABEGRON 50 MG/1
1 TABLET, FILM COATED, EXTENDED RELEASE ORAL
COMMUNITY
Start: 2025-01-28

## 2025-02-12 RX ORDER — PREDNISONE 10 MG/1
TABLET ORAL
Qty: 18 TABLET | Refills: 0 | Status: SHIPPED | OUTPATIENT
Start: 2025-02-12

## 2025-02-12 NOTE — TELEPHONE ENCOUNTER
----- Message from Summer sent at 2/12/2025 11:57 AM CST -----  .Type:  Patient Call Back    Who Called: PT DAUGHTER YULY       Does the patient know what this is regarding?: PLEASE REACH OUT TO HER TO CONFIRM THE 3/10/2025 VISIT. THERE'S A RE/S SYMBOL BY THE VISIT     Would the patient rather a call back YES     Best Call Back Number: [phone]       Additional Information: Thank You

## 2025-02-12 NOTE — PATIENT INSTRUCTIONS
Due to thrush changing from Breztri to Symbicort plus Spiriva.    Take Symbicort 1 puff followed by Spiriva 2 puffs every morning  Then take Symbicort 1 puff every evening.  Rinse your mouth after using    If you do not have issues with thrush increase symbicort to 2 puffs every morning and evening.

## 2025-02-12 NOTE — PROGRESS NOTES
2/12/2025    Kristin Guerra  Office Note    Chief Complaint   Patient presents with    3m f/u    COPD    Asthma       HPI:    2/12/2025- in office with daughter, stopped breztri due to severe case of oral thrush treated with nystatin at urgent care. Has been off breztri for 8 weeks.   Using nebulizer with albuterol every morning. States neb treatment helps her cough up clear/brown mucous.   States stamina is low. Able to clean home and carry light objects.   Wearing supplemental oxygen as needed at 3L with benefit.         10/25/24- did not receive the supplemental oxygen ordered 7/26/24.   Currently on Breztri daily, using nebulizer once daily. States SOB is persistent complaint, worse with exertion, varies in severity. Limits her ability to clean her home due to severity of symptoms  Associated with productive cough and sneezing. Improves with systemic steroid therapy. Took steroids on 7/23/24 and 4/5/24,       7/23/2024- complaint of fatigue and generalized weakness, using nebulizer twice daily, on Spiriva but causes chocking.   Complaint of chest tightness, wheeze, shortness of breath, and productive cough with clear mucous. Took steroids with antibiotics in April 2024 with benefit but symptoms returned and have been gradually worsening with time.     1/23/2024- states SOB is stable, worse when walking for extended periods of time. Cutting down on smoking. Switching to vape to help cut back. Down to less than a pack a day.   No recent systemic steroid therapy.   On Spiriva daily with benefit. Using nebulizer once daily with improvement in daily cough.     9/21/2023- complaint of worsening shortness of breath, associated wheeze. Using nebulizer 1 x daily, has productive thick clear to brown mucous  Currently smoking 1 pack daily. States very difficult to quit.     10/27/2022- SOB stable complaint, wearing oxygen at night. Using nebulizer 1-2 times daily, daily cough is productive. On spiriva daily  Under large  amount of stress due to husbands cancer diagnosis. PCP provides anxiety medication.     6/21/2022- currently smoking 1 pack daily. Not interested quitting and the moment.   Able to keep up with house work but tires quickly has to pace yourself. Wearing supplemental oxygen when needed. Uses husbands machine at 3L.   Cough- stable, unchanged, able to clear occasionally varies from clear to brown mucous. Associated with chest pain. Has Echo scheduled with cardiologist  Using Albuterol daily, tried Symbicort but states to strong and made her choke.     3/18/2022- currently working on cutting back on smoking. Over 1 pack daily. States SOB varies with time, has good/bad days, worse with high humidity.   Cough- persistent complaint, daily complaint, thick clear/brown mucous.   No current complaint of wheeze or chest tightness. Occasionally wearing supplemental oxygen at home at 3L with benefit. Uses 's machine. Uses albuterol inhaler only, tried Symbicort in past caused chest pain.      12/17/2021- Complaint of daily cough- persistent complaint, mild complaint, productive clear/brown mucous, currently smoking 1 pack daily, associated with wheeze  Wearing supplemental oxygen at night 3L at night. No daily metered dose inhaler, using albuterol nebulizer 2-3x weekly with benefit.     SOB- stable, worse with exertion, difficult to lift heavy objects, improves with rest.     9/17/2021- complaint of high blood pressure, daytime fatigue, and generalized body aches, states she faints unexpectedly,     3/17/2021- decided not to do sleep study, Shortness of breath- daily complaint, varies with severity, worse with exertion. Currently smoking 1 pack cigarettes daily.   No cough, chest tightness, or wheeze.   Using albuterol 3-4 days weekly, using nebulizer when needed about 1x weekly, no nocturnal arousals.   Not able to tolerate inhalers, using albuterol nebulizer and inhaler only.       10/6/2020- Cough and SOB unchanged,  daily, improves with albuterol rescue inhaler. Improves ability to bring up sputum, productive clear and brown mucous, not able to bring in sputum samples. Did not fill Trelegy due to high cost. Using albuterol rescue 2x daily to control cough,   Currently smoking 1 pack daily.    7/6/2020- referred by PCP for Ct chest screening with abnormal spot seen. Complaint of weakness and fatigue, being treated for Syncope dx with bradycardia waiting for pacemaker placement.   Cough- onset 2 years, large amounts of mucous, clear color, nickel size, daily.  SOB- onset 2 years, limits activity level, works with exertion, improves quickly with rest, no chest tightness or wheeze,  Complaint of nightly snoring, daytime fatigue, drowsiness in afternoons.   Social Hx: lives with spouse but no pets, currently retired from office and retail stocking, no known Asbestosis exposure, Smoking Hx: currently smoking 1 pack daily, 40 pack years.  Family Hx: Father and 2 brothers (Small cell) Lung Cancer, no COPD, no Asthma  Medical Hx: no previous pneumonia ; no previous shoulder/chest surgery, Neck surgery 2009, 11, 17 for disc infusions.         The chief compliant  problem varies with instablilty at time    PFSH:  Past Medical History:   Diagnosis Date    Allergy     Sterling's esophagus     Colon polyp     Diverticulitis     Diverticulosis     Fatty liver     GERD (gastroesophageal reflux disease)     History of endoscopy 04/07/2021    Dr Uzair Cantor  Impression:            - Normal oropharynx.                         - Hiatal hernia.                         - Sterling's esophagus. Treated with radiofrequency                         ablation.                         - Normal stomach.                         - Normal examined duodenum.                         - No specimens collected.     Hx of cervical spine surgery 11/07/2018    Hyperlipidemia     Hypertension     Lupus (systemic lupus erythematosus) 1999    discoid    Osteoarthritis      Osteopenia     Syncope     Tremor 2005    essential         Past Surgical History:   Procedure Laterality Date    ANGIOGRAM, CORONARY, WITH LEFT HEART CATHETERIZATION N/A 3/12/2024    Procedure: Angiogram, Coronary, with Left Heart Cath;  Surgeon: Fernando Valdivia MD;  Location: [facility] CATH/EP LAB;  Service: Cardiology;  Laterality: N/A;    APPENDECTOMY      removed during colon surgery    CERVICAL FUSION      CERVICAL SPINE SURGERY      Rods and screws.     SECTION      COLON SURGERY  2007    hemicolectomy    COLON SURGERY  2008    repair    COLONOSCOPY N/A 2018    Procedure: COLONOSCOPY;  Surgeon: Maverick Esquivel MD;  Location: [facility];  Service: Endoscopy;  Laterality: N/A; repeat in 5 years for surveillance    COLONOSCOPY N/A 2023    Procedure: COLONOSCOPY;  Surgeon: Uzair Cantor MD;  Location: [facility];  Service: Endoscopy;  Laterality: N/A;    CYSTOSCOPY N/A 2021    Procedure: CYSTOSCOPY;  Surgeon: Allison Miranda MD;  Location: [facility];  Service: Urology;  Laterality: N/A;    ESOPHAGOGASTRODUODENOSCOPY N/A 2019    Procedure: EGD (ESOPHAGOGASTRODUODENOSCOPY);  Surgeon: Monika Steele MD;  Location: [facility];  Service: Endoscopy;  Laterality: N/A;    ESOPHAGOGASTRODUODENOSCOPY  2019    with RFA and biopsies    ESOPHAGOGASTRODUODENOSCOPY N/A 2019    Procedure: EGD (ESOPHAGOGASTRODUODENOSCOPY);  Surgeon: Uzair Cantor MD;  Location: [facility];  Service: Endoscopy;  Laterality: N/A;  n+v with anesthesia    ESOPHAGOGASTRODUODENOSCOPY N/A 2019    Procedure: EGD (ESOPHAGOGASTRODUODENOSCOPY);  Surgeon: Uzair Cantor MD;  Location: [facility];  Service: Endoscopy;  Laterality: N/A;    ESOPHAGOGASTRODUODENOSCOPY N/A 10/7/2019    Procedure: EGD (ESOPHAGOGASTRODUODENOSCOPY);  Surgeon: Uzair Cantor MD;  Location: [facility];  Service: Endoscopy;  Laterality: N/A;    ESOPHAGOGASTRODUODENOSCOPY N/A 2/10/2020    Procedure: EGD  (ESOPHAGOGASTRODUODENOSCOPY);  Surgeon: Uzair Cantor MD;  Location: [facility];  Service: Endoscopy;  Laterality: N/A;  Requests earliest procedure time    ESOPHAGOGASTRODUODENOSCOPY N/A 8/17/2020    Procedure: EGD (ESOPHAGOGASTRODUODENOSCOPY);  Surgeon: Uzair Cantor MD;  Location: [facility];  Service: Endoscopy;  Laterality: N/A;  BARRETTS    ESOPHAGOGASTRODUODENOSCOPY N/A 4/7/2021    Procedure: ESOPHAGOGASTRODUODENOSCOPY (EGD);  Surgeon: Uzair Cantor MD;  Location: [facility];  Service: Endoscopy;  Laterality: N/A;  Needs Rapid Covid MP    ESOPHAGOGASTRODUODENOSCOPY N/A 11/30/2021    Procedure: EGD (ESOPHAGOGASTRODUODENOSCOPY);  Surgeon: Uzair Cantor MD;  Location: [facility];  Service: Endoscopy;  Laterality: N/A;    ESOPHAGOGASTRODUODENOSCOPY N/A 6/27/2022    Procedure: EGD (ESOPHAGOGASTRODUODENOSCOPY);  Surgeon: Uzair Cantor MD;  Location: [facility];  Service: Endoscopy;  Laterality: N/A;  5/19/22: fully vaccinated. instructions mailed-SC    loop recorder only    ESOPHAGOGASTRODUODENOSCOPY N/A 8/7/2023    Procedure: EGD (ESOPHAGOGASTRODUODENOSCOPY);  Surgeon: Uzair Cantor MD;  Location: [facility];  Service: Endoscopy;  Laterality: N/A;    HYSTERECTOMY      ovaries remain    INSERTION OF IMPLANTABLE LOOP RECORDER N/A 7/17/2020    Procedure: INSERTION, IMPLANTABLE LOOP RECORDER (MEDTRONIC);  Surgeon: Karyna Vasques MD;  Location: [facility] CATH/EP LAB;  Service: Cardiology;  Laterality: N/A;    IVUS, CORONARY  3/12/2024    Procedure: IVUS, Coronary;  Surgeon: Fernando Valdivia MD;  Location: [facility] CATH/EP LAB;  Service: Cardiology;;    PERCUTANEOUS CORONARY INTERVENTION, ARTERY N/A 3/12/2024    Procedure: Percutaneous coronary intervention;  Surgeon: Fernando Valdivia MD;  Location: [facility] CATH/EP LAB;  Service: Cardiology;  Laterality: N/A;    SPLENECTOMY, TOTAL  2007    Injured during surgery and removed    UPPER GASTROINTESTINAL ENDOSCOPY  2007    GERD & hiatal hernia per patient  "report     Social History     Tobacco Use    Smoking status: Every Day     Current packs/day: 1.00     Average packs/day: 1 pack/day for 35.0 years (35.0 ttl pk-yrs)     Types: Cigarettes     Passive exposure: Current    Smokeless tobacco: Never   Substance Use Topics    Alcohol use: No    Drug use: No     Family History   Problem Relation Name Age of Onset    Lymphoma Mother          Brain    Colon cancer Father          diagnosed in his 60's    Lung cancer Father      Breast cancer Maternal Aunt      Cancer Brother          prostate    Lung cancer Brother      Lung cancer Brother          metastatic from prostate    Prostate cancer Brother      Heart disease Brother          valvular disease    Hyperlipidemia Brother      Eczema Neg Hx      Lupus Neg Hx      Psoriasis Neg Hx      Melanoma Neg Hx      Crohn's disease Neg Hx      Ulcerative colitis Neg Hx      Stomach cancer Neg Hx      Esophageal cancer Neg Hx       Review of patient's allergies indicates:   Allergen Reactions    Doxycycline Itching and Swelling     Prescribed 5/2021, took one dose, describes lip swelling, and "itch all over"    Mirtazapine Other (See Comments)     Hyperactivity.    Morphine Swelling and Rash     Other reaction(s): swelling in limbs    Lexapro [escitalopram oxalate] Other (See Comments)     Worsening tremor.  Sedation.    Primidone Other (See Comments)     Syncope.  Other reaction(s): dizziness,hypotension  Other reaction(s): Unknown  Other reaction(s): Unknown    Simvastatin Nausea And Vomiting and Other (See Comments)     Weakness.  Other reaction(s): nausea,vomiting    Beta-adrenergic agents Other (See Comments)     Low blood pressure and fainted.     Metoprolol succinate Other (See Comments)    Prochlorperazine Other (See Comments)     Other reaction(s): weakness    Topiramate     Compazine [prochlorperazine edisylate] Nausea Only    Tramadol Nausea And Vomiting     I have reviewed past medical, family, and social history. I " "have reviewed previous nurse notes.    Performance Status:The patient's activity level is functions out of house.      Review of Systems:  a review of eleven systems covering constitutional, Eye, HEENT, Psych, Respiratory, Cardiac, GI, , Musculoskeletal, Endocrine, Dermatologic was negative except for pertinent findings as listed ABOVE and below: pertinent positive as above, rest is good  Cough, shortness of breath         Exam:Comprehensive exam done. BP (!) 140/80 (BP Location: Right arm, Patient Position: Sitting)   Pulse 72   Ht 5' 1" (1.549 m)   Wt 65.5 kg (144 lb 8.2 oz)   SpO2 (!) 93% Comment: on room air at rest  BMI 27.31 kg/m²   Exam included Vitals as listed, and patient's appearance and affect and alertness and mood, oral exam for yeast and hygiene and pharynx lesions and Mallapatti (M) score, neck with inspection for jvd and masses and thyroid abnormalities and lymph nodes (supraclavicular and infraclavicular nodes and axillary also examined and noted if abn), chest exam included symmetry and effort and fremitus and percussion and auscultation, cardiac exam included rhythm and gallops and murmur and rubs and jvd and edema, abdominal exam for mass and hepatosplenomegaly and tenderness and hernias and bowel sounds, Musculoskeletal exam with muscle tone and posture and mobility/gait and  strength, and skin for rashes and cyanosis and pallor and turgor, extremity for clubbing.  Findings were normal except for pertinent findings listed below: M2, BS deminished, bilateral rales.       Radiographs (ct chest and cxr) reviewed: view by direct vision     CT Chest Without Contrast  09/26/2023 stable, unchanged lung nodules largest 7 mm and emphysema.     CT chest 09/14/22 lung nodules, atelectasis, and emphysema unchanged, stable; repeat CT in one year recommended     CT Chest Without Contrast 9/10/2021   Unchanged right lung nodules dating back to 6/18/2020.  2. Mild emphysema.  Coronary artery " calcifications     CT Chest Without Contrast 2/22/2021   Thereare no new pulmonary nodules. Mild emphysematous lung disease is again demonstrated bilaterally. There is bibasilar subsegmental atelectasis versus scarring  Multiple pulmonary nodules are again identified as described and are unchanged      CT Chest Without Contrast 09/09/20   IMPRESSION:  Stable 8 mm fissural nodule in the anterior right middle lobe and 4 mm  subpleural nodule in the posterior right medial lobe Resolution of the 6 mm subpleural nodular density in the anterior right costophrenic angle Mild coronary artery calcification No new pulmonary nodules or infiltrates  LUNG-RADS CATEGORY 3: PROBABLY BENIGN FINDINGS  RECOMMENDATION: Short-term imaging follow-up with 6 month LDCT.      Transthoracic echo (TTE) complete 6/3/2020  Mild concentric left ventricular hypertrophy.  Normal left ventricular systolic function. The estimated ejection fraction is 60%.  Normal LV diastolic function.  The estimated PA systolic pressure is 34 mmHg.     X-Ray Chest AP Portable 06/02/20   The lungs are clear. Costophrenic angles are seen without effusion. No  pneumothorax is identified. The heart is normal in size. Atheromatous  calcifications are seen at the aortic arch. Degenerative changes are  seen with an ACDF and paraspinal fixation hardware in the lower  cervical spine. The visualized upper abdomen is unremarkable    CT Chest Lung Screening Low Dose 06/18/20   1.  Nodular density with spiculations in the anterior segment of  the right middle lobe measures 6 x 9 mm.  2.  6 mm subpleural nodular density in the anterior right  costophrenic angle.  3.  Irregular shaped density in the medial segment of the right  lower lobe is unchanged dating back from 2009 and is benign.     LUNG-RADS CATEGORY 4A: SUSPICIOUS FINDINGS     RECOMMENDATION: 3 month follow-up LDCT (PET/CT may be considered when  solid component greater than 7 mm is present).        Labs reviewed        Lab Results   Component Value Date    WBC 14.58 (H) 10/25/2024    RBC 3.94 (L) 10/25/2024    HGB 12.3 10/25/2024    HCT 37.4 10/25/2024    MCV 95 10/25/2024    MCH 31.2 (H) 10/25/2024    MCHC 32.9 10/25/2024    RDW 15.0 (H) 10/25/2024     10/25/2024    MPV 9.2 10/25/2024    GRAN 10.1 (H) 10/25/2024    GRAN 69.0 10/25/2024    LYMPH 3.3 10/25/2024    LYMPH 22.6 10/25/2024    MONO 1.1 (H) 10/25/2024    MONO 7.2 10/25/2024    EOS 0.0 10/25/2024    BASO 0.11 10/25/2024    EOSINOPHIL 0.1 10/25/2024    BASOPHIL 0.8 10/25/2024        Latest Reference Range & Units 10/10/23 10:07 11/09/23 12:29 03/07/24 11:37 07/24/24 09:28   Eos # 0.0 - 0.5 K/uL 0.2 0.4 0.1 0.2       Ed Model: 7/6/2020 8.47% probability.    PFT reviewed  Pulmonary Functions Testing Results:  spirometry with bronchodilator, lung volume by gas dilution, and diffusion capacity were measured July 15, 2020.  The FEV1 to FVC ratio was 66% indicating airflow obstruction.  The FEV1 measured 70% of predicted making airflow obstruction moderate.     There was a 25% improvement in FEV1 following bronchodilator, this is statistically significant.  The FEV1 measured about 70% predicted at 1.4 L pre bronchodilator.  Total lung capacity was in normal limits on lung volumes by gas dilution.  Diffusion,   uncorrected for anemia present, was 49% of predicted.       The patient had moderate airflow obstruction that resolved with bronchodilator.  Lung volumes were normal.  Diffusion was reduced.  Clinical correlation recommended.       EPWORTH SLEEPINESS SCALE= 16 9/17/2021  Sleep study AHI 1 9/27/2021  DEA- 1.0 [facility]    Plan:  Clinical impression is apparently straight forward and impression with management as below.    Kristin was seen today for 3m f/u, copd and asthma.    Diagnoses and all orders for this visit:    Chronic obstructive pulmonary disease, unspecified COPD type  -     tiotropium bromide (SPIRIVA RESPIMAT) 2.5 mcg/actuation inhaler; Inhale 2 puffs into  the lungs Daily. Controller  -     predniSONE (DELTASONE) 10 MG tablet; Take one pill a day for three days, repeat for shortness of breath  -     budesonide-formoterol 160-4.5 mcg (SYMBICORT) 160-4.5 mcg/actuation HFAA; Inhale 2 puffs into the lungs every 12 (twelve) hours. Controller  -     CBC auto differential; Future    Oral thrush  -     clotrimazole (MYCELEX) 10 mg rea; Take 1 tablet (10 mg total) by mouth 5 (five) times daily. for 14 days    Severe persistent asthma without complication  -     tiotropium bromide (SPIRIVA RESPIMAT) 2.5 mcg/actuation inhaler; Inhale 2 puffs into the lungs Daily. Controller  -     predniSONE (DELTASONE) 10 MG tablet; Take one pill a day for three days, repeat for shortness of breath  -     budesonide-formoterol 160-4.5 mcg (SYMBICORT) 160-4.5 mcg/actuation HFAA; Inhale 2 puffs into the lungs every 12 (twelve) hours. Controller  -     CBC auto differential; Future              Follow up in about 3 months (around 5/12/2025), or if symptoms worsen or fail to improve.    Discussed with patient above for education the following:      Patient Instructions   Due to thrush changing from Breztri to Symbicort plus Spiriva.    Take Symbicort 1 puff followed by Spiriva 2 puffs every morning  Then take Symbicort 1 puff every evening.  Rinse your mouth after using    If you do not have issues with thrush increase symbicort to 2 puffs every morning and evening.

## 2025-02-12 NOTE — TELEPHONE ENCOUNTER
Called the number provided with no answer.  I do not see a reschedule note from this office.  Patient can contact the office back with any questions.

## 2025-02-20 ENCOUNTER — OFFICE VISIT (OUTPATIENT)
Dept: CARDIOLOGY | Facility: CLINIC | Age: 75
End: 2025-02-20
Payer: MEDICARE

## 2025-02-20 VITALS
SYSTOLIC BLOOD PRESSURE: 110 MMHG | WEIGHT: 142.31 LBS | OXYGEN SATURATION: 99 % | HEART RATE: 66 BPM | DIASTOLIC BLOOD PRESSURE: 72 MMHG | BODY MASS INDEX: 26.87 KG/M2 | HEIGHT: 61 IN

## 2025-02-20 DIAGNOSIS — I10 PRIMARY HYPERTENSION: ICD-10-CM

## 2025-02-20 DIAGNOSIS — I25.10 CORONARY ARTERY DISEASE INVOLVING NATIVE CORONARY ARTERY OF NATIVE HEART WITHOUT ANGINA PECTORIS: Primary | ICD-10-CM

## 2025-02-20 DIAGNOSIS — E78.2 MIXED HYPERLIPIDEMIA: ICD-10-CM

## 2025-02-20 NOTE — LETTER
.  [facility]  [address]  [address]  [place] [address]  Phone: [phone]  Fax: [phone] Date: 2025    Patient: Kristin Guerra                   MRN#:[id number]  : 1950  Procedure: EGD    Current Outpatient Medications   Medication Sig Dispense Refill    albuterol (PROVENTIL) 2.5 mg /3 mL (0.083 %) nebulizer solution Take 3 mLs (2.5 mg total) by nebulization every 6 (six) hours as needed for Wheezing or Shortness of Breath. Rescue 75 mL 2    albuterol (VENTOLIN HFA) 90 mcg/actuation inhaler Inhale 2 puffs into the lungs every 4 (four) hours as needed. Rescue 18 g 11    aluminum & magnesium hydroxide-simethicone (MYLANTA MAX STRENGTH) 400-400-40 mg/5 mL suspension Take 5 mLs by mouth every 6 (six) hours as needed for Indigestion.       amLODIPine (NORVASC) 5 MG tablet Take 1 tablet (5 mg total) by mouth once daily. 90 tablet 0    ascorbic acid, vitamin C, 500 mg Cap as directed Orally once daily      aspirin 81 MG Chew Take 81 mg by mouth once daily.      azelastine (ASTELIN) 137 mcg (0.1 %) nasal spray 1 SPRAY BY NASAL ROUTE TWICE DAILY 90 mL 0    budesonide-formoterol 160-4.5 mcg (SYMBICORT) 160-4.5 mcg/actuation HFAA Inhale 2 puffs into the lungs every 12 (twelve) hours. Controller 30.6 g 11    calcitRIOL (ROCALTROL) 0.25 MCG Cap Take 1 capsule (0.25 mcg total) by mouth once daily. 90 capsule 3    calcium-vitamin D3 (OS-FIDE 500 + D3) 500 mg-5 mcg (200 unit) per tablet Take 1 tablet by mouth 2 (two) times daily with meals.      cefadroxil (DURICEF) 500 MG Cap Take 1 capsule (500 mg total) by mouth every 12 (twelve) hours. 14 capsule 0    cholecalciferol, vitamin D3, (VITAMIN D3) 50 mcg (2,000 unit) Cap capsule 1 capsule.      clobetasoL (TEMOVATE) 0.05 % cream Apply to feet daily to bid prn flare 30 g 2    clonazePAM (KLONOPIN) 0.5 MG tablet TAKE 1/2 TABLET(0.25 MG) BY MOUTH TWICE DAILY AS NEEDED FOR ANXIETY OR INSOMNIA 30 tablet 3     clopidogreL (PLAVIX) 75 mg tablet Take 1 tablet (75 mg total) by mouth once daily. 90 tablet 3    clotrimazole (MYCELEX) 10 mg rea Take 1 tablet (10 mg total) by mouth 5 (five) times daily. for 14 days 70 tablet 0    dupilumab (DUPIXENT) 300 mg/2 mL Syrg Inject 2 mLs (300 mg total) into the skin every 14 (fourteen) days. 4 mL 11    esomeprazole (NEXIUM) 40 MG capsule TAKE ONE CAPSULE BY MOUTH TWICE DAILY 180 capsule 3    ezetimibe (ZETIA) 10 mg tablet Take 1 tablet (10 mg total) by mouth every evening. 90 tablet 3    famotidine (PEPCID) 20 MG tablet Take 20 mg by mouth 2 (two) times daily.      fluticasone propionate (FLONASE) 50 mcg/actuation nasal spray 1 spray (50 mcg total) by Each Nostril route 2 (two) times daily. 48 g 0    gabapentin (NEURONTIN) 300 MG capsule TAKE ONE CAPSULE BY MOUTH EVERY EVENING 90 capsule 3    mirabegron (MYRBETRIQ) 25 mg Tb24 ER tablet Take 1 tablet (25 mg total) by mouth once daily. 90 tablet 3    MYRBETRIQ 50 mg Tb24 Take 1 tablet by mouth.      nitroGLYCERIN (NITROSTAT) 0.4 MG SL tablet Place 0.4 mg under the tongue.      ofloxacin (OCUFLOX) 0.3 % ophthalmic solution Place 1 drop into the left eye 4 (four) times daily.      polyethylene glycol (GLYCOLAX) 17 gram PwPk Take 17 g by mouth daily as needed.      predniSONE (DELTASONE) 10 MG tablet Take one pill a day for three days, repeat for shortness of breath 18 tablet 0    pyridoxine, vitamin B6, (VITAMIN B-6) 100 MG Tab Take 50 mg by mouth once daily.      tiotropium bromide (SPIRIVA RESPIMAT) 2.5 mcg/actuation inhaler Inhale 2 puffs into the lungs Daily. Controller 12 g 3    traZODone (DESYREL) 50 MG tablet 1 tablet at bedtime as needed Orally Once a day for 30 days      ZIRGAN 0.15 % Gel Place into both eyes.       Current Facility-Administered Medications   Medication Dose Route Frequency Provider Last Rate Last Admin    sodium chloride 0.9% flush 2 mL  2 mL Intravenous Q6H PRN Aron James MD       No absolute major  cardiac contraindications at this time for endoscopy procedure. The patient is at low risk of perioperative adverse cardiac events. Follow standard of care cardiac and hemodynamic monitoring during procedure and in the post-procedure period. Patient made aware that all surgeries carry risks and unpredictable cardiac events may still occur. Ok to hold to Plavix 7 days prior. If absolutely needed, ok to hold aspirin 7 days prior.  If you have any questions regarding the above, please contact my office at [phone]

## 2025-02-20 NOTE — PROGRESS NOTES
Part of this note has been created using TapTap dictation system. Errors in transcription may not be completely avoided. Please do not hesitate to contact me.    Patient ID:  Kristin Guerra  75 y.o.  female      Assessment:       1. Coronary artery disease involving native coronary artery of native heart without angina pectoris    2. Primary hypertension    3. Mixed hyperlipidemia      PCI of distal RCA in March 2024.     Plan:     Stable from cardiac standpoint.  Angina free.  Continue aspirin 81 mg daily. Plavix can be discontinued.  Continue Zetia 10 mg daily.  She says her repeat lipid panel with her PCP showed marked improvement. So she decided not to get Repatha. I do not have the results available to me. Will try to obtain records from PCP.  She has had issues with different statins.  Last LDL in our system is from July 2024 and it was   Continue amlodipine 5 mg daily. BP is controlled.   Discussed ASCVD risk factor control and lifestyle interventions including diet, exercise and weight loss.     She has an EGD coming up.  No contraindication from cardiac standpoint at this time for the EGD.      Subjective:     Chief Complaint   Patient presents with    Follow-up    Hypertension       HPI:  Kristin Guerra is a 75 y.o. female who presents today for follow up.  She has a history of CAD status post PCI of distal RCA in March 2024.  She was experiencing chest pain at the time and had an abnormal stress test which led to the cardiac cath.   She reports doing well. Reports no chest pain, orthopnea, PND, swelling of extremities, palpitations, syncope or near syncope.  Has some shortness of breath on exertion at baseline due to COPD. Denies any worsening.      PREVIOUS CARDIAC TESTING/PROCEDURES HISTORY:  Most Recent Echocardiogram Results  Results for orders placed during the hospital encounter of 01/30/24    Echo    Interpretation Summary    Left Ventricle: The left ventricle is normal in size. Normal wall  thickness. Normal wall motion. There is normal systolic function with a visually estimated ejection fraction of 65 - 70%. There is normal diastolic function.    Right Ventricle: Normal right ventricular cavity size. Wall thickness is normal. Right ventricle wall motion  is normal. Systolic function is normal.    Aortic Valve: The aortic valve is a trileaflet valve. Mildly calcified left, right and noncoronary cusps.    Mitral Valve: There is mild posterior mitral annular calcification present.    Pulmonary Artery: The estimated pulmonary artery systolic pressure is 22 mmHg.    IVC/SVC: Normal venous pressure at 3 mmHg.      Most Recent Stress Test Results  Results for orders placed during the hospital encounter of 01/30/24    Nuclear Stress - Cardiology Interpreted    Interpretation Summary    Abnormal myocardial perfusion scan.    There is a mild to moderate intensity, small to moderate sized, mostly fixed perfusion abnormality with some reversibilty in the anterior and lateral wall(s).    There are no other significant perfusion abnormalities.    The gated perfusion images showed an ejection fraction of 74% at rest. The gated perfusion images showed an ejection fraction of 80% post stress. Normal ejection fraction is greater than 53%.    There is normal wall motion at rest and post stress.    LV cavity size is normal at rest and normal at stress.    The ECG portion of the study is negative for ischemia.    The patient reported chest pain during the stress test.    There were no arrhythmias during stress.      Most Recent Cardiac PET Stress Test Results  No results found for this or any previous visit.      Most Recent Cardiovascular Angiogram results  Results for orders placed during the hospital encounter of 03/12/24    Cardiac catheterization    Conclusion    The Dist RCA lesion was 99% stenosed with 0% stenosis post-intervention.    LVEDP:  8 mmHg    The procedure log was documented by Documenter: Alfonso  "Teetee RT; Tschume, Sarah, RN and verified by Fernando Valdivia MD.    Date: 3/13/2024  Time: 6:42 PM      Other Most Recent Cardiology Results  Results for orders placed during the hospital encounter of 05/18/23    Cardiac device check - Remote    Narrative  · [employer] Reveal Summary Report-03/23 thru 05/18/2023  · No new observations.      Verified      CARDIAC SURGERY:    Most Recent EKG Results  Results for orders placed or performed during the hospital encounter of 07/24/24   EKG 12-lead    Collection Time: 07/24/24 10:51 AM   Result Value Ref Range    QRS Duration 92 ms    [facility] QTC Calculation 413 ms    Narrative    Test Reason : CAD    Vent. Rate : 054 BPM     Atrial Rate : 054 BPM     P-R Int : 162 ms          QRS Dur : 092 ms      QT Int : 436 ms       P-R-T Axes : 065 047 073 degrees     QTc Int : 413 ms    Sinus bradycardia  Nonspecific ST abnormality  Abnormal ECG  When compared with ECG of 10-MAY-2024 10:35,  Criteria for Inferior infarct are no longer Present  Confirmed by Santa QUIROGA, Gama MOULTON ([id number]) on 7/26/2024 7:33:55 PM    Referred By: ANYA MCKEON           Confirmed By:Gama Pratt MD         Review of patient's allergies indicates:   Allergen Reactions    Doxycycline Itching and Swelling     Prescribed 5/2021, took one dose, describes lip swelling, and "itch all over"    Mirtazapine Other (See Comments)     Hyperactivity.    Morphine Swelling and Rash     Other reaction(s): swelling in limbs    Lexapro [escitalopram oxalate] Other (See Comments)     Worsening tremor.  Sedation.    Primidone Other (See Comments)     Syncope.  Other reaction(s): dizziness,hypotension  Other reaction(s): Unknown  Other reaction(s): Unknown    Simvastatin Nausea And Vomiting and Other (See Comments)     Weakness.  Other reaction(s): nausea,vomiting    Beta-adrenergic agents Other (See Comments)     Low blood pressure and fainted.     Metoprolol succinate Other (See Comments)    Prochlorperazine Other (See " Comments)     Other reaction(s): weakness    Topiramate     Compazine [prochlorperazine edisylate] Nausea Only    Tramadol Nausea And Vomiting       Past Medical History:   Diagnosis Date    Allergy     Sterling's esophagus     Colon polyp     Diverticulitis     Diverticulosis     Fatty liver     GERD (gastroesophageal reflux disease)     History of endoscopy 2021    Dr Uzair Cantor  Impression:            - Normal oropharynx.                         - Hiatal hernia.                         - Sterling's esophagus. Treated with radiofrequency                         ablation.                         - Normal stomach.                         - Normal examined duodenum.                         - No specimens collected.     Hx of cervical spine surgery 2018    Hyperlipidemia     Hypertension     Lupus (systemic lupus erythematosus)     discoid    Osteoarthritis     Osteopenia     Syncope     Tremor 2005    essential     Past Surgical History:   Procedure Laterality Date    ANGIOGRAM, CORONARY, WITH LEFT HEART CATHETERIZATION N/A 3/12/2024    Procedure: Angiogram, Coronary, with Left Heart Cath;  Surgeon: Fernando Valdivia MD;  Location: [facility] CATH/EP LAB;  Service: Cardiology;  Laterality: N/A;    APPENDECTOMY      removed during colon surgery    CERVICAL FUSION      CERVICAL SPINE SURGERY      Rods and screws.     SECTION      COLON SURGERY  2007    hemicolectomy    COLON SURGERY  2008    repair    COLONOSCOPY N/A 2018    Procedure: COLONOSCOPY;  Surgeon: Maverick Esquivel MD;  Location: [facility];  Service: Endoscopy;  Laterality: N/A; repeat in 5 years for surveillance    COLONOSCOPY N/A 2023    Procedure: COLONOSCOPY;  Surgeon: Uzair Cantor MD;  Location: [facility] ENDO;  Service: Endoscopy;  Laterality: N/A;    CYSTOSCOPY N/A 2021    Procedure: CYSTOSCOPY;  Surgeon: Allison Miranda MD;  Location: [facility] OR;  Service: Urology;  Laterality: N/A;     ESOPHAGOGASTRODUODENOSCOPY N/A 4/2/2019    Procedure: EGD (ESOPHAGOGASTRODUODENOSCOPY);  Surgeon: Monika Steele MD;  Location: [facility];  Service: Endoscopy;  Laterality: N/A;    ESOPHAGOGASTRODUODENOSCOPY  06/04/2019    with RFA and biopsies    ESOPHAGOGASTRODUODENOSCOPY N/A 6/4/2019    Procedure: EGD (ESOPHAGOGASTRODUODENOSCOPY);  Surgeon: Uzair Cantor MD;  Location: [facility];  Service: Endoscopy;  Laterality: N/A;  n+v with anesthesia    ESOPHAGOGASTRODUODENOSCOPY N/A 8/21/2019    Procedure: EGD (ESOPHAGOGASTRODUODENOSCOPY);  Surgeon: Uzair Cantor MD;  Location: [facility];  Service: Endoscopy;  Laterality: N/A;    ESOPHAGOGASTRODUODENOSCOPY N/A 10/7/2019    Procedure: EGD (ESOPHAGOGASTRODUODENOSCOPY);  Surgeon: Uzair Cantor MD;  Location: [facility];  Service: Endoscopy;  Laterality: N/A;    ESOPHAGOGASTRODUODENOSCOPY N/A 2/10/2020    Procedure: EGD (ESOPHAGOGASTRODUODENOSCOPY);  Surgeon: Uzair Cantor MD;  Location: [facility];  Service: Endoscopy;  Laterality: N/A;  Requests earliest procedure time    ESOPHAGOGASTRODUODENOSCOPY N/A 8/17/2020    Procedure: EGD (ESOPHAGOGASTRODUODENOSCOPY);  Surgeon: Uzair Cantor MD;  Location: [facility];  Service: Endoscopy;  Laterality: N/A;  BARRETTS    ESOPHAGOGASTRODUODENOSCOPY N/A 4/7/2021    Procedure: ESOPHAGOGASTRODUODENOSCOPY (EGD);  Surgeon: Uzair Cantor MD;  Location: [facility];  Service: Endoscopy;  Laterality: N/A;  Needs Rapid Covid MP    ESOPHAGOGASTRODUODENOSCOPY N/A 11/30/2021    Procedure: EGD (ESOPHAGOGASTRODUODENOSCOPY);  Surgeon: Uzair Cantor MD;  Location: [facility];  Service: Endoscopy;  Laterality: N/A;    ESOPHAGOGASTRODUODENOSCOPY N/A 6/27/2022    Procedure: EGD (ESOPHAGOGASTRODUODENOSCOPY);  Surgeon: Uzair Cantor MD;  Location: [facility];  Service: Endoscopy;  Laterality: N/A;  5/19/22: fully vaccinated. instructions mailed-SC    loop recorder only    ESOPHAGOGASTRODUODENOSCOPY N/A 8/7/2023     Procedure: EGD (ESOPHAGOGASTRODUODENOSCOPY);  Surgeon: Uzair Cantor MD;  Location: [facility] ENDO;  Service: Endoscopy;  Laterality: N/A;    HYSTERECTOMY      ovaries remain    INSERTION OF IMPLANTABLE LOOP RECORDER N/A 7/17/2020    Procedure: INSERTION, IMPLANTABLE LOOP RECORDER ([employer]);  Surgeon: Karyna Vasques MD;  Location: [facility] CATH/EP LAB;  Service: Cardiology;  Laterality: N/A;    IVUS, CORONARY  3/12/2024    Procedure: IVUS, Coronary;  Surgeon: Fernando Valdivia MD;  Location: [facility] CATH/EP LAB;  Service: Cardiology;;    PERCUTANEOUS CORONARY INTERVENTION, ARTERY N/A 3/12/2024    Procedure: Percutaneous coronary intervention;  Surgeon: Fernando Valdivia MD;  Location: [facility] CATH/EP LAB;  Service: Cardiology;  Laterality: N/A;    SPLENECTOMY, TOTAL  2007    Injured during surgery and removed    UPPER GASTROINTESTINAL ENDOSCOPY  2007    GERD & hiatal hernia per patient report     Social History[1]       REVIEW OF SYSTEMS  CONSTITUTIONAL: No chills, no fatigue, no fever.   EYES: No double vision, no blurred vision  NEURO: No headaches, no dizziness  RESPIRATORY: No cough, no wheezing.    CARDIOVASCULAR: See HPI   GI: No abdominal pain, no melena, no diarrhea, no nausea or vomiting.   : No  dysuria and frequency, no hematuria  SKIN: no bruising, no discoloration  ENDOCRINE: no polyphagia, no heat intolerance, no cold intolerance  PSYCHIATRIC: no depression, no anxiety, no memory loss  MUSCULOSKELETAL: no  neck pain, no muscle weakness,no back pain          Objective:        Vitals:    02/20/25 1146   BP: 110/72   Pulse: 66         PHYSICAL EXAM  CONSTITUTIONAL: In no apparent distress  HEENT: Normocephalic. Pupils normal and conjunctivae normal. No pallor  NECK: No JVD  LUNGS: B/L air entry to the lungs, clear to auscultation. No rales, wheezing or rhonchi.  HEART: Normal rate and regular rhythm. Normal S1 and S2.  No murmur   ABDOMEN: soft, non-tender; bowel sounds normal  EXTREMITIES: No edema. Good  "palpable distal pulses.  NEURO: AAO X 3, no gross sensory or motor deficits  SKIN:  No rash  Psych:  Normal affect    Lab Results   Component Value Date    WBC 14.58 (H) 10/25/2024    HGB 12.3 10/25/2024    HCT 37.4 10/25/2024     10/25/2024    CHOL 232 (H) 07/24/2024    TRIG 77 07/24/2024    HDL 54 07/24/2024    ALT 14 07/24/2024    AST 15 07/24/2024     07/24/2024    K 4.0 07/24/2024     07/24/2024    CREATININE 0.8 07/24/2024    BUN 13 07/24/2024    CO2 24 07/24/2024    TSH 1.581 07/24/2024    INR 0.9 03/07/2024        @  Lab Results   Component Value Date    CHOL 232 (H) 07/24/2024    CHOL 202 (H) 11/09/2023    CHOL 231 (H) 03/08/2023     Lab Results   Component Value Date    HDL 54 07/24/2024    HDL 47 11/09/2023    HDL 53 03/08/2023     Lab Results   Component Value Date    LDLCALC 162.6 (H) 07/24/2024    LDLCALC 139.0 11/09/2023    LDLCALC 163.6 (H) 03/08/2023     No results found for: "DLDL"  Lab Results   Component Value Date    TRIG 77 07/24/2024    TRIG 80 11/09/2023    TRIG 72 03/08/2023           Current Outpatient Medications   Medication Instructions    albuterol (PROVENTIL) 2.5 mg, Nebulization, Every 6 hours PRN, Rescue    albuterol (VENTOLIN HFA) 90 mcg/actuation inhaler 2 puffs, Inhalation, Every 4 hours PRN, Rescue    aluminum & magnesium hydroxide-simethicone (MYLANTA MAX STRENGTH) 400-400-40 mg/5 mL suspension 5 mLs, Every 6 hours PRN    amLODIPine (NORVASC) 5 mg, Oral, Daily    ascorbic acid, vitamin C, 500 mg Cap as directed Orally once daily    aspirin 81 mg, Daily    azelastine (ASTELIN) 137 mcg (0.1 %) nasal spray 1 SPRAY BY NASAL ROUTE TWICE DAILY    budesonide-formoterol 160-4.5 mcg (SYMBICORT) 160-4.5 mcg/actuation HFAA 2 puffs, Inhalation, Every 12 hours, Controller    calcitRIOL (ROCALTROL) 0.25 mcg, Oral, Daily    calcium-vitamin D3 (OS-FIDE 500 + D3) 500 mg-5 mcg (200 unit) per tablet 1 tablet, 2 times daily with meals    cefadroxil (DURICEF) 500 mg, Oral, Every 12 " hours    cholecalciferol, vitamin D3, (VITAMIN D3) 50 mcg (2,000 unit) Cap capsule 1 capsule    clobetasoL (TEMOVATE) 0.05 % cream Apply to feet daily to bid prn flare    clonazePAM (KLONOPIN) 0.5 MG tablet TAKE 1/2 TABLET(0.25 MG) BY MOUTH TWICE DAILY AS NEEDED FOR ANXIETY OR INSOMNIA    clotrimazole (MYCELEX) 10 mg, Oral, 5 times daily    dupilumab (DUPIXENT) 300 mg, Subcutaneous, Every 14 days    esomeprazole (NEXIUM) 40 MG capsule TAKE ONE CAPSULE BY MOUTH TWICE DAILY    ezetimibe (ZETIA) 10 mg, Oral, Nightly    famotidine (PEPCID) 20 mg, 2 times daily    fluticasone propionate (FLONASE) 50 mcg, Each Nostril, 2 times daily    gabapentin (NEURONTIN) 300 mg, Oral, Nightly    MYRBETRIQ 50 mg Tb24 1 tablet    MYRBETRIQ 25 mg, Oral, Daily    nitroGLYCERIN (NITROSTAT) 0.4 mg    ofloxacin (OCUFLOX) 0.3 % ophthalmic solution 1 drop, 4 times daily    polyethylene glycol (GLYCOLAX) 17 g, Daily PRN    predniSONE (DELTASONE) 10 MG tablet Take one pill a day for three days, repeat for shortness of breath    pyridoxine (vitamin B6) (VITAMIN B-6) 50 mg, Daily    tiotropium bromide (SPIRIVA RESPIMAT) 2.5 mcg/actuation inhaler 2 puffs, Inhalation, Daily, Controller    traZODone (DESYREL) 50 MG tablet 1 tablet at bedtime as needed Orally Once a day for 30 days    ZIRGAN 0.15 % Gel Place into both eyes.       Medication List with Changes/Refills   Current Medications    ALBUTEROL (PROVENTIL) 2.5 MG /3 ML (0.083 %) NEBULIZER SOLUTION    Take 3 mLs (2.5 mg total) by nebulization every 6 (six) hours as needed for Wheezing or Shortness of Breath. Rescue    ALBUTEROL (VENTOLIN HFA) 90 MCG/ACTUATION INHALER    Inhale 2 puffs into the lungs every 4 (four) hours as needed. Rescue    ALUMINUM & MAGNESIUM HYDROXIDE-SIMETHICONE (MYLANTA MAX STRENGTH) 400-400-40 MG/5 ML SUSPENSION    Take 5 mLs by mouth every 6 (six) hours as needed for Indigestion.     AMLODIPINE (NORVASC) 5 MG TABLET    Take 1 tablet (5 mg total) by mouth once daily.     ASCORBIC ACID, VITAMIN C, 500 MG CAP    as directed Orally once daily    ASPIRIN 81 MG CHEW    Take 81 mg by mouth once daily.    AZELASTINE (ASTELIN) 137 MCG (0.1 %) NASAL SPRAY    1 SPRAY BY NASAL ROUTE TWICE DAILY    BUDESONIDE-FORMOTEROL 160-4.5 MCG (SYMBICORT) 160-4.5 MCG/ACTUATION HFAA    Inhale 2 puffs into the lungs every 12 (twelve) hours. Controller    CALCITRIOL (ROCALTROL) 0.25 MCG CAP    Take 1 capsule (0.25 mcg total) by mouth once daily.    CALCIUM-VITAMIN D3 (OS-FIDE 500 + D3) 500 MG-5 MCG (200 UNIT) PER TABLET    Take 1 tablet by mouth 2 (two) times daily with meals.    CEFADROXIL (DURICEF) 500 MG CAP    Take 1 capsule (500 mg total) by mouth every 12 (twelve) hours.    CHOLECALCIFEROL, VITAMIN D3, (VITAMIN D3) 50 MCG (2,000 UNIT) CAP CAPSULE    1 capsule.    CLOBETASOL (TEMOVATE) 0.05 % CREAM    Apply to feet daily to bid prn flare    CLONAZEPAM (KLONOPIN) 0.5 MG TABLET    TAKE 1/2 TABLET(0.25 MG) BY MOUTH TWICE DAILY AS NEEDED FOR ANXIETY OR INSOMNIA    CLOTRIMAZOLE (MYCELEX) 10 MG LIZZY    Take 1 tablet (10 mg total) by mouth 5 (five) times daily. for 14 days    DUPILUMAB (DUPIXENT) 300 MG/2 ML SYRG    Inject 2 mLs (300 mg total) into the skin every 14 (fourteen) days.    ESOMEPRAZOLE (NEXIUM) 40 MG CAPSULE    TAKE ONE CAPSULE BY MOUTH TWICE DAILY    EZETIMIBE (ZETIA) 10 MG TABLET    Take 1 tablet (10 mg total) by mouth every evening.    FAMOTIDINE (PEPCID) 20 MG TABLET    Take 20 mg by mouth 2 (two) times daily.    FLUTICASONE PROPIONATE (FLONASE) 50 MCG/ACTUATION NASAL SPRAY    1 spray (50 mcg total) by Each Nostril route 2 (two) times daily.    GABAPENTIN (NEURONTIN) 300 MG CAPSULE    TAKE ONE CAPSULE BY MOUTH EVERY EVENING    MIRABEGRON (MYRBETRIQ) 25 MG TB24 ER TABLET    Take 1 tablet (25 mg total) by mouth once daily.    MYRBETRIQ 50 MG TB24    Take 1 tablet by mouth.    NITROGLYCERIN (NITROSTAT) 0.4 MG SL TABLET    Place 0.4 mg under the tongue.    OFLOXACIN (OCUFLOX) 0.3 % OPHTHALMIC  SOLUTION    Place 1 drop into the left eye 4 (four) times daily.    POLYETHYLENE GLYCOL (GLYCOLAX) 17 GRAM PWPK    Take 17 g by mouth daily as needed.    PREDNISONE (DELTASONE) 10 MG TABLET    Take one pill a day for three days, repeat for shortness of breath    PYRIDOXINE, VITAMIN B6, (VITAMIN B-6) 100 MG TAB    Take 50 mg by mouth once daily.    TIOTROPIUM BROMIDE (SPIRIVA RESPIMAT) 2.5 MCG/ACTUATION INHALER    Inhale 2 puffs into the lungs Daily. Controller    TRAZODONE (DESYREL) 50 MG TABLET    1 tablet at bedtime as needed Orally Once a day for 30 days    ZIRGAN 0.15 % GEL    Place into both eyes.   Discontinued Medications    CLOPIDOGREL (PLAVIX) 75 MG TABLET    Take 1 tablet (75 mg total) by mouth once daily.               All pertinent labs, imaging, and EKGs reviewed.  Patient's most recent EKG tracing was personally interpreted by this provider.    Problem List Items Addressed This Visit    None  Visit Diagnoses         Coronary artery disease involving native coronary artery of native heart without angina pectoris    -  Primary      Primary hypertension          Mixed hyperlipidemia                Follow up in about 6 months (around 8/20/2025).          [1]   Social History  Tobacco Use    Smoking status: Every Day     Current packs/day: 1.00     Average packs/day: 1 pack/day for 35.0 years (35.0 ttl pk-yrs)     Types: Cigarettes     Passive exposure: Current    Smokeless tobacco: Never   Substance Use Topics    Alcohol use: No    Drug use: No

## 2025-03-05 DIAGNOSIS — F17.210 CIGARETTE SMOKER: Primary | ICD-10-CM

## 2025-03-06 ENCOUNTER — TELEPHONE (OUTPATIENT)
Facility: CLINIC | Age: 75
End: 2025-03-06
Payer: MEDICARE

## 2025-03-10 ENCOUNTER — OFFICE VISIT (OUTPATIENT)
Facility: CLINIC | Age: 75
End: 2025-03-10
Payer: MEDICARE

## 2025-03-10 VITALS
DIASTOLIC BLOOD PRESSURE: 79 MMHG | SYSTOLIC BLOOD PRESSURE: 143 MMHG | BODY MASS INDEX: 27.38 KG/M2 | HEART RATE: 69 BPM | WEIGHT: 145 LBS | HEIGHT: 61 IN

## 2025-03-10 DIAGNOSIS — J42 CHRONIC BRONCHITIS, UNSPECIFIED CHRONIC BRONCHITIS TYPE: ICD-10-CM

## 2025-03-10 DIAGNOSIS — M50.20 HERNIATION OF INTERVERTEBRAL DISC OF CERVICAL REGION: ICD-10-CM

## 2025-03-10 DIAGNOSIS — G25.0 ESSENTIAL TREMOR: Primary | ICD-10-CM

## 2025-03-10 PROCEDURE — 3078F DIAST BP <80 MM HG: CPT | Mod: CPTII,S$GLB,, | Performed by: STUDENT IN AN ORGANIZED HEALTH CARE EDUCATION/TRAINING PROGRAM

## 2025-03-10 PROCEDURE — 3077F SYST BP >= 140 MM HG: CPT | Mod: CPTII,S$GLB,, | Performed by: STUDENT IN AN ORGANIZED HEALTH CARE EDUCATION/TRAINING PROGRAM

## 2025-03-10 PROCEDURE — 99999 PR PBB SHADOW E&M-EST. PATIENT-LVL V: CPT | Mod: PBBFAC,,, | Performed by: STUDENT IN AN ORGANIZED HEALTH CARE EDUCATION/TRAINING PROGRAM

## 2025-03-10 PROCEDURE — 3288F FALL RISK ASSESSMENT DOCD: CPT | Mod: CPTII,S$GLB,, | Performed by: STUDENT IN AN ORGANIZED HEALTH CARE EDUCATION/TRAINING PROGRAM

## 2025-03-10 PROCEDURE — 99205 OFFICE O/P NEW HI 60 MIN: CPT | Mod: S$GLB,,, | Performed by: STUDENT IN AN ORGANIZED HEALTH CARE EDUCATION/TRAINING PROGRAM

## 2025-03-10 PROCEDURE — 3008F BODY MASS INDEX DOCD: CPT | Mod: CPTII,S$GLB,, | Performed by: STUDENT IN AN ORGANIZED HEALTH CARE EDUCATION/TRAINING PROGRAM

## 2025-03-10 PROCEDURE — 1159F MED LIST DOCD IN RCRD: CPT | Mod: CPTII,S$GLB,, | Performed by: STUDENT IN AN ORGANIZED HEALTH CARE EDUCATION/TRAINING PROGRAM

## 2025-03-10 PROCEDURE — 1126F AMNT PAIN NOTED NONE PRSNT: CPT | Mod: CPTII,S$GLB,, | Performed by: STUDENT IN AN ORGANIZED HEALTH CARE EDUCATION/TRAINING PROGRAM

## 2025-03-10 PROCEDURE — 1101F PT FALLS ASSESS-DOCD LE1/YR: CPT | Mod: CPTII,S$GLB,, | Performed by: STUDENT IN AN ORGANIZED HEALTH CARE EDUCATION/TRAINING PROGRAM

## 2025-03-10 PROCEDURE — G2211 COMPLEX E/M VISIT ADD ON: HCPCS | Mod: S$GLB,,, | Performed by: STUDENT IN AN ORGANIZED HEALTH CARE EDUCATION/TRAINING PROGRAM

## 2025-03-10 NOTE — PROGRESS NOTES
Name: Kristin Guerra  MRN: [id number]   CSN: [id number]      Date: 03/10/2025    Referring physician:  Tommy, Brian Soria MD  [id number]   [place] [id number]    Chief Complaint / Interval History: Essential Tremor Evaluation     History of Present Illness (HPI):  Ms. Guerra is a 76 yo RH woman with longstanding ET. Her symptom onset was in 2000.   Has tremors involving her bilateral hands L>R, head and voice. She has also had many cervical spine surgeries and has a lot of residual neck pain.   Dad and brothers had tremors as well.   Uses decaf drinks but drinks 1.5 pots of coffee/day. She also smokes regularly. She is on O2 for COPD and emphysema.    No ETOH use.   No frequent falls. Has a walker for when she feels weak. Has some drops in blood pressure changes when she stands quickly in the past. This has improved with medication changes.   She does have sensory issues in her feet for which she takes GBP.   Goals of surgery would be to improve her quality of life. Her tremor makes it hard for her to eat, write and do her ADLs.     Current Mvmt Medications:  Trazodone  GBP 300mg QHS    Prior Mvmt Medication Trials:  Propranolol - dropped blood pressure  Primidone  Clonazepam     Nonmotor ROS:  Smell/Taste: no issues   Voice/Swallowing: vocal tremor, no issues with swallowing   Gait/Falls: some shuffling (has sensory neuropathy), no falls   Dizziness: with changes in position   Urinary Issues: takes myrbetric   Constipation: occasional   Sleep/RBD: no active dreams  Hallucinations/Peripheral Illusions: none  Memory/Cognition/Language: doing ok -- on oxygen for COPD and emphysema.  Mood: none    Past Medical History: The patient  has a past medical history of Allergy, Sterling's esophagus, Colon polyp, Diverticulitis, Diverticulosis, Fatty liver, GERD (gastroesophageal reflux disease), History of endoscopy (04/07/2021), cervical spine surgery (11/07/2018), Hyperlipidemia, Hypertension, Lupus (systemic lupus  erythematosus) (), Osteoarthritis, Osteopenia, Syncope, and Tremor ().    Relevant Surgical History:   Past Surgical History:   Procedure Laterality Date    ANGIOGRAM, CORONARY, WITH LEFT HEART CATHETERIZATION N/A 3/12/2024    Procedure: Angiogram, Coronary, with Left Heart Cath;  Surgeon: Fernando Valdivia MD;  Location: [facility] CATH/EP LAB;  Service: Cardiology;  Laterality: N/A;    APPENDECTOMY      removed during colon surgery    CERVICAL FUSION      CERVICAL SPINE SURGERY      Rods and screws.     SECTION      COLON SURGERY  2007    hemicolectomy    COLON SURGERY  2008    repair    COLONOSCOPY N/A 2018    Procedure: COLONOSCOPY;  Surgeon: Maverick Esquivel MD;  Location: [facility];  Service: Endoscopy;  Laterality: N/A; repeat in 5 years for surveillance    COLONOSCOPY N/A 2023    Procedure: COLONOSCOPY;  Surgeon: Uzair Cantor MD;  Location: [facility];  Service: Endoscopy;  Laterality: N/A;    CYSTOSCOPY N/A 2021    Procedure: CYSTOSCOPY;  Surgeon: Allison Miranda MD;  Location: [facility];  Service: Urology;  Laterality: N/A;    ESOPHAGOGASTRODUODENOSCOPY N/A 2019    Procedure: EGD (ESOPHAGOGASTRODUODENOSCOPY);  Surgeon: Monika Steele MD;  Location: [facility];  Service: Endoscopy;  Laterality: N/A;    ESOPHAGOGASTRODUODENOSCOPY  2019    with RFA and biopsies    ESOPHAGOGASTRODUODENOSCOPY N/A 2019    Procedure: EGD (ESOPHAGOGASTRODUODENOSCOPY);  Surgeon: Uzair Cantor MD;  Location: [facility];  Service: Endoscopy;  Laterality: N/A;  n+v with anesthesia    ESOPHAGOGASTRODUODENOSCOPY N/A 2019    Procedure: EGD (ESOPHAGOGASTRODUODENOSCOPY);  Surgeon: Uzair Cantor MD;  Location: [facility];  Service: Endoscopy;  Laterality: N/A;    ESOPHAGOGASTRODUODENOSCOPY N/A 10/7/2019    Procedure: EGD (ESOPHAGOGASTRODUODENOSCOPY);  Surgeon: Uzair Cantor MD;  Location: [facility];  Service: Endoscopy;  Laterality: N/A;    ESOPHAGOGASTRODUODENOSCOPY N/A  2/10/2020    Procedure: EGD (ESOPHAGOGASTRODUODENOSCOPY);  Surgeon: Uzair Cantor MD;  Location: [facility] ENDO;  Service: Endoscopy;  Laterality: N/A;  Requests earliest procedure time    ESOPHAGOGASTRODUODENOSCOPY N/A 8/17/2020    Procedure: EGD (ESOPHAGOGASTRODUODENOSCOPY);  Surgeon: Uzair Cantor MD;  Location: [facility] ENDO;  Service: Endoscopy;  Laterality: N/A;  BARRETTS    ESOPHAGOGASTRODUODENOSCOPY N/A 4/7/2021    Procedure: ESOPHAGOGASTRODUODENOSCOPY (EGD);  Surgeon: Uzair Cantor MD;  Location: [facility] ENDO;  Service: Endoscopy;  Laterality: N/A;  Needs Rapid Covid MP    ESOPHAGOGASTRODUODENOSCOPY N/A 11/30/2021    Procedure: EGD (ESOPHAGOGASTRODUODENOSCOPY);  Surgeon: Uzair Cantor MD;  Location: [facility] ENDO;  Service: Endoscopy;  Laterality: N/A;    ESOPHAGOGASTRODUODENOSCOPY N/A 6/27/2022    Procedure: EGD (ESOPHAGOGASTRODUODENOSCOPY);  Surgeon: Uzair Cantor MD;  Location: [facility];  Service: Endoscopy;  Laterality: N/A;  5/19/22: fully vaccinated. instructions mailed-SC    loop recorder only    ESOPHAGOGASTRODUODENOSCOPY N/A 8/7/2023    Procedure: EGD (ESOPHAGOGASTRODUODENOSCOPY);  Surgeon: Uzair Cantor MD;  Location: [facility];  Service: Endoscopy;  Laterality: N/A;    HYSTERECTOMY      ovaries remain    INSERTION OF IMPLANTABLE LOOP RECORDER N/A 7/17/2020    Procedure: INSERTION, IMPLANTABLE LOOP RECORDER (MEDTRONIC);  Surgeon: Karyna Vasques MD;  Location: [facility] CATH/EP LAB;  Service: Cardiology;  Laterality: N/A;    IVUS, CORONARY  3/12/2024    Procedure: IVUS, Coronary;  Surgeon: Fernando Valdivia MD;  Location: [facility] CATH/EP LAB;  Service: Cardiology;;    PERCUTANEOUS CORONARY INTERVENTION, ARTERY N/A 3/12/2024    Procedure: Percutaneous coronary intervention;  Surgeon: Fernando Valdivia MD;  Location: [facility] CATH/EP LAB;  Service: Cardiology;  Laterality: N/A;    SPLENECTOMY, TOTAL  2007    Injured during surgery and removed    UPPER GASTROINTESTINAL ENDOSCOPY  2007    GERD  "& hiatal hernia per patient report       Social History: The patient  reports that she has been smoking cigarettes. She has a 35 pack-year smoking history. She has been exposed to tobacco smoke. She has never used smokeless tobacco. She reports that she does not drink alcohol and does not use drugs.    Family History: Their family history includes Breast cancer in her maternal aunt; Cancer in her brother; Colon cancer in her father; Heart disease in her brother; Hyperlipidemia in her brother; Lung cancer in her brother, brother, and father; Lymphoma in her mother; Prostate cancer in her brother.Tremor in her brothers and mother.     Allergies: Doxycycline, Mirtazapine, Morphine, Lexapro [escitalopram oxalate], Primidone, Simvastatin, Beta-adrenergic agents, Metoprolol succinate, Prochlorperazine, Topiramate, Compazine [prochlorperazine edisylate], and Tramadol     Meds: Medications Ordered Prior to Encounter[1]    Exam:  BP (!) 143/79 (Patient Position: Sitting)   Pulse 69   Ht 5' 1" (1.549 m)   Wt 65.8 kg (145 lb)   BMI 27.40 kg/m²     Constitutional  Well-developed, well-nourished, appears stated age   Cardiovascular  No LE edema bilaterally   Neurological    * Mental status  MOCA = not done during today's visit     - Orientation  Oriented to conversation     - Memory   Intact recent and remote     - Attention/concentration  Attentive, vigilant during exam     - Language  Intact to conversation.     - Fund of knowledge  Aware of current events     - Executive  Well-organized thoughts     - Other     * Cranial nerves       - CN II  Pupils equal, visual fields full to confrontation     - CN III, IV, VI  Extraocular movements full, normal pursuits and saccades         - CN VII  Face strong and symmetric bilaterally     - CN VIII  Hearing intact bilaterally         - CN XI  SCM and trapezius 5/5 bilaterally       * Motor  Muscle bulk normal, strength 5/5 throughout       * Coordination  No dysmetria with " finger-to-nose    * Gait  See below.       * Specialized movement exam Gen: mildly masked facies and reduced blink   Speech: vocal tremor  Tremor: head tremor, right hand rest tremor, L>R action and postural tremor - slightly dystonic head posture  Bradykinesia: maybe some mild slowing on the right but mostly interruptions on the left   Tone: cogwheeling in the right arm   Gait: reduced armswing bilaterally, no rest tremor          Medical Record Review:  Labs, imaging and prior notes reviewed independently.       Diagnoses:          1. Essential tremor  Ambulatory referral/consult to Adult Neuropsychology    Ambulatory consult to Neurosurgery      2. Herniation of intervertebral disc of cervical region        3. Chronic bronchitis, unspecified chronic bronchitis type            Assessment:  Ms. Guerra is a 74 yo RH woman with cervical spine disease, COPD, tobacco abuse and longstanding ET who presents today for DBS evaluation. Her exam is consistently with ET. She does have some rest tremor and cog wheeling involving the right hand which warrants continued monitoring. Her goal of surgery would be to improve her quality of life as her tremor affects her ability to perform her ADLs and she has been intolerant of oral medication options.     We went over FUS and DBS today. I discussed with her my concerns about her ability to lay comfortably for surgery given her cervical spine pain, her ability to heal appropriately given her tobacco usage and her medical comorbidities such as COPD on O2. If a candidate, she would opt for R brain VIM first given that her left hand is more affected. She ideally would like bilateral stimulation.     Plan:  - NSGY evaluation  - NPT order placed.   - Cut back on caffeine and tobacco usage.   - Continue to monitor the right side.     The visit today is associated with current or anticipated ongoing medical care related to this patients complex condition. Patient should RTC in 6 months to  see me.     Total time: 60 minutes spent on the encounter, which includes face to face time and non-face to face time preparing to see the patient (eg, review of tests), Obtaining and/or reviewing separately obtained history, Documenting clinical information in the electronic or other health record, Independently interpreting results (not separately reported) and communicating results to the patient/family/caregiver, or Care coordination (not separately reported).     Shama Gambino MD  Division of Movement and Memory Disorders  [facility]  [phone]           [1]   Current Outpatient Medications on File Prior to Visit   Medication Sig Dispense Refill    albuterol (PROVENTIL) 2.5 mg /3 mL (0.083 %) nebulizer solution Take 3 mLs (2.5 mg total) by nebulization every 6 (six) hours as needed for Wheezing or Shortness of Breath. Rescue 75 mL 2    albuterol (VENTOLIN HFA) 90 mcg/actuation inhaler Inhale 2 puffs into the lungs every 4 (four) hours as needed. Rescue 18 g 11    aluminum & magnesium hydroxide-simethicone (MYLANTA MAX STRENGTH) 400-400-40 mg/5 mL suspension Take 5 mLs by mouth every 6 (six) hours as needed for Indigestion.       ascorbic acid, vitamin C, 500 mg Cap as directed Orally once daily      aspirin 81 MG Chew Take 81 mg by mouth once daily.      budesonide-formoterol 160-4.5 mcg (SYMBICORT) 160-4.5 mcg/actuation HFAA Inhale 2 puffs into the lungs every 12 (twelve) hours. Controller 30.6 g 11    calcitRIOL (ROCALTROL) 0.25 MCG Cap Take 1 capsule (0.25 mcg total) by mouth once daily. 90 capsule 3    calcium-vitamin D3 (OS-FIDE 500 + D3) 500 mg-5 mcg (200 unit) per tablet Take 1 tablet by mouth 2 (two) times daily with meals.      cefadroxil (DURICEF) 500 MG Cap Take 1 capsule (500 mg total) by mouth every 12 (twelve) hours. 14 capsule 0    cholecalciferol, vitamin D3, (VITAMIN D3) 50 mcg (2,000 unit) Cap capsule 1 capsule.      clobetasoL (TEMOVATE) 0.05 % cream Apply to feet daily  to bid prn flare 30 g 2    clonazePAM (KLONOPIN) 0.5 MG tablet TAKE 1/2 TABLET(0.25 MG) BY MOUTH TWICE DAILY AS NEEDED FOR ANXIETY OR INSOMNIA 30 tablet 3    dupilumab (DUPIXENT) 300 mg/2 mL Syrg Inject 2 mLs (300 mg total) into the skin every 14 (fourteen) days. 4 mL 11    famotidine (PEPCID) 20 MG tablet Take 20 mg by mouth 2 (two) times daily.      gabapentin (NEURONTIN) 300 MG capsule TAKE ONE CAPSULE BY MOUTH EVERY EVENING 90 capsule 3    amLODIPine (NORVASC) 5 MG tablet Take 1 tablet (5 mg total) by mouth once daily. 90 tablet 0    azelastine (ASTELIN) 137 mcg (0.1 %) nasal spray 1 SPRAY BY NASAL ROUTE TWICE DAILY 90 mL 0    esomeprazole (NEXIUM) 40 MG capsule TAKE ONE CAPSULE BY MOUTH TWICE DAILY (Patient not taking: Reported on 3/10/2025) 180 capsule 3    ezetimibe (ZETIA) 10 mg tablet Take 1 tablet (10 mg total) by mouth every evening. 90 tablet 3    fluticasone propionate (FLONASE) 50 mcg/actuation nasal spray 1 spray (50 mcg total) by Each Nostril route 2 (two) times daily. 48 g 0    mirabegron (MYRBETRIQ) 25 mg Tb24 ER tablet Take 1 tablet (25 mg total) by mouth once daily. 90 tablet 3    MYRBETRIQ 50 mg Tb24 Take 1 tablet by mouth.      nitroGLYCERIN (NITROSTAT) 0.4 MG SL tablet Place 0.4 mg under the tongue.      ofloxacin (OCUFLOX) 0.3 % ophthalmic solution Place 1 drop into the left eye 4 (four) times daily.      polyethylene glycol (GLYCOLAX) 17 gram PwPk Take 17 g by mouth daily as needed. (Patient not taking: Reported on 3/10/2025)      predniSONE (DELTASONE) 10 MG tablet Take one pill a day for three days, repeat for shortness of breath (Patient not taking: Reported on 3/10/2025) 18 tablet 0    pyridoxine, vitamin B6, (VITAMIN B-6) 100 MG Tab Take 50 mg by mouth once daily.      tiotropium bromide (SPIRIVA RESPIMAT) 2.5 mcg/actuation inhaler Inhale 2 puffs into the lungs Daily. Controller 12 g 3    traZODone (DESYREL) 50 MG tablet 1 tablet at bedtime as needed Orally Once a day for 30 days  (Patient not taking: Reported on 3/10/2025)      ZIRGAN 0.15 % Gel Place into both eyes. (Patient not taking: Reported on 3/10/2025)       Current Facility-Administered Medications on File Prior to Visit   Medication Dose Route Frequency Provider Last Rate Last Admin    sodium chloride 0.9% flush 2 mL  2 mL Intravenous Q6H PRN Aron Jaems MD

## 2025-04-11 DIAGNOSIS — J40 BRONCHITIS: ICD-10-CM

## 2025-04-11 DIAGNOSIS — J06.9 VIRAL URI WITH COUGH: ICD-10-CM

## 2025-04-11 RX ORDER — ALBUTEROL SULFATE 0.83 MG/ML
2.5 SOLUTION RESPIRATORY (INHALATION) EVERY 6 HOURS PRN
Qty: 75 ML | Refills: 2 | Status: SHIPPED | OUTPATIENT
Start: 2025-04-11 | End: 2026-04-11

## 2025-04-16 ENCOUNTER — HOSPITAL ENCOUNTER (OUTPATIENT)
Facility: HOSPITAL | Age: 75
Discharge: HOME HEALTH | End: 2025-04-17
Attending: STUDENT IN AN ORGANIZED HEALTH CARE EDUCATION/TRAINING PROGRAM | Admitting: STUDENT IN AN ORGANIZED HEALTH CARE EDUCATION/TRAINING PROGRAM
Payer: MEDICARE

## 2025-04-16 ENCOUNTER — OFFICE VISIT (OUTPATIENT)
Dept: NEUROLOGY | Facility: CLINIC | Age: 75
End: 2025-04-16
Payer: MEDICARE

## 2025-04-16 ENCOUNTER — TELEPHONE (OUTPATIENT)
Dept: NEUROSURGERY | Facility: CLINIC | Age: 75
End: 2025-04-16
Payer: MEDICARE

## 2025-04-16 DIAGNOSIS — I77.1 STENOSIS OF LEFT SUBCLAVIAN ARTERY: ICD-10-CM

## 2025-04-16 DIAGNOSIS — R07.9 CHEST PAIN: ICD-10-CM

## 2025-04-16 DIAGNOSIS — R53.81 DEBILITY: ICD-10-CM

## 2025-04-16 DIAGNOSIS — I65.21 INTERNAL CAROTID ARTERY OCCLUSION, RIGHT: ICD-10-CM

## 2025-04-16 DIAGNOSIS — R41.9 COGNITIVE COMPLAINTS: Primary | ICD-10-CM

## 2025-04-16 DIAGNOSIS — R29.818 ACUTE FOCAL NEUROLOGICAL DEFICIT: Primary | ICD-10-CM

## 2025-04-16 DIAGNOSIS — G25.0 ESSENTIAL TREMOR: ICD-10-CM

## 2025-04-16 DIAGNOSIS — R55 NEAR SYNCOPE: ICD-10-CM

## 2025-04-16 DIAGNOSIS — I65.02 OCCLUSION OF LEFT VERTEBRAL ARTERY: ICD-10-CM

## 2025-04-16 DIAGNOSIS — R06.02 SHORTNESS OF BREATH: ICD-10-CM

## 2025-04-16 DIAGNOSIS — R55 SYNCOPE, NEAR: ICD-10-CM

## 2025-04-16 LAB
ABSOLUTE EOSINOPHIL (SMH): 0.01 K/UL
ABSOLUTE MONOCYTE (SMH): 1.23 K/UL (ref 0.3–1)
ABSOLUTE NEUTROPHIL COUNT (SMH): 13 K/UL (ref 1.8–7.7)
ALBUMIN SERPL-MCNC: 4 G/DL (ref 3.5–5.2)
ALP SERPL-CCNC: 62 UNIT/L (ref 55–135)
ALT SERPL-CCNC: 11 UNIT/L (ref 10–44)
AMPHET UR QL SCN: NEGATIVE
ANION GAP (SMH): 6 MMOL/L (ref 8–16)
AST SERPL-CCNC: 14 UNIT/L (ref 10–40)
BARBITURATE SCN PRESENT UR: NEGATIVE
BASOPHILS # BLD AUTO: 0.11 K/UL
BASOPHILS NFR BLD AUTO: 0.7 %
BENZODIAZ UR QL SCN: NEGATIVE
BILIRUB SERPL-MCNC: 0.3 MG/DL (ref 0.1–1)
BILIRUB UR QL STRIP.AUTO: NEGATIVE
BNP SERPL-MCNC: 25 PG/ML
BUN SERPL-MCNC: 13 MG/DL (ref 8–23)
CALCIUM SERPL-MCNC: 9.5 MG/DL (ref 8.7–10.5)
CANNABINOIDS UR QL SCN: NEGATIVE
CHLORIDE SERPL-SCNC: 102 MMOL/L (ref 95–110)
CHOLEST SERPL-MCNC: 186 MG/DL (ref 120–199)
CHOLEST/HDLC SERPL: 3.3 {RATIO} (ref 2–5)
CLARITY UR: CLEAR
CO2 SERPL-SCNC: 26 MMOL/L (ref 23–29)
COCAINE UR QL SCN: NEGATIVE
COLOR UR AUTO: COLORLESS
CREAT SERPL-MCNC: 0.7 MG/DL (ref 0.5–1.4)
CREAT SERPL-MCNC: 0.8 MG/DL (ref 0.5–1.4)
CREAT UR-MCNC: 15.3 MG/DL (ref 15–325)
ERYTHROCYTE [DISTWIDTH] IN BLOOD BY AUTOMATED COUNT: 14.1 % (ref 11.5–14.5)
GFR SERPLBLD CREATININE-BSD FMLA CKD-EPI: >60 ML/MIN/1.73/M2
GLUCOSE SERPL-MCNC: 102 MG/DL (ref 70–110)
GLUCOSE UR QL STRIP: NEGATIVE
HCT VFR BLD AUTO: 38.3 % (ref 37–48.5)
HCV AB SERPL QL IA: NORMAL
HDLC SERPL-MCNC: 56 MG/DL (ref 40–75)
HDLC SERPL: 30.1 % (ref 20–50)
HGB BLD-MCNC: 12.7 GM/DL (ref 12–16)
HGB UR QL STRIP: NEGATIVE
HIV 1+2 AB+HIV1 P24 AG SERPL QL IA: NORMAL
IMM GRANULOCYTES # BLD AUTO: 0.1 K/UL (ref 0–0.04)
IMM GRANULOCYTES NFR BLD AUTO: 0.6 % (ref 0–0.5)
INFLUENZA A MOLECULAR (OHS): NEGATIVE
INFLUENZA B MOLECULAR (OHS): NEGATIVE
INR PPP: 1 (ref 0.8–1.2)
KETONES UR QL STRIP: NEGATIVE
LDLC SERPL CALC-MCNC: 119.2 MG/DL (ref 63–159)
LEUKOCYTE ESTERASE UR QL STRIP: NEGATIVE
LYMPHOCYTES # BLD AUTO: 2.15 K/UL (ref 1–4.8)
MAGNESIUM SERPL-MCNC: 2 MG/DL (ref 1.6–2.6)
MCH RBC QN AUTO: 31.4 PG (ref 27–31)
MCHC RBC AUTO-ENTMCNC: 33.2 G/DL (ref 32–36)
MCV RBC AUTO: 95 FL (ref 82–98)
NITRITE UR QL STRIP: NEGATIVE
NONHDLC SERPL-MCNC: 130 MG/DL
NUCLEATED RBC (/100WBC) (SMH): 0 /100 WBC
OPIATES UR QL SCN: NEGATIVE
PCP UR QL: NEGATIVE
PH UR STRIP: 8 [PH]
PLATELET # BLD AUTO: 314 K/UL (ref 150–450)
PMV BLD AUTO: 9.3 FL (ref 9.2–12.9)
POTASSIUM SERPL-SCNC: 3.9 MMOL/L (ref 3.5–5.1)
PROT SERPL-MCNC: 7.5 GM/DL (ref 6–8.4)
PROT UR QL STRIP: NEGATIVE
PROTHROMBIN TIME: 10.7 SECONDS (ref 9–12.5)
RBC # BLD AUTO: 4.04 M/UL (ref 4–5.4)
RELATIVE EOSINOPHIL (SMH): 0.1 % (ref 0–8)
RELATIVE LYMPHOCYTE (SMH): 12.9 % (ref 18–48)
RELATIVE MONOCYTE (SMH): 7.4 % (ref 4–15)
RELATIVE NEUTROPHIL (SMH): 78.3 % (ref 38–73)
SAMPLE: NORMAL
SARS-COV-2 RDRP RESP QL NAA+PROBE: NEGATIVE
SODIUM SERPL-SCNC: 134 MMOL/L (ref 136–145)
SP GR UR STRIP: >=1.03
TRIGL SERPL-MCNC: 54 MG/DL (ref 30–150)
TROPONIN HIGH SENSITIVE (SMH): 4.4 PG/ML
TSH SERPL-ACNC: 2.36 UIU/ML (ref 0.4–4)
UROBILINOGEN UR STRIP-ACNC: NEGATIVE EU/DL
WBC # BLD AUTO: 16.62 K/UL (ref 3.9–12.7)

## 2025-04-16 PROCEDURE — 83880 ASSAY OF NATRIURETIC PEPTIDE: CPT

## 2025-04-16 PROCEDURE — 84484 ASSAY OF TROPONIN QUANT: CPT

## 2025-04-16 PROCEDURE — 83735 ASSAY OF MAGNESIUM: CPT

## 2025-04-16 PROCEDURE — 80307 DRUG TEST PRSMV CHEM ANLYZR: CPT

## 2025-04-16 PROCEDURE — 84443 ASSAY THYROID STIM HORMONE: CPT | Performed by: STUDENT IN AN ORGANIZED HEALTH CARE EDUCATION/TRAINING PROGRAM

## 2025-04-16 PROCEDURE — 80061 LIPID PANEL: CPT | Performed by: STUDENT IN AN ORGANIZED HEALTH CARE EDUCATION/TRAINING PROGRAM

## 2025-04-16 PROCEDURE — G0378 HOSPITAL OBSERVATION PER HR: HCPCS

## 2025-04-16 PROCEDURE — 63600175 PHARM REV CODE 636 W HCPCS: Performed by: STUDENT IN AN ORGANIZED HEALTH CARE EDUCATION/TRAINING PROGRAM

## 2025-04-16 PROCEDURE — 93005 ELECTROCARDIOGRAM TRACING: CPT | Performed by: GENERAL PRACTICE

## 2025-04-16 PROCEDURE — 85025 COMPLETE CBC W/AUTO DIFF WBC: CPT

## 2025-04-16 PROCEDURE — 96374 THER/PROPH/DIAG INJ IV PUSH: CPT

## 2025-04-16 PROCEDURE — 84520 ASSAY OF UREA NITROGEN: CPT

## 2025-04-16 PROCEDURE — U0002 COVID-19 LAB TEST NON-CDC: HCPCS

## 2025-04-16 PROCEDURE — 87389 HIV-1 AG W/HIV-1&-2 AB AG IA: CPT | Performed by: EMERGENCY MEDICINE

## 2025-04-16 PROCEDURE — 86803 HEPATITIS C AB TEST: CPT | Performed by: EMERGENCY MEDICINE

## 2025-04-16 PROCEDURE — 85610 PROTHROMBIN TIME: CPT | Performed by: STUDENT IN AN ORGANIZED HEALTH CARE EDUCATION/TRAINING PROGRAM

## 2025-04-16 PROCEDURE — 81003 URINALYSIS AUTO W/O SCOPE: CPT

## 2025-04-16 PROCEDURE — 25000003 PHARM REV CODE 250

## 2025-04-16 PROCEDURE — G0547 PR INTERPROF TELEPHONE/INTERNET/EHR ASSESS, CONSULT/REVIEW 11-20 MIN: HCPCS | Mod: ,,, | Performed by: STUDENT IN AN ORGANIZED HEALTH CARE EDUCATION/TRAINING PROGRAM

## 2025-04-16 PROCEDURE — 93010 ELECTROCARDIOGRAM REPORT: CPT | Mod: ,,, | Performed by: GENERAL PRACTICE

## 2025-04-16 PROCEDURE — 82565 ASSAY OF CREATININE: CPT

## 2025-04-16 PROCEDURE — 87502 INFLUENZA DNA AMP PROBE: CPT

## 2025-04-16 PROCEDURE — 25500020 PHARM REV CODE 255: Performed by: STUDENT IN AN ORGANIZED HEALTH CARE EDUCATION/TRAINING PROGRAM

## 2025-04-16 PROCEDURE — 99285 EMERGENCY DEPT VISIT HI MDM: CPT | Mod: 25

## 2025-04-16 RX ORDER — NAPROXEN SODIUM 220 MG/1
81 TABLET, FILM COATED ORAL DAILY
Status: DISCONTINUED | OUTPATIENT
Start: 2025-04-17 | End: 2025-04-17 | Stop reason: HOSPADM

## 2025-04-16 RX ORDER — AMOXICILLIN 250 MG
1 CAPSULE ORAL 2 TIMES DAILY PRN
Status: DISCONTINUED | OUTPATIENT
Start: 2025-04-16 | End: 2025-04-17 | Stop reason: HOSPADM

## 2025-04-16 RX ORDER — TRAZODONE HYDROCHLORIDE 50 MG/1
50 TABLET ORAL NIGHTLY PRN
Status: DISCONTINUED | OUTPATIENT
Start: 2025-04-16 | End: 2025-04-17 | Stop reason: HOSPADM

## 2025-04-16 RX ORDER — LANOLIN ALCOHOL/MO/W.PET/CERES
800 CREAM (GRAM) TOPICAL
Status: DISCONTINUED | OUTPATIENT
Start: 2025-04-16 | End: 2025-04-17 | Stop reason: HOSPADM

## 2025-04-16 RX ORDER — IPRATROPIUM BROMIDE AND ALBUTEROL SULFATE 2.5; .5 MG/3ML; MG/3ML
3 SOLUTION RESPIRATORY (INHALATION) EVERY 6 HOURS PRN
Status: DISCONTINUED | OUTPATIENT
Start: 2025-04-16 | End: 2025-04-17 | Stop reason: HOSPADM

## 2025-04-16 RX ORDER — SODIUM CHLORIDE 0.9 % (FLUSH) 0.9 %
10 SYRINGE (ML) INJECTION
Status: DISCONTINUED | OUTPATIENT
Start: 2025-04-16 | End: 2025-04-17 | Stop reason: HOSPADM

## 2025-04-16 RX ORDER — PSYLLIUM HUSK 0.4 G
1 CAPSULE ORAL 2 TIMES DAILY WITH MEALS
Status: DISCONTINUED | OUTPATIENT
Start: 2025-04-17 | End: 2025-04-17 | Stop reason: HOSPADM

## 2025-04-16 RX ORDER — ALUMINUM HYDROXIDE, MAGNESIUM HYDROXIDE, AND SIMETHICONE 1200; 120; 1200 MG/30ML; MG/30ML; MG/30ML
30 SUSPENSION ORAL 4 TIMES DAILY PRN
Status: DISCONTINUED | OUTPATIENT
Start: 2025-04-16 | End: 2025-04-17 | Stop reason: HOSPADM

## 2025-04-16 RX ORDER — BUDESONIDE 0.5 MG/2ML
0.5 INHALANT ORAL EVERY 12 HOURS
Status: DISCONTINUED | OUTPATIENT
Start: 2025-04-17 | End: 2025-04-17 | Stop reason: HOSPADM

## 2025-04-16 RX ORDER — LANOLIN ALCOHOL/MO/W.PET/CERES
50 CREAM (GRAM) TOPICAL DAILY
Status: DISCONTINUED | OUTPATIENT
Start: 2025-04-17 | End: 2025-04-17 | Stop reason: HOSPADM

## 2025-04-16 RX ORDER — ARFORMOTEROL TARTRATE 15 UG/2ML
15 SOLUTION RESPIRATORY (INHALATION) 2 TIMES DAILY
Status: DISCONTINUED | OUTPATIENT
Start: 2025-04-17 | End: 2025-04-17 | Stop reason: HOSPADM

## 2025-04-16 RX ORDER — OXYBUTYNIN CHLORIDE 5 MG/1
10 TABLET, EXTENDED RELEASE ORAL DAILY
Status: DISCONTINUED | OUTPATIENT
Start: 2025-04-17 | End: 2025-04-17 | Stop reason: HOSPADM

## 2025-04-16 RX ORDER — IPRATROPIUM BROMIDE 0.5 MG/2.5ML
0.5 SOLUTION RESPIRATORY (INHALATION) EVERY 6 HOURS
Status: DISCONTINUED | OUTPATIENT
Start: 2025-04-17 | End: 2025-04-17 | Stop reason: HOSPADM

## 2025-04-16 RX ORDER — IBUPROFEN 200 MG
24 TABLET ORAL
Status: DISCONTINUED | OUTPATIENT
Start: 2025-04-16 | End: 2025-04-17 | Stop reason: HOSPADM

## 2025-04-16 RX ORDER — ONDANSETRON HYDROCHLORIDE 2 MG/ML
4 INJECTION, SOLUTION INTRAVENOUS EVERY 6 HOURS PRN
Status: DISCONTINUED | OUTPATIENT
Start: 2025-04-16 | End: 2025-04-17 | Stop reason: HOSPADM

## 2025-04-16 RX ORDER — PANTOPRAZOLE SODIUM 40 MG/1
40 TABLET, DELAYED RELEASE ORAL
Status: DISCONTINUED | OUTPATIENT
Start: 2025-04-17 | End: 2025-04-17 | Stop reason: HOSPADM

## 2025-04-16 RX ORDER — NALOXONE HCL 0.4 MG/ML
0.02 VIAL (ML) INJECTION
Status: DISCONTINUED | OUTPATIENT
Start: 2025-04-16 | End: 2025-04-17 | Stop reason: HOSPADM

## 2025-04-16 RX ORDER — GABAPENTIN 300 MG/1
300 CAPSULE ORAL NIGHTLY
Status: DISCONTINUED | OUTPATIENT
Start: 2025-04-16 | End: 2025-04-17 | Stop reason: HOSPADM

## 2025-04-16 RX ORDER — SODIUM,POTASSIUM PHOSPHATES 280-250MG
2 POWDER IN PACKET (EA) ORAL
Status: DISCONTINUED | OUTPATIENT
Start: 2025-04-16 | End: 2025-04-17 | Stop reason: HOSPADM

## 2025-04-16 RX ORDER — ENOXAPARIN SODIUM 100 MG/ML
40 INJECTION SUBCUTANEOUS EVERY 24 HOURS
Status: DISCONTINUED | OUTPATIENT
Start: 2025-04-17 | End: 2025-04-17 | Stop reason: HOSPADM

## 2025-04-16 RX ORDER — AMLODIPINE BESYLATE 5 MG/1
5 TABLET ORAL DAILY
Status: DISCONTINUED | OUTPATIENT
Start: 2025-04-17 | End: 2025-04-17 | Stop reason: HOSPADM

## 2025-04-16 RX ORDER — EZETIMIBE 10 MG/1
10 TABLET ORAL NIGHTLY
Status: DISCONTINUED | OUTPATIENT
Start: 2025-04-17 | End: 2025-04-17 | Stop reason: HOSPADM

## 2025-04-16 RX ORDER — ASCORBIC ACID 500 MG
500 TABLET ORAL DAILY
Status: DISCONTINUED | OUTPATIENT
Start: 2025-04-17 | End: 2025-04-17 | Stop reason: HOSPADM

## 2025-04-16 RX ORDER — ACETAMINOPHEN 325 MG/1
650 TABLET ORAL EVERY 4 HOURS PRN
Status: DISCONTINUED | OUTPATIENT
Start: 2025-04-16 | End: 2025-04-17 | Stop reason: HOSPADM

## 2025-04-16 RX ORDER — BUDESONIDE AND FORMOTEROL FUMARATE DIHYDRATE 160; 4.5 UG/1; UG/1
2 AEROSOL RESPIRATORY (INHALATION) EVERY 12 HOURS
Status: DISCONTINUED | OUTPATIENT
Start: 2025-04-16 | End: 2025-04-16

## 2025-04-16 RX ORDER — ONDANSETRON 4 MG/1
4 TABLET, ORALLY DISINTEGRATING ORAL
Status: DISCONTINUED | OUTPATIENT
Start: 2025-04-16 | End: 2025-04-16

## 2025-04-16 RX ORDER — IBUPROFEN 200 MG
16 TABLET ORAL
Status: DISCONTINUED | OUTPATIENT
Start: 2025-04-16 | End: 2025-04-17 | Stop reason: HOSPADM

## 2025-04-16 RX ORDER — ONDANSETRON HYDROCHLORIDE 2 MG/ML
4 INJECTION, SOLUTION INTRAVENOUS
Status: COMPLETED | OUTPATIENT
Start: 2025-04-16 | End: 2025-04-16

## 2025-04-16 RX ORDER — TALC
9 POWDER (GRAM) TOPICAL NIGHTLY PRN
Status: DISCONTINUED | OUTPATIENT
Start: 2025-04-16 | End: 2025-04-17 | Stop reason: HOSPADM

## 2025-04-16 RX ORDER — OXYBUTYNIN CHLORIDE 5 MG/1
5 TABLET ORAL DAILY
COMMUNITY

## 2025-04-16 RX ORDER — ASPIRIN 325 MG
325 TABLET, DELAYED RELEASE (ENTERIC COATED) ORAL ONCE
Status: COMPLETED | OUTPATIENT
Start: 2025-04-16 | End: 2025-04-16

## 2025-04-16 RX ORDER — GLUCAGON 1 MG
1 KIT INJECTION
Status: DISCONTINUED | OUTPATIENT
Start: 2025-04-16 | End: 2025-04-17 | Stop reason: HOSPADM

## 2025-04-16 RX ADMIN — ASPIRIN 325 MG: 325 TABLET, COATED ORAL at 10:04

## 2025-04-16 RX ADMIN — TRAZODONE HYDROCHLORIDE 50 MG: 50 TABLET ORAL at 10:04

## 2025-04-16 RX ADMIN — IOHEXOL 100 ML: 350 INJECTION, SOLUTION INTRAVENOUS at 07:04

## 2025-04-16 RX ADMIN — ONDANSETRON 4 MG: 2 INJECTION INTRAMUSCULAR; INTRAVENOUS at 07:04

## 2025-04-16 RX ADMIN — ACETAMINOPHEN 650 MG: 325 TABLET ORAL at 10:04

## 2025-04-16 RX ADMIN — GABAPENTIN 300 MG: 300 CAPSULE ORAL at 10:04

## 2025-04-16 NOTE — PROGRESS NOTES
NEUROPSYCHOLOGICAL EVALUATION - CONFIDENTIAL    Referring Provider: Shama Gambino MD   Medical Necessity: Evaluate cognitive and emotional functioning, participate in treatment planning/management, and provide supportive therapy to help assess candidacy for deep brain stimulation treatment in the setting of essential tremor  Date Conducted: 2025  Present At Visit: the patient   Billin/33939 = 31 minutes  Referral Diagnoses: G25.0 (ICD-10-CM) - Essential tremor   Consent: The patient expressed an understanding of the purpose of the evaluation and consented to all procedures. We discussed the limits of confidentiality and discussed an emergency plan.    Telemedicine Details:   Audio Only Telehealth Visit   The patient location is: [place]  The chief complaint leading to consultation is: essential tremor  Visit type: Virtual visit with audio only (telephone)  Total time spent in medical discussion with patient: 31 minutes  Total time spent on date of the encounter:32 minutes   The reason for the audio only service rather than synchronous audio and video virtual visit was related to technical difficulties or patient preference/necessity.  Each patient to whom I provide medical services by telemedicine is: (1) informed of the relationship between the physician and patient and the respective role of any other health care provider with respect to management of the patient; and (2) notified that they may decline to receive medical services by telemedicine and may withdraw from such care at any time. Patient verbally consented to receive this service via voice-only telephone call.    ASSESSMENT & PLAN   Ms. Kristin Guerra is an 75 y.o., right-handed,   female with 12 years of education who was referred for a neuropsychological evaluation to help assess candidacy for deep brain stimulation treatment (DBS) in the setting of essential tremor.     The following factors should be considered when  planning/discussing surgical candidacy:    Procedure Understanding/Capacity  No issues/concerns regarding ability to understand and/or consent to treatment.    Ability to Manage Pre/Sandra/Post Operative Treatment  No concerns. Good support system to help manage.      Cognitive Functioning  Wants to wait and schedule testing after she meets with Dr. eVra tomorrow.   Provided with my direct phone number.     TBD risk for post-surgical cognitive decline    Psychiatric/Neuropsychiatric Considerations    1. Cognitive complaints    2. Essential tremor      Thank you for allowing me to assist in Ms. Kristin Guerra's care. If you have any questions, please contact me at [phone].      Carey Crain, Ph.D., ABPP  Board Certified in Clinical Neuropsychology  [facility]     CLINICAL INTERVIEW & RECORD REVIEW     Expectations for Surgery   Wants her hands and neck to stop so she is not spilling things, dropping things. Normal things.     Cognitive Functioning   Cognitive screener: none  Previous evaluation(s): none  Onset & course of difficulty: Not really noticing any changes. A little bit of change in her thinking speed, particularly when she is not wearing her hearing aids.        Daily Functioning   ADLs:    Bathing: Independent and without difficulty  Dressing: Independent and without difficulty  Grooming: Independent and without difficulty   Toileting: Independent and without difficulty  Transferring: Independent and without difficulty.  Eating: Independent and without difficulty.   IADLs:    Finances: Independent and without difficulty  Medication Mgmt: Independent and without difficulty  Driving: Independent and without difficulty  Household Mgmt: Independent and without difficulty Cooking/Meal Preparation: Independent and without difficulty.  Shopping: Independent and without difficulty.  Appointment Mgmt: Independent and without difficulty    "    Psychiatric/Neuropsychiatric Symptoms   Mood: "okay"  Depression: gets lonesome but not depressed   Linda/Hypomania: no  Anxiety/Stress: not really   Social Withdrawal: no  Neurovegetative Sxs:  Appetite: stable  Sleep: takes trazodone, some nights are good, some nights are bad. 6 or 7 hours on a good night, bad night is 4 or 5 hours. Sometimes won't be able to fall asleep until 3 or 4 AM. No sleep apnea, not acting out dreams  Energy: tires quickly but does have COPD which contributes to this    Hallucinations: no  Delusional/Paranoid Thinking: no  Impulsivity/Compulsivity: no  Substance Over-use: no   Sexual Behaviors: no  Shopping/Spending: no   Compulsive Eating: no  Gambling: no  Obsessive/Compulsive Behaviors: no  Disinhibition: no  Irritability/Agitation: no  Aggression: no  Apathy/Indifference: no  Other changes in personality: no     Physical Functioning   Tremor: yes  Difficulty walking: no but does have a walker  Imbalance: no  Falls: no, fell one time getting out of the shower last year   Weakness: no  Trouble with fine motor movements: yes   Lightheadedness: no  Urinary urgency: no  Sensory Sxs: She does have sensory issues in her feet for which she takes GBP.  Wears hearing aids when she goes out  Pain: well, her neck - has had 4 surgeries on her neck because of her tremors. Needs to watch what she does otherwise will get a pain doing up her scalp. So she is hoping surgery will then cause her pain to ease    ELEVANT HISTORY  This patient has a past medical history of Allergy, Sterling's esophagus, Colon polyp, Diverticulitis, Diverticulosis, Fatty liver, GERD (gastroesophageal reflux disease), History of endoscopy (04/07/2021), cervical spine surgery (11/07/2018), Hyperlipidemia, Hypertension, Lupus (systemic lupus erythematosus) (1999), Osteoarthritis, Osteopenia, Syncope, and Tremor (2005).    Past Surgical History:   Procedure Laterality Date    ANGIOGRAM, CORONARY, WITH LEFT HEART " CATHETERIZATION N/A 3/12/2024    Procedure: Angiogram, Coronary, with Left Heart Cath;  Surgeon: Fernando Valdivia MD;  Location: [facility] CATH/EP LAB;  Service: Cardiology;  Laterality: N/A;    APPENDECTOMY      removed during colon surgery    CERVICAL FUSION      CERVICAL SPINE SURGERY      Rods and screws.     SECTION      COLON SURGERY      hemicolectomy    COLON SURGERY  2008    repair    COLONOSCOPY N/A 2018    Procedure: COLONOSCOPY;  Surgeon: Maverick Esquivel MD;  Location: [facility];  Service: Endoscopy;  Laterality: N/A; repeat in 5 years for surveillance    COLONOSCOPY N/A 2023    Procedure: COLONOSCOPY;  Surgeon: Uzair Cantor MD;  Location: [facility];  Service: Endoscopy;  Laterality: N/A;    CYSTOSCOPY N/A 2021    Procedure: CYSTOSCOPY;  Surgeon: Allison Miranda MD;  Location: [facility] OR;  Service: Urology;  Laterality: N/A;    ESOPHAGOGASTRODUODENOSCOPY N/A 2019    Procedure: EGD (ESOPHAGOGASTRODUODENOSCOPY);  Surgeon: Monika Steele MD;  Location: [facility];  Service: Endoscopy;  Laterality: N/A;    ESOPHAGOGASTRODUODENOSCOPY  2019    with RFA and biopsies    ESOPHAGOGASTRODUODENOSCOPY N/A 2019    Procedure: EGD (ESOPHAGOGASTRODUODENOSCOPY);  Surgeon: Uzair Cantor MD;  Location: [facility];  Service: Endoscopy;  Laterality: N/A;  n+v with anesthesia    ESOPHAGOGASTRODUODENOSCOPY N/A 2019    Procedure: EGD (ESOPHAGOGASTRODUODENOSCOPY);  Surgeon: Uzair Cantor MD;  Location: [facility];  Service: Endoscopy;  Laterality: N/A;    ESOPHAGOGASTRODUODENOSCOPY N/A 10/7/2019    Procedure: EGD (ESOPHAGOGASTRODUODENOSCOPY);  Surgeon: Uzair Cantor MD;  Location: [facility];  Service: Endoscopy;  Laterality: N/A;    ESOPHAGOGASTRODUODENOSCOPY N/A 2/10/2020    Procedure: EGD (ESOPHAGOGASTRODUODENOSCOPY);  Surgeon: Uzair Cantor MD;  Location: [facility] ENDO;  Service: Endoscopy;  Laterality: N/A;  Requests earliest procedure time     ESOPHAGOGASTRODUODENOSCOPY N/A 8/17/2020    Procedure: EGD (ESOPHAGOGASTRODUODENOSCOPY);  Surgeon: Uzair Cantor MD;  Location: [facility];  Service: Endoscopy;  Laterality: N/A;  BARRETTS    ESOPHAGOGASTRODUODENOSCOPY N/A 4/7/2021    Procedure: ESOPHAGOGASTRODUODENOSCOPY (EGD);  Surgeon: Uzair Cantor MD;  Location: [facility];  Service: Endoscopy;  Laterality: N/A;  Needs Rapid Covid MP    ESOPHAGOGASTRODUODENOSCOPY N/A 11/30/2021    Procedure: EGD (ESOPHAGOGASTRODUODENOSCOPY);  Surgeon: Uzair Cantor MD;  Location: [facility];  Service: Endoscopy;  Laterality: N/A;    ESOPHAGOGASTRODUODENOSCOPY N/A 6/27/2022    Procedure: EGD (ESOPHAGOGASTRODUODENOSCOPY);  Surgeon: Uzair Cantor MD;  Location: [facility];  Service: Endoscopy;  Laterality: N/A;  5/19/22: fully vaccinated. instructions mailed-SC    loop recorder only    ESOPHAGOGASTRODUODENOSCOPY N/A 8/7/2023    Procedure: EGD (ESOPHAGOGASTRODUODENOSCOPY);  Surgeon: Uzair Cantor MD;  Location: [facility];  Service: Endoscopy;  Laterality: N/A;    HYSTERECTOMY      ovaries remain    INSERTION OF IMPLANTABLE LOOP RECORDER N/A 7/17/2020    Procedure: INSERTION, IMPLANTABLE LOOP RECORDER (MEDTRONIC);  Surgeon: Karyna Vasques MD;  Location: [facility] CATH/EP LAB;  Service: Cardiology;  Laterality: N/A;    IVUS, CORONARY  3/12/2024    Procedure: IVUS, Coronary;  Surgeon: Fernando Valdivia MD;  Location: [facility] CATH/EP LAB;  Service: Cardiology;;    PERCUTANEOUS CORONARY INTERVENTION, ARTERY N/A 3/12/2024    Procedure: Percutaneous coronary intervention;  Surgeon: Fernando Valdivia MD;  Location: [facility] CATH/EP LAB;  Service: Cardiology;  Laterality: N/A;    SPLENECTOMY, TOTAL  2007    Injured during surgery and removed    UPPER GASTROINTESTINAL ENDOSCOPY  2007    GERD & hiatal hernia per patient report     Neurological History    Headaches/Migraines: no  TBI: no  Seizures: no  Stroke: no  Tumor: no   Previous Episodes of Delirium: no  Movement Disorder:  essential tremor   CNS Infection: no  Other: no         Neurodiagnostics     Results for orders placed or performed during the hospital encounter of 10/10/23   CT Head Without Contrast    Narrative    CMS MANDATED QUALITY DATA-CT RADIATION DOSE-436  All CT scans at this facility dose modulation, iterative reconstruction, and or weight-based dosing when appropriate to reduce radiation dose to as low as reasonably achievable.    HISTORY: Head trauma, minor (Age >= 65y)  weakness and nausea.    FINDINGS: Comparison to multiple prior exams. There is no acute intracranial hemorrhage, with no mass effect or abnormal extra-axial fluid. Mild scattered hypoattenuating foci involve the white matter, with gray-white differentiation maintained. Right basal ganglia hypodensity suggesting remote infarct is unchanged.    There is mild generalized prominence of the cortical sulci and ventricles. The cerebellum and brainstem are unremarkable. The visualized paranasal sinuses and mastoid air cells are clear. There is no acute calvarial fracture or scalp hematoma.    IMPRESSION:  1. No acute intracranial abnormality.  2. Stable mild chronic microvascular ischemic changes in the white matter, and remote lacunar infarct in the right basal ganglia.    Electronically signed by:  Александр Diaz MD  10/10/2023 01:01 PM CDT Workstation: [id number]   Results for orders placed or performed during the hospital encounter of 08/02/21   MRI Brain Without Contrast    Narrative    MRI OF THE BRAIN WITHOUT CONTRAST    CLINICAL HISTORY:  71 years (1950) Female z86.73 Tremors x 20 yrs, severe, worsening.; No hx Surgery; No Hx CVA; No hx Trauma; No Hx CA    TECHNIQUE:  MR BRAIN WITHOUT IV CONTRAST, MR BRAIN ANGIOGRAPHY WITHOUT IV CONTRAST. 200 images obtained. Multiplanar multisequence MRI of the brain was performed.    CONTRAST:  No contrast was administered.    COMPARISON:  None available.    FINDINGS:  The overall there is very mild scattered  periventricular deep cerebral white matter T2 FLAIR hyperintensity, a nonspecific finding in this age group which can be seen in any diffuse white matter process, but one which is most commonly associated with mild small vessel ischemic disease.    There is a rounded defect in the right basal ganglia without adjacent white matter change, most compatible with a prominent perivascular Virchow-Lisandro space, and less likely a remote lacunar infarct.    There is no cerebral or cerebellar atrophy. Diffusion imaging does not indicate acute or recent stroke. No bleed is evident on the gradient reversal images. Basal and suprasellar cisterns are patent. The pituitary gland and infundibulum is normal in size without abnormal signal pattern. The craniocervical junction is unremarkable. The temporal bones, internal and external meati, and semicircular canals appear normal. The orbital contents are normal. There is trace fluid in the inferior mastoid air cells bilaterally. There is trace fluid in the paranasal sinuses.    IMPRESSION:  1. No finding to suggest an acute infarct or intracranial bleed.  2. Mild chronic/involutional findings as noted above.          MRA OF THE BRAIN WITHOUT CONTRAST    CMS MANDATED QUALITY DATA - CAROTID - 195    All measurements and percent stenosis described below were determined using NASCET criteria or criteria similar to NASCET, as defined by the Society of Radiologists in Ultrasound Consensus Conference, Radiology, 2003    CLINICAL HISTORY:  71 years (1950) Female z86.73 Tremors x 20 yrs, severe, worsening.; No hx Surgery; No Hx CVA; No hx Trauma; No Hx CA    TECHNIQUE:  MR BRAIN WITHOUT IV CONTRAST, MR BRAIN ANGIOGRAPHY WITHOUT IV CONTRAST. Noncontrasted, time of flight images were obtained with MIP and 3-D reconstructions at a separate workstation to evaluate the intracranial arterial circulation.    COMPARISON:  None available.    FINDINGS:  Evaluation of the anterior intracranial  arterial circulation demonstrates the intracranial portions of the internal carotid arteries to be normal and symmetric. M1 and M2 segments of the middle cerebral arteries are unremarkable. The A1 segments of the anterior cerebral arteries appears normal. The more distal segments of the ZULMA and MCA are symmetric and within normal limits.    Evaluation of the posterior intracranial arterial circulation demonstrates the distal vertebral arteries to be patent and symmetric. The basal artery is normal in course and caliber with no evidence of aneurysmal dilatation or focal stenosis. Cerebellar and right posterior cerebral arteries arise from the basilar artery and appear normal and symmetric. The left posterior communicating artery is patent and appears to make up the dominant arterial supply to the left posterior cerebral artery with nonvisualization the left P1 segment in keeping with persistent fetal circulation a congenital variant.    There is no intracranial aneurysm. Please note MRA is less sensitive for those aneurysms under 5 mm.    IMPRESSION:  Unremarkable MR angiography of the brain.              Electronically signed by:  Gage Benson MD  8/2/2021 9:43 AM CDT Workstation: [id number]   Results for orders placed or performed during the hospital encounter of 08/02/21   MRA Brain without contrast    Narrative    MRI OF THE BRAIN WITHOUT CONTRAST    CLINICAL HISTORY:  71 years (1950) Female z86.73 Tremors x 20 yrs, severe, worsening.; No hx Surgery; No Hx CVA; No hx Trauma; No Hx CA    TECHNIQUE:  MR BRAIN WITHOUT IV CONTRAST, MR BRAIN ANGIOGRAPHY WITHOUT IV CONTRAST. 200 images obtained. Multiplanar multisequence MRI of the brain was performed.    CONTRAST:  No contrast was administered.    COMPARISON:  None available.    FINDINGS:  The overall there is very mild scattered periventricular deep cerebral white matter T2 FLAIR hyperintensity, a nonspecific finding in this age group which can be seen in  any diffuse white matter process, but one which is most commonly associated with mild small vessel ischemic disease.    There is a rounded defect in the right basal ganglia without adjacent white matter change, most compatible with a prominent perivascular Virchow-Lisandro space, and less likely a remote lacunar infarct.    There is no cerebral or cerebellar atrophy. Diffusion imaging does not indicate acute or recent stroke. No bleed is evident on the gradient reversal images. Basal and suprasellar cisterns are patent. The pituitary gland and infundibulum is normal in size without abnormal signal pattern. The craniocervical junction is unremarkable. The temporal bones, internal and external meati, and semicircular canals appear normal. The orbital contents are normal. There is trace fluid in the inferior mastoid air cells bilaterally. There is trace fluid in the paranasal sinuses.    IMPRESSION:  1. No finding to suggest an acute infarct or intracranial bleed.  2. Mild chronic/involutional findings as noted above.          MRA OF THE BRAIN WITHOUT CONTRAST    CMS MANDATED QUALITY DATA - CAROTID - 195    All measurements and percent stenosis described below were determined using NASCET criteria or criteria similar to NASCET, as defined by the Society of Radiologists in Ultrasound Consensus Conference, Radiology, 2003    CLINICAL HISTORY:  71 years (1950) Female z86.73 Tremors x 20 yrs, severe, worsening.; No hx Surgery; No Hx CVA; No hx Trauma; No Hx CA    TECHNIQUE:  MR BRAIN WITHOUT IV CONTRAST, MR BRAIN ANGIOGRAPHY WITHOUT IV CONTRAST. Noncontrasted, time of flight images were obtained with MIP and 3-D reconstructions at a separate workstation to evaluate the intracranial arterial circulation.    COMPARISON:  None available.    FINDINGS:  Evaluation of the anterior intracranial arterial circulation demonstrates the intracranial portions of the internal carotid arteries to be normal and symmetric. M1 and M2  "segments of the middle cerebral arteries are unremarkable. The A1 segments of the anterior cerebral arteries appears normal. The more distal segments of the ZULMA and MCA are symmetric and within normal limits.    Evaluation of the posterior intracranial arterial circulation demonstrates the distal vertebral arteries to be patent and symmetric. The basal artery is normal in course and caliber with no evidence of aneurysmal dilatation or focal stenosis. Cerebellar and right posterior cerebral arteries arise from the basilar artery and appear normal and symmetric. The left posterior communicating artery is patent and appears to make up the dominant arterial supply to the left posterior cerebral artery with nonvisualization the left P1 segment in keeping with persistent fetal circulation a congenital variant.    There is no intracranial aneurysm. Please note MRA is less sensitive for those aneurysms under 5 mm.    IMPRESSION:  Unremarkable MR angiography of the brain.              Electronically signed by:  Gage Benson MD  8/2/2021 9:43 AM CDT Workstation: [id number]     Pertinent Lab Work     Lab Results   Component Value Date    KOBYNZBW34 637 06/17/2021     No results found for: "RPR"  Lab Results   Component Value Date    FOLATE 13.5 06/17/2021     Lab Results   Component Value Date    TSH 1.581 07/24/2024    P6KWZOE 125 07/24/2024    H5UCAQF 8.7 07/24/2024     No results found for: "LABA1C", "HGBA1C"  No results found for: "HIV1X2", "HDV93CXIE"      Medications     Current Outpatient Medications   Medication Instructions    albuterol (PROVENTIL) 2.5 mg, Nebulization, Every 6 hours PRN, Rescue    albuterol (VENTOLIN HFA) 90 mcg/actuation inhaler 2 puffs, Inhalation, Every 4 hours PRN, Rescue    aluminum & magnesium hydroxide-simethicone (MYLANTA MAX STRENGTH) 400-400-40 mg/5 mL suspension 5 mLs, Every 6 hours PRN    amLODIPine (NORVASC) 5 mg, Oral, Daily    ascorbic acid, vitamin C, 500 mg Cap as directed Orally " once daily    aspirin 81 mg, Daily    azelastine (ASTELIN) 137 mcg (0.1 %) nasal spray 1 SPRAY BY NASAL ROUTE TWICE DAILY    budesonide-formoterol 160-4.5 mcg (SYMBICORT) 160-4.5 mcg/actuation HFAA 2 puffs, Inhalation, Every 12 hours, Controller    calcitRIOL (ROCALTROL) 0.25 mcg, Oral, Daily    calcium-vitamin D3 (OS-FIDE 500 + D3) 500 mg-5 mcg (200 unit) per tablet 1 tablet, 2 times daily with meals    cefadroxil (DURICEF) 500 mg, Oral, Every 12 hours    cholecalciferol, vitamin D3, (VITAMIN D3) 50 mcg (2,000 unit) Cap capsule 1 capsule    clobetasoL (TEMOVATE) 0.05 % cream Apply to feet daily to bid prn flare    clonazePAM (KLONOPIN) 0.5 MG tablet TAKE 1/2 TABLET(0.25 MG) BY MOUTH TWICE DAILY AS NEEDED FOR ANXIETY OR INSOMNIA    dupilumab (DUPIXENT) 300 mg, Subcutaneous, Every 14 days    esomeprazole (NEXIUM) 40 MG capsule TAKE ONE CAPSULE BY MOUTH TWICE DAILY    ezetimibe (ZETIA) 10 mg, Oral, Nightly    famotidine (PEPCID) 20 mg, 2 times daily    fluticasone propionate (FLONASE) 50 mcg, Each Nostril, 2 times daily    gabapentin (NEURONTIN) 300 mg, Oral, Nightly    MYRBETRIQ 50 mg Tb24 1 tablet    MYRBETRIQ 25 mg, Oral, Daily    nitroGLYCERIN (NITROSTAT) 0.4 mg    ofloxacin (OCUFLOX) 0.3 % ophthalmic solution 1 drop, 4 times daily    polyethylene glycol (GLYCOLAX) 17 g, Daily PRN    predniSONE (DELTASONE) 10 MG tablet Take one pill a day for three days, repeat for shortness of breath    pyridoxine (vitamin B6) (VITAMIN B-6) 50 mg, Daily    tiotropium bromide (SPIRIVA RESPIMAT) 2.5 mcg/actuation inhaler 2 puffs, Inhalation, Daily, Controller    traZODone (DESYREL) 50 MG tablet 1 tablet at bedtime as needed Orally Once a day for 30 days    ZIRGAN 0.15 % Gel Place into both eyes.     Psychiatric History   Prior Diagnoses: depression and anxiety   History of Suicide Attempts: no  Current Ideation, Intention, or Plan: no  Homicidal Ideation: no   Medication(s): trazodone, klonopin (doesn't take klonopin, however)  "  Hospitalization(s): no  Psychotherapy/Counseling: no  Other: no         Substance Use History     Social History     Tobacco Use    Smoking status: Every Day     Current packs/day: 1.00     Average packs/day: 1 pack/day for 35.0 years (35.0 ttl pk-yrs)     Types: Cigarettes     Passive exposure: Current    Smokeless tobacco: Never   Substance and Sexual Activity    Alcohol use: No    Drug use: No    Sexual activity: Not Currently     History of abuse/overuse: no    Family Neurological & Psychiatric History       Family History   Problem Relation Name Age of Onset    Lymphoma Mother          Brain    Colon cancer Father          diagnosed in his 60's    Lung cancer Father      Breast cancer Maternal Aunt      Cancer Brother          prostate    Lung cancer Brother      Lung cancer Brother          metastatic from prostate    Prostate cancer Brother      Heart disease Brother          valvular disease    Hyperlipidemia Brother      Eczema Neg Hx      Lupus Neg Hx      Psoriasis Neg Hx      Melanoma Neg Hx      Crohn's disease Neg Hx      Ulcerative colitis Neg Hx      Stomach cancer Neg Hx      Esophageal cancer Neg Hx       Neurologic: dad had tremors later in life   Psychiatric: Negative for heritable risk factors.    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   2 years ago. 2 children, 3 grandchildren, 1 great grandchild.   Support System: good once she gets to [employer]   Hobbies/Activities: "nothing really" - works around the house, goes to her errands, watches " "tv  Current Living Situation: lives alone right now, but daughter talked her into selling her house to move by her.        OBJECTIVE:     Mental Status and Observations  Appearance: Unable to assess   Alertness: Attentive and alert.   Orientation:   O x 4    Gait:  Unable to assess   Psychomotor:  Unable to assess   Handedness:  Right   Vision & Hearing:  Adequate for session   Speech/language: Normal in rate, rhythm, tone, and volume. No significant word finding difficulty observed. Comprehension was normal.   Mood/Affect:  The patient's stated mood was "okay." Affect was congruent with stated mood.    Interpersonal Behavior:  Rapport was quickly and easily established    Suicidality/Homicidality: Denied   Hallucinations/Delusions:  None evidenced or endorsed   Thought Content: Logical   Though Processes: Goal-directed   Insight & Judgment:  Appropriate   Participation in Interview:  Full     Procedures/Tests Administered    Performed a review of pertinent medical records, reviewed limits to confidentiality, conducted a clinical interview, and explained procedures.       This service was not originating from a related E/M service provided within the previous 7 days nor will  to an E/M service or procedure within the next 24 hours or my soonest available appointment.  Prevailing standard of care was able to be met in this audio-only visit.            "

## 2025-04-17 ENCOUNTER — CLINICAL SUPPORT (OUTPATIENT)
Dept: CARDIOLOGY | Facility: HOSPITAL | Age: 75
End: 2025-04-17
Attending: STUDENT IN AN ORGANIZED HEALTH CARE EDUCATION/TRAINING PROGRAM
Payer: MEDICARE

## 2025-04-17 ENCOUNTER — PATIENT MESSAGE (OUTPATIENT)
Dept: CASE MANAGEMENT | Facility: HOSPITAL | Age: 75
End: 2025-04-17

## 2025-04-17 VITALS
WEIGHT: 141.13 LBS | BODY MASS INDEX: 26.65 KG/M2 | HEIGHT: 61 IN | OXYGEN SATURATION: 95 % | DIASTOLIC BLOOD PRESSURE: 67 MMHG | SYSTOLIC BLOOD PRESSURE: 116 MMHG | RESPIRATION RATE: 18 BRPM | HEART RATE: 53 BPM | TEMPERATURE: 98 F

## 2025-04-17 PROBLEM — I77.1 STENOSIS OF LEFT SUBCLAVIAN ARTERY: Status: ACTIVE | Noted: 2025-04-17

## 2025-04-17 PROBLEM — R29.818 ACUTE FOCAL NEUROLOGICAL DEFICIT: Status: RESOLVED | Noted: 2025-04-16 | Resolved: 2025-04-17

## 2025-04-17 PROBLEM — I65.21 ICAO (INTERNAL CAROTID ARTERY OCCLUSION), RIGHT: Status: ACTIVE | Noted: 2025-04-17

## 2025-04-17 PROBLEM — I65.02 OCCLUSION OF LEFT VERTEBRAL ARTERY: Status: ACTIVE | Noted: 2025-04-17

## 2025-04-17 LAB
ABSOLUTE EOSINOPHIL (SMH): 0.03 K/UL
ABSOLUTE MONOCYTE (SMH): 1.19 K/UL (ref 0.3–1)
ABSOLUTE NEUTROPHIL COUNT (SMH): 9.9 K/UL (ref 1.8–7.7)
ANION GAP (SMH): 6 MMOL/L (ref 8–16)
AORTIC ROOT ANNULUS: 3.2 CM
AORTIC VALVE CUSP SEPERATION: 1.7 CM
APICAL FOUR CHAMBER EJECTION FRACTION: 60 %
APICAL TWO CHAMBER EJECTION FRACTION: 68 %
APTT PPP: 25.6 SECONDS (ref 21–32)
AV INDEX (PROSTH): 0.94
AV MEAN GRADIENT: 7 MMHG
AV PEAK GRADIENT: 13 MMHG
AV VALVE AREA BY VELOCITY RATIO: 2.7 CM²
AV VALVE AREA: 2.7 CM²
AV VELOCITY RATIO: 0.94
BASOPHILS # BLD AUTO: 0.09 K/UL
BASOPHILS NFR BLD AUTO: 0.6 %
BSA FOR ECHO PROCEDURE: 1.66 M2
BUN SERPL-MCNC: 14 MG/DL (ref 8–23)
CALCIUM SERPL-MCNC: 9.3 MG/DL (ref 8.7–10.5)
CHLORIDE SERPL-SCNC: 106 MMOL/L (ref 95–110)
CO2 SERPL-SCNC: 26 MMOL/L (ref 23–29)
CREAT SERPL-MCNC: 0.8 MG/DL (ref 0.5–1.4)
CV ECHO LV RWT: 0.55 CM
DOP CALC AO PEAK VEL: 1.8 M/S
DOP CALC AO VTI: 39 CM
DOP CALC LVOT AREA: 2.8 CM2
DOP CALC LVOT DIAMETER: 1.9 CM
DOP CALC LVOT PEAK VEL: 1.7 M/S
DOP CALC LVOT STROKE VOLUME: 104.3 CM3
DOP CALC MV VTI: 35.5 CM
DOP CALCLVOT PEAK VEL VTI: 36.8 CM
E WAVE DECELERATION TIME: 289 MSEC
E/A RATIO: 0.8
E/E' RATIO: 9 M/S
EAG (SMH): 105 MG/DL (ref 68–131)
ECHO LV POSTERIOR WALL: 1.2 CM (ref 0.6–1.1)
ERYTHROCYTE [DISTWIDTH] IN BLOOD BY AUTOMATED COUNT: 14.1 % (ref 11.5–14.5)
FRACTIONAL SHORTENING: 34.1 % (ref 28–44)
GFR SERPLBLD CREATININE-BSD FMLA CKD-EPI: >60 ML/MIN/1.73/M2
GLUCOSE SERPL-MCNC: 96 MG/DL (ref 70–110)
HBA1C MFR BLD: 5.3 % (ref 4.5–6.2)
HCT VFR BLD AUTO: 38.4 % (ref 37–48.5)
HGB BLD-MCNC: 12.5 GM/DL (ref 12–16)
IMM GRANULOCYTES # BLD AUTO: 0.08 K/UL (ref 0–0.04)
IMM GRANULOCYTES NFR BLD AUTO: 0.6 % (ref 0–0.5)
INR PPP: 1 (ref 0.8–1.2)
INTERVENTRICULAR SEPTUM: 1.2 CM (ref 0.6–1.1)
IVC DIAMETER: 1.33 CM
LEFT ATRIUM AREA SYSTOLIC (APICAL 2 CHAMBER): 18.2 CM2
LEFT ATRIUM AREA SYSTOLIC (APICAL 4 CHAMBER): 13.5 CM2
LEFT ATRIUM VOLUME INDEX MOD: 23 ML/M2
LEFT ATRIUM VOLUME MOD: 37 ML
LEFT INTERNAL DIMENSION IN SYSTOLE: 2.9 CM (ref 2.1–4)
LEFT VENTRICLE DIASTOLIC VOLUME INDEX: 53.99 ML/M2
LEFT VENTRICLE DIASTOLIC VOLUME: 88 ML
LEFT VENTRICLE END DIASTOLIC VOLUME APICAL 2 CHAMBER: 56.1 ML
LEFT VENTRICLE END DIASTOLIC VOLUME APICAL 4 CHAMBER: 53.4 ML
LEFT VENTRICLE END SYSTOLIC VOLUME APICAL 2 CHAMBER: 49.9 ML
LEFT VENTRICLE END SYSTOLIC VOLUME APICAL 4 CHAMBER: 27.2 ML
LEFT VENTRICLE MASS INDEX: 117.4 G/M2
LEFT VENTRICLE SYSTOLIC VOLUME INDEX: 20.2 ML/M2
LEFT VENTRICLE SYSTOLIC VOLUME: 33 ML
LEFT VENTRICULAR INTERNAL DIMENSION IN DIASTOLE: 4.4 CM (ref 3.5–6)
LEFT VENTRICULAR MASS: 191.3 G
LV LATERAL E/E' RATIO: 7.6 M/S
LV SEPTAL E/E' RATIO: 10.5 M/S
LVED V (TEICH): 88.2 ML
LVES V (TEICH): 32.8 ML
LVOT MG: 6 MMHG
LVOT MV: 1.09 CM/S
LYMPHOCYTES # BLD AUTO: 3.21 K/UL (ref 1–4.8)
MAGNESIUM SERPL-MCNC: 2.1 MG/DL (ref 1.6–2.6)
MCH RBC QN AUTO: 31.2 PG (ref 27–31)
MCHC RBC AUTO-ENTMCNC: 32.6 G/DL (ref 32–36)
MCV RBC AUTO: 96 FL (ref 82–98)
MV MEAN GRADIENT: 2 MMHG
MV PEAK A VEL: 1.05 M/S
MV PEAK E VEL: 0.84 M/S
MV PEAK GRADIENT: 5 MMHG
MV STENOSIS PRESSURE HALF TIME: 115 MS
MV VALVE AREA BY CONTINUITY EQUATION: 2.94 CM2
MV VALVE AREA P 1/2 METHOD: 1.91 CM2
NUCLEATED RBC (/100WBC) (SMH): 0 /100 WBC
OHS LV EJECTION FRACTION SIMPSONS BIPLANE MOD: 64 %
OHS QRS DURATION: 86 MS
OHS QTC CALCULATION: 466 MS
PHOSPHATE SERPL-MCNC: 4.2 MG/DL (ref 2.7–4.5)
PISA TR MAX VEL: 2.3 M/S
PLATELET # BLD AUTO: 333 K/UL (ref 150–450)
PMV BLD AUTO: 9.3 FL (ref 9.2–12.9)
POTASSIUM SERPL-SCNC: 4.2 MMOL/L (ref 3.5–5.1)
PROTHROMBIN TIME: 10.9 SECONDS (ref 9–12.5)
PV MV: 0.67 M/S
PV PEAK GRADIENT: 4 MMHG
PV PEAK VELOCITY: 1 M/S
RBC # BLD AUTO: 4.01 M/UL (ref 4–5.4)
RELATIVE EOSINOPHIL (SMH): 0.2 % (ref 0–8)
RELATIVE LYMPHOCYTE (SMH): 22.2 % (ref 18–48)
RELATIVE MONOCYTE (SMH): 8.2 % (ref 4–15)
RELATIVE NEUTROPHIL (SMH): 68.2 % (ref 38–73)
RIGHT ATRIUM VOLUME AREA LENGTH APICAL 4 CHAMBER: 53.7 ML
RV TISSUE DOPPLER FREE WALL SYSTOLIC VELOCITY 1 (APICAL 4 CHAMBER VIEW): 18.6 CM/S
SODIUM SERPL-SCNC: 138 MMOL/L (ref 136–145)
TDI LATERAL: 0.11 M/S
TDI SEPTAL: 0.08 M/S
TDI: 0.1 M/S
TR MAX PG: 21 MMHG
TRICUSPID ANNULAR PLANE SYSTOLIC EXCURSION: 2.5 CM
WBC # BLD AUTO: 14.47 K/UL (ref 3.9–12.7)
Z-SCORE OF LEFT VENTRICULAR DIMENSION IN END DIASTOLE: -0.44
Z-SCORE OF LEFT VENTRICULAR DIMENSION IN END SYSTOLE: 0.13

## 2025-04-17 PROCEDURE — 80048 BASIC METABOLIC PNL TOTAL CA: CPT

## 2025-04-17 PROCEDURE — 94799 UNLISTED PULMONARY SVC/PX: CPT

## 2025-04-17 PROCEDURE — 27000221 HC OXYGEN, UP TO 24 HOURS

## 2025-04-17 PROCEDURE — 94761 N-INVAS EAR/PLS OXIMETRY MLT: CPT

## 2025-04-17 PROCEDURE — 25000242 PHARM REV CODE 250 ALT 637 W/ HCPCS: Performed by: STUDENT IN AN ORGANIZED HEALTH CARE EDUCATION/TRAINING PROGRAM

## 2025-04-17 PROCEDURE — 99900031 HC PATIENT EDUCATION (STAT)

## 2025-04-17 PROCEDURE — 97535 SELF CARE MNGMENT TRAINING: CPT

## 2025-04-17 PROCEDURE — G0378 HOSPITAL OBSERVATION PER HR: HCPCS

## 2025-04-17 PROCEDURE — 99406 BEHAV CHNG SMOKING 3-10 MIN: CPT | Performed by: CLINIC/CENTER

## 2025-04-17 PROCEDURE — 85025 COMPLETE CBC W/AUTO DIFF WBC: CPT

## 2025-04-17 PROCEDURE — 94640 AIRWAY INHALATION TREATMENT: CPT

## 2025-04-17 PROCEDURE — 94640 AIRWAY INHALATION TREATMENT: CPT | Mod: XB

## 2025-04-17 PROCEDURE — 99900035 HC TECH TIME PER 15 MIN (STAT)

## 2025-04-17 PROCEDURE — 93306 TTE W/DOPPLER COMPLETE: CPT | Mod: 26,,, | Performed by: GENERAL PRACTICE

## 2025-04-17 PROCEDURE — 36415 COLL VENOUS BLD VENIPUNCTURE: CPT

## 2025-04-17 PROCEDURE — 85730 THROMBOPLASTIN TIME PARTIAL: CPT

## 2025-04-17 PROCEDURE — 83036 HEMOGLOBIN GLYCOSYLATED A1C: CPT

## 2025-04-17 PROCEDURE — 97162 PT EVAL MOD COMPLEX 30 MIN: CPT

## 2025-04-17 PROCEDURE — 93306 TTE W/DOPPLER COMPLETE: CPT

## 2025-04-17 PROCEDURE — 84100 ASSAY OF PHOSPHORUS: CPT

## 2025-04-17 PROCEDURE — 83735 ASSAY OF MAGNESIUM: CPT

## 2025-04-17 PROCEDURE — 97165 OT EVAL LOW COMPLEX 30 MIN: CPT

## 2025-04-17 PROCEDURE — 97530 THERAPEUTIC ACTIVITIES: CPT

## 2025-04-17 PROCEDURE — 85610 PROTHROMBIN TIME: CPT

## 2025-04-17 PROCEDURE — 25000003 PHARM REV CODE 250

## 2025-04-17 RX ADMIN — BUDESONIDE INHALATION 0.5 MG: 0.5 SUSPENSION RESPIRATORY (INHALATION) at 07:04

## 2025-04-17 RX ADMIN — OXYCODONE HYDROCHLORIDE AND ACETAMINOPHEN 500 MG: 500 TABLET ORAL at 08:04

## 2025-04-17 RX ADMIN — IPRATROPIUM BROMIDE 0.5 MG: 0.5 SOLUTION RESPIRATORY (INHALATION) at 07:04

## 2025-04-17 RX ADMIN — IPRATROPIUM BROMIDE 0.5 MG: 0.5 SOLUTION RESPIRATORY (INHALATION) at 12:04

## 2025-04-17 RX ADMIN — PYRIDOXINE HCL TAB 50 MG 50 MG: 50 TAB at 08:04

## 2025-04-17 RX ADMIN — ARFORMOTEROL TARTRATE 15 MCG: 15 SOLUTION RESPIRATORY (INHALATION) at 07:04

## 2025-04-17 RX ADMIN — Medication 1 TABLET: at 08:04

## 2025-04-17 RX ADMIN — AMLODIPINE BESYLATE 5 MG: 5 TABLET ORAL at 08:04

## 2025-04-17 RX ADMIN — ASPIRIN 81 MG: 81 TABLET, CHEWABLE ORAL at 08:04

## 2025-04-17 RX ADMIN — PANTOPRAZOLE SODIUM 40 MG: 40 TABLET, DELAYED RELEASE ORAL at 05:04

## 2025-04-17 RX ADMIN — IPRATROPIUM BROMIDE 0.5 MG: 0.5 SOLUTION RESPIRATORY (INHALATION) at 01:04

## 2025-04-17 NOTE — ASSESSMENT & PLAN NOTE
Chronic,  Latest blood pressure and vitals reviewed-     Temp:  [97.6 °F (36.4 °C)]   Pulse:  [58-74]   Resp:  [18-23]   BP: (131-163)/(54-67)   SpO2:  [92 %-100 %] .   Home meds for hypertension were reviewed and noted below-  Hypertension Medications              amLODIPine (NORVASC) 5 MG tablet Take 1 tablet (5 mg total) by mouth once daily.    nitroGLYCERIN (NITROSTAT) 0.4 MG SL tablet Place 0.4 mg under the tongue.            While in the hospital, will manage blood pressure as follows; Continue home antihypertensive regimen    Will utilize p.r.n. blood pressure medication only if patient's blood pressure greater than 180/110 and she develops symptoms such as worsening chest pain or shortness of breath.

## 2025-04-17 NOTE — CARE UPDATE
04/17/25 0012   Patient Assessment/Suction   Level of Consciousness (AVPU) alert   Respiratory Effort Normal;Unlabored   Expansion/Accessory Muscles/Retractions no use of accessory muscles;no retractions;expansion symmetric   All Lung Fields Breath Sounds diminished;equal bilaterally   Rhythm/Pattern, Respiratory pattern regular;unlabored   Cough Frequency no cough   PRE-TX-O2   Device (Oxygen Therapy) nasal cannula   $ Is the patient on Low Flow Oxygen? Yes   Flow (L/min) (Oxygen Therapy) 2   SpO2 98 %   Pulse Oximetry Type Continuous   $ Pulse Oximetry - Multiple Charge Pulse Oximetry - Multiple   Pulse 78   Resp 18   Aerosol Therapy   $ Aerosol Therapy Charges Aerosol Treatment   Daily Review of Necessity (SVN) completed   Respiratory Treatment Status (SVN) given   Treatment Route (SVN) mask;oxygen   Patient Position HOB elevated   Post Treatment Assessment (SVN) breath sounds improved;increased aeration   Signs of Intolerance (SVN) none   Breath Sounds Post-Respiratory Treatment   Throughout All Fields Post-Treatment All Fields   Throughout All Fields Post-Treatment aeration increased   Post-treatment Heart Rate (beats/min) 60   Post-treatment Resp Rate (breaths/min) 18   Education   $ Education Bronchodilator;Oxygen;15 min

## 2025-04-17 NOTE — PHARMACY MED REC
"              .        Admission Medication History     The home medication history was taken by Ladonna Alvarez.    You may go to "Admission" then "Reconcile Home Medications" tabs to review and/or act upon these items.     The home medication list has been updated by the Pharmacy department.   Please read ALL comments highlighted in yellow.   Please address this information as you see fit.    Feel free to contact us if you have any questions or require assistance.      The medications listed below were removed from the home medication list. Please reorder if appropriate:  Patient reports no longer taking the following medication(s):  Azelastine Nasal Spray  Duricef  Vitamin D3  Clobetasol Cream  Clonazepam  Flonase Nasal [place]  Ocuflox Eye Drops  Prednisone  Zirgan Eye Gel    Medications listed below were obtained from: Patient/family and Analytic software- mySchoolNotebook  Medications Ordered Prior to Encounter[1]    Potential issues to be addressed PRIOR TO DISCHARGE  Patient reported not taking the following medications: (Oxybutynin & Dupixent). These medications remain on the home medication list. Please address accordingly.     Ladonna Alvarez  EXT 1921             [1]  Current Facility-Administered Medications on File Prior to Encounter   Medication Dose Route Frequency Provider Last Rate Last Admin    sodium chloride 0.9% flush 2 mL  2 mL Intravenous Q6H PRN Aron James MD         Current Outpatient Medications on File Prior to Encounter   Medication Sig Dispense Refill    albuterol (PROVENTIL) 2.5 mg /3 mL (0.083 %) nebulizer solution Take 3 mLs (2.5 mg total) by nebulization every 6 (six) hours as needed for Wheezing or Shortness of Breath. Rescue 75 mL 2    albuterol (VENTOLIN HFA) 90 mcg/actuation inhaler Inhale 2 puffs into the lungs every 4 (four) hours as needed. Rescue (Patient taking differently: Inhale 2 puffs into the lungs every 4 (four) hours as needed for Wheezing or Shortness of Breath. " Rescue) 18 g 11    aluminum & magnesium hydroxide-simethicone (MYLANTA MAX STRENGTH) 400-400-40 mg/5 mL suspension Take 5 mLs by mouth every 6 (six) hours as needed for Indigestion.       amLODIPine (NORVASC) 5 MG tablet Take 1 tablet (5 mg total) by mouth once daily. 90 tablet 0    ascorbic acid, vitamin C, 500 mg Cap Take 500 mg by mouth once daily.      aspirin 81 MG Chew Take 81 mg by mouth once daily.      budesonide-formoterol 160-4.5 mcg (SYMBICORT) 160-4.5 mcg/actuation HFAA Inhale 2 puffs into the lungs every 12 (twelve) hours. Controller 30.6 g 11    calcium-vitamin D3 (OS-FIDE 500 + D3) 500 mg-5 mcg (200 unit) per tablet Take 1 tablet by mouth 2 (two) times daily with meals.      esomeprazole (NEXIUM) 40 MG capsule TAKE ONE CAPSULE BY MOUTH TWICE DAILY (Patient taking differently: Take 40 mg by mouth 2 (two) times daily.) 180 capsule 3    ezetimibe (ZETIA) 10 mg tablet Take 1 tablet (10 mg total) by mouth every evening. 90 tablet 3    famotidine (PEPCID) 20 MG tablet Take 20 mg by mouth 2 (two) times daily.      gabapentin (NEURONTIN) 300 MG capsule TAKE ONE CAPSULE BY MOUTH EVERY EVENING 90 capsule 3    MYRBETRIQ 50 mg Tb24 Take 1 tablet by mouth once daily.      nitroGLYCERIN (NITROSTAT) 0.4 MG SL tablet Place 0.4 mg under the tongue every 5 (five) minutes as needed for Chest pain.      polyethylene glycol (GLYCOLAX) 17 gram PwPk Take 17 g by mouth daily as needed for Constipation.      tiotropium bromide (SPIRIVA RESPIMAT) 2.5 mcg/actuation inhaler Inhale 2 puffs into the lungs Daily. Controller 12 g 3    traZODone (DESYREL) 50 MG tablet Take 50 mg by mouth nightly as needed for Insomnia.      calcitRIOL (ROCALTROL) 0.25 MCG Cap Take 1 capsule (0.25 mcg total) by mouth once daily. 90 capsule 3    dupilumab (DUPIXENT) 300 mg/2 mL Syrg Inject 2 mLs (300 mg total) into the skin every 14 (fourteen) days. (Patient not taking: Reported on 4/16/2025) 4 mL 11    oxybutynin (DITROPAN) 5 MG Tab  Take 5 mg by mouth once daily. (Patient not taking: Reported on 4/16/2025)      pyridoxine, vitamin B6, (VITAMIN B-6) 100 MG Tab Take 50 mg by mouth once daily.      [DISCONTINUED] azelastine (ASTELIN) 137 mcg (0.1 %) nasal spray 1 SPRAY BY NASAL ROUTE TWICE DAILY 90 mL 0    [DISCONTINUED] cefadroxil (DURICEF) 500 MG Cap Take 1 capsule (500 mg total) by mouth every 12 (twelve) hours. 14 capsule 0    [DISCONTINUED] cholecalciferol, vitamin D3, (VITAMIN D3) 50 mcg (2,000 unit) Cap capsule 1 capsule.      [DISCONTINUED] clobetasoL (TEMOVATE) 0.05 % cream Apply to feet daily to bid prn flare 30 g 2    [DISCONTINUED] clonazePAM (KLONOPIN) 0.5 MG tablet TAKE 1/2 TABLET(0.25 MG) BY MOUTH TWICE DAILY AS NEEDED FOR ANXIETY OR INSOMNIA 30 tablet 3    [DISCONTINUED] fluticasone propionate (FLONASE) 50 mcg/actuation nasal spray 1 spray (50 mcg total) by Each Nostril route 2 (two) times daily. 48 g 0    [DISCONTINUED] mirabegron (MYRBETRIQ) 25 mg Tb24 ER tablet Take 1 tablet (25 mg total) by mouth once daily. 90 tablet 3    [DISCONTINUED] ofloxacin (OCUFLOX) 0.3 % ophthalmic solution Place 1 drop into the left eye 4 (four) times daily.      [DISCONTINUED] predniSONE (DELTASONE) 10 MG tablet Take one pill a day for three days, repeat for shortness of breath 18 tablet 0    [DISCONTINUED] ZIRGAN 0.15 % Gel Place into both eyes.

## 2025-04-17 NOTE — PLAN OF CARE
Problem: Physical Therapy  Goal: Physical Therapy Goal  Description: Goals to be met by: 25     Patient will increase functional independence with mobility by performin. Supine to sit with Modified [place]  2. Sit to supine with Modified [place]  3. Sit to stand transfer with Modified [place]  4. Bed to chair transfer with Modified [place] using Rolling Walker  5. Gait  x 150 feet with Modified [place] using Rolling Walker.     Outcome: Progressing

## 2025-04-17 NOTE — PROGRESS NOTES
Automatic Inhaler to Nebulizer Interchange    Tiotropium (Spiriva Respimat) 5 mcg changed to Ipratropium 0.5 mg every 6 hours per [facility] Automatic Therapeutic Substitutions Protocol.    Please contact pharmacy at extension [phone] with any questions.     Thank you,   Isaias Campbell

## 2025-04-17 NOTE — PT/OT/SLP EVAL
Occupational Therapy   Evaluation    Name: Kristin Guerra  MRN: [id number]  Admitting Diagnosis: Acute focal neurological deficit  Recent Surgery: * No surgery found *      Recommendations:     Discharge Recommendations: Low Intensity Therapy  Discharge Equipment Recommendations:  none  Barriers to discharge:  None    Assessment:     Kristin Guerra is a 75 y.o. female with a medical diagnosis of Acute focal neurological deficit.  She presents agreeable for therapy this AM. Performance deficits affecting function: weakness, impaired endurance, impaired functional mobility, impaired self care skills, impaired balance, decreased safety awareness, impaired cardiopulmonary response to activity.      Rehab Prognosis: Good; patient would benefit from acute skilled OT services to address these deficits and reach maximum level of function.       Plan:     Patient to be seen 3 x/week to address the above listed problems via self-care/home management, therapeutic activities, therapeutic exercises  Plan of Care Expires: 05/17/25  Plan of Care Reviewed with: patient, daughter    Subjective     Chief Complaint: weakness, but reported getting better  Patient/Family Comments/goals: get home     Occupational Profile:  Living Environment: pt lives yun in a [facility] with no DANK. Pt has a walk in shower with bench and grab bars.   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  Patient found supine with telemetry, PureWick, peripheral IV upon OT entry to room.    General Precautions: Standard, fall  Orthopedic Precautions: N/A  Braces: N/A  Respiratory Status: Room  air    Occupational Performance:    Bed Mobility:    Patient completed Rolling/Turning to Left with  contact guard assistance  Patient completed Rolling/Turning to Right with contact guard assistance  Patient completed Scooting/Bridging with contact guard assistance  Patient completed Supine to Sit with contact guard assistance  Patient completed Sit to Supine with contact guard assistance    Functional Mobility/Transfers:  Patient completed Sit <> Stand Transfer with contact guard assistance  with  no assistive device   Patient completed Toilet Transfer Step Transfer technique with contact guard assistance with  rolling walker  Functional Mobility: pt unstable with out AD, therapist provided RW to increase safety and independence    Activities of Daily Living:  Grooming: stand by assistance at sink for oral hygiene and hand hygiene post toileting  Lower Body Dressing: moderate assistance to don new brief   Toileting: stand by assistance at toilet    Cognitive/Visual Perceptual:  Cognitive/Psychosocial Skills:     -       Oriented to: Person, Place, Time, and Situation   -       Follows Commands/attention:Follows multistep  commands  -       Communication: clear/fluent  -       Safety awareness/insight to disability: intact   -       Mood/Affect/Coping skills/emotional control: Appropriate to situation, Cooperative, and Pleasant    Physical Exam: LUE weaker than R but still within functional limits  Balance:    -       good unsupported sitting balance and good standing balance with walker  Upper Extremity Range of Motion:     -       Right Upper Extremity: WFL  -       Left Upper Extremity: WFL  Upper Extremity Strength:    -       Right Upper Extremity: WFL  -       Left Upper Extremity: WFL   Strength:    -       Right Upper Extremity: WFL  -       Left Upper Extremity: WFL  Gross motor coordination:   WFL    AMPAC 6 Click ADL:  AMPAC Total Score: 20    Treatment & Education:  Pt educated on role of OT/POC,  importance of OOB/EOB activity, use of call bell, and safety during ADLs, transfers, and functional mobility.     Patient left supine with all lines intact, call button in reach, and bed alarm on    GOALS:   Multidisciplinary Problems       Occupational Therapy Goals          Problem: Occupational Therapy    Goal Priority Disciplines Outcome Interventions   Occupational Therapy Goal     OT, PT/OT     Description: Goals to be met by: 5/17/2025     Patient will increase functional independence with ADLs by performing:    UE Dressing with Supervision.  LE Dressing with Supervision.  Grooming while standing with Supervision.  Toileting from toilet with Supervision for hygiene and clothing management.                            DME Justifications:  No DME recommended requiring DME justifications    History:     Past Medical History:   Diagnosis Date    Allergy     Sterling's esophagus     Colon polyp     Diverticulitis     Diverticulosis     Fatty liver     GERD (gastroesophageal reflux disease)     History of endoscopy 04/07/2021    Dr Uzair Cantor  Impression:            - Normal oropharynx.                         - Hiatal hernia.                         - Sterling's esophagus. Treated with radiofrequency                         ablation.                         - Normal stomach.                         - Normal examined duodenum.                         - No specimens collected.     Hx of cervical spine surgery 11/07/2018    Hyperlipidemia     Hypertension     Lupus (systemic lupus erythematosus) 1999    discoid    Osteoarthritis     Osteopenia     Syncope     Tremor 2005    essential         Past Surgical History:   Procedure Laterality Date    ANGIOGRAM, CORONARY, WITH LEFT HEART CATHETERIZATION N/A 3/12/2024    Procedure: Angiogram, Coronary, with Left Heart Cath;  Surgeon: Fernando Valdivia MD;  Location: [facility] CATH/EP LAB;  Service: Cardiology;  Laterality: N/A;    APPENDECTOMY  2007    removed during colon  surgery    CERVICAL FUSION      CERVICAL SPINE SURGERY      Rods and screws.     SECTION      COLON SURGERY  2007    hemicolectomy    COLON SURGERY  2008    repair    COLONOSCOPY N/A 2018    Procedure: COLONOSCOPY;  Surgeon: Maverick Esquivel MD;  Location: [facility];  Service: Endoscopy;  Laterality: N/A; repeat in 5 years for surveillance    COLONOSCOPY N/A 2023    Procedure: COLONOSCOPY;  Surgeon: Uzair Cantor MD;  Location: [facility];  Service: Endoscopy;  Laterality: N/A;    CYSTOSCOPY N/A 2021    Procedure: CYSTOSCOPY;  Surgeon: Allison Miranda MD;  Location: [facility] OR;  Service: Urology;  Laterality: N/A;    ESOPHAGOGASTRODUODENOSCOPY N/A 2019    Procedure: EGD (ESOPHAGOGASTRODUODENOSCOPY);  Surgeon: Monika Steele MD;  Location: [facility];  Service: Endoscopy;  Laterality: N/A;    ESOPHAGOGASTRODUODENOSCOPY  2019    with RFA and biopsies    ESOPHAGOGASTRODUODENOSCOPY N/A 2019    Procedure: EGD (ESOPHAGOGASTRODUODENOSCOPY);  Surgeon: Uzair Cantor MD;  Location: [facility];  Service: Endoscopy;  Laterality: N/A;  n+v with anesthesia    ESOPHAGOGASTRODUODENOSCOPY N/A 2019    Procedure: EGD (ESOPHAGOGASTRODUODENOSCOPY);  Surgeon: Uzair Cantor MD;  Location: [facility];  Service: Endoscopy;  Laterality: N/A;    ESOPHAGOGASTRODUODENOSCOPY N/A 10/7/2019    Procedure: EGD (ESOPHAGOGASTRODUODENOSCOPY);  Surgeon: Uzair Cantor MD;  Location: [facility];  Service: Endoscopy;  Laterality: N/A;    ESOPHAGOGASTRODUODENOSCOPY N/A 2/10/2020    Procedure: EGD (ESOPHAGOGASTRODUODENOSCOPY);  Surgeon: Uzair Cantor MD;  Location: [facility];  Service: Endoscopy;  Laterality: N/A;  Requests earliest procedure time    ESOPHAGOGASTRODUODENOSCOPY N/A 2020    Procedure: EGD (ESOPHAGOGASTRODUODENOSCOPY);  Surgeon: Uzair Cantor MD;  Location: [facility];  Service: Endoscopy;  Laterality: N/A;  BARRETTS    ESOPHAGOGASTRODUODENOSCOPY N/A 2021     Procedure: ESOPHAGOGASTRODUODENOSCOPY (EGD);  Surgeon: Uzair Cantor MD;  Location: [facility];  Service: Endoscopy;  Laterality: N/A;  Needs Rapid Covid MP    ESOPHAGOGASTRODUODENOSCOPY N/A 11/30/2021    Procedure: EGD (ESOPHAGOGASTRODUODENOSCOPY);  Surgeon: Uzair Cantor MD;  Location: [facility];  Service: Endoscopy;  Laterality: N/A;    ESOPHAGOGASTRODUODENOSCOPY N/A 6/27/2022    Procedure: EGD (ESOPHAGOGASTRODUODENOSCOPY);  Surgeon: Uzair Cantor MD;  Location: [facility];  Service: Endoscopy;  Laterality: N/A;  5/19/22: fully vaccinated. instructions mailed-SC    loop recorder only    ESOPHAGOGASTRODUODENOSCOPY N/A 8/7/2023    Procedure: EGD (ESOPHAGOGASTRODUODENOSCOPY);  Surgeon: Uzair Cantor MD;  Location: [facility];  Service: Endoscopy;  Laterality: N/A;    HYSTERECTOMY      ovaries remain    INSERTION OF IMPLANTABLE LOOP RECORDER N/A 7/17/2020    Procedure: INSERTION, IMPLANTABLE LOOP RECORDER (MEDTRONIC);  Surgeon: Karyna Vasques MD;  Location: [facility] CATH/EP LAB;  Service: Cardiology;  Laterality: N/A;    IVUS, CORONARY  3/12/2024    Procedure: IVUS, Coronary;  Surgeon: Fernando Valdivia MD;  Location: [facility] CATH/EP LAB;  Service: Cardiology;;    PERCUTANEOUS CORONARY INTERVENTION, ARTERY N/A 3/12/2024    Procedure: Percutaneous coronary intervention;  Surgeon: Fernando Valdivia MD;  Location: [facility] CATH/EP LAB;  Service: Cardiology;  Laterality: N/A;    SPLENECTOMY, TOTAL  2007    Injured during surgery and removed    UPPER GASTROINTESTINAL ENDOSCOPY  2007    GERD & hiatal hernia per patient report       Time Tracking:     OT Date of Treatment: 04/17/25  OT Start Time: 1023  OT Stop Time: 1050  OT Total Time (min): 27 min    Billable Minutes:Evaluation 10  Self Care/Home Management 17    4/17/2025

## 2025-04-17 NOTE — CARE UPDATE
04/17/25 0012   Patient Assessment/Suction   Level of Consciousness (AVPU) alert   Respiratory Effort Normal;Unlabored   Expansion/Accessory Muscles/Retractions no use of accessory muscles;no retractions;expansion symmetric   All Lung Fields Breath Sounds diminished;equal bilaterally   Rhythm/Pattern, Respiratory pattern regular;unlabored   Cough Frequency no cough   PRE-TX-O2   Device (Oxygen Therapy) nasal cannula   $ Is the patient on Low Flow Oxygen? Yes   SpO2 98 %   Pulse Oximetry Type Continuous   $ Pulse Oximetry - Multiple Charge Pulse Oximetry - Multiple   Pulse 78   Resp 18   Aerosol Therapy   $ Aerosol Therapy Charges Aerosol Treatment   Daily Review of Necessity (SVN) completed   Respiratory Treatment Status (SVN) given   Treatment Route (SVN) mask;oxygen   Patient Position HOB elevated   Post Treatment Assessment (SVN) breath sounds improved;increased aeration   Signs of Intolerance (SVN) none   Breath Sounds Post-Respiratory Treatment   Throughout All Fields Post-Treatment All Fields   Throughout All Fields Post-Treatment aeration increased   Post-treatment Heart Rate (beats/min) 60   Post-treatment Resp Rate (breaths/min) 18

## 2025-04-17 NOTE — PLAN OF CARE
[facility]  Initial Discharge Assessment       Primary Care Provider: Hanane Zheng MD    Admission Diagnosis: Near syncope [R55]    Admission Date: 4/16/2025  Expected Discharge Date: 4/17/2025    Transition of Care Barriers: None    Assessment completed at bedside with pt and daughter. All demographic information was verified as correct. Pt lives home alone with Oxygen through [facility], BSC, shower chair, and rollator utilized. No known need for additional DME at discharge. Pt declines home health at this time. Case management to continue to follow for needs.    Payor: [employer] MEDICARE / Plan: [employer] MEDICARE PPO / Product Type: Medicare Advantage /     Extended Emergency Contact Information  Primary Emergency Contact: CharliRigoberto cormiera  Home Phone: [phone]  Mobile Phone: [phone]  Relation: Daughter    Discharge Plan A: Home  Discharge Plan B: Home      [facility] - [place], [place] - [address]  [address]  [place] [address]  Phone: [phone] Fax: [phone]    [id number] - [place], [place] - [address] [facility]  [address]  [place] [address]  Phone: [phone] Fax: [phone]      Initial Assessment (most recent)       Adult Discharge Assessment - 04/17/25 1321          Discharge Assessment    Assessment Type Discharge Planning Assessment     Confirmed/corrected address, phone number and insurance Yes     Confirmed Demographics Correct on Facesheet     Source of Information family;patient     When was your last doctors appointment? 01/10/25     Does patient/caregiver understand observation status Yes     Communicated ADIA with patient/caregiver Yes     Reason For Admission Acute focal neurological deficit.     People in Home alone     Facility Arrived From: home.     Do you expect to return to your current living situation? Yes     Do you have help at home or someone to help you manage your  care at home? No     Prior to hospitilization cognitive status: Alert/Oriented     Current cognitive status: Alert/Oriented     Walking or Climbing Stairs Difficulty yes     Walking or Climbing Stairs ambulation difficulty, requires equipment;stair climbing difficulty, requires equipment     Dressing/Bathing Difficulty yes     Dressing/Bathing bathing difficulty, requires equipment     Home Accessibility wheelchair accessible     Home Layout Able to live on 1st floor     Equipment Currently Used at Home rollator;bedside commode;shower chair;oxygen     Readmission within 30 days? No     Patient currently being followed by outpatient case management? No     Do you currently have service(s) that help you manage your care at home? No     Do you take prescription medications? Yes     Do you have prescription coverage? Yes     Do you have any problems affording any of your prescribed medications? No     Is the patient taking medications as prescribed? yes     Who is going to help you get home at discharge? daughterJenifer     How do you get to doctors appointments? car, drives self     Are you on dialysis? No     Do you take coumadin? No     Discharge Plan A Home     Discharge Plan B Home     DME Needed Upon Discharge  none     Discharge Plan discussed with: Patient;Adult children     Transition of Care Barriers None

## 2025-04-17 NOTE — CONSULTS
[facility]  Adult Nutrition  Education Short Note      Nutrition Education    Previous education: yes    Diet at home: Low Sodium/Cardiac    Written information provided and explained on Heart-Healthy Nutrition Therapy cardiac diet.     Comments:     Discussed with: patient    Educational Need? no    Barriers: none identified    Interventions: Fat modified diet, Cholesterol modified diet, and Sodium modified diet    Patient and/or family comprehend instructions: yes    Outcome: Verbalizes understanding     Thanks for the consult!    Liliana Croft RD 04/17/2025 2:38 PM

## 2025-04-17 NOTE — PROGRESS NOTES
Automatic Inhaler to Nebulizer Interchange    budesonide/formoterol (Symbicort) 320 mcg/18 mcg changed to budesonide 0.5 mg twice daily AND arformoterol 15 mcg twice daily per [facility] Automatic Therapeutic Substitutions Protocol.    Please contact pharmacy at extension [phone] with any questions.     Thank you,   Isaias Campbell

## 2025-04-17 NOTE — NURSING
Discharge instructions reviewed with patient. Patient verbalized understanding of instructions and stated no further questions; written instructions also provided. Belongings sent with patient. Patient to vehicle via wheelchair to home for self-care.

## 2025-04-17 NOTE — ED PROVIDER NOTES
"Encounter Date: 4/16/2025       History     Chief Complaint   Patient presents with    Loss of Consciousness     Was having a bowel movement and had syncopal episode     HPI  75-year-old woman with a history of diverticulitis hypertension hyperlipidemia CAD COPD on p.r.n. oxygen presents for evaluation of presyncopal event at home.  Around 4:00 p.m. she was getting out of the shower she felt lightheaded and warm all over.  She reports sliding down to the ground.  She denies head strike or LOC. she denies chest pain or new shortness of breath.  She denies belly pain.  She vomited nonbilious nonbloody.  She takes aspirin she denies blood thinners    She then reported that her left arm is not working correctly since she fell.  Review of patient's allergies indicates:   Allergen Reactions    Doxycycline Itching and Swelling     Prescribed 5/2021, took one dose, describes lip swelling, and "itch all over"    Mirtazapine Other (See Comments)     Hyperactivity.    Morphine Swelling and Rash     Other reaction(s): swelling in limbs    Lexapro [escitalopram oxalate] Other (See Comments)     Worsening tremor.  Sedation.    Primidone Other (See Comments)     Syncope.  Other reaction(s): dizziness,hypotension  Other reaction(s): Unknown  Other reaction(s): Unknown    Simvastatin Nausea And Vomiting and Other (See Comments)     Weakness.  Other reaction(s): nausea,vomiting    Beta-adrenergic agents Other (See Comments)     Low blood pressure and fainted.     Metoprolol succinate Other (See Comments)    Prochlorperazine Other (See Comments)     Other reaction(s): weakness    Topiramate     Compazine [prochlorperazine edisylate] Nausea Only    Tramadol Nausea And Vomiting     Past Medical History:   Diagnosis Date    Allergy     Sterling's esophagus     Colon polyp     Diverticulitis     Diverticulosis     Fatty liver     GERD (gastroesophageal reflux disease)     History of endoscopy 04/07/2021    Dr Uzair Cantor  Impression:    "         - Normal oropharynx.                         - Hiatal hernia.                         - Sterling's esophagus. Treated with radiofrequency                         ablation.                         - Normal stomach.                         - Normal examined duodenum.                         - No specimens collected.     Hx of cervical spine surgery 2018    Hyperlipidemia     Hypertension     Lupus (systemic lupus erythematosus)     discoid    Osteoarthritis     Osteopenia     Syncope     Tremor 2005    essential     Past Surgical History:   Procedure Laterality Date    ANGIOGRAM, CORONARY, WITH LEFT HEART CATHETERIZATION N/A 3/12/2024    Procedure: Angiogram, Coronary, with Left Heart Cath;  Surgeon: Fernando Valdivia MD;  Location: [facility] CATH/EP LAB;  Service: Cardiology;  Laterality: N/A;    APPENDECTOMY      removed during colon surgery    CERVICAL FUSION      CERVICAL SPINE SURGERY      Rods and screws.     SECTION      COLON SURGERY      hemicolectomy    COLON SURGERY  2008    repair    COLONOSCOPY N/A 2018    Procedure: COLONOSCOPY;  Surgeon: Maverick Esquivel MD;  Location: [facility];  Service: Endoscopy;  Laterality: N/A; repeat in 5 years for surveillance    COLONOSCOPY N/A 2023    Procedure: COLONOSCOPY;  Surgeon: Uzair Cantor MD;  Location: [facility];  Service: Endoscopy;  Laterality: N/A;    CYSTOSCOPY N/A 2021    Procedure: CYSTOSCOPY;  Surgeon: Allison Miranda MD;  Location: [facility] OR;  Service: Urology;  Laterality: N/A;    ESOPHAGOGASTRODUODENOSCOPY N/A 2019    Procedure: EGD (ESOPHAGOGASTRODUODENOSCOPY);  Surgeon: Monika Steele MD;  Location: [facility];  Service: Endoscopy;  Laterality: N/A;    ESOPHAGOGASTRODUODENOSCOPY  2019    with RFA and biopsies    ESOPHAGOGASTRODUODENOSCOPY N/A 2019    Procedure: EGD (ESOPHAGOGASTRODUODENOSCOPY);  Surgeon: Uzair Cantor MD;  Location: [facility];  Service: Endoscopy;  Laterality: N/A;   n+v with anesthesia    ESOPHAGOGASTRODUODENOSCOPY N/A 8/21/2019    Procedure: EGD (ESOPHAGOGASTRODUODENOSCOPY);  Surgeon: Uzair Cantor MD;  Location: [facility];  Service: Endoscopy;  Laterality: N/A;    ESOPHAGOGASTRODUODENOSCOPY N/A 10/7/2019    Procedure: EGD (ESOPHAGOGASTRODUODENOSCOPY);  Surgeon: Uzair Cantor MD;  Location: [facility];  Service: Endoscopy;  Laterality: N/A;    ESOPHAGOGASTRODUODENOSCOPY N/A 2/10/2020    Procedure: EGD (ESOPHAGOGASTRODUODENOSCOPY);  Surgeon: Uzair Cantor MD;  Location: [facility];  Service: Endoscopy;  Laterality: N/A;  Requests earliest procedure time    ESOPHAGOGASTRODUODENOSCOPY N/A 8/17/2020    Procedure: EGD (ESOPHAGOGASTRODUODENOSCOPY);  Surgeon: Uzair Cantor MD;  Location: [facility];  Service: Endoscopy;  Laterality: N/A;  BARRETTS    ESOPHAGOGASTRODUODENOSCOPY N/A 4/7/2021    Procedure: ESOPHAGOGASTRODUODENOSCOPY (EGD);  Surgeon: Uzair Cantor MD;  Location: [facility];  Service: Endoscopy;  Laterality: N/A;  Needs Rapid Covid MP    ESOPHAGOGASTRODUODENOSCOPY N/A 11/30/2021    Procedure: EGD (ESOPHAGOGASTRODUODENOSCOPY);  Surgeon: Uzair Cantor MD;  Location: [facility];  Service: Endoscopy;  Laterality: N/A;    ESOPHAGOGASTRODUODENOSCOPY N/A 6/27/2022    Procedure: EGD (ESOPHAGOGASTRODUODENOSCOPY);  Surgeon: Uzair Cantor MD;  Location: [facility];  Service: Endoscopy;  Laterality: N/A;  5/19/22: fully vaccinated. instructions mailed-[place]    loop recorder only    ESOPHAGOGASTRODUODENOSCOPY N/A 8/7/2023    Procedure: EGD (ESOPHAGOGASTRODUODENOSCOPY);  Surgeon: Uzair Cantor MD;  Location: [facility];  Service: Endoscopy;  Laterality: N/A;    HYSTERECTOMY      ovaries remain    INSERTION OF IMPLANTABLE LOOP RECORDER N/A 7/17/2020    Procedure: INSERTION, IMPLANTABLE LOOP RECORDER (MEDTRONIC);  Surgeon: Karyna Vasques MD;  Location: [facility] CATH/EP LAB;  Service: Cardiology;  Laterality: N/A;    IVUS, CORONARY  3/12/2024    Procedure:  IVUS, Coronary;  Surgeon: Fernando Valdivia MD;  Location: [facility] CATH/EP LAB;  Service: Cardiology;;    PERCUTANEOUS CORONARY INTERVENTION, ARTERY N/A 3/12/2024    Procedure: Percutaneous coronary intervention;  Surgeon: Fernando Valdivia MD;  Location: [facility] CATH/EP LAB;  Service: Cardiology;  Laterality: N/A;    SPLENECTOMY, TOTAL  2007    Injured during surgery and removed    UPPER GASTROINTESTINAL ENDOSCOPY  2007    GERD & hiatal hernia per patient report     Family History   Problem Relation Name Age of Onset    Lymphoma Mother          Brain    Colon cancer Father          diagnosed in his 60's    Lung cancer Father      Breast cancer Maternal Aunt      Cancer Brother          prostate    Lung cancer Brother      Lung cancer Brother          metastatic from prostate    Prostate cancer Brother      Heart disease Brother          valvular disease    Hyperlipidemia Brother      Eczema Neg Hx      Lupus Neg Hx      Psoriasis Neg Hx      Melanoma Neg Hx      Crohn's disease Neg Hx      Ulcerative colitis Neg Hx      Stomach cancer Neg Hx      Esophageal cancer Neg Hx       Social History[1]  Review of Systems   All other systems reviewed and are negative.      Physical Exam     Initial Vitals [04/16/25 1806]   BP Pulse Resp Temp SpO2   135/62 (!) 58 (!) 23 97.6 °F (36.4 °C) (!) 92 %      MAP       --         Physical Exam    Nursing note and vitals reviewed.  Constitutional: She appears well-developed. She is not diaphoretic.   HENT:   Head: Normocephalic and atraumatic.   Eyes: Right eye exhibits no discharge. Left eye exhibits no discharge.   Neck: No tracheal deviation present.   Cardiovascular:  Normal rate, regular rhythm and intact distal pulses.           Pulmonary/Chest: Breath sounds normal. No stridor. No respiratory distress.   Abdominal: Abdomen is soft. There is no abdominal tenderness. There is no rebound and no guarding.   Musculoskeletal:         General: No tenderness.     Neurological: She is alert  and oriented to person, place, and time.   CN 2-12 intact  Mild left UE drift but is able to hold up  No right UE drift  No LE drift  SILT  4/5 , 4/5 flex/ex elbow on right     Skin: Skin is warm and dry.         ED Course   Critical Care    Date/Time: 4/16/2025 9:13 PM    Performed by: Roger Lawrence MD  Authorized by: Roger Lawrence MD  Direct patient critical care time: 35 minutes  Additional history critical care time: 5 minutes  Ordering / reviewing critical care time: 10 minutes  Documentation critical care time: 5 minutes  Consulting other physicians critical care time: 15 minutes  Total critical care time (exclusive of procedural time) : 70 minutes  Critical care time was exclusive of separately billable procedures and treating other patients and teaching time.  Critical care was necessary to treat or prevent imminent or life-threatening deterioration of the following conditions: CNS failure or compromise.  Critical care was time spent personally by me on the following activities: development of treatment plan with patient or surrogate, discussions with consultants, evaluation of patient's response to treatment, examination of patient, obtaining history from patient or surrogate, ordering and performing treatments and interventions, ordering and review of laboratory studies, ordering and review of radiographic studies, pulse oximetry, re-evaluation of patient's condition and review of old charts.        Labs Reviewed   COMPREHENSIVE METABOLIC PANEL - Abnormal       Result Value    Sodium 134 (*)     Potassium 3.9      Chloride 102      CO2 26      Glucose 102      BUN 13      Creatinine 0.7      Calcium 9.5      Protein Total 7.5      Albumin 4.0      Bilirubin Total 0.3      ALP 62      AST 14      ALT 11      Anion Gap 6 (*)     eGFR >60     CBC WITH DIFFERENTIAL - Abnormal    WBC 16.62 (*)     RBC 4.04      Hgb 12.7      Hct 38.3      MCV 95      MCH 31.4 (*)     MCHC 33.2      RDW  14.1      Platelet Count 314      MPV 9.3      Nucleated RBC 0      Neut % 78.3 (*)     Lymph % 12.9 (*)     Mono % 7.4      Eos % 0.1      Basophil % 0.7      Imm Grans % 0.6 (*)     Neut # 13.0 (*)     Lymph # 2.15      Mono # 1.23 (*)     Eos # 0.01      Baso # 0.11      Imm Grans # 0.10 (*)    HEPATITIS C ANTIBODY - Normal    Hep C Ab Interp Non-Reactive     HIV 1 / 2 ANTIBODY - Normal    HIV 1/2 Ag/Ab Non-Reactive     MAGNESIUM - Normal    Magnesium 2.0     B-TYPE NATRIURETIC PEPTIDE - Normal    BNP 25     TROPONIN I HIGH SENSITIVITY - Normal    Troponin High Sensitive 4.4     PROTIME-INR - Normal    PT 10.7      INR 1.0     TSH - Normal    TSH 2.357     INFLUENZA A & B BY MOLECULAR   CBC W/ AUTO DIFFERENTIAL    Narrative:     The following orders were created for panel order CBC auto differential.  Procedure                               Abnormality         Status                     ---------                               -----------         ------                     CBC with Differential[[id number]]       Abnormal            Final result                 Please view results for these tests on the individual orders.   LIPID PANEL    Cholesterol Total 186      Triglyceride 54      HDL Cholesterol 56      LDL Cholesterol 119.2      HDL/Cholesterol Ratio 30.1      Cholesterol/HDL Ratio 3.3      Non HDL Cholesterol 130     URINALYSIS, REFLEX TO URINE CULTURE   DRUG SCREEN PANEL, URINE EMERGENCY   SARS-COV-2 RNA AMPLIFICATION, QUAL   ISTAT CREATININE    POC Creatinine 0.8      Sample VENOUS     POCT GLUCOSE MONITORING CONTINUOUS          Imaging Results              CTA Head and Neck (xpd) (Final result)  Result time 04/16/25 19:37:59      Final result by Jean Carlos Nayak MD (04/16/25 19:37:59)                   Impression:      Abnormal examination findings of occlusion of the right common carotid with subsequent occlusion of the right cervical ICA with reconstitution of flow within the right distal cervical ICA.   Vascular neurology consultation is recommended.    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    High-grade stenosis of the proximal aspect of the left subclavian artery.    No additional large vessel occlusion in the intracranial circulation.    Case discussed with Dr. Lawrence on 04/16/2025 at 19:37      Electronically signed by: Jean Carlos Nayak MD  Date:    04/16/2025  Time:    19:37               Narrative:    EXAMINATION:  CTA HEAD AND NECK (XPD)    CLINICAL HISTORY:  Neuro deficit, acute, stroke suspected;    TECHNIQUE:  CT angiogram was performed from the level of the rupert to the top of the head following the IV administration of 100mL of Omnipaque 350.   Sagittal and coronal reconstructions and maximum intensity projection reconstructions were performed. Arterial stenosis percentages are based on NASCET measurement criteria.    COMPARISON:  CT head dated 04/16/2025.    MRI brain dated 08/02/2021.    Carotid ultrasound dated 08/02/2021.    FINDINGS:  CTA neck:    There are no filling defects within the pulmonary tree to suggest pulmonary embolism.    There are calcifications of the great vessels arising from the aortic arch.  There is complete occlusion of the right common carotid artery with subsequent occlusion of the right cervical ICA.  There is reconstitution of flow within the right distal cervical ICA.  There is high-grade stenosis involving the proximal aspect of the left subclavian artery.  The left common carotid appears patent.  There are calcifications in the left common carotid.  The left internal external carotid arteries are within normal limits.    The origins of the vertebral arteries are poorly delineated.  There is a dominant right vertebral artery.  The left vertebral artery is hypoplastic.    CTA head:    There are calcifications in the intracranial segments of the ICA.  The anterior cerebral arteries and anterior communicating artery complex are within normal limits.  The  middle cerebral arteries are unremarkable.    There is occlusion of the V4 segment of the left vertebral artery.  The right vertebral is unremarkable.  The basilar is within normal limits.  The bilateral superior cerebellar arteries are hypoplastic.  The posterior cerebral arteries are within normal limits.    The dural venous sinuses are within normal limits.    There is no abnormal intracranial enhancement.    Additional findings:    The soft tissue the neck are within normal limits.  There is no evidence of lymphadenopathy in the neck.  There are significant emphysematous changes in the lung fields.  There are degenerative changes in the cervical spine.  There are postoperative changes in the cervical spine there is no evidence of a fracture.                                        CT HEAD FOR CODE STROKE (Final result)  Result time 04/16/25 19:22:34      Final result by Jean Carlos Nayak MD (04/16/25 19:22:34)                   Impression:      Ill-defined region of low attenuation within the left basal ganglia.  Region of evolving infarction would be difficult to exclude.  Consideration with MRI of the brain along with patient's clinical symptomatology, as clinically warranted.    No evidence of intracranial hemorrhage or mass effect.    This report was flagged in Epic as abnormal.      Electronically signed by: Jean Carlos Nayak MD  Date:    04/16/2025  Time:    19:22               Narrative:    EXAMINATION:  CT HEAD FOR CODE STROKE    CLINICAL HISTORY:  Neuro deficit, acute, stroke suspected;    TECHNIQUE:  Low dose axial images were obtained through the head.  Coronal and sagittal reformations were also performed. Contrast was not administered.    COMPARISON:  CT head dated 10/10/2023.    FINDINGS:  The subcutaneous tissues are unremarkable.  The bony calvarium is intact.  The paranasal sinuses are unremarkable.  The mastoids are clear.  There are postoperative changes in the globes.    The craniocervical junction is  intact.  There is partial empty sella configuration.  There are no extra-axial fluid collections.  There is no evidence of intracranial hemorrhage.    The ventricles and sulci are prominent, consistent cerebral volume loss.  There is ill-defined area of low attenuation within the left basal ganglia.  The remainder of the gray-white differentiation is maintained.  There is unchanged appearance of a chronic infarction in the right basal ganglia.    There is no dense vessel sign.  There is no evidence of mass effect.                                       X-Ray Chest AP Portable (Final result)  Result time 04/16/25 19:16:49   Procedure changed from X-Ray Chest PA And Lateral     Final result by Jean Carlos Nayak MD (04/16/25 19:16:49)                   Impression:      No acute process.      Electronically signed by: Jean Carlos Nayak MD  Date:    04/16/2025  Time:    19:16               Narrative:    EXAMINATION:  XR CHEST AP PORTABLE    CLINICAL HISTORY:  pain;pain; Shortness of breath    TECHNIQUE:  Single frontal view of the chest was performed.    COMPARISON:  03/07/2024.    FINDINGS:  There are postop changes in the cervical spine.  There is a loop recorder in place.  Monitoring EKG leads are present.    The trachea is unremarkable.  There are calcifications of the aortic knob.  The cardiomediastinal silhouette is within normal limits.  There are no pleural effusions.  There is no evidence of a pneumothorax.  There is no evidence of pneumomediastinum.  No airspace opacity is present.    There is no evidence of free air beneath the hemidiaphragms.  There are degenerative changes in the osseous structures.                                       Medications   sodium chloride 0.9% flush 10 mL (has no administration in time range)   sodium chloride 0.9% bolus 500 mL 500 mL (has no administration in time range)   enoxaparin injection 40 mg (has no administration in time range)   ondansetron injection 4 mg (4 mg Intravenous Given  4/16/25 1916)   iohexoL (OMNIPAQUE 350) injection 100 mL (100 mLs Intravenous Given 4/16/25 1914)     Medical Decision Making  75-year-old woman presents for presyncopal episode.  She reports her arm isn't working on the left side since she fell.  She denies falling on it.  Vital signs are within normal limits she is well-appearing.  She does have left-sided deficits on the upper extremity.  See ED course for discussion regarding thrombolytic therapy.  I will admit to Hospital Medicine for stroke workup and presyncopal episode.  I do not think she needs a CTA of her chest.    She has a an MRI of her head and her C-spine pending for stroke evaluation.  She has been admitted to hospital medicine.    Amount and/or Complexity of Data Reviewed  Independent Historian: caregiver     Details: Daughter provides additional history  Labs: ordered.  Radiology: ordered and independent interpretation performed. Decision-making details documented in ED Course.  ECG/medicine tests: ordered and independent interpretation performed. Decision-making details documented in ED Course.    Risk  Prescription drug management.  Drug therapy requiring intensive monitoring for toxicity.  Decision regarding hospitalization.               ED Course as of 04/16/25 2114 Wed Apr 16, 2025   1858 When I went into evaluate this patient she informed me that her left arm has not not been working since she passed out or almost passed out, this occurred at 4:00 p.m..  She says this is a new problem for her, she has some left arm drift and decreased  strength as well as push pull at the elbow, her face is symmetric she has not no lower extremity drift or right upper extremity drift, I have stroke activated her [IC]   1913 On my independent interpretation of head CT I do not appreciate acute intracranial hemorrhage [IC]   1926     Thrombolysis Candidate? No, nondisabling    Delays to Thrombolysis?  No    Not a TNK candidate per tele stroke, no  evidence of LVO   [IC]   1937 Her head CT was read with an evolving infarct, the stroke coordinator reach out to me and said she got an AI alert for possible LBO.  In light of these new pieces of information I reach back out to tele stroke they are re-evaluating the images [IC]   1939 Tele stroke re-evaluated this CTA images, the findings are chronic, she is not a thrombectomy candidate [IC]   1957 Discussed CT and CTA findings with tele stroke, tele stroke or myself and discussed with the family, we are in agreement that she is not a TNK candidate due to non disabling symptoms.  Her CTA findings are chronic [IC]   2006 EKG on my independent interpretation normal sinus rhythm normal axis no STEMI QTC less than 500 [IC]      ED Course User Index  [IC] Roger Lwarence MD                           Clinical Impression:  Final diagnoses:  [R55] Syncope, near  [R06.02] Shortness of breath  [R29.818] Acute focal neurological deficit  [R55] Near syncope          ED Disposition Condition    Observation                     [1]   Social History  Tobacco Use    Smoking status: Every Day     Current packs/day: 1.00     Average packs/day: 1 pack/day for 35.0 years (35.0 ttl pk-yrs)     Types: Cigarettes     Passive exposure: Current    Smokeless tobacco: Never   Substance Use Topics    Alcohol use: No    Drug use: No        Roger Lawrence MD  04/16/25 4431

## 2025-04-17 NOTE — SUBJECTIVE & OBJECTIVE
Past Medical History:   Diagnosis Date    Allergy     Sterling's esophagus     Colon polyp     Diverticulitis     Diverticulosis     Fatty liver     GERD (gastroesophageal reflux disease)     History of endoscopy 2021    Dr Uzair Cantor  Impression:            - Normal oropharynx.                         - Hiatal hernia.                         - Sterling's esophagus. Treated with radiofrequency                         ablation.                         - Normal stomach.                         - Normal examined duodenum.                         - No specimens collected.     Hx of cervical spine surgery 2018    Hyperlipidemia     Hypertension     Lupus (systemic lupus erythematosus)     discoid    Osteoarthritis     Osteopenia     Syncope     Tremor 2005    essential       Past Surgical History:   Procedure Laterality Date    ANGIOGRAM, CORONARY, WITH LEFT HEART CATHETERIZATION N/A 3/12/2024    Procedure: Angiogram, Coronary, with Left Heart Cath;  Surgeon: Fernando Valdivia MD;  Location: [facility] CATH/EP LAB;  Service: Cardiology;  Laterality: N/A;    APPENDECTOMY      removed during colon surgery    CERVICAL FUSION      CERVICAL SPINE SURGERY      Rods and screws.     SECTION      COLON SURGERY      hemicolectomy    COLON SURGERY  2008    repair    COLONOSCOPY N/A 2018    Procedure: COLONOSCOPY;  Surgeon: Maverick Esquivel MD;  Location: [facility];  Service: Endoscopy;  Laterality: N/A; repeat in 5 years for surveillance    COLONOSCOPY N/A 2023    Procedure: COLONOSCOPY;  Surgeon: Uzair Cantor MD;  Location: [facility];  Service: Endoscopy;  Laterality: N/A;    CYSTOSCOPY N/A 2021    Procedure: CYSTOSCOPY;  Surgeon: Allison Miranda MD;  Location: [facility] OR;  Service: Urology;  Laterality: N/A;    ESOPHAGOGASTRODUODENOSCOPY N/A 2019    Procedure: EGD (ESOPHAGOGASTRODUODENOSCOPY);  Surgeon: Monika Steele MD;  Location: [facility];  Service: Endoscopy;   Laterality: N/A;    ESOPHAGOGASTRODUODENOSCOPY  06/04/2019    with RFA and biopsies    ESOPHAGOGASTRODUODENOSCOPY N/A 6/4/2019    Procedure: EGD (ESOPHAGOGASTRODUODENOSCOPY);  Surgeon: Uzair Cantor MD;  Location: [facility];  Service: Endoscopy;  Laterality: N/A;  n+v with anesthesia    ESOPHAGOGASTRODUODENOSCOPY N/A 8/21/2019    Procedure: EGD (ESOPHAGOGASTRODUODENOSCOPY);  Surgeon: Uzair Cantor MD;  Location: [facility];  Service: Endoscopy;  Laterality: N/A;    ESOPHAGOGASTRODUODENOSCOPY N/A 10/7/2019    Procedure: EGD (ESOPHAGOGASTRODUODENOSCOPY);  Surgeon: Uzair Cantor MD;  Location: [facility];  Service: Endoscopy;  Laterality: N/A;    ESOPHAGOGASTRODUODENOSCOPY N/A 2/10/2020    Procedure: EGD (ESOPHAGOGASTRODUODENOSCOPY);  Surgeon: Uzair Cantor MD;  Location: [facility];  Service: Endoscopy;  Laterality: N/A;  Requests earliest procedure time    ESOPHAGOGASTRODUODENOSCOPY N/A 8/17/2020    Procedure: EGD (ESOPHAGOGASTRODUODENOSCOPY);  Surgeon: Uzair Cantor MD;  Location: [facility];  Service: Endoscopy;  Laterality: N/A;  BARRETTS    ESOPHAGOGASTRODUODENOSCOPY N/A 4/7/2021    Procedure: ESOPHAGOGASTRODUODENOSCOPY (EGD);  Surgeon: Uzair Cantor MD;  Location: [facility];  Service: Endoscopy;  Laterality: N/A;  Needs Rapid Covid MP    ESOPHAGOGASTRODUODENOSCOPY N/A 11/30/2021    Procedure: EGD (ESOPHAGOGASTRODUODENOSCOPY);  Surgeon: Uzair Cantor MD;  Location: [facility];  Service: Endoscopy;  Laterality: N/A;    ESOPHAGOGASTRODUODENOSCOPY N/A 6/27/2022    Procedure: EGD (ESOPHAGOGASTRODUODENOSCOPY);  Surgeon: Uzair Cantor MD;  Location: [facility];  Service: Endoscopy;  Laterality: N/A;  5/19/22: fully vaccinated. instructions mailed-[place]    loop recorder only    ESOPHAGOGASTRODUODENOSCOPY N/A 8/7/2023    Procedure: EGD (ESOPHAGOGASTRODUODENOSCOPY);  Surgeon: Uzair Cantor MD;  Location: [facility];  Service: Endoscopy;  Laterality: N/A;    HYSTERECTOMY      ovaries  "remain    INSERTION OF IMPLANTABLE LOOP RECORDER N/A 7/17/2020    Procedure: INSERTION, IMPLANTABLE LOOP RECORDER (MEDTRONIC);  Surgeon: Karyna Vasques MD;  Location: [facility] CATH/EP LAB;  Service: Cardiology;  Laterality: N/A;    IVUS, CORONARY  3/12/2024    Procedure: IVUS, Coronary;  Surgeon: Fernando Valdivia MD;  Location: [facility] CATH/EP LAB;  Service: Cardiology;;    PERCUTANEOUS CORONARY INTERVENTION, ARTERY N/A 3/12/2024    Procedure: Percutaneous coronary intervention;  Surgeon: Fernando Valdivia MD;  Location: [facility] CATH/EP LAB;  Service: Cardiology;  Laterality: N/A;    SPLENECTOMY, TOTAL  2007    Injured during surgery and removed    UPPER GASTROINTESTINAL ENDOSCOPY  2007    GERD & hiatal hernia per patient report       Review of patient's allergies indicates:   Allergen Reactions    Doxycycline Itching and Swelling     Prescribed 5/2021, took one dose, describes lip swelling, and "itch all over"    Mirtazapine Other (See Comments)     Hyperactivity.    Morphine Swelling and Rash     Other reaction(s): swelling in limbs    Lexapro [escitalopram oxalate] Other (See Comments)     Worsening tremor.  Sedation.    Primidone Other (See Comments)     Syncope.  Other reaction(s): dizziness,hypotension  Other reaction(s): Unknown  Other reaction(s): Unknown    Simvastatin Nausea And Vomiting and Other (See Comments)     Weakness.  Other reaction(s): nausea,vomiting    Beta-adrenergic agents Other (See Comments)     Low blood pressure and fainted.     Metoprolol succinate Other (See Comments)    Prochlorperazine Other (See Comments)     Other reaction(s): weakness    Topiramate     Compazine [prochlorperazine edisylate] Nausea Only    Tramadol Nausea And Vomiting       Current Facility-Administered Medications on File Prior to Encounter   Medication    sodium chloride 0.9% flush 2 mL     Current Outpatient Medications on File Prior to Encounter   Medication Sig    oxybutynin (DITROPAN) 5 MG Tab Take 5 mg by mouth " once daily.    albuterol (PROVENTIL) 2.5 mg /3 mL (0.083 %) nebulizer solution Take 3 mLs (2.5 mg total) by nebulization every 6 (six) hours as needed for Wheezing or Shortness of Breath. Rescue    albuterol (VENTOLIN HFA) 90 mcg/actuation inhaler Inhale 2 puffs into the lungs every 4 (four) hours as needed. Rescue    aluminum & magnesium hydroxide-simethicone (MYLANTA MAX STRENGTH) 400-400-40 mg/5 mL suspension Take 5 mLs by mouth every 6 (six) hours as needed for Indigestion.     amLODIPine (NORVASC) 5 MG tablet Take 1 tablet (5 mg total) by mouth once daily.    ascorbic acid, vitamin C, 500 mg Cap as directed Orally once daily    aspirin 81 MG Chew Take 81 mg by mouth once daily.    azelastine (ASTELIN) 137 mcg (0.1 %) nasal spray 1 SPRAY BY NASAL ROUTE TWICE DAILY    budesonide-formoterol 160-4.5 mcg (SYMBICORT) 160-4.5 mcg/actuation HFAA Inhale 2 puffs into the lungs every 12 (twelve) hours. Controller    calcitRIOL (ROCALTROL) 0.25 MCG Cap Take 1 capsule (0.25 mcg total) by mouth once daily.    calcium-vitamin D3 (OS-FIDE 500 + D3) 500 mg-5 mcg (200 unit) per tablet Take 1 tablet by mouth 2 (two) times daily with meals.    cefadroxil (DURICEF) 500 MG Cap Take 1 capsule (500 mg total) by mouth every 12 (twelve) hours.    cholecalciferol, vitamin D3, (VITAMIN D3) 50 mcg (2,000 unit) Cap capsule 1 capsule.    clobetasoL (TEMOVATE) 0.05 % cream Apply to feet daily to bid prn flare    clonazePAM (KLONOPIN) 0.5 MG tablet TAKE 1/2 TABLET(0.25 MG) BY MOUTH TWICE DAILY AS NEEDED FOR ANXIETY OR INSOMNIA    dupilumab (DUPIXENT) 300 mg/2 mL Syrg Inject 2 mLs (300 mg total) into the skin every 14 (fourteen) days.    esomeprazole (NEXIUM) 40 MG capsule TAKE ONE CAPSULE BY MOUTH TWICE DAILY (Patient not taking: Reported on 3/10/2025)    ezetimibe (ZETIA) 10 mg tablet Take 1 tablet (10 mg total) by mouth every evening.    famotidine (PEPCID) 20 MG tablet Take 20 mg by mouth 2 (two) times daily.    fluticasone propionate  (FLONASE) 50 mcg/actuation nasal spray 1 spray (50 mcg total) by Each Nostril route 2 (two) times daily.    gabapentin (NEURONTIN) 300 MG capsule TAKE ONE CAPSULE BY MOUTH EVERY EVENING    mirabegron (MYRBETRIQ) 25 mg Tb24 ER tablet Take 1 tablet (25 mg total) by mouth once daily.    MYRBETRIQ 50 mg Tb24 Take 1 tablet by mouth.    nitroGLYCERIN (NITROSTAT) 0.4 MG SL tablet Place 0.4 mg under the tongue.    ofloxacin (OCUFLOX) 0.3 % ophthalmic solution Place 1 drop into the left eye 4 (four) times daily.    polyethylene glycol (GLYCOLAX) 17 gram PwPk Take 17 g by mouth daily as needed. (Patient not taking: Reported on 3/10/2025)    predniSONE (DELTASONE) 10 MG tablet Take one pill a day for three days, repeat for shortness of breath (Patient not taking: Reported on 3/10/2025)    pyridoxine, vitamin B6, (VITAMIN B-6) 100 MG Tab Take 50 mg by mouth once daily.    tiotropium bromide (SPIRIVA RESPIMAT) 2.5 mcg/actuation inhaler Inhale 2 puffs into the lungs Daily. Controller    traZODone (DESYREL) 50 MG tablet 1 tablet at bedtime as needed Orally Once a day for 30 days (Patient not taking: Reported on 3/10/2025)    ZIRGAN 0.15 % Gel Place into both eyes. (Patient not taking: Reported on 3/10/2025)     Family History       Problem Relation (Age of Onset)    Breast cancer Maternal Aunt    Cancer Brother    Colon cancer Father    Heart disease Brother    Hyperlipidemia Brother    Lung cancer Father, Brother, Brother    Lymphoma Mother    Prostate cancer Brother          Tobacco Use    Smoking status: Every Day     Current packs/day: 1.00     Average packs/day: 1 pack/day for 35.0 years (35.0 ttl pk-yrs)     Types: Cigarettes     Passive exposure: Current    Smokeless tobacco: Never   Substance and Sexual Activity    Alcohol use: No    Drug use: No    Sexual activity: Not Currently     Review of Systems   Constitutional:  Negative for chills and fever.   Eyes:  Negative for visual disturbance.   Respiratory:  Positive for  shortness of breath.    Cardiovascular:  Negative for chest pain.   Gastrointestinal:  Positive for constipation, nausea and vomiting. Negative for abdominal pain and diarrhea.   Genitourinary:  Negative for dysuria and hematuria.   Neurological:  Positive for weakness (left upper extremity) and light-headedness. Negative for dizziness, syncope, facial asymmetry, speech difficulty and numbness.        Positive for near-syncope     Objective:     Vital Signs (Most Recent):  Temp: 97.6 °F (36.4 °C) (04/16/25 1806)  Pulse: 74 (04/16/25 2030)  Resp: (!) 22 (04/16/25 2030)  BP: (!) 137/54 (04/16/25 2030)  SpO2: 98 % (04/16/25 2030) Vital Signs (24h Range):  Temp:  [97.6 °F (36.4 °C)] 97.6 °F (36.4 °C)  Pulse:  [58-74] 74  Resp:  [18-23] 22  SpO2:  [92 %-100 %] 98 %  BP: (131-163)/(54-67) 137/54     Weight: 64.5 kg (142 lb 3.2 oz)  Body mass index is 26.87 kg/m².     Physical Exam  Vitals reviewed.   Constitutional:       General: She is awake. She is not in acute distress.     Appearance: Normal appearance. She is not ill-appearing or diaphoretic.   Cardiovascular:      Rate and Rhythm: Normal rate.      Heart sounds: Normal heart sounds. No murmur heard.     No friction rub. No gallop.   Pulmonary:      Effort: Pulmonary effort is normal. No accessory muscle usage or respiratory distress.      Breath sounds: Normal breath sounds. No wheezing, rhonchi or rales.   Abdominal:      General: Abdomen is flat. Bowel sounds are normal.      Palpations: Abdomen is soft.      Tenderness: There is no abdominal tenderness. There is no guarding or rebound.   Musculoskeletal:      Right lower leg: No edema.      Left lower leg: No edema.   Skin:     General: Skin is warm and dry.   Neurological:      Mental Status: She is alert and oriented to person, place, and time.      Comments: Patient moving all extremities and carrying on conversation.  No slurred speech or facial asymmetry appreciated.  4/5 strength to left upper extremity.   5/5 strength to right upper extremity and bilateral lower extremities.  Decreased  strength on the left.  Sensation intact to bilateral face as well as bilateral upper and lower extremities.  Normal heel-to-shin.  No other deficits appreciated on exam.   Psychiatric:         Behavior: Behavior is cooperative.                Significant Labs: All pertinent labs within the past 24 hours have been reviewed.  CBC:   Recent Labs   Lab 04/16/25 1833   WBC 16.62*   HGB 12.7   HCT 38.3        CMP:   Recent Labs   Lab 04/16/25 1833   *   K 3.9   CO2 26   BUN 13   CREATININE 0.7   CALCIUM 9.5   ALBUMIN 4.0   BILITOT 0.3   ALKPHOS 62   AST 14   ALT 11     Cardiac Markers:   Recent Labs   Lab 04/16/25 1833   BNP 25     Coagulation:   Recent Labs   Lab 04/16/25 1941   INR 1.0     Lipid Panel:   Recent Labs   Lab 04/16/25  1833   CHOL 186   HDL 56   TRIG 54     Magnesium:   Recent Labs   Lab 04/16/25 1833   MG 2.0     TSH:   Recent Labs   Lab 04/16/25  1833   TSH 2.357       Significant Imaging:   Imaging Results              MRI Brain Without Contrast (In process)  Result time 04/16/25 21:30:05                     MRI Cervical Spine Without Contrast (In process)  Result time 04/16/25 21:30:00                     CTA Head and Neck (xpd) (Final result)  Result time 04/16/25 19:37:59      Final result by Jean Carlos Nayak MD (04/16/25 19:37:59)                   Impression:      Abnormal examination findings of occlusion of the right common carotid with subsequent occlusion of the right cervical ICA with reconstitution of flow within the right distal cervical ICA.  Vascular neurology consultation is recommended.    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    High-grade stenosis of the proximal aspect of the left subclavian artery.    No additional large vessel occlusion in the intracranial circulation.    Case discussed with Dr. Lawrence on 04/16/2025 at 19:37      Electronically signed by: Jean Carlos  MD Nael  Date:    04/16/2025  Time:    19:37               Narrative:    EXAMINATION:  CTA HEAD AND NECK (XPD)    CLINICAL HISTORY:  Neuro deficit, acute, stroke suspected;    TECHNIQUE:  CT angiogram was performed from the level of the rupert to the top of the head following the IV administration of 100mL of Omnipaque 350.   Sagittal and coronal reconstructions and maximum intensity projection reconstructions were performed. Arterial stenosis percentages are based on NASCET measurement criteria.    COMPARISON:  CT head dated 04/16/2025.    MRI brain dated 08/02/2021.    Carotid ultrasound dated 08/02/2021.    FINDINGS:  CTA neck:    There are no filling defects within the pulmonary tree to suggest pulmonary embolism.    There are calcifications of the great vessels arising from the aortic arch.  There is complete occlusion of the right common carotid artery with subsequent occlusion of the right cervical ICA.  There is reconstitution of flow within the right distal cervical ICA.  There is high-grade stenosis involving the proximal aspect of the left subclavian artery.  The left common carotid appears patent.  There are calcifications in the left common carotid.  The left internal external carotid arteries are within normal limits.    The origins of the vertebral arteries are poorly delineated.  There is a dominant right vertebral artery.  The left vertebral artery is hypoplastic.    CTA head:    There are calcifications in the intracranial segments of the ICA.  The anterior cerebral arteries and anterior communicating artery complex are within normal limits.  The middle cerebral arteries are unremarkable.    There is occlusion of the V4 segment of the left vertebral artery.  The right vertebral is unremarkable.  The basilar is within normal limits.  The bilateral superior cerebellar arteries are hypoplastic.  The posterior cerebral arteries are within normal limits.    The dural venous sinuses are within normal  limits.    There is no abnormal intracranial enhancement.    Additional findings:    The soft tissue the neck are within normal limits.  There is no evidence of lymphadenopathy in the neck.  There are significant emphysematous changes in the lung fields.  There are degenerative changes in the cervical spine.  There are postoperative changes in the cervical spine there is no evidence of a fracture.                                        CT HEAD FOR CODE STROKE (Final result)  Result time 04/16/25 19:22:34      Final result by Jean Carlos Nayak MD (04/16/25 19:22:34)                   Impression:      Ill-defined region of low attenuation within the left basal ganglia.  Region of evolving infarction would be difficult to exclude.  Consideration with MRI of the brain along with patient's clinical symptomatology, as clinically warranted.    No evidence of intracranial hemorrhage or mass effect.    This report was flagged in Epic as abnormal.      Electronically signed by: Jean Carlos Nayak MD  Date:    04/16/2025  Time:    19:22               Narrative:    EXAMINATION:  CT HEAD FOR CODE STROKE    CLINICAL HISTORY:  Neuro deficit, acute, stroke suspected;    TECHNIQUE:  Low dose axial images were obtained through the head.  Coronal and sagittal reformations were also performed. Contrast was not administered.    COMPARISON:  CT head dated 10/10/2023.    FINDINGS:  The subcutaneous tissues are unremarkable.  The bony calvarium is intact.  The paranasal sinuses are unremarkable.  The mastoids are clear.  There are postoperative changes in the globes.    The craniocervical junction is intact.  There is partial empty sella configuration.  There are no extra-axial fluid collections.  There is no evidence of intracranial hemorrhage.    The ventricles and sulci are prominent, consistent cerebral volume loss.  There is ill-defined area of low attenuation within the left basal ganglia.  The remainder of the gray-white differentiation is  maintained.  There is unchanged appearance of a chronic infarction in the right basal ganglia.    There is no dense vessel sign.  There is no evidence of mass effect.                                       X-Ray Chest AP Portable (Final result)  Result time 04/16/25 19:16:49   Procedure changed from X-Ray Chest PA And Lateral     Final result by Jean Carlos Nayak MD (04/16/25 19:16:49)                   Impression:      No acute process.      Electronically signed by: Jean Carlos Nayak MD  Date:    04/16/2025  Time:    19:16               Narrative:    EXAMINATION:  XR CHEST AP PORTABLE    CLINICAL HISTORY:  pain;pain; Shortness of breath    TECHNIQUE:  Single frontal view of the chest was performed.    COMPARISON:  03/07/2024.    FINDINGS:  There are postop changes in the cervical spine.  There is a loop recorder in place.  Monitoring EKG leads are present.    The trachea is unremarkable.  There are calcifications of the aortic knob.  The cardiomediastinal silhouette is within normal limits.  There are no pleural effusions.  There is no evidence of a pneumothorax.  There is no evidence of pneumomediastinum.  No airspace opacity is present.    There is no evidence of free air beneath the hemidiaphragms.  There are degenerative changes in the osseous structures.

## 2025-04-17 NOTE — PLAN OF CARE
Pt discharging home with [facility]. Follow up appointment scheduled with neurosurgery, pulmonology, and discharge clinic. All appointments added to AVS for reference as well as home health info. Daughter to provide transportation home. No further needs known.      04/17/25 1416   Final Note   Assessment Type Final Discharge Note   Anticipated Discharge Disposition Home-Health   What phone number can be called within the next 1-3 days to see how you are doing after discharge? [phone]   Hospital Resources/Appts/Education Provided Provided patient/caregiver with written discharge plan information;Appointments scheduled and added to AVS;Post-Acute resouces added to AVS   Post-Acute Status   Post-Acute Authorization Home Health   Home Health Status Set-up Complete/Auth obtained   Discharge Delays None known at this time

## 2025-04-17 NOTE — PT/OT/SLP EVAL
Physical Therapy Evaluation    Patient Name:  Kristin Guerra   MRN:  [id number]    Recommendations:     Discharge Recommendations: Low Intensity Therapy   Discharge Equipment Recommendations: none   Barriers to discharge: Decreased caregiver support    Assessment:     Kristin Guerra is a 75 y.o. female admitted with a medical diagnosis of Acute focal neurological deficit.  She presents with the following impairments/functional limitations: weakness, impaired endurance, impaired functional mobility, gait instability, pain, impaired cardiopulmonary response to activity, decreased coordination, impaired fine motor Pt found awake in bed. Agreeable to PT. Positive tremor x 20 years. BP taken in supine/sit and standing. Negative orthostatics. (See below).She is slightly weaker on L side vs right. She is somewhat anxious and distractible.She ambulated with RW x 45 ft SBA, no LOB. She may benefit from continued PT to maximize safety and functional independence as patient lives alone.  BLOOD PRESSURE:  SUPINE: 143/65 (93) 59 BPM  SIT: 126/58 (83) 59 BPM  STAND: 123/60 (82) 64 BPM  Rehab Prognosis: Good; patient would benefit from acute skilled PT services to address these deficits and reach maximum level of function.    Recent Surgery: * No surgery found *      Plan:     During this hospitalization, patient to be seen 5 x/week to address the identified rehab impairments via gait training, therapeutic activities, therapeutic exercises, neuromuscular re-education and progress toward the following goals:    Plan of Care Expires:  05/17/25    Subjective     Chief Complaint: weakness  Patient/Family Comments/goals: return home  Pain/Comfort:  Pain Rating 1: 4/10  Location - Side 1: Right  Location 1: neck  Pain Addressed 1: Distraction, Cessation of Activity  Pain Rating Post-Intervention 1: 4/10    Patients cultural, spiritual, Yarsani conflicts given the current situation: no    Living Environment:  Lives alone in [facility], mod I  with all mobility and self care, no DANK, drives  Prior to admission, patients level of function was mod I.  Equipment used at home: walker, rolling.  DME owned (not currently used): none.  Upon discharge, patient will have assistance from family who looks in on her.    Objective:     Communicated with nurse prior to session.  Patient found HOB elevated with telemetry, pulse ox (continuous), blood pressure cuff, bed alarm, peripheral IV  upon PT entry to room.    General Precautions: Standard, fall  Orthopedic Precautions:N/A   Braces: N/A  Respiratory Status: Room air    Exams:  Cognitive Exam:  Patient is oriented to Person, Place, Time, and Situation  Fine Motor Coordination:    -       Impaired  alternating palms up/down, alternating reciprocal heel/toe and Left hand thumb/finger opposition skills .  Gross Motor Coordination:  positive tremor> R  Sensation:    -       Intact  RUE Strength: WNL  LUE Strength: WFL  RLE Strength: WNL  LLE Strength: WFL    Functional Mobility:  Bed Mobility:     Supine to Sit: minimum assistance  Transfers:     Sit to Stand:  supervision with rolling walker  Bed to Chair: supervision with  rolling walker  using  Step Transfer  Gait: 45 ft with RW SBA      AM-PAC 6 CLICK MOBILITY  Total Score:21       Treatment & Education:  Pt educated in PT roles and goals, DC recommendations, fall prevention, use of call light, chair check    Patient left up in chair with all lines intact, call button in reach, chair alarm on, and nurse and MD present.    GOALS:   Multidisciplinary Problems       Physical Therapy Goals          Problem: Physical Therapy    Goal Priority Disciplines Outcome Interventions   Physical Therapy Goal     PT, PT/OT Progressing    Description: Goals to be met by: 25     Patient will increase functional independence with mobility by performin. Supine to sit with Modified [place]  2. Sit to supine with Modified [place]  3. Sit to stand transfer with  Modified [place]  4. Bed to chair transfer with Modified [place] using Rolling Walker  5. Gait  x 150 feet with Modified [place] using Rolling Walker.                          DME Justifications:  No DME recommended requiring DME justifications    History:     Past Medical History:   Diagnosis Date    Allergy     Sterling's esophagus     Colon polyp     Diverticulitis     Diverticulosis     Fatty liver     GERD (gastroesophageal reflux disease)     History of endoscopy 2021    Dr Uzair Cantor  Impression:            - Normal oropharynx.                         - Hiatal hernia.                         - Sterling's esophagus. Treated with radiofrequency                         ablation.                         - Normal stomach.                         - Normal examined duodenum.                         - No specimens collected.     Hx of cervical spine surgery 2018    Hyperlipidemia     Hypertension     Lupus (systemic lupus erythematosus)     discoid    Osteoarthritis     Osteopenia     Syncope     Tremor 2005    essential       Past Surgical History:   Procedure Laterality Date    ANGIOGRAM, CORONARY, WITH LEFT HEART CATHETERIZATION N/A 3/12/2024    Procedure: Angiogram, Coronary, with Left Heart Cath;  Surgeon: Fernando Valdivia MD;  Location: [facility] CATH/EP LAB;  Service: Cardiology;  Laterality: N/A;    APPENDECTOMY      removed during colon surgery    CERVICAL FUSION      CERVICAL SPINE SURGERY      Rods and screws.     SECTION      COLON SURGERY  2007    hemicolectomy    COLON SURGERY  2008    repair    COLONOSCOPY N/A 2018    Procedure: COLONOSCOPY;  Surgeon: Maverick Esquivel MD;  Location: [facility] ENDO;  Service: Endoscopy;  Laterality: N/A; repeat in 5 years for surveillance    COLONOSCOPY N/A 2023    Procedure: COLONOSCOPY;  Surgeon: Uzair Cantor MD;  Location: [facility] ENDO;  Service: Endoscopy;  Laterality: N/A;    CYSTOSCOPY N/A 2021    Procedure:  CYSTOSCOPY;  Surgeon: Allison Miranda MD;  Location: [facility] OR;  Service: Urology;  Laterality: N/A;    ESOPHAGOGASTRODUODENOSCOPY N/A 4/2/2019    Procedure: EGD (ESOPHAGOGASTRODUODENOSCOPY);  Surgeon: Monika Steele MD;  Location: [facility];  Service: Endoscopy;  Laterality: N/A;    ESOPHAGOGASTRODUODENOSCOPY  06/04/2019    with RFA and biopsies    ESOPHAGOGASTRODUODENOSCOPY N/A 6/4/2019    Procedure: EGD (ESOPHAGOGASTRODUODENOSCOPY);  Surgeon: Uzair Cantor MD;  Location: [facility];  Service: Endoscopy;  Laterality: N/A;  n+v with anesthesia    ESOPHAGOGASTRODUODENOSCOPY N/A 8/21/2019    Procedure: EGD (ESOPHAGOGASTRODUODENOSCOPY);  Surgeon: Uzair Cantor MD;  Location: [facility];  Service: Endoscopy;  Laterality: N/A;    ESOPHAGOGASTRODUODENOSCOPY N/A 10/7/2019    Procedure: EGD (ESOPHAGOGASTRODUODENOSCOPY);  Surgeon: Uzair Cantor MD;  Location: [facility];  Service: Endoscopy;  Laterality: N/A;    ESOPHAGOGASTRODUODENOSCOPY N/A 2/10/2020    Procedure: EGD (ESOPHAGOGASTRODUODENOSCOPY);  Surgeon: Uzair Cantor MD;  Location: [facility];  Service: Endoscopy;  Laterality: N/A;  Requests earliest procedure time    ESOPHAGOGASTRODUODENOSCOPY N/A 8/17/2020    Procedure: EGD (ESOPHAGOGASTRODUODENOSCOPY);  Surgeon: Uzair Cantor MD;  Location: [facility];  Service: Endoscopy;  Laterality: N/A;  BARRETTS    ESOPHAGOGASTRODUODENOSCOPY N/A 4/7/2021    Procedure: ESOPHAGOGASTRODUODENOSCOPY (EGD);  Surgeon: Uzair Cantor MD;  Location: [facility];  Service: Endoscopy;  Laterality: N/A;  Needs Rapid Covid MP    ESOPHAGOGASTRODUODENOSCOPY N/A 11/30/2021    Procedure: EGD (ESOPHAGOGASTRODUODENOSCOPY);  Surgeon: Uzair Cantor MD;  Location: [facility];  Service: Endoscopy;  Laterality: N/A;    ESOPHAGOGASTRODUODENOSCOPY N/A 6/27/2022    Procedure: EGD (ESOPHAGOGASTRODUODENOSCOPY);  Surgeon: Uzair Cantor MD;  Location: [facility];  Service: Endoscopy;  Laterality: N/A;  5/19/22: fully  vaccinated. instructions mailed-SC    loop recorder only    ESOPHAGOGASTRODUODENOSCOPY N/A 8/7/2023    Procedure: EGD (ESOPHAGOGASTRODUODENOSCOPY);  Surgeon: Uzair Cantor MD;  Location: [facility];  Service: Endoscopy;  Laterality: N/A;    HYSTERECTOMY      ovaries remain    INSERTION OF IMPLANTABLE LOOP RECORDER N/A 7/17/2020    Procedure: INSERTION, IMPLANTABLE LOOP RECORDER ([employer]TRONIC);  Surgeon: Karyna Vasques MD;  Location: [facility] CATH/EP LAB;  Service: Cardiology;  Laterality: N/A;    IVUS, CORONARY  3/12/2024    Procedure: IVUS, Coronary;  Surgeon: Fernando Valdivia MD;  Location: [facility] CATH/EP LAB;  Service: Cardiology;;    PERCUTANEOUS CORONARY INTERVENTION, ARTERY N/A 3/12/2024    Procedure: Percutaneous coronary intervention;  Surgeon: Fernando Valdivia MD;  Location: [facility] CATH/EP LAB;  Service: Cardiology;  Laterality: N/A;    SPLENECTOMY, TOTAL  2007    Injured during surgery and removed    UPPER GASTROINTESTINAL ENDOSCOPY  2007    GERD & hiatal hernia per patient report       Time Tracking:     PT Received On: 04/17/25  PT Start Time: 0910     PT Stop Time: 0940  PT Total Time (min): 30 min     Billable Minutes: Evaluation 16 and Therapeutic Activity 14      04/17/2025

## 2025-04-17 NOTE — DISCHARGE SUMMARY
[facility]  Discharge Summary      Patient Name: Kristin Guerra  MRN: [id number]  [facility]: [id number]  Patient Class: OP- Observation  Admission Date: 4/16/2025  Hospital Length of Stay: 0 days  Discharge Date and Time: 04/17/2025 2:55 PM  Attending Physician: Kermit Khan MD   Discharging Provider: Kermit Khan MD  Primary Care Provider: Hanane Zheng MD    Primary Care Team: Networked reference to record PCT     HPI:   Ms. Guerra is a 75 yr old female with a hx of HTN, essential tremor, COPD on 3 L NC prn, colon cancer, diverticulosis, cataracts, skin lupus, GERD, hiatal hernia, OA, restless legs syndrome, heart murmur, Barretts esophagus with dysplasia, splenectomy, anxiety, HLD, statin intolerance, vitamin-D deficiency, orthostatic hypotension, loop recorder, depression, asthma, osteopenia, syncope who presents to the ED with a chief complaint of near-syncope.  Patient endorses around 4:00 p.m. she was getting out of the shower when she began feeling lightheaded and got flushed.  She states that she slowly lowered herself to the ground and laid down in the bathroom for approximately 30 minutes.  She denies LOC or head trauma.  States that since that time she has been having left upper extremity weakness and heaviness.  She states that she feels like she is unable to move her left arm.  She denies slurred speech, facial asymmetry, vision changes.  She states that she has not walked since lying down in the bathroom as EMS was called to her home.  She endorses chronic dyspnea, nausea, vomiting, constipation, lightheadedness.  She states she smokes 1 pack of cigarettes a day and denies alcohol and recreational drug use.  She denies fever, chills, abdominal pain, diarrhea, dysuria, hematuria, dizziness, and syncope.    Upon arrival to ED, patient afebrile, HR 58, RR of 23, BP of 135/62, satting 92% on 3 L NC.  Workup in the ED revealed WBC of 16.62, sodium of 134.  Remainder  of labs fairly unremarkable.  CXR shows no acute process.  CT head with ill-defined region of low attenuation within the left basal ganglia.  Region of evolving infarction would be difficult to exclude.  No evidence of intracranial hemorrhage or mass effect.  CTA head and neck with abnormal examination findings of occlusion of the right common carotid with subsequent occlusion of the right cervical ICA with reconstitution of flow within the right distal cervical ICA.  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  High-grade stenosis of the proximal aspect of the left subclavian artery.  No additional large vessel occlusion in the intracranial circulation.  Patient was given ondansetron 4 mg IV while in the ED.  tele vascular Neurology consulted from the ED and recommended MRI.  Case discussed with ED provider and patient will be placed under observation for further management.    * No surgery found *      Hospital Course:   75-year-old female with PMH HTN, essential tremor, asthma/COPD, home O2, anxiety/depression, loop recorder is admitted for presyncopal event after getting out of hot shower associated with subjective left upper extremity weakness and numbness.  Continued on her aspirin and Zetia.  Statin intolerant.  CT head questioned unclear area in the left basal ganglia could not rule out infarct but MRI brain showed no acute infarct.  CTA head and neck showed occluded right common carotid, occluded right cervical ICA, occluded V4 segment left vertebral artery, high-grade stenosis proximal left subclavian artery.  MRI cervical spine also showed the right common carotid occlusion as well as chronic spinal findings.  Vascular Neurology saw the patient and reviewed the images without acute recommendations for the blockages noted, see their note.  Patient's symptoms improved to near resolution the following day.  Worked with PT/OT.  Set up up with home health.  Outpatient vascular neurology referral  placed.  Also referral placed to outpatient vascular surgery due to the mentioned weakness possibly being due to the left subclavian artery stenosis.  Recommended close follow up with PCP as well. By time of discharge, the patient was tolerating an appropriate diet with resolving admission symptoms. Patient seen and examined on day of discharge.    Physical exam on day of discharge:  General - Patient alert and oriented x3 in NAD  CV - Regular rate and rhythm   Resp - No increased WOB   Extrem-  No cyanosis, clubbing, edema.   Skin -  No obvious masses, rashes or lesions  Neuro - baseline essential tremor, left arm strength 4+/5 reported some posterior neck pain when testing the left arm, right arm 5/5 strength     Goals of Care Treatment Preferences:  Code Status: Full Code         Consults:   Consults (From admission, onward)          Status Ordering Provider     Inpatient consult to Registered Dietitian/Nutritionist  Once        Provider:  (Not yet assigned)    Completed MICAELA SUTHERLAND     IP consult to case management/social work  Once        Provider:  (Not yet assigned)    Acknowledged MICAELA SUTHERLAND     Consult to Telemedicine - Acute Stroke  Once        Provider:  (Not yet assigned)    Completed JIE KUHN            Final Active Diagnoses:    Diagnosis Date Noted POA    ICAO (internal carotid artery occlusion), right [I65.21] 04/17/2025 Yes    Occlusion of left vertebral artery [I65.02] 04/17/2025 Yes    Stenosis of left subclavian artery [I77.1] 04/17/2025 Yes    Chronic obstructive pulmonary disease [J44.9] 04/20/2023 Yes    Near syncope [R55] 06/02/2020 Yes    Essential tremor [G25.0] 09/04/2019 Yes    Essential hypertension, labile [I10] 04/17/2018 Yes     Chronic      Problems Resolved During this Admission:    Diagnosis Date Noted Date Resolved POA    PRINCIPAL PROBLEM:  Acute focal neurological deficit [R29.818] 04/16/2025 04/17/2025 Yes       Discharged Condition:  good    Disposition: Home-Health Care [facility]    Follow Up:   Follow-up Information       Tomas, Stacy B., NP. Go on 5/14/2025.    Specialties: Pulmonary Disease, Critical Care Medicine  Why: please go to hospital follow up appointment for 10AM  Contact information:  [address]  [address]  [place] [address]  [phone]               Mildred Vera MD. Go on 5/22/2025.    Specialty: Neurosurgery  Why: please go to hospital follow up appointment for 10AM  Contact information:  [address]  [facility] [address]  [phone]               Maehler, Jewel A., FNP. Go on 4/29/2025.    Specialty: Family Medicine  Why: Hospital follow up scheduled at 9:00 AM  Contact information:  [address]  [address]  [place] [address]  [phone]               [facility] Follow up.    Specialties: Home Health Services, Home Therapy Services, Home Living Aide Services  Contact information:  [address] [address]  [phone]                         Patient Instructions:      Ambulatory referral/consult to Vascular Neurology   Standing Status: Future   Referral Priority: Routine Referral Type: Consultation   Referral Reason: Specialty Services Required   Requested Specialty: Vascular Neurology   Number of Visits Requested: 1     Ambulatory referral/consult to Home Health   Standing Status: Future   Referral Priority: Routine Referral Type: Home Health   Referral Reason: Specialty Services Required   Requested Specialty: Home Health Services   Number of Visits Requested: 1     Ambulatory referral/consult to Vascular Surgery   Standing Status: Future   Referral Priority: Routine Referral Type: Consultation   Referral Reason: Specialty Services Required   Requested Specialty: Vascular Surgery   Number of Visits Requested: 1     Diet Cardiac   Order Comments: See Stroke Patient Education Guide Booklet for details.     Call 911 for any of the following:   Order Comments: Call 911   right away if any of the following warning signs come on suddenly, even if the symptoms only last for a few minutes. With stroke, timing is very important.   - Warning Signs of Stroke:  - Weakness: You may feel a sudden weakness, tingling or loss of feeling on one side of your face or body.  - Vision Problems: You may have sudden double vision or trouble seeing in one or both eyes.  - Speech Problems: You may have sudden trouble talking, slured speech, or problems understanding others.  - Headache: You may have sudden, severe headache.  - Movement Problems: You may experience dizziness, a feeling of spinning, a loss of balance, a feeling of falling or blackouts.     Activity as tolerated     Call MD for:  temperature >100.4     Call MD for:  persistent nausea and vomiting or diarrhea     Call MD for:  severe uncontrolled pain     Call MD for:  redness, tenderness, or signs of infection (pain, swelling, redness, odor or green/yellow discharge around incision site)     Call MD for:  difficulty breathing or increased cough     Call MD for:  severe persistent headache     Call MD for:  worsening rash     Call MD for:  persistent dizziness, light-headedness, or visual disturbances     Call MD for:  increased confusion or weakness       Significant Diagnostic Studies:     Lab Results   Component Value Date    WBC 14.47 (H) 04/17/2025    HGB 12.5 04/17/2025    HCT 38.4 04/17/2025    MCV 96 04/17/2025     04/17/2025       BMP  Lab Results   Component Value Date     04/17/2025    K 4.2 04/17/2025     07/24/2024    CO2 26 04/17/2025    BUN 14 04/17/2025    CREATININE 0.8 04/17/2025    CALCIUM 9.3 04/17/2025    ANIONGAP 10 07/24/2024    EGFRNORACEVR >60 04/17/2025       MRI Brain Without Contrast  Result Date: 4/16/2025  EXAMINATION: MRI BRAIN WITHOUT CONTRAST CLINICAL HISTORY: Stroke, follow up; TECHNIQUE: Multiplanar multisequence MR imaging of the brain was performed without contrast. COMPARISON: CT dated  04/16/2025. FINDINGS: The craniocervical junction is intact.  There is partial empty sella configuration.  There is decrease in the flow void in the left vertebral artery.  The remainder of the intracranial flow voids are within normal limits. No acute diffusion-weighted signal abnormality is present..  There are old infarctions within the periventricular white matter and subcortical white matter.  There are old infarctions in the bilateral basal ganglia. The ventricles and sulci are prominent, consistent cerebral volume loss.  There are T2/FLAIR signal hyperintensities within the periventricular and subcortical white matter.  There is no intracranial hemorrhage.  There is no evidence of mass effect. There are postoperative changes in the globes.  There is mucosal thickening within the ethmoid sinuses.  The mastoid air cells are clear.  The calvarium is intact.     No acute intracranial process.  Specifically, no evidence of acute infarction. Changes of chronic vessel ischemic disease and cerebral volume loss. Electronically signed by: Jean Carlos Nayak MD Date:    04/16/2025 Time:    22:42    MRI Cervical Spine Without Contrast  Result Date: 4/16/2025  EXAMINATION: MRI CERVICAL SPINE WITHOUT CONTRAST CLINICAL HISTORY: Myelopathy, acute, cervical spine. TECHNIQUE: Multiplanar, multisequence MR images of the cervical spine were acquired without the administration of contrast. COMPARISON: CTA dated 04/16/2024. FINDINGS: The craniocervical junction is intact.  The predental space is maintained.  No prevertebral soft tissue swelling is identified. There are postop changes of both the anterior and posterior instrumentation of the cervical spine.  The overall MRI appearance is unremarkable. The bone marrow signal is within normal limits.  There is duraplasty changes of the spinal canal with wide patency of the spinal canal from C3 through C5.  There is mild narrowing of the dorsal aspect of the spinal canal at the C5-C6 level.  The cord is normal in signal without evidence of edema or expansion. Evaluation of the individual disc levels reveals no significant spinal canal narrowing.  Overall assessment of degree of neural foraminal narrowing is difficult disease of the artifact from the hardware. There is absence of the flow void within the right common carotid artery in keeping with known occlusion.  The remainder of the flow voids within the neck appear within normal limits.     Changes of prior anterior and posterior instrumentation of the cervical spine.  Wide patency of the spinal canal with no appreciable mass effect within the spinal canal from C3 through C5. Minimal narrowing the dorsal aspect of the spinal canal at the C5-C6 level.  No cord signal abnormalities. Absence of the flow void within the right common carotid artery, in keeping with known occlusion. This report was flagged in Epic as abnormal. Electronically signed by: Jean Carlos Nayak MD Date:    04/16/2025 Time:    22:10    CTA Head and Neck (xpd)  Result Date: 4/16/2025  EXAMINATION: CTA HEAD AND NECK (XPD) CLINICAL HISTORY: Neuro deficit, acute, stroke suspected; TECHNIQUE: CT angiogram was performed from the level of the rupert to the top of the head following the IV administration of 100mL of Omnipaque 350.   Sagittal and coronal reconstructions and maximum intensity projection reconstructions were performed. Arterial stenosis percentages are based on NASCET measurement criteria. COMPARISON: CT head dated 04/16/2025. MRI brain dated 08/02/2021. Carotid ultrasound dated 08/02/2021. FINDINGS: CTA neck: There are no filling defects within the pulmonary tree to suggest pulmonary embolism. There are calcifications of the great vessels arising from the aortic arch.  There is complete occlusion of the right common carotid artery with subsequent occlusion of the right cervical ICA.  There is reconstitution of flow within the right distal cervical ICA.  There is high-grade  stenosis involving the proximal aspect of the left subclavian artery.  The left common carotid appears patent.  There are calcifications in the left common carotid.  The left internal external carotid arteries are within normal limits. The origins of the vertebral arteries are poorly delineated.  There is a dominant right vertebral artery.  The left vertebral artery is hypoplastic. CTA head: There are calcifications in the intracranial segments of the ICA.  The anterior cerebral arteries and anterior communicating artery complex are within normal limits.  The middle cerebral arteries are unremarkable. There is occlusion of the V4 segment of the left vertebral artery.  The right vertebral is unremarkable.  The basilar is within normal limits.  The bilateral superior cerebellar arteries are hypoplastic.  The posterior cerebral arteries are within normal limits. The dural venous sinuses are within normal limits. There is no abnormal intracranial enhancement. Additional findings: The soft tissue the neck are within normal limits.  There is no evidence of lymphadenopathy in the neck.  There are significant emphysematous changes in the lung fields.  There are degenerative changes in the cervical spine.  There are postoperative changes in the cervical spine there is no evidence of a fracture.     Abnormal examination findings of occlusion of the right common carotid with subsequent occlusion of the right cervical ICA with reconstitution of flow within the right distal cervical ICA.  Vascular neurology consultation is recommended. 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 High-grade stenosis of the proximal aspect of the left subclavian artery. No additional large vessel occlusion in the intracranial circulation. Case discussed with Dr. Lawrence on 04/16/2025 at 19:37 Electronically signed by: Jean Carlos Nayak MD Date:    04/16/2025 Time:    19:37    CT HEAD FOR CODE STROKE  Result Date: 4/16/2025  EXAMINATION:  CT HEAD FOR CODE STROKE CLINICAL HISTORY: Neuro deficit, acute, stroke suspected; TECHNIQUE: Low dose axial images were obtained through the head.  Coronal and sagittal reformations were also performed. Contrast was not administered. COMPARISON: CT head dated 10/10/2023. FINDINGS: The subcutaneous tissues are unremarkable.  The bony calvarium is intact.  The paranasal sinuses are unremarkable.  The mastoids are clear.  There are postoperative changes in the globes. The craniocervical junction is intact.  There is partial empty sella configuration.  There are no extra-axial fluid collections.  There is no evidence of intracranial hemorrhage. The ventricles and sulci are prominent, consistent cerebral volume loss.  There is ill-defined area of low attenuation within the left basal ganglia.  The remainder of the gray-white differentiation is maintained.  There is unchanged appearance of a chronic infarction in the right basal ganglia. There is no dense vessel sign.  There is no evidence of mass effect.     Ill-defined region of low attenuation within the left basal ganglia.  Region of evolving infarction would be difficult to exclude.  Consideration with MRI of the brain along with patient's clinical symptomatology, as clinically warranted. No evidence of intracranial hemorrhage or mass effect. This report was flagged in Epic as abnormal. Electronically signed by: Jean Carlos Nayak MD Date:    04/16/2025 Time:    19:22    X-Ray Chest AP Portable  Result Date: 4/16/2025  EXAMINATION: XR CHEST AP PORTABLE CLINICAL HISTORY: pain;pain; Shortness of breath TECHNIQUE: Single frontal view of the chest was performed. COMPARISON: 03/07/2024. FINDINGS: There are postop changes in the cervical spine.  There is a loop recorder in place.  Monitoring EKG leads are present. The trachea is unremarkable.  There are calcifications of the aortic knob.  The cardiomediastinal silhouette is within normal limits.  There are no pleural effusions.   There is no evidence of a pneumothorax.  There is no evidence of pneumomediastinum.  No airspace opacity is present. There is no evidence of free air beneath the hemidiaphragms.  There are degenerative changes in the osseous structures.     No acute process. Electronically signed by: Jean Carlos Nayak MD Date:    04/16/2025 Time:    19:16  - pulls last radiology orders      Pending Diagnostic Studies:       Procedure Component Value Units Date/Time    Echo Saline Bubble? Yes [[id number]]  (Abnormal) Resulted: 04/17/25 1039    Order Status: Sent Lab Status: In process Updated: 04/17/25 1039     Durbin's Biplane MOD Ejection Fraction 64 %      A2C EF 68 %      A4C EF 60 %      LVOT stroke volume 104.3 cm3      LVIDd 4.4 cm      LV Systolic Volume 33 mL      LV Systolic Volume Index 20.2 mL/m2      LVIDs 2.9 cm      LV ESV A2C 49.90 mL      LV Diastolic Volume 88 mL      LV ESV A4C 27.20 mL      LV Diastolic Volume Index 53.99 mL/m2      LV EDV A2C 56.613777443521475 mL      LV EDV A4C 53.40 mL      Left Ventricular End Systolic Volume by Teichholz Method 32.80 mL      Left Ventricular End Diastolic Volume by Teichholz Method 88.20 mL      IVS 1.2 cm      LVOT diameter 1.9 cm      LVOT area 2.8 cm2      FS 34.1 %      Left Ventricle Relative Wall Thickness 0.55 cm      PW 1.2 cm      LV mass 191.3 g      LV Mass Index 117.4 g/m2      MV Peak E Russ 0.84 m/s      TDI LATERAL 0.11 m/s      TDI SEPTAL 0.08 m/s      E/E' ratio 9 m/s      MV Peak A Russ 1.05 m/s      TR Max Russ 2.3 m/s      E/A ratio 0.80     E wave deceleration time 289 msec      LV SEPTAL E/E' RATIO 10.5 m/s      LV LATERAL E/E' RATIO 7.6 m/s      LVOT peak russ 1.7 m/s      Left Ventricular Outflow Tract Mean Velocity 1.09 cm/s      Left Ventricular Outflow Tract Mean Gradient 6.00 mmHg      RV S' 18.60 cm/s      TAPSE 2.50 cm      LA Vol (MOD) 37 mL      TEJ (MOD) 23 mL/m2      RA area length vol 53.70 mL      AV mean gradient 7 mmHg      AV peak gradient 13  mmHg      Ao peak reyes 1.8 m/s      Ao VTI 39.0 cm      LVOT peak VTI 36.8 cm      AV valve area 2.7 cm²      AV Velocity Ratio 0.94     AV index (prosthetic) 0.94     LOUIS by Velocity Ratio 2.7 cm²      MV mean gradient 2 mmHg      MV peak gradient 5 mmHg      MV stenosis pressure 1/2 time 115.00 ms      MV valve area p 1/2 method 1.91 cm2      MV valve area by continuity eq 2.94 cm2      MV VTI 35.5 cm      Triscuspid Valve Regurgitation Peak Gradient 21 mmHg      PV PEAK VELOCITY 1.00 m/s      PV peak gradient 4 mmHg      Pulmonary Valve Mean Velocity 0.67 m/s      Ao root annulus 3.20 cm      IVC diameter 1.33 cm      Mean e' 0.10 m/s      ZLVIDS 0.13     ZLVIDD -0.44     LA area A4C 13.50 cm2      LA area A2C 18.20 cm2      AORTIC VALVE CUSP SEPERATION 1.70 cm      BSA 1.66 m2            Medications:  Reconciled Home Medications:      Medication List        CONTINUE taking these medications      * albuterol 90 mcg/actuation inhaler  Commonly known as: VENTOLIN HFA  Inhale 2 puffs into the lungs every 4 (four) hours as needed. Rescue     * albuterol 2.5 mg /3 mL (0.083 %) nebulizer solution  Commonly known as: PROVENTIL  Take 3 mLs (2.5 mg total) by nebulization every 6 (six) hours as needed for Wheezing or Shortness of Breath. Rescue     aluminum & magnesium hydroxide-simethicone 400-400-40 mg/5 mL suspension  Commonly known as: MYLANTA MAX STRENGTH  Take 5 mLs by mouth every 6 (six) hours as needed for Indigestion.     amLODIPine 5 MG tablet  Commonly known as: NORVASC  Take 1 tablet (5 mg total) by mouth once daily.     ascorbic acid (vitamin C) 500 mg Cap  Take 500 mg by mouth once daily.     aspirin 81 MG Chew  Take 81 mg by mouth once daily.     budesonide-formoterol 160-4.5 mcg 160-4.5 mcg/actuation Hfaa  Commonly known as: SYMBICORT  Inhale 2 puffs into the lungs every 12 (twelve) hours. Controller     calcitRIOL 0.25 MCG Cap  Commonly known as: ROCALTROL  Take 1 capsule (0.25 mcg total) by mouth once  daily.     calcium-vitamin D3 500 mg-5 mcg (200 unit) per tablet  Commonly known as: OS-FIDE 500 + D3  Take 1 tablet by mouth 2 (two) times daily with meals.     esomeprazole 40 MG capsule  Commonly known as: NEXIUM  TAKE ONE CAPSULE BY MOUTH TWICE DAILY     ezetimibe 10 mg tablet  Commonly known as: ZETIA  Take 1 tablet (10 mg total) by mouth every evening.     famotidine 20 MG tablet  Commonly known as: PEPCID  Take 20 mg by mouth 2 (two) times daily.     gabapentin 300 MG capsule  Commonly known as: NEURONTIN  TAKE ONE CAPSULE BY MOUTH EVERY EVENING     MYRBETRIQ 50 mg Tb24  Generic drug: mirabegron  Take 1 tablet by mouth once daily.     nitroGLYCERIN 0.4 MG SL tablet  Commonly known as: NITROSTAT  Place 0.4 mg under the tongue every 5 (five) minutes as needed for Chest pain.     polyethylene glycol 17 gram Pwpk  Commonly known as: GLYCOLAX  Take 17 g by mouth daily as needed for Constipation.     SPIRIVA RESPIMAT 2.5 mcg/actuation inhaler  Generic drug: tiotropium bromide  Inhale 2 puffs into the lungs Daily. Controller     traZODone 50 MG tablet  Commonly known as: DESYREL  Take 50 mg by mouth nightly as needed for Insomnia.     VITAMIN B-6 100 MG Tab  Generic drug: pyridoxine (vitamin B6)  Take 50 mg by mouth once daily.           * This list has 2 medication(s) that are the same as other medications prescribed for you. Read the directions carefully, and ask your doctor or other care provider to review them with you.                ASK your doctor about these medications      dupilumab 300 mg/2 mL Syrg  Commonly known as: DUPIXENT  Inject 2 mLs (300 mg total) into the skin every 14 (fourteen) days.     oxybutynin 5 MG Tab  Commonly known as: DITROPAN  Take 5 mg by mouth once daily.              Indwelling Lines/Drains at time of discharge:   Lines/Drains/Airways       None                   Time spent on the discharge of patient: 35 minutes         Kermit Khan MD  Department of Hospital Medicine  [place]  [facility]

## 2025-04-17 NOTE — PLAN OF CARE
Met with patient and family at bedside to review discharge recommendation of HH and they are agreeable to plan.    Patient/family provided with a list of facilities in-network with patient's payor plan. Providers that are owned, operated, or affiliated with [facility] are included on the list.     Notified that referrals will be sent to the below listed facilities from in-network list based on proximity to home/family support:   [facility]       Patient/family instructed to identify preference.    Preferred Facility: (if more than 1, listed in order of descending preference)  [facility]     If an additional preferred facility not listed above is identified, additional referral to be sent. If above facilities unable to accept, will send additional referrals to in-network providers.        04/17/25 4852   Discharge Reassessment   Did the patient's condition or plan change since previous assessment? Yes   Discharge Plan discussed with: Patient;Adult children   Communicated ADIA with patient/caregiver Yes   Discharge Plan A Home Health   Transition of Care Barriers None   Post-Acute Status   Post-Acute Authorization Home Health   Home Health Status Referrals Sent   Patient choice form signed by patient/caregiver List from [employer]   Discharge Delays None known at this time

## 2025-04-17 NOTE — H&P
[facility] - Emergency Dept  Hospital Medicine  History & Physical    Patient Name: Kristin Guerra  MRN: [id number]  Patient Class: OP- Observation  Admission Date: 4/16/2025  Attending Physician: Nica Keith MD   Primary Care Provider: Gavi Mishra MD         Patient information was obtained from patient, past medical records, and ER records.     Subjective:     Principal Problem:Acute focal neurological deficit    Chief Complaint:   Chief Complaint   Patient presents with    Loss of Consciousness     Was having a bowel movement and had syncopal episode        HPI: Ms. Guerra is a 75 yr old female with a hx of HTN, essential tremor, COPD on 3 L NC prn, colon cancer, diverticulosis, cataracts, skin lupus, GERD, hiatal hernia, OA, restless legs syndrome, heart murmur, Barretts esophagus with dysplasia, splenectomy, anxiety, HLD, statin intolerance, vitamin-D deficiency, orthostatic hypotension, loop recorder, depression, asthma, osteopenia, syncope who presents to the ED with a chief complaint of near-syncope.  Patient endorses around 4:00 p.m. she was getting out of the shower when she began feeling lightheaded and got flushed.  She states that she slowly lowered herself to the ground and laid down in the bathroom for approximately 30 minutes.  She denies LOC or head trauma.  States that since that time she has been having left upper extremity weakness and heaviness.  She states that she feels like she is unable to move her left arm.  She denies slurred speech, facial asymmetry, vision changes.  She states that she has not walked since lying down in the bathroom as EMS was called to her home.  She endorses chronic dyspnea, nausea, vomiting, constipation, lightheadedness.  She states she smokes 1 pack of cigarettes a day and denies alcohol and recreational drug use.  She denies fever, chills, abdominal pain, diarrhea, dysuria, hematuria, dizziness, and syncope.    Upon arrival to ED, patient  afebrile, HR 58, RR of 23, BP of 135/62, satting 92% on 3 L NC.  Workup in the ED revealed WBC of 16.62, sodium of 134.  Remainder of labs fairly unremarkable.  CXR shows no acute process.  CT head with ill-defined region of low attenuation within the left basal ganglia.  Region of evolving infarction would be difficult to exclude.  No evidence of intracranial hemorrhage or mass effect.  CTA head and neck with abnormal examination findings of occlusion of the right common carotid with subsequent occlusion of the right cervical ICA with reconstitution of flow within the right distal cervical ICA.  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  High-grade stenosis of the proximal aspect of the left subclavian artery.  No additional large vessel occlusion in the intracranial circulation.  Patient was given ondansetron 4 mg IV while in the ED.  tele vascular Neurology consulted from the ED and recommended MRI.  Case discussed with ED provider and patient will be placed under observation for further management.    Past Medical History:   Diagnosis Date    Allergy     Sterling's esophagus     Colon polyp     Diverticulitis     Diverticulosis     Fatty liver     GERD (gastroesophageal reflux disease)     History of endoscopy 04/07/2021    Dr Uzair Cantor  Impression:            - Normal oropharynx.                         - Hiatal hernia.                         - Sterling's esophagus. Treated with radiofrequency                         ablation.                         - Normal stomach.                         - Normal examined duodenum.                         - No specimens collected.     Hx of cervical spine surgery 11/07/2018    Hyperlipidemia     Hypertension     Lupus (systemic lupus erythematosus) 1999    discoid    Osteoarthritis     Osteopenia     Syncope     Tremor 2005    essential       Past Surgical History:   Procedure Laterality Date    ANGIOGRAM, CORONARY, WITH LEFT HEART CATHETERIZATION N/A  3/12/2024    Procedure: Angiogram, Coronary, with Left Heart Cath;  Surgeon: Fernando Valdivia MD;  Location: [facility] CATH/EP LAB;  Service: Cardiology;  Laterality: N/A;    APPENDECTOMY      removed during colon surgery    CERVICAL FUSION      CERVICAL SPINE SURGERY      Rods and screws.     SECTION      COLON SURGERY      hemicolectomy    COLON SURGERY  2008    repair    COLONOSCOPY N/A 2018    Procedure: COLONOSCOPY;  Surgeon: Maverick Esquivel MD;  Location: [facility];  Service: Endoscopy;  Laterality: N/A; repeat in 5 years for surveillance    COLONOSCOPY N/A 2023    Procedure: COLONOSCOPY;  Surgeon: Uzair Cantor MD;  Location: [facility];  Service: Endoscopy;  Laterality: N/A;    CYSTOSCOPY N/A 2021    Procedure: CYSTOSCOPY;  Surgeon: Allison Miranda MD;  Location: [facility] OR;  Service: Urology;  Laterality: N/A;    ESOPHAGOGASTRODUODENOSCOPY N/A 2019    Procedure: EGD (ESOPHAGOGASTRODUODENOSCOPY);  Surgeon: Monika Steele MD;  Location: [facility];  Service: Endoscopy;  Laterality: N/A;    ESOPHAGOGASTRODUODENOSCOPY  2019    with RFA and biopsies    ESOPHAGOGASTRODUODENOSCOPY N/A 2019    Procedure: EGD (ESOPHAGOGASTRODUODENOSCOPY);  Surgeon: Uzair Cantor MD;  Location: [facility];  Service: Endoscopy;  Laterality: N/A;  n+v with anesthesia    ESOPHAGOGASTRODUODENOSCOPY N/A 2019    Procedure: EGD (ESOPHAGOGASTRODUODENOSCOPY);  Surgeon: Uzair Cantor MD;  Location: [facility];  Service: Endoscopy;  Laterality: N/A;    ESOPHAGOGASTRODUODENOSCOPY N/A 10/7/2019    Procedure: EGD (ESOPHAGOGASTRODUODENOSCOPY);  Surgeon: Uzair Cantor MD;  Location: [facility];  Service: Endoscopy;  Laterality: N/A;    ESOPHAGOGASTRODUODENOSCOPY N/A 2/10/2020    Procedure: EGD (ESOPHAGOGASTRODUODENOSCOPY);  Surgeon: Uzair Cantor MD;  Location: [facility];  Service: Endoscopy;  Laterality: N/A;  Requests earliest procedure time    ESOPHAGOGASTRODUODENOSCOPY N/A  "8/17/2020    Procedure: EGD (ESOPHAGOGASTRODUODENOSCOPY);  Surgeon: Uzair Cantor MD;  Location: [facility] ENDO;  Service: Endoscopy;  Laterality: N/A;  BARRETTS    ESOPHAGOGASTRODUODENOSCOPY N/A 4/7/2021    Procedure: ESOPHAGOGASTRODUODENOSCOPY (EGD);  Surgeon: Uzair Cantor MD;  Location: [facility] ENDO;  Service: Endoscopy;  Laterality: N/A;  Needs Rapid Covid MP    ESOPHAGOGASTRODUODENOSCOPY N/A 11/30/2021    Procedure: EGD (ESOPHAGOGASTRODUODENOSCOPY);  Surgeon: Uzair Cantor MD;  Location: [facility] ENDO;  Service: Endoscopy;  Laterality: N/A;    ESOPHAGOGASTRODUODENOSCOPY N/A 6/27/2022    Procedure: EGD (ESOPHAGOGASTRODUODENOSCOPY);  Surgeon: Uzair Cantor MD;  Location: [facility] ENDO;  Service: Endoscopy;  Laterality: N/A;  5/19/22: fully vaccinated. instructions mailed-[place]    loop recorder only    ESOPHAGOGASTRODUODENOSCOPY N/A 8/7/2023    Procedure: EGD (ESOPHAGOGASTRODUODENOSCOPY);  Surgeon: Uzair Cantor MD;  Location: [facility];  Service: Endoscopy;  Laterality: N/A;    HYSTERECTOMY      ovaries remain    INSERTION OF IMPLANTABLE LOOP RECORDER N/A 7/17/2020    Procedure: INSERTION, IMPLANTABLE LOOP RECORDER (MEDTRONIC);  Surgeon: Karyna Vasques MD;  Location: [facility] CATH/EP LAB;  Service: Cardiology;  Laterality: N/A;    IVUS, CORONARY  3/12/2024    Procedure: IVUS, Coronary;  Surgeon: Fernando Valdivia MD;  Location: [facility] CATH/EP LAB;  Service: Cardiology;;    PERCUTANEOUS CORONARY INTERVENTION, ARTERY N/A 3/12/2024    Procedure: Percutaneous coronary intervention;  Surgeon: Fernando Valdivia MD;  Location: [facility] CATH/EP LAB;  Service: Cardiology;  Laterality: N/A;    SPLENECTOMY, TOTAL  2007    Injured during surgery and removed    UPPER GASTROINTESTINAL ENDOSCOPY  2007    GERD & hiatal hernia per patient report       Review of patient's allergies indicates:   Allergen Reactions    Doxycycline Itching and Swelling     Prescribed 5/2021, took one dose, describes lip swelling, and "itch all " "over"    Mirtazapine Other (See Comments)     Hyperactivity.    Morphine Swelling and Rash     Other reaction(s): swelling in limbs    Lexapro [escitalopram oxalate] Other (See Comments)     Worsening tremor.  Sedation.    Primidone Other (See Comments)     Syncope.  Other reaction(s): dizziness,hypotension  Other reaction(s): Unknown  Other reaction(s): Unknown    Simvastatin Nausea And Vomiting and Other (See Comments)     Weakness.  Other reaction(s): nausea,vomiting    Beta-adrenergic agents Other (See Comments)     Low blood pressure and fainted.     Metoprolol succinate Other (See Comments)    Prochlorperazine Other (See Comments)     Other reaction(s): weakness    Topiramate     Compazine [prochlorperazine edisylate] Nausea Only    Tramadol Nausea And Vomiting       Current Facility-Administered Medications on File Prior to Encounter   Medication    sodium chloride 0.9% flush 2 mL     Current Outpatient Medications on File Prior to Encounter   Medication Sig    oxybutynin (DITROPAN) 5 MG Tab Take 5 mg by mouth once daily.    albuterol (PROVENTIL) 2.5 mg /3 mL (0.083 %) nebulizer solution Take 3 mLs (2.5 mg total) by nebulization every 6 (six) hours as needed for Wheezing or Shortness of Breath. Rescue    albuterol (VENTOLIN HFA) 90 mcg/actuation inhaler Inhale 2 puffs into the lungs every 4 (four) hours as needed. Rescue    aluminum & magnesium hydroxide-simethicone (MYLANTA MAX STRENGTH) 400-400-40 mg/5 mL suspension Take 5 mLs by mouth every 6 (six) hours as needed for Indigestion.     amLODIPine (NORVASC) 5 MG tablet Take 1 tablet (5 mg total) by mouth once daily.    ascorbic acid, vitamin C, 500 mg Cap as directed Orally once daily    aspirin 81 MG Chew Take 81 mg by mouth once daily.    azelastine (ASTELIN) 137 mcg (0.1 %) nasal spray 1 SPRAY BY NASAL ROUTE TWICE DAILY    budesonide-formoterol 160-4.5 mcg (SYMBICORT) 160-4.5 mcg/actuation HFAA Inhale 2 puffs into the lungs every 12 (twelve) hours. " Controller    calcitRIOL (ROCALTROL) 0.25 MCG Cap Take 1 capsule (0.25 mcg total) by mouth once daily.    calcium-vitamin D3 (OS-FIDE 500 + D3) 500 mg-5 mcg (200 unit) per tablet Take 1 tablet by mouth 2 (two) times daily with meals.    cefadroxil (DURICEF) 500 MG Cap Take 1 capsule (500 mg total) by mouth every 12 (twelve) hours.    cholecalciferol, vitamin D3, (VITAMIN D3) 50 mcg (2,000 unit) Cap capsule 1 capsule.    clobetasoL (TEMOVATE) 0.05 % cream Apply to feet daily to bid prn flare    clonazePAM (KLONOPIN) 0.5 MG tablet TAKE 1/2 TABLET(0.25 MG) BY MOUTH TWICE DAILY AS NEEDED FOR ANXIETY OR INSOMNIA    dupilumab (DUPIXENT) 300 mg/2 mL Syrg Inject 2 mLs (300 mg total) into the skin every 14 (fourteen) days.    esomeprazole (NEXIUM) 40 MG capsule TAKE ONE CAPSULE BY MOUTH TWICE DAILY (Patient not taking: Reported on 3/10/2025)    ezetimibe (ZETIA) 10 mg tablet Take 1 tablet (10 mg total) by mouth every evening.    famotidine (PEPCID) 20 MG tablet Take 20 mg by mouth 2 (two) times daily.    fluticasone propionate (FLONASE) 50 mcg/actuation nasal spray 1 spray (50 mcg total) by Each Nostril route 2 (two) times daily.    gabapentin (NEURONTIN) 300 MG capsule TAKE ONE CAPSULE BY MOUTH EVERY EVENING    mirabegron (MYRBETRIQ) 25 mg Tb24 ER tablet Take 1 tablet (25 mg total) by mouth once daily.    MYRBETRIQ 50 mg Tb24 Take 1 tablet by mouth.    nitroGLYCERIN (NITROSTAT) 0.4 MG SL tablet Place 0.4 mg under the tongue.    ofloxacin (OCUFLOX) 0.3 % ophthalmic solution Place 1 drop into the left eye 4 (four) times daily.    polyethylene glycol (GLYCOLAX) 17 gram PwPk Take 17 g by mouth daily as needed. (Patient not taking: Reported on 3/10/2025)    predniSONE (DELTASONE) 10 MG tablet Take one pill a day for three days, repeat for shortness of breath (Patient not taking: Reported on 3/10/2025)    pyridoxine, vitamin B6, (VITAMIN B-6) 100 MG Tab Take 50 mg by mouth once daily.    tiotropium bromide (SPIRIVA RESPIMAT) 2.5  mcg/actuation inhaler Inhale 2 puffs into the lungs Daily. Controller    traZODone (DESYREL) 50 MG tablet 1 tablet at bedtime as needed Orally Once a day for 30 days (Patient not taking: Reported on 3/10/2025)    ZIRGAN 0.15 % Gel Place into both eyes. (Patient not taking: Reported on 3/10/2025)     Family History       Problem Relation (Age of Onset)    Breast cancer Maternal Aunt    Cancer Brother    Colon cancer Father    Heart disease Brother    Hyperlipidemia Brother    Lung cancer Father, Brother, Brother    Lymphoma Mother    Prostate cancer Brother          Tobacco Use    Smoking status: Every Day     Current packs/day: 1.00     Average packs/day: 1 pack/day for 35.0 years (35.0 ttl pk-yrs)     Types: Cigarettes     Passive exposure: Current    Smokeless tobacco: Never   Substance and Sexual Activity    Alcohol use: No    Drug use: No    Sexual activity: Not Currently     Review of Systems   Constitutional:  Negative for chills and fever.   Eyes:  Negative for visual disturbance.   Respiratory:  Positive for shortness of breath.    Cardiovascular:  Negative for chest pain.   Gastrointestinal:  Positive for constipation, nausea and vomiting. Negative for abdominal pain and diarrhea.   Genitourinary:  Negative for dysuria and hematuria.   Neurological:  Positive for weakness (left upper extremity) and light-headedness. Negative for dizziness, syncope, facial asymmetry, speech difficulty and numbness.        Positive for near-syncope     Objective:     Vital Signs (Most Recent):  Temp: 97.6 °F (36.4 °C) (04/16/25 1806)  Pulse: 74 (04/16/25 2030)  Resp: (!) 22 (04/16/25 2030)  BP: (!) 137/54 (04/16/25 2030)  SpO2: 98 % (04/16/25 2030) Vital Signs (24h Range):  Temp:  [97.6 °F (36.4 °C)] 97.6 °F (36.4 °C)  Pulse:  [58-74] 74  Resp:  [18-23] 22  SpO2:  [92 %-100 %] 98 %  BP: (131-163)/(54-67) 137/54     Weight: 64.5 kg (142 lb 3.2 oz)  Body mass index is 26.87 kg/m².     Physical Exam  Vitals reviewed.    Constitutional:       General: She is awake. She is not in acute distress.     Appearance: Normal appearance. She is not ill-appearing or diaphoretic.   Cardiovascular:      Rate and Rhythm: Normal rate.      Heart sounds: Normal heart sounds. No murmur heard.     No friction rub. No gallop.   Pulmonary:      Effort: Pulmonary effort is normal. No accessory muscle usage or respiratory distress.      Breath sounds: Normal breath sounds. No wheezing, rhonchi or rales.   Abdominal:      General: Abdomen is flat. Bowel sounds are normal.      Palpations: Abdomen is soft.      Tenderness: There is no abdominal tenderness. There is no guarding or rebound.   Musculoskeletal:      Right lower leg: No edema.      Left lower leg: No edema.   Skin:     General: Skin is warm and dry.   Neurological:      Mental Status: She is alert and oriented to person, place, and time.      Comments: Patient moving all extremities and carrying on conversation.  No slurred speech or facial asymmetry appreciated.  4/5 strength to left upper extremity.  5/5 strength to right upper extremity and bilateral lower extremities.  Decreased  strength on the left.  Sensation intact to bilateral face as well as bilateral upper and lower extremities.  Normal heel-to-shin.  No other deficits appreciated on exam.   Psychiatric:         Behavior: Behavior is cooperative.                Significant Labs: All pertinent labs within the past 24 hours have been reviewed.  CBC:   Recent Labs   Lab 04/16/25  1833   WBC 16.62*   HGB 12.7   HCT 38.3        CMP:   Recent Labs   Lab 04/16/25  1833   *   K 3.9   CO2 26   BUN 13   CREATININE 0.7   CALCIUM 9.5   ALBUMIN 4.0   BILITOT 0.3   ALKPHOS 62   AST 14   ALT 11     Cardiac Markers:   Recent Labs   Lab 04/16/25  1833   BNP 25     Coagulation:   Recent Labs   Lab 04/16/25  1941   INR 1.0     Lipid Panel:   Recent Labs   Lab 04/16/25  1833   CHOL 186   HDL 56   TRIG 54     Magnesium:   Recent Labs    Lab 04/16/25  1833   MG 2.0     TSH:   Recent Labs   Lab 04/16/25  1833   TSH 2.357       Significant Imaging:   Imaging Results              MRI Brain Without Contrast (In process)  Result time 04/16/25 21:30:05                     MRI Cervical Spine Without Contrast (In process)  Result time 04/16/25 21:30:00                     CTA Head and Neck (xpd) (Final result)  Result time 04/16/25 19:37:59      Final result by Jean Carlos Nayak MD (04/16/25 19:37:59)                   Impression:      Abnormal examination findings of occlusion of the right common carotid with subsequent occlusion of the right cervical ICA with reconstitution of flow within the right distal cervical ICA.  Vascular neurology consultation is recommended.    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    High-grade stenosis of the proximal aspect of the left subclavian artery.    No additional large vessel occlusion in the intracranial circulation.    Case discussed with Dr. Lawrence on 04/16/2025 at 19:37      Electronically signed by: Jean Carlos Nayak MD  Date:    04/16/2025  Time:    19:37               Narrative:    EXAMINATION:  CTA HEAD AND NECK (XPD)    CLINICAL HISTORY:  Neuro deficit, acute, stroke suspected;    TECHNIQUE:  CT angiogram was performed from the level of the rupert to the top of the head following the IV administration of 100mL of Omnipaque 350.   Sagittal and coronal reconstructions and maximum intensity projection reconstructions were performed. Arterial stenosis percentages are based on NASCET measurement criteria.    COMPARISON:  CT head dated 04/16/2025.    MRI brain dated 08/02/2021.    Carotid ultrasound dated 08/02/2021.    FINDINGS:  CTA neck:    There are no filling defects within the pulmonary tree to suggest pulmonary embolism.    There are calcifications of the great vessels arising from the aortic arch.  There is complete occlusion of the right common carotid artery with subsequent occlusion of the  right cervical ICA.  There is reconstitution of flow within the right distal cervical ICA.  There is high-grade stenosis involving the proximal aspect of the left subclavian artery.  The left common carotid appears patent.  There are calcifications in the left common carotid.  The left internal external carotid arteries are within normal limits.    The origins of the vertebral arteries are poorly delineated.  There is a dominant right vertebral artery.  The left vertebral artery is hypoplastic.    CTA head:    There are calcifications in the intracranial segments of the ICA.  The anterior cerebral arteries and anterior communicating artery complex are within normal limits.  The middle cerebral arteries are unremarkable.    There is occlusion of the V4 segment of the left vertebral artery.  The right vertebral is unremarkable.  The basilar is within normal limits.  The bilateral superior cerebellar arteries are hypoplastic.  The posterior cerebral arteries are within normal limits.    The dural venous sinuses are within normal limits.    There is no abnormal intracranial enhancement.    Additional findings:    The soft tissue the neck are within normal limits.  There is no evidence of lymphadenopathy in the neck.  There are significant emphysematous changes in the lung fields.  There are degenerative changes in the cervical spine.  There are postoperative changes in the cervical spine there is no evidence of a fracture.                                        CT HEAD FOR CODE STROKE (Final result)  Result time 04/16/25 19:22:34      Final result by Jean Carlos Nayak MD (04/16/25 19:22:34)                   Impression:      Ill-defined region of low attenuation within the left basal ganglia.  Region of evolving infarction would be difficult to exclude.  Consideration with MRI of the brain along with patient's clinical symptomatology, as clinically warranted.    No evidence of intracranial hemorrhage or mass effect.    This  report was flagged in Epic as abnormal.      Electronically signed by: Jean Carlos Nayak MD  Date:    04/16/2025  Time:    19:22               Narrative:    EXAMINATION:  CT HEAD FOR CODE STROKE    CLINICAL HISTORY:  Neuro deficit, acute, stroke suspected;    TECHNIQUE:  Low dose axial images were obtained through the head.  Coronal and sagittal reformations were also performed. Contrast was not administered.    COMPARISON:  CT head dated 10/10/2023.    FINDINGS:  The subcutaneous tissues are unremarkable.  The bony calvarium is intact.  The paranasal sinuses are unremarkable.  The mastoids are clear.  There are postoperative changes in the globes.    The craniocervical junction is intact.  There is partial empty sella configuration.  There are no extra-axial fluid collections.  There is no evidence of intracranial hemorrhage.    The ventricles and sulci are prominent, consistent cerebral volume loss.  There is ill-defined area of low attenuation within the left basal ganglia.  The remainder of the gray-white differentiation is maintained.  There is unchanged appearance of a chronic infarction in the right basal ganglia.    There is no dense vessel sign.  There is no evidence of mass effect.                                       X-Ray Chest AP Portable (Final result)  Result time 04/16/25 19:16:49   Procedure changed from X-Ray Chest PA And Lateral     Final result by Jean Carlos Nayak MD (04/16/25 19:16:49)                   Impression:      No acute process.      Electronically signed by: Jean Carlos Nayak MD  Date:    04/16/2025  Time:    19:16               Narrative:    EXAMINATION:  XR CHEST AP PORTABLE    CLINICAL HISTORY:  pain;pain; Shortness of breath    TECHNIQUE:  Single frontal view of the chest was performed.    COMPARISON:  03/07/2024.    FINDINGS:  There are postop changes in the cervical spine.  There is a loop recorder in place.  Monitoring EKG leads are present.    The trachea is unremarkable.  There are  calcifications of the aortic knob.  The cardiomediastinal silhouette is within normal limits.  There are no pleural effusions.  There is no evidence of a pneumothorax.  There is no evidence of pneumomediastinum.  No airspace opacity is present.    There is no evidence of free air beneath the hemidiaphragms.  There are degenerative changes in the osseous structures.                                     Assessment/Plan:     Assessment & Plan  Acute focal neurological deficit  - CT head with ill-defined region of low attenuation within the left basal ganglia.  Region of evolving infarction would be difficult to exclude.  No evidence of intracranial hemorrhage or mass effect.  - CTA head and neck with abnormal examination findings of occlusion of the right common carotid with subsequent occlusion of the right cervical ICA with reconstitution of flow within the right distal cervical ICA.  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  High-grade stenosis of the proximal aspect of the left subclavian artery.  No additional large vessel occlusion in the intracranial circulation.  - On stroke pathway  - Neurology consulted, appreciate recs  - PT/OT/SLP consulted, appreciate recs  - Resume home daily ASA  -  mg oral x1 ordered  - Tele vascular neurology has recommended MRI prior to starting DAPT  - NPO until bedside knapp passed  - Lipid and TSH WNL  - A1c pending  - TTE with bubble study pending  - MRI pending  - Permissive HTN not recommended at this time by tele vascular Neurology, pending MRI results  - Neuro checks Q4H  - Fall and aspiration precautions  Essential hypertension, labile  Chronic,  Latest blood pressure and vitals reviewed-     Temp:  [97.6 °F (36.4 °C)]   Pulse:  [58-74]   Resp:  [18-23]   BP: (131-163)/(54-67)   SpO2:  [92 %-100 %] .   Home meds for hypertension were reviewed and noted below-  Hypertension Medications              amLODIPine (NORVASC) 5 MG tablet Take 1 tablet (5 mg  total) by mouth once daily.    nitroGLYCERIN (NITROSTAT) 0.4 MG SL tablet Place 0.4 mg under the tongue.            While in the hospital, will manage blood pressure as follows; Continue home antihypertensive regimen    Will utilize p.r.n. blood pressure medication only if patient's blood pressure greater than 180/110 and she develops symptoms such as worsening chest pain or shortness of breath.    Essential tremor  - Hx noted    Near syncope  - Presented to the ED with chief complaint of near-syncope  - Also getting stroke w/u, see plan for acute focal neuro deficit  - CXR negative for infection  - COVID/Flu pending  - UA and UDS pending  - Not hypoglycemic on CMP, or hypotensive on arrival  - Orthostatic BP pending  - TSH and lipid WNL  - A1c pending  - TTE pending  - Continuous tele monitoring  - Fall precautions   Chronic obstructive pulmonary disease  Patient's COPD is controlled currently.  Patient is currently off COPD Pathway. Continue scheduled inhalers Supplemental oxygen and monitor respiratory status closely.     - DuoNebs Q6H PRN  VTE Risk Mitigation (From admission, onward)           Ordered     enoxaparin injection 40 mg  Daily         04/16/25 2107     IP VTE HIGH RISK PATIENT  Once         04/16/25 2107     Place sequential compression device  Until discontinued         04/16/25 2107                         On 04/16/2025, patient should be placed in hospital observation services under my care in collaboration with Nica Keith MD.           Shaye Luque PA-C  Department of Hospital Medicine  [facility] - Emergency Dept

## 2025-04-17 NOTE — HOSPITAL COURSE
75-year-old female with PMH HTN, essential tremor, asthma/COPD, home O2, anxiety/depression, loop recorder is admitted for presyncopal event after getting out of hot shower associated with subjective left upper extremity weakness and numbness.  Continued on her aspirin and Zetia.  Statin intolerant.  CT head questioned unclear area in the left basal ganglia could not rule out infarct but MRI brain showed no acute infarct.  CTA head and neck showed occluded right common carotid, occluded right cervical ICA, occluded V4 segment left vertebral artery, high-grade stenosis proximal left subclavian artery.  MRI cervical spine also showed the right common carotid occlusion as well as chronic spinal findings.  Vascular Neurology saw the patient and reviewed the images without acute recommendations for the blockages noted, see their note.  Patient's symptoms improved to near resolution the following day.  Worked with PT/OT.  Set up up with home health.  Outpatient vascular neurology referral placed.  Also referral placed to outpatient vascular surgery due to the mentioned weakness possibly being due to the left subclavian artery stenosis.  Recommended close follow up with PCP as well. By time of discharge, the patient was tolerating an appropriate diet with resolving admission symptoms. Patient seen and examined on day of discharge.    Physical exam on day of discharge:  General - Patient alert and oriented x3 in NAD  CV - Regular rate and rhythm   Resp - No increased WOB   Extrem-  No cyanosis, clubbing, edema.   Skin -  No obvious masses, rashes or lesions  Neuro - baseline essential tremor, left arm strength 4+/5 reported some posterior neck pain when testing the left arm, right arm 5/5 strength

## 2025-04-17 NOTE — TELEMEDICINE CONSULT
[facility]  Vascular Neurology  Comprehensive Stroke Center  TeleVascular Neurology Interprofessional Consult Note           Consult Information  Consult to Telemedicine - Acute Stroke  Consult performed by: Mignon Horan MD  Consult ordered by: Roger Kuhn MD          Consulting Provider: ROGER KUHN   Patient Location:  [facility] EMERGENCY DEPARTMENT     Summary of patient's symptoms:      75 year old female with medical history of HTN,  HLD, CDDD, central tremors, chronic dizziness - with admission concerns of  focal neurological deficits. And Stroke code / neurology called in for the same.      History from patient / family / primary team and medical records review. Symptom spectrum of dizziness and generalized weakness, in chronic spectrum pattern however with add on symptomatology of LUE distal weakness, with LKN <4.5 hrs prior to ED arrival. No other focal motor or sensory or cranial nerve deficits. No associated complex headaches or clinical seizures. No similar events in the past. No history of strokes, epilepsy or complex migraines.     Exam: awake, alert, following commands, no obvious aphasia or neglect or visual field deficits, LUE weakness - however able to antigravitate wo drift / + action tremor confounder / otherwise no other focal motor or sensory or cranial nerve deficits     Recs:    Low suspicion for focal cerebrovascular ischemic event or epileptic or neuro inflammatory etiology - however shall r/o.      Follow up   - MRI brain WO contrast    Interim   - need no permissive HTN or DAPT / await advanced neuroimaging reports     If MRI brain positive for acute infarct follow the below   -  / Plavix 300 mg load if not as home med prior - followed by DAPT X 21 day, ASA 81/Plavix 75, with asa thereafter  - target LDL < 70 / Continue atorvastatin  - euglycemic goals   - permissive HTN x 48-72 hrs post index event / long term < 130/80  - Echo f/u  for any immediate high risk cardio embolic etiologies / needing considerations for AC    - 30 day event monitor at Dc   - PT/OT recs - discharge planning   - F/u PCP for risk factor modification monitoring  -  on DASH diet; exercise and lifestyle modifications when appropriate   - Provide stroke counseling during the visit - Identification of signs; emergency action and ER attention; Risk factor control, optimization for secondary stroke prevention; Compliance to medications   - F/u tele vascular neurology     If MRI brain negative for acute ischemic etiology - consider management of alternative DDx as above   - Supportive care / PT-OT assessment / gait assistance - fall precautions   - PRN MRI C spine / OP neurology     Images personally reviewed and interpreted:  Study: Head CT and CTA Head & Neck  Study Interpretation: CT head negative for acute findings / no evidence of early or subacute ischemic changes, intracerebral hemorrhage or hyperdense vessel signs  CTA negative for any LVO or MeVO in the pertinent vascular territory of interest - no targets identified for acute or urgent reperfusion therapy. Negative for correlating intracranial or extracranial MARIBEL pathology.       I spent approximately 15 minutes on this encounter. More than half of that time was spent communicating with the consulting provider and coordinating patient care.       Mignon Horan MD        This encounter was conducted as an interprofessional communication between providers at the [facility] and vascular neurologist. The interaction was completed over the phone or via secure messaging (electronic medical record - [employer] Secure Chat).     Once this note was completed, a written copy was sent back to the provider via fax or electronic medical record.

## 2025-04-17 NOTE — ASSESSMENT & PLAN NOTE
- Presented to the ED with chief complaint of near-syncope  - Also getting stroke w/u, see plan for acute focal neuro deficit  - CXR negative for infection  - COVID/Flu pending  - UA and UDS pending  - Not hypoglycemic on CMP, or hypotensive on arrival  - Orthostatic BP pending  - TSH and lipid WNL  - A1c pending  - TTE pending  - Continuous tele monitoring  - Fall precautions

## 2025-04-17 NOTE — HPI
Ms. Guerra is a 75 yr old female with a hx of HTN, essential tremor, COPD on 3 L NC prn, colon cancer, diverticulosis, cataracts, skin lupus, GERD, hiatal hernia, OA, restless legs syndrome, heart murmur, Barretts esophagus with dysplasia, splenectomy, anxiety, HLD, statin intolerance, vitamin-D deficiency, orthostatic hypotension, loop recorder, depression, asthma, osteopenia, syncope who presents to the ED with a chief complaint of near-syncope.  Patient endorses around 4:00 p.m. she was getting out of the shower when she began feeling lightheaded and got flushed.  She states that she slowly lowered herself to the ground and laid down in the bathroom for approximately 30 minutes.  She denies LOC or head trauma.  States that since that time she has been having left upper extremity weakness and heaviness.  She states that she feels like she is unable to move her left arm.  She denies slurred speech, facial asymmetry, vision changes.  She states that she has not walked since lying down in the bathroom as EMS was called to her home.  She endorses chronic dyspnea, nausea, vomiting, constipation, lightheadedness.  She states she smokes 1 pack of cigarettes a day and denies alcohol and recreational drug use.  She denies fever, chills, abdominal pain, diarrhea, dysuria, hematuria, dizziness, and syncope.    Upon arrival to ED, patient afebrile, HR 58, RR of 23, BP of 135/62, satting 92% on 3 L NC.  Workup in the ED revealed WBC of 16.62, sodium of 134.  Remainder of labs fairly unremarkable.  CXR shows no acute process.  CT head with ill-defined region of low attenuation within the left basal ganglia.  Region of evolving infarction would be difficult to exclude.  No evidence of intracranial hemorrhage or mass effect.  CTA head and neck with abnormal examination findings of occlusion of the right common carotid with subsequent occlusion of the right cervical ICA with reconstitution of flow within the right distal cervical  ICA.  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  High-grade stenosis of the proximal aspect of the left subclavian artery.  No additional large vessel occlusion in the intracranial circulation.  Patient was given ondansetron 4 mg IV while in the ED.  tele vascular Neurology consulted from the ED and recommended MRI.  Case discussed with ED provider and patient will be placed under observation for further management.

## 2025-04-17 NOTE — ASSESSMENT & PLAN NOTE
- CT head with ill-defined region of low attenuation within the left basal ganglia.  Region of evolving infarction would be difficult to exclude.  No evidence of intracranial hemorrhage or mass effect.  - CTA head and neck with abnormal examination findings of occlusion of the right common carotid with subsequent occlusion of the right cervical ICA with reconstitution of flow within the right distal cervical ICA.  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  High-grade stenosis of the proximal aspect of the left subclavian artery.  No additional large vessel occlusion in the intracranial circulation.  - On stroke pathway  - Neurology consulted, appreciate recs  - PT/OT/SLP consulted, appreciate recs  - Resume home daily ASA  -  mg oral x1 ordered  - Tele vascular neurology has recommended MRI prior to starting DAPT  - NPO until bedside knapp passed  - Lipid and TSH WNL  - A1c pending  - TTE with bubble study pending  - MRI pending  - Permissive HTN not recommended at this time by tele vascular Neurology, pending MRI results  - Neuro checks Q4H  - Fall and aspiration precautions

## 2025-04-23 ENCOUNTER — TELEPHONE (OUTPATIENT)
Dept: PULMONOLOGY | Facility: CLINIC | Age: 75
End: 2025-04-23
Payer: MEDICARE

## 2025-04-23 NOTE — TELEPHONE ENCOUNTER
Attempted to contact patient to reschedule 5/14 appointment with Stacy Marin, no answer, m for a return call.

## 2025-05-02 ENCOUNTER — TELEPHONE (OUTPATIENT)
Dept: FAMILY MEDICINE | Facility: CLINIC | Age: 75
End: 2025-05-02
Payer: MEDICARE

## 2025-05-02 NOTE — TELEPHONE ENCOUNTER
Patient see's a NP at [facility].  Advised that she needs to get her refills from the NP..

## 2025-05-02 NOTE — TELEPHONE ENCOUNTER
Copied from CRM #[id number]. Topic: Medications - Medication Refill  >> May 2, 2025  1:55 PM Chantell wrote:  .Type:  RX Refill Request    Who Called: Pt  Refill or New Rx:refill   RX Name and Strength:    amLODIPine (NORVASC) 5 MG tablet 90 tablet 0 1/21/2025 -   Sig - Route: Take 1 tablet (5 mg total) by mouth once daily. - Oral   Sent to pharmacy as: amLODIPine (NORVASC) 5 MG tablet   How is the patient currently taking it? (ex. 1XDay):see above  Is this a 30 day or 90 day RX:see above  Preferred Pharmacy with phone number:  [facility] - [place], [place] - [address] [id number] [address]  [place] [address]  Phone: [phone] Fax: [phone]  Local or Mail Order:local  Ordering Provider:  Would the patient rather a call back or a response via [facility]? call  Best Call Back Number [phone]    Additional Information:  >> May 2, 2025  2:21 PM Med Assistant Oksana wrote:  Patient see's a NP at [facility].  Advised that she needs to get her refills from the NP.

## 2025-05-19 ENCOUNTER — HOSPITAL ENCOUNTER (INPATIENT)
Facility: HOSPITAL | Age: 75
LOS: 3 days | Discharge: HOME HEALTH | DRG: 194 | End: 2025-05-22
Attending: EMERGENCY MEDICINE | Admitting: INTERNAL MEDICINE
Payer: MEDICARE

## 2025-05-19 ENCOUNTER — TELEPHONE (OUTPATIENT)
Dept: PULMONOLOGY | Facility: CLINIC | Age: 75
End: 2025-05-19
Payer: MEDICARE

## 2025-05-19 DIAGNOSIS — R53.1 WEAKNESS: ICD-10-CM

## 2025-05-19 DIAGNOSIS — J18.9 ACUTE PNEUMONIA: ICD-10-CM

## 2025-05-19 DIAGNOSIS — R07.9 CHEST PAIN: ICD-10-CM

## 2025-05-19 DIAGNOSIS — R05.9 COUGH: ICD-10-CM

## 2025-05-19 DIAGNOSIS — J44.9 CHRONIC OBSTRUCTIVE PULMONARY DISEASE, UNSPECIFIED COPD TYPE: Primary | ICD-10-CM

## 2025-05-19 DIAGNOSIS — Z72.0 TOBACCO ABUSE: ICD-10-CM

## 2025-05-19 DIAGNOSIS — I47.20 V-TACH: ICD-10-CM

## 2025-05-19 PROBLEM — R04.2 HEMOPTYSIS: Status: ACTIVE | Noted: 2025-05-19

## 2025-05-19 LAB
ABSOLUTE EOSINOPHIL (SMH): 0.14 K/UL
ABSOLUTE MONOCYTE (SMH): 1.05 K/UL (ref 0.3–1)
ABSOLUTE NEUTROPHIL COUNT (SMH): 6.9 K/UL (ref 1.8–7.7)
ALBUMIN SERPL-MCNC: 4 G/DL (ref 3.5–5.2)
ALP SERPL-CCNC: 69 UNIT/L (ref 55–135)
ALT SERPL-CCNC: 9 UNIT/L (ref 10–44)
ANION GAP (SMH): 8 MMOL/L (ref 8–16)
AST SERPL-CCNC: 13 UNIT/L (ref 10–40)
BASOPHILS # BLD AUTO: 0.12 K/UL
BASOPHILS NFR BLD AUTO: 1 %
BILIRUB SERPL-MCNC: 0.3 MG/DL (ref 0.1–1)
BILIRUB UR QL STRIP.AUTO: NEGATIVE
BUN SERPL-MCNC: 14 MG/DL (ref 8–23)
CALCIUM SERPL-MCNC: 9.4 MG/DL (ref 8.7–10.5)
CHLORIDE SERPL-SCNC: 105 MMOL/L (ref 95–110)
CLARITY UR: CLEAR
CO2 SERPL-SCNC: 25 MMOL/L (ref 23–29)
COLOR UR AUTO: COLORLESS
CREAT SERPL-MCNC: 0.7 MG/DL (ref 0.5–1.4)
ERYTHROCYTE [DISTWIDTH] IN BLOOD BY AUTOMATED COUNT: 14.2 % (ref 11.5–14.5)
GFR SERPLBLD CREATININE-BSD FMLA CKD-EPI: >60 ML/MIN/1.73/M2
GLUCOSE SERPL-MCNC: 75 MG/DL (ref 70–110)
GLUCOSE UR QL STRIP: NEGATIVE
HCT VFR BLD AUTO: 37.9 % (ref 37–48.5)
HGB BLD-MCNC: 12.6 GM/DL (ref 12–16)
HGB UR QL STRIP: ABNORMAL
IMM GRANULOCYTES # BLD AUTO: 0.05 K/UL (ref 0–0.04)
IMM GRANULOCYTES NFR BLD AUTO: 0.4 % (ref 0–0.5)
KETONES UR QL STRIP: NEGATIVE
LEUKOCYTE ESTERASE UR QL STRIP: NEGATIVE
LYMPHOCYTES # BLD AUTO: 3.92 K/UL (ref 1–4.8)
MCH RBC QN AUTO: 30.8 PG (ref 27–31)
MCHC RBC AUTO-ENTMCNC: 33.2 G/DL (ref 32–36)
MCV RBC AUTO: 93 FL (ref 82–98)
NITRITE UR QL STRIP: NEGATIVE
NUCLEATED RBC (/100WBC) (SMH): 0 /100 WBC
PH UR STRIP: 7 [PH]
PLATELET # BLD AUTO: 356 K/UL (ref 150–450)
PMV BLD AUTO: 9.4 FL (ref 9.2–12.9)
POCT GLUCOSE: 61 MG/DL (ref 70–110)
POTASSIUM SERPL-SCNC: 3.9 MMOL/L (ref 3.5–5.1)
PROT SERPL-MCNC: 7.7 GM/DL (ref 6–8.4)
PROT UR QL STRIP: NEGATIVE
RBC # BLD AUTO: 4.09 M/UL (ref 4–5.4)
RELATIVE EOSINOPHIL (SMH): 1.2 % (ref 0–8)
RELATIVE LYMPHOCYTE (SMH): 32.3 % (ref 18–48)
RELATIVE MONOCYTE (SMH): 8.6 % (ref 4–15)
RELATIVE NEUTROPHIL (SMH): 56.5 % (ref 38–73)
SODIUM SERPL-SCNC: 138 MMOL/L (ref 136–145)
SP GR UR STRIP: 1.01
TROPONIN HIGH SENSITIVE (SMH): 4 PG/ML
UROBILINOGEN UR STRIP-ACNC: NEGATIVE EU/DL
WBC # BLD AUTO: 12.14 K/UL (ref 3.9–12.7)

## 2025-05-19 PROCEDURE — 25000003 PHARM REV CODE 250: Performed by: EMERGENCY MEDICINE

## 2025-05-19 PROCEDURE — 94799 UNLISTED PULMONARY SVC/PX: CPT

## 2025-05-19 PROCEDURE — 25000003 PHARM REV CODE 250: Performed by: INTERNAL MEDICINE

## 2025-05-19 PROCEDURE — 82962 GLUCOSE BLOOD TEST: CPT

## 2025-05-19 PROCEDURE — 87040 BLOOD CULTURE FOR BACTERIA: CPT | Performed by: INTERNAL MEDICINE

## 2025-05-19 PROCEDURE — 63600175 PHARM REV CODE 636 W HCPCS: Performed by: INTERNAL MEDICINE

## 2025-05-19 PROCEDURE — 94761 N-INVAS EAR/PLS OXIMETRY MLT: CPT

## 2025-05-19 PROCEDURE — 93010 ELECTROCARDIOGRAM REPORT: CPT | Mod: ,,, | Performed by: INTERNAL MEDICINE

## 2025-05-19 PROCEDURE — 93005 ELECTROCARDIOGRAM TRACING: CPT | Performed by: INTERNAL MEDICINE

## 2025-05-19 PROCEDURE — 11000001 HC ACUTE MED/SURG PRIVATE ROOM

## 2025-05-19 PROCEDURE — 94640 AIRWAY INHALATION TREATMENT: CPT

## 2025-05-19 PROCEDURE — 81003 URINALYSIS AUTO W/O SCOPE: CPT | Performed by: NURSE PRACTITIONER

## 2025-05-19 PROCEDURE — 99900031 HC PATIENT EDUCATION (STAT)

## 2025-05-19 PROCEDURE — 25500020 PHARM REV CODE 255: Performed by: EMERGENCY MEDICINE

## 2025-05-19 PROCEDURE — 99285 EMERGENCY DEPT VISIT HI MDM: CPT | Mod: 25

## 2025-05-19 PROCEDURE — 80053 COMPREHEN METABOLIC PANEL: CPT | Performed by: NURSE PRACTITIONER

## 2025-05-19 PROCEDURE — 99900035 HC TECH TIME PER 15 MIN (STAT)

## 2025-05-19 PROCEDURE — 25000242 PHARM REV CODE 250 ALT 637 W/ HCPCS: Performed by: INTERNAL MEDICINE

## 2025-05-19 PROCEDURE — 63600175 PHARM REV CODE 636 W HCPCS: Performed by: EMERGENCY MEDICINE

## 2025-05-19 PROCEDURE — 85025 COMPLETE CBC W/AUTO DIFF WBC: CPT | Performed by: NURSE PRACTITIONER

## 2025-05-19 PROCEDURE — 36415 COLL VENOUS BLD VENIPUNCTURE: CPT | Performed by: INTERNAL MEDICINE

## 2025-05-19 PROCEDURE — 96365 THER/PROPH/DIAG IV INF INIT: CPT

## 2025-05-19 PROCEDURE — 84484 ASSAY OF TROPONIN QUANT: CPT | Performed by: NURSE PRACTITIONER

## 2025-05-19 RX ORDER — POLYETHYLENE GLYCOL 3350 17 G/17G
17 POWDER, FOR SOLUTION ORAL DAILY
COMMUNITY
Start: 2025-04-22

## 2025-05-19 RX ORDER — TRAZODONE HYDROCHLORIDE 50 MG/1
50 TABLET ORAL NIGHTLY PRN
Status: DISCONTINUED | OUTPATIENT
Start: 2025-05-19 | End: 2025-05-22 | Stop reason: HOSPADM

## 2025-05-19 RX ORDER — SODIUM,POTASSIUM PHOSPHATES 280-250MG
2 POWDER IN PACKET (EA) ORAL
Status: DISCONTINUED | OUTPATIENT
Start: 2025-05-19 | End: 2025-05-22 | Stop reason: HOSPADM

## 2025-05-19 RX ORDER — NALOXONE HCL 0.4 MG/ML
0.02 VIAL (ML) INJECTION
Status: DISCONTINUED | OUTPATIENT
Start: 2025-05-19 | End: 2025-05-22 | Stop reason: HOSPADM

## 2025-05-19 RX ORDER — TALC
6 POWDER (GRAM) TOPICAL NIGHTLY PRN
Status: DISCONTINUED | OUTPATIENT
Start: 2025-05-19 | End: 2025-05-22 | Stop reason: HOSPADM

## 2025-05-19 RX ORDER — VITAMIN B COMPLEX
1 CAPSULE ORAL DAILY
COMMUNITY

## 2025-05-19 RX ORDER — ACETAMINOPHEN 325 MG/1
650 TABLET ORAL EVERY 8 HOURS PRN
Status: DISCONTINUED | OUTPATIENT
Start: 2025-05-19 | End: 2025-05-22 | Stop reason: HOSPADM

## 2025-05-19 RX ORDER — VIT C/E/ZN/COPPR/LUTEIN/ZEAXAN 250MG-90MG
1000 CAPSULE ORAL DAILY
COMMUNITY

## 2025-05-19 RX ORDER — LANOLIN ALCOHOL/MO/W.PET/CERES
800 CREAM (GRAM) TOPICAL
Status: DISCONTINUED | OUTPATIENT
Start: 2025-05-19 | End: 2025-05-22 | Stop reason: HOSPADM

## 2025-05-19 RX ORDER — NITROGLYCERIN 0.4 MG/1
0.4 TABLET SUBLINGUAL EVERY 5 MIN PRN
Status: DISCONTINUED | OUTPATIENT
Start: 2025-05-19 | End: 2025-05-20

## 2025-05-19 RX ORDER — GABAPENTIN 300 MG/1
300 CAPSULE ORAL NIGHTLY
Status: DISCONTINUED | OUTPATIENT
Start: 2025-05-19 | End: 2025-05-22 | Stop reason: HOSPADM

## 2025-05-19 RX ORDER — ONDANSETRON HYDROCHLORIDE 2 MG/ML
4 INJECTION, SOLUTION INTRAVENOUS EVERY 6 HOURS PRN
Status: DISCONTINUED | OUTPATIENT
Start: 2025-05-19 | End: 2025-05-22 | Stop reason: HOSPADM

## 2025-05-19 RX ORDER — EZETIMIBE 10 MG/1
10 TABLET ORAL NIGHTLY
Status: DISCONTINUED | OUTPATIENT
Start: 2025-05-19 | End: 2025-05-22 | Stop reason: HOSPADM

## 2025-05-19 RX ORDER — AMLODIPINE BESYLATE 5 MG/1
5 TABLET ORAL DAILY
Status: DISCONTINUED | OUTPATIENT
Start: 2025-05-20 | End: 2025-05-20

## 2025-05-19 RX ORDER — ACETAMINOPHEN 325 MG/1
650 TABLET ORAL EVERY 4 HOURS PRN
Status: DISCONTINUED | OUTPATIENT
Start: 2025-05-19 | End: 2025-05-22 | Stop reason: HOSPADM

## 2025-05-19 RX ORDER — PANTOPRAZOLE SODIUM 40 MG/1
40 TABLET, DELAYED RELEASE ORAL DAILY
Status: DISCONTINUED | OUTPATIENT
Start: 2025-05-20 | End: 2025-05-22 | Stop reason: HOSPADM

## 2025-05-19 RX ORDER — IPRATROPIUM BROMIDE AND ALBUTEROL SULFATE 2.5; .5 MG/3ML; MG/3ML
3 SOLUTION RESPIRATORY (INHALATION) EVERY 6 HOURS
Status: DISCONTINUED | OUTPATIENT
Start: 2025-05-19 | End: 2025-05-22 | Stop reason: HOSPADM

## 2025-05-19 RX ORDER — ALUMINUM HYDROXIDE, MAGNESIUM HYDROXIDE, AND SIMETHICONE 1200; 120; 1200 MG/30ML; MG/30ML; MG/30ML
30 SUSPENSION ORAL 4 TIMES DAILY PRN
Status: DISCONTINUED | OUTPATIENT
Start: 2025-05-19 | End: 2025-05-22 | Stop reason: HOSPADM

## 2025-05-19 RX ADMIN — GABAPENTIN 300 MG: 300 CAPSULE ORAL at 09:05

## 2025-05-19 RX ADMIN — IPRATROPIUM BROMIDE AND ALBUTEROL SULFATE 3 ML: 2.5; .5 SOLUTION RESPIRATORY (INHALATION) at 07:05

## 2025-05-19 RX ADMIN — EZETIMIBE 10 MG: 10 TABLET ORAL at 09:05

## 2025-05-19 RX ADMIN — PIPERACILLIN AND TAZOBACTAM 4.5 G: 4; .5 INJECTION, POWDER, LYOPHILIZED, FOR SOLUTION INTRAVENOUS; PARENTERAL at 09:05

## 2025-05-19 RX ADMIN — VANCOMYCIN HYDROCHLORIDE 1250 MG: 1.25 INJECTION, POWDER, LYOPHILIZED, FOR SOLUTION INTRAVENOUS at 07:05

## 2025-05-19 RX ADMIN — PIPERACILLIN SODIUM AND TAZOBACTAM SODIUM 4.5 G: 4; .5 INJECTION, POWDER, LYOPHILIZED, FOR SOLUTION INTRAVENOUS at 06:05

## 2025-05-19 RX ADMIN — IOHEXOL 100 ML: 350 INJECTION, SOLUTION INTRAVENOUS at 06:05

## 2025-05-19 RX ADMIN — TRAZODONE HYDROCHLORIDE 50 MG: 50 TABLET ORAL at 09:05

## 2025-05-19 NOTE — TELEPHONE ENCOUNTER
Attempted to reach the patient.  No answer.  LVM.  
Copied from CRM #[id number]. Topic: Appointments - Appointment Scheduling  >> May 19, 2025  7:51 AM Chantell wrote:  Type:  Needs Medical Advice    Who Called:  pt  e via [facility]?  call  Best Call Back Number:[phone]    Additional Information:  pt states she coughing up blood wants to know what Dr wants done if she needs to get Xray  
Pt presents to ED A&Ox4 with c/o pain and edema of left lower leg. Pt reports having single mechanical falls and sustaining injury to left lower leg. Upon inspection, abrasion are present on left lower leg with redness and swelling. Pt referred to ED for rule out DVT by orthopedist. PMH of HTN, HLD. Denies other medical complaints at this time.

## 2025-05-20 PROBLEM — Z72.0 TOBACCO ABUSE: Status: ACTIVE | Noted: 2025-05-20

## 2025-05-20 LAB
ABSOLUTE EOSINOPHIL (SMH): 0.17 K/UL
ABSOLUTE MONOCYTE (SMH): 0.86 K/UL (ref 0.3–1)
ABSOLUTE NEUTROPHIL COUNT (SMH): 5.6 K/UL (ref 1.8–7.7)
ADENOVIRUS (SMH): NOT DETECTED
ALBUMIN SERPL-MCNC: 3.4 G/DL (ref 3.5–5.2)
ALP SERPL-CCNC: 61 UNIT/L (ref 55–135)
ALT SERPL-CCNC: 8 UNIT/L (ref 10–44)
ANION GAP (SMH): 9 MMOL/L (ref 8–16)
AST SERPL-CCNC: 12 UNIT/L (ref 10–40)
BASOPHILS # BLD AUTO: 0.13 K/UL
BASOPHILS NFR BLD AUTO: 1.3 %
BILIRUB SERPL-MCNC: 0.3 MG/DL (ref 0.1–1)
BORDETELLA PARAPERTUSSIS (IS1001) (SMH): NOT DETECTED
BORDETELLA PERTUSSIS (PTXP) (SMH): NOT DETECTED
BUN SERPL-MCNC: 17 MG/DL (ref 8–23)
CALCIUM SERPL-MCNC: 8.6 MG/DL (ref 8.7–10.5)
CHLAMYDIA PNEUMONIAE (SMH): NOT DETECTED
CHLORIDE SERPL-SCNC: 108 MMOL/L (ref 95–110)
CO2 SERPL-SCNC: 22 MMOL/L (ref 23–29)
CORONAVIRUS 229E, COMMON COLD VIRUS (SMH): NOT DETECTED
CORONAVIRUS HKU1, COMMON COLD VIRUS (SMH): NOT DETECTED
CORONAVIRUS NL63, COMMON COLD VIRUS (SMH): NOT DETECTED
CORONAVIRUS OC43, COMMON COLD VIRUS (SMH): NOT DETECTED
CREAT SERPL-MCNC: 0.7 MG/DL (ref 0.5–1.4)
ERYTHROCYTE [DISTWIDTH] IN BLOOD BY AUTOMATED COUNT: 14.4 % (ref 11.5–14.5)
FLUBV RNA NPH QL NAA+NON-PROBE: NOT DETECTED
GFR SERPLBLD CREATININE-BSD FMLA CKD-EPI: >60 ML/MIN/1.73/M2
GLUCOSE SERPL-MCNC: 96 MG/DL (ref 70–110)
HCT VFR BLD AUTO: 35.3 % (ref 37–48.5)
HGB BLD-MCNC: 11.6 GM/DL (ref 12–16)
HPIV1 RNA NPH QL NAA+NON-PROBE: NOT DETECTED
HPIV2 RNA NPH QL NAA+NON-PROBE: NOT DETECTED
HPIV3 RNA NPH QL NAA+NON-PROBE: NOT DETECTED
HPIV4 RNA NPH QL NAA+NON-PROBE: NOT DETECTED
HUMAN METAPNEUMOVIRUS (SMH): NOT DETECTED
IMM GRANULOCYTES # BLD AUTO: 0.02 K/UL (ref 0–0.04)
IMM GRANULOCYTES NFR BLD AUTO: 0.2 % (ref 0–0.5)
INFLUENZA A (SUBTYPES H1,H1-2009,H3) (SMH): NOT DETECTED
LYMPHOCYTES # BLD AUTO: 2.93 K/UL (ref 1–4.8)
MAGNESIUM SERPL-MCNC: 2.1 MG/DL (ref 1.6–2.6)
MCH RBC QN AUTO: 30.6 PG (ref 27–31)
MCHC RBC AUTO-ENTMCNC: 32.9 G/DL (ref 32–36)
MCV RBC AUTO: 93 FL (ref 82–98)
MYCOPLASMA PNEUMONIAE (SMH): NOT DETECTED
NUCLEATED RBC (/100WBC) (SMH): 0 /100 WBC
PLATELET # BLD AUTO: 336 K/UL (ref 150–450)
PMV BLD AUTO: 9.4 FL (ref 9.2–12.9)
POTASSIUM SERPL-SCNC: 3.8 MMOL/L (ref 3.5–5.1)
PROT SERPL-MCNC: 6.5 GM/DL (ref 6–8.4)
RBC # BLD AUTO: 3.79 M/UL (ref 4–5.4)
RELATIVE EOSINOPHIL (SMH): 1.8 % (ref 0–8)
RELATIVE LYMPHOCYTE (SMH): 30.2 % (ref 18–48)
RELATIVE MONOCYTE (SMH): 8.9 % (ref 4–15)
RELATIVE NEUTROPHIL (SMH): 57.6 % (ref 38–73)
RESPIRATORY INFECTION PANEL SOURCE (SMH): NORMAL
RSV RNA NPH QL NAA+NON-PROBE: NOT DETECTED
RV+EV RNA NPH QL NAA+NON-PROBE: NOT DETECTED
SARS-COV-2 RNA RESP QL NAA+PROBE: NOT DETECTED
SODIUM SERPL-SCNC: 139 MMOL/L (ref 136–145)
WBC # BLD AUTO: 9.7 K/UL (ref 3.9–12.7)

## 2025-05-20 PROCEDURE — 94761 N-INVAS EAR/PLS OXIMETRY MLT: CPT

## 2025-05-20 PROCEDURE — 99900031 HC PATIENT EDUCATION (STAT)

## 2025-05-20 PROCEDURE — 25000242 PHARM REV CODE 250 ALT 637 W/ HCPCS: Performed by: INTERNAL MEDICINE

## 2025-05-20 PROCEDURE — 99900035 HC TECH TIME PER 15 MIN (STAT)

## 2025-05-20 PROCEDURE — 85025 COMPLETE CBC W/AUTO DIFF WBC: CPT | Performed by: INTERNAL MEDICINE

## 2025-05-20 PROCEDURE — 11000001 HC ACUTE MED/SURG PRIVATE ROOM

## 2025-05-20 PROCEDURE — 63600175 PHARM REV CODE 636 W HCPCS: Performed by: INTERNAL MEDICINE

## 2025-05-20 PROCEDURE — 80053 COMPREHEN METABOLIC PANEL: CPT | Performed by: INTERNAL MEDICINE

## 2025-05-20 PROCEDURE — 99223 1ST HOSP IP/OBS HIGH 75: CPT | Mod: ,,, | Performed by: STUDENT IN AN ORGANIZED HEALTH CARE EDUCATION/TRAINING PROGRAM

## 2025-05-20 PROCEDURE — 25000003 PHARM REV CODE 250: Performed by: STUDENT IN AN ORGANIZED HEALTH CARE EDUCATION/TRAINING PROGRAM

## 2025-05-20 PROCEDURE — 94640 AIRWAY INHALATION TREATMENT: CPT

## 2025-05-20 PROCEDURE — 0202U NFCT DS 22 TRGT SARS-COV-2: CPT | Performed by: HOSPITALIST

## 2025-05-20 PROCEDURE — 36415 COLL VENOUS BLD VENIPUNCTURE: CPT | Performed by: INTERNAL MEDICINE

## 2025-05-20 PROCEDURE — 25000003 PHARM REV CODE 250: Performed by: HOSPITALIST

## 2025-05-20 PROCEDURE — 83735 ASSAY OF MAGNESIUM: CPT | Performed by: INTERNAL MEDICINE

## 2025-05-20 PROCEDURE — 93005 ELECTROCARDIOGRAM TRACING: CPT | Performed by: INTERNAL MEDICINE

## 2025-05-20 PROCEDURE — 93010 ELECTROCARDIOGRAM REPORT: CPT | Mod: ,,, | Performed by: INTERNAL MEDICINE

## 2025-05-20 PROCEDURE — 25000003 PHARM REV CODE 250: Performed by: INTERNAL MEDICINE

## 2025-05-20 RX ORDER — IBUPROFEN 200 MG
1 TABLET ORAL DAILY PRN
Status: DISCONTINUED | OUTPATIENT
Start: 2025-05-20 | End: 2025-05-22 | Stop reason: HOSPADM

## 2025-05-20 RX ORDER — PREDNISONE 20 MG/1
40 TABLET ORAL DAILY
Status: DISCONTINUED | OUTPATIENT
Start: 2025-05-21 | End: 2025-05-22 | Stop reason: HOSPADM

## 2025-05-20 RX ORDER — POTASSIUM CHLORIDE 20 MEQ/1
40 TABLET, EXTENDED RELEASE ORAL ONCE
Status: COMPLETED | OUTPATIENT
Start: 2025-05-20 | End: 2025-05-20

## 2025-05-20 RX ORDER — METOPROLOL TARTRATE 25 MG/1
12.5 TABLET ORAL 2 TIMES DAILY
Status: DISCONTINUED | OUTPATIENT
Start: 2025-05-20 | End: 2025-05-21

## 2025-05-20 RX ORDER — NAPROXEN SODIUM 220 MG/1
81 TABLET, FILM COATED ORAL DAILY
Status: DISCONTINUED | OUTPATIENT
Start: 2025-05-20 | End: 2025-05-22 | Stop reason: HOSPADM

## 2025-05-20 RX ORDER — OXYBUTYNIN CHLORIDE 5 MG/1
5 TABLET, EXTENDED RELEASE ORAL DAILY
Status: DISCONTINUED | OUTPATIENT
Start: 2025-05-20 | End: 2025-05-22 | Stop reason: HOSPADM

## 2025-05-20 RX ORDER — AMOXICILLIN AND CLAVULANATE POTASSIUM 875; 125 MG/1; MG/1
1 TABLET, FILM COATED ORAL EVERY 12 HOURS
Status: DISCONTINUED | OUTPATIENT
Start: 2025-05-20 | End: 2025-05-22 | Stop reason: HOSPADM

## 2025-05-20 RX ADMIN — PIPERACILLIN AND TAZOBACTAM 4.5 G: 4; .5 INJECTION, POWDER, LYOPHILIZED, FOR SOLUTION INTRAVENOUS; PARENTERAL at 11:05

## 2025-05-20 RX ADMIN — OXYBUTYNIN CHLORIDE 5 MG: 5 TABLET, EXTENDED RELEASE ORAL at 05:05

## 2025-05-20 RX ADMIN — AMOXICILLIN AND CLAVULANATE POTASSIUM 1 TABLET: 875; 125 TABLET, FILM COATED ORAL at 09:05

## 2025-05-20 RX ADMIN — IPRATROPIUM BROMIDE AND ALBUTEROL SULFATE 3 ML: 2.5; .5 SOLUTION RESPIRATORY (INHALATION) at 07:05

## 2025-05-20 RX ADMIN — ASPIRIN 81 MG CHEWABLE TABLET 81 MG: 81 TABLET CHEWABLE at 05:05

## 2025-05-20 RX ADMIN — PIPERACILLIN AND TAZOBACTAM 4.5 G: 4; .5 INJECTION, POWDER, LYOPHILIZED, FOR SOLUTION INTRAVENOUS; PARENTERAL at 05:05

## 2025-05-20 RX ADMIN — POTASSIUM CHLORIDE 40 MEQ: 1500 TABLET, EXTENDED RELEASE ORAL at 05:05

## 2025-05-20 RX ADMIN — TRAZODONE HYDROCHLORIDE 50 MG: 50 TABLET ORAL at 10:05

## 2025-05-20 RX ADMIN — IPRATROPIUM BROMIDE AND ALBUTEROL SULFATE 3 ML: 2.5; .5 SOLUTION RESPIRATORY (INHALATION) at 01:05

## 2025-05-20 RX ADMIN — EZETIMIBE 10 MG: 10 TABLET ORAL at 09:05

## 2025-05-20 RX ADMIN — IPRATROPIUM BROMIDE AND ALBUTEROL SULFATE 3 ML: 2.5; .5 SOLUTION RESPIRATORY (INHALATION) at 12:05

## 2025-05-20 RX ADMIN — GABAPENTIN 300 MG: 300 CAPSULE ORAL at 09:05

## 2025-05-21 ENCOUNTER — CLINICAL SUPPORT (OUTPATIENT)
Dept: CARDIOLOGY | Facility: HOSPITAL | Age: 75
End: 2025-05-21
Attending: HOSPITALIST
Payer: MEDICARE

## 2025-05-21 LAB
ABSOLUTE EOSINOPHIL (SMH): 0.41 K/UL
ABSOLUTE MONOCYTE (SMH): 0.74 K/UL (ref 0.3–1)
ABSOLUTE NEUTROPHIL COUNT (SMH): 4.3 K/UL (ref 1.8–7.7)
ALBUMIN SERPL-MCNC: 3.4 G/DL (ref 3.5–5.2)
ALP SERPL-CCNC: 61 UNIT/L (ref 55–135)
ALT SERPL-CCNC: 11 UNIT/L (ref 10–44)
ANION GAP (SMH): 6 MMOL/L (ref 8–16)
APICAL FOUR CHAMBER EJECTION FRACTION: 70 %
APICAL TWO CHAMBER EJECTION FRACTION: 71 %
AST SERPL-CCNC: 15 UNIT/L (ref 10–40)
BASOPHILS # BLD AUTO: 0.09 K/UL
BASOPHILS NFR BLD AUTO: 1 %
BILIRUB SERPL-MCNC: 0.4 MG/DL (ref 0.1–1)
BSA FOR ECHO PROCEDURE: 1.66 M2
BUN SERPL-MCNC: 12 MG/DL (ref 8–23)
CALCIUM SERPL-MCNC: 9 MG/DL (ref 8.7–10.5)
CHLORIDE SERPL-SCNC: 108 MMOL/L (ref 95–110)
CO2 SERPL-SCNC: 23 MMOL/L (ref 23–29)
CREAT SERPL-MCNC: 0.6 MG/DL (ref 0.5–1.4)
CV ECHO LV RWT: 0.38 CM
ECHO LV POSTERIOR WALL: 0.9 CM (ref 0.6–1.1)
ERYTHROCYTE [DISTWIDTH] IN BLOOD BY AUTOMATED COUNT: 14.1 % (ref 11.5–14.5)
FRACTIONAL SHORTENING: 44.7 % (ref 28–44)
GFR SERPLBLD CREATININE-BSD FMLA CKD-EPI: >60 ML/MIN/1.73/M2
GLUCOSE SERPL-MCNC: 87 MG/DL (ref 70–110)
HCT VFR BLD AUTO: 34.9 % (ref 37–48.5)
HGB BLD-MCNC: 11.5 GM/DL (ref 12–16)
IMM GRANULOCYTES # BLD AUTO: 0.02 K/UL (ref 0–0.04)
IMM GRANULOCYTES NFR BLD AUTO: 0.2 % (ref 0–0.5)
INTERVENTRICULAR SEPTUM: 1.2 CM (ref 0.6–1.1)
IVC DIAMETER: 1.77 CM
LEFT INTERNAL DIMENSION IN SYSTOLE: 2.6 CM (ref 2.1–4)
LEFT VENTRICLE DIASTOLIC VOLUME INDEX: 61.96 ML/M2
LEFT VENTRICLE DIASTOLIC VOLUME: 101 ML
LEFT VENTRICLE END DIASTOLIC VOLUME APICAL 2 CHAMBER: 48.1 ML
LEFT VENTRICLE END DIASTOLIC VOLUME APICAL 4 CHAMBER: 56.8 ML
LEFT VENTRICLE MASS INDEX: 107.9 G/M2
LEFT VENTRICLE SYSTOLIC VOLUME INDEX: 16 ML/M2
LEFT VENTRICLE SYSTOLIC VOLUME: 26 ML
LEFT VENTRICULAR INTERNAL DIMENSION IN DIASTOLE: 4.7 CM (ref 3.5–6)
LEFT VENTRICULAR MASS: 175.8 G
LVED V (TEICH): 101 ML
LVES V (TEICH): 25.6 ML
LYMPHOCYTES # BLD AUTO: 3.06 K/UL (ref 1–4.8)
MAGNESIUM SERPL-MCNC: 2.1 MG/DL (ref 1.6–2.6)
MCH RBC QN AUTO: 30.7 PG (ref 27–31)
MCHC RBC AUTO-ENTMCNC: 33 G/DL (ref 32–36)
MCV RBC AUTO: 93 FL (ref 82–98)
NUCLEATED RBC (/100WBC) (SMH): 0 /100 WBC
OHS LV EJECTION FRACTION SIMPSONS BIPLANE MOD: 69 %
PHOSPHATE SERPL-MCNC: 3.7 MG/DL (ref 2.7–4.5)
PLATELET # BLD AUTO: 312 K/UL (ref 150–450)
PMV BLD AUTO: 9.2 FL (ref 9.2–12.9)
POTASSIUM SERPL-SCNC: 4.4 MMOL/L (ref 3.5–5.1)
PROT SERPL-MCNC: 6.5 GM/DL (ref 6–8.4)
RA PRESSURE ESTIMATED: 3 MMHG
RBC # BLD AUTO: 3.74 M/UL (ref 4–5.4)
RELATIVE EOSINOPHIL (SMH): 4.7 % (ref 0–8)
RELATIVE LYMPHOCYTE (SMH): 35.4 % (ref 18–48)
RELATIVE MONOCYTE (SMH): 8.6 % (ref 4–15)
RELATIVE NEUTROPHIL (SMH): 50.1 % (ref 38–73)
SODIUM SERPL-SCNC: 137 MMOL/L (ref 136–145)
WBC # BLD AUTO: 8.65 K/UL (ref 3.9–12.7)
Z-SCORE OF LEFT VENTRICULAR DIMENSION IN END DIASTOLE: 0.22
Z-SCORE OF LEFT VENTRICULAR DIMENSION IN END SYSTOLE: -0.73

## 2025-05-21 PROCEDURE — 25000003 PHARM REV CODE 250: Performed by: INTERNAL MEDICINE

## 2025-05-21 PROCEDURE — 82040 ASSAY OF SERUM ALBUMIN: CPT | Performed by: INTERNAL MEDICINE

## 2025-05-21 PROCEDURE — 84100 ASSAY OF PHOSPHORUS: CPT | Performed by: HOSPITALIST

## 2025-05-21 PROCEDURE — 85025 COMPLETE CBC W/AUTO DIFF WBC: CPT | Performed by: INTERNAL MEDICINE

## 2025-05-21 PROCEDURE — 92610 EVALUATE SWALLOWING FUNCTION: CPT

## 2025-05-21 PROCEDURE — 11000001 HC ACUTE MED/SURG PRIVATE ROOM

## 2025-05-21 PROCEDURE — 25000003 PHARM REV CODE 250: Performed by: HOSPITALIST

## 2025-05-21 PROCEDURE — 94761 N-INVAS EAR/PLS OXIMETRY MLT: CPT

## 2025-05-21 PROCEDURE — 63600175 PHARM REV CODE 636 W HCPCS: Performed by: STUDENT IN AN ORGANIZED HEALTH CARE EDUCATION/TRAINING PROGRAM

## 2025-05-21 PROCEDURE — 94640 AIRWAY INHALATION TREATMENT: CPT

## 2025-05-21 PROCEDURE — 25500020 PHARM REV CODE 255: Performed by: HOSPITALIST

## 2025-05-21 PROCEDURE — 99233 SBSQ HOSP IP/OBS HIGH 50: CPT | Mod: ,,, | Performed by: STUDENT IN AN ORGANIZED HEALTH CARE EDUCATION/TRAINING PROGRAM

## 2025-05-21 PROCEDURE — 93308 TTE F-UP OR LMTD: CPT | Mod: 26,,, | Performed by: INTERNAL MEDICINE

## 2025-05-21 PROCEDURE — 25000003 PHARM REV CODE 250: Performed by: STUDENT IN AN ORGANIZED HEALTH CARE EDUCATION/TRAINING PROGRAM

## 2025-05-21 PROCEDURE — 99900031 HC PATIENT EDUCATION (STAT)

## 2025-05-21 PROCEDURE — 83735 ASSAY OF MAGNESIUM: CPT | Performed by: INTERNAL MEDICINE

## 2025-05-21 PROCEDURE — 25000242 PHARM REV CODE 250 ALT 637 W/ HCPCS: Performed by: INTERNAL MEDICINE

## 2025-05-21 PROCEDURE — 93308 TTE F-UP OR LMTD: CPT

## 2025-05-21 PROCEDURE — 36415 COLL VENOUS BLD VENIPUNCTURE: CPT | Performed by: INTERNAL MEDICINE

## 2025-05-21 RX ADMIN — EZETIMIBE 10 MG: 10 TABLET ORAL at 09:05

## 2025-05-21 RX ADMIN — AMOXICILLIN AND CLAVULANATE POTASSIUM 1 TABLET: 875; 125 TABLET, FILM COATED ORAL at 09:05

## 2025-05-21 RX ADMIN — IPRATROPIUM BROMIDE AND ALBUTEROL SULFATE 3 ML: 2.5; .5 SOLUTION RESPIRATORY (INHALATION) at 12:05

## 2025-05-21 RX ADMIN — Medication 6 MG: at 11:05

## 2025-05-21 RX ADMIN — ASPIRIN 81 MG CHEWABLE TABLET 81 MG: 81 TABLET CHEWABLE at 09:05

## 2025-05-21 RX ADMIN — Medication 6 MG: at 01:05

## 2025-05-21 RX ADMIN — IPRATROPIUM BROMIDE AND ALBUTEROL SULFATE 3 ML: 2.5; .5 SOLUTION RESPIRATORY (INHALATION) at 07:05

## 2025-05-21 RX ADMIN — PREDNISONE 40 MG: 20 TABLET ORAL at 09:05

## 2025-05-21 RX ADMIN — TRAZODONE HYDROCHLORIDE 50 MG: 50 TABLET ORAL at 10:05

## 2025-05-21 RX ADMIN — OXYBUTYNIN CHLORIDE 5 MG: 5 TABLET, EXTENDED RELEASE ORAL at 09:05

## 2025-05-21 RX ADMIN — GABAPENTIN 300 MG: 300 CAPSULE ORAL at 09:05

## 2025-05-21 RX ADMIN — PANTOPRAZOLE SODIUM 40 MG: 40 TABLET, DELAYED RELEASE ORAL at 06:05

## 2025-05-21 RX ADMIN — IOHEXOL 100 ML: 350 INJECTION, SOLUTION INTRAVENOUS at 04:05

## 2025-05-22 ENCOUNTER — CLINICAL SUPPORT (OUTPATIENT)
Dept: SMOKING CESSATION | Facility: CLINIC | Age: 75
End: 2025-05-22
Payer: COMMERCIAL

## 2025-05-22 VITALS
TEMPERATURE: 98 F | DIASTOLIC BLOOD PRESSURE: 59 MMHG | OXYGEN SATURATION: 94 % | HEIGHT: 61 IN | WEIGHT: 141.31 LBS | RESPIRATION RATE: 19 BRPM | BODY MASS INDEX: 26.68 KG/M2 | HEART RATE: 54 BPM | SYSTOLIC BLOOD PRESSURE: 132 MMHG

## 2025-05-22 DIAGNOSIS — F17.200 NICOTINE DEPENDENCE: Primary | ICD-10-CM

## 2025-05-22 LAB
ABSOLUTE EOSINOPHIL (SMH): 0.05 K/UL
ABSOLUTE MONOCYTE (SMH): 0.91 K/UL (ref 0.3–1)
ABSOLUTE NEUTROPHIL COUNT (SMH): 7.7 K/UL (ref 1.8–7.7)
ALBUMIN SERPL-MCNC: 3.6 G/DL (ref 3.5–5.2)
ALP SERPL-CCNC: 63 UNIT/L (ref 55–135)
ALT SERPL-CCNC: 12 UNIT/L (ref 10–44)
ANION GAP (SMH): 6 MMOL/L (ref 8–16)
AST SERPL-CCNC: 14 UNIT/L (ref 10–40)
BASOPHILS # BLD AUTO: 0.07 K/UL
BASOPHILS NFR BLD AUTO: 0.6 %
BILIRUB SERPL-MCNC: 0.3 MG/DL (ref 0.1–1)
BUN SERPL-MCNC: 16 MG/DL (ref 8–23)
CALCIUM SERPL-MCNC: 9.6 MG/DL (ref 8.7–10.5)
CHLORIDE SERPL-SCNC: 105 MMOL/L (ref 95–110)
CO2 SERPL-SCNC: 28 MMOL/L (ref 23–29)
CREAT SERPL-MCNC: 0.7 MG/DL (ref 0.5–1.4)
ERYTHROCYTE [DISTWIDTH] IN BLOOD BY AUTOMATED COUNT: 14.1 % (ref 11.5–14.5)
GFR SERPLBLD CREATININE-BSD FMLA CKD-EPI: >60 ML/MIN/1.73/M2
GLUCOSE SERPL-MCNC: 95 MG/DL (ref 70–110)
HCT VFR BLD AUTO: 34.3 % (ref 37–48.5)
HGB BLD-MCNC: 11.3 GM/DL (ref 12–16)
IMM GRANULOCYTES # BLD AUTO: 0.06 K/UL (ref 0–0.04)
IMM GRANULOCYTES NFR BLD AUTO: 0.5 % (ref 0–0.5)
LYMPHOCYTES # BLD AUTO: 3.69 K/UL (ref 1–4.8)
MAGNESIUM SERPL-MCNC: 2.2 MG/DL (ref 1.6–2.6)
MCH RBC QN AUTO: 30.8 PG (ref 27–31)
MCHC RBC AUTO-ENTMCNC: 32.9 G/DL (ref 32–36)
MCV RBC AUTO: 94 FL (ref 82–98)
NUCLEATED RBC (/100WBC) (SMH): 0 /100 WBC
PHOSPHATE SERPL-MCNC: 3.5 MG/DL (ref 2.7–4.5)
PLATELET # BLD AUTO: 316 K/UL (ref 150–450)
PMV BLD AUTO: 9.7 FL (ref 9.2–12.9)
POTASSIUM SERPL-SCNC: 4.4 MMOL/L (ref 3.5–5.1)
PROT SERPL-MCNC: 6.8 GM/DL (ref 6–8.4)
RBC # BLD AUTO: 3.67 M/UL (ref 4–5.4)
RELATIVE EOSINOPHIL (SMH): 0.4 % (ref 0–8)
RELATIVE LYMPHOCYTE (SMH): 29.6 % (ref 18–48)
RELATIVE MONOCYTE (SMH): 7.3 % (ref 4–15)
RELATIVE NEUTROPHIL (SMH): 61.6 % (ref 38–73)
SODIUM SERPL-SCNC: 139 MMOL/L (ref 136–145)
WBC # BLD AUTO: 12.48 K/UL (ref 3.9–12.7)

## 2025-05-22 PROCEDURE — 99900031 HC PATIENT EDUCATION (STAT)

## 2025-05-22 PROCEDURE — 25000003 PHARM REV CODE 250: Performed by: HOSPITALIST

## 2025-05-22 PROCEDURE — 63600175 PHARM REV CODE 636 W HCPCS: Performed by: STUDENT IN AN ORGANIZED HEALTH CARE EDUCATION/TRAINING PROGRAM

## 2025-05-22 PROCEDURE — 25000242 PHARM REV CODE 250 ALT 637 W/ HCPCS: Performed by: INTERNAL MEDICINE

## 2025-05-22 PROCEDURE — 94640 AIRWAY INHALATION TREATMENT: CPT

## 2025-05-22 PROCEDURE — 99900035 HC TECH TIME PER 15 MIN (STAT)

## 2025-05-22 PROCEDURE — 85025 COMPLETE CBC W/AUTO DIFF WBC: CPT | Performed by: INTERNAL MEDICINE

## 2025-05-22 PROCEDURE — 80053 COMPREHEN METABOLIC PANEL: CPT | Performed by: INTERNAL MEDICINE

## 2025-05-22 PROCEDURE — 83735 ASSAY OF MAGNESIUM: CPT | Performed by: INTERNAL MEDICINE

## 2025-05-22 PROCEDURE — 99407 BEHAV CHNG SMOKING > 10 MIN: CPT | Mod: S$GLB,,, | Performed by: CLINIC/CENTER

## 2025-05-22 PROCEDURE — 25000003 PHARM REV CODE 250: Performed by: INTERNAL MEDICINE

## 2025-05-22 PROCEDURE — 36415 COLL VENOUS BLD VENIPUNCTURE: CPT | Performed by: INTERNAL MEDICINE

## 2025-05-22 PROCEDURE — 25000003 PHARM REV CODE 250: Performed by: STUDENT IN AN ORGANIZED HEALTH CARE EDUCATION/TRAINING PROGRAM

## 2025-05-22 PROCEDURE — 84100 ASSAY OF PHOSPHORUS: CPT | Performed by: HOSPITALIST

## 2025-05-22 PROCEDURE — 94761 N-INVAS EAR/PLS OXIMETRY MLT: CPT

## 2025-05-22 RX ORDER — AMOXICILLIN AND CLAVULANATE POTASSIUM 875; 125 MG/1; MG/1
1 TABLET, FILM COATED ORAL EVERY 12 HOURS
Qty: 10 TABLET | Refills: 0 | Status: SHIPPED | OUTPATIENT
Start: 2025-05-22 | End: 2025-05-27

## 2025-05-22 RX ORDER — METHYLPREDNISOLONE 4 MG/1
TABLET ORAL
Qty: 21 EACH | Refills: 0 | Status: SHIPPED | OUTPATIENT
Start: 2025-05-22 | End: 2025-06-12

## 2025-05-22 RX ADMIN — OXYBUTYNIN CHLORIDE 5 MG: 5 TABLET, EXTENDED RELEASE ORAL at 09:05

## 2025-05-22 RX ADMIN — IPRATROPIUM BROMIDE AND ALBUTEROL SULFATE 3 ML: 2.5; .5 SOLUTION RESPIRATORY (INHALATION) at 01:05

## 2025-05-22 RX ADMIN — PREDNISONE 40 MG: 20 TABLET ORAL at 09:05

## 2025-05-22 RX ADMIN — PANTOPRAZOLE SODIUM 40 MG: 40 TABLET, DELAYED RELEASE ORAL at 06:05

## 2025-05-22 RX ADMIN — ASPIRIN 81 MG CHEWABLE TABLET 81 MG: 81 TABLET CHEWABLE at 09:05

## 2025-05-22 RX ADMIN — IPRATROPIUM BROMIDE AND ALBUTEROL SULFATE 3 ML: 2.5; .5 SOLUTION RESPIRATORY (INHALATION) at 07:05

## 2025-05-22 RX ADMIN — AMOXICILLIN AND CLAVULANATE POTASSIUM 1 TABLET: 875; 125 TABLET, FILM COATED ORAL at 09:05

## 2025-05-22 NOTE — PROGRESS NOTES
05/22/25 0830   Tobacco Cessation Intervention   Do you use any type of tobacco product? Yes   Are you interested in quitting use of tobacco products? Thinking about quitting.   Are you interested in Nicotine Replacement for symptom relief? Yes. Patient stated that she never did receive her nicotine patch while admitted.    $ Smoking Cessation Charges Smoking Cessation - Intermediate (CTTS)

## 2025-05-23 LAB
OHS QRS DURATION: 84 MS
OHS QTC CALCULATION: 430 MS

## 2025-05-24 LAB — BACTERIA BLD CULT: NORMAL

## 2025-05-28 LAB
OHS QRS DURATION: 84 MS
OHS QTC CALCULATION: 448 MS

## 2025-06-03 ENCOUNTER — OFFICE VISIT (OUTPATIENT)
Dept: PULMONOLOGY | Facility: CLINIC | Age: 75
End: 2025-06-03
Payer: MEDICARE

## 2025-06-03 ENCOUNTER — HOSPITAL ENCOUNTER (OUTPATIENT)
Dept: RADIOLOGY | Facility: HOSPITAL | Age: 75
Discharge: HOME | End: 2025-06-03
Attending: NURSE PRACTITIONER
Payer: MEDICARE

## 2025-06-03 ENCOUNTER — RESULTS FOLLOW-UP (OUTPATIENT)
Dept: PULMONOLOGY | Facility: CLINIC | Age: 75
End: 2025-06-03

## 2025-06-03 VITALS
BODY MASS INDEX: 26.11 KG/M2 | HEIGHT: 61 IN | WEIGHT: 138.31 LBS | SYSTOLIC BLOOD PRESSURE: 137 MMHG | HEART RATE: 67 BPM | OXYGEN SATURATION: 98 % | DIASTOLIC BLOOD PRESSURE: 77 MMHG

## 2025-06-03 DIAGNOSIS — J18.0 LOBULAR PNEUMONIA: Primary | ICD-10-CM

## 2025-06-03 DIAGNOSIS — J44.9 CHRONIC OBSTRUCTIVE PULMONARY DISEASE, UNSPECIFIED COPD TYPE: ICD-10-CM

## 2025-06-03 DIAGNOSIS — J18.0 LOBULAR PNEUMONIA: ICD-10-CM

## 2025-06-03 PROCEDURE — 71046 X-RAY EXAM CHEST 2 VIEWS: CPT | Mod: TC

## 2025-06-03 PROCEDURE — 71046 X-RAY EXAM CHEST 2 VIEWS: CPT | Mod: 26,,, | Performed by: RADIOLOGY

## 2025-06-03 PROCEDURE — 3075F SYST BP GE 130 - 139MM HG: CPT | Mod: CPTII,S$GLB,, | Performed by: NURSE PRACTITIONER

## 2025-06-03 PROCEDURE — 1101F PT FALLS ASSESS-DOCD LE1/YR: CPT | Mod: CPTII,S$GLB,, | Performed by: NURSE PRACTITIONER

## 2025-06-03 PROCEDURE — 3008F BODY MASS INDEX DOCD: CPT | Mod: CPTII,S$GLB,, | Performed by: NURSE PRACTITIONER

## 2025-06-03 PROCEDURE — 3288F FALL RISK ASSESSMENT DOCD: CPT | Mod: CPTII,S$GLB,, | Performed by: NURSE PRACTITIONER

## 2025-06-03 PROCEDURE — 1159F MED LIST DOCD IN RCRD: CPT | Mod: CPTII,S$GLB,, | Performed by: NURSE PRACTITIONER

## 2025-06-03 PROCEDURE — 1111F DSCHRG MED/CURRENT MED MERGE: CPT | Mod: CPTII,S$GLB,, | Performed by: NURSE PRACTITIONER

## 2025-06-03 PROCEDURE — 99214 OFFICE O/P EST MOD 30 MIN: CPT | Mod: S$GLB,,, | Performed by: NURSE PRACTITIONER

## 2025-06-03 PROCEDURE — 3078F DIAST BP <80 MM HG: CPT | Mod: CPTII,S$GLB,, | Performed by: NURSE PRACTITIONER

## 2025-06-03 PROCEDURE — 99999 PR PBB SHADOW E&M-EST. PATIENT-LVL IV: CPT | Mod: PBBFAC,,, | Performed by: NURSE PRACTITIONER

## 2025-06-03 PROCEDURE — 3044F HG A1C LEVEL LT 7.0%: CPT | Mod: CPTII,S$GLB,, | Performed by: NURSE PRACTITIONER

## 2025-06-03 RX ORDER — NITROFURANTOIN 25; 75 MG/1; MG/1
100 CAPSULE ORAL 2 TIMES DAILY
COMMUNITY
Start: 2025-06-02

## 2025-06-03 RX ORDER — FLUCONAZOLE 100 MG/1
100 TABLET ORAL DAILY
COMMUNITY
Start: 2025-06-02

## 2025-06-03 RX ORDER — MEPOLIZUMAB 100 MG/ML
100 INJECTION, SOLUTION SUBCUTANEOUS
Qty: 1 EACH | Refills: 11 | Status: ACTIVE | OUTPATIENT
Start: 2025-06-03

## 2025-06-06 ENCOUNTER — TELEPHONE (OUTPATIENT)
Dept: PULMONOLOGY | Facility: CLINIC | Age: 75
End: 2025-06-06
Payer: MEDICARE

## 2025-06-09 ENCOUNTER — TELEPHONE (OUTPATIENT)
Dept: PULMONOLOGY | Facility: CLINIC | Age: 75
End: 2025-06-09
Payer: MEDICARE

## 2025-06-09 NOTE — TELEPHONE ENCOUNTER
Returned call to Alexus. Alexus wanted to go over medical benefits. Explained Alexus requesting pharmacy benefits. Alexus spoke to Jody[facility] and they report pt does not have pharmacy benefits. Reached out to [facility] for status update, [facility] will sign pt up for PAP if they have issues with pt insurance. Reached out to pt daughter Jenifer about pt pharmacy benefits pt daughter states pt has pharmacy benefits. Jenifer explains she is going to look and see if pt has another insurance card and will notify clinic.

## 2025-06-09 NOTE — TELEPHONE ENCOUNTER
Copied from CRM #[id number]. Topic: General Inquiry - Return Call  >> Jun 9, 2025 10:00 AM Sharon wrote:  Type:  Needs Medical Advice    Who Called: makayla   Would the patient rather a call back or a response via [facility]? Call back   Best Call Back Number: [phone]  Additional Information: need pharm benefit info

## 2025-06-10 ENCOUNTER — TELEPHONE (OUTPATIENT)
Dept: PULMONOLOGY | Facility: CLINIC | Age: 75
End: 2025-06-10
Payer: MEDICARE

## 2025-06-10 NOTE — TELEPHONE ENCOUNTER
Call received from pt daughter Jenifer. Jenifer found pt [employer] card. Scanned into pt chart and faxed enrollment form to [place].

## 2025-06-19 ENCOUNTER — OFFICE VISIT (OUTPATIENT)
Dept: NEUROSURGERY | Facility: CLINIC | Age: 75
End: 2025-06-19
Payer: MEDICARE

## 2025-06-19 DIAGNOSIS — G25.0 ESSENTIAL TREMOR: ICD-10-CM

## 2025-06-19 PROCEDURE — 98003 SYNCH AUDIO-VIDEO NEW HI 60: CPT | Mod: 95,,, | Performed by: NEUROLOGICAL SURGERY

## 2025-06-19 PROCEDURE — 3044F HG A1C LEVEL LT 7.0%: CPT | Mod: CPTII,95,, | Performed by: NEUROLOGICAL SURGERY

## 2025-06-19 PROCEDURE — 1111F DSCHRG MED/CURRENT MED MERGE: CPT | Mod: CPTII,95,, | Performed by: NEUROLOGICAL SURGERY

## 2025-06-19 NOTE — PROGRESS NOTES
"Neurosurgery  History & Physical    SUBJECTIVE:     Chief Complaint: tremor    History of Present Illness:  Kristin Guerra is a 75 y.o. right-handed female who presents as a referral from Dr. Gambino for consideration of DBS therapy for tremor. The patient's tremor first began in 2000; she has tremor of both hands (left>right), head, and voice. She has complex history of cervical spine surgery and has residual neck pain as a result.     The left hand is more bothersome; she has a combination of weakness and tremor. She did not respond well to propranolol or primidone. Her father and two of her brothers had tremor.     She is a current smoker (a pack a day) and is on home O2 for COPD and emphysema. She was recently hospitalized for pneumonia.     Goal: "for these hands to stop so I can start writing legibly again and stop dropping everything and maybe talk a little better"     The patient denies any bleeding, infectious, or anesthetic complications with any previous surgery. She had a heart stent placed last year ([facility]; unclear if it is drug-eluting or not), and she has a loop recorder on the left side, so she will need both cardiac clearance prior to surgery and a right chest pulse generator. She is on ASA 81 for the stent.     Review of patient's allergies indicates:   Allergen Reactions    Doxycycline Itching and Swelling     Prescribed 5/2021, took one dose, describes lip swelling, and "itch all over"    Mirtazapine Other (See Comments)     Hyperactivity.    Morphine Swelling and Rash     Other reaction(s): swelling in limbs    Lexapro [escitalopram oxalate] Other (See Comments)     Worsening tremor.  Sedation.    Primidone Other (See Comments)     Syncope.  Other reaction(s): dizziness,hypotension  Other reaction(s): Unknown  Other reaction(s): Unknown    Simvastatin Nausea And Vomiting and Other (See Comments)     Weakness.  Other reaction(s): nausea,vomiting    Beta-adrenergic agents Other " (See Comments)     Low blood pressure and fainted.     Metoprolol succinate Other (See Comments)    Prochlorperazine Other (See Comments)     Other reaction(s): weakness    Topiramate     Compazine [prochlorperazine edisylate] Nausea Only    Tramadol Nausea And Vomiting       Current Medications[1]    Past Medical History:   Diagnosis Date    Allergy     Sterling's esophagus     Colon polyp     Diverticulitis     Diverticulosis     Fatty liver     GERD (gastroesophageal reflux disease)     History of endoscopy 2021    Dr Uzair Cantor  Impression:            - Normal oropharynx.                         - Hiatal hernia.                         - Sterling's esophagus. Treated with radiofrequency                         ablation.                         - Normal stomach.                         - Normal examined duodenum.                         - No specimens collected.     Hx of cervical spine surgery 2018    Hyperlipidemia     Hypertension     Lupus (systemic lupus erythematosus)     discoid    Osteoarthritis     Osteopenia     Syncope     Tremor     essential     Past Surgical History:   Procedure Laterality Date    ANGIOGRAM, CORONARY, WITH LEFT HEART CATHETERIZATION N/A 3/12/2024    Procedure: Angiogram, Coronary, with Left Heart Cath;  Surgeon: Fernando Valdivia MD;  Location: [facility] CATH/EP LAB;  Service: Cardiology;  Laterality: N/A;    APPENDECTOMY      removed during colon surgery    CERVICAL FUSION      CERVICAL SPINE SURGERY      Rods and screws.     SECTION      COLON SURGERY  2007    hemicolectomy    COLON SURGERY  2008    repair    COLONOSCOPY N/A 2018    Procedure: COLONOSCOPY;  Surgeon: Maverick Esquivel MD;  Location: [facility];  Service: Endoscopy;  Laterality: N/A; repeat in 5 years for surveillance    COLONOSCOPY N/A 2023    Procedure: COLONOSCOPY;  Surgeon: Uzair Cantor MD;  Location: [facility];  Service: Endoscopy;  Laterality: N/A;    CYSTOSCOPY N/A  5/26/2021    Procedure: CYSTOSCOPY;  Surgeon: Allison Miranda MD;  Location: [facility] OR;  Service: Urology;  Laterality: N/A;    ESOPHAGOGASTRODUODENOSCOPY N/A 4/2/2019    Procedure: EGD (ESOPHAGOGASTRODUODENOSCOPY);  Surgeon: Monika Steele MD;  Location: [facility];  Service: Endoscopy;  Laterality: N/A;    ESOPHAGOGASTRODUODENOSCOPY  06/04/2019    with RFA and biopsies    ESOPHAGOGASTRODUODENOSCOPY N/A 6/4/2019    Procedure: EGD (ESOPHAGOGASTRODUODENOSCOPY);  Surgeon: Uzair Cantor MD;  Location: [facility];  Service: Endoscopy;  Laterality: N/A;  n+v with anesthesia    ESOPHAGOGASTRODUODENOSCOPY N/A 8/21/2019    Procedure: EGD (ESOPHAGOGASTRODUODENOSCOPY);  Surgeon: Uzair Cantor MD;  Location: [facility];  Service: Endoscopy;  Laterality: N/A;    ESOPHAGOGASTRODUODENOSCOPY N/A 10/7/2019    Procedure: EGD (ESOPHAGOGASTRODUODENOSCOPY);  Surgeon: Uzair Cantor MD;  Location: [facility];  Service: Endoscopy;  Laterality: N/A;    ESOPHAGOGASTRODUODENOSCOPY N/A 2/10/2020    Procedure: EGD (ESOPHAGOGASTRODUODENOSCOPY);  Surgeon: Uzair Cantor MD;  Location: [facility];  Service: Endoscopy;  Laterality: N/A;  Requests earliest procedure time    ESOPHAGOGASTRODUODENOSCOPY N/A 8/17/2020    Procedure: EGD (ESOPHAGOGASTRODUODENOSCOPY);  Surgeon: Uzair Cantor MD;  Location: [facility];  Service: Endoscopy;  Laterality: N/A;  BARRETTS    ESOPHAGOGASTRODUODENOSCOPY N/A 4/7/2021    Procedure: ESOPHAGOGASTRODUODENOSCOPY (EGD);  Surgeon: Uzair Cantor MD;  Location: [facility];  Service: Endoscopy;  Laterality: N/A;  Needs Rapid Covid MP    ESOPHAGOGASTRODUODENOSCOPY N/A 11/30/2021    Procedure: EGD (ESOPHAGOGASTRODUODENOSCOPY);  Surgeon: Uzair Cantor MD;  Location: [facility];  Service: Endoscopy;  Laterality: N/A;    ESOPHAGOGASTRODUODENOSCOPY N/A 6/27/2022    Procedure: EGD (ESOPHAGOGASTRODUODENOSCOPY);  Surgeon: Uzair Cantor MD;  Location: [facility];  Service: Endoscopy;  Laterality:  N/A;  5/19/22: fully vaccinated. instructions mailed-[place]    loop recorder only    ESOPHAGOGASTRODUODENOSCOPY N/A 8/7/2023    Procedure: EGD (ESOPHAGOGASTRODUODENOSCOPY);  Surgeon: Uzair Cantor MD;  Location: [facility] ENDO;  Service: Endoscopy;  Laterality: N/A;    HYSTERECTOMY      ovaries remain    INSERTION OF IMPLANTABLE LOOP RECORDER N/A 7/17/2020    Procedure: INSERTION, IMPLANTABLE LOOP RECORDER (MEDTRONIC);  Surgeon: Karyna Vasques MD;  Location: [facility] CATH/EP LAB;  Service: Cardiology;  Laterality: N/A;    IVUS, CORONARY  3/12/2024    Procedure: IVUS, Coronary;  Surgeon: Fernando Valdivia MD;  Location: [facility] CATH/EP LAB;  Service: Cardiology;;    PERCUTANEOUS CORONARY INTERVENTION, ARTERY N/A 3/12/2024    Procedure: Percutaneous coronary intervention;  Surgeon: Fernando Valdivia MD;  Location: [facility] CATH/EP LAB;  Service: Cardiology;  Laterality: N/A;    SPLENECTOMY, TOTAL  2007    Injured during surgery and removed    UPPER GASTROINTESTINAL ENDOSCOPY  2007    GERD & hiatal hernia per patient report     Family History       Problem Relation (Age of Onset)    Breast cancer Maternal Aunt    Cancer Brother    Colon cancer Father    Heart disease Brother    Hyperlipidemia Brother    Lung cancer Father, Brother, Brother    Lymphoma Mother    Prostate cancer Brother          Social History     Socioeconomic History    Marital status:     Number of children: 2   Tobacco Use    Smoking status: Every Day     Current packs/day: 1.00     Average packs/day: 1 pack/day for 45.5 years (45.5 ttl pk-yrs)     Types: Cigarettes     Start date: 1980     Passive exposure: Current    Smokeless tobacco: Never   Substance and Sexual Activity    Alcohol use: No    Drug use: No    Sexual activity: Not Currently     Social Drivers of Health     Financial Resource Strain: Low Risk  (4/22/2024)    Overall Financial Resource Strain (CARDIA)     Difficulty of Paying Living Expenses: Not hard at all   Food Insecurity: No Food  Insecurity (4/22/2024)    Hunger Vital Sign     Worried About Running Out of Food in the Last Year: Never true     Ran Out of Food in the Last Year: Never true   Transportation Needs: No Transportation Needs (4/22/2024)    PRAPARE - Transportation     Lack of Transportation (Medical): No     Lack of Transportation (Non-Medical): No   Physical Activity: Inactive (4/22/2024)    Exercise Vital Sign     Days of Exercise per Week: 0 days     Minutes of Exercise per Session: 0 min   Stress: Stress Concern Present (4/22/2024)    [employer] [place] [employer] - Occupational Stress Questionnaire     Feeling of Stress : To some extent   Housing Stability: Low Risk  (4/22/2024)    Housing Stability Vital Sign     Unable to Pay for Housing in the Last Year: No     Homeless in the Last Year: No       Review of Systems    OBJECTIVE:     Vital Signs     There is no height or weight on file to calculate BMI.      Physical Exam:    Constitutional: She appears well-developed and well-nourished.     Eyes: EOM are normal.     Skin: Skin displays no rash on extremities. Skin displays no lesions on extremities.     Psych/Behavior: She is alert. She is oriented to person, place, and time.     Musculoskeletal:      Comments: No focal weakness on video visit     Neurological:   Left > right tremor     Pulmonary: labored respirations     Hematologic: no bruising noted       Diagnostic Results:  MRI brain without contrast or fine cuts personally reviewed   Chronic microvascular ischemic changes     ASSESSMENT/PLAN:     Kristin Guerra is a 75 y.o. female who presents as a referral from Dr. Gambino for consideration of DBS therapy for Parkinson disease. She has multiple medical co-morbidities that will require early optimization by Dr. Palacios prior to proceeding with surgery. I would like to schedule in-person neuropsych testing and a visit with Dr. Palacios on the same day.  She will need to hold ASA x10 days prior to surgery. She  will need to be discussed further in interdisciplinary conference, but I would recommend she be staged on two different dates, starting with right Vim.     We had a lengthy discussion about the risks, benefits, and alternatives to deep brain stimulation.  Risks include, but are not limited to, bleeding, pain, infection, scarring, need for further/repeat procedure, failure to slow the progression of neurodegenerative disease symptoms, speech difficulty, balance difficulty, cognitive changes, loss of inhibition, intracranial hemorrhage, venous infarct, seizure, stroke, paralysis, and death. Hardware-related complications may also occur. This is an implanted device, meaning that any infection elsewhere in the body must be treated immediately to minimize the risk of blood stream infection seeding the device. Should this happen, it is possible that the entire system would require removal.  We also discussed that some patients become refractory to stimulation for ET about 5-10 years after implantation; the mechanism of this is not well understood but is an area of ongoing research.      We also reviewed the work flow employed at [facility] for placement of deep brain stimulation devices.  We discussed that the patient would be admitted Tuesday morning for placement of 5 fiducial screws.  We clarified this would require shaving the entire head.  After the fiducial screws are placed, the patient would be transferred to CT scan to obtain a fiducial registration CT scan.  This would be merged with the already obtained MRI.  The patient would be brought back to the operating room for definitive placement of the DBS lead.  This is to be done while awake. The patient expressed understanding thereof. The following week, the patient would be readmitted for asleep implantation of extension cable and pulse generator on an outpatient basis.  The device would subsequently be programmed by movement disorders neurology.      I have  encouraged the patient to contact the clinic in interim with any questions, concerns, or adverse clinical change.     I spent over an hour on this encounter, more than half in direct consultation.     The patient location is: at home   The chief complaint leading to consultation is: tremor     Visit type: audiovisual    Face to Face time with patient: 35 (including telephone)   62 minutes of total time spent on the encounter, which includes face to face time and non-face to face time preparing to see the patient (eg, review of tests), Obtaining and/or reviewing separately obtained history, Documenting clinical information in the electronic or other health record, Independently interpreting results (not separately reported) and communicating results to the patient/family/caregiver, or Care coordination (not separately reported).         Each patient to whom he or she provides medical services by telemedicine is:  (1) informed of the relationship between the physician and patient and the respective role of any other health care provider with respect to management of the patient; and (2) notified that he or she may decline to receive medical services by telemedicine and may withdraw from such care at any time.    Notes:            [1]   Current Outpatient Medications   Medication Sig Dispense Refill    albuterol (PROVENTIL) 2.5 mg /3 mL (0.083 %) nebulizer solution Take 3 mLs (2.5 mg total) by nebulization every 6 (six) hours as needed for Wheezing or Shortness of Breath. Rescue 75 mL 2    albuterol (VENTOLIN HFA) 90 mcg/actuation inhaler Inhale 2 puffs into the lungs every 4 (four) hours as needed. Rescue 18 g 11    aluminum & magnesium hydroxide-simethicone (MYLANTA MAX STRENGTH) 400-400-40 mg/5 mL suspension Take 5 mLs by mouth every 6 (six) hours as needed for Indigestion.       amLODIPine (NORVASC) 5 MG tablet Take 1 tablet (5 mg total) by mouth once daily. 90 tablet 0    ascorbic acid, vitamin C, 500 mg Cap Take  500 mg by mouth once daily.      aspirin 81 MG Chew Take 81 mg by mouth once daily.      b complex vitamins capsule Take 1 capsule by mouth once daily.      cholecalciferol, vitamin D3, (VITAMIN D3) 25 mcg (1,000 unit) capsule Take 1,000 Units by mouth once daily.      esomeprazole (NEXIUM) 40 MG capsule TAKE ONE CAPSULE BY MOUTH TWICE DAILY (Patient taking differently: Take 40 mg by mouth once daily.) 180 capsule 3    ezetimibe (ZETIA) 10 mg tablet Take 1 tablet (10 mg total) by mouth every evening. 90 tablet 3    fluconazole (DIFLUCAN) 100 MG tablet Take 100 mg by mouth once daily. 1 tab oral every 3 days for 2 days      gabapentin (NEURONTIN) 300 MG capsule TAKE ONE CAPSULE BY MOUTH EVERY EVENING 90 capsule 0    mepolizumab (NUCALA) 100 mg/mL AtIn Inject 1 mL (100 mg total) into the skin every 28 days. 1 each 11    MYRBETRIQ 50 mg Tb24 Take 1 tablet by mouth once daily.      nitrofurantoin, macrocrystal-monohydrate, (MACROBID) 100 MG capsule Take 100 mg by mouth 2 (two) times daily.      nitroGLYCERIN (NITROSTAT) 0.4 MG SL tablet Place 0.4 mg under the tongue every 5 (five) minutes as needed for Chest pain.      polyethylene glycol (MIRALAX) 17 gram/dose powder Take 17 g by mouth once daily.      traZODone (DESYREL) 50 MG tablet Take 50 mg by mouth nightly as needed for Insomnia.       No current facility-administered medications for this visit.

## 2025-06-27 ENCOUNTER — TELEPHONE (OUTPATIENT)
Dept: NEUROLOGY | Facility: CLINIC | Age: 75
End: 2025-06-27
Payer: MEDICARE

## (undated) DEVICE — SHEATH INTRODUCER 5FR 10CM

## (undated) DEVICE — CATH GUIDE JUDKINS RIGHT 4.0

## (undated) DEVICE — KIT MINI STICK MAX 5F 21GX10CM

## (undated) DEVICE — GLOVE SURGEONS ULTRA TOUCH 6.5

## (undated) DEVICE — SEE ITEM #152308

## (undated) DEVICE — BLLN SC EUPHORA RX 2.00MMX12MM

## (undated) DEVICE — GUIDEWIRE RUNTHROUGH EF 180CM

## (undated) DEVICE — KIT GLIDESHEATH SLEND 6FR 10CM

## (undated) DEVICE — Device

## (undated) DEVICE — CATH DXTERITY JR40 100CM 5FR

## (undated) DEVICE — GUIDEWIRE INQWIRE SE 3MM JTIP

## (undated) DEVICE — SHEATH INTRODUCER 6FR 11CM

## (undated) DEVICE — CATH EAGLE EYE PLATINUM

## (undated) DEVICE — CATH DXTERITY JL35 100CM 5FR

## (undated) DEVICE — WATER STERILE INJ 500ML BAG

## (undated) DEVICE — SET CYSTO IRRIGATION UNIV SPIK

## (undated) DEVICE — INFLATOR ADVANTAGE ENCORE 26